# Patient Record
Sex: FEMALE | Race: BLACK OR AFRICAN AMERICAN | Employment: PART TIME | URBAN - METROPOLITAN AREA
[De-identification: names, ages, dates, MRNs, and addresses within clinical notes are randomized per-mention and may not be internally consistent; named-entity substitution may affect disease eponyms.]

---

## 2018-06-20 ENCOUNTER — HOSPITAL ENCOUNTER (EMERGENCY)
Age: 43
Discharge: HOME | End: 2018-06-20
Attending: EMERGENCY MEDICINE
Payer: SUBSIDIZED

## 2018-06-20 ENCOUNTER — HOSPITAL ENCOUNTER (EMERGENCY)
Age: 43
Discharge: HOME | End: 2018-06-21
Attending: EMERGENCY MEDICINE
Payer: SUBSIDIZED

## 2018-06-20 VITALS
TEMPERATURE: 99 F | HEART RATE: 91 BPM | HEIGHT: 63 IN | OXYGEN SATURATION: 99 % | WEIGHT: 246.6 LBS | DIASTOLIC BLOOD PRESSURE: 75 MMHG | SYSTOLIC BLOOD PRESSURE: 126 MMHG | BODY MASS INDEX: 43.7 KG/M2 | RESPIRATION RATE: 18 BRPM

## 2018-06-20 DIAGNOSIS — F41.1 ANXIETY STATE: Primary | ICD-10-CM

## 2018-06-20 PROCEDURE — 99285 EMERGENCY DEPT VISIT HI MDM: CPT

## 2018-06-20 PROCEDURE — 90791 PSYCH DIAGNOSTIC EVALUATION: CPT

## 2018-06-20 PROCEDURE — 93005 ELECTROCARDIOGRAM TRACING: CPT | Performed by: EMERGENCY MEDICINE

## 2018-06-20 PROCEDURE — 93005 ELECTROCARDIOGRAM TRACING: CPT

## 2018-06-20 PROCEDURE — 74011250637 HC RX REV CODE- 250/637: Performed by: EMERGENCY MEDICINE

## 2018-06-20 PROCEDURE — 99283 EMERGENCY DEPT VISIT LOW MDM: CPT

## 2018-06-20 RX ORDER — HYDROXYZINE 50 MG/1
50 TABLET, FILM COATED ORAL
Status: COMPLETED | OUTPATIENT
Start: 2018-06-20 | End: 2018-06-20

## 2018-06-20 RX ORDER — CLONIDINE HYDROCHLORIDE 0.1 MG/1
0.2 TABLET ORAL
Status: COMPLETED | OUTPATIENT
Start: 2018-06-20 | End: 2018-06-20

## 2018-06-20 RX ORDER — HYDROXYZINE 25 MG/1
25 TABLET, FILM COATED ORAL
Qty: 12 TAB | Refills: 0 | Status: SHIPPED | OUTPATIENT
Start: 2018-06-20 | End: 2018-06-22 | Stop reason: SDUPTHER

## 2018-06-20 RX ORDER — LORAZEPAM 1 MG/1
1 TABLET ORAL
Status: COMPLETED | OUTPATIENT
Start: 2018-06-20 | End: 2018-06-20

## 2018-06-20 RX ADMIN — LORAZEPAM 1 MG: 1 TABLET ORAL at 21:55

## 2018-06-20 RX ADMIN — CLONIDINE HYDROCHLORIDE 0.2 MG: 0.1 TABLET ORAL at 23:20

## 2018-06-20 RX ADMIN — HYDROXYZINE HYDROCHLORIDE 50 MG: 50 TABLET, FILM COATED ORAL at 20:59

## 2018-06-20 NOTE — ED PROVIDER NOTES
HPI       41y F with hx of HTN, DM, anxiety here with concern for anxiety. States she recently lost a job and has been stressed out. 4 days ago she started pulling her hair out. Hx of similar a few years ago. At that time was placed on anxiety meds but then taken off by her doctor. Hasn't really had recurrence of problems until recently. No SI or HI. No paranoia. No AH/VH. No complaints of pain. No trouble breathing. Nothing makes sx's better or worse. Past Medical History:   Diagnosis Date    Anxiety     Depression     Diabetes ([facility] [address]     Hypertension     Panic attack     Suicidal thoughts        History reviewed. No pertinent surgical history. History reviewed. No pertinent family history. Social History     Social History    Marital status: SINGLE     Spouse name: N/A    Number of children: N/A    Years of education: N/A     Occupational History    Not on file. Social History Main Topics    Smoking status: Never Smoker    Smokeless tobacco: Not on file    Alcohol use No    Drug use: No    Sexual activity: Not on file     Other Topics Concern    Not on file     Social History Narrative         ALLERGIES: Lisinopril    Review of Systems  Review of Systems   Constitutional: (-) weight loss. HEENT: (-) stiff neck   Eyes: (-) discharge. Respiratory: (-) for cough. Cardiovascular: (-) syncope. Gastrointestinal: (-) blood in stool. Genitourinary: (-) hematuria. Musculoskeletal: (-) myalgias. Neurological: (-) seizure. Skin: (-) petechiae  Lymph/Immunologic: (-) enlarged lymph nodes  All other systems reviewed and are negative. Vitals:    06/20/18 1208   BP: 126/75   Pulse: 91   Resp: 18   Temp: 99 °F (37.2 °C)   SpO2: 99%   Weight: 111.9 kg (246 lb 9.6 oz)   Height: 5' 3\" (1.6 m)            Physical Exam Nursing note and vitals reviewed. Constitutional: oriented to person, place, and time. appears well-developed and well-nourished. No distress.   Head: Normocephalic and atraumatic. Sclera anicteric  Nose: No rhinorrhea  Mouth/Throat: Oropharynx is clear and moist. Pharynx normal  Eyes: Conjunctivae are normal. Pupils are equal, round, and reactive to light. Right eye exhibits no discharge. Left eye exhibits no discharge. Neck: Painless normal range of motion. Neck supple. No LAD. Cardiovascular: Normal rate, regular rhythm, normal heart sounds and intact distal pulses. Exam reveals no gallop and no friction rub. No murmur heard. Pulmonary/Chest:  No respiratory distress. No wheezes. No rales. No rhonchi. No increased work of breathing. No accessory muscle use. Good air exchange throughout. Abdominal: soft, non-tender, no rebound or guarding. No hepatosplenomegaly. Normal bowel sounds throughout. Back: no tenderness to palpation, no deformities, no CVA tenderness  Extremities/Musculoskeletal: Normal range of motion. no tenderness. No edema. Distal extremities are neurovasc intact. Lymphadenopathy:   No adenopathy. Neurological:  Alert and oriented to person, place, and time. Coordination normal. CN 2-12 intact. Motor and sensory function intact. Skin: Skin is warm and dry. No rash noted. No pallor. MDM 41y F here with anxiety. Hx of similar controlled with meds but then taken off by her doctor. Doesn't recall what she had been on in the past. No SI/HI. Will have [facility] evaluate.       ED Course       Procedures

## 2018-06-20 NOTE — ED TRIAGE NOTES
Pt states she needs a refill on her psychiatric medications. Can't be seen by her MD til July. States she does not know the name of the medications. States she was taken off her medications by her PCP back in 2016 but feels like she needs to be on the medications. Complains of anxiety and that she has been pulling her hair.

## 2018-06-20 NOTE — DISCHARGE INSTRUCTIONS
Anxiety Disorder: Care Instructions  Your Care Instructions    Anxiety is a normal reaction to stress. Difficult situations can cause you to have symptoms such as sweaty palms and a nervous feeling. In an anxiety disorder, the symptoms are far more severe. Constant worry, muscle tension, trouble sleeping, nausea and diarrhea, and other symptoms can make normal daily activities difficult or impossible. These symptoms may occur for no reason, and they can affect your work, school, or social life. Medicines, counseling, and self-care can all help. Follow-up care is a key part of your treatment and safety. Be sure to make and go to all appointments, and call your doctor if you are having problems. It's also a good idea to know your test results and keep a list of the medicines you take. How can you care for yourself at home? · Take medicines exactly as directed. Call your doctor if you think you are having a problem with your medicine. · Go to your counseling sessions and follow-up appointments. · Recognize and accept your anxiety. Then, when you are in a situation that makes you anxious, say to yourself, \"This is not an emergency. I feel uncomfortable, but I am not in danger. I can keep going even if I feel anxious. \"  · Be kind to your body:  ¨ Relieve tension with exercise or a massage. ¨ Get enough rest.  ¨ Avoid alcohol, caffeine, nicotine, and illegal drugs. They can increase your anxiety level and cause sleep problems. ¨ Learn and do relaxation techniques. See below for more about these techniques. · Engage your mind. Get out and do something you enjoy. Go to a funny movie, or take a walk or hike. Plan your day. Having too much or too little to do can make you anxious. · Keep a record of your symptoms. Discuss your fears with a good friend or family member, or join a support group for people with similar problems. Talking to others sometimes relieves stress.   · Get involved in social groups, or volunteer to help others. Being alone sometimes makes things seem worse than they are. · Get at least 30 minutes of exercise on most days of the week to relieve stress. Walking is a good choice. You also may want to do other activities, such as running, swimming, cycling, or playing tennis or team sports. Relaxation techniques  Do relaxation exercises 10 to 20 minutes a day. You can play soothing, relaxing music while you do them, if you wish. · Tell others in your house that you are going to do your relaxation exercises. Ask them not to disturb you. · Find a comfortable place, away from all distractions and noise. · Lie down on your back, or sit with your back straight. · Focus on your breathing. Make it slow and steady. · Breathe in through your nose. Breathe out through either your nose or mouth. · Breathe deeply, filling up the area between your navel and your rib cage. Breathe so that your belly goes up and down. · Do not hold your breath. · Breathe like this for 5 to 10 minutes. Notice the feeling of calmness throughout your whole body. As you continue to breathe slowly and deeply, relax by doing the following for another 5 to 10 minutes:  · Tighten and relax each muscle group in your body. You can begin at your toes and work your way up to your head. · Imagine your muscle groups relaxing and becoming heavy. · Empty your mind of all thoughts. · Let yourself relax more and more deeply. · Become aware of the state of calmness that surrounds you. · When your relaxation time is over, you can bring yourself back to alertness by moving your fingers and toes and then your hands and feet and then stretching and moving your entire body. Sometimes people fall asleep during relaxation, but they usually wake up shortly afterward. · Always give yourself time to return to full alertness before you drive a car or do anything that might cause an accident if you are not fully alert.  Never play a relaxation tape while you drive a car. When should you call for help? Call 911 anytime you think you may need emergency care. For example, call if:  ? · You feel you cannot stop from hurting yourself or someone else. ? Keep the numbers for these national suicide hotlines: 0-092-705-TALK (8-857-248-675.180.4469) and 8-469-XVQOUXN (9-743.488.9201). If you or someone you know talks about suicide or feeling hopeless, get help right away. ? Watch closely for changes in your health, and be sure to contact your doctor if:  ? · You have anxiety or fear that affects your life. ? · You have symptoms of anxiety that are new or different from those you had before. Where can you learn more? Go to http://scott-clara.info/. Enter P754 in the search box to learn more about \"Anxiety Disorder: Care Instructions. \"  Current as of: May 12, 2017  Content Version: 11.4  © 0099-0138 Healthwise, Incorporated. Care instructions adapted under license by [employer] (which disclaims liability or warranty for this information). If you have questions about a medical condition or this instruction, always ask your healthcare professional. [address] any warranty or liability for your use of this information.

## 2018-06-20 NOTE — ED NOTES
12:10 PM  I have evaluated the patient as the Provider in Triage. I have reviewed Her vital signs and the triage nurse assessment. I have talked with the patient and any available family and advised that I am the provider in triage and have ordered the appropriate study to initiate their work up based on the clinical presentation during my assessment. I have advised that the patient will be accommodated in the Main ED as soon as possible. I have also requested to contact the triage nurse or myself immediately if the patient experiences any changes in their condition during this brief waiting period. The patient reports anxiety and hair-pulling for the past 4 days. She reports being admitted 2 years ago for similar symptoms but has not been on any medication for the past 2 years and is concerned she needs to be back on medication. No SI/HI.   AQUILINO Hernandez

## 2018-06-20 NOTE — BSM[facility] NOTE
Comprehensive Assessment Form Part 1      Section I - Disposition    Axis I - Anxiety Unspecified, Trichotillomania   Axis II - Deferred  Axis III -   Past Medical History:   Diagnosis Date    Anxiety     Depression     Diabetes ([address]     Hypertension     Panic attack     Suicidal thoughts        Axis IV - Loss of job  [place] [address] 46      The Medical Doctor to Psychiatrist conference was not completed. The Medical Doctor is in agreement with Psychiatrist disposition because of (reason) Patient does not require admission. The plan is discharge with resources to follow up. The on-call Psychiatrist consulted was Dr. Brenna Sarkar. The admitting Psychiatrist will be Dr. Tanner Sotomayor. The admitting Diagnosis is NA. The Payor source is self pay. Section II - Integrated Summary  Summary:  Patient came in accompanied by her mother due to increased anxiety and hair pulling since Saturday. Patient has history of same. Patient also has history of depression and was admitted here in 2016. Patient was discharged on Prozac, Clonipin, Risperdal, Hydroxyzine prn and Ambien prn. Per patient she has not taken medications since 2016 because she had been doing well. Patient reported she called her previous psychiatrist and can't be seen until September. Patient is alert and oriented. Mood is anxious and she reported poor sleep the last couple nights. Patient denied any current hallucinations, SI or HI. The patienthas demonstrated mental capacity to provide informed consent. The information is given by the patient and past medical records. The Chief Complaint is anxiety and hair pulling. The Precipitant Factors are loss of job. Previous Hospitalizations: Yes  Current Psychiatrist and/or  is NA. Lethality Assessment:    The potential for suicide noted by the following: Patient denied any SI . The potential for homicide is not noted. The patient has not been a perpetrator of sexual or physical abuse.   There are not pending charges. The patient is not felt to be at risk for self harm or harm to others. The attending nurse was advised that security has not been notified. Section III - Psychosocial  The patient's overall mood and attitude is anxious. Feelings of helplessness and hopelessness are not observed. Generalized anxiety is not observed. Panic is not observed. Phobias are not observed. Obsessive compulsive tendencies are not observed. Section IV - Mental Status Exam  The patient's appearance shows no evidence of impairment. The patient's behavior shows no evidence of impairment. The patient is oriented to time, place, person and situation. The patient's speech shows no evidence of impairment. The patient's mood is anxious. The range of affect shows no evidence of impairment. The patient's thought content demonstrates no evidence of impairment. The thought process shows no evidence of impairment. The patient's perception shows no evidence of impairment. The patient's memory shows no evidence of impairment. The patient's appetite shows no evidence of impairment. The patient's sleep has evidence of insomnia. The patient shows no insight. The patient's judgement is psychologically impaired. Section V - Substance Abuse  The patient is not using substances. Section VI - Living Arrangements  The patient is single. The patient lives alone. The patient has no children. The patient does plan to return home upon discharge. The patient does not have legal issues pending. The patient's source of income comes from [employer]. Buddhist and cultural practices have not been voiced at this time. The patient's greatest support comes from family and this person will not be involved with the treatment.     The patient has not been in an event described as horrible or outside the realm of ordinary life experience either currently or in the past.  The patient has not been a victim of sexual/physical abuse. Section VII - Other Areas of Clinical Concern  The highest grade achieved is college with the overall quality of school experience being described as NA. The patient is currently unemployed and speaks [place] as a primary language. The patient has no communication impairments affecting communication. The patient's preference for learning can be described as: can read and write adequately. The patient's hearing is normal.  The patient's vision is impaired and  wears glasses or contacts.       Jose Mejias, LPC

## 2018-06-21 VITALS
WEIGHT: 250.6 LBS | OXYGEN SATURATION: 94 % | SYSTOLIC BLOOD PRESSURE: 133 MMHG | RESPIRATION RATE: 16 BRPM | BODY MASS INDEX: 44.39 KG/M2 | DIASTOLIC BLOOD PRESSURE: 85 MMHG | TEMPERATURE: 98.2 F | HEART RATE: 80 BPM

## 2018-06-21 LAB
ATRIAL RATE: 99 BPM
CALCULATED P AXIS, ECG09: 46 DEGREES
CALCULATED R AXIS, ECG10: -9 DEGREES
CALCULATED T AXIS, ECG11: 29 DEGREES
DIAGNOSIS, 93000: NORMAL
P-R INTERVAL, ECG05: 156 MS
Q-T INTERVAL, ECG07: 350 MS
QRS DURATION, ECG06: 72 MS
QTC CALCULATION (BEZET), ECG08: 449 MS
VENTRICULAR RATE, ECG03: 99 BPM

## 2018-06-21 NOTE — DISCHARGE INSTRUCTIONS
Anxiety Disorder: Care Instructions  Your Care Instructions    Anxiety is a normal reaction to stress. Difficult situations can cause you to have symptoms such as sweaty palms and a nervous feeling. In an anxiety disorder, the symptoms are far more severe. Constant worry, muscle tension, trouble sleeping, nausea and diarrhea, and other symptoms can make normal daily activities difficult or impossible. These symptoms may occur for no reason, and they can affect your work, school, or social life. Medicines, counseling, and self-care can all help. Follow-up care is a key part of your treatment and safety. Be sure to make and go to all appointments, and call your doctor if you are having problems. It's also a good idea to know your test results and keep a list of the medicines you take. How can you care for yourself at home? · Take medicines exactly as directed. Call your doctor if you think you are having a problem with your medicine. · Go to your counseling sessions and follow-up appointments. · Recognize and accept your anxiety. Then, when you are in a situation that makes you anxious, say to yourself, \"This is not an emergency. I feel uncomfortable, but I am not in danger. I can keep going even if I feel anxious. \"  · Be kind to your body:  ¨ Relieve tension with exercise or a massage. ¨ Get enough rest.  ¨ Avoid alcohol, caffeine, nicotine, and illegal drugs. They can increase your anxiety level and cause sleep problems. ¨ Learn and do relaxation techniques. See below for more about these techniques. · Engage your mind. Get out and do something you enjoy. Go to a funny movie, or take a walk or hike. Plan your day. Having too much or too little to do can make you anxious. · Keep a record of your symptoms. Discuss your fears with a good friend or family member, or join a support group for people with similar problems. Talking to others sometimes relieves stress.   · Get involved in social groups, or volunteer to help others. Being alone sometimes makes things seem worse than they are. · Get at least 30 minutes of exercise on most days of the week to relieve stress. Walking is a good choice. You also may want to do other activities, such as running, swimming, cycling, or playing tennis or team sports. Relaxation techniques  Do relaxation exercises 10 to 20 minutes a day. You can play soothing, relaxing music while you do them, if you wish. · Tell others in your house that you are going to do your relaxation exercises. Ask them not to disturb you. · Find a comfortable place, away from all distractions and noise. · Lie down on your back, or sit with your back straight. · Focus on your breathing. Make it slow and steady. · Breathe in through your nose. Breathe out through either your nose or mouth. · Breathe deeply, filling up the area between your navel and your rib cage. Breathe so that your belly goes up and down. · Do not hold your breath. · Breathe like this for 5 to 10 minutes. Notice the feeling of calmness throughout your whole body. As you continue to breathe slowly and deeply, relax by doing the following for another 5 to 10 minutes:  · Tighten and relax each muscle group in your body. You can begin at your toes and work your way up to your head. · Imagine your muscle groups relaxing and becoming heavy. · Empty your mind of all thoughts. · Let yourself relax more and more deeply. · Become aware of the state of calmness that surrounds you. · When your relaxation time is over, you can bring yourself back to alertness by moving your fingers and toes and then your hands and feet and then stretching and moving your entire body. Sometimes people fall asleep during relaxation, but they usually wake up shortly afterward. · Always give yourself time to return to full alertness before you drive a car or do anything that might cause an accident if you are not fully alert.  Never play a relaxation tape while you drive a car. When should you call for help? Call 911 anytime you think you may need emergency care. For example, call if:  ? · You feel you cannot stop from hurting yourself or someone else. ? Keep the numbers for these national suicide hotlines: 4-634-469-TALK (6-566.126.4127) and 0-493-AWZPZES (3-209.189.2315). If you or someone you know talks about suicide or feeling hopeless, get help right away. ? Watch closely for changes in your health, and be sure to contact your doctor if:  ? · You have anxiety or fear that affects your life. ? · You have symptoms of anxiety that are new or different from those you had before. Where can you learn more? Go to http://scott-clara.info/. Enter P754 in the search box to learn more about \"Anxiety Disorder: Care Instructions. \"  Current as of: May 12, 2017  Content Version: 11.4  © 3769-6631 Healthwise, Incorporated. Care instructions adapted under license by [employer] (which disclaims liability or warranty for this information). If you have questions about a medical condition or this instruction, always ask your healthcare professional. [address] any warranty or liability for your use of this information.

## 2018-06-21 NOTE — ED TRIAGE NOTES
TRIAGE NOTE: Pt arrives for pulling her hair and panic attacks that have been worsening. Mother states she's been pulling her hair out in large clumps and having violent panic attacks. Seen earlier for same, took prescribed hydroxyzine without relief.
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
## 2018-06-21 NOTE — BSMART NOTE
Comprehensive Assessment Form Part 1        Section I - Disposition     Axis I - Anxiety Unspecified, Trichotillomania   Axis II - Deferred  Axis III -        Past Medical History:   Diagnosis Date    Anxiety      Depression      Diabetes ([facility] [address]      Hypertension      Panic attack      Suicidal thoughts           Axis IV - Unemployed (6/15/18)  Axis V - 50        The Medical Doctor to Psychiatrist conference was not completed. The Medical Doctor is in agreement with Psychiatrist disposition because of (reason) Patient is seeking a voluntary admission, patient does not warrant admission criteria. The plan continue medications as given connect with psychiatrist. Patient given information for [employer]. The on-call Psychiatrist consulted was Dr. Yahaira Castro. The admitting Psychiatrist will be  .  The admitting Diagnosis is   The Payor source is self pay. Section II - Integrated Summary  Summary:   Patient is 38year old  female reporting to ED for the second time to day with compliant  Pt arrives for pulling her hair and panic attacks that have been worsening. Mother states she's been pulling her hair out in large clumps and having violent panic attacks. Seen earlier for same, took prescribed hydroxyzine without relief. As reported patient was given prescription for Hydroxyzine and after taking did not have relief so she returned. At bedside, patient denied suicidal, homicidal thoughts and hallucinations. Patient stated she was scared because she could not stop what is going on. Patient acknowledged that she was hurting herself by as reported hitting herself in head and pulling at her hair. Patient reported that she lost her job on Friday 6/15/18 and symptoms started Saturday as she was away from home to visit parents in [place]. As reported by her mother things have been worse since Sunday evening and Monday.  Patient as reported has had similar problem in past. Patient was hospitalized last in 2016. Patient does not have psychiatrist at this time. Patient reported difficulty sleeping. Patient reported she cannot calm her mind. Patient lives alone but has her mother here from out of town. Patient stated she cannot get a hold it. The patienthas demonstrated mental capacity to provide informed consent. The information is given by the patient and past medical records. The Chief Complaint is anxiety and hair pulling. The Precipitant Factors are lost her job. Previous Hospitalizations: Yes  Current Psychiatrist and/or  is NA.     Lethality Assessment:     The potential for suicide noted by the following: Patient denied any SI . The potential for homicide is not noted. The patient has not been a perpetrator of sexual or physical abuse. There are not pending charges. The patient is not felt to be at risk for self harm or harm to others. The attending nurse was advised that security has not been notified.     Section III - Psychosocial  The patient's overall mood and attitude is anxious. Feelings of helplessness and hopelessness are not observed. Generalized anxiety is not observed. Panic is not observed. Phobias are not observed. Obsessive compulsive tendencies are not observed.       Section IV - Mental Status Exam  The patient's appearance shows no evidence of impairment. The patient's behavior shows no evidence of impairment. The patient is oriented to time, place, person and situation. The patient's speech shows no evidence of impairment. The patient's mood is anxious. The range of affect shows no evidence of impairment. The patient's thought content demonstrates no evidence of impairment. The thought process shows no evidence of impairment. The patient's perception shows no evidence of impairment. The patient's memory shows no evidence of impairment. The patient's appetite shows no evidence of impairment. The patient's sleep has evidence of insomnia.  The patient shows no insight. The patient's judgement is psychologically impaired.                         Section V - Substance Abuse  The patient is not using substances.       Section VI - Living Arrangements  The patient is single. The patient lives alone. The patient has no children. The patient does plan to return home upon discharge. The patient does not have legal issues pending. The patient's source of income comes from [employer]. Rastafari and cultural practices have not been voiced at this time.     The patient's greatest support comes from family and this person will not be involved with the treatment. The patient has not been in an event described as horrible or outside the realm of ordinary life experience either currently or in the past.  The patient has not been a victim of sexual/physical abuse.     Section VII - Other Areas of Clinical Concern  The highest grade achieved is college with the overall quality of school experience being described as NA. The patient is currently unemployed and speaks [place] as a primary language. The patient has no communication impairments affecting communication. The patient's preference for learning can be described as: can read and write adequately. The patient's hearing is normal.  The patient's vision is impaired and  wears glasses or contacts.

## 2018-06-21 NOTE — ED NOTES
Patient has been medically cleared for discharge at this time. She has been given all her discharge papers. Told to follow up with psychiatrist in AM. Patient assisted out of the department and taken home by her mother.

## 2018-06-22 ENCOUNTER — HOSPITAL ENCOUNTER (INPATIENT)
Age: 43
LOS: 5 days | Discharge: HOME | DRG: 885 | End: 2018-06-27
Attending: EMERGENCY MEDICINE | Admitting: PSYCHIATRY & NEUROLOGY
Payer: SUBSIDIZED

## 2018-06-22 DIAGNOSIS — F51.04 PSYCHOPHYSIOLOGICAL INSOMNIA: ICD-10-CM

## 2018-06-22 DIAGNOSIS — F29 PSYCHOSIS, UNSPECIFIED PSYCHOSIS TYPE (HCC): Primary | ICD-10-CM

## 2018-06-22 DIAGNOSIS — F41.0 SEVERE ANXIETY WITH PANIC: ICD-10-CM

## 2018-06-22 LAB
ALBUMIN SERPL-MCNC: 3.5 G/DL (ref 3.5–5)
ALBUMIN/GLOB SERPL: 0.8 {RATIO} (ref 1.1–2.2)
ALP SERPL-CCNC: 82 U/L (ref 45–117)
ALT SERPL-CCNC: 41 U/L (ref 12–78)
AMPHET UR QL SCN: NEGATIVE
ANION GAP SERPL CALC-SCNC: 11 MMOL/L (ref 5–15)
APAP SERPL-MCNC: <2 UG/ML (ref 10–30)
APPEARANCE UR: CLEAR
AST SERPL-CCNC: 28 U/L (ref 15–37)
BACTERIA URNS QL MICRO: NEGATIVE /HPF
BARBITURATES UR QL SCN: NEGATIVE
BASOPHILS # BLD: 0 K/UL (ref 0–0.1)
BASOPHILS NFR BLD: 1 % (ref 0–1)
BENZODIAZ UR QL: NEGATIVE
BILIRUB SERPL-MCNC: 0.4 MG/DL (ref 0.2–1)
BILIRUB UR QL: NEGATIVE
BUN SERPL-MCNC: 9 MG/DL (ref 6–20)
BUN/CREAT SERPL: 10 (ref 12–20)
CALCIUM SERPL-MCNC: 9.1 MG/DL (ref 8.5–10.1)
CANNABINOIDS UR QL SCN: NEGATIVE
CHLORIDE SERPL-SCNC: 104 MMOL/L (ref 97–108)
CO2 SERPL-SCNC: 23 MMOL/L (ref 21–32)
COCAINE UR QL SCN: NEGATIVE
COLOR UR: NORMAL
CREAT SERPL-MCNC: 0.94 MG/DL (ref 0.55–1.02)
DIFFERENTIAL METHOD BLD: ABNORMAL
DRUG SCRN COMMENT,DRGCM: NORMAL
EOSINOPHIL # BLD: 0.1 K/UL (ref 0–0.4)
EOSINOPHIL NFR BLD: 1 % (ref 0–7)
EPITH CASTS URNS QL MICRO: NORMAL /LPF
ERYTHROCYTE [DISTWIDTH] IN BLOOD BY AUTOMATED COUNT: 14.6 % (ref 11.5–14.5)
ETHANOL SERPL-MCNC: <10 MG/DL
GLOBULIN SER CALC-MCNC: 4.6 G/DL (ref 2–4)
GLUCOSE SERPL-MCNC: 131 MG/DL (ref 65–100)
GLUCOSE UR STRIP.AUTO-MCNC: NEGATIVE MG/DL
HCG SERPL QL: NEGATIVE
HCT VFR BLD AUTO: 37.2 % (ref 35–47)
HGB BLD-MCNC: 11.9 G/DL (ref 11.5–16)
HGB UR QL STRIP: NEGATIVE
HYALINE CASTS URNS QL MICRO: NORMAL /LPF (ref 0–5)
IMM GRANULOCYTES # BLD: 0 K/UL (ref 0–0.04)
IMM GRANULOCYTES NFR BLD AUTO: 0 % (ref 0–0.5)
KETONES UR QL STRIP.AUTO: NEGATIVE MG/DL
LEUKOCYTE ESTERASE UR QL STRIP.AUTO: NEGATIVE
LYMPHOCYTES # BLD: 1.5 K/UL (ref 0.8–3.5)
LYMPHOCYTES NFR BLD: 18 % (ref 12–49)
MCH RBC QN AUTO: 28.5 PG (ref 26–34)
MCHC RBC AUTO-ENTMCNC: 32 G/DL (ref 30–36.5)
MCV RBC AUTO: 89.2 FL (ref 80–99)
METHADONE UR QL: NEGATIVE
MONOCYTES # BLD: 0.5 K/UL (ref 0–1)
MONOCYTES NFR BLD: 6 % (ref 5–13)
NEUTS SEG # BLD: 6 K/UL (ref 1.8–8)
NEUTS SEG NFR BLD: 75 % (ref 32–75)
NITRITE UR QL STRIP.AUTO: NEGATIVE
NRBC # BLD: 0 K/UL (ref 0–0.01)
NRBC BLD-RTO: 0 PER 100 WBC
OPIATES UR QL: NEGATIVE
PCP UR QL: NEGATIVE
PH UR STRIP: 7.5 [PH] (ref 5–8)
PLATELET # BLD AUTO: 331 K/UL (ref 150–400)
PMV BLD AUTO: 9.9 FL (ref 8.9–12.9)
POTASSIUM SERPL-SCNC: 3 MMOL/L (ref 3.5–5.1)
PROT SERPL-MCNC: 8.1 G/DL (ref 6.4–8.2)
PROT UR STRIP-MCNC: NEGATIVE MG/DL
RBC # BLD AUTO: 4.17 M/UL (ref 3.8–5.2)
RBC #/AREA URNS HPF: NORMAL /HPF (ref 0–5)
SALICYLATES SERPL-MCNC: <1.7 MG/DL (ref 2.8–20)
SODIUM SERPL-SCNC: 138 MMOL/L (ref 136–145)
SP GR UR REFRACTOMETRY: 1.01 (ref 1–1.03)
UR CULT HOLD, URHOLD: NORMAL
UROBILINOGEN UR QL STRIP.AUTO: 0.2 EU/DL (ref 0.2–1)
WBC # BLD AUTO: 8.1 K/UL (ref 3.6–11)
WBC URNS QL MICRO: NORMAL /HPF (ref 0–4)

## 2018-06-22 PROCEDURE — 80307 DRUG TEST PRSMV CHEM ANLYZR: CPT | Performed by: EMERGENCY MEDICINE

## 2018-06-22 PROCEDURE — 90791 PSYCH DIAGNOSTIC EVALUATION: CPT

## 2018-06-22 PROCEDURE — 65220000001 HC RM PRIVATE PSYCH

## 2018-06-22 PROCEDURE — 36415 COLL VENOUS BLD VENIPUNCTURE: CPT | Performed by: EMERGENCY MEDICINE

## 2018-06-22 PROCEDURE — 84703 CHORIONIC GONADOTROPIN ASSAY: CPT | Performed by: EMERGENCY MEDICINE

## 2018-06-22 PROCEDURE — 74011000250 HC RX REV CODE- 250: Performed by: PSYCHIATRY & NEUROLOGY

## 2018-06-22 PROCEDURE — 85025 COMPLETE CBC W/AUTO DIFF WBC: CPT | Performed by: EMERGENCY MEDICINE

## 2018-06-22 PROCEDURE — 74011250637 HC RX REV CODE- 250/637: Performed by: HOSPITALIST

## 2018-06-22 PROCEDURE — 99284 EMERGENCY DEPT VISIT MOD MDM: CPT

## 2018-06-22 PROCEDURE — 74011250637 HC RX REV CODE- 250/637: Performed by: EMERGENCY MEDICINE

## 2018-06-22 PROCEDURE — 80053 COMPREHEN METABOLIC PANEL: CPT | Performed by: EMERGENCY MEDICINE

## 2018-06-22 PROCEDURE — 74011250636 HC RX REV CODE- 250/636: Performed by: PSYCHIATRY & NEUROLOGY

## 2018-06-22 PROCEDURE — 81001 URINALYSIS AUTO W/SCOPE: CPT | Performed by: EMERGENCY MEDICINE

## 2018-06-22 RX ORDER — IBUPROFEN 200 MG
1 TABLET ORAL
Status: DISCONTINUED | OUTPATIENT
Start: 2018-06-22 | End: 2018-06-27 | Stop reason: HOSPADM

## 2018-06-22 RX ORDER — HYDROXYZINE PAMOATE 25 MG/1
25-50 CAPSULE ORAL
COMMUNITY
End: 2018-06-27

## 2018-06-22 RX ORDER — ACETAMINOPHEN 325 MG/1
650 TABLET ORAL
Status: DISCONTINUED | OUTPATIENT
Start: 2018-06-22 | End: 2018-06-27 | Stop reason: HOSPADM

## 2018-06-22 RX ORDER — IBUPROFEN 400 MG/1
400 TABLET ORAL
Status: DISCONTINUED | OUTPATIENT
Start: 2018-06-22 | End: 2018-06-27 | Stop reason: HOSPADM

## 2018-06-22 RX ORDER — FLUOXETINE HYDROCHLORIDE 20 MG/1
40 CAPSULE ORAL DAILY
COMMUNITY
End: 2018-06-27

## 2018-06-22 RX ORDER — METFORMIN HYDROCHLORIDE 500 MG/1
500 TABLET ORAL 2 TIMES DAILY WITH MEALS
Status: DISCONTINUED | OUTPATIENT
Start: 2018-06-23 | End: 2018-06-27 | Stop reason: HOSPADM

## 2018-06-22 RX ORDER — QUETIAPINE FUMARATE 25 MG/1
25 TABLET, FILM COATED ORAL
COMMUNITY
End: 2018-06-27

## 2018-06-22 RX ORDER — LOSARTAN POTASSIUM 50 MG/1
50 TABLET ORAL DAILY
Status: DISCONTINUED | OUTPATIENT
Start: 2018-06-23 | End: 2018-06-27 | Stop reason: HOSPADM

## 2018-06-22 RX ORDER — LORAZEPAM 2 MG/ML
2 INJECTION INTRAMUSCULAR
Status: DISCONTINUED | OUTPATIENT
Start: 2018-06-22 | End: 2018-06-27 | Stop reason: HOSPADM

## 2018-06-22 RX ORDER — POTASSIUM CHLORIDE 750 MG/1
40 TABLET, FILM COATED, EXTENDED RELEASE ORAL 2 TIMES DAILY
Status: COMPLETED | OUTPATIENT
Start: 2018-06-22 | End: 2018-06-23

## 2018-06-22 RX ORDER — BENZTROPINE MESYLATE 1 MG/1
2 TABLET ORAL
Status: DISCONTINUED | OUTPATIENT
Start: 2018-06-22 | End: 2018-06-27 | Stop reason: HOSPADM

## 2018-06-22 RX ORDER — OLANZAPINE 5 MG/1
5 TABLET ORAL
Status: DISCONTINUED | OUTPATIENT
Start: 2018-06-22 | End: 2018-06-26

## 2018-06-22 RX ORDER — ZOLPIDEM TARTRATE 10 MG/1
10 TABLET ORAL
Status: DISCONTINUED | OUTPATIENT
Start: 2018-06-22 | End: 2018-06-22 | Stop reason: DRUGHIGH

## 2018-06-22 RX ORDER — LORAZEPAM 1 MG/1
1 TABLET ORAL
Status: DISCONTINUED | OUTPATIENT
Start: 2018-06-22 | End: 2018-06-27 | Stop reason: HOSPADM

## 2018-06-22 RX ORDER — LORAZEPAM 1 MG/1
1 TABLET ORAL
Status: COMPLETED | OUTPATIENT
Start: 2018-06-22 | End: 2018-06-22

## 2018-06-22 RX ORDER — LORAZEPAM 2 MG/ML
1 INJECTION INTRAMUSCULAR
Status: DISCONTINUED | OUTPATIENT
Start: 2018-06-22 | End: 2018-06-22

## 2018-06-22 RX ORDER — BENZTROPINE MESYLATE 1 MG/ML
2 INJECTION INTRAMUSCULAR; INTRAVENOUS
Status: DISCONTINUED | OUTPATIENT
Start: 2018-06-22 | End: 2018-06-27 | Stop reason: HOSPADM

## 2018-06-22 RX ORDER — ADHESIVE BANDAGE
30 BANDAGE TOPICAL DAILY PRN
Status: DISCONTINUED | OUTPATIENT
Start: 2018-06-22 | End: 2018-06-27 | Stop reason: HOSPADM

## 2018-06-22 RX ORDER — TRIAMTERENE/HYDROCHLOROTHIAZID 37.5-25 MG
1 TABLET ORAL DAILY
Status: DISCONTINUED | OUTPATIENT
Start: 2018-06-23 | End: 2018-06-27 | Stop reason: HOSPADM

## 2018-06-22 RX ORDER — ZOLPIDEM TARTRATE 5 MG/1
5 TABLET ORAL
Status: DISCONTINUED | OUTPATIENT
Start: 2018-06-22 | End: 2018-06-26

## 2018-06-22 RX ADMIN — LORAZEPAM 2 MG: 2 INJECTION INTRAMUSCULAR; INTRAVENOUS at 16:54

## 2018-06-22 RX ADMIN — WATER 20 MG: 1 INJECTION INTRAMUSCULAR; INTRAVENOUS; SUBCUTANEOUS at 16:55

## 2018-06-22 RX ADMIN — LORAZEPAM 1 MG: 1 TABLET ORAL at 13:28

## 2018-06-22 RX ADMIN — POTASSIUM CHLORIDE 40 MEQ: 750 TABLET, EXTENDED RELEASE ORAL at 22:52

## 2018-06-22 NOTE — ED TRIAGE NOTES
Seen twice Tuesday for medication refill and anxiety. Started on Seroquel 25mg daily yesterday by psychiatrist. Today, c/o being suicidal without a plan and then said \"No, I'm not going to kill myself\".

## 2018-06-22 NOTE — BSMART NOTE
Comprehensive Assessment Form Part 1        Section I - Disposition     Axis I - Anxiety Unspecified, Trichotillomania   Axis II - Deferred  Axis III -        Past Medical History:   Diagnosis Date    Anxiety      Depression      Diabetes ([address]      Hypertension      Panic attack      Suicidal thoughts           Axis IV - Loss of job  Axis V - 50        The Medical Doctor to Psychiatrist conference was not completed. The Medical Doctor is in agreement with Psychiatrist disposition because of (reason) patient warrants inpatient care for stabilization. The plan is admit. The on-call Psychiatrist consulted was Dr. [place] Blair  The admitting Psychiatrist will be Dr. Mel Lorenzo  The admitting Diagnosis is Anxiety D/O NOS  The Payor source is self pay. Section II - Integrated Summary  Summary: Patient returns to ER for 3rd time in 2 days. Says that she saw a Psychiatrist yesterday, Dr. Kat Rivera, at [facility]. She was started on Seroquel 25mg for sleep. Says that this didn't work. Last night she began thinking about ending her life saying that she can't live like this anymore. This morning she's had similar thoughts. She denies any plans or history of attempts. Patient noted to have a somewhat bizarre presentation. She is seen jerking, pulling her hair, pulling her mouth, and intermittently staring off without speaking. She denies any AH/VH, however patient appears disorganized and seems to be thought blocking. Per note on 6/21- Patient came in accompanied by her mother due to increased anxiety and hair pulling since Saturday. Patient has history of same. Patient also has history of depression and was admitted here in 2016. Patient was discharged on Prozac, Clonipin, Risperdal, Hydroxyzine prn and Ambien prn. Per patient she has not taken medications since 2016 because she had been doing well. Patient reported she called her previous psychiatrist and can't be seen until September.   Patient is alert and oriented. Mood is anxious and she reported poor sleep the last couple nights. Patient denied any current hallucinations, SI or HI. The patienthas demonstrated mental capacity to provide informed consent. The information is given by the patient and past medical records. The Chief Complaint is anxiety and hair pulling. The Precipitant Factors are loss of job. Previous Hospitalizations: Yes  Current Psychiatrist and/or  is David Narvaez, Psych NP at [facility].     Lethality Assessment:     The potential for suicide noted by the following: ideation . The potential for homicide is not noted. The patient has not been a perpetrator of sexual or physical abuse. There are not pending charges. The patient is not felt to be at risk for self harm or harm to others. The attending nurse was advised that security has not been notified.     Section III - Psychosocial  The patient's overall mood and attitude is anxious. Feelings of helplessness and hopelessness are not observed. Generalized anxiety is not observed. Panic is not observed. Phobias are not observed. Obsessive compulsive tendencies are not observed.       Section IV - Mental Status Exam  The patient's appearance shows no evidence of impairment. The patient's behavior shows restlessness. The patient is oriented to time, place, person and situation. The patient's speech shows no evidence of impairment. The patient's mood is anxious. The range of affect shows no evidence of impairment. The patient's thought content demonstrates no evidence of impairment. The thought process shows no evidence of impairment. The patient's perception shows no evidence of impairment. The patient's memory shows no evidence of impairment. The patient's appetite shows no evidence of impairment. The patient's sleep has evidence of insomnia. The patient shows little insight.   The patient's judgement is psychologically impaired.              Section V - Substance Abuse  The patient is not using substances.       Section VI - Living Arrangements  The patient is single. The patient lives alone. The patient has no children. The patient does plan to return home upon discharge. The patient does not have legal issues pending. The patient's source of income comes from [employer]. Baptism and cultural practices have not been voiced at this time.     The patient's greatest support comes from family and this person will not be involved with the treatment. The patient has not been in an event described as horrible or outside the realm of ordinary life experience either currently or in the past.  The patient has not been a victim of sexual/physical abuse.     Section VII - Other Areas of Clinical Concern  The highest grade achieved is college with the overall quality of school experience being described as good. The patient is currently unemployed and speaks [place] as a primary language. The patient has no communication impairments affecting communication. The patient's preference for learning can be described as: can read and write adequately.  The patient's hearing is normal.  The patient's vision is impaired and  wears glasses or contacts.        Flako Zamarripa [facility]

## 2018-06-22 NOTE — ROUTINE PROCESS
TRANSFER - OUT REPORT:    Verbal report given to Giovani Velasquez RN (name) on Desiree Sic  being transferred to [place] [id number] (unit) for routine progression of care       Report consisted of patients Situation, Background, Assessment and   Recommendations(SBAR). Information from the following report(s) SBAR, ED Summary, [facility] and Recent Results was reviewed with the receiving nurse. Lines:       Opportunity for questions and clarification was provided.       Patient transported with:   Registered Nurse & HPD

## 2018-06-22 NOTE — BH NOTES
1920-Sonya Berman paged related to anti-hypertensive management, oral diabetic management, poc glucose bid order consideration, as well as to evatuate what appears to sebacious cyst mid spine cervical region  2120-Norma Nursing Supervisor informed No return page from Dr. Baldev Villar  2300-Hospitalist up to see patient , advised patient PCP could do I and D on outpatient basis, no signs of infection, Potassium supplement ordered, as well as hypertensives, glucophage, and POC glocuse ac TID

## 2018-06-22 NOTE — BH NOTES
PRN Medication Documentation    Specific patient behavior that led to need for PRN medication: patient seen in day room pulling hair and eating her plastic fork during dinner; patient given redirection then 5 minutes later returned to nursing station to say she had pulled out an entire chunk of her hair and needed medication to calm herself down  Staff interventions attempted prior to PRN being given: redirection, reorientation, mindfulness  PRN medication given: 20 mg IM geodon and 2 mg ativan IM left deltoid  Patient response/effectiveness of PRN medication: will continue to assess

## 2018-06-22 NOTE — ED NOTES
Patient resting comfortably in stretcher. VSS. Respirations equal and unlabored on RA. Patient remains voluntary for admission. Patient in no acute distress upon transfer to the floor.

## 2018-06-22 NOTE — ED NOTES
9:39 AM  I have just evaluated the patient. I have reviewed Her vital signs and determined there is currently no worsening in their condition or physical exam. I have talked with the patient and the family and advised them that I am the provider in triage and have ordered lab work, x rays and other diagnostic tests. I have advised them that we will try and get them to the back as soon as possible. I have also advised them that should they have a worsening condition or any problems before they are sent back to the main ED, to contact me or the triage nurse.       Tanya Rai NP

## 2018-06-22 NOTE — ED PROVIDER NOTES
HPI Comments: 38 y.o. female with past medical history significant for HTN, diabetes, depression, anxiety, and panic attacks who presents from home via private vehicle with chief complaint of mental health problem. Pt reports intense anxiety causing \"violent\" episodes of pulling her hair out and flailing movements. Pt reports her anxiety in the past has been managed by medication. Pt continually states \"I need to be admitted\", \"I need to stop pulling my hair out\", and wants to be here to \"not deal with the outside\". Pt reports SI at this time with no plan, reports a previous suicide attempt of OD on her medications, was admitted for a few days. Pt reports seeing a new psychiatrist yesterday, per her discharge instructions from previous ED visit, and pt states this went \"pretty good\", was started on 25mg Seroquel. Pt reports taking this last night. Pt states \"I am just tired, I want to sleep\". Pt denies any hallucinations or HI. There are no other acute medical concerns at this time. Per pt's mother at bedside, pt has not had any exacerbations of her psychiatric history in the last 2 years and has been doing very well on her own. Mother states that the pt started a job she really enjoyed, stayed at it for 3 weeks before being fired unexpectedly. She believes this is what has triggered the pt's recent decline. She reports the pt traveled to [place] to visit her parents for Father's Day. Mother notes seeing one episode of hair-pulling 6 days ago, but the pt did not start with significant symptoms until 3 days ago. She reports she was admitted for 4 days in 2016 after an OD on her medications. Mother states the pt was very agitated this morning. Old Chart Review: Pt was here twice 2 days ago. Her first encounter was for anxiety after recently losing a job, was pulling her hair out. Pt denies SI/HI at that time. Pt was discharged with hydroxyzine and told to F/U with psych.  Pt then returned later that day for the same symptoms, noting some sleep disturbances, still denying any SI/HI. Pt was having flailing movements at that time, striking the ED provider during exam. Pt was seen again by [facility] and discharged with outpatient treatment. Social hx: Nonsmoker; No EtOH use  PCP: Mark Garza MD    Note written by Dylan Latham, as dictated by Mili Rojas MD 10:10 AM    The history is provided by the patient, medical records and a parent. No  was used. Past Medical History:   Diagnosis Date    Anxiety     Depression     Diabetes [address] [address]     Hypertension     Panic attack     Suicidal thoughts        History reviewed. No pertinent surgical history. History reviewed. No pertinent family history. Social History     Social History    Marital status: SINGLE     Spouse name: N/A    Number of children: N/A    Years of education: N/A     Occupational History    Not on file. Social History Main Topics    Smoking status: Never Smoker    Smokeless tobacco: Not on file    Alcohol use No    Drug use: No    Sexual activity: Not on file     Other Topics Concern    Not on file     Social History Narrative         ALLERGIES: Lisinopril    Review of Systems   Constitutional: Negative for activity change, chills and fever. HENT: Negative for nosebleeds, sore throat, trouble swallowing and voice change. Eyes: Negative for visual disturbance. Respiratory: Negative for shortness of breath. Cardiovascular: Negative for chest pain and palpitations. Gastrointestinal: Negative for abdominal pain, constipation, diarrhea and nausea. Genitourinary: Negative for difficulty urinating, dysuria, hematuria and urgency. Musculoskeletal: Negative for back pain, neck pain and neck stiffness. Skin: Negative for color change. Allergic/Immunologic: Negative for immunocompromised state.    Neurological: Negative for dizziness, seizures, syncope, weakness, light-headedness, numbness and headaches. Psychiatric/Behavioral: Positive for agitation, behavioral problems and suicidal ideas. Negative for confusion, hallucinations and self-injury. The patient is nervous/anxious. All other systems reviewed and are negative. Vitals:    06/22/18 0940   BP: 146/88   Pulse: (!) 101   Resp: 16   Temp: 98.9 °F (37.2 °C)   SpO2: 97%   Weight: 111.1 kg (245 lb)   Height: 5' 3\" (1.6 m)            Physical Exam   Constitutional: She appears well-developed and well-nourished. No distress. HENT:   Head: Atraumatic. Eyes: EOM are normal.   Neck: No tracheal deviation present. Cardiovascular:   Warm and well perfused   Pulmonary/Chest: Effort normal. No respiratory distress. Musculoskeletal: Normal range of motion. Neurological: She is alert. Coordination normal.   Skin: Skin is warm and dry. She is not diaphoretic. Nursing note and vitals reviewed. Note written by Dylan Wyatt, as dictated by Anant Marie MD 10:10 AM    [facility]      ED Course     This is a 57-year-old female with past medical history, review of systems, physical exam as above, presenting with ongoing anxiety, self-mutilation, and a history of several visits his week for anxiety, in the context of previous mental health problems, and the recent loss for job. Today the patient also endorses suicidal ideation, plan by pills, denying homicidal ideation, auditory or visual hallucinations. She states she started taking Seroquel yesterday, as prescribed by her psychiatrist. Physical exam is unremarkable. In to consult psychiatry for recommendations, obtain preadmission lab work. We will make a disposition based the patient's diagnostics and response to therapy. Procedures      CONSULT NOTE:  12:42 PM Anant Marie MD spoke with Tobias Reynolds, Consult for [facility] [place]. Discussed available diagnostic tests and clinical findings.   Tobias Reynolds recommends admission and is requesting Ativan to calm the pt.

## 2018-06-22 NOTE — ED NOTES
Assumed care of patient. Patient unable to void at this time. Lunch tray ordered per patient request. Denies any other needs or concerns at this time. Mother remains at bedside.

## 2018-06-22 NOTE — ED NOTES
Patient ambulatory to restroom. Steady gait noted. Urine specimen obtained. Updated patient on plan of care. Verbalizes understanding. Voluntary for admission at this time.

## 2018-06-22 NOTE — PROGRESS NOTES
Admission Medication Reconciliation:    Information obtained from:  patient and pt's Mom interview, pt's home rx bottles    Comments/Recommendations: Updated PTA meds/reviewed patient's allergies. 1)  Pt's medications reviewed, pt brought her rx bottles from home, updated pt's        pharmacy records    2)  Medication changes (since last review): Added  - Prozac    Adjusted  - Hydroxyzine indication updated (anxiety, not itching)  - Quetiapine dose updated (pt started this new medication last night)    Removed  - none     Allergies:  Lisinopril    Significant PMH/Disease States:   Past Medical History:   Diagnosis Date    Anxiety     Depression     Diabetes ([facility] [address]     Hypertension     Panic attack     Suicidal thoughts        Chief Complaint for this Admission:    Chief Complaint   Patient presents with   [address]       Prior to Admission Medications:   Prior to Admission Medications   Prescriptions Last Dose Informant Patient Reported? Taking? FLUoxetine (PROZAC) 20 mg capsule 6/22/2018 at am  Yes Yes   Sig: Take 20 mg by mouth daily. QUEtiapine (SEROQUEL) 25 mg tablet 6/21/2018 at hs  Yes Yes   Sig: Take 25 mg by mouth nightly. hydrOXYzine pamoate (VISTARIL) 25 mg capsule   Yes Yes   Sig: Take 25 mg by mouth three (3) times daily as needed for Anxiety. losartan (COZAAR) 50 mg tablet 6/22/2018 at am  Yes Yes   Sig: Take 50 mg by mouth daily. Indications: HYPERTENSION   metformin (GLUCOPHAGE) 500 mg tablet 6/22/2018 at am  Yes Yes   Sig: Take 500 mg by mouth two (2) times daily (with meals). Indications: TYPE 2 DIABETES MELLITUS   triamterene-hydrochlorothiazide (MAXZIDE) 37.5-25 mg per tablet 6/22/2018 at am  Yes Yes   Sig: Take 1 Tab by mouth daily. Indications: HYPERTENSION      Facility-Administered Medications: None     Thank you for allowing me to participate in the care of this patient.  If there are any further questions, please contact the pharmacy at  or the medication reconciliation pharmacist at . Araceli Lau, Pharm. D., BCPS

## 2018-06-22 NOTE — IP AVS SNAPSHOT
[address] [facility] [address] [place] [address] 
[phone] Patient: Celia Carballo MRN: [id number] :1975 About your hospitalization You were admitted on:  2018 You last received care in the:  [address] [place] [address] You were discharged on:  2018 Why you were hospitalized Your primary diagnosis was:  Severe Anxiety With Panic Your diagnoses also included:  Depressive Disorder Follow-up Information Follow up With Details Comments Contact Info The Daily [address] On 2018 You have a 9:00am appointment with Moody Grover, [address]. [address] [place] [id number] 
[phone] Zhane Bailon MD   12[address] [facility] 7 [address] 
[phone] Discharge Orders None A check dhara indicates which time of day the medication should be taken. My Medications START taking these medications Instructions Each Dose to Equal  
 Morning Noon Evening Bedtime  
 benztropine 0.5 mg tablet Commonly known as:  COGENTIN Take 1 Tab by mouth two (2) times daily as needed. Indications: drug-induced extrapyramidal reaction, jittery, restlessness feelings 0.5 mg  
    
   
   
   
  
 clonazePAM 0.5 mg tablet Commonly known as:  Almita Panda Take 1 Tab by mouth three (3) times daily. Max Daily Amount: 1.5 mg. Indications: anxiety 0.5 mg  
    
   
   
   
  
  
 risperiDONE 0.5 mg tablet Commonly known as:  RisperDAL Take 1 Tab by mouth nightly. Indications: severe trichotillomania, panic  
 0.5 mg  
    
   
   
   
  
  
 zolpidem 10 mg tablet Commonly known as:  AMBIEN Take 1 Tab by mouth nightly. Max Daily Amount: 10 mg. Indications: SLEEP-ONSET INSOMNIA 10 mg CHANGE how you take these medications Instructions Each Dose to Equal  
 Morning Noon Evening Bedtime FLUoxetine 40 mg capsule Commonly known as:  PROzac Start taking on:  2018 What changed:  medication strength Take 1 Cap by mouth daily. Indications: Generalized Anxiety Disorder, major depressive disorder 40 mg CONTINUE taking these medications Instructions Each Dose to Equal  
 Morning Noon Evening Bedtime  
 losartan 50 mg tablet Commonly known as:  COZAAR Take 50 mg by mouth daily. Indications: HYPERTENSION 50 mg  
    
  
   
   
   
  
 metFORMIN 500 mg tablet Commonly known as:  GLUCOPHAGE Take 500 mg by mouth two (2) times daily (with meals). Indications: TYPE 2 DIABETES MELLITUS  
 500 mg  
    
   
   
  
   
  
 triamterene-hydroCHLOROthiazide 37.5-25 mg per tablet Commonly known as:  Aaron Feeling Take 1 Tab by mouth daily. Indications: HYPERTENSION  
 1 Tab STOP taking these medications   
 hydrOXYzine HCl 25 mg tablet Commonly known as:  ATARAX  
   
  
 hydrOXYzine pamoate 25 mg capsule Commonly known as:  VISTARIL  
   
  
 SEROquel 25 mg tablet Generic drug:  QUEtiapine Where to Get Your Medications Information on where to get these meds will be given to you by the nurse or doctor. ! Ask your nurse or doctor about these medications  
  benztropine 0.5 mg tablet  
 clonazePAM 0.5 mg tablet FLUoxetine 40 mg capsule  
 risperiDONE 0.5 mg tablet  
 zolpidem 10 mg tablet Discharge Instructions DISCHARGE SUMMARY 
 
NAME:Catherine Wagoner : 1975 MRN: [id number] The patient Bianca Chain exhibits the ability to control behavior in a less restrictive environment. Patient's level of functioning is improving. No assaultive/destructive behavior has been observed for the past 24 hours. No suicidal/homicidal threat or behavior has been observed for the past 24 hours.   There is no evidence of serious medication side effects. Patient has not been in physical or protective restraints for at least the past 24 hours. If weapons involved, how are they secured? No weapons involved. Is patient aware of and in agreement with discharge plan? Yes Arrangements for medication:  Prescriptions given to patient. Referral for substance abuse treatment? Patient is not using substances/Not applicable. Referral for smoking cessation needed? Patient is not a smoker/Not applicable. Copy of discharge instructions to provider?:  [facility] Arrangements for transportation home:  Parents to . Keep all follow up appointments as scheduled, continue to take prescribed medications per physician instructions. Mental health crisis number:  796 or your local mental health crisis line number at [phone]. DISCHARGE SUMMARY from Nurse PATIENT INSTRUCTIONS: 
 
What to do at Home: 
Recommended activity: Activity as tolerated. If you experience any of the following symptoms:  Overwhelming anxiety or depression, thoughts of hurting yourself or others, please follow up with  911 or your local mental health crisis line number at [phone]. *  Please give a list of your current medications to your Primary Care Provider. *  Please update this list whenever your medications are discontinued, doses are 
    changed, or new medications (including over-the-counter products) are added. *  Please carry medication information at all times in case of emergency situations. These are general instructions for a healthy lifestyle: No smoking/ No tobacco products/ Avoid exposure to second hand smoke Surgeon General's Warning:  Quitting smoking now greatly reduces serious risk to your health. Obesity, smoking, and sedentary lifestyle greatly increases your risk for illness A healthy diet, regular physical exercise & weight monitoring are important for maintaining a healthy lifestyle You may be retaining fluid if you have a history of heart failure or if you experience any of the following symptoms:  Weight gain of 3 pounds or more overnight or 5 pounds in a week, increased swelling in our hands or feet or shortness of breath while lying flat in bed. Please call your doctor as soon as you notice any of these symptoms; do not wait until your next office visit. Recognize signs and symptoms of STROKE: 
 
F-face looks uneven A-arms unable to move or move unevenly S-speech slurred or non-existent T-time-call 911 as soon as signs and symptoms begin-DO NOT go Back to bed or wait to see if you get better-TIME IS BRAIN. Warning Signs of HEART ATTACK Call 911 if you have these symptoms: 
? Chest discomfort. Most heart attacks involve discomfort in the center of the chest that lasts more than a few minutes, or that goes away and comes back. It can feel like uncomfortable pressure, squeezing, fullness, or pain. ? Discomfort in other areas of the upper body. Symptoms can include pain or discomfort in one or both arms, the back, neck, jaw, or stomach. ? Shortness of breath with or without chest discomfort. ? Other signs may include breaking out in a cold sweat, nausea, or lightheadedness. Don't wait more than five minutes to call [address]! Fast action can save your life. Calling 911 is almost always the fastest way to get lifesaving treatment. Emergency Medical Services staff can begin treatment when they arrive  up to an hour sooner than if someone gets to the hospital by car. The discharge information has been reviewed with the patient. The patient verbalized understanding. Discharge medications reviewed with the patient and appropriate educational materials and side effects teaching were provided. ___________________________________________________________________________________________________________________________________ Introducing [facility]! [employer] introduces Secure-24hart patient portal. Now you can access parts of your medical record, email your doctor's office, and request medication refills online. 1. In your internet browser, go to [email or web address] 2. Click on the First Time User? Click Here link in the Sign In box. You will see the New Member Sign Up page. 3. Enter your Real [employer] Access Code exactly as it appears below. You will not need to use this code after youve completed the sign-up process. If you do not sign up before the expiration date, you must request a new code. · [employer] Access Code: [email or web address] Expires: 9/18/2018 12:04 PM 
 
4. Enter the last four digits of your Social Security Number (xxxx) and Date of Birth (mm/dd/yyyy) as indicated and click Submit. You will be taken to the next sign-up page. 5. Create a Real Time Wine ID. This will be your Real Time [employer] login ID and cannot be changed, so think of one that is secure and easy to remember. 6. Create a [employer] password. You can change your password at any time. 7. Enter your Password Reset Question and Answer. This can be used at a later time if you forget your password. 8. Enter your e-mail address. You will receive e-mail notification when new information is available in [address] 9. Click Sign Up. You can now view and download portions of your medical record. 10. Click the Download Summary menu link to download a portable copy of your medical information. If you have questions, please visit the Frequently Asked Questions section of the Real Time Wine website. Remember, [employer] is NOT to be used for urgent needs. For medical emergencies, dial 911. Now available from your iPhone and Android! Introducing Cristopher Perdomo As a [employer] patient, I wanted to make you aware of our electronic visit tool called Cristopher Perdomo. [employer] 24/7 allows you to connect within minutes with a medical provider 24 hours a day, seven days a week via a mobile device or tablet or logging into a secure website from your computer. You can access Virobay from anywhere in the [place]. A virtual visit might be right for you when you have a simple condition and feel like you just dont want to get out of bed, or cant get away from work for an appointment, when your regular [facility] provider is not available (evenings, weekends or holidays), or when youre out of town and need minor care. Electronic visits cost only $49 and if the GastelumYouTern 24/Startup Freak provider determines a prescription is needed to treat your condition, one can be electronically transmitted to a nearby pharmacy*. Please take a moment to enroll today if you have not already done so. The enrollment process is free and takes just a few minutes. To enroll, please download the [email or web address] gaudencio to your tablet or phone, or visit [email or web address] to enroll on your computer. And, as an [address] [place] [address] patient with a Everypost account, the results of your visits will be scanned into your electronic medical record and your primary care provider will be able to view the scanned results. We urge you to continue to see your regular [facility] provider for your ongoing medical care. And while your primary care provider may not be the one available when you seek a Cristopher Lewisfin virtual visit, the peace of mind you get from getting a real diagnosis real time can be priceless. For more information on Cristopher iCeuticaluz maria, view our Frequently Asked Questions (FAQs) at [email or web address]. Sincerely, 
 
Richard Hall MD 
Chief Medical Officer Anna8 Sophie Li *:  certain medications cannot be prescribed via Cristopher iCeuticaniBest Learning English Unresulted tests-please follow up with your PCP on these results Procedure/Test Authorizing Provider  Luke Mullins MD  
 CBC WITH AUTOMATED DIFF 1 Olegario Ortiz MD  
 DRUG SCREEN, URINE Antonio Gomes MD  
 ETHYL ALCOHOL Antonio Gomes MD  
 GLUCOSE, FASTING Sandra Hughes MD  
 HCG QL SERUM Antonio Gomes MD  
 LIPID PANEL Sandra Hughes MD  
 METABOLIC PANEL, COMPREHENSIVE MD Marylou Coates MD  
 SAMPLES BEING HELD 1 Olegario Ortiz MD  
 TSH 3RD GENERATION MD Brittany Gonzalez MD  
 URINE CULTURE HOLD SAMPLE 1 Olegario Ortiz MD  
  
Providers Seen During Your Hospitalization Provider Specialty Primary office phone 1 Olegario Ortiz MD Emergency Medicine [phone] Sandra Hughes MD Psychiatry [phone] Your Primary Care Physician (PCP) Primary Care Physician Office Phone Office Fax Bayron Leonard [phone] ** None ** You are allergic to the following Allergen Reactions Lisinopril Rash Rash on lip Recent Documentation Height Weight Breastfeeding? BMI OB Status Smoking Status 1.6 m 110.8 kg No 43.29 kg/m2 Having regular periods Never Smoker Emergency Contacts Name Discharge Info Relation Home Work Mobile [address] DISCHARGE CAREGIVER [3] Other Relative [6] [phone] Patient Belongings The following personal items are in your possession at time of discharge: 
  Dental Appliances: None  Visual Aid: Glasses      Home Medications: None   Jewelry: None  Clothing: Shirt, Undergarments (bra, 2 shirts)    Other Valuables: None  Personal Items Sent to Safe: none Please provide this summary of care documentation to your next provider. Signatures-by signing, you are acknowledging that this After Visit Summary has been reviewed with you and you have received a copy. Patient Signature:  ____________________________________________________________ Date:  ____________________________________________________________  
  
Connye Brod Provider Signature:  ____________________________________________________________ Date:  ____________________________________________________________

## 2018-06-22 NOTE — IP AVS SNAPSHOT
[address] 
[phone] Patient: Bianca Avila MRN: [id number] :1975 A check dhara indicates which time of day the medication should be taken. My Medications START taking these medications Instructions Each Dose to Equal  
 Morning Noon Evening Bedtime  
 benztropine 0.5 mg tablet Commonly known as:  COGENTIN Take 1 Tab by mouth two (2) times daily as needed. Indications: drug-induced extrapyramidal reaction, jittery, restlessness feelings 0.5 mg  
    
   
   
   
  
 clonazePAM 0.5 mg tablet Commonly known as:  Beverely Hack Take 1 Tab by mouth three (3) times daily. Max Daily Amount: 1.5 mg. Indications: anxiety 0.5 mg  
    
   
   
   
  
  
 risperiDONE 0.5 mg tablet Commonly known as:  RisperDAL Take 1 Tab by mouth nightly. Indications: severe trichotillomania, panic  
 0.5 mg  
    
   
   
   
  
  
 zolpidem 10 mg tablet Commonly known as:  AMBIEN Take 1 Tab by mouth nightly. Max Daily Amount: 10 mg. Indications: SLEEP-ONSET INSOMNIA 10 mg CHANGE how you take these medications Instructions Each Dose to Equal  
 Morning Noon Evening Bedtime FLUoxetine 40 mg capsule Commonly known as:  PROzac Start taking on:  2018 What changed:  medication strength Take 1 Cap by mouth daily. Indications: Generalized Anxiety Disorder, major depressive disorder 40 mg CONTINUE taking these medications Instructions Each Dose to Equal  
 Morning Noon Evening Bedtime  
 losartan 50 mg tablet Commonly known as:  COZAAR Take 50 mg by mouth daily. Indications: HYPERTENSION 50 mg  
    
  
   
   
   
  
 metFORMIN 500 mg tablet Commonly known as:  GLUCOPHAGE Take 500 mg by mouth two (2) times daily (with meals).  Indications: TYPE 2 DIABETES MELLITUS  
 500 mg  
    
   
 triamterene-hydroCHLOROthiazide 37.5-25 mg per tablet Commonly known as:  Lucía Manzo Take 1 Tab by mouth daily. Indications: HYPERTENSION  
 1 Tab STOP taking these medications   
 hydrOXYzine HCl 25 mg tablet Commonly known as:  ATARAX  
   
  
 hydrOXYzine pamoate 25 mg capsule Commonly known as:  VISTARIL  
   
  
 SEROquel 25 mg tablet Generic drug:  QUEtiapine Where to Get Your Medications Information on where to get these meds will be given to you by the nurse or doctor. ! Ask your nurse or doctor about these medications  
  benztropine 0.5 mg tablet  
 clonazePAM 0.5 mg tablet FLUoxetine 40 mg capsule  
 risperiDONE 0.5 mg tablet  
 zolpidem 10 mg tablet

## 2018-06-22 NOTE — BH NOTES
Primary Nurse Juan Finley RN and William Adam RN performed a dual skin assessment on this patient No impairment noted  Daniel score is 22    Right great toe reddened from \"stubbing it\"  Cyst right mid cervical spine region    Pressure Injury Documentation  (COMPLETE ONE LABEL PER PRESSURE INJURY)  For further information, please review corresponding Wound Care flowsheet.       Sofi Rush has: no pressure ulcer    {

## 2018-06-22 NOTE — BH NOTES
TRANSFER - IN REPORT:    Verbal report received from Blu Cummings on Reynaldo Nicholas  being received from Emergency Department(unit) for routine progression of care      Report consisted of patients Situation, Background, Assessment and   Recommendations(SBAR). Information from the following report(s) SBAR, Kardex and MAR was reviewed with the receiving nurse. Opportunity for questions and clarification was provided. Assessment completed upon patients arrival to unit and care assumed.

## 2018-06-23 LAB
CHOLEST SERPL-MCNC: 145 MG/DL
GLUCOSE BLD STRIP.AUTO-MCNC: 102 MG/DL (ref 65–100)
GLUCOSE BLD STRIP.AUTO-MCNC: 148 MG/DL (ref 65–100)
GLUCOSE BLD STRIP.AUTO-MCNC: 91 MG/DL (ref 65–100)
GLUCOSE P FAST SERPL-MCNC: 121 MG/DL (ref 65–100)
HDLC SERPL-MCNC: 63 MG/DL
HDLC SERPL: 2.3 {RATIO} (ref 0–5)
LDLC SERPL CALC-MCNC: 68.8 MG/DL (ref 0–100)
LIPID PROFILE,FLP: NORMAL
SERVICE CMNT-IMP: ABNORMAL
SERVICE CMNT-IMP: ABNORMAL
SERVICE CMNT-IMP: NORMAL
TRIGL SERPL-MCNC: 66 MG/DL (ref ?–150)
TSH SERPL DL<=0.05 MIU/L-ACNC: 1.89 UIU/ML (ref 0.36–3.74)
VLDLC SERPL CALC-MCNC: 13.2 MG/DL

## 2018-06-23 PROCEDURE — 74011250637 HC RX REV CODE- 250/637: Performed by: PSYCHIATRY & NEUROLOGY

## 2018-06-23 PROCEDURE — 74011250637 HC RX REV CODE- 250/637: Performed by: HOSPITALIST

## 2018-06-23 PROCEDURE — 82962 GLUCOSE BLOOD TEST: CPT | Performed by: PSYCHIATRY & NEUROLOGY

## 2018-06-23 PROCEDURE — 36415 COLL VENOUS BLD VENIPUNCTURE: CPT | Performed by: PSYCHIATRY & NEUROLOGY

## 2018-06-23 PROCEDURE — 80061 LIPID PANEL: CPT | Performed by: PSYCHIATRY & NEUROLOGY

## 2018-06-23 PROCEDURE — 84443 ASSAY THYROID STIM HORMONE: CPT | Performed by: PSYCHIATRY & NEUROLOGY

## 2018-06-23 PROCEDURE — 65220000001 HC RM PRIVATE PSYCH

## 2018-06-23 PROCEDURE — 82947 ASSAY GLUCOSE BLOOD QUANT: CPT | Performed by: PSYCHIATRY & NEUROLOGY

## 2018-06-23 PROCEDURE — 82962 GLUCOSE BLOOD TEST: CPT

## 2018-06-23 RX ORDER — RISPERIDONE 0.5 MG/1
0.5 TABLET, FILM COATED ORAL
Status: DISCONTINUED | OUTPATIENT
Start: 2018-06-23 | End: 2018-06-27 | Stop reason: HOSPADM

## 2018-06-23 RX ORDER — CLONAZEPAM 0.5 MG/1
0.25 TABLET ORAL 3 TIMES DAILY
Status: DISCONTINUED | OUTPATIENT
Start: 2018-06-23 | End: 2018-06-23

## 2018-06-23 RX ORDER — FLUOXETINE HYDROCHLORIDE 20 MG/1
40 CAPSULE ORAL DAILY
Status: DISCONTINUED | OUTPATIENT
Start: 2018-06-23 | End: 2018-06-27 | Stop reason: HOSPADM

## 2018-06-23 RX ORDER — CLONAZEPAM 0.5 MG/1
0.25 TABLET ORAL 3 TIMES DAILY
Status: DISCONTINUED | OUTPATIENT
Start: 2018-06-23 | End: 2018-06-25

## 2018-06-23 RX ADMIN — METFORMIN HYDROCHLORIDE 500 MG: 500 TABLET, FILM COATED ORAL at 08:31

## 2018-06-23 RX ADMIN — ZOLPIDEM TARTRATE 5 MG: 5 TABLET ORAL at 21:01

## 2018-06-23 RX ADMIN — LOSARTAN POTASSIUM 50 MG: 50 TABLET ORAL at 08:31

## 2018-06-23 RX ADMIN — CLONAZEPAM 0.25 MG: 0.5 TABLET ORAL at 17:04

## 2018-06-23 RX ADMIN — METFORMIN HYDROCHLORIDE 500 MG: 500 TABLET, FILM COATED ORAL at 17:04

## 2018-06-23 RX ADMIN — FLUOXETINE 40 MG: 20 CAPSULE ORAL at 12:15

## 2018-06-23 RX ADMIN — LORAZEPAM 1 MG: 1 TABLET ORAL at 23:24

## 2018-06-23 RX ADMIN — CLONAZEPAM 0.25 MG: 0.5 TABLET ORAL at 21:01

## 2018-06-23 RX ADMIN — POTASSIUM CHLORIDE 40 MEQ: 750 TABLET, EXTENDED RELEASE ORAL at 17:03

## 2018-06-23 RX ADMIN — POTASSIUM CHLORIDE 40 MEQ: 750 TABLET, EXTENDED RELEASE ORAL at 08:30

## 2018-06-23 RX ADMIN — TRIAMTERENE AND HYDROCHLOROTHIAZIDE 1 TABLET: 37.5; 25 TABLET ORAL at 08:31

## 2018-06-23 RX ADMIN — CLONAZEPAM 0.25 MG: 0.5 TABLET ORAL at 12:15

## 2018-06-23 RX ADMIN — RISPERIDONE 0.5 MG: 0.5 TABLET, FILM COATED ORAL at 21:01

## 2018-06-23 NOTE — CONSULTS
HISTORY AND PHYSICAL      PCP: Harvel Ormond, MD  History source: the patient, EMR  History limitation: the patient has been medically sedated      CC: bump on back of neck      HPI: 38 y.o lady w/ DM, HTN, who is admitted to the behavioral health service. We are consulted for her diabetes, HTN, and a bump on the back of her neck. The patient was recently sedated and is therefore a limited historian. When awakened, she offers no complaints. States the bump is not painful and has been present there for at least a year. No fever. No other complaints. PMH/PSH:  Past Medical History:   Diagnosis Date    Anxiety     Depression     Diabetes ([address]     Hypertension     Panic attack     Suicidal thoughts     Vision decreased      History reviewed. No pertinent surgical history. Home meds:   Prior to Admission medications    Medication Sig Start Date End Date Taking? Authorizing Provider   QUEtiapine (SEROQUEL) 25 mg tablet Take 25 mg by mouth nightly. Yes Phys MD Cindy   hydrOXYzine pamoate (VISTARIL) 25 mg capsule Take 25-50 mg by mouth three (3) times daily as needed for Anxiety. Yes Historical Provider   FLUoxetine (PROZAC) 20 mg capsule Take 40 mg by mouth daily. Yes Historical Provider   triamterene-hydrochlorothiazide (MAXZIDE) 37.5-25 mg per tablet Take 1 Tab by mouth daily. Indications: HYPERTENSION   Yes Historical Provider   losartan (COZAAR) 50 mg tablet Take 50 mg by mouth daily. Indications: HYPERTENSION   Yes Historical Provider   metformin (GLUCOPHAGE) 500 mg tablet Take 500 mg by mouth two (2) times daily (with meals). Indications: TYPE 2 DIABETES MELLITUS   Yes Phys Other, MD       Allergies: Allergies   Allergen Reactions    Lisinopril Rash     Rash on lip       FH:  History reviewed. No pertinent family history.     SH:  Social History   Substance Use Topics    Smoking status: Never Smoker    Smokeless tobacco: Never Used      Comment: n/a never smoked tobacco    Alcohol use No       ROS: Review of systems not obtained due to patient factors. PHYSICAL EXAM:  Visit Vitals    /66    Pulse 93    Temp 98.3 °F (36.8 °C)    Resp 18    Ht 5' 3\" (1.6 m)    Wt 111.1 kg (245 lb)    SpO2 99%    Breastfeeding No    BMI 43.4 kg/m2       Gen: NAD, non-toxic  HEENT: anicteric sclerae  Neck: supple, non-tender ~2cm sebaceous cyst on dorsum of neck  Heart: RRR, no MRG, no peripheral edema  Lungs: CTA b/l, non-labored respirations  Neuro/psych: drowsy      Labs/Imaging:  Recent Results (from the past 24 hour(s))   CBC WITH AUTOMATED DIFF    Collection Time: 06/22/18 11:14 AM   Result Value Ref Range    WBC 8.1 3.6 - 11.0 K/uL    RBC 4.17 3.80 - 5.20 M/uL    HGB 11.9 11.5 - 16.0 g/dL    HCT 37.2 35.0 - 47.0 %    MCV 89.2 80.0 - 99.0 FL    MCH 28.5 26.0 - 34.0 PG    MCHC 32.0 30.0 - 36.5 g/dL    RDW 14.6 (H) 11.5 - 14.5 %    PLATELET 098 358 - 560 K/uL    MPV 9.9 8.9 - 12.9 FL    NRBC 0.0 0  WBC    ABSOLUTE NRBC 0.00 0.00 - 0.01 K/uL    NEUTROPHILS 75 32 - 75 %    LYMPHOCYTES 18 12 - 49 %    MONOCYTES 6 5 - 13 %    EOSINOPHILS 1 0 - 7 %    BASOPHILS 1 0 - 1 %    IMMATURE GRANULOCYTES 0 0.0 - 0.5 %    ABS. NEUTROPHILS 6.0 1.8 - 8.0 K/UL    ABS. LYMPHOCYTES 1.5 0.8 - 3.5 K/UL    ABS. MONOCYTES 0.5 0.0 - 1.0 K/UL    ABS. EOSINOPHILS 0.1 0.0 - 0.4 K/UL    ABS. BASOPHILS 0.0 0.0 - 0.1 K/UL    ABS. IMM.  GRANS. 0.0 0.00 - 0.04 K/UL    DF AUTOMATED     METABOLIC PANEL, COMPREHENSIVE    Collection Time: 06/22/18 11:14 AM   Result Value Ref Range    Sodium 138 136 - 145 mmol/L    Potassium 3.0 (L) 3.5 - 5.1 mmol/L    Chloride 104 97 - 108 mmol/L    CO2 23 21 - 32 mmol/L    Anion gap 11 5 - 15 mmol/L    Glucose 131 (H) 65 - 100 mg/dL    BUN 9 6 - 20 MG/DL    Creatinine 0.94 0.55 - 1.02 MG/DL    BUN/Creatinine ratio 10 (L) 12 - 20      GFR est AA >60 >60 ml/min/1.73m2    GFR est non-AA >60 >60 ml/min/1.73m2    Calcium 9.1 8.5 - 10.1 MG/DL    Bilirubin, total 0.4 0.2 - 1.0 MG/DL    ALT (SGPT) 41 12 - 78 U/L    AST (SGOT) 28 15 - 37 U/L    Alk. phosphatase 82 45 - 117 U/L    Protein, total 8.1 6.4 - 8.2 g/dL    Albumin 3.5 3.5 - 5.0 g/dL    Globulin 4.6 (H) 2.0 - 4.0 g/dL    A-G Ratio 0.8 (L) 1.1 - 2.2     ETHYL ALCOHOL    Collection Time: 06/22/18 11:14 AM   Result Value Ref Range    ALCOHOL(ETHYL),SERUM <17 <00 MG/DL   SALICYLATE    Collection Time: 06/22/18 11:14 AM   Result Value Ref Range    Salicylate level <0.3 (L) 2.8 - 20.0 MG/DL   ACETAMINOPHEN    Collection Time: 06/22/18 11:14 AM   Result Value Ref Range    Acetaminophen level <2 (L) 10 - 30 ug/mL   HCG QL SERUM    Collection Time: 06/22/18 11:14 AM   Result Value Ref Range    HCG, Ql. NEGATIVE  NEG     URINALYSIS W/MICROSCOPIC    Collection Time: 06/22/18  1:29 PM   Result Value Ref Range    Color YELLOW/STRAW      Appearance CLEAR CLEAR      Specific gravity 1.013 1.003 - 1.030      pH (UA) 7.5 5.0 - 8.0      Protein NEGATIVE  NEG mg/dL    Glucose NEGATIVE  NEG mg/dL    Ketone NEGATIVE  NEG mg/dL    Bilirubin NEGATIVE  NEG      Blood NEGATIVE  NEG      Urobilinogen 0.2 0.2 - 1.0 EU/dL    Nitrites NEGATIVE  NEG      Leukocyte Esterase NEGATIVE  NEG      WBC 0-4 0 - 4 /hpf    RBC 0-5 0 - 5 /hpf    Epithelial cells FEW FEW /lpf    Bacteria NEGATIVE  NEG /hpf    Hyaline cast 0-2 0 - 5 /lpf   URINE CULTURE HOLD SAMPLE    Collection Time: 06/22/18  1:29 PM   Result Value Ref Range    Urine culture hold        URINE ON HOLD IN MICROBIOLOGY DEPT FOR 3 DAYS. IF UNPRESERVED URINE IS SUBMITTED, IT CANNOT BE USED FOR ADDITIONAL TESTING AFTER 24 HRS, RECOLLECTION WILL BE REQUIRED.    DRUG SCREEN, URINE    Collection Time: 06/22/18  1:29 PM   Result Value Ref Range    AMPHETAMINES NEGATIVE  NEG      BARBITURATES NEGATIVE  NEG      BENZODIAZEPINES NEGATIVE  NEG      COCAINE NEGATIVE  NEG      METHADONE NEGATIVE  NEG      OPIATES NEGATIVE  NEG      PCP(PHENCYCLIDINE) NEGATIVE  NEG      THC (TH-CANNABINOL) NEGATIVE  NEG      Drug screen comment (NOTE) Recent Labs      06/22/18   1114   WBC  8.1   HGB  11.9   HCT  37.2   PLT  331     Recent Labs      06/22/18   1114   NA  138   K  3.0*   CL  104   CO2  23   BUN  9   CREA  0.94   GLU  131*   CA  9.1     Recent Labs      06/22/18   1114   SGOT  28   ALT  41   AP  82   TBILI  0.4   TP  8.1   ALB  3.5   GLOB  4.6*       No results for input(s): CPK, CKNDX, TROIQ in the last 72 hours. No lab exists for component: CPKMB    No results for input(s): INR, PTP, APTT in the last 72 hours. No lab exists for component: INREXT     No results for input(s): PH, PCO2, PO2 in the last 72 hours. Assessment & Plan:     Sebaceous cyst: no intervention in the hospital. Can be incised and drained by PCP or surgeon electively as an office procedure. Type 2 DM:  -resume metformin  -POC checks    HTN:  -resume home meds    Hypokalemia:  -replete    Obesity    Will sign off and be available as needed.     Signed By: Katie Thacker MD     June 22, 2018

## 2018-06-23 NOTE — PROGRESS NOTES
Problem: Anxiety-Behavioral Health (Adult/Pediatric)  Goal: *STG/LTG: Trigger identification  Outcome: Progressing Towards Goal  Pt acknowledges that increased stress leads to hair pulling  Pt agreeable to informing staff when feeling increasingly stressed so that proper care can be provided

## 2018-06-23 NOTE — INTERDISCIPLINARY ROUNDS
Behavioral Health Interdisciplinary Rounds     Patient Name: Desiree Hyatt  Age: 38 y.o. Room/Bed:  724/  Primary Diagnosis: <principal problem not specified>   Admission Status: Voluntary     Readmission within 30 days: no  Power of  in place: no  Patient requires a blocked bed: yes          Reason for blocked bed: behavior    VTE Prophylaxis: Not indicated    Mobility needs/Fall risk: no    Nutritional Plan: no  Consults:          Labs/Testing due today?: yes    Sleep hours: 10.5       Participation in Care/Groups:  no  Medication Compliant?: Yes  PRNS (last 24 hours):  Antipsychotic (IM) and Antianxiety    Restraints (last 24 hours):  no     CIWA (range last 24 hours):     COWS (range last 24 hours):      Alcohol screening (AUDIT) completed -   AUDIT Score: 0     If applicable, date SBIRT discussed in treatment team AND documented:     Tobacco - patient is a smoker: Have You Used Tobacco in the Past 30 Days: No  Illegal Drugs use: Have You Used Any Illegal Substances Over the Past 12 Months: No    24 hour chart check complete: yes     Patient goal(s) for today: Meet with Tx team to devise course of tx and to provide information that might assist with assessment  Treatment team focus/goals:  Complete Psych- Social Assessment  Progress note: Alert, engaging and providing significant information regarding issues that lead to hospitalization    LOS:  1  Expected LOS:     Financial concerns/prescription coverage:   Date of last family contact:       Family requesting physician contact today:    Discharge plan: Return home  Guns in the home: Return home     Outpatient provider(s):  [facility]     Participating treatment team members: Desiree Hyatt, Dr Ranulfo Hendrix and Mary Puente RN

## 2018-06-23 NOTE — BH NOTES
Voluntary admit  Single, lives alone, reportedly recent lost job  Chief complaint of increasing anxiety leading to hair pulling  No A/V hallucinations  No alcohol or substance abuse or use  Valuables-only clothing  PTA meds verified  Provided dinner  Signed MAGGIE to mother for full disclosure to discuss health related matters  Mother called and informed patient received medication and so was currently sleeping, therefore visitation may not be beneficial

## 2018-06-23 NOTE — PROGRESS NOTES
Problem: Falls - Risk of  Goal: *Absence of Falls  Document Princess Fall Risk and appropriate interventions in the flowsheet. Outcome: Progressing Towards Goal  Fall Risk Interventions:        non slip socks  Bed in low position    Medication Interventions: Teach patient to arise slowly                  Problem: Depressed Mood (Adult/Pediatric)  Goal met by 6/30/2018    Goal: *STG: Participates in treatment plan  Outcome: Progressing Towards Goal  Pt. Met with Dr. Iveth Cabrales in treatment team states she has been  Under some stress not having a job but the impulse of pulling her hair out just comes  Out of the blue    Goal: *STG: Attends activities and groups  Outcome: Progressing Towards Goal  Attending select groups  Goal: *STG: Complies with medication therapy  Outcome: Progressing Towards Goal  Medication compliant  Reviewed in treatment team  \"i've been on prozac for a long time \"  Discussed continual anxiety and some medications to reduce it. Goal: Interventions  Outcome: Progressing Towards Goal  Will continue to monitor  On 15 min.  Checks for safety  Assess depression and anxiety  Medication compliance  Effectiveness encourage groups    [facility]  Master Treatment Plan Bobbye Sic        Date Treatment Plan Initiated:  6/23/2018      Treatment Plan Modalities:    Type of Modality Amount  (x minutes) Frequency (x/week) Duration (x days) Name of Responsible Staff   Community & wrap-up meetings to encourage peer interactions    15    7    1     MEGAN Marmolejo   Group psychotherapy to assist in building coping skills and internal controls    60    7    1    Lewis Mcclendon LCSW   Therapeutic activity groups to build coping skills    60    7    1    Lewis Mcclendon LCSW   Psychoeducation in group setting to address:   Medication education    15    7    1    Jeninfer Mckeon RN   Coping skills    20    7    1    Madisyn Hannah RN   Relaxation techniques           Symptom management         Discharge planning    15    7    1    Israel Ward,    Spirituality     60    7    1    huang ARMANDO    60    7    1    Volunteer from [facility]/AA/NA    60    7    1    Volunteer from [address] [place] [address] medication management    15    7    1    Dr. Gunjan Smith meeting/discharge planning

## 2018-06-23 NOTE — BH NOTES
PSYCHOSOCIAL ASSESSMENT  :Patient identifying info:  Jovan Verde is a 38 y.o., female admitted 6/22/2018  9:43 AM     Presenting problem and precipitating factors: Pt was admitted to the hospital with symptoms related to her depression. Pt stated she has been experiencing lack of sleep, anxiety, suicide ideation ( no plan), difficulty coping with illness; however pt has been off medications since 2016. Pt reported doing well w/o medications until recent return of depression. Pt had three ER visits prior to this admission complaining of same issues. Mental status assessment: Alert, well organized thoughts and no s/s of psychosis but agreed     Current psychiatric providers and contact info:  [address] [facility]   @ [facility] ( psych)     Previous psychiatric services/providers and response to treatment:  [facility] [place] 2016 and     Family history of mental illness : None    Substance abuse history:  Pt.denied using and drugs and her UDS was negative for tested drugs. Social History   Substance Use Topics    Smoking status: Never Smoker    Smokeless tobacco: Never Used      Comment: n/a never smoked tobacco    Alcohol use No       Family constellation:Parents - [employer] Trish @ [phone]    Is significant other involved? Not in a relationship    Describe support system: Pt.'s parents  provides her greatest source of support and they are  involved in her treatment and discharge planning    Describe living arrangements and home environment: Single and lives alone. . Pt does plan to return home    Health issues: Review H&P  Hospital Problems  Date Reviewed: 3/25/2016          Codes Class Noted POA    Depressive disorder ICD-10-CM: F32.9  ICD-9-CM: 104  3/24/2016 Unknown              Trauma history:  Denied any forms of abuse.     Legal issues: None     History of  service:  N/A    Financial status: Pt receives supplemental income and part-time employment    Evangelical/cultural factors: Not voiced     Education/work history: HS and college grad but currently not in the work force    Have you been licensed as a sheri care professional (current or ):  No    Leisure and recreation preferences: Reading     Describe coping skills: Ineffective and poor judgement    Israel Ward  2018

## 2018-06-23 NOTE — PROGRESS NOTES
Problem: Falls - Risk of  Goal: *Absence of Falls  Document Princess Fall Risk and appropriate interventions in the flowsheet. Outcome: Progressing Towards Goal  Fall Risk Interventions:            Medication Interventions: Teach patient to arise slowly       Resting in bed with eyes closed, no complaints, no distress noted. Safety measures in place, will continue to monitor.

## 2018-06-23 NOTE — PROGRESS NOTES
Problem: Anxiety-Behavioral Health (Adult/Pediatric)  Goal: *STG: Participates in treatment plan  Outcome: Progressing Towards Goal  Pt is coloring and watching TV and writing in her journal, amongst peers. Pt states that she will notify staff if her anxiety/agitation level increases. Pt complies with medications. Dianne Jay

## 2018-06-24 LAB
GLUCOSE BLD STRIP.AUTO-MCNC: 107 MG/DL (ref 65–100)
GLUCOSE BLD STRIP.AUTO-MCNC: 126 MG/DL (ref 65–100)
GLUCOSE BLD STRIP.AUTO-MCNC: 138 MG/DL (ref 65–100)
SERVICE CMNT-IMP: ABNORMAL

## 2018-06-24 PROCEDURE — 65220000001 HC RM PRIVATE PSYCH

## 2018-06-24 PROCEDURE — 74011250637 HC RX REV CODE- 250/637: Performed by: PSYCHIATRY & NEUROLOGY

## 2018-06-24 PROCEDURE — 82962 GLUCOSE BLOOD TEST: CPT

## 2018-06-24 PROCEDURE — 74011250637 HC RX REV CODE- 250/637: Performed by: HOSPITALIST

## 2018-06-24 RX ADMIN — CLONAZEPAM 0.25 MG: 0.5 TABLET ORAL at 17:29

## 2018-06-24 RX ADMIN — FLUOXETINE 40 MG: 20 CAPSULE ORAL at 09:06

## 2018-06-24 RX ADMIN — RISPERIDONE 0.5 MG: 0.5 TABLET, FILM COATED ORAL at 21:20

## 2018-06-24 RX ADMIN — CLONAZEPAM 0.25 MG: 0.5 TABLET ORAL at 21:20

## 2018-06-24 RX ADMIN — CLONAZEPAM 0.25 MG: 0.5 TABLET ORAL at 09:07

## 2018-06-24 RX ADMIN — METFORMIN HYDROCHLORIDE 500 MG: 500 TABLET, FILM COATED ORAL at 09:06

## 2018-06-24 RX ADMIN — LORAZEPAM 1 MG: 1 TABLET ORAL at 11:08

## 2018-06-24 RX ADMIN — METFORMIN HYDROCHLORIDE 500 MG: 500 TABLET, FILM COATED ORAL at 17:29

## 2018-06-24 RX ADMIN — TRIAMTERENE AND HYDROCHLOROTHIAZIDE 1 TABLET: 37.5; 25 TABLET ORAL at 09:06

## 2018-06-24 RX ADMIN — ZOLPIDEM TARTRATE 5 MG: 5 TABLET ORAL at 22:00

## 2018-06-24 RX ADMIN — LOSARTAN POTASSIUM 50 MG: 50 TABLET ORAL at 09:06

## 2018-06-24 RX ADMIN — OLANZAPINE 5 MG: 5 TABLET, FILM COATED ORAL at 11:08

## 2018-06-24 NOTE — PROGRESS NOTES
Problem: Falls - Risk of  Goal: *Absence of Falls  Document Princess Fall Risk and appropriate interventions in the flowsheet. Outcome: Progressing Towards Goal  Fall Risk Interventions:        non slip socks  Bed in low position    Medication Interventions: Teach patient to arise slowly                  Problem: Depressed Mood (Adult/Pediatric)  Goal: *STG: Participates in treatment plan  Outcome: Progressing Towards Goal  Pt. Met in treatment team with Dr. Hoang Kelsey she has been feeling more depressed   Today     Discussed her impulsive hair pulling episodes  Goal: *STG: Attends activities and groups  Outcome: Progressing Towards Goal  Selectively attends groups  Goal: *STG: Complies with medication therapy  Outcome: Progressing Towards Goal  Medication compliant  Reviewed in treatment team   Goal: Interventions  Outcome: Progressing Towards Goal  Will continue to monitor  On 15 min.  Checks for safety  Assess depression  Lability   Medication compliance  Effectiveness  Encourage groups

## 2018-06-24 NOTE — INTERDISCIPLINARY ROUNDS
Behavioral Health Interdisciplinary Rounds     Patient Name: Mili Paredes  Age: 38 y.o. Room/Bed:  724/  Primary Diagnosis: <principal problem not specified>   Admission Status: Voluntary     Readmission within 30 days: no  Power of  in place: no  Patient requires a blocked bed: yes          Reason for blocked bed: behavior    VTE Prophylaxis: No    Mobility needs/Fall risk: no    Nutritional Plan: no  Consults:          Labs/Testing due today?: no    Sleep hours:  4.15      Participation in Care/Groups:  yes  Medication Compliant?: Yes  PRNS (last 24 hours):  Antipsychotic (PO)    Restraints (last 24 hours):  no     CIWA (range last 24 hours):     COWS (range last 24 hours):      Alcohol screening (AUDIT) completed -   AUDIT Score: 0     If applicable, date SBIRT discussed in treatment team AND documented:     Tobacco - patient is a smoker: Have You Used Tobacco in the Past 30 Days: No  Illegal Drugs use: Have You Used Any Illegal Substances Over the Past 12 Months: No    24 hour chart check complete: yes     Patient goal(s) for today:   Treatment team focus/goals:   Progress note     LOS:  2  Expected LOS:     Financial concerns/prescription coverage:  no  Date of last family contact:       Family requesting physician contact today:  no  Discharge plan:   Guns in the home:         Outpatient provider(s):     Participating treatment team members: Mili Paredes, * (assigned SW),

## 2018-06-24 NOTE — BH NOTES
PSYCHIATRIC PROGRESS NOTE            IDENTIFICATION:    Patient Name  Jennie Chino   Date of Birth 1975   [facility] [id number]   Medical Record Number  [id number]      Age  38 y.o. PCP Shaheed Becerril MD   Admit date:  6/22/2018    Room Number  724/01  @ [facility]   Date of Service  6/24/2018            HISTORY         REASON FOR HOSPITALIZATION:  CC: \"I keep pulling my hair out\". Pt admitted on a voluntary basis for severe anxiety and hair pulling    HISTORY OF PRESENT ILLNESS:    The patient, Jennie Chino, is a 38 y.o. BLACK OR  female with a past psychiatric history significant for SANTY, MDD, hair pulling who presents at this time with complaints of (and/or evidence of) the following emotional symptoms: severe life stressors, anxiety and impulsive hair pulling. .  Additional symptomatology include poor sleep. The above symptoms have been present for one week These symptoms are of moderate to high severity. These symptoms are constant in nature. She was brought to the ED by her parents who saw the patient one week ago and noted the increased anxiety and hair pulling. They [facility] evaluated [facility] and encouraged to bring her to the ED if the sx worsened. The patient says that she does not have any positive feelings resulting from pulling her hair, no sense of relief or calm. She notes severe anxiety,panic and impulsive ego dystonic urge to pull her hair out. The patient has pulled out 80% of her hair. This is the second episode of this nature in her lifetime. She notes significant stress related to lack of employment, financial stressors/ dependent on parents, limited social supports.    6/24/18 she still feels anxious and depressed and has been feeling down and not feeling angry or agitated and no si or HI and no paranoid feeling      ALLERGIES:   Allergies   Allergen Reactions    Lisinopril Rash     Rash on lip      MEDICATIONS PRIOR TO ADMISSION: Prescriptions Prior to Admission   Medication Sig    QUEtiapine (SEROQUEL) 25 mg tablet Take 25 mg by mouth nightly.  hydrOXYzine pamoate (VISTARIL) 25 mg capsule Take 25-50 mg by mouth three (3) times daily as needed for Anxiety.  FLUoxetine (PROZAC) 20 mg capsule Take 40 mg by mouth daily.  triamterene-hydrochlorothiazide (MAXZIDE) 37.5-25 mg per tablet Take 1 Tab by mouth daily. Indications: HYPERTENSION    losartan (COZAAR) 50 mg tablet Take 50 mg by mouth daily. Indications: HYPERTENSION    metformin (GLUCOPHAGE) 500 mg tablet Take 500 mg by mouth two (2) times daily (with meals). Indications: TYPE 2 DIABETES MELLITUS      PAST MEDICAL HISTORY:   Past Medical History:   Diagnosis Date    Anxiety     Depression     Diabetes ([facility] [address]     Hypertension     Panic attack     Suicidal thoughts     Vision decreased    History reviewed. No pertinent surgical history. SOCIAL HISTORY: LIves alone. College educated. Past work at [employer] for 17 years, laid off in December 2017, received severance  Social History     Social History    Marital status: SINGLE     Spouse name: N/A    Number of children: N/A    Years of education: N/A     Occupational History    Not on file. Social History Main Topics    Smoking status: Never Smoker    Smokeless tobacco: Never Used      Comment: n/a never smoked tobacco    Alcohol use No    Drug use: No    Sexual activity: Yes     Partners: Male     Birth control/ protection: None     Other Topics Concern    Not on file     Social History Narrative      FAMILY HISTORY: History reviewed. No pertinent family history. History reviewed. No pertinent family history. REVIEW OF SYSTEMS:   Negative except anxiety, panic, worry, hair pulling  Pertinent items are noted in the History of Present Illness. All other Systems reviewed and are considered negative.            MENTAL STATUS EXAM & VITALS     MENTAL STATUS EXAM (MSE):    MSE FINDINGS ARE WITHIN NORMAL LIMITS (WNL) UNLESS OTHERWISE STATED BELOW. ( ALL OF THE BELOW CATEGORIES OF THE MSE HAVE BEEN REVIEWED (reviewed 6/24/2018) AND UPDATED AS DEEMED APPROPRIATE )  General Presentation age appropriate and casually dressed, cooperative   Orientation oriented to time, place and person   Vital Signs  See below (reviewed 6/24/2018); Vital Signs (BP, Pulse, & Temp) are within normal limits if not listed below.    Gait and Station Stable/steady, no ataxia   Musculoskeletal System No extrapyramidal symptoms (EPS); no abnormal muscular movements or Tardive Dyskinesia (TD); muscle strength and tone are within normal limits   Language No aphasia or dysarthria   Speech:  normal pitch and normal volume   Thought Processes logical; normal rate of thoughts; good abstract reasoning/computation   Thought Associations goal directed   Thought Content not internally preoccupied   Suicidal Ideations none   Homicidal Ideations none   Mood:  anxious    Affect:  anxious   Memory recent  fair   Memory remote:  fair   Concentration/Attention:  wnl   Fund of Knowledge wnl   Insight:  good   Reliability good   Judgment:  good          VITALS:     Patient Vitals for the past 24 hrs:   Temp Pulse Resp BP SpO2   06/24/18 1118 98.2 °F (36.8 °C) (!) 108 18 130/84 -   06/24/18 0800 98.4 °F (36.9 °C) 96 16 124/83 96 %   06/23/18 2324 98.1 °F (36.7 °C) (!) 110 16 117/71 98 %   06/23/18 2019 98.6 °F (37 °C) (!) 108 12 128/78 -   06/23/18 1616 98.6 °F (37 °C) (!) 101 16 121/83 99 %     Wt Readings from Last 3 Encounters:   06/24/18 110.8 kg (244 lb 6 oz)   06/20/18 113.7 kg (250 lb 9.6 oz)   06/20/18 111.9 kg (246 lb 9.6 oz)     Temp Readings from Last 3 Encounters:   06/24/18 98.2 °F (36.8 °C)   06/20/18 98.2 °F (36.8 °C)   06/20/18 99 °F (37.2 °C)     BP Readings from Last 3 Encounters:   06/24/18 130/84   06/21/18 133/85   06/20/18 126/75     Pulse Readings from Last 3 Encounters:   06/24/18 (!) 108   06/21/18 80   06/20/18 91            DATA     LABORATORY DATA:  Labs Reviewed   CBC WITH AUTOMATED DIFF - Abnormal; Notable for the following:        Result Value    RDW 14.6 (*)     All other components within normal limits   METABOLIC PANEL, COMPREHENSIVE - Abnormal; Notable for the following:     Potassium 3.0 (*)     Glucose 131 (*)     BUN/Creatinine ratio 10 (*)     Globulin 4.6 (*)     A-G Ratio 0.8 (*)     All other components within normal limits   SALICYLATE - Abnormal; Notable for the following:     Salicylate level <2.2 (*)     All other components within normal limits   ACETAMINOPHEN - Abnormal; Notable for the following:     Acetaminophen level <2 (*)     All other components within normal limits   GLUCOSE, FASTING - Abnormal; Notable for the following:     Glucose 121 (*)     All other components within normal limits   GLUCOSE, POC - Abnormal; Notable for the following:     Glucose (POC) 148 (*)     All other components within normal limits   GLUCOSE, POC - Abnormal; Notable for the following:     Glucose (POC) 102 (*)     All other components within normal limits   GLUCOSE, POC - Abnormal; Notable for the following:     Glucose (POC) 138 (*)     All other components within normal limits   GLUCOSE, POC - Abnormal; Notable for the following:     Glucose (POC) 126 (*)     All other components within normal limits   URINE CULTURE HOLD SAMPLE   URINALYSIS W/MICROSCOPIC   ETHYL ALCOHOL   DRUG SCREEN, URINE   HCG QL SERUM   TSH 3RD GENERATION   LIPID PANEL   SAMPLES BEING HELD   GLUCOSE, POC     Admission on 06/22/2018   Component Date Value Ref Range Status    WBC 06/22/2018 8.1  3.6 - 11.0 K/uL Final    RBC 06/22/2018 4.17  3.80 - 5.20 M/uL Final    HGB 06/22/2018 11.9  11.5 - 16.0 g/dL Final    HCT 06/22/2018 37.2  35.0 - 47.0 % Final    MCV 06/22/2018 89.2  80.0 - 99.0 FL Final    MCH 06/22/2018 28.5  26.0 - 34.0 PG Final    MCHC 06/22/2018 32.0  30.0 - 36.5 g/dL Final    RDW 06/22/2018 14.6* 11.5 - 14.5 % Final    PLATELET 45/05/4640 356  150 - 400 K/uL Final    MPV 06/22/2018 9.9  8.9 - 12.9 FL Final    NRBC 06/22/2018 0.0  0  WBC Final    ABSOLUTE NRBC 06/22/2018 0.00  0.00 - 0.01 K/uL Final    NEUTROPHILS 06/22/2018 75  32 - 75 % Final    LYMPHOCYTES 06/22/2018 18  12 - 49 % Final    MONOCYTES 06/22/2018 6  5 - 13 % Final    EOSINOPHILS 06/22/2018 1  0 - 7 % Final    BASOPHILS 06/22/2018 1  0 - 1 % Final    IMMATURE GRANULOCYTES 06/22/2018 0  0.0 - 0.5 % Final    ABS. NEUTROPHILS 06/22/2018 6.0  1.8 - 8.0 K/UL Final    ABS. LYMPHOCYTES 06/22/2018 1.5  0.8 - 3.5 K/UL Final    ABS. MONOCYTES 06/22/2018 0.5  0.0 - 1.0 K/UL Final    ABS. EOSINOPHILS 06/22/2018 0.1  0.0 - 0.4 K/UL Final    ABS. BASOPHILS 06/22/2018 0.0  0.0 - 0.1 K/UL Final    ABS. IMM. GRANS. 06/22/2018 0.0  0.00 - 0.04 K/UL Final    DF 06/22/2018 AUTOMATED    Final    Sodium 06/22/2018 138  136 - 145 mmol/L Final    Potassium 06/22/2018 3.0* 3.5 - 5.1 mmol/L Final    Chloride 06/22/2018 104  97 - 108 mmol/L Final    CO2 06/22/2018 23  21 - 32 mmol/L Final    Anion gap 06/22/2018 11  5 - 15 mmol/L Final    Glucose 06/22/2018 131* 65 - 100 mg/dL Final    BUN 06/22/2018 9  6 - 20 MG/DL Final    Creatinine 06/22/2018 0.94  0.55 - 1.02 MG/DL Final    BUN/Creatinine ratio 06/22/2018 10* 12 - 20   Final    GFR est AA 06/22/2018 >60  >60 ml/min/1.73m2 Final    GFR est non-AA 06/22/2018 >60  >60 ml/min/1.73m2 Final    Calcium 06/22/2018 9.1  8.5 - 10.1 MG/DL Final    Bilirubin, total 06/22/2018 0.4  0.2 - 1.0 MG/DL Final    ALT (SGPT) 06/22/2018 41  12 - 78 U/L Final    AST (SGOT) 06/22/2018 28  15 - 37 U/L Final    Alk.  phosphatase 06/22/2018 82  45 - 117 U/L Final    Protein, total 06/22/2018 8.1  6.4 - 8.2 g/dL Final    Albumin 06/22/2018 3.5  3.5 - 5.0 g/dL Final    Globulin 06/22/2018 4.6* 2.0 - 4.0 g/dL Final    A-G Ratio 06/22/2018 0.8* 1.1 - 2.2   Final    Color 06/22/2018 YELLOW/STRAW    Final    Appearance 06/22/2018 CLEAR  CLEAR   Final    Specific gravity 06/22/2018 1.013  1.003 - 1.030   Final    pH (UA) 06/22/2018 7.5  5.0 - 8.0   Final    Protein 06/22/2018 NEGATIVE   NEG mg/dL Final    Glucose 06/22/2018 NEGATIVE   NEG mg/dL Final    Ketone 06/22/2018 NEGATIVE   NEG mg/dL Final    Bilirubin 06/22/2018 NEGATIVE   NEG   Final    Blood 06/22/2018 NEGATIVE   NEG   Final    Urobilinogen 06/22/2018 0.2  0.2 - 1.0 EU/dL Final    Nitrites 06/22/2018 NEGATIVE   NEG   Final    Leukocyte Esterase 06/22/2018 NEGATIVE   NEG   Final    WBC 06/22/2018 0-4  0 - 4 /hpf Final    RBC 06/22/2018 0-5  0 - 5 /hpf Final    Epithelial cells 06/22/2018 FEW  FEW /lpf Final    Bacteria 06/22/2018 NEGATIVE   NEG /hpf Final    Hyaline cast 06/22/2018 0-2  0 - 5 /lpf Final    Urine culture hold 06/22/2018 URINE ON HOLD IN MICROBIOLOGY DEPT FOR 3 DAYS. IF UNPRESERVED URINE IS SUBMITTED, IT CANNOT BE USED FOR ADDITIONAL TESTING AFTER 24 HRS, RECOLLECTION WILL BE REQUIRED.     Final    ALCOHOL(ETHYL),SERUM 06/22/2018 <10  <10 MG/DL Final    Salicylate level 41/77/0329 <1.7* 2.8 - 20.0 MG/DL Final    Acetaminophen level 06/22/2018 <2* 10 - 30 ug/mL Final    AMPHETAMINES 06/22/2018 NEGATIVE   NEG   Final    BARBITURATES 06/22/2018 NEGATIVE   NEG   Final    BENZODIAZEPINES 06/22/2018 NEGATIVE   NEG   Final    COCAINE 06/22/2018 NEGATIVE   NEG   Final    METHADONE 06/22/2018 NEGATIVE   NEG   Final    OPIATES 06/22/2018 NEGATIVE   NEG   Final    PCP(PHENCYCLIDINE) 06/22/2018 NEGATIVE   NEG   Final    THC (TH-CANNABINOL) 06/22/2018 NEGATIVE   NEG   Final    Drug screen comment 06/22/2018 (NOTE)   Final    HCG, Ql. 06/22/2018 NEGATIVE   NEG   Final    Glucose 06/23/2018 121* 65 - 100 MG/DL Final    TSH 06/23/2018 1.89  0.36 - 3.74 uIU/mL Final    LIPID PROFILE 06/23/2018        Final    Cholesterol, total 06/23/2018 145  <200 MG/DL Final    Triglyceride 06/23/2018 66  <150 MG/DL Final    HDL Cholesterol 06/23/2018 63  MG/DL Final    LDL, calculated 06/23/2018 68.8  0 - 100 MG/DL Final    VLDL, calculated 06/23/2018 13.2  MG/DL Final    CHOL/HDL Ratio 06/23/2018 2.3  0 - 5.0   Final    Glucose (POC) 06/23/2018 148* 65 - 100 mg/dL Final    Performed by 06/23/2018 Giselle Clark   Final    Glucose (POC) 06/23/2018 91  65 - 100 mg/dL Final    Performed by 06/23/2018 Giselle Clark   Final    Glucose (POC) 06/23/2018 102* 65 - 100 mg/dL Final    Performed by 06/23/2018 Dale Hightower   Final    Glucose (POC) 06/24/2018 138* 65 - 100 mg/dL Final    Performed by 06/24/2018 Junaid Menezes   Final    Glucose (POC) 06/24/2018 126* 65 - 100 mg/dL Final    Performed by 06/24/2018 Junaid Menezes   Final        RADIOLOGY REPORTS:  No results found for this or any previous visit. No results found.            MEDICATIONS       ALL MEDICATIONS  Current Facility-Administered Medications   Medication Dose Route Frequency    risperiDONE (RisperDAL) tablet 0.5 mg  0.5 mg Oral QHS    FLUoxetine (PROzac) capsule 40 mg  40 mg Oral DAILY    clonazePAM (KlonoPIN) tablet 0.25 mg  0.25 mg Oral TID    ziprasidone (GEODON) 20 mg in sterile water (preservative free) 1 mL injection  20 mg IntraMUSCular BID PRN    OLANZapine (ZyPREXA) tablet 5 mg  5 mg Oral Q6H PRN    benztropine (COGENTIN) tablet 2 mg  2 mg Oral BID PRN    benztropine (COGENTIN) injection 2 mg  2 mg IntraMUSCular BID PRN    LORazepam (ATIVAN) injection 2 mg  2 mg IntraMUSCular Q4H PRN    LORazepam (ATIVAN) tablet 1 mg  1 mg Oral Q4H PRN    acetaminophen (TYLENOL) tablet 650 mg  650 mg Oral Q4H PRN    ibuprofen (MOTRIN) tablet 400 mg  400 mg Oral Q8H PRN    magnesium hydroxide (MILK OF MAGNESIA) 400 mg/5 mL oral suspension 30 mL  30 mL Oral DAILY PRN    nicotine (NICODERM CQ) 21 mg/24 hr patch 1 Patch  1 Patch TransDERmal DAILY PRN    zolpidem (AMBIEN) tablet 5 mg  5 mg Oral QHS PRN    losartan (COZAAR) tablet 50 mg  50 mg Oral DAILY    triamterene-hydroCHLOROthiazide (MAXZIDE) 37.5-25 mg per tablet 1 Tab 1 Tab Oral DAILY    metFORMIN (GLUCOPHAGE) tablet 500 mg  500 mg Oral BID WITH MEALS      SCHEDULED MEDICATIONS  Current Facility-Administered Medications   Medication Dose Route Frequency    risperiDONE (RisperDAL) tablet 0.5 mg  0.5 mg Oral QHS    FLUoxetine (PROzac) capsule 40 mg  40 mg Oral DAILY    clonazePAM (KlonoPIN) tablet 0.25 mg  0.25 mg Oral TID    losartan (COZAAR) tablet 50 mg  50 mg Oral DAILY    triamterene-hydroCHLOROthiazide (MAXZIDE) 37.5-25 mg per tablet 1 Tab  1 Tab Oral DAILY    metFORMIN (GLUCOPHAGE) tablet 500 mg  500 mg Oral BID WITH MEALS                ASSESSMENT & PLAN        The patient, Reynaldo Nicholas, is a 38 y.o.  female who presents at this time for treatment of the following diagnoses:  Patient Active Hospital Problem List:   Anxiety Disorder nos (3/24/2016)    Assessment: Recurrent, severe    Plan: Agree with inpatient hospitalization for further stabilization, safety monitoring and medication management  Medications- continue Prozac which she reports has stabilized her depression, Add klonopin and Risperdal, which helped with her hair pulling in the past.   Provided supportive psychotherapy    Diabetes  Assessment: mild  Plan: resume metformin  Diabetic diet  POC glucose    HTN  Assessment: moderate to severe  Plan: resume home meds- losartan and maxzide  6/24/18 continue same medications             A coordinated, multidisplinary treatment team (includes the nurse, unit pharmcist,  and writer) round was conducted for this initial evaluation with the patient present. The following regarding medications was addressed during rounds with patient:   the risks and benefits of the proposed medication. The patient was given the opportunity to ask questions. Informed consent given to the use of the above medications.      I will continue to adjust psychiatric and non-psychiatric medications (see above \"medication\" section and orders section for details) as deemed appropriate & based upon diagnoses and response to treatment. I have reviewed admission (and previous/old) labs and medical tests in the EHR and or transferring hospital documents. I will continue to order blood tests/labs and diagnostic tests as deemed appropriate and review results as they become available (see orders for details). I have reviewed old psychiatric and medical records available in the EHR. I Will order additional psychiatric records from other institutions to further elucidate the nature of patient's psychopathology and review once available. I will gather additional collateral information from friends, family and o/p treatment team to further elucidate the nature of patient's psychopathology and baselline level of psychiatric functioning.       ESTIMATED LENGTH OF STAY:    3-5 days       STRENGTHS:  Access to housing/residential stability and Interpersonal/supportive relationships (family, friends, peers)                                        SIGNED:    Carmenza Avila MD  6/24/2018

## 2018-06-24 NOTE — BH NOTES
PRN Medication Documentation    Specific patient behavior that led to need for PRN medication: patient asking for sleep aid  Staff interventions attempted prior to PRN being given: redirection  PRN medication given: 5 mg ambien PO  Patient response/effectiveness of PRN medication: will continue to assess

## 2018-06-24 NOTE — BH NOTES
Pt tearful during morning.      PRN Medication Documentation    Specific patient behavior that led to need for PRN medication: pt attempting to pull her hair out, banging chair in dining room, impulsive  Staff interventions attempted prior to PRN being given: education   PRN medication given: po 1 mg Zyprexa, po 5 mg Zyprexa   Patient response/effectiveness of PRN medication: pt visible on unit

## 2018-06-24 NOTE — BH NOTES
PRN Medication Documentation 1031    Specific patient behavior that led to need for PRN medication: patient c/o anxiety, patient pulls at her hair   Staff interventions attempted prior to PRN being given: quiet environment, dimmed lights in her room  PRN medication given: Ativan 1mg PO  Patient response/effectiveness of PRN medication: appears effective as patient asleep at midnight

## 2018-06-24 NOTE — PROGRESS NOTES
Problem: Depressed Mood (Adult/Pediatric)  Goal: Interventions  Pt shared that she had a nice visit with her parents and a friend of hers that she has known since school age. She is pleasant upon approach, calm,cooperative, friendly. Good appetite. Med compliant. Continue to encourage pt to verbalize feelings and continue Q15 checks.

## 2018-06-24 NOTE — BH NOTES
GROUP THERAPY PROGRESS NOTE    Reynaldo Nicholas is participating in Goals Group.      Group time: 20 minutes    Personal goal for participation: Stop beating myself up     Goal orientation: personal    Group therapy participation: active    Therapeutic interventions reviewed and discussed: Goals/Change    Impression of participation: active

## 2018-06-24 NOTE — PROGRESS NOTES
Problem: Depressed Mood (Adult/Pediatric)  Goal: *STG: Attends activities and groups  Outcome: Progressing Towards Goal  Patient is participating in groups, activities and the preparation of her care plan. She appears to be improved as she is responding positively to redirection. She has remained safe on the Acute unit since arrival and has contracted for safety.

## 2018-06-25 LAB
GLUCOSE BLD STRIP.AUTO-MCNC: 142 MG/DL (ref 65–100)
GLUCOSE BLD STRIP.AUTO-MCNC: 159 MG/DL (ref 65–100)
GLUCOSE BLD STRIP.AUTO-MCNC: 98 MG/DL (ref 65–100)
SERVICE CMNT-IMP: ABNORMAL
SERVICE CMNT-IMP: ABNORMAL
SERVICE CMNT-IMP: NORMAL

## 2018-06-25 PROCEDURE — 65220000001 HC RM PRIVATE PSYCH

## 2018-06-25 PROCEDURE — 74011250637 HC RX REV CODE- 250/637: Performed by: HOSPITALIST

## 2018-06-25 PROCEDURE — 74011250637 HC RX REV CODE- 250/637: Performed by: PSYCHIATRY & NEUROLOGY

## 2018-06-25 PROCEDURE — 82962 GLUCOSE BLOOD TEST: CPT

## 2018-06-25 RX ORDER — CLONAZEPAM 0.5 MG/1
0.5 TABLET ORAL 3 TIMES DAILY
Status: DISCONTINUED | OUTPATIENT
Start: 2018-06-25 | End: 2018-06-27 | Stop reason: HOSPADM

## 2018-06-25 RX ADMIN — LORAZEPAM 1 MG: 1 TABLET ORAL at 23:43

## 2018-06-25 RX ADMIN — RISPERIDONE 0.5 MG: 0.5 TABLET, FILM COATED ORAL at 21:00

## 2018-06-25 RX ADMIN — OLANZAPINE 5 MG: 5 TABLET, FILM COATED ORAL at 23:43

## 2018-06-25 RX ADMIN — TRIAMTERENE AND HYDROCHLOROTHIAZIDE 1 TABLET: 37.5; 25 TABLET ORAL at 08:33

## 2018-06-25 RX ADMIN — LOSARTAN POTASSIUM 50 MG: 50 TABLET ORAL at 08:33

## 2018-06-25 RX ADMIN — CLONAZEPAM 0.25 MG: 0.5 TABLET ORAL at 08:34

## 2018-06-25 RX ADMIN — METFORMIN HYDROCHLORIDE 500 MG: 500 TABLET, FILM COATED ORAL at 08:33

## 2018-06-25 RX ADMIN — CLONAZEPAM 0.5 MG: 0.5 TABLET ORAL at 16:52

## 2018-06-25 RX ADMIN — METFORMIN HYDROCHLORIDE 500 MG: 500 TABLET, FILM COATED ORAL at 16:52

## 2018-06-25 RX ADMIN — FLUOXETINE 40 MG: 20 CAPSULE ORAL at 08:34

## 2018-06-25 RX ADMIN — ZOLPIDEM TARTRATE 5 MG: 5 TABLET ORAL at 21:00

## 2018-06-25 RX ADMIN — CLONAZEPAM 0.5 MG: 0.5 TABLET ORAL at 21:00

## 2018-06-25 NOTE — PROGRESS NOTES
Problem: Anxiety-Behavioral Health (Adult/Pediatric)  Goal: *STG: Remains safe in hospital  Outcome: Progressing Towards Goal  Pt denies any suicidal or homicidal thoughts. Contracts for safety. Remains on q 15 min safety checks. Goal: *STG: Demonstrates effective anxiety reduction strategies  Outcome: Not Progressing Towards Goal  Patient was sitting at dining room table and was banging on table. Staed \"I just wanted to.'  Goal: *STG/LTG: Complies with medication therapy  Outcome: Progressing Towards Goal  Has been medication compliant. Problem: Depressed Mood (Adult/Pediatric)  Goal: *STG: Demonstrates reduction in symptoms and increase in insight into coping skills/future focused  Outcome: Not Progressing Towards Goal  Self reports depression. Level of irritability is present. Minimal self disclosure. Mila Barriga

## 2018-06-25 NOTE — BH NOTES
PRN Medication Documentation    Specific patient behavior that led to need for PRN medication: sleeplessness  Staff interventions attempted prior to PRN being given: relaxation  PRN medication given: Ambien  Patient response/effectiveness of PRN medication: will continue to monitor
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
## 2018-06-25 NOTE — INTERDISCIPLINARY ROUNDS
Behavioral Health Interdisciplinary Rounds     Patient Name: Mili Paredes  Age: 38 y.o. Room/Bed:  724/  Primary Diagnosis: <principal problem not specified>   Admission Status: Voluntary     Readmission within 30 days: no  Power of  in place: no  Patient requires a blocked bed: yes          Reason for blocked bed: behavior    VTE Prophylaxis: No    Mobility needs/Fall risk: no    Nutritional Plan: no  Consults:          Labs/Testing due today?: no    Sleep hours:        Participation in Care/Groups:  yes  Medication Compliant?: Yes  PRNS (last 24 hours): Antipsychotic (PO), Antianxiety and Sleep Aid    Restraints (last 24 hours):  no     CIWA (range last 24 hours):     COWS (range last 24 hours):      Alcohol screening (AUDIT) completed -   AUDIT Score: 0     If applicable, date SBIRT discussed in treatment team AND documented:     Tobacco - patient is a smoker: Have You Used Tobacco in the Past 30 Days: No  Illegal Drugs use: Have You Used Any Illegal Substances Over the Past 12 Months: No    24 hour chart check complete: yes     Patient goal(s) for today:  To have fewer outbursts  Treatment team focus/goals: Continue to take medication as prescribed  Progress note Send patient to the General Unit when a bed is available    LOS:  3  Expected LOS: TBD    Financial concerns/prescription coverage:   Date of last family contact:  Spoke with patient's mother today, 6/25/18   Family requesting physician contact today: No  Discharge plan: Return to home  Guns in the home: No       Outpatient provider(s): [facility]    Participating treatment team members: Dr. Angelic Hector; Delbert Murillo; Lionel Rosado

## 2018-06-25 NOTE — BH NOTES
GROUP THERAPY PROGRESS NOTE    Lisa Dumont is participating in [place].      Group time: 15 minutes    Personal goal for participation: stay calm     Goal orientation: community    Group therapy participation: active    Therapeutic interventions reviewed and discussed: yes    Impression of participation: engaged

## 2018-06-25 NOTE — PROGRESS NOTES
Laboratory Monitoring for Antipsychotics: This patient is currently prescribed the following medication(s):   Current Facility-Administered Medications   Medication Dose Route Frequency    risperiDONE (RisperDAL) tablet 0.5 mg  0.5 mg Oral QHS    FLUoxetine (PROzac) capsule 40 mg  40 mg Oral DAILY    clonazePAM (KlonoPIN) tablet 0.25 mg  0.25 mg Oral TID    losartan (COZAAR) tablet 50 mg  50 mg Oral DAILY    triamterene-hydroCHLOROthiazide (MAXZIDE) 37.5-25 mg per tablet 1 Tab  1 Tab Oral DAILY    metFORMIN (GLUCOPHAGE) tablet 500 mg  500 mg Oral BID WITH MEALS     The following labs have been completed for monitoring of antipsychotics and/or mood stabilizers:    Height, Weight, BMI Estimation  Estimated body mass index is 43.29 kg/(m^2) as calculated from the following:    Height as of this encounter: 160 cm (63\"). Weight as of this encounter: 110.8 kg (244 lb 6 oz). Vital Signs/Blood Pressure  Visit Vitals    /84    Pulse (!) 110    Temp 96.8 °F (36 °C)    Resp 16    Ht 160 cm (63\")    Wt 110.8 kg (244 lb 6 oz)    SpO2 98%    Breastfeeding No    BMI 43.29 kg/m2     Renal Function, Hepatic Function and Chemistry  Estimated Creatinine Clearance: 91.4 mL/min (based on Cr of 0.94). Lab Results   Component Value Date/Time    Sodium 138 06/22/2018 11:14 AM    Potassium 3.0 (L) 06/22/2018 11:14 AM    Chloride 104 06/22/2018 11:14 AM    CO2 23 06/22/2018 11:14 AM    Anion gap 11 06/22/2018 11:14 AM    BUN 9 06/22/2018 11:14 AM    Creatinine 0.94 06/22/2018 11:14 AM    BUN/Creatinine ratio 10 (L) 06/22/2018 11:14 AM    Bilirubin, total 0.4 06/22/2018 11:14 AM    Protein, total 8.1 06/22/2018 11:14 AM    Albumin 3.5 06/22/2018 11:14 AM    Globulin 4.6 (H) 06/22/2018 11:14 AM    A-G Ratio 0.8 (L) 06/22/2018 11:14 AM    ALT (SGPT) 41 06/22/2018 11:14 AM    Alk.  phosphatase 82 06/22/2018 11:14 AM     Lab Results   Component Value Date/Time    Glucose 121 (H) 06/23/2018 04:29 AM    Glucose (POC) 159 (H) 06/25/2018 07:36 AM     Lab Results   Component Value Date/Time    Hemoglobin A1c 6.5 (H) 03/26/2016 06:09 AM     Hematology  Lab Results   Component Value Date/Time    WBC 8.1 06/22/2018 11:14 AM    RBC 4.17 06/22/2018 11:14 AM    HGB 11.9 06/22/2018 11:14 AM    HCT 37.2 06/22/2018 11:14 AM    MCV 89.2 06/22/2018 11:14 AM    MCH 28.5 06/22/2018 11:14 AM    MCHC 32.0 06/22/2018 11:14 AM    RDW 14.6 (H) 06/22/2018 11:14 AM    PLATELET 423 29/64/3169 11:14 AM     Lipids  Lab Results   Component Value Date/Time    Cholesterol, total 145 06/23/2018 04:29 AM    HDL Cholesterol 63 06/23/2018 04:29 AM    LDL, calculated 68.8 06/23/2018 04:29 AM    Triglyceride 66 06/23/2018 04:29 AM    CHOL/HDL Ratio 2.3 06/23/2018 04:29 AM     Thyroid Function  Lab Results   Component Value Date/Time    TSH 1.89 06/23/2018 04:29 AM     Pregnancy Status  Lab Results   Component Value Date/Time    HCG urine, QL NEGATIVE  03/24/2016 06:06 AM    Pregnancy test,urine (POC) NEGATIVE  05/21/2016 03:36 PM     Assessment/Plan:  Recommended baseline laboratory monitoring has been completed based on this patient's current medication regimen.      Yuan Marie, PharmD, BCPP, [facility], [facility]

## 2018-06-26 LAB
GLUCOSE BLD STRIP.AUTO-MCNC: 118 MG/DL (ref 65–100)
GLUCOSE BLD STRIP.AUTO-MCNC: 123 MG/DL (ref 65–100)
GLUCOSE BLD STRIP.AUTO-MCNC: 93 MG/DL (ref 65–100)
SERVICE CMNT-IMP: ABNORMAL
SERVICE CMNT-IMP: ABNORMAL
SERVICE CMNT-IMP: NORMAL

## 2018-06-26 PROCEDURE — 74011250637 HC RX REV CODE- 250/637: Performed by: PSYCHIATRY & NEUROLOGY

## 2018-06-26 PROCEDURE — 74011250637 HC RX REV CODE- 250/637: Performed by: HOSPITALIST

## 2018-06-26 PROCEDURE — 65220000003 HC RM SEMIPRIVATE PSYCH

## 2018-06-26 PROCEDURE — 82962 GLUCOSE BLOOD TEST: CPT

## 2018-06-26 RX ORDER — ZOLPIDEM TARTRATE 10 MG/1
10 TABLET ORAL
Status: DISCONTINUED | OUTPATIENT
Start: 2018-06-26 | End: 2018-06-27 | Stop reason: HOSPADM

## 2018-06-26 RX ADMIN — LOSARTAN POTASSIUM 50 MG: 50 TABLET ORAL at 08:13

## 2018-06-26 RX ADMIN — METFORMIN HYDROCHLORIDE 500 MG: 500 TABLET, FILM COATED ORAL at 08:13

## 2018-06-26 RX ADMIN — CLONAZEPAM 0.5 MG: 0.5 TABLET ORAL at 17:11

## 2018-06-26 RX ADMIN — METFORMIN HYDROCHLORIDE 500 MG: 500 TABLET, FILM COATED ORAL at 17:11

## 2018-06-26 RX ADMIN — FLUOXETINE 40 MG: 20 CAPSULE ORAL at 08:13

## 2018-06-26 RX ADMIN — TRIAMTERENE AND HYDROCHLOROTHIAZIDE 1 TABLET: 37.5; 25 TABLET ORAL at 08:14

## 2018-06-26 RX ADMIN — CLONAZEPAM 0.5 MG: 0.5 TABLET ORAL at 08:13

## 2018-06-26 RX ADMIN — CLONAZEPAM 0.5 MG: 0.5 TABLET ORAL at 21:16

## 2018-06-26 RX ADMIN — ZOLPIDEM TARTRATE 10 MG: 10 TABLET ORAL at 22:07

## 2018-06-26 RX ADMIN — RISPERIDONE 0.5 MG: 0.5 TABLET, FILM COATED ORAL at 21:17

## 2018-06-26 NOTE — INTERDISCIPLINARY ROUNDS
Behavioral Health Interdisciplinary Rounds     Patient Name: Constantin Alicia  Age: 38 y.o. Room/Bed:  724/  Primary Diagnosis: Severe anxiety with panic   Admission Status: Voluntary     Readmission within 30 days: no  Power of  in place: no  Patient requires a blocked bed: yes          Reason for blocked bed: Behavior    VTE Prophylaxis: No    Mobility needs/Fall risk: no    Nutritional Plan: no  Consults:          Labs/Testing due today?: no    Sleep hours: 5.5       Participation in Care/Groups:  yes  Medication Compliant?: Yes  PRNS (last 24 hours):  Antipsychotic (PO), Antianxiety and Sleep Aid    Restraints (last 24 hours):  no     CIWA (range last 24 hours):     COWS (range last 24 hours):      Alcohol screening (AUDIT) completed -   AUDIT Score: 0     If applicable, date SBIRT discussed in treatment team AND documented:     Tobacco - patient is a smoker: Have You Used Tobacco in the Past 30 Days: No  Illegal Drugs use: Have You Used Any Illegal Substances Over the Past 12 Months: No    24 hour chart check complete: yes     Patient goal(s) for today: Adjust to General Unit; engage in groups with peers  Treatment team focus/goals: Transfer to General Unit; schedule follow-up  Progress note: Pt reports decreased frequency in \"episodes,\" states she is feeling better and family is still in town to assist with Pt maintaining stability      LOS:  4  Expected LOS: ~5-6    Financial concerns/prescription coverage: Uninsured  Date of last family contact:       Family requesting physician contact today: No  Discharge plan: Return home when stable for discharge  Guns in the home: No       Outpatient provider(s): ANNEL Lay; Kyra Schlatter, [facility] ([facility])    Participating treatment team members: Constantin Ravin, Libra Dickerson, MSW; Dr. Edgardo Marc MD; Ivone Corona, CHRISTIANE; Linda Torres, PharmD; Pharmacy student

## 2018-06-26 NOTE — PROGRESS NOTES
Problem: Depressed Mood (Adult/Pediatric)  Goal: *STG: Participates in treatment plan  Outcome: Progressing Towards Goal  Out on unit transfer to general unit. Demonstrates appropriate behaviors, ability to follow staff direction and unit routine. Mood and affect smiling bright and reports anxiety decreased to a tolerable level. Broken sleep continue reports not feeling tired and describes anxiety present when awoken. Daily goal is to participates in groups, work on healthy coping skills and share her anxiety levels with staff.  Staff focus is on coping skills education

## 2018-06-26 NOTE — BH NOTES
GROUP THERAPY PROGRESS NOTE    Tobi Ambrose is participating in Reflections. Group time: 10 minutes    Personal goal for participation: unit orientation and daily progress    Goal orientation: personal    Group therapy participation: active    Therapeutic interventions reviewed and discussed: yes    Impression of participation: Seems in good spirits. Comments and questions appropriate to group discussion.

## 2018-06-26 NOTE — BH NOTES
PRN Medication Documentation    Specific patient behavior that led to need for PRN medication: patient complaining of increasing anxiety and racing thoughts with inability to sleep  Staff interventions attempted prior to PRN being given: redirection  PRN medication given: 1 mg ativan PO, 5 mg xyprexa PO  Patient response/effectiveness of PRN medication: will continue to monitor

## 2018-06-26 NOTE — BH NOTES
TRANSFER - IN REPORT:    Verbal report received from Bravo Serrano RN on Sofi Rush  being received from Acute(unit) for routine progression of care      Report consisted of patients Situation, Background, Assessment and   Recommendations(SBAR). Information from the following report(s) SBAR was reviewed with the receiving nurse. Opportunity for questions and clarification was provided. Assessment completed upon patients arrival to unit and care assumed.

## 2018-06-26 NOTE — PROGRESS NOTES
Problem: Falls - Risk of  Goal: *Absence of Falls  Document Princess Fall Risk and appropriate interventions in the flowsheet. Outcome: Progressing Towards Goal  Fall Risk Interventions: non slip socks  Bed in ow position            Medication Interventions: Teach patient to arise slowly         History of Falls Interventions: Room close to nurse's station        Problem: Depressed Mood (Adult/Pediatric)  Goal: *STG: Participates in treatment plan  Outcome: Progressing Towards Goal  Pt. Met with Dr. Iveth Cabrales in treatment team  Discussed her anxiety  Poor impulse control when she is stressed  Goal: *STG: Attends activities and groups  Outcome: Progressing Towards Goal  Attending and participating in unit activities   Goal: *STG: Complies with medication therapy  Outcome: Progressing Towards Goal  Medication compliant reviewed in treatment team  Goal: Interventions  Outcome: Progressing Towards Goal  Will continue to monitor  On 15 min.  Checks for safety  Assess depression  Medication compliance  Effectiveness  Encourage groups

## 2018-06-26 NOTE — BH NOTES
GROUP THERAPY PROGRESS NOTE    Joe Rey participated in the General Unit's Process Group, with a focus on setting a direction or goal, identifying feelings and planning for the day. Group time: 75 minutes. Personal goal for participation: To increase the capacity to improve ones mood and structure. Goal orientation: The patient will be able to identify a direction or goal for themselves, identify their feelings, develop a plan for structuring their day, and continue working on a discharge planning. Group therapy participation: With prompting, this patient partially participated in the group. Therapeutic interventions reviewed and discussed: The group members were asked to introduce themselves to each other and to see if they could identify an emotion they are having and/or let the group know what they want to focus on for the day as they continue to make discharge plans. They were also asked to read a list of twenty-five suggestions for living and to pick one for themselves today. Impression of participation: The patient said she was \"feeling better today\" and glad that she had been moved over from the Acute Unit. She added that she was focusing on \"being positive today. ..and working on her discharge planning. \" She was alert, generally oriented, and expressed no current SI/HI. She also displayed no overt psychotic symptoms. Her thinking appeared clearer and her facial expression was brighter than earlier in the week. Her affect was anxious and her mood was mildly euphoric. She left the group about long-term through the session and did not return to the group.

## 2018-06-26 NOTE — BH NOTES
GROUP THERAPY PROGRESS NOTE    The patient Galina Calix is participating in [employer]. Group time: 30 minutes    Personal goal for participation: to orient the patient to the unit.     Goal orientation: successful adoption of unit rules    Group therapy participation: active    Therapeutic interventions reviewed and discussed: Yes    Impression of participation:     Filemon Espino  6/26/2018 9:56 AM

## 2018-06-26 NOTE — PROGRESS NOTES
Problem: Falls - Risk of  Goal: *Absence of Falls  Document Princess Fall Risk and appropriate interventions in the flowsheet. Outcome: Progressing Towards Goal  Fall Risk Interventions:            Medication Interventions: Assess postural VS orthostatic hypotension         History of Falls Interventions: Room close to nurse's station    Resting in bed with eyes closed, no complaints, no distress noted. Safety measures in place, will continue to monitor.

## 2018-06-26 NOTE — BH NOTES
TRANSFER - OUT REPORT:    Verbal report given to zeus CHANDLER RN  on [facility]  being transferred to  [facility] general unit  for routine progression of care       Report consisted of patients Situation, Background, Assessment and   Recommendations(SBAR). Information from the following report(s) SBAR was reviewed with the receiving nurse. Opportunity for questions and clarification was provided.       Patient transported with:   TIDAL PETROLEUM

## 2018-06-26 NOTE — PROGRESS NOTES
Problem: Depressed Mood (Adult/Pediatric)  Goal: *STG: Remains safe in hospital  Outcome: Progressing Towards Goal  Pt out in milieu with peers laughing and talking with select peers. No acute distress noted at this time. Appetite is good and complies with scheduled medications. Staff will continue to monitor q 15 min checks.

## 2018-06-27 VITALS
HEIGHT: 63 IN | SYSTOLIC BLOOD PRESSURE: 115 MMHG | DIASTOLIC BLOOD PRESSURE: 82 MMHG | HEART RATE: 98 BPM | TEMPERATURE: 98.2 F | OXYGEN SATURATION: 99 % | BODY MASS INDEX: 43.3 KG/M2 | WEIGHT: 244.38 LBS | RESPIRATION RATE: 16 BRPM

## 2018-06-27 LAB
GLUCOSE BLD STRIP.AUTO-MCNC: 144 MG/DL (ref 65–100)
GLUCOSE BLD STRIP.AUTO-MCNC: 85 MG/DL (ref 65–100)
SERVICE CMNT-IMP: ABNORMAL
SERVICE CMNT-IMP: NORMAL

## 2018-06-27 PROCEDURE — 74011250637 HC RX REV CODE- 250/637: Performed by: PSYCHIATRY & NEUROLOGY

## 2018-06-27 PROCEDURE — 74011250637 HC RX REV CODE- 250/637: Performed by: HOSPITALIST

## 2018-06-27 PROCEDURE — 82962 GLUCOSE BLOOD TEST: CPT

## 2018-06-27 RX ORDER — RISPERIDONE 0.5 MG/1
0.5 TABLET, FILM COATED ORAL
Qty: 15 TAB | Refills: 1 | Status: SHIPPED | OUTPATIENT
Start: 2018-06-27 | End: 2019-05-31

## 2018-06-27 RX ORDER — FLUOXETINE HYDROCHLORIDE 40 MG/1
40 CAPSULE ORAL DAILY
Qty: 15 CAP | Refills: 1 | Status: SHIPPED | OUTPATIENT
Start: 2018-06-28 | End: 2019-05-31

## 2018-06-27 RX ORDER — BENZTROPINE MESYLATE 1 MG/1
0.5 TABLET ORAL 2 TIMES DAILY
Status: DISCONTINUED | OUTPATIENT
Start: 2018-06-27 | End: 2018-06-27 | Stop reason: HOSPADM

## 2018-06-27 RX ORDER — ZOLPIDEM TARTRATE 10 MG/1
10 TABLET ORAL
Qty: 15 TAB | Refills: 1 | Status: SHIPPED | OUTPATIENT
Start: 2018-06-27 | End: 2019-05-31

## 2018-06-27 RX ORDER — BENZTROPINE MESYLATE 0.5 MG/1
0.5 TABLET ORAL
Qty: 15 TAB | Refills: 1 | Status: SHIPPED | OUTPATIENT
Start: 2018-06-27 | End: 2019-05-31

## 2018-06-27 RX ORDER — CLONAZEPAM 0.5 MG/1
0.5 TABLET ORAL 3 TIMES DAILY
Qty: 45 TAB | Refills: 1 | Status: SHIPPED | OUTPATIENT
Start: 2018-06-27 | End: 2019-05-31

## 2018-06-27 RX ADMIN — BENZTROPINE MESYLATE 0.5 MG: 1 TABLET ORAL at 13:25

## 2018-06-27 RX ADMIN — FLUOXETINE 40 MG: 20 CAPSULE ORAL at 09:26

## 2018-06-27 RX ADMIN — CLONAZEPAM 0.5 MG: 0.5 TABLET ORAL at 09:26

## 2018-06-27 RX ADMIN — TRIAMTERENE AND HYDROCHLOROTHIAZIDE 1 TABLET: 37.5; 25 TABLET ORAL at 09:26

## 2018-06-27 RX ADMIN — LOSARTAN POTASSIUM 50 MG: 50 TABLET ORAL at 09:26

## 2018-06-27 RX ADMIN — METFORMIN HYDROCHLORIDE 500 MG: 500 TABLET, FILM COATED ORAL at 09:26

## 2018-06-27 NOTE — INTERDISCIPLINARY ROUNDS
Behavioral Health Interdisciplinary Rounds     Patient Name: Don Thompson  Age: 38 y.o.   Room/Bed:  746/  Primary Diagnosis: Severe anxiety with panic   Admission Status: Voluntary     Readmission within 30 days: no  Power of  in place: no  Patient requires a blocked bed: yes          Reason for blocked bed:     VTE Prophylaxis: No    Mobility needs/Fall risk: no    Nutritional Plan: no  Consults:          Labs/Testing due today?: no  Sleep hours:  6.0      Participation in Care/Groups:  yes  Medication Compliant?: Yes  PRNS (last 24 hours): None    Restraints (last 24 hours):  no     CIWA (range last 24 hours):     COWS (range last 24 hours):      Alcohol screening (AUDIT) completed -   AUDIT Score: 0     If applicable, date SBIRT discussed in treatment team AND documented:     Tobacco - patient is a smoker: Have You Used Tobacco in the Past 30 Days: No  Illegal Drugs use: Have You Used Any Illegal Substances Over the Past 12 Months: No    24 hour chart check complete: yes     Patient goal(s) for today: Discharge  Treatment team focus/goals: Schedule follow-up  Progress note: Pt reports being better able to put things into perspective; future-oriented goals     LOS:  5  Expected LOS: 5    Financial concerns/prescription coverage:  Uninsured  Date of last family contact:       Family requesting physician contact today:  no  Discharge plan: Return home when stable for discharge  Guns in the home: No        Outpatient provider(s): ANNEL Andrew; Alan Pritchett [facility] [place] [facility] ([facility])    Participating treatment team members: Aranza Elder MSW; Dr. Sandra Barboza MD; Marilyn Salinas RN

## 2018-06-27 NOTE — BH NOTES
GROUP THERAPY PROGRESS NOTE    Joe Rey participated in a morning Process Group on the General Unit with a focus identifying feelings, planning for the day, and learning more about Healing the [address] and Breaking the [employer]. .  Group time: 65 minutes. Personal goal for participation: To increase the capacity to improve ones mood, set personal goals, and understand more about basic activities to help regulate emotions, particularly for trauma survivors. Goal orientation: The patients will be able to identify their feelings and develop a plan for   structuring their day. They were also presented with a two-sided sheet with one page focusing on the concept of the wounded inner child and the second sheet providing a paradigm for breaking the drama of many dysfunctional patterns. Six steps were suggested to help heal a wounded inner child: 1) trust; 2) validation; 3) shock and anger; 4) sadness; 5) remorse; and 6) loneliness. Breaking the drama triangle requires recognizing the three roles that keep the drama in play: 1) victim/martyr  feeling broken, blameless, victimized, hopeless, helpless, ashamed, etc.; 2) rescuer  feeling like an enabler or offers to save, reinforces victim mentality by giving s permission to fail and expectations to fail; 3) tyrant/oppressor  feeling right, others are wrong, criticizes, keeps others subservient, mobilized by anger, rigidity, and an authoritarian manner. The goal will be to get on with ones own life, to give without expectations, hold oneself accountable and hold others accountable. Group therapy participation: With prompting, this patient partially participated in the group. Therapeutic interventions reviewed and discussed: The group members were asked to   identify an emotion they are having and/or let the group know what they want to focus on for the   day as they continue to make discharge plans.  The group members reviewed their feelings and goals for the day. They also reviewed the two paged handout described above. The group   members were also provided summary sheets on the two topics for their review outside of group. Impression of participation: The patient was called out of group to meet with her treatment plan and to get dressed for her anticipated discharge later today. She said was less anxious about going home and that she is facing her discharge with a feeling of positivity. She indicated that she expects to be able to bring this level of positive energy into all the areas of her life when she leaves the hospital. She recommended her peers take on a positive attitude and trust in their jasvir in God. She expressed no current SI/HI and displayed no overt psychotic symptoms in this group. Her affect was mildly euphoric and her mood seemed slightly over optimistic; she did not want to leave any room for doubt or vicissitude.

## 2018-06-27 NOTE — PROGRESS NOTES
Problem: Depressed Mood (Adult/Pediatric)  Goal: *STG: Participates in treatment plan  Outcome: Progressing Towards Goal  Pt attended Benedicto ARMANDO and focus groups. Pt compliant with meal and meds. Pt had family visitors this evening. Pt has been on the milieu interacting with others.

## 2018-06-27 NOTE — PROGRESS NOTES
Problem: Depressed Mood (Adult/Pediatric)  Goal: *STG: Participates in treatment plan  Outcome: Progressing Towards Goal  Patient participates in treatment plan. Labile mood, affect congruent, cooperative, appropriate with peers. Patient's work history discussed, thoughts organized, fair insight, treatment and medication discussed. Patient is attending groups, out on the unit, med and meal compliant, remains on Q15 min safety checks.

## 2018-06-27 NOTE — BH NOTES
Behavioral Health Transition Record to Provider    Patient Name: Sophie Vazquez  YOB: 1975  Medical Record Number: [id number]  Date of Admission: 6/22/2018  Date of Discharge: 6/27/2018    Attending Provider: General Marely MD  Discharging Provider: General Marely MD  To contact this individual call [phone] and ask the  to page. If unavailable, ask to be transferred to [facility] Provider on call. [facility] Provider will be available on call 24/7 and during holidays. Primary Care Provider: Mark Garza MD    Allergies   Allergen Reactions    Lisinopril Rash     Rash on lip       Reason for Admission: Pt admitted on a voluntary basis for severe anxiety and hair pulling     Admission Diagnosis: Depressive disorder    * No surgery found *    Results for orders placed or performed during the hospital encounter of 06/22/18   URINE CULTURE HOLD SAMPLE   Result Value Ref Range    Urine culture hold        URINE ON HOLD IN MICROBIOLOGY DEPT FOR 3 DAYS. IF UNPRESERVED URINE IS SUBMITTED, IT CANNOT BE USED FOR ADDITIONAL TESTING AFTER 24 HRS, RECOLLECTION WILL BE REQUIRED. CBC WITH AUTOMATED DIFF   Result Value Ref Range    WBC 8.1 3.6 - 11.0 K/uL    RBC 4.17 3.80 - 5.20 M/uL    HGB 11.9 11.5 - 16.0 g/dL    HCT 37.2 35.0 - 47.0 %    MCV 89.2 80.0 - 99.0 FL    MCH 28.5 26.0 - 34.0 PG    MCHC 32.0 30.0 - 36.5 g/dL    RDW 14.6 (H) 11.5 - 14.5 %    PLATELET 304 307 - 761 K/uL    MPV 9.9 8.9 - 12.9 FL    NRBC 0.0 0  WBC    ABSOLUTE NRBC 0.00 0.00 - 0.01 K/uL    NEUTROPHILS 75 32 - 75 %    LYMPHOCYTES 18 12 - 49 %    MONOCYTES 6 5 - 13 %    EOSINOPHILS 1 0 - 7 %    BASOPHILS 1 0 - 1 %    IMMATURE GRANULOCYTES 0 0.0 - 0.5 %    ABS. NEUTROPHILS 6.0 1.8 - 8.0 K/UL    ABS. LYMPHOCYTES 1.5 0.8 - 3.5 K/UL    ABS. MONOCYTES 0.5 0.0 - 1.0 K/UL    ABS. EOSINOPHILS 0.1 0.0 - 0.4 K/UL    ABS. BASOPHILS 0.0 0.0 - 0.1 K/UL    ABS. IMM.  GRANS. 0.0 0.00 - 0.04 K/UL    DF AUTOMATED METABOLIC PANEL, COMPREHENSIVE   Result Value Ref Range    Sodium 138 136 - 145 mmol/L    Potassium 3.0 (L) 3.5 - 5.1 mmol/L    Chloride 104 97 - 108 mmol/L    CO2 23 21 - 32 mmol/L    Anion gap 11 5 - 15 mmol/L    Glucose 131 (H) 65 - 100 mg/dL    BUN 9 6 - 20 MG/DL    Creatinine 0.94 0.55 - 1.02 MG/DL    BUN/Creatinine ratio 10 (L) 12 - 20      GFR est AA >60 >60 ml/min/1.73m2    GFR est non-AA >60 >60 ml/min/1.73m2    Calcium 9.1 8.5 - 10.1 MG/DL    Bilirubin, total 0.4 0.2 - 1.0 MG/DL    ALT (SGPT) 41 12 - 78 U/L    AST (SGOT) 28 15 - 37 U/L    Alk.  phosphatase 82 45 - 117 U/L    Protein, total 8.1 6.4 - 8.2 g/dL    Albumin 3.5 3.5 - 5.0 g/dL    Globulin 4.6 (H) 2.0 - 4.0 g/dL    A-G Ratio 0.8 (L) 1.1 - 2.2     URINALYSIS W/MICROSCOPIC   Result Value Ref Range    Color YELLOW/STRAW      Appearance CLEAR CLEAR      Specific gravity 1.013 1.003 - 1.030      pH (UA) 7.5 5.0 - 8.0      Protein NEGATIVE  NEG mg/dL    Glucose NEGATIVE  NEG mg/dL    Ketone NEGATIVE  NEG mg/dL    Bilirubin NEGATIVE  NEG      Blood NEGATIVE  NEG      Urobilinogen 0.2 0.2 - 1.0 EU/dL    Nitrites NEGATIVE  NEG      Leukocyte Esterase NEGATIVE  NEG      WBC 0-4 0 - 4 /hpf    RBC 0-5 0 - 5 /hpf    Epithelial cells FEW FEW /lpf    Bacteria NEGATIVE  NEG /hpf    Hyaline cast 0-2 0 - 5 /lpf   ETHYL ALCOHOL   Result Value Ref Range    ALCOHOL(ETHYL),SERUM <37 <82 MG/DL   SALICYLATE   Result Value Ref Range    Salicylate level <8.5 (L) 2.8 - 20.0 MG/DL   ACETAMINOPHEN   Result Value Ref Range    Acetaminophen level <2 (L) 10 - 30 ug/mL   DRUG SCREEN, URINE   Result Value Ref Range    AMPHETAMINES NEGATIVE  NEG      BARBITURATES NEGATIVE  NEG      BENZODIAZEPINES NEGATIVE  NEG      COCAINE NEGATIVE  NEG      METHADONE NEGATIVE  NEG      OPIATES NEGATIVE  NEG      PCP(PHENCYCLIDINE) NEGATIVE  NEG      THC (TH-CANNABINOL) NEGATIVE  NEG      Drug screen comment (NOTE)    HCG QL SERUM   Result Value Ref Range    HCG, Ql. NEGATIVE  NEG     GLUCOSE, FASTING   Result Value Ref Range    Glucose 121 (H) 65 - 100 MG/DL   TSH 3RD GENERATION   Result Value Ref Range    TSH 1.89 0.36 - 3.74 uIU/mL   LIPID PANEL   Result Value Ref Range    LIPID PROFILE          Cholesterol, total 145 <200 MG/DL    Triglyceride 66 <150 MG/DL    HDL Cholesterol 63 MG/DL    LDL, calculated 68.8 0 - 100 MG/DL    VLDL, calculated 13.2 MG/DL    CHOL/HDL Ratio 2.3 0 - 5.0     GLUCOSE, POC   Result Value Ref Range    Glucose (POC) 148 (H) 65 - 100 mg/dL    Performed by Sangeetha Paredes    GLUCOSE, POC   Result Value Ref Range    Glucose (POC) 91 65 - 100 mg/dL    Performed by Sangeetha Paredes    GLUCOSE, POC   Result Value Ref Range    Glucose (POC) 102 (H) 65 - 100 mg/dL    Performed by Dina Olivares    GLUCOSE, POC   Result Value Ref Range    Glucose (POC) 138 (H) 65 - 100 mg/dL    Performed by Muna Bailey    GLUCOSE, POC   Result Value Ref Range    Glucose (POC) 126 (H) 65 - 100 mg/dL    Performed by Muna Bailey    GLUCOSE, POC   Result Value Ref Range    Glucose (POC) 107 (H) 65 - 100 mg/dL    Performed by Sangeetha Paredes    GLUCOSE, POC   Result Value Ref Range    Glucose (POC) 159 (H) 65 - 100 mg/dL    Performed by Dina Olivares    GLUCOSE, POC   Result Value Ref Range    Glucose (POC) 142 (H) 65 - 100 mg/dL    Performed by Dina Olivares    GLUCOSE, POC   Result Value Ref Range    Glucose (POC) 98 65 - 100 mg/dL    Performed by Lyric Hope    GLUCOSE, POC   Result Value Ref Range    Glucose (POC) 123 (H) 65 - 100 mg/dL    Performed by Sangeetha Paredes    GLUCOSE, POC   Result Value Ref Range    Glucose (POC) 118 (H) 65 - 100 mg/dL    Performed by Jason Rondon    GLUCOSE, POC   Result Value Ref Range    Glucose (POC) 93 65 - 100 mg/dL    Performed by Judy Trujillo    GLUCOSE, POC   Result Value Ref Range    Glucose (POC) 144 (H) 65 - 100 mg/dL    Performed by Thefarhana Mom    GLUCOSE, POC   Result Value Ref Range    Glucose (POC) 85 65 - 100 mg/dL    Performed by Theadora Mom Immunizations administered during this encounter: There is no immunization history on file for this patient. Screening for Metabolic Disorders for Patients on Antipsychotic Medications  (Data obtained from the EMR)    Estimated Body Mass Index  Estimated body mass index is 43.29 kg/(m^2) as calculated from the following:    Height as of this encounter: 5' 3\" (1.6 m). Weight as of this encounter: 110.8 kg (244 lb 6 oz). Vital Signs/Blood Pressure  Visit Vitals    /82    Pulse 98    Temp 98.2 °F (36.8 °C)    Resp 16    Ht 5' 3\" (1.6 m)    Wt 110.8 kg (244 lb 6 oz)    SpO2 99%    Breastfeeding No    BMI 43.29 kg/m2       Blood Glucose/Hemoglobin A1c  Lab Results   Component Value Date/Time    Glucose 121 (H) 2018 04:29 AM    Glucose (POC) 85 2018 11:33 AM       Lab Results   Component Value Date/Time    Hemoglobin A1c 6.5 (H) 2016 06:09 AM        Lipid Panel  Lab Results   Component Value Date/Time    Cholesterol, total 145 2018 04:29 AM    HDL Cholesterol 63 2018 04:29 AM    LDL, calculated 68.8 2018 04:29 AM    Triglyceride 66 2018 04:29 AM    CHOL/HDL Ratio 2.3 2018 04:29 AM        Discharge Diagnosis:  Severe anxiety with panic (ICD-10-CM: F41.0); Depressive disorder (ICD-10-CM: F32.9)    Discharge Plan: Patient discharged home into the care of family. DISCHARGE SUMMARY    NAME:Catherine Oscar  : 1975  MRN: [id number]    The patient Kishor Jones exhibits the ability to control behavior in a less restrictive environment. Patient's level of functioning is improving. No assaultive/destructive behavior has been observed for the past 24 hours. No suicidal/homicidal threat or behavior has been observed for the past 24 hours. There is no evidence of serious medication side effects. Patient has not been in physical or protective restraints for at least the past 24 hours. If weapons involved, how are they secured?   No weapons involved. Is patient aware of and in agreement with discharge plan? Yes    Arrangements for medication:  Prescriptions given to patient. Referral for substance abuse treatment? Patient is not using substances/Not applicable. Referral for smoking cessation needed? Patient is not a smoker/Not applicable. Copy of discharge instructions to provider?:  [facility]    Arrangements for transportation home:  Parents to . Keep all follow up appointments as scheduled, continue to take prescribed medications per physician instructions. Mental health crisis number:  457 or your local mental health crisis line number at [phone]. Discharge Medication List and Instructions:   Discharge Medication List as of 6/27/2018  1:17 PM      START taking these medications    Details   benztropine (COGENTIN) 0.5 mg tablet Take 1 Tab by mouth two (2) times daily as needed. Indications: drug-induced extrapyramidal reaction, jittery, restlessness feelings, Print, Disp-15 Tab, R-1      clonazePAM (KLONOPIN) 0.5 mg tablet Take 1 Tab by mouth three (3) times daily. Max Daily Amount: 1.5 mg. Indications: anxiety, Print, Disp-45 Tab, R-1      risperiDONE (RISPERDAL) 0.5 mg tablet Take 1 Tab by mouth nightly. Indications: severe trichotillomania, panic, Print, Disp-15 Tab, R-1      zolpidem (AMBIEN) 10 mg tablet Take 1 Tab by mouth nightly. Max Daily Amount: 10 mg. Indications: SLEEP-ONSET INSOMNIA, Print, Disp-15 Tab, R-1         CONTINUE these medications which have CHANGED    Details   FLUoxetine (PROZAC) 40 mg capsule Take 1 Cap by mouth daily. Indications: Generalized Anxiety Disorder, major depressive disorder, Print, Disp-15 Cap, R-1         CONTINUE these medications which have NOT CHANGED    Details   triamterene-hydrochlorothiazide (MAXZIDE) 37.5-25 mg per tablet Take 1 Tab by mouth daily. Indications: HYPERTENSION, Historical Med      losartan (COZAAR) 50 mg tablet Take 50 mg by mouth daily.  Indications: HYPERTENSION, Historical Med      metformin (GLUCOPHAGE) 500 mg tablet Take 500 mg by mouth two (2) times daily (with meals). Indications: TYPE 2 DIABETES MELLITUS, Historical Med         STOP taking these medications       QUEtiapine (SEROQUEL) 25 mg tablet Comments:   Reason for Stopping:         hydrOXYzine pamoate (VISTARIL) 25 mg capsule Comments:   Reason for Stopping:         hydrOXYzine HCl (ATARAX) 25 mg tablet Comments:   Reason for Stopping:               Unresulted Labs     None        To obtain results of studies pending at discharge, please contact [phone]    Follow-up Information     Follow up With Details Comments Contact Info    [facility] [address] On 7/18/2018 You have a 9:00am appointment with Emily Duran, [address] [place] [id number]  [address] [place] [address] [phone]            Advanced Directive:   Does the patient have an appointed surrogate decision maker? No  Does the patient have a Medical Advance Directive? No  Does the patient have a Psychiatric Advance Directive? No  If the patient does not have a surrogate or Medical Advance Directive AND Psychiatric Advance Directive, the patient was offered information on these advance directives Yes and Patient declined to complete    Patient Instructions: Please continue all medications until otherwise directed by physician. Tobacco Cessation Discharge Plan:   Is the patient a smoker and needs referral for smoking cessation? No  Patient referred to the following for smoking cessation with an appointment? Refused     Patient was offered medication to assist with smoking cessation at discharge? Refused  Was education for smoking cessation added to the discharge instructions? Yes    Alcohol/Substance Abuse Discharge Plan:   Does the patient have a history of substance/alcohol abuse and requires a referral for treatment?  No  Patient referred to the following for substance/alcohol abuse treatment with an appointment? Refused  Patient was offered medication to assist with alcohol cessation at discharge? Refused  Was education for substance/alcohol abuse added to discharge instructions? Yes    Patient discharged to Home; discussed with patient/caregiver and provided to the patient/caregiver either in hard copy or electronically.

## 2018-06-27 NOTE — PROGRESS NOTES
Pharmacist Discharge Medication Reconciliation    Discharging Provider: Dr. Ariel Hoffmann PMH:   Past Medical History:   Diagnosis Date    Anxiety     Depression     Diabetes ([address]     Hypertension     Panic attack     Suicidal thoughts     Vision decreased      Chief Complaint for this Admission:   Chief Complaint   Patient presents with    Mental Health Problem     Allergies: Lisinopril    Discharge Medications:   Current Discharge Medication List        START taking these medications    Details   benztropine (COGENTIN) 0.5 mg tablet Take 1 Tab by mouth two (2) times daily as needed. Indications: drug-induced extrapyramidal reaction, jittery, restlessness feelings  Qty: 15 Tab, Refills: 1      clonazePAM (KLONOPIN) 0.5 mg tablet Take 1 Tab by mouth three (3) times daily. Max Daily Amount: 1.5 mg. Indications: anxiety  Qty: 45 Tab, Refills: 1    Associated Diagnoses: Severe anxiety with panic      risperiDONE (RISPERDAL) 0.5 mg tablet Take 1 Tab by mouth nightly. Indications: severe trichotillomania, panic  Qty: 15 Tab, Refills: 1      zolpidem (AMBIEN) 10 mg tablet Take 1 Tab by mouth nightly. Max Daily Amount: 10 mg. Indications: SLEEP-ONSET INSOMNIA  Qty: 15 Tab, Refills: 1    Associated Diagnoses: Psychophysiological insomnia           CONTINUE these medications which have CHANGED    Details   FLUoxetine (PROZAC) 40 mg capsule Take 1 Cap by mouth daily. Indications: Generalized Anxiety Disorder, major depressive disorder  Qty: 15 Cap, Refills: 1           CONTINUE these medications which have NOT CHANGED    Details   triamterene-hydrochlorothiazide (MAXZIDE) 37.5-25 mg per tablet Take 1 Tab by mouth daily. Indications: HYPERTENSION      losartan (COZAAR) 50 mg tablet Take 50 mg by mouth daily. Indications: HYPERTENSION      metformin (GLUCOPHAGE) 500 mg tablet Take 500 mg by mouth two (2) times daily (with meals).  Indications: TYPE 2 DIABETES MELLITUS           STOP taking these medications QUEtiapine (SEROQUEL) 25 mg tablet Comments:   Reason for Stopping:         hydrOXYzine pamoate (VISTARIL) 25 mg capsule Comments:   Reason for Stopping:         hydrOXYzine HCl (ATARAX) 25 mg tablet Comments:   Reason for Stopping:               The patient's chart, MAR and AVS were reviewed by ZAINAB SappD.

## 2018-06-27 NOTE — BH NOTES
Patient reviewed and signed discharge instructions. Patient was discharged with instructions, all personal belongings and escorted off unit to where her parents were waiting.

## 2018-06-27 NOTE — DISCHARGE INSTRUCTIONS
DISCHARGE SUMMARY    NAME:Catherine Wright  : 1975  MRN: [id number]    The patient Celia Carballo exhibits the ability to control behavior in a less restrictive environment. Patient's level of functioning is improving. No assaultive/destructive behavior has been observed for the past 24 hours. No suicidal/homicidal threat or behavior has been observed for the past 24 hours. There is no evidence of serious medication side effects. Patient has not been in physical or protective restraints for at least the past 24 hours. If weapons involved, how are they secured? No weapons involved. Is patient aware of and in agreement with discharge plan? Yes    Arrangements for medication:  Prescriptions given to patient. Referral for substance abuse treatment? Patient is not using substances/Not applicable. Referral for smoking cessation needed? Patient is not a smoker/Not applicable. Copy of discharge instructions to provider?:  [facility]    Arrangements for transportation home:  Parents to . Keep all follow up appointments as scheduled, continue to take prescribed medications per physician instructions. Mental health crisis number:  [phone] or your local mental health crisis line number at [phone]. DISCHARGE SUMMARY from Nurse    PATIENT INSTRUCTIONS:    What to do at Home:  Recommended activity: Activity as tolerated. If you experience any of the following symptoms:  Overwhelming anxiety or depression, thoughts of hurting yourself or others, please follow up with  911 or your local mental health crisis line number at [phone]. *  Please give a list of your current medications to your Primary Care Provider. *  Please update this list whenever your medications are discontinued, doses are      changed, or new medications (including over-the-counter products) are added. *  Please carry medication information at all times in case of emergency situations.     These are general instructions for a healthy lifestyle:    No smoking/ No tobacco products/ Avoid exposure to second hand smoke  Surgeon General's Warning:  Quitting smoking now greatly reduces serious risk to your health. Obesity, smoking, and sedentary lifestyle greatly increases your risk for illness    A healthy diet, regular physical exercise & weight monitoring are important for maintaining a healthy lifestyle    You may be retaining fluid if you have a history of heart failure or if you experience any of the following symptoms:  Weight gain of 3 pounds or more overnight or 5 pounds in a week, increased swelling in our hands or feet or shortness of breath while lying flat in bed. Please call your doctor as soon as you notice any of these symptoms; do not wait until your next office visit. Recognize signs and symptoms of STROKE:    F-face looks uneven    A-arms unable to move or move unevenly    S-speech slurred or non-existent    T-time-call 911 as soon as signs and symptoms begin-DO NOT go       Back to bed or wait to see if you get better-TIME IS BRAIN. Warning Signs of HEART ATTACK     Call 911 if you have these symptoms:   Chest discomfort. Most heart attacks involve discomfort in the center of the chest that lasts more than a few minutes, or that goes away and comes back. It can feel like uncomfortable pressure, squeezing, fullness, or pain.  Discomfort in other areas of the upper body. Symptoms can include pain or discomfort in one or both arms, the back, neck, jaw, or stomach.  Shortness of breath with or without chest discomfort.  Other signs may include breaking out in a cold sweat, nausea, or lightheadedness. Don't wait more than five minutes to call 911 - MINUTES MATTER! Fast action can save your life. Calling 911 is almost always the fastest way to get lifesaving treatment.  Emergency Medical Services staff can begin treatment when they arrive -- up to an hour sooner than if someone gets to the hospital by car. The discharge information has been reviewed with the patient. The patient verbalized understanding. Discharge medications reviewed with the patient and appropriate educational materials and side effects teaching were provided.   ___________________________________________________________________________________________________________________________________

## 2018-07-01 NOTE — DISCHARGE SUMMARY
PSYCHIATRIC DISCHARGE SUMMARY         IDENTIFICATION:    Patient Name  Nasima Graham   Date of Birth 1975   [facility] [id number]   Medical Record Number  [id number]      Age  38 y.o. 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  ICD-9-CM: 312.39         Depression with anxiety ICD-10-CM: F41.8  ICD-9-CM: 300.4         Hypertension ICD-10-CM: I10  ICD-9-CM: 401.9         Type II diabetes mellitus ([facility] [address] ICD-10-CM: E11.9  ICD-9-CM: 250.00         * (Principal)Severe anxiety with panic ICD-10-CM: F41.0  ICD-9-CM: 300.01         Depressive disorder ICD-10-CM: F32.9  ICD-9-CM: [address] Problems    *Severe anxiety with panic      Depressive disorder        DISCHARGE DIAGNOSIS:   Axis I:  SEE ABOVE  Axis II: SEE ABOVE  Axis III: SEE ABOVE  Axis IV:  Financial and career stressors; lack of structure  Axis V:  45on admission, 55 on discharge 65(baseline)       CC & HISTORY OF PRESENT ILLNESS:  The patient, Nasima Graham, is a 38 y.o. BLACK OR  female with a past psychiatric history significant for SANTY, MDD, hair pulling who presents at this time with complaints of (and/or evidence of) the following emotional symptoms: severe life stressors, anxiety and impulsive hair pulling. .  Additional symptomatology include poor sleep. The above symptoms have been present for one week These symptoms are of moderate to high severity. These symptoms are constant in nature. She was brought to the ED by her parents who saw the patient one week ago and noted the increased anxiety and hair pulling.  They had her evaluated by [facility] and encouraged to bring her to the ED if the sx worsened. The patient says that she does not have any positive feelings resulting from pulling her hair, no sense of relief or calm. She notes severe anxiety,panic and impulsive ego dystonic urge to pull her hair out. The patient has pulled out 80% of her hair. This is the second episode of this nature in her lifetime.      She notes significant stress related to lack of employment, financial stressors/ dependent on parents, limited social supports     SOCIAL HISTORY:    Social History     Social History    Marital status: SINGLE     Spouse name: N/A    Number of children: N/A    Years of education: N/A     Occupational History    Not on file. Social History Main Topics    Smoking status: Never Smoker    Smokeless tobacco: Never Used      Comment: n/a never smoked tobacco    Alcohol use No    Drug use: No    Sexual activity: Yes     Partners: Male     Birth control/ protection: None     Other Topics Concern    Not on file     Social History Narrative      FAMILY HISTORY:   History reviewed. No pertinent family history. HOSPITALIZATION COURSE:    Juanjo Choi was admitted to the inpatient psychiatric unit [facility] for acute psychiatric stabilization in regards to symptomatology as described in the HPI above. The differential diagnosis at time of admission included: MDD vs. SANTY vs. Trichotillomania vs. Panic disorder. .  While on the unit Juanjo Choi was involved in individual, group, occupational and milieu therapy. Psychiatric medications were adjusted during this hospitalization including resuming prozac, adding risperdal and klonopin which worked in the past.   Juanjo Choi demonstrated a  progressive improvement in overall condition. Much of patient's depression appeared to be related to situational stressors,  and psychological factors. Please see individual progress notes for more specific details regarding patient's hospitalization course.    At time of discharge, Jennie Chino is without significant problems of psychosis or anxiety or depression  Patient free of suicidal and homicidal ideations (appears to be at very low risk of suicide or homicide) and reports many positive predictive factors in terms of not attempting suicide or homicide. Overall presentation at time of discharge is most consistent with the diagnosis of Panic disorder, MDD and trichotillomania. Patient with request for discharge today. There are no grounds to seek a TDO. Patient has maximized benefit to be derived from acute inpatient psychiatric treatment. All members of the treatment team concur with each other in regards to plans for discharge today per patient's request.  Patient and family are aware and in agreement with discharge and discharge plan.     Per my last note:          LABS AND IMAGING:    Labs Reviewed   CBC WITH AUTOMATED DIFF - Abnormal; Notable for the following:        Result Value    RDW 14.6 (*)     All other components within normal limits   METABOLIC PANEL, COMPREHENSIVE - Abnormal; Notable for the following:     Potassium 3.0 (*)     Glucose 131 (*)     BUN/Creatinine ratio 10 (*)     Globulin 4.6 (*)     A-G Ratio 0.8 (*)     All other components within normal limits   SALICYLATE - Abnormal; Notable for the following:     Salicylate level <4.7 (*)     All other components within normal limits   ACETAMINOPHEN - Abnormal; Notable for the following:     Acetaminophen level <2 (*)     All other components within normal limits   GLUCOSE, FASTING - Abnormal; Notable for the following:     Glucose 121 (*)     All other components within normal limits   GLUCOSE, POC - Abnormal; Notable for the following:     Glucose (POC) 148 (*)     All other components within normal limits   GLUCOSE, POC - Abnormal; Notable for the following:     Glucose (POC) 102 (*)     All other components within normal limits   GLUCOSE, POC - Abnormal; Notable for the following:     Glucose (POC) 138 (*)     All other components within normal limits   GLUCOSE, POC - Abnormal; Notable for the following:     Glucose (POC) 126 (*)     All other components within normal limits   GLUCOSE, POC - Abnormal; Notable for the following:     Glucose (POC) 107 (*)     All other components within normal limits   GLUCOSE, POC - Abnormal; Notable for the following:     Glucose (POC) 159 (*)     All other components within normal limits   GLUCOSE, POC - Abnormal; Notable for the following:     Glucose (POC) 142 (*)     All other components within normal limits   GLUCOSE, POC - Abnormal; Notable for the following:     Glucose (POC) 123 (*)     All other components within normal limits   GLUCOSE, POC - Abnormal; Notable for the following:     Glucose (POC) 118 (*)     All other components within normal limits   GLUCOSE, POC - Abnormal; Notable for the following:     Glucose (POC) 144 (*)     All other components within normal limits   URINE CULTURE HOLD SAMPLE   URINALYSIS W/MICROSCOPIC   ETHYL ALCOHOL   DRUG SCREEN, URINE   HCG QL SERUM   TSH 3RD GENERATION   LIPID PANEL   SAMPLES BEING HELD   GLUCOSE, POC   GLUCOSE, POC   GLUCOSE, POC   GLUCOSE, POC     No results found for: DS35, PHEN, PHENO, PHENT, DILF, DS39, PHENY, PTN, VALF2, VALAC, VALP, VALPR, DS6, CRBAM, CRBAMP, CARB2, XCRBAM  Admission on 06/22/2018, Discharged on 06/27/2018   Component Date Value Ref Range Status    WBC 06/22/2018 8.1  3.6 - 11.0 K/uL Final    RBC 06/22/2018 4.17  3.80 - 5.20 M/uL Final    HGB 06/22/2018 11.9  11.5 - 16.0 g/dL Final    HCT 06/22/2018 37.2  35.0 - 47.0 % Final    MCV 06/22/2018 89.2  80.0 - 99.0 FL Final    MCH 06/22/2018 28.5  26.0 - 34.0 PG Final    MCHC 06/22/2018 32.0  30.0 - 36.5 g/dL Final    RDW 06/22/2018 14.6* 11.5 - 14.5 % Final    PLATELET 87/23/6941 370  150 - 400 K/uL Final    MPV 06/22/2018 9.9  8.9 - 12.9 FL Final    NRBC 06/22/2018 0.0  0  WBC Final    ABSOLUTE NRBC 06/22/2018 0.00  0.00 - 0.01 K/uL Final    NEUTROPHILS 06/22/2018 75  32 - 75 % Final    LYMPHOCYTES 06/22/2018 18  12 - 49 % Final    MONOCYTES 06/22/2018 6  5 - 13 % Final    EOSINOPHILS 06/22/2018 1  0 - 7 % Final    BASOPHILS 06/22/2018 1  0 - 1 % Final    IMMATURE GRANULOCYTES 06/22/2018 0  0.0 - 0.5 % Final    ABS. NEUTROPHILS 06/22/2018 6.0  1.8 - 8.0 K/UL Final    ABS. LYMPHOCYTES 06/22/2018 1.5  0.8 - 3.5 K/UL Final    ABS. MONOCYTES 06/22/2018 0.5  0.0 - 1.0 K/UL Final    ABS. EOSINOPHILS 06/22/2018 0.1  0.0 - 0.4 K/UL Final    ABS. BASOPHILS 06/22/2018 0.0  0.0 - 0.1 K/UL Final    ABS. IMM. GRANS. 06/22/2018 0.0  0.00 - 0.04 K/UL Final    DF 06/22/2018 AUTOMATED    Final    Sodium 06/22/2018 138  136 - 145 mmol/L Final    Potassium 06/22/2018 3.0* 3.5 - 5.1 mmol/L Final    Chloride 06/22/2018 104  97 - 108 mmol/L Final    CO2 06/22/2018 23  21 - 32 mmol/L Final    Anion gap 06/22/2018 11  5 - 15 mmol/L Final    Glucose 06/22/2018 131* 65 - 100 mg/dL Final    BUN 06/22/2018 9  6 - 20 MG/DL Final    Creatinine 06/22/2018 0.94  0.55 - 1.02 MG/DL Final    BUN/Creatinine ratio 06/22/2018 10* 12 - 20   Final    GFR est AA 06/22/2018 >60  >60 ml/min/1.73m2 Final    GFR est non-AA 06/22/2018 >60  >60 ml/min/1.73m2 Final    Calcium 06/22/2018 9.1  8.5 - 10.1 MG/DL Final    Bilirubin, total 06/22/2018 0.4  0.2 - 1.0 MG/DL Final    ALT (SGPT) 06/22/2018 41  12 - 78 U/L Final    AST (SGOT) 06/22/2018 28  15 - 37 U/L Final    Alk.  phosphatase 06/22/2018 82  45 - 117 U/L Final    Protein, total 06/22/2018 8.1  6.4 - 8.2 g/dL Final    Albumin 06/22/2018 3.5  3.5 - 5.0 g/dL Final    Globulin 06/22/2018 4.6* 2.0 - 4.0 g/dL Final    A-G Ratio 06/22/2018 0.8* 1.1 - 2.2   Final    Color 06/22/2018 YELLOW/STRAW    Final    Appearance 06/22/2018 CLEAR  CLEAR   Final    Specific gravity 06/22/2018 1.013  1.003 - 1.030   Final    pH (UA) 06/22/2018 7.5  5.0 - 8.0   Final    Protein 06/22/2018 NEGATIVE   NEG mg/dL Final    Glucose 06/22/2018 NEGATIVE   NEG mg/dL Final    Ketone 06/22/2018 NEGATIVE   NEG mg/dL Final    Bilirubin 06/22/2018 NEGATIVE   NEG   Final    Blood 06/22/2018 NEGATIVE   NEG   Final    Urobilinogen 06/22/2018 0.2  0.2 - 1.0 EU/dL Final    Nitrites 06/22/2018 NEGATIVE   NEG   Final    Leukocyte Esterase 06/22/2018 NEGATIVE   NEG   Final    WBC 06/22/2018 0-4  0 - 4 /hpf Final    RBC 06/22/2018 0-5  0 - 5 /hpf Final    Epithelial cells 06/22/2018 FEW  FEW /lpf Final    Bacteria 06/22/2018 NEGATIVE   NEG /hpf Final    Hyaline cast 06/22/2018 0-2  0 - 5 /lpf Final    Urine culture hold 06/22/2018 URINE ON HOLD IN MICROBIOLOGY DEPT FOR 3 DAYS. IF UNPRESERVED URINE IS SUBMITTED, IT CANNOT BE USED FOR ADDITIONAL TESTING AFTER 24 HRS, RECOLLECTION WILL BE REQUIRED.     Final    ALCOHOL(ETHYL),SERUM 06/22/2018 <10  <10 MG/DL Final    Salicylate level 62/83/8289 <1.7* 2.8 - 20.0 MG/DL Final    Acetaminophen level 06/22/2018 <2* 10 - 30 ug/mL Final    AMPHETAMINES 06/22/2018 NEGATIVE   NEG   Final    BARBITURATES 06/22/2018 NEGATIVE   NEG   Final    BENZODIAZEPINES 06/22/2018 NEGATIVE   NEG   Final    COCAINE 06/22/2018 NEGATIVE   NEG   Final    METHADONE 06/22/2018 NEGATIVE   NEG   Final    OPIATES 06/22/2018 NEGATIVE   NEG   Final    PCP(PHENCYCLIDINE) 06/22/2018 NEGATIVE   NEG   Final    THC (TH-CANNABINOL) 06/22/2018 NEGATIVE   NEG   Final    Drug screen comment 06/22/2018 (NOTE)   Final    HCG, Ql. 06/22/2018 NEGATIVE   NEG   Final    Glucose 06/23/2018 121* 65 - 100 MG/DL Final    TSH 06/23/2018 1.89  0.36 - 3.74 uIU/mL Final    LIPID PROFILE 06/23/2018        Final    Cholesterol, total 06/23/2018 145  <200 MG/DL Final    Triglyceride 06/23/2018 66  <150 MG/DL Final    HDL Cholesterol 06/23/2018 63  MG/DL Final    LDL, calculated 06/23/2018 68.8  0 - 100 MG/DL Final    VLDL, calculated 06/23/2018 13.2  MG/DL Final    CHOL/HDL Ratio 06/23/2018 2.3  0 - 5.0   Final    Glucose (POC) 06/23/2018 148* 65 - 100 mg/dL Final    Performed by 06/23/2018 Ulefrain Stahlk   Final    Glucose (POC) 06/23/2018 91  65 - 100 mg/dL Final    Performed by 06/23/2018 Tacho Stahlk   Final    Glucose (POC) 06/23/2018 102* 65 - 100 mg/dL Final    Performed by 06/23/2018 Vilmarioa Elders   Final    Glucose (POC) 06/24/2018 138* 65 - 100 mg/dL Final    Performed by 06/24/2018 [place]   Final    Glucose (POC) 06/24/2018 126* 65 - 100 mg/dL Final    Performed by 06/24/2018 [place]   Final    Glucose (POC) 06/24/2018 107* 65 - 100 mg/dL Final    Performed by 06/24/2018 Tacho Stahlk   Final    Glucose (POC) 06/25/2018 159* 65 - 100 mg/dL Final    Performed by 06/25/2018 Ronnie Flemings   Final    Glucose (POC) 06/25/2018 142* 65 - 100 mg/dL Final    Performed by 06/25/2018 Trinitya Elders   Final    Glucose (POC) 06/25/2018 98  65 - 100 mg/dL Final    Performed by 06/25/2018 Aline Ballesteros   Final    Glucose (POC) 06/26/2018 123* 65 - 100 mg/dL Final    Performed by 06/26/2018 Tacho Stahlk   Final    Glucose (POC) 06/26/2018 118* 65 - 100 mg/dL Final    Performed by 06/26/2018 Marie Roach   Final    Glucose (POC) 06/26/2018 93  65 - 100 mg/dL Final    Performed by 06/26/2018 [place]   Final    Glucose (POC) 06/27/2018 144* 65 - 100 mg/dL Final    Performed by 06/27/2018 Jerica Arias   Final    Glucose (POC) 06/27/2018 85  65 - 100 mg/dL Final    Performed by 06/27/2018 Jerica Arias   Final   Admission on 06/20/2018, Discharged on 06/21/2018   Component Date Value Ref Range Status    Ventricular Rate 06/20/2018 99  BPM Final    Atrial Rate 06/20/2018 99  BPM Final    P-R Interval 06/20/2018 156  ms Final    QRS Duration 06/20/2018 72  ms Final    Q-T Interval 06/20/2018 350  ms Final    QTC Calculation (Bezet) 06/20/2018 449  ms Final    Calculated P Axis 06/20/2018 46  degrees Final    Calculated R Axis 06/20/2018 -9  degrees Final    Calculated T Axis 06/20/2018 29 degrees Final    Diagnosis 06/20/2018    Final                    Value:Normal sinus rhythm  No previous ECGs available  Confirmed by Rufino Estrada MD. ([id number]) on 6/21/2018 11:48:11 AM       No results found. DISPOSITION:    Home. Patient to f/u with , psychiatric, and psychotherapy appointments. Patient is to f/u with internist as directed. FOLLOW-UP CARE:    Activity as tolerated  Regular Diet  Wound Care: none needed. Follow-up Information     Follow up With Details Comments Contact Info    [facility] On 7/18/2018 You have a 9:00am appointment with Heladio Tuttle, [address] [place] [address], [address] [address] [phone]                   PROGNOSIS:   Good ---- based on nature of patient's pathology/ies and treatment compliance issues. Prognosis is greatly dependent upon patient's ability to follow up with psychiatric/psychotherapy appointments as well as to comply with psychiatric medications as prescribed. DISCHARGE MEDICATIONS:   Informed consent given for the use of following psychotropic medications:  Discharge Medication List as of 6/27/2018  1:17 PM      START taking these medications    Details   benztropine (COGENTIN) 0.5 mg tablet Take 1 Tab by mouth two (2) times daily as needed. Indications: drug-induced extrapyramidal reaction, jittery, restlessness feelings, Print, Disp-15 Tab, R-1      clonazePAM (KLONOPIN) 0.5 mg tablet Take 1 Tab by mouth three (3) times daily. Max Daily Amount: 1.5 mg. Indications: anxiety, Print, Disp-45 Tab, R-1      risperiDONE (RISPERDAL) 0.5 mg tablet Take 1 Tab by mouth nightly. Indications: severe trichotillomania, panic, Print, Disp-15 Tab, R-1      zolpidem (AMBIEN) 10 mg tablet Take 1 Tab by mouth nightly. Max Daily Amount: 10 mg.  Indications: SLEEP-ONSET INSOMNIA, Print, Disp-15 Tab, R-1         CONTINUE these medications which have CHANGED    Details   FLUoxetine (PROZAC) 40 mg capsule Take 1 Cap by mouth daily. Indications: Generalized Anxiety Disorder, major depressive disorder, Print, Disp-15 Cap, R-1         CONTINUE these medications which have NOT CHANGED    Details   triamterene-hydrochlorothiazide (MAXZIDE) 37.5-25 mg per tablet Take 1 Tab by mouth daily. Indications: HYPERTENSION, Historical Med      losartan (COZAAR) 50 mg tablet Take 50 mg by mouth daily. Indications: HYPERTENSION, Historical Med      metformin (GLUCOPHAGE) 500 mg tablet Take 500 mg by mouth two (2) times daily (with meals). Indications: TYPE 2 DIABETES MELLITUS, Historical Med         STOP taking these medications       QUEtiapine (SEROQUEL) 25 mg tablet Comments:   Reason for Stopping:         hydrOXYzine pamoate (VISTARIL) 25 mg capsule Comments:   Reason for Stopping:         hydrOXYzine HCl (ATARAX) 25 mg tablet Comments:   Reason for Stopping:                      A coordinated, multidisplinary treatment team round was conducted with Jenn Marie---this is done daily here at [facility]. This team consists of the nurse, psychiatric unit pharmcist,  and writer. I have spent greater than 35 minutes on discharge work.     Signed:  Flaquita Yarbrough MD  6/27/18

## 2018-07-01 NOTE — H&P
PSYCHIATRIC DISCHARGE SUMMARY         IDENTIFICATION:    Patient Name  Jovan Verde   Date of Birth 1975   [facility] [id number]   Medical Record Number  [id number]      Age  38 y.o. 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  ICD-9-CM: 312.39         Depression with anxiety ICD-10-CM: F41.8  ICD-9-CM: 300.4         Hypertension ICD-10-CM: I10  ICD-9-CM: 401.9         Type II diabetes mellitus ([facility] [address] ICD-10-CM: E11.9  ICD-9-CM: 250.00         * (Principal)Severe anxiety with panic ICD-10-CM: F41.0  ICD-9-CM: 300.01         Depressive disorder ICD-10-CM: F32.9  ICD-9-CM: [address] Problems    *Severe anxiety with panic      Depressive disorder        DISCHARGE DIAGNOSIS:   Axis I:  SEE ABOVE  Axis II: SEE ABOVE  Axis III: SEE ABOVE  Axis IV:  Financial and career stressors; lack of structure  Axis V:  45on admission, 55 on discharge 65(baseline)       CC & HISTORY OF PRESENT ILLNESS:  The patient, Jovan Verde, is a 38 y.o. BLACK OR  female with a past psychiatric history significant for SANTY, MDD, hair pulling who presents at this time with complaints of (and/or evidence of) the following emotional symptoms: severe life stressors, anxiety and impulsive hair pulling. .  Additional symptomatology include poor sleep. The above symptoms have been present for one week These symptoms are of moderate to high severity. These symptoms are constant in nature. She was brought to the ED by her parents who saw the patient one week ago and noted the increased anxiety and hair pulling.  They [facility] and encouraged to bring her to the ED if the sx worsened. The patient says that she does not have any positive feelings resulting from pulling her hair, no sense of relief or calm. She notes severe anxiety,panic and impulsive ego dystonic urge to pull her hair out. The patient has pulled out 80% of her hair. This is the second episode of this nature in her lifetime.      She notes significant stress related to lack of employment, financial stressors/ dependent on parents, limited social supports     SOCIAL HISTORY:    Social History     Social History    Marital status: SINGLE     Spouse name: N/A    Number of children: N/A    Years of education: N/A     Occupational History    Not on file. Social History Main Topics    Smoking status: Never Smoker    Smokeless tobacco: Never Used      Comment: n/a never smoked tobacco    Alcohol use No    Drug use: No    Sexual activity: Yes     Partners: Male     Birth control/ protection: None     Other Topics Concern    Not on file     Social History Narrative      FAMILY HISTORY:   History reviewed. No pertinent family history. HOSPITALIZATION COURSE:    Joe Rey was admitted to the inpatient psychiatric unit [facility] for acute psychiatric stabilization in regards to symptomatology as described in the HPI above. The differential diagnosis at time of admission included: MDD vs. SANTY vs. Trichotillomania vs. Panic disorder. .  While on the unit Joe Rey was involved in individual, group, occupational and milieu therapy. Psychiatric medications were adjusted during this hospitalization including resuming prozac, adding risperdal and klonopin which worked in the past.   Joe Rey demonstrated a  progressive improvement in overall condition. Much of patient's depression appeared to be related to situational stressors,  and psychological factors. Please see individual progress notes for more specific details regarding patient's hospitalization course.    At time of discharge, Galina Calix is without significant problems of psychosis or anxiety or depression  Patient free of suicidal and homicidal ideations (appears to be at very low risk of suicide or homicide) and reports many positive predictive factors in terms of not attempting suicide or homicide. Overall presentation at time of discharge is most consistent with the diagnosis of Panic disorder, MDD and trichotillomania. Patient with request for discharge today. There are no grounds to seek a TDO. Patient has maximized benefit to be derived from acute inpatient psychiatric treatment. All members of the treatment team concur with each other in regards to plans for discharge today per patient's request.  Patient and family are aware and in agreement with discharge and discharge plan.     Per my last note:          LABS AND IMAGING:    Labs Reviewed   CBC WITH AUTOMATED DIFF - Abnormal; Notable for the following:        Result Value    RDW 14.6 (*)     All other components within normal limits   METABOLIC PANEL, COMPREHENSIVE - Abnormal; Notable for the following:     Potassium 3.0 (*)     Glucose 131 (*)     BUN/Creatinine ratio 10 (*)     Globulin 4.6 (*)     A-G Ratio 0.8 (*)     All other components within normal limits   SALICYLATE - Abnormal; Notable for the following:     Salicylate level <2.1 (*)     All other components within normal limits   ACETAMINOPHEN - Abnormal; Notable for the following:     Acetaminophen level <2 (*)     All other components within normal limits   GLUCOSE, FASTING - Abnormal; Notable for the following:     Glucose 121 (*)     All other components within normal limits   GLUCOSE, POC - Abnormal; Notable for the following:     Glucose (POC) 148 (*)     All other components within normal limits   GLUCOSE, POC - Abnormal; Notable for the following:     Glucose (POC) 102 (*)     All other components within normal limits   GLUCOSE, POC - Abnormal; Notable for the following:     Glucose (POC) 138 (*)     All other components within normal limits   GLUCOSE, POC - Abnormal; Notable for the following:     Glucose (POC) 126 (*)     All other components within normal limits   GLUCOSE, POC - Abnormal; Notable for the following:     Glucose (POC) 107 (*)     All other components within normal limits   GLUCOSE, POC - Abnormal; Notable for the following:     Glucose (POC) 159 (*)     All other components within normal limits   GLUCOSE, POC - Abnormal; Notable for the following:     Glucose (POC) 142 (*)     All other components within normal limits   GLUCOSE, POC - Abnormal; Notable for the following:     Glucose (POC) 123 (*)     All other components within normal limits   GLUCOSE, POC - Abnormal; Notable for the following:     Glucose (POC) 118 (*)     All other components within normal limits   GLUCOSE, POC - Abnormal; Notable for the following:     Glucose (POC) 144 (*)     All other components within normal limits   URINE CULTURE HOLD SAMPLE   URINALYSIS W/MICROSCOPIC   ETHYL ALCOHOL   DRUG SCREEN, URINE   HCG QL SERUM   TSH 3RD GENERATION   LIPID PANEL   SAMPLES BEING HELD   GLUCOSE, POC   GLUCOSE, POC   GLUCOSE, POC   GLUCOSE, POC     No results found for: DS35, PHEN, PHENO, PHENT, DILF, DS39, PHENY, PTN, VALF2, VALAC, VALP, VALPR, DS6, CRBAM, CRBAMP, CARB2, XCRBAM  Admission on 06/22/2018, Discharged on 06/27/2018   Component Date Value Ref Range Status    WBC 06/22/2018 8.1  3.6 - 11.0 K/uL Final    RBC 06/22/2018 4.17  3.80 - 5.20 M/uL Final    HGB 06/22/2018 11.9  11.5 - 16.0 g/dL Final    HCT 06/22/2018 37.2  35.0 - 47.0 % Final    MCV 06/22/2018 89.2  80.0 - 99.0 FL Final    MCH 06/22/2018 28.5  26.0 - 34.0 PG Final    MCHC 06/22/2018 32.0  30.0 - 36.5 g/dL Final    RDW 06/22/2018 14.6* 11.5 - 14.5 % Final    PLATELET 36/39/6099 264  150 - 400 K/uL Final    MPV 06/22/2018 9.9  8.9 - 12.9 FL Final    NRBC 06/22/2018 0.0  0  WBC Final    ABSOLUTE NRBC 06/22/2018 0.00  0.00 - 0.01 K/uL Final    NEUTROPHILS 06/22/2018 75  32 - 75 % Final    LYMPHOCYTES 06/22/2018 18  12 - 49 % Final    MONOCYTES 06/22/2018 6  5 - 13 % Final    EOSINOPHILS 06/22/2018 1  0 - 7 % Final    BASOPHILS 06/22/2018 1  0 - 1 % Final    IMMATURE GRANULOCYTES 06/22/2018 0  0.0 - 0.5 % Final    ABS. NEUTROPHILS 06/22/2018 6.0  1.8 - 8.0 K/UL Final    ABS. LYMPHOCYTES 06/22/2018 1.5  0.8 - 3.5 K/UL Final    ABS. MONOCYTES 06/22/2018 0.5  0.0 - 1.0 K/UL Final    ABS. EOSINOPHILS 06/22/2018 0.1  0.0 - 0.4 K/UL Final    ABS. BASOPHILS 06/22/2018 0.0  0.0 - 0.1 K/UL Final    ABS. IMM. GRANS. 06/22/2018 0.0  0.00 - 0.04 K/UL Final    DF 06/22/2018 AUTOMATED    Final    Sodium 06/22/2018 138  136 - 145 mmol/L Final    Potassium 06/22/2018 3.0* 3.5 - 5.1 mmol/L Final    Chloride 06/22/2018 104  97 - 108 mmol/L Final    CO2 06/22/2018 23  21 - 32 mmol/L Final    Anion gap 06/22/2018 11  5 - 15 mmol/L Final    Glucose 06/22/2018 131* 65 - 100 mg/dL Final    BUN 06/22/2018 9  6 - 20 MG/DL Final    Creatinine 06/22/2018 0.94  0.55 - 1.02 MG/DL Final    BUN/Creatinine ratio 06/22/2018 10* 12 - 20   Final    GFR est AA 06/22/2018 >60  >60 ml/min/1.73m2 Final    GFR est non-AA 06/22/2018 >60  >60 ml/min/1.73m2 Final    Calcium 06/22/2018 9.1  8.5 - 10.1 MG/DL Final    Bilirubin, total 06/22/2018 0.4  0.2 - 1.0 MG/DL Final    ALT (SGPT) 06/22/2018 41  12 - 78 U/L Final    AST (SGOT) 06/22/2018 28  15 - 37 U/L Final    Alk.  phosphatase 06/22/2018 82  45 - 117 U/L Final    Protein, total 06/22/2018 8.1  6.4 - 8.2 g/dL Final    Albumin 06/22/2018 3.5  3.5 - 5.0 g/dL Final    Globulin 06/22/2018 4.6* 2.0 - 4.0 g/dL Final    A-G Ratio 06/22/2018 0.8* 1.1 - 2.2   Final    Color 06/22/2018 YELLOW/STRAW    Final    Appearance 06/22/2018 CLEAR  CLEAR   Final    Specific gravity 06/22/2018 1.013  1.003 - 1.030   Final    pH (UA) 06/22/2018 7.5  5.0 - 8.0   Final    Protein 06/22/2018 NEGATIVE   NEG mg/dL Final    Glucose 06/22/2018 NEGATIVE   NEG mg/dL Final    Ketone 06/22/2018 NEGATIVE   NEG mg/dL Final    Bilirubin 06/22/2018 NEGATIVE   NEG   Final    Blood 06/22/2018 NEGATIVE   NEG   Final    Urobilinogen 06/22/2018 0.2  0.2 - 1.0 EU/dL Final    Nitrites 06/22/2018 NEGATIVE   NEG   Final    Leukocyte Esterase 06/22/2018 NEGATIVE   NEG   Final    WBC 06/22/2018 0-4  0 - 4 /hpf Final    RBC 06/22/2018 0-5  0 - 5 /hpf Final    Epithelial cells 06/22/2018 FEW  FEW /lpf Final    Bacteria 06/22/2018 NEGATIVE   NEG /hpf Final    Hyaline cast 06/22/2018 0-2  0 - 5 /lpf Final    Urine culture hold 06/22/2018 URINE ON HOLD IN MICROBIOLOGY DEPT FOR 3 DAYS. IF UNPRESERVED URINE IS SUBMITTED, IT CANNOT BE USED FOR ADDITIONAL TESTING AFTER 24 HRS, RECOLLECTION WILL BE REQUIRED.     Final    ALCOHOL(ETHYL),SERUM 06/22/2018 <10  <10 MG/DL Final    Salicylate level 29/14/4630 <1.7* 2.8 - 20.0 MG/DL Final    Acetaminophen level 06/22/2018 <2* 10 - 30 ug/mL Final    AMPHETAMINES 06/22/2018 NEGATIVE   NEG   Final    BARBITURATES 06/22/2018 NEGATIVE   NEG   Final    BENZODIAZEPINES 06/22/2018 NEGATIVE   NEG   Final    COCAINE 06/22/2018 NEGATIVE   NEG   Final    METHADONE 06/22/2018 NEGATIVE   NEG   Final    OPIATES 06/22/2018 NEGATIVE   NEG   Final    PCP(PHENCYCLIDINE) 06/22/2018 NEGATIVE   NEG   Final    THC (TH-CANNABINOL) 06/22/2018 NEGATIVE   NEG   Final    Drug screen comment 06/22/2018 (NOTE)   Final    HCG, Ql. 06/22/2018 NEGATIVE   NEG   Final    Glucose 06/23/2018 121* 65 - 100 MG/DL Final    TSH 06/23/2018 1.89  0.36 - 3.74 uIU/mL Final    LIPID PROFILE 06/23/2018        Final    Cholesterol, total 06/23/2018 145  <200 MG/DL Final    Triglyceride 06/23/2018 66  <150 MG/DL Final    HDL Cholesterol 06/23/2018 63  MG/DL Final    LDL, calculated 06/23/2018 68.8  0 - 100 MG/DL Final    VLDL, calculated 06/23/2018 13.2  MG/DL Final    CHOL/HDL Ratio 06/23/2018 2.3  0 - 5.0   Final    Glucose (POC) 06/23/2018 148* 65 - 100 mg/dL Final    Performed by 06/23/2018 Sanjuana Wai   Final    Glucose (POC) 06/23/2018 91  65 - 100 mg/dL Final    Performed by 06/23/2018 Sanjuana Wai   Final    Glucose (POC) 06/23/2018 102* 65 - 100 mg/dL Final    Performed by 06/23/2018 Abdias Claudio   Final    Glucose (POC) 06/24/2018 138* 65 - 100 mg/dL Final    Performed by 06/24/2018 Ronda Nikhil   Final    Glucose (POC) 06/24/2018 126* 65 - 100 mg/dL Final    Performed by 06/24/2018 Ronda Alfordmitz   Final    Glucose (POC) 06/24/2018 107* 65 - 100 mg/dL Final    Performed by 06/24/2018 Sanjuana Wai   Final    Glucose (POC) 06/25/2018 159* 65 - 100 mg/dL Final    Performed by 06/25/2018 Abdias Pérezy   Final    Glucose (POC) 06/25/2018 142* 65 - 100 mg/dL Final    Performed by 06/25/2018 Abdias Claudio   Final    Glucose (POC) 06/25/2018 98  65 - 100 mg/dL Final    Performed by 06/25/2018 Tip Alert   Final    Glucose (POC) 06/26/2018 123* 65 - 100 mg/dL Final    Performed by 06/26/2018 Sanjuana Wai   Final    Glucose (POC) 06/26/2018 118* 65 - 100 mg/dL Final    Performed by 06/26/2018 Albert Ruiz   Final    Glucose (POC) 06/26/2018 93  65 - 100 mg/dL Final    Performed by 06/26/2018 Koby Finn   Final    Glucose (POC) 06/27/2018 144* 65 - 100 mg/dL Final    Performed by 06/27/2018 Libia Sneed   Final    Glucose (POC) 06/27/2018 85  65 - 100 mg/dL Final    Performed by 06/27/2018 Libia Sneed   Final   Admission on 06/20/2018, Discharged on 06/21/2018   Component Date Value Ref Range Status    Ventricular Rate 06/20/2018 99  BPM Final    Atrial Rate 06/20/2018 99  BPM Final    P-R Interval 06/20/2018 156  ms Final    QRS Duration 06/20/2018 72  ms Final    Q-T Interval 06/20/2018 350  ms Final    QTC Calculation (Bezet) 06/20/2018 449  ms Final    Calculated P Axis 06/20/2018 46  degrees Final    Calculated R Axis 06/20/2018 -9  degrees Final    Calculated T Axis 06/20/2018 29 degrees Final    Diagnosis 06/20/2018    Final                    Value:Normal sinus rhythm  No previous ECGs available  Confirmed by Odalis Barrera MD. ([id number]) on 6/21/2018 11:48:11 AM       No results found. DISPOSITION:    Home. Patient to f/u with , psychiatric, and psychotherapy appointments. Patient is to f/u with internist as directed. FOLLOW-UP CARE:    Activity as tolerated  Regular Diet  Wound Care: none needed. Follow-up Information     Follow up With Details Comments Contact Info    [facility] [address] On 7/18/2018 You have a 9:00am appointment with Malik Arredondo, [address] [facility] [address] [place] [address], [address] [address] [phone]                   PROGNOSIS:   Good ---- based on nature of patient's pathology/ies and treatment compliance issues. Prognosis is greatly dependent upon patient's ability to follow up with psychiatric/psychotherapy appointments as well as to comply with psychiatric medications as prescribed. DISCHARGE MEDICATIONS:   Informed consent given for the use of following psychotropic medications:  Discharge Medication List as of 6/27/2018  1:17 PM      START taking these medications    Details   benztropine (COGENTIN) 0.5 mg tablet Take 1 Tab by mouth two (2) times daily as needed. Indications: drug-induced extrapyramidal reaction, jittery, restlessness feelings, Print, Disp-15 Tab, R-1      clonazePAM (KLONOPIN) 0.5 mg tablet Take 1 Tab by mouth three (3) times daily. Max Daily Amount: 1.5 mg. Indications: anxiety, Print, Disp-45 Tab, R-1      risperiDONE (RISPERDAL) 0.5 mg tablet Take 1 Tab by mouth nightly. Indications: severe trichotillomania, panic, Print, Disp-15 Tab, R-1      zolpidem (AMBIEN) 10 mg tablet Take 1 Tab by mouth nightly. Max Daily Amount: 10 mg.  Indications: SLEEP-ONSET INSOMNIA, Print, Disp-15 Tab, R-1         CONTINUE these medications which have CHANGED    Details   FLUoxetine (PROZAC) 40 mg capsule Take 1 Cap by mouth daily. Indications: Generalized Anxiety Disorder, major depressive disorder, Print, Disp-15 Cap, R-1         CONTINUE these medications which have NOT CHANGED    Details   triamterene-hydrochlorothiazide (MAXZIDE) 37.5-25 mg per tablet Take 1 Tab by mouth daily. Indications: HYPERTENSION, Historical Med      losartan (COZAAR) 50 mg tablet Take 50 mg by mouth daily. Indications: HYPERTENSION, Historical Med      metformin (GLUCOPHAGE) 500 mg tablet Take 500 mg by mouth two (2) times daily (with meals). Indications: TYPE 2 DIABETES MELLITUS, Historical Med         STOP taking these medications       QUEtiapine (SEROQUEL) 25 mg tablet Comments:   Reason for Stopping:         hydrOXYzine pamoate (VISTARIL) 25 mg capsule Comments:   Reason for Stopping:         hydrOXYzine HCl (ATARAX) 25 mg tablet Comments:   Reason for Stopping:                      A coordinated, multidisplinary treatment team round was conducted with Ester Marie---this is done daily here at [facility]. This team consists of the nurse, psychiatric unit pharmcist,  and writer. I have spent greater than 35 minutes on discharge work.     Signed:  Ebony Morrissey MD  6/27/18

## 2018-07-10 ENCOUNTER — TELEPHONE (OUTPATIENT)
Dept: BEHAVIORAL/MENTAL HEALTH CLINIC | Age: 43
End: 2018-07-10

## 2018-07-10 NOTE — TELEPHONE ENCOUNTER
Pt calls. You discharged her 2 weeks ago. She was summoned with jury duty and asks if you can write her a note to excuse her from jury duty. Please call her when letter is ready.

## 2018-07-10 NOTE — TELEPHONE ENCOUNTER
Patient should follow up with her outpatient psychiatrist office for this letter. I believe she was referred to [facility].

## 2018-10-17 ENCOUNTER — OFFICE VISIT (OUTPATIENT)
Dept: NEUROLOGY | Age: 43
End: 2018-10-17

## 2018-10-17 VITALS
OXYGEN SATURATION: 97 % | WEIGHT: 239 LBS | HEART RATE: 83 BPM | BODY MASS INDEX: 42.34 KG/M2 | SYSTOLIC BLOOD PRESSURE: 118 MMHG | DIASTOLIC BLOOD PRESSURE: 80 MMHG

## 2018-10-17 DIAGNOSIS — F63.3 HAIR PULLING: ICD-10-CM

## 2018-10-17 DIAGNOSIS — M54.81 OCCIPITAL NEURALGIA OF LEFT SIDE: Primary | ICD-10-CM

## 2018-10-17 RX ORDER — ARIPIPRAZOLE 10 MG/1
10 TABLET ORAL DAILY
Refills: 1 | COMMUNITY
Start: 2018-10-16 | End: 2019-05-31

## 2018-10-17 RX ORDER — LORAZEPAM 1 MG/1
1 TABLET ORAL DAILY
Refills: 1 | COMMUNITY
Start: 2018-10-16 | End: 2019-05-31

## 2018-10-17 NOTE — PATIENT INSTRUCTIONS
[facility]   Statement to Patients  April 1, 2014      In an effort to ensure the large volume of patient prescription refills is processed in the most efficient and expeditious manner, we are asking our patients to assist us by calling your Pharmacy for all prescription refills, this will include also your  Mail Order Pharmacy. The pharmacy will contact our office electronically to continue the refill process. Please do not wait until the last minute to call your pharmacy. We need at least 48 hours (2days) to fill prescriptions. We also encourage you to call your pharmacy before going to  your prescription to make sure it is ready. With regard to controlled substance prescription refill requests (narcotic refills) that need to be picked up at our office, we ask your cooperation by providing us with at least 72 hours (3days) notice that you will need a refill. We will not refill narcotic prescription refill requests after 4:00pm on any weekday, Monday through Thursday, or after 2:00pm on Fridays, or on the weekends. We encourage everyone to explore another way of getting your prescription refill request processed using [employer], our patient web portal through our electronic medical record system. [employer] is an efficient and effective way to communicate your medication request directly to the office and  downloadable as an gaudencio on your smart phone . [employer] also features a review functionality that allows you to view your medication list as well as leave messages for your physician. Are you ready to get connected? If so please review the attatched instructions or speak to any of our staff to get you set up right away! Thank you so much for your cooperation. Should you have any questions please contact our Practice Administrator.     The Physicians and Staff,  [facility] [address] [facility] Statement to Patients  April 1, 2014      In an effort to ensure the large volume of patient prescription refills is processed in the most efficient and expeditious manner, we are asking our patients to assist us by calling your Pharmacy for all prescription refills, this will include also your  Mail Order Pharmacy. The pharmacy will contact our office electronically to continue the refill process. Please do not wait until the last minute to call your pharmacy. We need at least 48 hours (2days) to fill prescriptions. We also encourage you to call your pharmacy before going to  your prescription to make sure it is ready. With regard to controlled substance prescription refill requests (narcotic refills) that need to be picked up at our office, we ask your cooperation by providing us with at least 72 hours (3days) notice that you will need a refill. We will not refill narcotic prescription refill requests after 4:00pm on any weekday, Monday through Thursday, or after 2:00pm on Fridays, or on the weekends. We encourage everyone to explore another way of getting your prescription refill request processed using [employer], our patient web portal through our electronic medical record system. [employer] is an efficient and effective way to communicate your medication request directly to the office and  downloadable as an gaudencio on your smart phone . [employer] also features a review functionality that allows you to view your medication list as well as leave messages for your physician. Are you ready to get connected? If so please review the attatched instructions or speak to any of our staff to get you set up right away! Thank you so much for your cooperation. Should you have any questions please contact our Practice Administrator.     The Physicians and Staff,  [facility]

## 2018-10-17 NOTE — PROGRESS NOTES
NEUROLOGY NEW PATIENT OFFICE CONSULTATION      10/17/2018    RE: French Apple         1975      REFERRED BY:  Wilmar Dumas MD        CHIEF COMPLAINT:  This is French Apple is a 37 y.o. female right handed unemployed who had concerns including Anxiety. HPI:     On June 22 until 27, patient was admitted at [facility] for anxiety and hair pulling (-) suicidal/ homicidal thoughts. Patient's Prozac was adjusted to 40 mg and Risperdisone and Ativan were given. Since then, patient has been having L occipital sharp severe pain. Patient saw psychiatrist 5 days ago who said everything is okay. Patient feels better now and has stopped pulling hair for the past 2 weeks with no more L occipital pain for the past 2 days.     ROS   (-) fever  (-) rash  All other systems reviewed and are negative    Past Medical Hx  Past Medical History:   Diagnosis Date    Anxiety     Depression     Diabetes ([facility] [address]     Hypertension     Panic attack     Suicidal thoughts     Vision decreased        Social Hx  Social History     Socioeconomic History    Marital status: SINGLE     Spouse name: Not on file    Number of children: Not on file    Years of education: Not on file    Highest education level: Not on file   Social Needs    Financial resource strain: Not on file    Food insecurity - worry: Not on file    Food insecurity - inability: Not on file   [employer] needs - medical: Not on file   [employer] needs - non-medical: Not on file   Occupational History    Not on file   Tobacco Use    Smoking status: Never Smoker    Smokeless tobacco: Never Used    Tobacco comment: n/a never smoked tobacco   Substance and Sexual Activity    Alcohol use: No    Drug use: No    Sexual activity: Yes     Partners: Male     Birth control/protection: None   Other Topics Concern    Not on file   Social History Narrative    Not on file       Family Hx  No family history on file.    ALLERGIES  Allergies   Allergen Reactions    Lisinopril Rash     Swelling of the lips       CURRENT MEDS  Current Outpatient Medications   Medication Sig Dispense Refill    LORazepam (ATIVAN) 1 mg tablet Take 1 mg by mouth daily. 1    ARIPiprazole (ABILIFY) 10 mg tablet Take 10 mg by mouth daily. 1    FLUoxetine (PROZAC) 40 mg capsule Take 1 Cap by mouth daily. Indications: Generalized Anxiety Disorder, major depressive disorder 15 Cap 1    zolpidem (AMBIEN) 10 mg tablet Take 1 Tab by mouth nightly. Max Daily Amount: 10 mg. Indications: SLEEP-ONSET INSOMNIA 15 Tab 1    triamterene-hydrochlorothiazide (MAXZIDE) 37.5-25 mg per tablet Take 1 Tab by mouth daily. Indications: HYPERTENSION      losartan (COZAAR) 50 mg tablet Take 50 mg by mouth daily. Indications: HYPERTENSION      metformin (GLUCOPHAGE) 500 mg tablet Take 500 mg by mouth two (2) times daily (with meals). Indications: TYPE 2 DIABETES MELLITUS      benztropine (COGENTIN) 0.5 mg tablet Take 1 Tab by mouth two (2) times daily as needed. Indications: drug-induced extrapyramidal reaction, jittery, restlessness feelings 15 Tab 1    clonazePAM (KLONOPIN) 0.5 mg tablet Take 1 Tab by mouth three (3) times daily. Max Daily Amount: 1.5 mg. Indications: anxiety 45 Tab 1    risperiDONE (RISPERDAL) 0.5 mg tablet Take 1 Tab by mouth nightly. Indications: severe trichotillomania, panic 15 Tab 1           PREVIOUS WORKUP: (reviewed)  IMAGING:    CT Results (recent):  No results found for this or any previous visit. MRI Results (recent):  No results found for this or any previous visit. IR Results (recent):  No results found for this or any previous visit. VAS/US Results (recent):  No results found for this or any previous visit.         LABS (reviewed)  Results for orders placed or performed during the hospital encounter of 06/22/18   URINE CULTURE HOLD SAMPLE   Result Value Ref Range    Urine culture hold        URINE ON HOLD IN MICROBIOLOGY DEPT FOR 3 DAYS. IF UNPRESERVED URINE IS SUBMITTED, IT CANNOT BE USED FOR ADDITIONAL TESTING AFTER 24 HRS, RECOLLECTION WILL BE REQUIRED. CBC WITH AUTOMATED DIFF   Result Value Ref Range    WBC 8.1 3.6 - 11.0 K/uL    RBC 4.17 3.80 - 5.20 M/uL    HGB 11.9 11.5 - 16.0 g/dL    HCT 37.2 35.0 - 47.0 %    MCV 89.2 80.0 - 99.0 FL    MCH 28.5 26.0 - 34.0 PG    MCHC 32.0 30.0 - 36.5 g/dL    RDW 14.6 (H) 11.5 - 14.5 %    PLATELET 126 241 - 958 K/uL    MPV 9.9 8.9 - 12.9 FL    NRBC 0.0 0  WBC    ABSOLUTE NRBC 0.00 0.00 - 0.01 K/uL    NEUTROPHILS 75 32 - 75 %    LYMPHOCYTES 18 12 - 49 %    MONOCYTES 6 5 - 13 %    EOSINOPHILS 1 0 - 7 %    BASOPHILS 1 0 - 1 %    IMMATURE GRANULOCYTES 0 0.0 - 0.5 %    ABS. NEUTROPHILS 6.0 1.8 - 8.0 K/UL    ABS. LYMPHOCYTES 1.5 0.8 - 3.5 K/UL    ABS. MONOCYTES 0.5 0.0 - 1.0 K/UL    ABS. EOSINOPHILS 0.1 0.0 - 0.4 K/UL    ABS. BASOPHILS 0.0 0.0 - 0.1 K/UL    ABS. IMM. GRANS. 0.0 0.00 - 0.04 K/UL    DF AUTOMATED     METABOLIC PANEL, COMPREHENSIVE   Result Value Ref Range    Sodium 138 136 - 145 mmol/L    Potassium 3.0 (L) 3.5 - 5.1 mmol/L    Chloride 104 97 - 108 mmol/L    CO2 23 21 - 32 mmol/L    Anion gap 11 5 - 15 mmol/L    Glucose 131 (H) 65 - 100 mg/dL    BUN 9 6 - 20 MG/DL    Creatinine 0.94 0.55 - 1.02 MG/DL    BUN/Creatinine ratio 10 (L) 12 - 20      GFR est AA >60 >60 ml/min/1.73m2    GFR est non-AA >60 >60 ml/min/1.73m2    Calcium 9.1 8.5 - 10.1 MG/DL    Bilirubin, total 0.4 0.2 - 1.0 MG/DL    ALT (SGPT) 41 12 - 78 U/L    AST (SGOT) 28 15 - 37 U/L    Alk.  phosphatase 82 45 - 117 U/L    Protein, total 8.1 6.4 - 8.2 g/dL    Albumin 3.5 3.5 - 5.0 g/dL    Globulin 4.6 (H) 2.0 - 4.0 g/dL    A-G Ratio 0.8 (L) 1.1 - 2.2     URINALYSIS W/MICROSCOPIC   Result Value Ref Range    Color YELLOW/STRAW      Appearance CLEAR CLEAR      Specific gravity 1.013 1.003 - 1.030      pH (UA) 7.5 5.0 - 8.0      Protein NEGATIVE  NEG mg/dL    Glucose NEGATIVE  NEG mg/dL    Ketone NEGATIVE  NEG mg/dL Bilirubin NEGATIVE  NEG      Blood NEGATIVE  NEG      Urobilinogen 0.2 0.2 - 1.0 EU/dL    Nitrites NEGATIVE  NEG      Leukocyte Esterase NEGATIVE  NEG      WBC 0-4 0 - 4 /hpf    RBC 0-5 0 - 5 /hpf    Epithelial cells FEW FEW /lpf    Bacteria NEGATIVE  NEG /hpf    Hyaline cast 0-2 0 - 5 /lpf   ETHYL ALCOHOL   Result Value Ref Range    ALCOHOL(ETHYL),SERUM <36 <13 MG/DL   SALICYLATE   Result Value Ref Range    Salicylate level <8.0 (L) 2.8 - 20.0 MG/DL   ACETAMINOPHEN   Result Value Ref Range    Acetaminophen level <2 (L) 10 - 30 ug/mL   DRUG SCREEN, URINE   Result Value Ref Range    AMPHETAMINES NEGATIVE  NEG      BARBITURATES NEGATIVE  NEG      BENZODIAZEPINES NEGATIVE  NEG      COCAINE NEGATIVE  NEG      METHADONE NEGATIVE  NEG      OPIATES NEGATIVE  NEG      PCP(PHENCYCLIDINE) NEGATIVE  NEG      THC (TH-CANNABINOL) NEGATIVE  NEG      Drug screen comment (NOTE)    HCG QL SERUM   Result Value Ref Range    HCG, Ql. NEGATIVE  NEG     GLUCOSE, FASTING   Result Value Ref Range    Glucose 121 (H) 65 - 100 MG/DL   TSH 3RD GENERATION   Result Value Ref Range    TSH 1.89 0.36 - 3.74 uIU/mL   LIPID PANEL   Result Value Ref Range    LIPID PROFILE          Cholesterol, total 145 <200 MG/DL    Triglyceride 66 <150 MG/DL    HDL Cholesterol 63 MG/DL    LDL, calculated 68.8 0 - 100 MG/DL    VLDL, calculated 13.2 MG/DL    CHOL/HDL Ratio 2.3 0 - 5.0     GLUCOSE, POC   Result Value Ref Range    Glucose (POC) 148 (H) 65 - 100 mg/dL    Performed by Dialogfeed Sanchez    GLUCOSE, POC   Result Value Ref Range    Glucose (POC) 91 65 - 100 mg/dL    Performed by Dialogfeed Sanchez    GLUCOSE, POC   Result Value Ref Range    Glucose (POC) 102 (H) 65 - 100 mg/dL    Performed by Magdalena Malin    GLUCOSE, POC   Result Value Ref Range    Glucose (POC) 138 (H) 65 - 100 mg/dL    Performed by Gloriadaly Wayne    GLUCOSE, POC   Result Value Ref Range    Glucose (POC) 126 (H) 65 - 100 mg/dL    Performed by [facility]    GLUCOSE, POC   Result Value Ref Range Glucose (POC) 107 (H) 65 - 100 mg/dL    Performed by [address], POC   Result Value Ref Range    Glucose (POC) 159 (H) 65 - 100 mg/dL    Performed by Lexy Beam    GLUCOSE, POC   Result Value Ref Range    Glucose (POC) 142 (H) 65 - 100 mg/dL    Performed by Lexy Beam    GLUCOSE, POC   Result Value Ref Range    Glucose (POC) 98 65 - 100 mg/dL    Performed by Gerri Guo    GLUCOSE, POC   Result Value Ref Range    Glucose (POC) 123 (H) 65 - 100 mg/dL    Performed by Rodo Oneal    GLUCOSE, POC   Result Value Ref Range    Glucose (POC) 118 (H) 65 - 100 mg/dL    Performed by Warren Hannah    GLUCOSE, POC   Result Value Ref Range    Glucose (POC) 93 65 - 100 mg/dL    Performed by Angela Nicholas    GLUCOSE, POC   Result Value Ref Range    Glucose (POC) 144 (H) 65 - 100 mg/dL    Performed by [employer]    GLUCOSE, POC   Result Value Ref Range    Glucose (POC) 85 65 - 100 mg/dL    Performed by [employer]        Physical Exam:     Visit Vitals  /80   Pulse 83   Wt 108.4 kg (239 lb)   SpO2 97%   BMI 42.34 kg/m²     General:  Alert, cooperative, no distress. Head:  Normocephalic, without obvious abnormality, atraumatic. Eyes:  Conjunctivae/corneas clear. Lungs:  Heart:   Non labored breathing  Regular rate and rhythm, no carotid bruits   Abdomen:   Soft, non-distended   Extremities: Extremities normal, atraumatic, no cyanosis or edema. Pulses: 2+ and symmetric all extremities. Skin: Skin color, texture, turgor normal. No rashes or lesions.   Neurologic Exam     Gen: Attention normal             Language: naming, repetition, fluency normal             Memory: intact recent and remote memory  Cranial Nerves:  I: smell Not tested   II: visual fields Full to confrontation   II: pupils Equal, round, reactive to light   II: optic disc No papilledema   III,VII: ptosis none   III,IV,VI: extraocular muscles  Full ROM   V: mastication normal   V: facial light touch sensation  normal   VII: facial muscle function   symmetric   VIII: hearing symmetric   IX: soft palate elevation  normal   XI: trapezius strength  5/5   XI: sternocleidomastoid strength 5/5   XI: neck flexion strength  5/5   XII: tongue  midline     Motor: normal bulk and tone, no tremor              Strength: 5/5 all four extremities  Sensory: intact to LT, PP, vibration, and JPS  Reflexes: 2+ throughout; Down going toes  Coordination: Good FTN and HTS  Gait: normal gait including tandem            Impression:     Charles Mayer is a 37 y.o. female who  has a past medical history of Anxiety, Depression, Diabetes ([address] Hypertension, Panic attack, Suicidal thoughts, and Vision decreased. who   On June 22 until 27, patient was admitted at [facility] for anxiety and hair pulling after losing her job. (-) suicidal/ homicidal thoughts. Patient's Prozac was adjusted to 40 mg and Risperdisone and Ativan were given. Since then, patient has severe L occipital sharp pain consistent with L occipital neuralgia probably due to hair pulling (more likely a reaction to severe stress and anxiety disorder)    RECOMMENDATIONS  1. Reviewed [facility] medical records and lab results in EPIC  2. Since patient is doing better, advise to observe symptoms for now and avoid hair pulling  3. Can take Diclofenac prn and warm packs if L occipital pain reoccurs  4. Follow up with her psychiatrist regarding further management of anxiety disorder  5. Depending on above, will consider L occipital nerve block    Follow-up Disposition:  Return if symptoms worsen or fail to improve.       Thank you for the consultation      Reyna Morrow MD  Diplomate, American Board of Psychiatry and Neurology  Diplomate, Neuromuscular Medicine  Diplomate, American Board of Electrodiagnostic Medicine        CC: Cassie Saint, MD  Fax: None

## 2018-10-24 ENCOUNTER — HOSPITAL ENCOUNTER (OUTPATIENT)
Dept: MAMMOGRAPHY | Age: 43
Discharge: HOME | End: 2018-10-24
Attending: INTERNAL MEDICINE
Payer: SUBSIDIZED

## 2018-10-24 DIAGNOSIS — Z12.39 SCREENING BREAST EXAMINATION: ICD-10-CM

## 2018-10-24 PROCEDURE — 77067 SCR MAMMO BI INCL CAD: CPT

## 2019-01-22 ENCOUNTER — TELEPHONE (OUTPATIENT)
Dept: NEUROLOGY | Age: 44
End: 2019-01-22

## 2019-01-24 ENCOUNTER — TELEPHONE (OUTPATIENT)
Dept: NEUROLOGY | Age: 44
End: 2019-01-24

## 2019-01-24 NOTE — TELEPHONE ENCOUNTER
Pt is returning ASHISH call in ref to giving him her address.  Please note the correct address is what we have on file  [address] [address] [place] # [phone]

## 2019-01-24 NOTE — TELEPHONE ENCOUNTER
Called, left vm for pt to return call to office.    Left message that office note has mailed to her address on file

## 2019-01-24 NOTE — TELEPHONE ENCOUNTER
----- Message from Hugo Narvaez sent at 1/24/2019 11:31 AM EST -----  Regarding: Dr Mike Wilson (p) [phone],[facility] was returning the nurses call. She things it is regarding her test results.

## 2019-04-10 ENCOUNTER — OFFICE VISIT (OUTPATIENT)
Dept: SURGERY | Age: 44
End: 2019-04-10

## 2019-04-10 VITALS
OXYGEN SATURATION: 97 % | BODY MASS INDEX: 40.57 KG/M2 | WEIGHT: 229 LBS | TEMPERATURE: 99.3 F | RESPIRATION RATE: 18 BRPM | HEIGHT: 63 IN | SYSTOLIC BLOOD PRESSURE: 108 MMHG | DIASTOLIC BLOOD PRESSURE: 76 MMHG | HEART RATE: 108 BPM

## 2019-04-10 DIAGNOSIS — L72.3 INFECTED SEBACEOUS CYST: Primary | ICD-10-CM

## 2019-04-10 DIAGNOSIS — L08.9 INFECTED SEBACEOUS CYST: Primary | ICD-10-CM

## 2019-04-10 PROBLEM — E66.01 OBESITY, MORBID (HCC): Status: ACTIVE | Noted: 2019-04-10

## 2019-04-10 RX ORDER — DOXYCYCLINE 100 MG/1
CAPSULE ORAL
Refills: 0 | COMMUNITY
Start: 2019-04-04 | End: 2019-05-31

## 2019-04-10 NOTE — PROGRESS NOTES
1. Have you been to the ER, urgent care clinic since your last visit? Hospitalized since your last visit? No    2. Have you seen or consulted any other health care providers outside of the [address] [place] [address] since your last visit? Include any pap smears or colon screening.  No

## 2019-04-10 NOTE — PROGRESS NOTES
Subjective:      Camila Shah  is a 37 y.o.  female who was referred by Dr. Gwen Damon, who presents for evaluation of cyst of her back. Pt states she had a cyst on her back for several years and it recently became infected. She is currently on antibiotics and has taking them for the past week and has one week remaining. This is currently draining. Past Medical History:   Diagnosis Date    Anxiety     Depression     Diabetes ([address]     Hypertension     Infected sebaceous cyst, upper back 4/10/2019    Panic attack     Suicidal thoughts     Vision decreased        History reviewed. No pertinent surgical history. Social History     Tobacco Use    Smoking status: Never Smoker    Smokeless tobacco: Never Used    Tobacco comment: n/a never smoked tobacco   Substance Use Topics    Alcohol use: No       History reviewed. No pertinent family history. Current Outpatient Medications on File Prior to Visit   Medication Sig Dispense Refill    doxycycline (MONODOX) 100 mg capsule   0    LORazepam (ATIVAN) 1 mg tablet Take 1 mg by mouth daily. 1    ARIPiprazole (ABILIFY) 10 mg tablet Take 10 mg by mouth daily. 1    zolpidem (AMBIEN) 10 mg tablet Take 1 Tab by mouth nightly. Max Daily Amount: 10 mg. Indications: SLEEP-ONSET INSOMNIA 15 Tab 1    triamterene-hydrochlorothiazide (MAXZIDE) 37.5-25 mg per tablet Take 1 Tab by mouth daily. Indications: HYPERTENSION      losartan (COZAAR) 50 mg tablet Take 50 mg by mouth daily. Indications: HYPERTENSION      FLUoxetine (PROZAC) 40 mg capsule Take 1 Cap by mouth daily. Indications: Generalized Anxiety Disorder, major depressive disorder 15 Cap 1    benztropine (COGENTIN) 0.5 mg tablet Take 1 Tab by mouth two (2) times daily as needed. Indications: drug-induced extrapyramidal reaction, jittery, restlessness feelings 15 Tab 1    clonazePAM (KLONOPIN) 0.5 mg tablet Take 1 Tab by mouth three (3) times daily.  Max Daily Amount: 1.5 mg. Indications: anxiety 45 Tab 1    risperiDONE (RISPERDAL) 0.5 mg tablet Take 1 Tab by mouth nightly. Indications: severe trichotillomania, panic 15 Tab 1    metformin (GLUCOPHAGE) 500 mg tablet Take 500 mg by mouth two (2) times daily (with meals). Indications: TYPE 2 DIABETES MELLITUS       No current facility-administered medications on file prior to visit. Allergies   Allergen Reactions    Lisinopril Rash     Swelling of the lips         Review of Systems:    Pertinent items are noted in the History of Present Illness. Objective:     Visit Vitals  /76 (BP 1 Location: Right arm, BP Patient Position: Sitting)   Pulse (!) 108   Temp 99.3 °F (37.4 °C) (Oral)   Resp 18   Ht 5' 3\" (1.6 m)   Wt 229 lb (103.9 kg)   SpO2 97%   BMI 40.57 kg/m²        Physical Exam:  BACK: 4 cm area of infected cyst in the upper midline back which is draining nicely. Labs: No results found for this or any previous visit (from the past 24 hour(s)). Assessment and Plan:       ICD-10-CM ICD-9-CM    1. Infected sebaceous cyst, upper back L72.3 706.2     L08.9         Will consider a cystectomy with local anesthesia once the infection has cleared. She should finish the remainder of her antibiotics and may use a heating pad to encourage further draining. Patient will call after area has healed to schedule a time for removal. All questions were answered and she agrees with this plan. This document was scribed by Jaguar Cowart as dictated by Dr. Romeo De La Cruz.      Signed By: Jericho Young MD     04/10/19

## 2019-04-24 ENCOUNTER — OFFICE VISIT (OUTPATIENT)
Dept: SURGERY | Age: 44
End: 2019-04-24

## 2019-04-24 VITALS
RESPIRATION RATE: 18 BRPM | OXYGEN SATURATION: 97 % | BODY MASS INDEX: 40.75 KG/M2 | SYSTOLIC BLOOD PRESSURE: 122 MMHG | HEIGHT: 63 IN | TEMPERATURE: 97.8 F | DIASTOLIC BLOOD PRESSURE: 80 MMHG | WEIGHT: 230 LBS | HEART RATE: 86 BPM

## 2019-04-24 DIAGNOSIS — L72.3 INFECTED SEBACEOUS CYST: Primary | ICD-10-CM

## 2019-04-24 DIAGNOSIS — L08.9 INFECTED SEBACEOUS CYST: Primary | ICD-10-CM

## 2019-04-24 NOTE — PROGRESS NOTES
Subjective:      Geoff Patient  is a 37 y.o.  female who presents for 2 week f/u for infected sebaceous cyst on the upper midline back. Pt states this looks much better but is still currently draining. Past Medical History:   Diagnosis Date    Anxiety     Depression     Diabetes ([address]     Hypertension     Infected sebaceous cyst, upper back 4/10/2019    Panic attack     Suicidal thoughts     Vision decreased        History reviewed. No pertinent surgical history. Social History     Tobacco Use    Smoking status: Never Smoker    Smokeless tobacco: Never Used    Tobacco comment: n/a never smoked tobacco   Substance Use Topics    Alcohol use: No       History reviewed. No pertinent family history. Current Outpatient Medications on File Prior to Visit   Medication Sig Dispense Refill    doxycycline (MONODOX) 100 mg capsule   0    LORazepam (ATIVAN) 1 mg tablet Take 1 mg by mouth daily. 1    ARIPiprazole (ABILIFY) 10 mg tablet Take 10 mg by mouth daily. 1    benztropine (COGENTIN) 0.5 mg tablet Take 1 Tab by mouth two (2) times daily as needed. Indications: drug-induced extrapyramidal reaction, jittery, restlessness feelings 15 Tab 1    clonazePAM (KLONOPIN) 0.5 mg tablet Take 1 Tab by mouth three (3) times daily. Max Daily Amount: 1.5 mg. Indications: anxiety 45 Tab 1    zolpidem (AMBIEN) 10 mg tablet Take 1 Tab by mouth nightly. Max Daily Amount: 10 mg. Indications: SLEEP-ONSET INSOMNIA 15 Tab 1    triamterene-hydrochlorothiazide (MAXZIDE) 37.5-25 mg per tablet Take 1 Tab by mouth daily. Indications: HYPERTENSION      losartan (COZAAR) 50 mg tablet Take 50 mg by mouth daily. Indications: HYPERTENSION      metformin (GLUCOPHAGE) 500 mg tablet Take 500 mg by mouth two (2) times daily (with meals). Indications: TYPE 2 DIABETES MELLITUS      FLUoxetine (PROZAC) 40 mg capsule Take 1 Cap by mouth daily.  Indications: Generalized Anxiety Disorder, major depressive disorder 15 Cap 1    risperiDONE (RISPERDAL) 0.5 mg tablet Take 1 Tab by mouth nightly. Indications: severe trichotillomania, panic 15 Tab 1     No current facility-administered medications on file prior to visit. Allergies   Allergen Reactions    Lisinopril Rash     Swelling of the lips         Review of Systems:    Pertinent items are noted in the History of Present Illness. Objective:     Visit Vitals  /80 (BP 1 Location: Right arm, BP Patient Position: Sitting)   Pulse 86   Temp 97.8 °F (36.6 °C) (Oral)   Resp 18   Ht 5' 3\" (1.6 m)   Wt 230 lb (104.3 kg)   SpO2 97%   BMI 40.74 kg/m²        Physical Exam:  SKIN: She has a 2.5 cm remaining cyst with remaining fluid that is draining slowly. Labs: No results found for this or any previous visit (from the past 24 hour(s)). Assessment and Plan:       Pt will call me after this has completely healed and is no longer draining to schedule cystectomy. All questions were answered and she agrees with this plan. This document was scribed by Mesha Mixon as dictated by Dr. Sae Rod.      Signed By: Livingston Runner, MD     05/01/19

## 2019-04-24 NOTE — PROGRESS NOTES
1. Have you been to the ER, urgent care clinic since your last visit? Hospitalized since your last visit? No    2. Have you seen or consulted any other health care providers outside of the [address] [place] [address] since your last visit? Include any pap smears or colon screening.  No

## 2019-05-29 ENCOUNTER — OFFICE VISIT (OUTPATIENT)
Dept: SURGERY | Age: 44
End: 2019-05-29

## 2019-05-29 VITALS
SYSTOLIC BLOOD PRESSURE: 130 MMHG | HEART RATE: 96 BPM | WEIGHT: 248 LBS | TEMPERATURE: 98.6 F | BODY MASS INDEX: 43.94 KG/M2 | DIASTOLIC BLOOD PRESSURE: 84 MMHG | OXYGEN SATURATION: 99 % | RESPIRATION RATE: 18 BRPM | HEIGHT: 63 IN

## 2019-05-29 DIAGNOSIS — L72.3 INFECTED SEBACEOUS CYST: Primary | ICD-10-CM

## 2019-05-29 DIAGNOSIS — L08.9 INFECTED SEBACEOUS CYST: Primary | ICD-10-CM

## 2019-05-29 NOTE — PROGRESS NOTES
1. Have you been to the ER, urgent care clinic since your last visit? Hospitalized since your last visit? No    2. Have you seen or consulted any other health care providers outside of the [address] [place] [address] since your last visit? Include any pap smears or colon screening.  No

## 2019-05-29 NOTE — PROGRESS NOTES
Subjective:      Quita Owen  is a 37 y.o.  female who returns for re-evaluation of previously infected sebaceous cyst on her upper midline back. Pt reports this is no longer draining. Past Medical History:   Diagnosis Date    Anxiety     Depression     [facility] [address]     Hypertension     Infected sebaceous cyst, upper back 4/10/2019    Panic attack     Suicidal thoughts     Vision decreased        History reviewed. No pertinent surgical history. Social History     Tobacco Use    Smoking status: Never Smoker    Smokeless tobacco: Never Used    Tobacco comment: n/a never smoked tobacco   Substance Use Topics    Alcohol use: No       History reviewed. No pertinent family history. Current Outpatient Medications on File Prior to Visit   Medication Sig Dispense Refill    triamterene-hydrochlorothiazide (MAXZIDE) 37.5-25 mg per tablet Take 1 Tab by mouth daily. Indications: HYPERTENSION      losartan (COZAAR) 50 mg tablet Take 50 mg by mouth daily. Indications: HYPERTENSION      metformin (GLUCOPHAGE) 500 mg tablet Take 500 mg by mouth two (2) times daily (with meals). Indications: TYPE 2 DIABETES MELLITUS      doxycycline (MONODOX) 100 mg capsule   0    LORazepam (ATIVAN) 1 mg tablet Take 1 mg by mouth daily. 1    ARIPiprazole (ABILIFY) 10 mg tablet Take 10 mg by mouth daily. 1    FLUoxetine (PROZAC) 40 mg capsule Take 1 Cap by mouth daily. Indications: Generalized Anxiety Disorder, major depressive disorder 15 Cap 1    benztropine (COGENTIN) 0.5 mg tablet Take 1 Tab by mouth two (2) times daily as needed. Indications: drug-induced extrapyramidal reaction, jittery, restlessness feelings 15 Tab 1    clonazePAM (KLONOPIN) 0.5 mg tablet Take 1 Tab by mouth three (3) times daily. Max Daily Amount: 1.5 mg. Indications: anxiety 45 Tab 1    risperiDONE (RISPERDAL) 0.5 mg tablet Take 1 Tab by mouth nightly.  Indications: severe trichotillomania, panic 15 Tab 1    zolpidem (AMBIEN) 10 mg tablet Take 1 Tab by mouth nightly. Max Daily Amount: 10 mg. Indications: SLEEP-ONSET INSOMNIA 15 Tab 1     No current facility-administered medications on file prior to visit. Allergies   Allergen Reactions    Lisinopril Rash     Swelling of the lips         Review of Systems:    Pertinent items are noted in the History of Present Illness. Objective:     Visit Vitals  /84 (BP 1 Location: Right arm, BP Patient Position: Sitting)   Pulse 96   Temp 98.6 °F (37 °C) (Oral)   Resp 18   Ht 5' 3\" (1.6 m)   Wt 248 lb (112.5 kg)   SpO2 99%   BMI 43.93 kg/m²        Physical Exam:  LUNG: clear to auscultation bilaterally  HEART: regular rate and rhythm, S1, S2 normal, no murmur, click, rub or gallop  SKIN: 3.5 x 4 cm cyst in the upper midline back. Labs: No results found for this or any previous visit (from the past 24 hour(s)). Assessment and Plan:       ICD-10-CM ICD-9-CM    1. Infected sebaceous cyst, upper back L72.3 706.2     L08.9         Will plan for cystectomy with local anesthesia and MAC. All questions were answered. Pt agrees with this plan and will schedule this accordingly. This document was scribed by Winter Carty as dictated by Dr. Queenie Bailon.      Signed By: Nick Verdin MD     05/29/19

## 2019-05-29 NOTE — H&P (VIEW-ONLY)
Subjective:      Morales Olivarez  is a 37 y.o.  female who returns for re-evaluation of previously infected sebaceous cyst on her upper midline back. Pt reports this is no longer draining. Past Medical History:   Diagnosis Date    Anxiety     Depression     Diabetes ([facility] [address]     Hypertension     Infected sebaceous cyst, upper back 4/10/2019    Panic attack     Suicidal thoughts     Vision decreased        History reviewed. No pertinent surgical history. Social History     Tobacco Use    Smoking status: Never Smoker    Smokeless tobacco: Never Used    Tobacco comment: n/a never smoked tobacco   Substance Use Topics    Alcohol use: No       History reviewed. No pertinent family history. Current Outpatient Medications on File Prior to Visit   Medication Sig Dispense Refill    triamterene-hydrochlorothiazide (MAXZIDE) 37.5-25 mg per tablet Take 1 Tab by mouth daily. Indications: HYPERTENSION      losartan (COZAAR) 50 mg tablet Take 50 mg by mouth daily. Indications: HYPERTENSION      metformin (GLUCOPHAGE) 500 mg tablet Take 500 mg by mouth two (2) times daily (with meals). Indications: TYPE 2 DIABETES MELLITUS      doxycycline (MONODOX) 100 mg capsule   0    LORazepam (ATIVAN) 1 mg tablet Take 1 mg by mouth daily. 1    ARIPiprazole (ABILIFY) 10 mg tablet Take 10 mg by mouth daily. 1    FLUoxetine (PROZAC) 40 mg capsule Take 1 Cap by mouth daily. Indications: Generalized Anxiety Disorder, major depressive disorder 15 Cap 1    benztropine (COGENTIN) 0.5 mg tablet Take 1 Tab by mouth two (2) times daily as needed. Indications: drug-induced extrapyramidal reaction, jittery, restlessness feelings 15 Tab 1    clonazePAM (KLONOPIN) 0.5 mg tablet Take 1 Tab by mouth three (3) times daily. Max Daily Amount: 1.5 mg. Indications: anxiety 45 Tab 1    risperiDONE (RISPERDAL) 0.5 mg tablet Take 1 Tab by mouth nightly.  Indications: severe trichotillomania, panic 15 Tab 1    zolpidem (AMBIEN) 10 mg tablet Take 1 Tab by mouth nightly. Max Daily Amount: 10 mg. Indications: SLEEP-ONSET INSOMNIA 15 Tab 1     No current facility-administered medications on file prior to visit. Allergies   Allergen Reactions    Lisinopril Rash     Swelling of the lips         Review of Systems:    Pertinent items are noted in the History of Present Illness. Objective:     Visit Vitals  /84 (BP 1 Location: Right arm, BP Patient Position: Sitting)   Pulse 96   Temp 98.6 °F (37 °C) (Oral)   Resp 18   Ht 5' 3\" (1.6 m)   Wt 248 lb (112.5 kg)   SpO2 99%   BMI 43.93 kg/m²        Physical Exam:  LUNG: clear to auscultation bilaterally  HEART: regular rate and rhythm, S1, S2 normal, no murmur, click, rub or gallop  SKIN: 3.5 x 4 cm cyst in the upper midline back. Labs: No results found for this or any previous visit (from the past 24 hour(s)). Assessment and Plan:       ICD-10-CM ICD-9-CM    1. Infected sebaceous cyst, upper back L72.3 706.2     L08.9         Will plan for cystectomy with local anesthesia and MAC. All questions were answered. Pt agrees with this plan and will schedule this accordingly. This document was scribed by Rodo Rodríguez as dictated by Dr. Tatyana Ellsworth.      Signed By: Cristi Allen MD     05/29/19

## 2019-05-29 NOTE — Clinical Note
Excision of large sebaceous cyst of midline upper back; 1/2 hour; outpatient; MAC or General anesthesia (choice)

## 2019-05-31 ENCOUNTER — HOSPITAL ENCOUNTER (OUTPATIENT)
Dept: PREADMISSION TESTING | Age: 44
Discharge: HOME | End: 2019-05-31
Payer: MEDICAID

## 2019-05-31 VITALS
BODY MASS INDEX: 43.23 KG/M2 | HEIGHT: 63 IN | SYSTOLIC BLOOD PRESSURE: 121 MMHG | HEART RATE: 98 BPM | TEMPERATURE: 98.1 F | DIASTOLIC BLOOD PRESSURE: 76 MMHG | OXYGEN SATURATION: 97 % | WEIGHT: 244 LBS

## 2019-05-31 LAB
ANION GAP SERPL CALC-SCNC: 6 MMOL/L (ref 5–15)
BASOPHILS # BLD: 0 K/UL (ref 0–0.1)
BASOPHILS NFR BLD: 1 % (ref 0–1)
BUN SERPL-MCNC: 8 MG/DL (ref 6–20)
BUN/CREAT SERPL: 10 (ref 12–20)
CALCIUM SERPL-MCNC: 9 MG/DL (ref 8.5–10.1)
CHLORIDE SERPL-SCNC: 104 MMOL/L (ref 97–108)
CO2 SERPL-SCNC: 28 MMOL/L (ref 21–32)
CREAT SERPL-MCNC: 0.82 MG/DL (ref 0.55–1.02)
DIFFERENTIAL METHOD BLD: ABNORMAL
EOSINOPHIL # BLD: 0.2 K/UL (ref 0–0.4)
EOSINOPHIL NFR BLD: 5 % (ref 0–7)
ERYTHROCYTE [DISTWIDTH] IN BLOOD BY AUTOMATED COUNT: 16.9 % (ref 11.5–14.5)
EST. AVERAGE GLUCOSE BLD GHB EST-MCNC: 131 MG/DL
GLUCOSE SERPL-MCNC: 90 MG/DL (ref 65–100)
HBA1C MFR BLD: 6.2 % (ref 4.2–6.3)
HCT VFR BLD AUTO: 38 % (ref 35–47)
HGB BLD-MCNC: 12 G/DL (ref 11.5–16)
IMM GRANULOCYTES # BLD AUTO: 0 K/UL (ref 0–0.04)
IMM GRANULOCYTES NFR BLD AUTO: 0 % (ref 0–0.5)
LYMPHOCYTES # BLD: 1.8 K/UL (ref 0.8–3.5)
LYMPHOCYTES NFR BLD: 40 % (ref 12–49)
MCH RBC QN AUTO: 28 PG (ref 26–34)
MCHC RBC AUTO-ENTMCNC: 31.6 G/DL (ref 30–36.5)
MCV RBC AUTO: 88.8 FL (ref 80–99)
MONOCYTES # BLD: 0.2 K/UL (ref 0–1)
MONOCYTES NFR BLD: 5 % (ref 5–13)
NEUTS SEG # BLD: 2.2 K/UL (ref 1.8–8)
NEUTS SEG NFR BLD: 49 % (ref 32–75)
NRBC # BLD: 0 K/UL (ref 0–0.01)
NRBC BLD-RTO: 0 PER 100 WBC
PLATELET # BLD AUTO: 268 K/UL (ref 150–400)
PMV BLD AUTO: 8.9 FL (ref 8.9–12.9)
POTASSIUM SERPL-SCNC: 4.3 MMOL/L (ref 3.5–5.1)
RBC # BLD AUTO: 4.28 M/UL (ref 3.8–5.2)
SODIUM SERPL-SCNC: 138 MMOL/L (ref 136–145)
WBC # BLD AUTO: 4.5 K/UL (ref 3.6–11)

## 2019-05-31 PROCEDURE — 83036 HEMOGLOBIN GLYCOSYLATED A1C: CPT

## 2019-05-31 PROCEDURE — 80048 BASIC METABOLIC PNL TOTAL CA: CPT

## 2019-05-31 PROCEDURE — 85025 COMPLETE CBC W/AUTO DIFF WBC: CPT

## 2019-05-31 RX ORDER — METFORMIN HYDROCHLORIDE 1000 MG/1
1000 TABLET ORAL DAILY
Status: ON HOLD | COMMUNITY
End: 2022-08-05 | Stop reason: SDUPTHER

## 2019-05-31 RX ORDER — LORAZEPAM 0.5 MG/1
TABLET ORAL
Status: ON HOLD | COMMUNITY
End: 2022-05-12

## 2019-05-31 RX ORDER — CLOMIPRAMINE HYDROCHLORIDE 50 MG/1
50 CAPSULE ORAL
COMMUNITY
End: 2022-05-16

## 2019-05-31 NOTE — PERIOP NOTES
PREOP. VERBAL & WRITTEN INSTRUCTIONS GIVEN TO PT. PATIENT GIVEN SURGICAL SITE INFECTION FAQs HANDOUT. PT INSTRUCTED TO BUY CHG SOLUTION, WITH WRITTEN & VERBAL INSTRUCTED. PT GIVEN OPPORTUNITY TO EXPRESS CONCERNS & ASK QUESTIONS.

## 2019-06-03 ENCOUNTER — ANESTHESIA EVENT (OUTPATIENT)
Dept: SURGERY | Age: 44
End: 2019-06-03
Payer: MEDICAID

## 2019-06-03 ENCOUNTER — ANESTHESIA (OUTPATIENT)
Dept: SURGERY | Age: 44
End: 2019-06-03
Payer: MEDICAID

## 2019-06-03 ENCOUNTER — APPOINTMENT (OUTPATIENT)
Dept: GENERAL RADIOLOGY | Age: 44
End: 2019-06-03
Attending: SURGERY
Payer: MEDICAID

## 2019-06-03 ENCOUNTER — HOSPITAL ENCOUNTER (OUTPATIENT)
Age: 44
Setting detail: OUTPATIENT SURGERY
Discharge: HOME | End: 2019-06-03
Attending: SURGERY | Admitting: SURGERY
Payer: MEDICAID

## 2019-06-03 VITALS
WEIGHT: 244 LBS | SYSTOLIC BLOOD PRESSURE: 124 MMHG | RESPIRATION RATE: 22 BRPM | HEIGHT: 63 IN | BODY MASS INDEX: 43.23 KG/M2 | DIASTOLIC BLOOD PRESSURE: 86 MMHG | HEART RATE: 85 BPM | TEMPERATURE: 98.6 F | OXYGEN SATURATION: 96 %

## 2019-06-03 DIAGNOSIS — G89.18 POST-OP PAIN: Primary | ICD-10-CM

## 2019-06-03 LAB
GLUCOSE BLD STRIP.AUTO-MCNC: 127 MG/DL (ref 65–100)
HCG UR QL: NEGATIVE
SERVICE CMNT-IMP: ABNORMAL

## 2019-06-03 PROCEDURE — 77030002996 HC SUT SLK J&J -A: Performed by: SURGERY

## 2019-06-03 PROCEDURE — 77030020782 HC GWN BAIR PAWS FLX 3M -B

## 2019-06-03 PROCEDURE — 76210000006 HC OR PH I REC 0.5 TO 1 HR: Performed by: SURGERY

## 2019-06-03 PROCEDURE — 76210000020 HC REC RM PH II FIRST 0.5 HR: Performed by: SURGERY

## 2019-06-03 PROCEDURE — 76060000034 HC ANESTHESIA 1.5 TO 2 HR: Performed by: SURGERY

## 2019-06-03 PROCEDURE — 88305 TISSUE EXAM BY PATHOLOGIST: CPT

## 2019-06-03 PROCEDURE — 71045 X-RAY EXAM CHEST 1 VIEW: CPT

## 2019-06-03 PROCEDURE — 88304 TISSUE EXAM BY PATHOLOGIST: CPT

## 2019-06-03 PROCEDURE — 74011250636 HC RX REV CODE- 250/636: Performed by: ANESTHESIOLOGY

## 2019-06-03 PROCEDURE — 81025 URINE PREGNANCY TEST: CPT

## 2019-06-03 PROCEDURE — 74011250636 HC RX REV CODE- 250/636: Performed by: SURGERY

## 2019-06-03 PROCEDURE — 77030002933 HC SUT MCRYL J&J -A: Performed by: SURGERY

## 2019-06-03 PROCEDURE — 74011250636 HC RX REV CODE- 250/636

## 2019-06-03 PROCEDURE — 88331 PATH CONSLTJ SURG 1 BLK 1SPC: CPT

## 2019-06-03 PROCEDURE — 77030011640 HC PAD GRND REM COVD -A: Performed by: SURGERY

## 2019-06-03 PROCEDURE — 77030008684 HC TU ET CUF COVD -B: Performed by: ANESTHESIOLOGY

## 2019-06-03 PROCEDURE — 77030010507 HC ADH SKN DERMBND J&J -B: Performed by: SURGERY

## 2019-06-03 PROCEDURE — 77030013079 HC BLNKT BAIR HGGR 3M -A: Performed by: ANESTHESIOLOGY

## 2019-06-03 PROCEDURE — 77030031139 HC SUT VCRL2 J&J -A: Performed by: SURGERY

## 2019-06-03 PROCEDURE — 77030032490 HC SLV COMPR SCD KNE COVD -B: Performed by: SURGERY

## 2019-06-03 PROCEDURE — 74011000250 HC RX REV CODE- 250

## 2019-06-03 PROCEDURE — 74011250637 HC RX REV CODE- 250/637: Performed by: ANESTHESIOLOGY

## 2019-06-03 PROCEDURE — 77030018836 HC SOL IRR NACL ICUM -A: Performed by: SURGERY

## 2019-06-03 PROCEDURE — 77030026438 HC STYL ET INTUB CARD -A: Performed by: ANESTHESIOLOGY

## 2019-06-03 PROCEDURE — 82962 GLUCOSE BLOOD TEST: CPT

## 2019-06-03 PROCEDURE — 76010000149 HC OR TIME 1 TO 1.5 HR: Performed by: SURGERY

## 2019-06-03 RX ORDER — EPHEDRINE SULFATE/0.9% NACL/PF 50 MG/5 ML
5 SYRINGE (ML) INTRAVENOUS AS NEEDED
Status: DISCONTINUED | OUTPATIENT
Start: 2019-06-03 | End: 2019-06-03 | Stop reason: HOSPADM

## 2019-06-03 RX ORDER — NEOSTIGMINE METHYLSULFATE 1 MG/ML
INJECTION INTRAVENOUS AS NEEDED
Status: DISCONTINUED | OUTPATIENT
Start: 2019-06-03 | End: 2019-06-03 | Stop reason: HOSPADM

## 2019-06-03 RX ORDER — CEFAZOLIN SODIUM/WATER 2 G/20 ML
2 SYRINGE (ML) INTRAVENOUS ONCE
Status: COMPLETED | OUTPATIENT
Start: 2019-06-03 | End: 2019-06-03

## 2019-06-03 RX ORDER — ROCURONIUM BROMIDE 10 MG/ML
INJECTION, SOLUTION INTRAVENOUS AS NEEDED
Status: DISCONTINUED | OUTPATIENT
Start: 2019-06-03 | End: 2019-06-03 | Stop reason: HOSPADM

## 2019-06-03 RX ORDER — OXYCODONE HYDROCHLORIDE 5 MG/1
5 TABLET ORAL AS NEEDED
Status: DISCONTINUED | OUTPATIENT
Start: 2019-06-03 | End: 2019-06-03 | Stop reason: HOSPADM

## 2019-06-03 RX ORDER — PROPOFOL 10 MG/ML
INJECTION, EMULSION INTRAVENOUS AS NEEDED
Status: DISCONTINUED | OUTPATIENT
Start: 2019-06-03 | End: 2019-06-03 | Stop reason: HOSPADM

## 2019-06-03 RX ORDER — FENTANYL CITRATE 50 UG/ML
50 INJECTION, SOLUTION INTRAMUSCULAR; INTRAVENOUS AS NEEDED
Status: DISCONTINUED | OUTPATIENT
Start: 2019-06-03 | End: 2019-06-03 | Stop reason: HOSPADM

## 2019-06-03 RX ORDER — SODIUM CHLORIDE, SODIUM LACTATE, POTASSIUM CHLORIDE, CALCIUM CHLORIDE 600; 310; 30; 20 MG/100ML; MG/100ML; MG/100ML; MG/100ML
1000 INJECTION, SOLUTION INTRAVENOUS CONTINUOUS
Status: DISCONTINUED | OUTPATIENT
Start: 2019-06-03 | End: 2019-06-03 | Stop reason: HOSPADM

## 2019-06-03 RX ORDER — ONDANSETRON 2 MG/ML
INJECTION INTRAMUSCULAR; INTRAVENOUS AS NEEDED
Status: DISCONTINUED | OUTPATIENT
Start: 2019-06-03 | End: 2019-06-03 | Stop reason: HOSPADM

## 2019-06-03 RX ORDER — SUCCINYLCHOLINE CHLORIDE 20 MG/ML
INJECTION INTRAMUSCULAR; INTRAVENOUS AS NEEDED
Status: DISCONTINUED | OUTPATIENT
Start: 2019-06-03 | End: 2019-06-03 | Stop reason: HOSPADM

## 2019-06-03 RX ORDER — LIDOCAINE HYDROCHLORIDE 20 MG/ML
INJECTION, SOLUTION EPIDURAL; INFILTRATION; INTRACAUDAL; PERINEURAL AS NEEDED
Status: DISCONTINUED | OUTPATIENT
Start: 2019-06-03 | End: 2019-06-03 | Stop reason: HOSPADM

## 2019-06-03 RX ORDER — MIDAZOLAM HYDROCHLORIDE 1 MG/ML
1 INJECTION, SOLUTION INTRAMUSCULAR; INTRAVENOUS AS NEEDED
Status: DISCONTINUED | OUTPATIENT
Start: 2019-06-03 | End: 2019-06-03 | Stop reason: HOSPADM

## 2019-06-03 RX ORDER — SODIUM CHLORIDE 9 MG/ML
25 INJECTION, SOLUTION INTRAVENOUS CONTINUOUS
Status: DISCONTINUED | OUTPATIENT
Start: 2019-06-03 | End: 2019-06-03 | Stop reason: HOSPADM

## 2019-06-03 RX ORDER — ROPIVACAINE HYDROCHLORIDE 5 MG/ML
150 INJECTION, SOLUTION EPIDURAL; INFILTRATION; PERINEURAL AS NEEDED
Status: DISCONTINUED | OUTPATIENT
Start: 2019-06-03 | End: 2019-06-03 | Stop reason: HOSPADM

## 2019-06-03 RX ORDER — BUPIVACAINE HYDROCHLORIDE AND EPINEPHRINE 5; 5 MG/ML; UG/ML
30 INJECTION, SOLUTION EPIDURAL; INTRACAUDAL; PERINEURAL ONCE
Status: DISCONTINUED | OUTPATIENT
Start: 2019-06-03 | End: 2019-06-03 | Stop reason: HOSPADM

## 2019-06-03 RX ORDER — FENTANYL CITRATE 50 UG/ML
25 INJECTION, SOLUTION INTRAMUSCULAR; INTRAVENOUS
Status: DISCONTINUED | OUTPATIENT
Start: 2019-06-03 | End: 2019-06-03 | Stop reason: HOSPADM

## 2019-06-03 RX ORDER — FENTANYL CITRATE 50 UG/ML
INJECTION, SOLUTION INTRAMUSCULAR; INTRAVENOUS AS NEEDED
Status: DISCONTINUED | OUTPATIENT
Start: 2019-06-03 | End: 2019-06-03 | Stop reason: HOSPADM

## 2019-06-03 RX ORDER — MORPHINE SULFATE 10 MG/ML
2 INJECTION, SOLUTION INTRAMUSCULAR; INTRAVENOUS
Status: DISCONTINUED | OUTPATIENT
Start: 2019-06-03 | End: 2019-06-03 | Stop reason: HOSPADM

## 2019-06-03 RX ORDER — SODIUM CHLORIDE, SODIUM LACTATE, POTASSIUM CHLORIDE, CALCIUM CHLORIDE 600; 310; 30; 20 MG/100ML; MG/100ML; MG/100ML; MG/100ML
100 INJECTION, SOLUTION INTRAVENOUS CONTINUOUS
Status: DISCONTINUED | OUTPATIENT
Start: 2019-06-03 | End: 2019-06-03 | Stop reason: HOSPADM

## 2019-06-03 RX ORDER — MIDAZOLAM HYDROCHLORIDE 1 MG/ML
0.5 INJECTION, SOLUTION INTRAMUSCULAR; INTRAVENOUS
Status: DISCONTINUED | OUTPATIENT
Start: 2019-06-03 | End: 2019-06-03 | Stop reason: HOSPADM

## 2019-06-03 RX ORDER — GLYCOPYRROLATE 0.2 MG/ML
INJECTION INTRAMUSCULAR; INTRAVENOUS AS NEEDED
Status: DISCONTINUED | OUTPATIENT
Start: 2019-06-03 | End: 2019-06-03 | Stop reason: HOSPADM

## 2019-06-03 RX ORDER — DIPHENHYDRAMINE HYDROCHLORIDE 50 MG/ML
12.5 INJECTION, SOLUTION INTRAMUSCULAR; INTRAVENOUS AS NEEDED
Status: DISCONTINUED | OUTPATIENT
Start: 2019-06-03 | End: 2019-06-03 | Stop reason: HOSPADM

## 2019-06-03 RX ORDER — DEXAMETHASONE SODIUM PHOSPHATE 4 MG/ML
INJECTION, SOLUTION INTRA-ARTICULAR; INTRALESIONAL; INTRAMUSCULAR; INTRAVENOUS; SOFT TISSUE AS NEEDED
Status: DISCONTINUED | OUTPATIENT
Start: 2019-06-03 | End: 2019-06-03 | Stop reason: HOSPADM

## 2019-06-03 RX ORDER — MIDAZOLAM HYDROCHLORIDE 1 MG/ML
INJECTION, SOLUTION INTRAMUSCULAR; INTRAVENOUS AS NEEDED
Status: DISCONTINUED | OUTPATIENT
Start: 2019-06-03 | End: 2019-06-03 | Stop reason: HOSPADM

## 2019-06-03 RX ORDER — ACETAMINOPHEN 325 MG/1
650 TABLET ORAL ONCE
Status: COMPLETED | OUTPATIENT
Start: 2019-06-03 | End: 2019-06-03

## 2019-06-03 RX ORDER — HYDROCODONE BITARTRATE AND ACETAMINOPHEN 5; 325 MG/1; MG/1
1 TABLET ORAL
Qty: 30 TAB | Refills: 0 | Status: SHIPPED | OUTPATIENT
Start: 2019-06-03 | End: 2019-06-10

## 2019-06-03 RX ORDER — LIDOCAINE HYDROCHLORIDE 10 MG/ML
0.1 INJECTION, SOLUTION EPIDURAL; INFILTRATION; INTRACAUDAL; PERINEURAL AS NEEDED
Status: DISCONTINUED | OUTPATIENT
Start: 2019-06-03 | End: 2019-06-03 | Stop reason: HOSPADM

## 2019-06-03 RX ORDER — PHENYLEPHRINE HCL IN 0.9% NACL 0.4MG/10ML
SYRINGE (ML) INTRAVENOUS AS NEEDED
Status: DISCONTINUED | OUTPATIENT
Start: 2019-06-03 | End: 2019-06-03 | Stop reason: HOSPADM

## 2019-06-03 RX ORDER — ONDANSETRON 2 MG/ML
4 INJECTION INTRAMUSCULAR; INTRAVENOUS AS NEEDED
Status: DISCONTINUED | OUTPATIENT
Start: 2019-06-03 | End: 2019-06-03 | Stop reason: HOSPADM

## 2019-06-03 RX ORDER — HYDROMORPHONE HYDROCHLORIDE 1 MG/ML
0.2 INJECTION, SOLUTION INTRAMUSCULAR; INTRAVENOUS; SUBCUTANEOUS
Status: ACTIVE | OUTPATIENT
Start: 2019-06-03 | End: 2019-06-03

## 2019-06-03 RX ADMIN — MIDAZOLAM HYDROCHLORIDE 2 MG: 1 INJECTION, SOLUTION INTRAMUSCULAR; INTRAVENOUS at 11:18

## 2019-06-03 RX ADMIN — MIDAZOLAM HYDROCHLORIDE 2 MG: 1 INJECTION, SOLUTION INTRAMUSCULAR; INTRAVENOUS at 11:08

## 2019-06-03 RX ADMIN — Medication 80 MCG: at 11:38

## 2019-06-03 RX ADMIN — SUCCINYLCHOLINE CHLORIDE 160 MG: 20 INJECTION INTRAMUSCULAR; INTRAVENOUS at 11:19

## 2019-06-03 RX ADMIN — Medication 2 G: at 11:30

## 2019-06-03 RX ADMIN — ROCURONIUM BROMIDE 10 MG: 10 INJECTION, SOLUTION INTRAVENOUS at 11:46

## 2019-06-03 RX ADMIN — ONDANSETRON 4 MG: 2 INJECTION INTRAMUSCULAR; INTRAVENOUS at 11:57

## 2019-06-03 RX ADMIN — SODIUM CHLORIDE, SODIUM LACTATE, POTASSIUM CHLORIDE, AND CALCIUM CHLORIDE 1000 ML: 600; 310; 30; 20 INJECTION, SOLUTION INTRAVENOUS at 10:10

## 2019-06-03 RX ADMIN — NEOSTIGMINE METHYLSULFATE 4 MG: 1 INJECTION INTRAVENOUS at 11:57

## 2019-06-03 RX ADMIN — DEXAMETHASONE SODIUM PHOSPHATE 4 MG: 4 INJECTION, SOLUTION INTRA-ARTICULAR; INTRALESIONAL; INTRAMUSCULAR; INTRAVENOUS; SOFT TISSUE at 11:31

## 2019-06-03 RX ADMIN — PROPOFOL 200 MG: 10 INJECTION, EMULSION INTRAVENOUS at 11:19

## 2019-06-03 RX ADMIN — ACETAMINOPHEN 650 MG: 325 TABLET ORAL at 10:06

## 2019-06-03 RX ADMIN — MIDAZOLAM HYDROCHLORIDE 1 MG: 1 INJECTION, SOLUTION INTRAMUSCULAR; INTRAVENOUS at 11:09

## 2019-06-03 RX ADMIN — FENTANYL CITRATE 100 MCG: 50 INJECTION, SOLUTION INTRAMUSCULAR; INTRAVENOUS at 11:19

## 2019-06-03 RX ADMIN — ROCURONIUM BROMIDE 20 MG: 10 INJECTION, SOLUTION INTRAVENOUS at 11:25

## 2019-06-03 RX ADMIN — GLYCOPYRROLATE 0.6 MG: 0.2 INJECTION INTRAMUSCULAR; INTRAVENOUS at 11:57

## 2019-06-03 RX ADMIN — LIDOCAINE HYDROCHLORIDE 60 MG: 20 INJECTION, SOLUTION EPIDURAL; INFILTRATION; INTRACAUDAL; PERINEURAL at 11:19

## 2019-06-03 RX ADMIN — ROCURONIUM BROMIDE 10 MG: 10 INJECTION, SOLUTION INTRAVENOUS at 11:19

## 2019-06-03 NOTE — ANESTHESIA PREPROCEDURE EVALUATION
Relevant Problems   No relevant active problems       Anesthetic History   No history of anesthetic complications            Review of Systems / Medical History  Patient summary reviewed, nursing notes reviewed and pertinent labs reviewed    Pulmonary  Within defined limits                 Neuro/Psych         Psychiatric history     Cardiovascular    Hypertension                   GI/Hepatic/Renal  Within defined limits              Endo/Other    Diabetes: type 2    Morbid obesity     Other Findings            Physical Exam    Airway  Mallampati: II  TM Distance: > 6 cm  Neck ROM: normal range of motion   Mouth opening: Normal     Cardiovascular  Regular rate and rhythm,  S1 and S2 normal,  no murmur, click, rub, or gallop             Dental  No notable dental hx       Pulmonary  Breath sounds clear to auscultation               Abdominal  GI exam deferred       Other Findings            Anesthetic Plan    ASA: 3  Anesthesia type: general          Induction: Intravenous  Anesthetic plan and risks discussed with: Patient

## 2019-06-03 NOTE — ROUTINE PROCESS
Patient: Loy Dancer MRN: [id number]  SSN: [id number]   YOB: 1975  Age: 37 y.o. Sex: female     Patient is status post Procedure(s):  EXCISION OF LARGE SEBACEOUS CYST OF MID LINE UPPER BACK. Surgeon(s) and Role:     * Ajay Hager MD - Primary    Local/Dose/Irrigation:  No local  Saline irrigation to sterile field. Peripheral IV 06/03/19 Left;Posterior Hand (Active)   Site Assessment Clean, dry, & intact 6/3/2019 10:07 AM   Phlebitis Assessment 0 6/3/2019 10:07 AM   Infiltration Assessment 0 6/3/2019 10:07 AM   Dressing Status Clean, dry, & intact; New 6/3/2019 10:07 AM   Dressing Type Tape;Transparent 6/3/2019 10:07 AM   Hub Color/Line Status Pink; Infusing 6/3/2019 10:07 AM            Airway - Endotracheal Tube 06/03/19 Oral (Active)                   Dressing/Packing:  Wound Back Upper;Mid-Dressing Type: Packing;4 x 4;Special tape (comment) (06/03/19 1206)    Splint/Cast:  ]    Other:  none

## 2019-06-03 NOTE — INTERVAL H&P NOTE
H&P Update:  Lee Rosales was seen and examined. History and physical has been reviewed. The patient has been examined.  There have been no significant clinical changes since the completion of the originally dated History and Physical.

## 2019-06-03 NOTE — BRIEF OP NOTE
BRIEF OPERATIVE NOTE    Date of Procedure: 6/3/2019   Preoperative Diagnosis: LARGE SEBACEOUS CYST ON BACK  Postoperative Diagnosis: LARGE SEBACEOUS CYST ON BACK    Procedure(s):  EXCISION OF LARGE SEBACEOUS CYST OF MID LINE UPPER BACK  Surgeon(s) and Role:     Osbaldo Brewer MD - Primary         Surgical Assistant: Ismael Snyder    Surgical Staff:  Circ-1: Jamshid Slater RN  Scrub Tech-1: Lincoln Cone  Surg Asst-1: Richelle Arreguin  Surg Asst-2: Sable Richmond  Float Staff: Angélica Sosa RN  Event Time In Time Out   Incision Start 1139    Incision Close 1203      Anesthesia: General   Estimated Blood Loss: minimal  Specimens:   ID Type Source Tests Collected by Time Destination   1 : mass on back Frozen Section Back  Joy Naylor MD 6/3/2019 1148 Pathology   2 : sebaceous cyst Fresh Back  Joy Naylor MD 6/3/2019 1152 Pathology      Findings: pockets of pus in severe fibrotic scarred areas   Complications: none  Implants: * No implants in log *   [id number]

## 2019-06-03 NOTE — ANESTHESIA POSTPROCEDURE EVALUATION
Post-Anesthesia Evaluation and Assessment    Patient: Suri Monterroso MRN: [id number]  SSN: [id number]    YOB: 1975  Age: 37 y.o. Sex: female      I have evaluated the patient and they are stable and ready for discharge from the PACU. Cardiovascular Function/Vital Signs  Visit Vitals  /72   Pulse 92   Temp 35.7 °C (96.3 °F)   Resp 15   Ht 5' 3\" (1.6 m)   Wt 110.7 kg (244 lb)   SpO2 100%   BMI 43.22 kg/m²       Patient is status post General anesthesia for Procedure(s):  EXCISION OF LARGE SEBACEOUS CYST OF MID LINE UPPER BACK. Nausea/Vomiting: None    Postoperative hydration reviewed and adequate. Pain:  Pain Scale 1: Adult Nonverbal Pain Scale (06/03/19 1250)  Pain Intensity 1: 0 (06/03/19 1250)   Managed    Neurological Status:   Neuro (WDL): Exceptions to WDL (06/03/19 1250)  Neuro  Neurologic State: Drowsy; Eyes open to stimulus (06/03/19 1250)  Orientation Level: Unable to verbalize (06/03/19 1250)   At baseline    Mental Status, Level of Consciousness: Alert and  oriented to person, place, and time    Pulmonary Status:   O2 Device: Nasal cannula (06/03/19 1250)   Adequate oxygenation and airway patent    Complications related to anesthesia: None    Post-anesthesia assessment completed. No concerns    Signed By: Misbah Liu MD     Lalita 3, 2019              Procedure(s):  EXCISION OF LARGE SEBACEOUS CYST OF MID LINE UPPER BACK. general    <BSHSIANPOST>    Vitals Value Taken Time   /65 6/3/2019  1:00 PM   Temp 35.7 °C (96.3 °F) 6/3/2019 12:50 PM   Pulse 91 6/3/2019  1:11 PM   Resp 10 6/3/2019  1:11 PM   SpO2 99 % 6/3/2019  1:11 PM   Vitals shown include unvalidated device data.

## 2019-06-03 NOTE — DISCHARGE INSTRUCTIONS
Patient Discharge Instructions    Geoff Patient / [id number] : 1975    Admitted 6/3/2019 Discharged: 6/3/2019     Take Home Medications     · It is important that you take the medication exactly as they are prescribed. · Keep your medication in the bottles provided by the pharmacist and keep a list of the medication names, dosages, and times to be taken in your wallet. · Do not take other medications without consulting your doctor. What to do at Home    Recommended diet: Regular Diet; start with light, bland foods and advance if no nausea or vomiting    Recommended activity: Activity as tolerated    Do not remove the dressing or take a shower. Dr. Maico Church will remove the dressing at your visit on Wednesday. Follow-up Appointments   Procedures    FOLLOW UP VISIT Appointment in: Other (Specify) This Wednesday at 1:20 PM  Room [address] in the [address] [place] [address]     This Wednesday at 1:20 PM  Room 406 in the [address] [place] [address]     Standing Status:   Standing     Number of Occurrences:   1     Order Specific Question:   Appointment in     Answer: Other (Specify)           Information obtained by :  I understand that if any problems occur once I am at home I am to contact my physician. I understand and acknowledge receipt of the instructions indicated above.                                                                                                                                            Physician's or R.N.'s Signature                                                                  Date/Time                                                                                                                                              Patient or Representative Signature                                                          Date/Time    ______________________________________________________________________    Anesthesia Discharge Instructions    After general anesthesia or intervenous sedation, for 24 hours or while taking prescription Narcotics:  · Limit your activities  · Do not drive or operate hazardous machinery  · If you have not urinated within 8 hours after discharge, please contact your surgeon on call. · Do not make important personal or business decisions  · Do not drink alcoholic beverages    Report the following to your surgeon:  · Excessive pain, swelling, redness or odor of or around the surgical area  · Temperature over 100.5 degrees  · Nausea and vomiting lasting longer than 4 hours or if unable to take medication  · Any signs of decreased circulation or nerve impairment to extremity:  Change in color, persistent numbness, tingling, coldness or increased pain.   · Any questions

## 2019-06-04 NOTE — OP NOTES
[address]   OPERATIVE REPORT    Name:  Cecily Ryder  MR#:  [id number]  :  1975  ACCOUNT #:  [de-identified]  DATE OF SERVICE:  2019    PREOPERATIVE DIAGNOSIS:  Infected sebaceous cyst of the back. POSTOPERATIVE DIAGNOSIS:  Severely scarred, previously ruptured sebaceous cyst with multiple areas of fibrosis and tunneling of pus pockets. PROCEDURE PERFORMED:  Excision of large sebaceous cyst in the midline upper back and with packing. SURGEON:  Stu Rubio MD    ASSISTANT:  Cb Nazario SA    ANESTHESIA:  General.    COMPLICATIONS:  None. SPECIMENS REMOVED:  Mass from the back, and portions of it really appeared more like a cyst.    IMPLANTS:  None. ESTIMATED BLOOD LOSS:  Minimal.    CONDITION:  Good to the PACU. FINDINGS:  Pockets of pus and severe fibrotic scarred areas. PROCEDURE:  The patient was placed under suitable general anesthesia and then turned prone on the operating table with appropriate padding. The area of concern, which was at about 2, 4, 5 level, an ellipse of skin was made measuring about 3 x 1 cm in size, and once down into the lesion, it was clear it was mostly fibrotic with these little tunnels of pus extending in all directions. I debrided it as best as I could using the electrocautery. I was left with a defect that was 4 cm in length, 3 cm deep and about 2.5 to 3 cm wide. I cut it back to as normal tissue as possible. I elected to pack it with 2-inch [place] soaked in Betadine. Dressing was applied. At the termination of the procedure, all counts were correct. The patient tolerated this well and was brought to the PACU in satisfactory condition. Luisa Ye MD      GP/OMAR_DONNA_TAVON/BS_EDIT  D:  2019 12:24  T:  2019 15:38  JOB #:  [id number]  CC: Jalen Salines.  JEREMI Iglesias MD

## 2019-06-05 ENCOUNTER — OFFICE VISIT (OUTPATIENT)
Dept: SURGERY | Age: 44
End: 2019-06-05

## 2019-06-05 VITALS
RESPIRATION RATE: 18 BRPM | OXYGEN SATURATION: 99 % | DIASTOLIC BLOOD PRESSURE: 82 MMHG | BODY MASS INDEX: 43.94 KG/M2 | TEMPERATURE: 97.9 F | SYSTOLIC BLOOD PRESSURE: 124 MMHG | HEART RATE: 91 BPM | HEIGHT: 63 IN | WEIGHT: 248 LBS

## 2019-06-05 DIAGNOSIS — Z09 POSTOPERATIVE EXAMINATION: Primary | ICD-10-CM

## 2019-06-05 NOTE — LETTER
6/5/19 Patient: Jennifer Shell YOB: 1975 Date of Visit: 6/5/2019 Abby Nunez NP 
[facility] [address] [facility] [address] [address] VIA Facsimile: [phone] Dear Abby Nunez NP, Thank you for referring Ms. Leoncio Walker to [facility] for evaluation. My notes for this consultation are attached. If you have questions, please do not hesitate to call me. I look forward to following your patient along with you. Sincerely, Jericho Young MD

## 2019-06-05 NOTE — PROGRESS NOTES
1. Have you been to the ER, urgent care clinic since your last visit? Hospitalized since your last visit? No    2. Have you seen or consulted any other health care providers outside of the [address] [place] [address] since your last visit? Include any pap smears or colon screening.  No

## 2019-06-05 NOTE — PROGRESS NOTES
Subjective:      Katrin Smyth  is a 37 y.o.  female who is s/p excision of large sebaceous cyst on the upper midline back on 06/03/19, who presents for wound packing change. Past Medical History:   Diagnosis Date    Anxiety     Depression     ANXIETY & DEPRESSION    Diabetes ([facility] [address]     TYPE II    Hypertension     Infected sebaceous cyst, upper back 4/10/2019    Panic attack     Suicidal thoughts     Vision decreased        Past Surgical History:   Procedure Laterality Date    HX OTHER SURGICAL  06/03/2019     Excision of large sebaceous cyst in the midline upper back and with packing- [facility]-DR. Luis Enrique Powers       Social History     Tobacco Use    Smoking status: Never Smoker    Smokeless tobacco: Never Used    Tobacco comment: n/a never smoked tobacco   Substance Use Topics    Alcohol use: No       Family History   Problem Relation Age of Onset    Diabetes Mother     Hypertension Mother     Cancer Father         LUNG CA. SMOKER       Current Outpatient Medications on File Prior to Visit   Medication Sig Dispense Refill    LORazepam (ATIVAN) 0.5 mg tablet Take  by mouth daily (with breakfast).  metFORMIN (GLUCOPHAGE) 1,000 mg tablet Take 1,000 mg by mouth Daily (before breakfast).  clomiPRAMINE (ANAFRANIL) 50 mg capsule Take 50 mg by mouth daily (with breakfast).  triamterene-hydrochlorothiazide (MAXZIDE) 37.5-25 mg per tablet Take 1 Tab by mouth daily. Indications: HYPERTENSION      losartan (COZAAR) 50 mg tablet Take 50 mg by mouth daily. Indications: HYPERTENSION      HYDROcodone-acetaminophen (NORCO) 5-325 mg per tablet Take 1 Tab by mouth every six (6) hours as needed for Pain for up to 7 days. Max Daily Amount: 4 Tabs. 30 Tab 0     No current facility-administered medications on file prior to visit.         Allergies   Allergen Reactions    Lisinopril Rash     Swelling of the lips         Review of Systems:    Pertinent items are noted in the History of Present Illness. Objective:     Visit Vitals  /82 (BP 1 Location: Left arm, BP Patient Position: Sitting)   Pulse 91   Temp 97.9 °F (36.6 °C) (Oral)   Resp 18   Ht 5' 3\" (1.6 m)   Wt 248 lb (112.5 kg)   SpO2 99%   BMI 43.93 kg/m²        Physical Exam:  SKIN:(Upper midline back) Removed packing and placed a 2 x 2 vertically into the wound and covered with with a 4 x 4 and tape. Labs: No results found for this or any previous visit (from the past 24 hour(s)). Assessment and Plan:       ICD-10-CM ICD-9-CM    1. Postoperative examination Z09 V67.00        Pt's mother will perform the dressing changes once a day. Pt will f/u with me again in 1 week. All questions were answered and she agrees with this plan. This document was scribed by Oanh Owen as dictated by Dr. Romana Pimple.      Signed By: Mj Stephenson MD     06/05/19

## 2019-06-17 ENCOUNTER — OFFICE VISIT (OUTPATIENT)
Dept: SURGERY | Age: 44
End: 2019-06-17

## 2019-06-17 VITALS
DIASTOLIC BLOOD PRESSURE: 84 MMHG | OXYGEN SATURATION: 98 % | RESPIRATION RATE: 18 BRPM | HEART RATE: 99 BPM | WEIGHT: 245 LBS | HEIGHT: 63 IN | BODY MASS INDEX: 43.41 KG/M2 | SYSTOLIC BLOOD PRESSURE: 120 MMHG | TEMPERATURE: 98.2 F

## 2019-06-17 DIAGNOSIS — Z09 POSTOPERATIVE EXAMINATION: Primary | ICD-10-CM

## 2019-06-17 DIAGNOSIS — L72.3 INFECTED SEBACEOUS CYST: ICD-10-CM

## 2019-06-17 DIAGNOSIS — L08.9 INFECTED SEBACEOUS CYST: ICD-10-CM

## 2019-06-17 DIAGNOSIS — Z51.89 ENCOUNTER FOR WOUND CARE: ICD-10-CM

## 2019-06-17 NOTE — PROGRESS NOTES
1. Have you been to the ER, urgent care clinic since your last visit? Hospitalized since your last visit? No    2. Have you seen or consulted any other health care providers outside of the [address] [place] [address] since your last visit? Include any pap smears or colon screening.  no

## 2019-06-17 NOTE — PROGRESS NOTES
Subjective:    Nolvia Sweeney is a 37 y.o. female presents for postop care following excision of large, tunneling cyst on back by Dr. Immanuel Palacios. She is receiving wound care by friend/relative who reports no problems with wound care. The patient is not having any pain. Wound is being packed with packing strips. Advance directive not on file      Objective:     Visit Vitals  /84   Pulse 99   Temp 98.2 °F (36.8 °C) (Oral)   Resp 18   Ht 5' 3\" (1.6 m)   Wt 245 lb (111.1 kg)   SpO2 98%   BMI 43.40 kg/m²         Wound:  Location: back  clean, open, good granulation tissue, +slough on top of good granulation tissue; some rubbed off  periwound skin intact, no erythema or induration        Assessment:     Wound check. Doing well postoperatively. Plan:     1. Wound care discussed. Healing. Daily wound care: Moistened packing strip with saline and gentle pack wound bed. Cover with 4x4s and secure with tape. May take a shower and wash area with soap and water, pat wound dry. 2. Pt is to increase activities as tolerated. No swimming or baths. 3. Follow-up next week to check wound bed    Ms. Pepper Snider has a reminder for a \"due or due soon\" health maintenance. I have asked that she contact her primary care provider for follow-up on this health maintenance. Patient verbalized understanding and agreement.

## 2019-06-17 NOTE — PATIENT INSTRUCTIONS
Epidermoid Cyst: Care Instructions  Your Care Instructions  An epidermoid (say \"zr-zlp-XWT-haroldo\") cyst is a lump just under the skin. These cysts can form when a hair follicle becomes blocked. They are common in acne and may occur on the face, neck, back, and genitals. However, they can form anywhere on the body. These cysts are not cancer and do not lead to cancer. They tend not to hurt, but they can sometimes become swollen and painful. They also may break open (rupture) and cause scarring. These cysts sometimes do not cause problems and may not need treatment. If you have a cyst that is swollen and hurts, your doctor may inject it with a medicine to help it heal. But it is more likely that a painful cyst will need to be removed. Your doctor will give you a shot of numbing medicine and cut into the cyst to drain it or remove it. This makes the symptoms go away. Follow-up care is a key part of your treatment and safety. Be sure to make and go to all appointments, and call your doctor if you are having problems. It's also a good idea to know your test results and keep a list of the medicines you take. How can you care for yourself at home? · Do not squeeze the cyst or poke it with a needle to open it. This can cause swelling, redness, and infection. · Always have a doctor look at any new lumps you get to make sure that they are not serious. When should you call for help? Watch closely for changes in your health, and be sure to contact your doctor if:    · You have a fever, redness, or swelling after you get a shot of medicine in the cyst.     · You see or feel a new lump on your skin. Where can you learn more? Go to http://scott-clara.info/. Enter F616 in the search box to learn more about \"Epidermoid Cyst: Care Instructions. \"  Current as of: April 17, 2018  Content Version: 11.9  © 1217-9246 [employer].  Care instructions adapted under license by [employer] (which disclaims liability or warranty for this information). If you have questions about a medical condition or this instruction, always ask your healthcare professional. [address] any warranty or liability for your use of this information.

## 2019-06-17 NOTE — LETTER
6/17/19 Patient: Kristen Hercules YOB: 1975 Date of Visit: 6/17/2019 William Rivera NP 
[facility] [address] [address] VIA Facsimile: [phone] Dear William Rivera NP, Thank you for referring Ms. Zafar Gardner to [facility] for evaluation. My notes for this consultation are attached. If you have questions, please do not hesitate to call me. I look forward to following your patient along with you. Sincerely, Abbie Wing NP

## 2019-06-24 ENCOUNTER — OFFICE VISIT (OUTPATIENT)
Dept: SURGERY | Age: 44
End: 2019-06-24

## 2019-06-24 VITALS
DIASTOLIC BLOOD PRESSURE: 90 MMHG | SYSTOLIC BLOOD PRESSURE: 130 MMHG | HEIGHT: 63 IN | RESPIRATION RATE: 18 BRPM | WEIGHT: 245 LBS | HEART RATE: 97 BPM | BODY MASS INDEX: 43.41 KG/M2 | TEMPERATURE: 98.2 F | OXYGEN SATURATION: 97 %

## 2019-06-24 DIAGNOSIS — Z09 POSTOPERATIVE EXAMINATION: Primary | ICD-10-CM

## 2019-06-24 DIAGNOSIS — Z51.89 ENCOUNTER FOR WOUND CARE: ICD-10-CM

## 2019-06-24 NOTE — PROGRESS NOTES
Subjective:    Geoff Patient is a 37 y.o. female presents for wound check. She is receiving wound care by friend who reports no problems with wound care. The patient is not having any pain. Daily packing with gauze moistened with saline. Advance directive not on file    Objective:     Visit Vitals  /90   Pulse 97   Temp 98.2 °F (36.8 °C) (Oral)   Resp 18   Ht 5' 3\" (1.6 m)   Wt 245 lb (111.1 kg)   SpO2 97%   BMI 43.40 kg/m²         Wound:  Location: back  clean, dry, open, good granulation tissue, healing. Filling in nicely  periwound skin intact, no erythema or induration        Assessment:     Wound check. Doing well postoperatively. Plan:     1. Wound care discussed. Continue with daily wet-dry dressing changes. Moisten 1\" plain packing and fold into wound bed, cover with 4x4 and secure with tape. May shower, but no swimming or baths until it heals up. 2. Pt is to increase activities as tolerated. .  3. Follow-up in 2 weeks for wound  check    Ms. Yahaira Norwood has a reminder for a \"due or due soon\" health maintenance. I have asked that she contact her primary care provider for follow-up on this health maintenance. Patient verbalized understanding and agreement.

## 2019-06-24 NOTE — LETTER
6/24/19 Patient: Cassi Lal YOB: 1975 Date of Visit: 6/24/2019 Hang Fernandez NP 
[facility] [address] [address] VIA Facsimile: [phone] Dear Hang Fernandez NP, Thank you for referring Ms. Alexx Espinoza to [facility] for evaluation. My notes for this consultation are attached. If you have questions, please do not hesitate to call me. I look forward to following your patient along with you. Sincerely, Elroy Rossi NP

## 2019-06-24 NOTE — PATIENT INSTRUCTIONS
Wound Check: Care Instructions  Your Care Instructions  People have wounds that need care for many reasons. You may have a cut that needs care after surgery. You may have a cut or puncture wound from an accident. Or you may have a wound because of a condition like diabetes. Whatever the cause of your wound, there are things you can do to care for it at home. Your doctor may also want you to come back for a wound check. The wound check lets the doctor know how your wound is healing and if you need more treatment. Follow-up care is a key part of your treatment and safety. Be sure to make and go to all appointments, and call your doctor if you are having problems. It's also a good idea to know your test results and keep a list of the medicines you take. How can you care for yourself at home? · If your doctor told you how to care for your wound, follow your doctor's instructions. If you did not get instructions, follow this general advice:  ? You may cover the wound with a thin layer of petroleum jelly, such as Vaseline, and a nonstick bandage. ? Apply more petroleum jelly and replace the bandage as needed. · Keep the wound dry for the first 24 to 48 hours. After this, you can shower if your doctor okays it. Pat the wound dry. · Be safe with medicines. Read and follow all instructions on the label. ? If the doctor gave you a prescription medicine for pain, take it as prescribed. ? If you are not taking a prescription pain medicine, ask your doctor if you can take an over-the-counter medicine. · If your doctor prescribed antibiotics, take them as directed. Do not stop taking them just because you feel better. You need to take the full course of antibiotics. · If you have stitches, do not remove them on your own. Your doctor will tell you when to come back to have them removed. · If you have Steri-Strips, leave them on until they fall off.   · If possible, prop up the injured area on a pillow anytime you sit or lie down during the next 3 days. Try to keep it above the level of your heart. This will help reduce swelling. When should you call for help? Call your doctor now or seek immediate medical care if:    · You have new pain, or the pain gets worse.     · The skin near the wound is cold or pale or changes color.     · You have tingling, weakness, or numbness near the wound.     · The wound starts to bleed, and blood soaks through the bandage. Oozing small amounts of blood is normal.     · You have symptoms of infection, such as:  ? Increased pain, swelling, warmth, or redness. ? Red streaks leading from the wound. ? Pus draining from the wound. ? A fever.    Watch closely for changes in your health, and be sure to contact your doctor if:    · You do not get better as expected. Where can you learn more? Go to http://scott-clara.info/. Enter P342 in the search box to learn more about \"Wound Check: Care Instructions. \"  Current as of: September 23, 2018  Content Version: 11.9  © 4743-2015 Healthwise, Incorporated. Care instructions adapted under license by [employer] (which disclaims liability or warranty for this information). If you have questions about a medical condition or this instruction, always ask your healthcare professional. [address] any warranty or liability for your use of this information.

## 2019-06-24 NOTE — PROGRESS NOTES
1. Have you been to the ER, urgent care clinic since your last visit? Hospitalized since your last visit? no     2. Have you seen or consulted any other health care providers outside of the [address] [place] [address] since your last visit? Include any pap smears or colon screening. no

## 2019-07-17 ENCOUNTER — OFFICE VISIT (OUTPATIENT)
Dept: SURGERY | Age: 44
End: 2019-07-17

## 2019-07-17 VITALS
TEMPERATURE: 97.9 F | WEIGHT: 265 LBS | DIASTOLIC BLOOD PRESSURE: 100 MMHG | BODY MASS INDEX: 46.95 KG/M2 | RESPIRATION RATE: 18 BRPM | HEART RATE: 110 BPM | HEIGHT: 63 IN | SYSTOLIC BLOOD PRESSURE: 130 MMHG | OXYGEN SATURATION: 97 %

## 2019-07-17 DIAGNOSIS — Z09 POSTOPERATIVE EXAMINATION: Primary | ICD-10-CM

## 2019-07-17 DIAGNOSIS — Z51.89 ENCOUNTER FOR WOUND CARE: ICD-10-CM

## 2019-07-17 NOTE — PROGRESS NOTES
Subjective:    Anthony Wiseman is a 37 y.o. female presents for wound care. She is receiving wound care by self who reports no problems with wound care. The patient is not having any pain. Pt reports that it is too shallow to pack. Advance directive not on file      Objective:     Visit Vitals  BP (!) 130/100   Pulse (!) 110   Temp 97.9 °F (36.6 °C) (Oral)   Resp 18   Ht 5' 3\" (1.6 m)   Wt 265 lb (120.2 kg)   SpO2 97%   BMI 46.94 kg/m²     BP recheck 118/92    Wound:  Location: back  clean, dry, scabbed over. a spot of drainage appeared with palpation  periwound skin intact, no erythema or induration        Assessment:     Wound check    Plan:     1. Wound care discussed. No need for further wound care. Keep dry and cover only if any more drainage  2. Pt is to increase activities as tolerated. .  3. Follow-up prn    Ms. Francheska Peña has a reminder for a \"due or due soon\" health maintenance. I have asked that she contact her primary care provider for follow-up on this health maintenance. Patient verbalized understanding and agreement.

## 2019-07-17 NOTE — PROGRESS NOTES
1. Have you been to the ER, urgent care clinic since your last visit? Hospitalized since your last visit?no    2. Have you seen or consulted any other health care providers outside of the [address] [place] [address] since your last visit? Include any pap smears or colon screening.  no

## 2019-07-17 NOTE — LETTER
7/17/19 Patient: Christina Crooks YOB: 1975 Date of Visit: 7/17/2019 Ye Hutchison NP 
[facility] [address] [address] [address] VIA Facsimile: [phone] Dear Ye Hutchison NP, Thank you for referring Ms. Wen Bass to [address] [facility] for evaluation. My notes for this consultation are attached. If you have questions, please do not hesitate to call me. I look forward to following your patient along with you. Sincerely, Holly Goldberg NP

## 2019-11-20 ENCOUNTER — HOSPITAL ENCOUNTER (OUTPATIENT)
Dept: MAMMOGRAPHY | Age: 44
Discharge: HOME | End: 2019-11-20
Payer: MEDICAID

## 2019-11-20 DIAGNOSIS — Z12.31 VISIT FOR SCREENING MAMMOGRAM: ICD-10-CM

## 2019-11-20 PROCEDURE — 77067 SCR MAMMO BI INCL CAD: CPT

## 2020-10-08 ENCOUNTER — TRANSCRIBE ORDER (OUTPATIENT)
Dept: SCHEDULING | Age: 45
End: 2020-10-08

## 2020-10-08 DIAGNOSIS — Z12.31 SCREENING MAMMOGRAM FOR HIGH-RISK PATIENT: Primary | ICD-10-CM

## 2020-11-16 ENCOUNTER — HOSPITAL ENCOUNTER (OUTPATIENT)
Dept: PREADMISSION TESTING | Age: 45
Discharge: HOME | End: 2020-11-16
Payer: MEDICAID

## 2020-11-16 ENCOUNTER — TRANSCRIBE ORDER (OUTPATIENT)
Dept: REGISTRATION | Age: 45
End: 2020-11-16

## 2020-11-16 DIAGNOSIS — Z01.812 PRE-PROCEDURE LAB EXAM: ICD-10-CM

## 2020-11-16 DIAGNOSIS — Z01.812 PRE-PROCEDURE LAB EXAM: Primary | ICD-10-CM

## 2020-11-16 PROCEDURE — 87635 SARS-COV-2 COVID-19 AMP PRB: CPT

## 2020-11-18 LAB — SARS-COV-2, COV2NT: NOT DETECTED

## 2020-11-20 ENCOUNTER — ANESTHESIA (OUTPATIENT)
Dept: ENDOSCOPY | Age: 45
End: 2020-11-20
Payer: MEDICAID

## 2020-11-20 ENCOUNTER — ANESTHESIA EVENT (OUTPATIENT)
Dept: ENDOSCOPY | Age: 45
End: 2020-11-20
Payer: MEDICAID

## 2020-11-20 ENCOUNTER — HOSPITAL ENCOUNTER (OUTPATIENT)
Age: 45
Setting detail: OUTPATIENT SURGERY
Discharge: HOME | End: 2020-11-20
Attending: INTERNAL MEDICINE | Admitting: INTERNAL MEDICINE
Payer: MEDICAID

## 2020-11-20 VITALS
WEIGHT: 246 LBS | BODY MASS INDEX: 43.59 KG/M2 | SYSTOLIC BLOOD PRESSURE: 135 MMHG | HEART RATE: 103 BPM | DIASTOLIC BLOOD PRESSURE: 83 MMHG | OXYGEN SATURATION: 98 % | RESPIRATION RATE: 18 BRPM | HEIGHT: 63 IN | TEMPERATURE: 98.9 F

## 2020-11-20 PROCEDURE — 2709999900 HC NON-CHARGEABLE SUPPLY: Performed by: INTERNAL MEDICINE

## 2020-11-20 PROCEDURE — 88305 TISSUE EXAM BY PATHOLOGIST: CPT

## 2020-11-20 PROCEDURE — 76040000007: Performed by: INTERNAL MEDICINE

## 2020-11-20 PROCEDURE — 74011250636 HC RX REV CODE- 250/636: Performed by: NURSE ANESTHETIST, CERTIFIED REGISTERED

## 2020-11-20 PROCEDURE — 76060000032 HC ANESTHESIA 0.5 TO 1 HR: Performed by: INTERNAL MEDICINE

## 2020-11-20 PROCEDURE — 77030013992 HC SNR POLYP ENDOSC BSC -B: Performed by: INTERNAL MEDICINE

## 2020-11-20 PROCEDURE — 74011000250 HC RX REV CODE- 250: Performed by: NURSE ANESTHETIST, CERTIFIED REGISTERED

## 2020-11-20 PROCEDURE — 77030021593 HC FCPS BIOP ENDOSC BSC -A: Performed by: INTERNAL MEDICINE

## 2020-11-20 RX ORDER — NALOXONE HYDROCHLORIDE 0.4 MG/ML
0.4 INJECTION, SOLUTION INTRAMUSCULAR; INTRAVENOUS; SUBCUTANEOUS
Status: DISCONTINUED | OUTPATIENT
Start: 2020-11-20 | End: 2020-11-20 | Stop reason: HOSPADM

## 2020-11-20 RX ORDER — ATROPINE SULFATE 0.1 MG/ML
0.5 INJECTION INTRAVENOUS
Status: DISCONTINUED | OUTPATIENT
Start: 2020-11-20 | End: 2020-11-20 | Stop reason: HOSPADM

## 2020-11-20 RX ORDER — MIDAZOLAM HYDROCHLORIDE 1 MG/ML
.25-1 INJECTION, SOLUTION INTRAMUSCULAR; INTRAVENOUS
Status: DISCONTINUED | OUTPATIENT
Start: 2020-11-20 | End: 2020-11-20 | Stop reason: HOSPADM

## 2020-11-20 RX ORDER — FENTANYL CITRATE 50 UG/ML
100 INJECTION, SOLUTION INTRAMUSCULAR; INTRAVENOUS
Status: DISCONTINUED | OUTPATIENT
Start: 2020-11-20 | End: 2020-11-20 | Stop reason: HOSPADM

## 2020-11-20 RX ORDER — EPINEPHRINE 0.1 MG/ML
1 INJECTION INTRACARDIAC; INTRAVENOUS
Status: DISCONTINUED | OUTPATIENT
Start: 2020-11-20 | End: 2020-11-20 | Stop reason: HOSPADM

## 2020-11-20 RX ORDER — SODIUM CHLORIDE 9 MG/ML
INJECTION, SOLUTION INTRAVENOUS
Status: DISCONTINUED | OUTPATIENT
Start: 2020-11-20 | End: 2020-11-20 | Stop reason: HOSPADM

## 2020-11-20 RX ORDER — LIDOCAINE HYDROCHLORIDE 20 MG/ML
INJECTION, SOLUTION EPIDURAL; INFILTRATION; INTRACAUDAL; PERINEURAL AS NEEDED
Status: DISCONTINUED | OUTPATIENT
Start: 2020-11-20 | End: 2020-11-20 | Stop reason: HOSPADM

## 2020-11-20 RX ORDER — PROPOFOL 10 MG/ML
INJECTION, EMULSION INTRAVENOUS AS NEEDED
Status: DISCONTINUED | OUTPATIENT
Start: 2020-11-20 | End: 2020-11-20 | Stop reason: HOSPADM

## 2020-11-20 RX ORDER — SODIUM CHLORIDE 0.9 % (FLUSH) 0.9 %
5-40 SYRINGE (ML) INJECTION AS NEEDED
Status: DISCONTINUED | OUTPATIENT
Start: 2020-11-20 | End: 2020-11-20 | Stop reason: HOSPADM

## 2020-11-20 RX ORDER — SODIUM CHLORIDE 0.9 % (FLUSH) 0.9 %
5-40 SYRINGE (ML) INJECTION EVERY 8 HOURS
Status: DISCONTINUED | OUTPATIENT
Start: 2020-11-20 | End: 2020-11-20 | Stop reason: HOSPADM

## 2020-11-20 RX ORDER — DEXTROMETHORPHAN/PSEUDOEPHED 2.5-7.5/.8
1.2 DROPS ORAL
Status: DISCONTINUED | OUTPATIENT
Start: 2020-11-20 | End: 2020-11-20 | Stop reason: HOSPADM

## 2020-11-20 RX ORDER — SODIUM CHLORIDE 9 MG/ML
50 INJECTION, SOLUTION INTRAVENOUS CONTINUOUS
Status: DISCONTINUED | OUTPATIENT
Start: 2020-11-20 | End: 2020-11-20 | Stop reason: HOSPADM

## 2020-11-20 RX ORDER — FLUMAZENIL 0.1 MG/ML
0.2 INJECTION INTRAVENOUS
Status: DISCONTINUED | OUTPATIENT
Start: 2020-11-20 | End: 2020-11-20 | Stop reason: HOSPADM

## 2020-11-20 RX ADMIN — PROPOFOL 40 MG: 10 INJECTION, EMULSION INTRAVENOUS at 14:43

## 2020-11-20 RX ADMIN — PROPOFOL 50 MG: 10 INJECTION, EMULSION INTRAVENOUS at 14:47

## 2020-11-20 RX ADMIN — PROPOFOL 50 MG: 10 INJECTION, EMULSION INTRAVENOUS at 14:52

## 2020-11-20 RX ADMIN — PROPOFOL 30 MG: 10 INJECTION, EMULSION INTRAVENOUS at 14:35

## 2020-11-20 RX ADMIN — PROPOFOL 80 MG: 10 INJECTION, EMULSION INTRAVENOUS at 14:31

## 2020-11-20 RX ADMIN — LIDOCAINE HYDROCHLORIDE 50 MG: 20 INJECTION, SOLUTION EPIDURAL; INFILTRATION; INTRACAUDAL; PERINEURAL at 14:31

## 2020-11-20 RX ADMIN — SODIUM CHLORIDE: 900 INJECTION, SOLUTION INTRAVENOUS at 14:12

## 2020-11-20 RX ADMIN — PROPOFOL 30 MG: 10 INJECTION, EMULSION INTRAVENOUS at 14:33

## 2020-11-20 RX ADMIN — PROPOFOL 50 MG: 10 INJECTION, EMULSION INTRAVENOUS at 14:39

## 2020-11-20 NOTE — PERIOP NOTES

## 2020-11-20 NOTE — ANESTHESIA PREPROCEDURE EVALUATION
Relevant Problems   No relevant active problems       Anesthetic History   No history of anesthetic complications            Review of Systems / Medical History  Patient summary reviewed, nursing notes reviewed and pertinent labs reviewed    Pulmonary  Within defined limits                 Neuro/Psych         Psychiatric history     Cardiovascular    Hypertension                   GI/Hepatic/Renal  Within defined limits              Endo/Other    Diabetes: type 2    Morbid obesity     Other Findings              Physical Exam    Airway  Mallampati: II  TM Distance: > 6 cm  Neck ROM: normal range of motion   Mouth opening: Normal     Cardiovascular  Regular rate and rhythm,  S1 and S2 normal,  no murmur, click, rub, or gallop             Dental  No notable dental hx       Pulmonary  Breath sounds clear to auscultation               Abdominal  GI exam deferred       Other Findings            Anesthetic Plan    ASA: 3  Anesthesia type: MAC          Induction: Intravenous  Anesthetic plan and risks discussed with: Patient

## 2020-11-20 NOTE — H&P
[address] [facility] [address] [facility] [address]  [address] [facility] [address]  [address] [facility], [address] [place] [address]                                History and Physical     NAME: Mora Reddy   :  1975   MRN:  [id number]     HPI:  The patient was seen and examined. Past Surgical History:   Procedure Laterality Date    HX OTHER SURGICAL  2019     Excision of large sebaceous cyst in the midline upper back and with packing- [facility]-DR. Brittney Fernandes     Past Medical History:   Diagnosis Date    Anxiety     Depression     ANXIETY & DEPRESSION    Diabetes ([facility] [address]     TYPE II    Hypertension     Infected sebaceous cyst, upper back 4/10/2019    Panic attack     Suicidal thoughts     Vision decreased      Social History     Tobacco Use    Smoking status: Never Smoker    Smokeless tobacco: Never Used    Tobacco comment: n/a never smoked tobacco   Substance Use Topics    Alcohol use: No    Drug use: No     Allergies   Allergen Reactions    Lisinopril Rash     Swelling of the lips     Family History   Problem Relation Age of Onset    Diabetes Mother     Hypertension Mother     Cancer Father         LUNG CA. SMOKER     Current Facility-Administered Medications   Medication Dose Route Frequency    0.9% sodium chloride infusion  50 mL/hr IntraVENous CONTINUOUS    sodium chloride (NS) flush 5-40 mL  5-40 mL IntraVENous Q8H    sodium chloride (NS) flush 5-40 mL  5-40 mL IntraVENous PRN    midazolam (VERSED) injection 0.25-10 mg  0.25-10 mg IntraVENous Multiple    fentaNYL citrate (PF) injection 100 mcg  100 mcg IntraVENous MULTIPLE DOSE GIVEN    naloxone (NARCAN) injection 0.4 mg  0.4 mg IntraVENous Multiple    flumazeniL (ROMAZICON) 0.1 mg/mL injection 0.2 mg  0.2 mg IntraVENous Multiple    simethicone (MYLICON) 99KW/4.1UT oral drops 80 mg  1.2 mL Oral Multiple    atropine injection 0.5 mg  0.5 mg IntraVENous ONCE PRN    EPINEPHrine (ADRENALIN) 0.1 mg/mL syringe 1 mg  1 mg Endoscopically ONCE PRN Facility-Administered Medications Ordered in Other Encounters   Medication Dose Route Frequency    0.9% sodium chloride infusion   IntraVENous CONTINUOUS         PHYSICAL EXAM:  General: WD, WN. Alert, cooperative, no acute distress    HEENT: NC, Atraumatic. PERRLA, EOMI. Anicteric sclerae. Lungs:  CTA Bilaterally. No Wheezing/Rhonchi/Rales. Heart:  Regular  rhythm,  No murmur, No Rubs, No Gallops  Abdomen: Soft, Non distended, Non tender. +Bowel sounds, no HSM  Extremities: No c/c/e  Neurologic:  CN 2-12 gi, Alert and oriented X 3. No acute neurological distress   Psych:   Good insight. Not anxious nor agitated. The heart, lungs and mental status were satisfactory for the administration of MAC sedation and for the procedure. Mallampati score: 2     The patient was counseled at length about the risks of sid Covid-19 in the hemanth-operative and post-operative states including the recovery window of their procedure. The patient was made aware that sid Covid-19 after a surgical procedure may worsen their prognosis for recovering from the virus and lend to a higher morbidity and or mortality risk. The patient was given the options of postponing their procedure. All of the risks, benefits, and alternatives were discussed. The patient does wish to proceed with the procedure.       Assessment:   · Screening colonoscopy    Plan:   · Endoscopic procedure :colonoscopy  · MAC sedation

## 2020-11-20 NOTE — ROUTINE PROCESS
Sharla Keller 1975 
[id number] Situation: 
Verbal report received from: RN Yanelis Bowie Procedure: Procedure(s): 
. COLONOSCOPY  :- 
ENDOSCOPIC POLYPECTOMY Background: 
 
Preoperative diagnosis: SCREENING Postoperative diagnosis: colon polyps :  Dr. Annel Rice Assistant(s): Endoscopy Technician-1: Martín Osuna Endoscopy RN-1: Nicola Tompkins RN Specimens:  
ID Type Source Tests Collected by Time Destination 1 : ascending colon polyp Preservative Colon, Ascending  Barbara Hopper MD 11/20/2020 1439 Pathology 2 : sigmoid colon polyp Preservative Sigmoid  Barbara Hopper MD 11/20/2020 1451 Pathology H. Pylori  no Assessment: 
Intra-procedure medications Anesthesia gave intra-procedure sedation and medications, see anesthesia flow sheet yes Intravenous fluids: 350 NS @ Theone Yaw Vital signs stable yes Abdominal assessment: round and soft yes Recommendation: 
Discharge patient per MD order yes. Return to floor no Family or Friend : mother Permission to share finding with family or friend yes

## 2020-11-20 NOTE — DISCHARGE INSTRUCTIONS
[address] [facility].  [address] [facility] [address], [address]  [facility] Theresa  [id number]  1975    It was my pleasure seeing you for your procedure. You will also receive a summary report with the findings from this procedure and any further recommendations. If you had polyps removed or biopsies taken during your procedure, you will receive a separate letter from me within the next 2 weeks. If you don't receive this letter or if you have any questions, please call my office [phone]. Please take note of the post procedure instructions listed below. Dr. Dhruv Thibodeaux    These instructions give you information on caring for yourself after your procedure. Call your doctor if you have any problems or questions after your procedure. HOME CARE  · Walk if you have belly cramping or gas. Walking will help get rid of the air and reduce the bloated feeling in your belly (abdomen). · Your IV site (where you received drugs) may be tender to touch. Place warm towels on the site; keep your arm up on two pillows if you have any swelling or soreness in the area. · You may shower. ACTIVITY:  · Take frequent rest periods and move at a slower pace for the next 24 hours. .  · You may resume your regular activity tomorrow if you are feeling back to normal.  · Do not drive or ride a bicycle for at least 24 hours (because of the medicine (anesthesia) used during the test). · Do not sign any important legal documents or use or operate any machinery for 24 hours  · Do not take sleeping medicines/nerve drugs for 24 hours unless the doctor tells you. · You can return to work/school tomorrow unless otherwise instructed. NUTRITION:  · Drink plenty of fluids to keep your pee (urine) clear or pale yellow  · Begin with a light meal and progress to your normal diet.  Heavy or fried foods are harder to digest and may make you feel sick to your stomach (nauseated). · Once you are feeling back to normal, you may resume your normal diet as instructed by your doctor. · Avoid alcoholic beverages for 24 hours or as instructed. IF YOU HAD BIOPSIES TAKEN OR POLYPS REMOVED DURING THE PROCEDURE:  · For the next 7 days, avoid all non-steroidal antiinflammatory medications such as Ibuprofen, Motrin, Advil, Alleve, Cecilia-seltzer, Goody's powder, BC powder. · If you do not have an heart condition that requires you to take a daily aspirin, you should avoid taking aspirin for 7 days. · Eat a soft diet for 24 hours. · Monitor your stools for any blood or dark black, tar-like, stools as this may be a sign of bleeding and if you see any blood, notify your doctor immediately. GET HELP RIGHT AWAY AND SEEK IMMEDIATE MEDICAL CARE IF:  · You have more than a spotting of blood in your stool. · You pass clumps of tissue (blood clots) or fill the toilet with blood. · Your belly is painfully swollen or puffy (abdominal distention). · You throw up (vomit). · You have a fever. · You have redness, pain or swelling at the IV site that last greater than two days. · You have abdominal pain or discomfort that is severe or gets worse throughout the day. Post-procedure diagnosis:  colon polyps    Post-procedure recommendations:  Findings:   Rectum: normal  Sigmoid: One 10 mm sessile polyp, removed with hot snare  Descending Colon: normal  Transverse Colon: normal  Ascending Colon: One 7 mm sessile polyp, removed with hot snare  Cecum: normal    Recommendations:   - Await pathology. You should receive a letter within 2 weeks. - Resume normal medications.  - Recommend repeat colonoscopy in 3 years with trilyte prep. Learning About Coronavirus [phone])  Coronavirus [phone]): Overview  What is coronavirus (COVID-19)? The coronavirus disease (COVID-19) is caused by a virus.  It is an illness that was first found in [place], [place], in December 2019. It has since spread worldwide. The virus can cause fever, cough, and trouble breathing. In severe cases, it can cause pneumonia and make it hard to breathe without help. It can cause death. Coronaviruses are a large group of viruses. They cause the common cold. They also cause more serious illnesses like Middle East respiratory syndrome (MERS) and severe acute respiratory syndrome (SARS). COVID-19 is caused by a novel coronavirus. That means it's a new type that has not been seen in people before. This virus spreads person-to-person through droplets from coughing and sneezing. It can also spread when you are close to someone who is infected. And it can spread when you touch something that has the virus on it, such as a doorknob or a tabletop. What can you do to protect yourself from coronavirus (COVID-19)? The best way to protect yourself from getting sick is to:  · Avoid areas where there is an outbreak. · Avoid contact with people who may be infected. · Wash your hands often with soap or alcohol-based hand sanitizers. · Avoid crowds and try to stay at least 6 feet away from other people. · Wash your hands often, especially after you cough or sneeze. Use soap and water, and scrub for at least 20 seconds. If soap and water aren't available, use an alcohol-based hand . · Avoid touching your mouth, nose, and eyes. What can you do to avoid spreading the virus to others? To help avoid spreading the virus to others:  · Cover your mouth with a tissue when you cough or sneeze. Then throw the tissue in the trash. · Use a disinfectant to clean things that you touch often. · Stay home if you are sick or have been exposed to the virus. Don't go to school, work, or public areas. And don't use public transportation. · If you are sick:  ? Leave your home only if you need to get medical care. But call the doctor's office first so they know you're coming.  And wear a face mask, if you have one.  ? If you have a face mask, wear it whenever you're around other people. It can help stop the spread of the virus when you cough or sneeze. ? Clean and disinfect your home every day. Use household  and disinfectant wipes or sprays. Take special care to clean things that you grab with your hands. These include doorknobs, remote controls, phones, and handles on your refrigerator and microwave. And don't forget countertops, tabletops, bathrooms, and computer keyboards. When to call for help  Call 911 anytime you think you may need emergency care. For example, call if:  · You have severe trouble breathing. (You can't talk at all.)  · You have constant chest pain or pressure. · You are severely dizzy or lightheaded. · You are confused or can't think clearly. · Your face and lips have a blue color. · You pass out (lose consciousness) or are very hard to wake up. Call your doctor now if you develop symptoms such as:  · Shortness of breath. · Fever. · Cough. If you need to get care, call ahead to the doctor's office for instructions before you go. Make sure you wear a face mask, if you have one, to prevent exposing other people to the virus. Where can you get the latest information? The following health organizations are tracking and studying this virus. Their websites contain the most up-to-date information. Christina Borjas also learn what to do if you think you may have been exposed to the virus. · U.S. Centers for Disease Control and Prevention (CDC): The CDC provides updated news about the disease and travel advice. The website also tells you how to prevent the spread of infection. www.cdc.gov  · World Health Organization [facility]): WHO offers information about the virus outbreaks. WHO also has travel advice. www.who.int  Current as of: April 1, 2020               Content Version: 12.4  © 9920-4866 Healthwise, Incorporated.    Care instructions adapted under license by your healthcare professional. If you have questions about a medical condition or this instruction, always ask your healthcare professional. [place] [address] any warranty or liability for your use of this information.

## 2020-11-20 NOTE — PROCEDURES
[address] [facility].  [address] [place] [address] [address], [address]  [phone]                              Colonoscopy Procedure Note      Indications:  Screening colonoscopy, first procedure. Family history colon cancer     :  Neida David MD    Staff: Endoscopy Technician-1: Joceline Samaniego  Endoscopy RN-1: Ramila Bonilla RN    Referring Provider: Carolyn Killian NP    Sedation:  MAC anesthesia    Procedure Details:  After informed consent was obtained with all risks and benefits of procedure explained and preoperative exam completed, the patient was taken to the endoscopy suite and placed in the left lateral decubitus position. Upon sequential sedation as per above, a digital rectal exam was performed per below. The Olympus videocolonoscope was inserted in the rectum and carefully advanced to the cecum, which was identified by the ileocecal valve and appendiceal orifice. The quality of preparation was fair. [place] Bowel Prep Score :2/2/2. The colonoscope was slowly withdrawn with careful evaluation between folds. Retroflexion in the rectum was performed. Findings:   Rectum: normal aside from small external hemorrhoids  Sigmoid: One 10 mm sessile polyp, removed with hot snare  Descending Colon: normal  Transverse Colon: normal  Ascending Colon: One 7 mm sessile polyp, removed with hot snare  Cecum: normal    Interventions:  polypectomy     Specimen Removed:    ID Type Source Tests Collected by Time Destination   1 : ascending colon polyp Preservative Colon, Ascending  Becka Bhandari MD 11/20/2020 1439 Pathology   2 : sigmoid colon polyp Preservative Sigmoid  Becka Bhandari MD 11/20/2020 1451 Pathology       Complications: None. EBL:  Minimal     Impression:    See Postoperative diagnosis above    Recommendations:   - Await pathology. You should receive a letter within 2 weeks.    - Resume normal medications.  - Recommend repeat colonoscopy in 3 years with trilyte prep. Discharge Disposition:  Home in the company of a  when able to ambulate.     Latanya Alfaro MD  11/20/2020  3:00 PM

## 2020-11-23 NOTE — ANESTHESIA POSTPROCEDURE EVALUATION
Procedure(s):  . COLONOSCOPY  :-  ENDOSCOPIC POLYPECTOMY. MAC    Anesthesia Post Evaluation        Patient location during evaluation: PACU  Patient participation: complete - patient participated  Level of consciousness: awake and alert  Pain management: adequate  Airway patency: patent  Anesthetic complications: no  Cardiovascular status: acceptable  Respiratory status: acceptable  Hydration status: acceptable  Comments: I have seen and evaluated the patient and is ready for discharge.  Luciano Hargrove MD    Post anesthesia nausea and vomiting:  none      INITIAL Post-op Vital signs:   Vitals Value Taken Time   /83 11/20/2020  3:20 PM   Temp     Pulse 103 11/20/2020  3:20 PM   Resp 18 11/20/2020  3:20 PM   SpO2 98 % 11/20/2020  3:20 PM

## 2020-12-11 ENCOUNTER — HOSPITAL ENCOUNTER (OUTPATIENT)
Dept: MAMMOGRAPHY | Age: 45
Discharge: HOME | End: 2020-12-11
Payer: MEDICAID

## 2020-12-11 DIAGNOSIS — Z12.31 SCREENING MAMMOGRAM FOR HIGH-RISK PATIENT: ICD-10-CM

## 2020-12-11 PROCEDURE — 77067 SCR MAMMO BI INCL CAD: CPT

## 2021-11-17 ENCOUNTER — TRANSCRIBE ORDER (OUTPATIENT)
Dept: SCHEDULING | Age: 46
End: 2021-11-17

## 2021-11-17 DIAGNOSIS — Z12.31 SCREENING MAMMOGRAM FOR HIGH-RISK PATIENT: Primary | ICD-10-CM

## 2021-12-13 ENCOUNTER — HOSPITAL ENCOUNTER (OUTPATIENT)
Dept: MAMMOGRAPHY | Age: 46
Discharge: HOME | End: 2021-12-13
Payer: MEDICAID

## 2021-12-13 DIAGNOSIS — Z12.31 SCREENING MAMMOGRAM FOR HIGH-RISK PATIENT: ICD-10-CM

## 2021-12-13 PROCEDURE — 77067 SCR MAMMO BI INCL CAD: CPT

## 2022-03-19 PROBLEM — L72.3 INFECTED SEBACEOUS CYST: Status: ACTIVE | Noted: 2019-04-10

## 2022-03-19 PROBLEM — L08.9 INFECTED SEBACEOUS CYST: Status: ACTIVE | Noted: 2019-04-10

## 2022-03-19 PROBLEM — E66.01 OBESITY, MORBID (HCC): Status: ACTIVE | Noted: 2019-04-10

## 2022-05-04 ENCOUNTER — APPOINTMENT (OUTPATIENT)
Dept: CT IMAGING | Age: 47
End: 2022-05-04
Attending: FAMILY MEDICINE
Payer: MEDICAID

## 2022-05-04 ENCOUNTER — APPOINTMENT (OUTPATIENT)
Dept: GENERAL RADIOLOGY | Age: 47
End: 2022-05-04
Attending: FAMILY MEDICINE
Payer: MEDICAID

## 2022-05-04 ENCOUNTER — HOSPITAL ENCOUNTER (EMERGENCY)
Age: 47
Discharge: HOME | End: 2022-05-04
Attending: EMERGENCY MEDICINE
Payer: MEDICAID

## 2022-05-04 VITALS
BODY MASS INDEX: 38.28 KG/M2 | HEART RATE: 95 BPM | OXYGEN SATURATION: 99 % | WEIGHT: 216.05 LBS | RESPIRATION RATE: 18 BRPM | SYSTOLIC BLOOD PRESSURE: 133 MMHG | TEMPERATURE: 97 F | HEIGHT: 63 IN | DIASTOLIC BLOOD PRESSURE: 82 MMHG

## 2022-05-04 DIAGNOSIS — E87.6 HYPOKALEMIA: Primary | ICD-10-CM

## 2022-05-04 DIAGNOSIS — M50.30 DEGENERATIVE DISC DISEASE, CERVICAL: ICD-10-CM

## 2022-05-04 LAB
ALBUMIN SERPL-MCNC: 3.7 G/DL (ref 3.5–5)
ALBUMIN/GLOB SERPL: 0.8 {RATIO} (ref 1.1–2.2)
ALP SERPL-CCNC: 87 U/L (ref 45–117)
ALT SERPL-CCNC: 24 U/L (ref 12–78)
ANION GAP SERPL CALC-SCNC: 7 MMOL/L (ref 5–15)
AST SERPL-CCNC: 22 U/L (ref 15–37)
ATRIAL RATE: 104 BPM
BASOPHILS # BLD: 0.1 K/UL (ref 0–0.1)
BASOPHILS NFR BLD: 1 % (ref 0–1)
BILIRUB SERPL-MCNC: 0.5 MG/DL (ref 0.2–1)
BUN SERPL-MCNC: 11 MG/DL (ref 6–20)
BUN/CREAT SERPL: 14 (ref 12–20)
CALCIUM SERPL-MCNC: 9.2 MG/DL (ref 8.5–10.1)
CALCULATED P AXIS, ECG09: 44 DEGREES
CALCULATED R AXIS, ECG10: -30 DEGREES
CALCULATED T AXIS, ECG11: 45 DEGREES
CHLORIDE SERPL-SCNC: 106 MMOL/L (ref 97–108)
CO2 SERPL-SCNC: 27 MMOL/L (ref 21–32)
CREAT SERPL-MCNC: 0.79 MG/DL (ref 0.55–1.02)
DIAGNOSIS, 93000: NORMAL
DIFFERENTIAL METHOD BLD: NORMAL
EOSINOPHIL # BLD: 0.2 K/UL (ref 0–0.4)
EOSINOPHIL NFR BLD: 3 % (ref 0–7)
ERYTHROCYTE [DISTWIDTH] IN BLOOD BY AUTOMATED COUNT: 14.1 % (ref 11.5–14.5)
GLOBULIN SER CALC-MCNC: 4.5 G/DL (ref 2–4)
GLUCOSE SERPL-MCNC: 126 MG/DL (ref 65–100)
HCT VFR BLD AUTO: 37.9 % (ref 35–47)
HGB BLD-MCNC: 12.3 G/DL (ref 11.5–16)
IMM GRANULOCYTES # BLD AUTO: 0 K/UL (ref 0–0.04)
IMM GRANULOCYTES NFR BLD AUTO: 0 % (ref 0–0.5)
LIPASE SERPL-CCNC: 118 U/L (ref 73–393)
LYMPHOCYTES # BLD: 2.6 K/UL (ref 0.8–3.5)
LYMPHOCYTES NFR BLD: 42 % (ref 12–49)
MCH RBC QN AUTO: 27.3 PG (ref 26–34)
MCHC RBC AUTO-ENTMCNC: 32.5 G/DL (ref 30–36.5)
MCV RBC AUTO: 84.2 FL (ref 80–99)
MONOCYTES # BLD: 0.3 K/UL (ref 0–1)
MONOCYTES NFR BLD: 5 % (ref 5–13)
NEUTS SEG # BLD: 3.1 K/UL (ref 1.8–8)
NEUTS SEG NFR BLD: 49 % (ref 32–75)
NRBC # BLD: 0 K/UL (ref 0–0.01)
NRBC BLD-RTO: 0 PER 100 WBC
P-R INTERVAL, ECG05: 120 MS
PLATELET # BLD AUTO: 348 K/UL (ref 150–400)
PMV BLD AUTO: 9.2 FL (ref 8.9–12.9)
POTASSIUM SERPL-SCNC: 2.9 MMOL/L (ref 3.5–5.1)
PROT SERPL-MCNC: 8.2 G/DL (ref 6.4–8.2)
Q-T INTERVAL, ECG07: 356 MS
QRS DURATION, ECG06: 68 MS
QTC CALCULATION (BEZET), ECG08: 468 MS
RBC # BLD AUTO: 4.5 M/UL (ref 3.8–5.2)
SODIUM SERPL-SCNC: 140 MMOL/L (ref 136–145)
TROPONIN-HIGH SENSITIVITY: 4 NG/L (ref 0–51)
VENTRICULAR RATE, ECG03: 104 BPM
WBC # BLD AUTO: 6.2 K/UL (ref 3.6–11)

## 2022-05-04 PROCEDURE — 84484 ASSAY OF TROPONIN QUANT: CPT

## 2022-05-04 PROCEDURE — 96374 THER/PROPH/DIAG INJ IV PUSH: CPT

## 2022-05-04 PROCEDURE — 72125 CT NECK SPINE W/O DYE: CPT

## 2022-05-04 PROCEDURE — 74011250636 HC RX REV CODE- 250/636: Performed by: FAMILY MEDICINE

## 2022-05-04 PROCEDURE — 83690 ASSAY OF LIPASE: CPT

## 2022-05-04 PROCEDURE — 36415 COLL VENOUS BLD VENIPUNCTURE: CPT

## 2022-05-04 PROCEDURE — 99285 EMERGENCY DEPT VISIT HI MDM: CPT

## 2022-05-04 PROCEDURE — 85025 COMPLETE CBC W/AUTO DIFF WBC: CPT

## 2022-05-04 PROCEDURE — 71046 X-RAY EXAM CHEST 2 VIEWS: CPT

## 2022-05-04 PROCEDURE — 80053 COMPREHEN METABOLIC PANEL: CPT

## 2022-05-04 PROCEDURE — 74011250637 HC RX REV CODE- 250/637: Performed by: FAMILY MEDICINE

## 2022-05-04 PROCEDURE — 93005 ELECTROCARDIOGRAM TRACING: CPT

## 2022-05-04 RX ORDER — CYCLOBENZAPRINE HCL 10 MG
10 TABLET ORAL
Qty: 9 TABLET | Refills: 0 | Status: SHIPPED | OUTPATIENT
Start: 2022-05-04 | End: 2022-05-08

## 2022-05-04 RX ORDER — CYCLOBENZAPRINE HCL 10 MG
10 TABLET ORAL
Status: COMPLETED | OUTPATIENT
Start: 2022-05-04 | End: 2022-05-04

## 2022-05-04 RX ORDER — POTASSIUM CHLORIDE 750 MG/1
40 TABLET, FILM COATED, EXTENDED RELEASE ORAL
Status: COMPLETED | OUTPATIENT
Start: 2022-05-04 | End: 2022-05-04

## 2022-05-04 RX ORDER — KETOROLAC TROMETHAMINE 30 MG/ML
15 INJECTION, SOLUTION INTRAMUSCULAR; INTRAVENOUS
Status: COMPLETED | OUTPATIENT
Start: 2022-05-04 | End: 2022-05-04

## 2022-05-04 RX ADMIN — KETOROLAC TROMETHAMINE 15 MG: 30 INJECTION, SOLUTION INTRAMUSCULAR at 12:41

## 2022-05-04 RX ADMIN — CYCLOBENZAPRINE 10 MG: 10 TABLET, FILM COATED ORAL at 12:41

## 2022-05-04 RX ADMIN — POTASSIUM CHLORIDE 40 MEQ: 750 TABLET, EXTENDED RELEASE ORAL at 13:40

## 2022-05-04 NOTE — DISCHARGE INSTRUCTIONS
Thank you for allowing us to provide you with medical care today. We realize that you have many choices for your emergency care needs. We thank you for choosing [facility]. Please choose us in the future for any continued health care needs. The exam and treatment you received in the emergency department were for an emergent problem and are not intended as complete care. It is important that you follow-up with a doctor. If your symptoms worsen or you do not improve should return to the emergency department. We are available 24 hours a day. Please make an appointment with your health care provider for follow-up of your emergency department visit. Take this sheet with you when you go to your follow-up visit.
88

## 2022-05-04 NOTE — Clinical Note
[address]  [address] [facility] [address]  [phone]    Work/School Note    Date: 5/4/2022    To Whom It May concern:      Samm Magaña was seen and treated today in the emergency room by the following provider(s):  Attending Provider: Carolyn Mark MD  Nurse Practitioner: Sanchez Cramer NP. Samm Magaña is excused from work/school on 05/04/22. She is clear to return to work/school on 05/05/22.         Sincerely,          Loree Kim NP

## 2022-05-04 NOTE — ED PROVIDER NOTES
Patient is a 40-year-old female with past medical history of anxiety, depression, diabetes, hypertension who presents for evaluation of left-sided jaw pain and left arm pain. She reports that she noticed her jaw pain towards the end of March and felt that her tooth was hurting her. [facility] and they were unable to find a cause for her symptoms. Over the past 2 weeks, her jaw pain has resolved. She however reports that she has noted some left-sided arm pain that began a few weeks ago. She denies any weakness of the extremity. She additionally felt that yesterday she was diaphoretic and was unsure if she had a fever. [facility] went to [facility] and got a rapid [facility] test, which was negative. She does endorse that she does heavy lifting at work frequently and she is unsure if this is causing her pain. She denies any chest pain or shortness of breath. She tried acetaminophen last night for her symptoms with no relief. Past Medical History:   Diagnosis Date    Anxiety     Depression     ANXIETY & DEPRESSION    Diabetes ([address]     TYPE II    Hypertension     Infected sebaceous cyst, upper back 4/10/2019    Panic attack     Suicidal thoughts     Vision decreased        Past Surgical History:   Procedure Laterality Date    COLONOSCOPY N/A 11/20/2020    . COLONOSCOPY  :- [facility] by [facility] at [facility] ENDOSCOPY    HX OTHER SURGICAL  06/03/2019     Excision of large sebaceous cyst in the midline upper back and with packing- [facility]-DR. Tej Quiroz         Family History:   Problem Relation Age of Onset    Diabetes Mother     Hypertension Mother     Cancer Father         LUNG [employer]. [employer]       Social History     Socioeconomic History    Marital status: SINGLE     Spouse name: Not on file    Number of children: Not on file    Years of education: Not on file    Highest education level: Not on file   Occupational History    Not on file   Tobacco Use    Smoking status: Never Smoker    Smokeless tobacco: Never Used    Tobacco comment: n/a never smoked tobacco   Substance and Sexual Activity    Alcohol use: No    Drug use: No    Sexual activity: Yes     Partners: Male     Birth control/protection: None   Other Topics Concern    Not on file   Social History Narrative    Not on file     Social Determinants of Health     Financial Resource Strain:     Difficulty of Paying Living Expenses: Not on file   Food Insecurity:     Worried About Running Out of Food in the Last Year: Not on file    James[place] of Food in the Last Year: Not on file   Transportation Needs:     Lack of Transportation (Medical): Not on file    Lack of Transportation (Non-Medical): Not on file   Physical Activity:     Days of Exercise per Week: Not on file    Minutes of Exercise per Session: Not on file   Stress:     Feeling of Stress : Not on file   Social Connections:     Frequency of Communication with Friends and Family: Not on file    Frequency of Social Gatherings with Friends and Family: Not on file    Attends Islam Services: Not on file    Active Member of [address] [place] [address] or Organizations: Not on file    Attends Club or Organization Meetings: Not on file    Marital Status: Not on file   Intimate Partner Violence:     Fear of Current or Ex-Partner: Not on file    Emotionally Abused: Not on file    Physically Abused: Not on file    Sexually Abused: Not on file   Housing Stability:     Unable to Pay for Housing in the Last Year: Not on file    Number of [place] in the Last Year: Not on file    Unstable Housing in the Last Year: Not on file         ALLERGIES: Lisinopril    Review of Systems   Constitutional: Negative for unexpected weight change. HENT: Negative for congestion. Eyes: Negative for visual disturbance. Respiratory: Negative for cough, chest tightness and shortness of breath. Cardiovascular: Negative for chest pain and leg swelling.    Gastrointestinal: Negative for abdominal pain, nausea and vomiting. Endocrine: Negative for polyuria. Genitourinary: Negative for dysuria and flank pain. Musculoskeletal: Positive for neck pain. Negative for back pain and neck stiffness. Skin: Negative for color change. Allergic/Immunologic: Negative for immunocompromised state. Neurological: Negative for dizziness and headaches. Hematological: Negative for adenopathy. Psychiatric/Behavioral: Negative for agitation. Vitals:    05/04/22 1119   BP: 133/82   Pulse: 95   Resp: 18   Temp: 97 °F (36.1 °C)   SpO2: 99%   Weight: 98 kg (216 lb 0.8 oz)   Height: 5' 3\" (1.6 m)            Physical Exam  Vitals and nursing note reviewed. Constitutional:       Appearance: Normal appearance. She is normal weight. HENT:      Head: Atraumatic. Jaw: There is normal jaw occlusion. No tenderness or pain on movement. Mouth/Throat:      Mouth: Mucous membranes are moist.      Dentition: No gingival swelling or dental abscesses. Pharynx: Oropharynx is clear. Uvula midline. Tonsils: No tonsillar exudate or tonsillar abscesses. Eyes:      Conjunctiva/sclera: Conjunctivae normal.      Pupils: Pupils are equal, round, and reactive to light. Neck:      Comments: C-spine tenderness to palpation, no step-offs noted, no skin abnormality noted  Cardiovascular:      Rate and Rhythm: Normal rate and regular rhythm. Pulses: Normal pulses. Heart sounds: Normal heart sounds. Pulmonary:      Effort: Pulmonary effort is normal. No respiratory distress. Breath sounds: Normal breath sounds. Abdominal:      General: Abdomen is flat. Bowel sounds are normal.      Tenderness: There is no abdominal tenderness. Musculoskeletal:         General: Normal range of motion. Cervical back: Normal range of motion and neck supple. Comments: 5 out of 5  strength to bilateral upper extremities. No obvious swelling or deformity noted to left arm.   No skin abnormality, erythema, heat present. Full range of motion without reproduction of pain. Skin:     General: Skin is warm and dry. Capillary Refill: Capillary refill takes less than 2 seconds. Neurological:      General: No focal deficit present. Mental Status: She is alert and oriented to person, place, and time. Mental status is at baseline. Psychiatric:         Mood and Affect: Mood normal.         Behavior: Behavior normal.          MDM  Number of Diagnoses or Management Options  Degenerative disc disease, cervical  Hypokalemia  Diagnosis management comments: Patient presents for evaluation of jaw pain, which has now resolved, left arm pain. On physical exam, she does have some C-spine tenderness. Suspect that this pain in her arm could be radicular in nature. Will obtain CT of cervical spine for further evaluation. We will additionally obtain cardiac work-up as patient was having jaw pain and is now having left arm pain as this may be a atypical presentation of ACS. Patient is PERC negative based on HR during physical exam.     ED EKG interpretation:1:22 PM  Rhythm: normal sinus rhythm; and regular. Rate (approx.): 101; Axis: normal; P wave: normal; ST/T wave: no concerning ST elevations or depressions; Other findings: unremarkable. EKG has also been evaluated by attending ED physician. Anne-Marie Ponce NP    3500 -significant for hypokalemia, did with potassium. Troponin 4. No need for repeat at this point in time. Remainder of lab work unremarkable. Chest x-ray unremarkable. CT of cervical spine shows severe multilevel chronic degenerative changes. I suspect that this is likely causing her arm pain. As patient is a diabetic, I will not be treating her with oral steroids. We will trial course of oral Flexeril for her discomfort. We will provide her with orthopedic follow-up for further evaluation possibly with MRI.   Additionally advised patient to follow-up with primary care provider for reevaluation of her hypokalemia, to ensure that this is resolving. Discussed my clinical impression(s), any labs and/or radiology results with the patient. I answered any questions and addressed any concerns. Discussed the importance of following up with their primary care physician and/or specialist(s). Discussed signs or symptoms that would warrant return back to the ER for further evaluation. The patient is agreeable with discharge.        Amount and/or Complexity of Data Reviewed  Clinical lab tests: ordered and reviewed  Tests in the radiology section of CPT®: ordered and reviewed    Patient Progress  Patient progress: stable         Procedures

## 2022-05-04 NOTE — ED TRIAGE NOTES
Pt has had intermittent pain down left jaw down into left arm/hand x 2 weeks. +upper back pain.  Denies pain in chest.

## 2022-05-06 ENCOUNTER — HOSPITAL ENCOUNTER (EMERGENCY)
Age: 47
Discharge: HOME | End: 2022-05-06
Attending: EMERGENCY MEDICINE
Payer: MEDICAID

## 2022-05-06 VITALS
SYSTOLIC BLOOD PRESSURE: 139 MMHG | DIASTOLIC BLOOD PRESSURE: 83 MMHG | TEMPERATURE: 97.6 F | RESPIRATION RATE: 16 BRPM | OXYGEN SATURATION: 96 % | HEART RATE: 93 BPM

## 2022-05-06 DIAGNOSIS — F63.3 TRICHOTILLOMANIA: ICD-10-CM

## 2022-05-06 DIAGNOSIS — F41.1 ANXIETY STATE: Primary | ICD-10-CM

## 2022-05-06 PROCEDURE — 90791 PSYCH DIAGNOSTIC EVALUATION: CPT

## 2022-05-06 PROCEDURE — 99283 EMERGENCY DEPT VISIT LOW MDM: CPT

## 2022-05-06 PROCEDURE — 74011250637 HC RX REV CODE- 250/637: Performed by: FAMILY MEDICINE

## 2022-05-06 RX ORDER — HYDROXYZINE 50 MG/1
25 TABLET, FILM COATED ORAL
Qty: 20 TABLET | Refills: 0 | Status: ON HOLD | OUTPATIENT
Start: 2022-05-06 | End: 2022-05-12

## 2022-05-06 RX ORDER — ALPRAZOLAM 0.5 MG/1
0.25 TABLET ORAL
Status: COMPLETED | OUTPATIENT
Start: 2022-05-06 | End: 2022-05-06

## 2022-05-06 RX ADMIN — ALPRAZOLAM 0.25 MG: 0.5 TABLET ORAL at 21:09

## 2022-05-07 NOTE — ED PROVIDER NOTES
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 She reports that today, she has felt extremely anxious. She noted to be having panic attacks where she felt that she was hyperventilating. She notes that she started pulling her hair out today. She has previously done this many years ago, but has not had any episodes recently except for today. She denies any episodes of self-harm. She denies any suicidal ideation or homicidal ideation. She denies any auditory or visual hallucinations. She reports that she does see a therapist.  She has not had any changes to medications, but cannot remember which she is currently taking for anxiety and depression. Past Medical History:   Diagnosis Date    Anxiety     Depression     ANXIETY & DEPRESSION    Diabetes ([facility] [address]     TYPE II    Hypertension     Infected sebaceous cyst, upper back 4/10/2019    Panic attack     Suicidal thoughts     Vision decreased        Past Surgical History:   Procedure Laterality Date    COLONOSCOPY N/A 11/20/2020    . COLONOSCOPY  :- [facility] by [facility] at [facility] ENDOSCOPY    HX OTHER SURGICAL  06/03/2019     Excision of large sebaceous cyst in the midline upper back and with packing- [facility]-DR. Tej Quiroz         Family History:   Problem Relation Age of Onset    Diabetes Mother     Hypertension Mother     Cancer Father         LUNG CA. SMOKER       Social History     Socioeconomic History    Marital status: SINGLE     Spouse name: Not on file    Number of children: Not on file    Years of education: Not on file    Highest education level: Not on file   Occupational History    Not on file   Tobacco Use    Smoking status: Never Smoker    Smokeless tobacco: Never Used    Tobacco comment: n/a never smoked tobacco   Substance and Sexual Activity    Alcohol use: No    Drug use: No    Sexual activity: Yes     Partners: Male     Birth control/protection: None   Other Topics Concern    Not on file   Social History Narrative    Not on file     Social Determinants of Health     Financial Resource Strain:     Difficulty of Paying Living Expenses: Not on file   Food Insecurity:     Worried About Running Out of Food in the Last Year: Not on file    Jameson of Food in the Last Year: Not on file   Transportation Needs:     Lack of Transportation (Medical): Not on file    Lack of Transportation (Non-Medical): Not on file   Physical Activity:     Days of Exercise per Week: Not on file    Minutes of Exercise per Session: Not on file   Stress:     Feeling of Stress : Not on file   Social Connections:     Frequency of Communication with Friends and Family: Not on file    Frequency of Social Gatherings with Friends and Family: Not on file    Attends Alevism Services: Not on file    Active Member of [address] [place] [address] [employer] or Organizations: Not on file    Attends Club or Organization Meetings: Not on file    Marital Status: Not on file   Intimate Partner Violence:     Fear of Current or Ex-Partner: Not on file    Emotionally Abused: Not on file    Physically Abused: Not on file    Sexually Abused: Not on file   Housing Stability:     Unable to Pay for Housing in the Last Year: Not on file    Number of [place] in the Last Year: Not on file    Unstable Housing in the Last Year: Not on file         ALLERGIES: Lisinopril    Review of Systems   Constitutional: Negative for unexpected weight change. HENT: Negative for congestion. Eyes: Negative for visual disturbance. Respiratory: Negative for cough, chest tightness and shortness of breath. Cardiovascular: Negative for chest pain. Gastrointestinal: Negative for abdominal pain. Endocrine: Negative for polyuria. Genitourinary: Negative for dysuria and flank pain. Musculoskeletal: Negative for back pain. Skin: Negative for color change. Allergic/Immunologic: Negative for immunocompromised state. Neurological: Negative for dizziness and headaches. Hematological: Negative for adenopathy. Psychiatric/Behavioral: The patient is nervous/anxious. Vitals:    05/06/22 2027   BP: 139/83   Pulse: 93   Resp: 16   Temp: 97.6 °F (36.4 °C)   SpO2: 96%            Physical Exam  Vitals and nursing note reviewed. Constitutional:       Appearance: Normal appearance. She is normal weight. HENT:      Head: Atraumatic. Eyes:      Conjunctiva/sclera: Conjunctivae normal.      Pupils: Pupils are equal, round, and reactive to light. Cardiovascular:      Rate and Rhythm: Normal rate. Pulmonary:      Effort: Pulmonary effort is normal. No respiratory distress. Abdominal:      General: Abdomen is flat. Musculoskeletal:         General: Normal range of motion. Cervical back: Neck supple. Skin:     General: Skin is warm and dry. Capillary Refill: Capillary refill takes less than 2 seconds. Neurological:      General: No focal deficit present. Mental Status: She is alert and oriented to person, place, and time. Mental status is at baseline. Psychiatric:         Mood and Affect: Mood is anxious. Affect is tearful. Thought Content: Thought content is not paranoid or delusional. Thought content does not include homicidal or suicidal ideation. Thought content does not include homicidal or suicidal plan. MDM  Number of Diagnoses or Management Options  Anxiety state  Trichotillomania  Diagnosis management comments: Patient presents with anxiety, trichotillomania. She is not suicidal or homicidal.  We will treat patient symptomatically with low-dose Xanax here in the emergency department. We will additionally consult bsmart for evaluation. 2138 - Spoke to [facility], patient has therapist and nurse practitioner she can follow up with at the [facility].   Per medication review, I do not see that patient is taking anything for anxiety/depression. Will provide patient with hydroxyzine PRN anxiety/panic attacks. Will defer long term medications to her nurse practitioner. Patient in agreement with plan of care. Discussed my clinical impression(s), any labs and/or radiology results with the patient. I answered any questions and addressed any concerns. Discussed the importance of following up with their primary care physician and/or specialist(s). Discussed signs or symptoms that would warrant return back to the ER for further evaluation. The patient is agreeable with discharge.          Procedures

## 2022-05-07 NOTE — DISCHARGE INSTRUCTIONS
Thank you for allowing us to provide you with medical care today. We realize that you have many choices for your emergency care needs. We thank you for choosing [address] [place] [address]. Please choose us in the future for any continued health care needs. The exam and treatment you received in the emergency department were for an emergent problem and are not intended as complete care. It is important that you follow-up with a doctor. If your symptoms worsen or you do not improve should return to the emergency department. We are available 24 hours a day. Please make an appointment with your health care provider for follow-up of your emergency department visit. Take this sheet with you when you go to your follow-up visit.

## 2022-05-07 NOTE — BSMART NOTE
BSMART evaluation complete; Rec referrals for outpatient. NP Mare Handler agrees with disposition. BSMART assessment completed, and suicide risk level noted to be low risk. Primary Nurse Yue Delarosa and Charge Nurse Konstantin Santamaria notified. Concerns observed by patient presents as anxious, with a nervous tic where she screams and pulls her hair and bonnet off of her head. Patient reports she is stressed out from work; mother is with patient at bedside. Security/Off- has not been notified.

## 2022-05-07 NOTE — BSMART NOTE
Comprehensive Assessment Form Part 1      Section I - Disposition    Axis I - Generalized Anxiety Disorder with panic attacks    Past Medical History:   Diagnosis Date    Anxiety     Depression     ANXIETY & DEPRESSION    Diabetes ([facility] [address]     TYPE II    Hypertension     Infected sebaceous cyst, upper back 4/10/2019    Panic attack     Suicidal thoughts     Vision decreased        The Medical Doctor to Psychiatrist conference was not completed. The Medical Provider, JEREMI Styles, is in agreement with BSMART. The plan is to discharge with referrals. The on-call Psychiatrist consulted was Dr. Wilmer Irene. The admitting Psychiatrist will be Dr. Wilmer Irene. The admitting Diagnosis is n/a. The Payor source is [facility] MEDICAID/[place] [facility]. 1. This writer reviewed the [address] in nursing flowsheet and the risk level assigned is no risk. 2. Based on this assessment, the risk of suicide is at no risk. The plan is discharge with referrals to outpatient treatment. Section II - Integrated Summary  Summary:  Patient is a 48year old female presenting to the ED as a [address] arriving by car with mother, Wilmer Diaz due to increasing anxiety symptoms with panic attacks. Patient was evaluated face to face by [facility]; Patient denies HI/SI and A/V hallucinations. Patient  Is reporting feeling extreme stressed due to work and states she had a panic attack earlier today. Patient is seeking medication. Patient reports that she is currently taking medication for anxiety but does not remember the name of the medication stating, \"it begins with the letter C.\"  Patient reports she had a previous episode presenting similarly two years ago. At that time, she said medication and therapy sessions \"helped the symptoms go away. \"  Patient is currently in the care of a psychiatrist with [facility], Malaika Dang, but is not currently engaged in treatment with an outpatient counselor.   Patient states she skipped a few doses this week of her anxiety medication but she usually takes it as prescribed. Patient reports a hx dx of depression and anxiety. Patient reports a hx of inpatient treatment in the past for depression and anxiety a few years ago. Patient denies SA and alcohol use. Patient denies hx of suicide attempts. Patient reports that she is sleeping well but recently has not slept well this week for two nights due to pain in her shoulder. Patient denies appetite disturbances. Patient denies current pending legal charges. Patient's mother is part of her support system and has patient's information of previous treatment providers and was able to provide some additional hx. At this time, patient does not meet criteria for inpatient hospitalization. Patient has an outpatient provider for psychiatry with the [facility]. Her next appointment in- person is May 19th which she plans on attending. According to the CSSRS, patient is at no risk of suicide. Patient agrees with the recommendation to start outpatient therapy sessions again and was given additional referrals, but states she would like to start therapy again with her previous therapist that is with the [facility]; Ronny Min, [address] [facility]. Patient was also provided info to [facility] and [facility]. for Same Day Access services if needed. Patient state she was also seeking medication to assist with her symptoms of tics and hair pulling. Consulted with ED provider, JEREMI Gunn, who agrees with disposition to discharge with referrals and will follow up with patient concerning medication management. The patienthas demonstrated mental capacity to provide informed consent. The information is given by the patient and caregiver / friend. The Chief Complaint is anxiety and pulling her hair. The Precipitant Factors are stress from work.   Previous Hospitalizations: Yes with [facility] in the past  The patient has not previously been in restraints. Current Psychiatrist and/or  is the [facility]- Dr Bruna Lazar. Lethality Assessment:    The potential for suicide noted by the following: n/a . The potential for homicide is not noted. The patient has not been a perpetrator of sexual or physical abuse. There are not pending charges. The patient is not felt to be at risk for self harm or harm to others. The attending nurse was advised patient is not at risk of suicide and needs no precautions. Section III - Psychosocial  The patient's overall mood and attitude is anxious. Feelings of helplessness and hopelessness are not observed. Generalized anxiety is observed by patient having outbursts with rapid random movements with hair pulling; short rapid tic- like movements. Panic is not observed. Phobias are not observed. Obsessive compulsive tendencies are not observed. Section IV - Mental Status Exam  The patient's appearance shows no evidence of impairment. The patient's behavior is restless. The patient is oriented to time, place, person and situation. The patient's speech is soft. The patient's mood is anxious. The range of affect is constricted. The patient's thought content demonstrates no evidence of impairment. The thought process shows no evidence of impairment. The patient's perception shows no evidence of impairment. The patient's memory shows no evidence of impairment. The patient's appetite shows no evidence of impairment. The patient's sleep shows no evidence of impairment. The patient's insight shows no evidence of impairment. The patient's judgement shows no evidence of impairment. Section V - Substance Abuse  The patient is not using substances. The patient is not using. The patient has experienced the following withdrawal symptoms: N/A. Section VI - Living Arrangements  The patient is single. The patient lives alone. The patient has no children.   The patient does plan to return home upon discharge. The patient does not have legal issues pending. The patient's source of income comes from employment. Church and cultural practices have not been voiced at this time. The patient's greatest support comes from mother and this person will be involved with the treatment. The patient has not been in an event described as horrible or outside the realm of ordinary life experience either currently or in the past.  The patient has not been a victim of sexual/physical abuse. Section VII - Other Areas of Clinical Concern  The highest grade achieved is high school with the overall quality of school experience being described as normal.  The patient is currently employed and speaks [place] as a primary language. The patient has no communication impairments affecting communication. The patient's preference for learning can be described as: can read and write adequately.   The patient's hearing is normal.  The patient's vision is normal.      Jorge L Ibrahim MA, Resident in counseling

## 2022-05-08 ENCOUNTER — HOSPITAL ENCOUNTER (EMERGENCY)
Age: 47
Discharge: HOME | End: 2022-05-08
Attending: EMERGENCY MEDICINE
Payer: MEDICAID

## 2022-05-08 VITALS
SYSTOLIC BLOOD PRESSURE: 118 MMHG | HEART RATE: 86 BPM | DIASTOLIC BLOOD PRESSURE: 85 MMHG | HEIGHT: 63 IN | WEIGHT: 216 LBS | TEMPERATURE: 97.4 F | RESPIRATION RATE: 17 BRPM | OXYGEN SATURATION: 98 % | BODY MASS INDEX: 38.27 KG/M2

## 2022-05-08 DIAGNOSIS — F41.0 PANIC ATTACK: ICD-10-CM

## 2022-05-08 DIAGNOSIS — F41.1 ANXIETY STATE: Primary | ICD-10-CM

## 2022-05-08 PROCEDURE — 90791 PSYCH DIAGNOSTIC EVALUATION: CPT

## 2022-05-08 PROCEDURE — 74011250637 HC RX REV CODE- 250/637: Performed by: EMERGENCY MEDICINE

## 2022-05-08 PROCEDURE — 99283 EMERGENCY DEPT VISIT LOW MDM: CPT

## 2022-05-08 RX ORDER — ALPRAZOLAM 0.5 MG/1
0.5 TABLET ORAL
Status: COMPLETED | OUTPATIENT
Start: 2022-05-08 | End: 2022-05-08

## 2022-05-08 RX ADMIN — ALPRAZOLAM 0.5 MG: 0.5 TABLET ORAL at 19:09

## 2022-05-08 NOTE — ED PROVIDER NOTES
39-year-old female history of anxiety, depression, type 2 diabetes, hypertension, panic attacks presents to the emergency department chief complaint of increased anxiety with panic attacks past 3 days. She was seen 2 days ago for similar complaint and given hydroxyzine prescription. She tells me that she has had worsening panic attacks, cannot stop pulling out her hair. She tells me she been having increased stress due to situations at home. No medical complaints. She is supposed to see someone from the [place] [id number]. tomorrow to continue to manage her psychiatric conditions. The history is provided by the patient, medical records and a parent. Panic Attack   This is a recurrent problem. The problem has been rapidly worsening. The problem occurs constantly. The patient is experiencing no pain. Pertinent negatives include no abdominal pain, no cough, no fever, no headaches, no nausea, no shortness of breath, no vomiting and no weakness. Past Medical History:   Diagnosis Date    Anxiety     Depression     ANXIETY & DEPRESSION    Diabetes ([facility] [address]     TYPE II    Hypertension     Infected sebaceous cyst, upper back 4/10/2019    Panic attack     Suicidal thoughts     Vision decreased        Past Surgical History:   Procedure Laterality Date    COLONOSCOPY N/A 11/20/2020    . COLONOSCOPY  :- performed by Shaunna Sandifer, [facility] at [facility] ENDOSCOPY    HX OTHER SURGICAL  06/03/2019     Excision of large sebaceous cyst in the midline upper back and with packing- [facility]-DR. Araseli Davies         Family History:   Problem Relation Age of Onset    Diabetes Mother     Hypertension Mother     Cancer Father         LUNG CA. SMOKER       Social History     Socioeconomic History    Marital status: SINGLE     Spouse name: Not on file    Number of children: Not on file    Years of education: Not on file    Highest education level: Not on file   Occupational History    Not on file   Tobacco Use    Smoking status: Never Smoker    Smokeless tobacco: Never Used    Tobacco comment: n/a never smoked tobacco   Substance and Sexual Activity    Alcohol use: No    Drug use: No    Sexual activity: Yes     Partners: Male     Birth control/protection: None   Other Topics Concern    Not on file   Social History Narrative    Not on file     Social Determinants of Health     Financial Resource Strain:     Difficulty of Paying Living Expenses: Not on file   Food Insecurity:     Worried About Running Out of Food in the Last Year: Not on file    Jameson of Food in the Last Year: Not on file   Transportation Needs:     Lack of Transportation (Medical): Not on file    Lack of Transportation (Non-Medical): Not on file   Physical Activity:     Days of Exercise per Week: Not on file    Minutes of Exercise per Session: Not on file   Stress:     Feeling of Stress : Not on file   Social Connections:     Frequency of Communication with Friends and Family: Not on file    Frequency of Social Gatherings with Friends and Family: Not on file    Attends Yazidism Services: Not on file    Active Member of [address] [place] [address] or Organizations: Not on file    Attends Club or Organization Meetings: Not on file    Marital Status: Not on file   Intimate Partner Violence:     Fear of Current or Ex-Partner: Not on file    Emotionally Abused: Not on file    Physically Abused: Not on file    Sexually Abused: Not on file   Housing Stability:     Unable to Pay for Housing in the Last Year: Not on file    Number of [place] in the Last Year: Not on file    Unstable Housing in the Last Year: Not on file         ALLERGIES: Lisinopril    Review of Systems   Constitutional: Negative for fatigue and fever. HENT: Negative for sneezing and sore throat. Respiratory: Negative for cough and shortness of breath. Cardiovascular: Negative for chest pain and leg swelling. Gastrointestinal: Negative for abdominal pain, diarrhea, nausea and vomiting. Genitourinary: Negative for difficulty urinating and dysuria. Musculoskeletal: Negative for arthralgias and myalgias. Skin: Negative for color change and rash. Neurological: Negative for weakness and headaches. Psychiatric/Behavioral: Negative for agitation and behavioral problems. The patient is nervous/anxious and is hyperactive. Vitals:    05/08/22 1717   BP: 118/85   Pulse: (!) 104   Resp: 17   Temp: 97.4 °F (36.3 °C)   SpO2: 97%   Weight: 98 kg (216 lb)   Height: 5' 3\" (1.6 m)            Physical Exam  Vitals and nursing note reviewed. Constitutional:       General: She is not in acute distress. Appearance: Normal appearance. She is well-developed. She is not ill-appearing, toxic-appearing or diaphoretic. HENT:      Head: Normocephalic and atraumatic. Nose: Nose normal.      Mouth/Throat:      Mouth: Mucous membranes are moist.      Pharynx: Oropharynx is clear. Eyes:      Extraocular Movements: Extraocular movements intact. Conjunctiva/sclera: Conjunctivae normal.      Pupils: Pupils are equal, round, and reactive to light. Cardiovascular:      Rate and Rhythm: Normal rate and regular rhythm. Pulses: Normal pulses. Heart sounds: Normal heart sounds. Pulmonary:      Effort: Pulmonary effort is normal. No respiratory distress. Breath sounds: Normal breath sounds. No wheezing. Chest:      Chest wall: No tenderness. Abdominal:      General: Abdomen is flat. There is no distension. Palpations: Abdomen is soft. Tenderness: There is no abdominal tenderness. There is no guarding or rebound. Musculoskeletal:         General: No swelling, tenderness, deformity or signs of injury. Normal range of motion. Cervical back: Normal range of motion and neck supple. No rigidity. No muscular tenderness. Right lower leg: No edema. Left lower leg: No edema. Skin:     General: Skin is warm and dry.       Capillary Refill: Capillary refill takes less than 2 seconds. Neurological:      General: No focal deficit present. Mental Status: She is alert and oriented to person, place, and time. Psychiatric:         Mood and Affect: Mood is anxious. Behavior: Behavior normal.          MDM  Number of Diagnoses or Management Options  Diagnosis management comments: 78-year-old female presents as above with increased anxiety. She is pending follow-up tomorrow with her behavioral health team.  Evaluated by [facility] while in the emergency department and felt safe to be discharged home.          Procedures

## 2022-05-08 NOTE — BSMART NOTE
Comprehensive Assessment Form Part 1        Section I - Disposition     Axis I - Generalized Anxiety Disorder with panic attacks          Past Medical History:   Diagnosis Date    Anxiety      Depression       ANXIETY & DEPRESSION    Diabetes ([facility] [address]       TYPE II    Hypertension      Infected sebaceous cyst, upper back 4/10/2019    Panic attack      Suicidal thoughts      Vision decreased           The Medical Doctor to Psychiatrist conference was not completed. The Medical Provider, JEREMI Roblero, is in agreement with [facility] because the patient doesn't warrant inpatient care  The plan is to discharge home and follow up with [facility] in the morning. Mom to take her for walk in clinic in the morning. Also contacted [facility] and [facility] to make referrals. The on-call Psychiatrist consulted was   The admitting Psychiatrist will be   The admitting Diagnosis is NA  The Payor source is [facility] MEDICAID/[facility].          Section II - Integrated Summary    Summary:  Patient returns to ER with her mother. Shared that she has continued to feel highly anxious with \"aggressive\" hair pulling. Feels that the hydroxyzine offered little benefit to her \"I took the whole tablet and still nothing\". Shared that she did get some sleep Friday night after leaving ER but has been unable to relax since then. Feels she can't stop her mind from spinning over details. She further shared that she continues to pull out clumps of her hair but feels she can't stop herself from doing this. Patient's mother states that she hasn't seen her like this in a few years. She's been doing well up until Friday when she had an \"incident\" at work. Although she didn't go into details, she did have a panic attack that was witnessed by her coworkers which made her anxiety much worse. Isn't set to return until Tuesday, but suggested to patient she wait until seen by her providers.  Mom further notes she hears her yelling out every so often upstairs, which patient says happens when she panics. Reviewed patient's prior admission hx and noted several previous admission for same that result in her being placed on a combination of SSRI/Benzo/Risperdal. She is currently on Anafranil. Talked about various options with patient including admission to get episode under control. However, suggested that [facility]/mobile crisis supports might help her to talk more about issues and build skills while working with her Psychiatrist is a better option at this time. Denies any SI/HI. Denies any substance use. From 5/6/22: Patient is a 48year old female presenting to the ED as a [address] arriving by car with mother, Devendra Velasquez due to increasing anxiety symptoms with panic attacks. Patient was evaluated face to face by [place]; Patient denies HI/SI and A/V hallucinations. Patient  Is reporting feeling extreme stressed due to work and states she had a panic attack earlier today. Patient is seeking medication. Patient reports that she is currently taking medication for anxiety but does not remember the name of the medication stating, \"it begins with the letter C.\"  Patient reports she had a previous episode presenting similarly two years ago. At that time, she said medication and therapy sessions \"helped the symptoms go away. \"  Patient is currently in the care of a psychiatrist with [facility], but is not currently engaged in treatment with an outpatient counselor. Patient states she skipped a few doses this week of her anxiety medication but she usually takes it as prescribed. Patient reports a hx dx of depression and anxiety. Patient reports a hx of inpatient treatment in the past for depression and anxiety a few years ago. Patient denies SA and alcohol use. Patient denies hx of suicide attempts.   Patient reports that she is sleeping well but recently has not slept well this week for two nights due to pain in her shoulder. Patient denies appetite disturbances. Patient denies current pending legal charges. Patient's mother is part of her support system and has patient's information of previous treatment providers and was able to provide some additional hx.     At this time, patient does not meet criteria for inpatient hospitalization. Patient has an outpatient provider for psychiatry with [facility]. Her next appointment in- person is May 19th which she plans on attending. According to the CSSRS, patient is at no risk of suicide. Patient agrees with the recommendation to start outpatient therapy sessions again and was given additional referrals, but states she would like to start therapy again with her previous therapist that is with the [facility]; [facility]. Patient was also provided info to [facility] and [facility] for Same Day Access services if needed. Patient state she was also seeking medication to assist with her symptoms of tics and hair pulling. The patienthas demonstrated mental capacity to provide informed consent. The information is given by the patient and caregiver / friend. The Chief Complaint is anxiety and pulling her hair. The Precipitant Factors are stress from work. Previous Hospitalizations: Yes with [facility] in the past  The patient has not previously been in restraints. Current Psychiatrist and/or  is [facility]- Dr Fahad Adames.     Lethality Assessment:     The potential for suicide noted by the following: Not noted  The potential for homicide is not noted. The patient has not been a perpetrator of sexual or physical abuse. There are not pending charges. The patient is not felt to be at risk for self harm or harm to others. The attending nurse was advised patient is not at risk of suicide and needs no precautions.     Section III - Psychosocial  The patient's overall mood and attitude is anxious.   Feelings of helplessness and hopelessness are not observed. Generalized anxiety is observed by patient having outbursts with rapid random movements with hair pulling; short rapid tic- like movements. Panic is not observed. Phobias are not observed. Obsessive compulsive tendencies are not observed.       Section IV - Mental Status Exam  The patient's appearance shows no evidence of impairment. The patient's behavior is restless. The patient is oriented to time, place, person and situation. The patient's speech is WNL. The patient's mood is anxious. The range of affect is constricted. The patient's thought content demonstrates no evidence of impairment. The thought process shows no evidence of impairment. The patient's perception shows no evidence of impairment. The patient's memory shows no evidence of impairment. The patient's appetite shows no evidence of impairment. The patient's sleep shows no evidence of impairment. The patient's insight shows no evidence of impairment. The patient's judgement shows no evidence of impairment.           Section V - Substance Abuse  The patient is not using substances. The patient is not using. The patient has experienced the following withdrawal symptoms: N/A.        Section VI - Living Arrangements  The patient is single. The patient lives with her mother. The patient has no children. The patient does plan to return home upon discharge. The patient does not have legal issues pending. The patient's source of income comes from employment. Denominational and cultural practices have not been voiced at this time. The patient's greatest support comes from mother and this person will be involved with the treatment.   The patient has not been in an event described as horrible or outside the realm of ordinary life experience either currently or in the past. The patient has not been a victim of sexual/physical abuse.     Section VII - Other Areas of Clinical Concern  The highest grade achieved is some college with the overall quality of school experience being described as good. The patient is currently employed and speaks [place] as a primary language. The patient has no communication impairments affecting communication. The patient's preference for learning can be described as: can read and write adequately.   The patient's hearing is normal.  The patient's vision is impaired and she wears glasses.     Domo Casillas, [facility]

## 2022-05-08 NOTE — ED TRIAGE NOTES
Pt ambulatory to ED accompanied mother with c/o increased panic attacks over the last 3 days. \"Today has been worse than before. I've been pulling my hair, jerking and making noises that I can't control\". Pt denies SI/HI.

## 2022-05-09 NOTE — DISCHARGE INSTRUCTIONS
Thank you for allowing us to provide you with medical care today. We realize that you have many choices for your emergency care needs. We thank you for choosing [facility]. Please choose us in the future for any continued health care needs. We hope we addressed all of your medical concerns. We strive to provide excellent quality care in the Emergency Department. Anything less than excellent does not meet our expectations. The exam and treatment you received in the Emergency Department were for an emergent problem and are not intended as complete care. It is important that you follow up with a doctor, nurse practitioner, or physician's assistant for ongoing care. If your symptoms worsen or you do not improve as expected and you are unable to reach your usual health care provider, you should return to the Emergency Department. We are available 24 hours a day. Take this sheet with you when you go to your follow-up visit. If you have any problem arranging the follow-up visit, contact the Emergency Department immediately. Make an appointment your family doctor for follow up of this visit. Return to the ER if you are unable to be seen in a timely manner.

## 2022-05-11 ENCOUNTER — HOSPITAL ENCOUNTER (INPATIENT)
Age: 47
LOS: 5 days | Discharge: HOME | DRG: 751 | End: 2022-05-16
Attending: EMERGENCY MEDICINE | Admitting: PSYCHIATRY & NEUROLOGY
Payer: MEDICAID

## 2022-05-11 DIAGNOSIS — Z00.8 MEDICAL CLEARANCE FOR PSYCHIATRIC ADMISSION: ICD-10-CM

## 2022-05-11 DIAGNOSIS — F33.1 MODERATE EPISODE OF RECURRENT MAJOR DEPRESSIVE DISORDER (HCC): ICD-10-CM

## 2022-05-11 DIAGNOSIS — E87.6 HYPOKALEMIA: ICD-10-CM

## 2022-05-11 DIAGNOSIS — F43.0 PANIC ATTACK AS REACTION TO STRESS: Primary | ICD-10-CM

## 2022-05-11 DIAGNOSIS — F41.0 PANIC ATTACK AS REACTION TO STRESS: Primary | ICD-10-CM

## 2022-05-11 DIAGNOSIS — F63.3 TRICHOTILLOMANIA: ICD-10-CM

## 2022-05-11 DIAGNOSIS — F41.0 PANIC DISORDER WITHOUT AGORAPHOBIA WITH SEVERE PANIC ATTACKS: ICD-10-CM

## 2022-05-11 LAB
ALBUMIN SERPL-MCNC: 3.8 G/DL (ref 3.5–5)
ALBUMIN/GLOB SERPL: 0.9 {RATIO} (ref 1.1–2.2)
ALP SERPL-CCNC: 86 U/L (ref 45–117)
ALT SERPL-CCNC: 28 U/L (ref 12–78)
AMPHET UR QL SCN: NEGATIVE
ANION GAP SERPL CALC-SCNC: 11 MMOL/L (ref 5–15)
ANION GAP SERPL CALC-SCNC: 9 MMOL/L (ref 5–15)
APPEARANCE UR: CLEAR
AST SERPL-CCNC: 23 U/L (ref 15–37)
BARBITURATES UR QL SCN: NEGATIVE
BASOPHILS # BLD: 0 K/UL (ref 0–0.1)
BASOPHILS NFR BLD: 0 % (ref 0–1)
BENZODIAZ UR QL: NEGATIVE
BILIRUB SERPL-MCNC: 0.4 MG/DL (ref 0.2–1)
BILIRUB UR QL: NEGATIVE
BUN SERPL-MCNC: 13 MG/DL (ref 6–20)
BUN SERPL-MCNC: 17 MG/DL (ref 6–20)
BUN/CREAT SERPL: 16 (ref 12–20)
BUN/CREAT SERPL: 17 (ref 12–20)
CALCIUM SERPL-MCNC: 9.2 MG/DL (ref 8.5–10.1)
CALCIUM SERPL-MCNC: 9.4 MG/DL (ref 8.5–10.1)
CANNABINOIDS UR QL SCN: NEGATIVE
CHLORIDE SERPL-SCNC: 100 MMOL/L (ref 97–108)
CHLORIDE SERPL-SCNC: 100 MMOL/L (ref 97–108)
CO2 SERPL-SCNC: 29 MMOL/L (ref 21–32)
CO2 SERPL-SCNC: 29 MMOL/L (ref 21–32)
COCAINE UR QL SCN: NEGATIVE
COLOR UR: ABNORMAL
CREAT SERPL-MCNC: 0.79 MG/DL (ref 0.55–1.02)
CREAT SERPL-MCNC: 1.02 MG/DL (ref 0.55–1.02)
DIFFERENTIAL METHOD BLD: ABNORMAL
DRUG SCRN COMMENT,DRGCM: NORMAL
EOSINOPHIL # BLD: 0 K/UL (ref 0–0.4)
EOSINOPHIL NFR BLD: 0 % (ref 0–7)
ERYTHROCYTE [DISTWIDTH] IN BLOOD BY AUTOMATED COUNT: 14.5 % (ref 11.5–14.5)
ETHANOL SERPL-MCNC: <10 MG/DL
FLUAV RNA SPEC QL NAA+PROBE: NOT DETECTED
FLUBV RNA SPEC QL NAA+PROBE: NOT DETECTED
GLOBULIN SER CALC-MCNC: 4.4 G/DL (ref 2–4)
GLUCOSE SERPL-MCNC: 65 MG/DL (ref 65–100)
GLUCOSE SERPL-MCNC: 96 MG/DL (ref 65–100)
GLUCOSE UR STRIP.AUTO-MCNC: >1000 MG/DL
HCG UR QL: NEGATIVE
HCT VFR BLD AUTO: 38.2 % (ref 35–47)
HGB BLD-MCNC: 12 G/DL (ref 11.5–16)
HGB UR QL STRIP: NEGATIVE
IMM GRANULOCYTES # BLD AUTO: 0.1 K/UL (ref 0–0.04)
IMM GRANULOCYTES NFR BLD AUTO: 1 % (ref 0–0.5)
KETONES UR QL STRIP.AUTO: ABNORMAL MG/DL
LEUKOCYTE ESTERASE UR QL STRIP.AUTO: NEGATIVE
LYMPHOCYTES # BLD: 2.2 K/UL (ref 0.8–3.5)
LYMPHOCYTES NFR BLD: 23 % (ref 12–49)
MCH RBC QN AUTO: 26.1 PG (ref 26–34)
MCHC RBC AUTO-ENTMCNC: 31.4 G/DL (ref 30–36.5)
MCV RBC AUTO: 83.2 FL (ref 80–99)
METHADONE UR QL: NEGATIVE
MONOCYTES # BLD: 0.5 K/UL (ref 0–1)
MONOCYTES NFR BLD: 5 % (ref 5–13)
NEUTS SEG # BLD: 7 K/UL (ref 1.8–8)
NEUTS SEG NFR BLD: 71 % (ref 32–75)
NITRITE UR QL STRIP.AUTO: NEGATIVE
NRBC # BLD: 0 K/UL (ref 0–0.01)
NRBC BLD-RTO: 0 PER 100 WBC
OPIATES UR QL: NEGATIVE
PCP UR QL: NEGATIVE
PH UR STRIP: 6.5 [PH] (ref 5–8)
PLATELET # BLD AUTO: 423 K/UL (ref 150–400)
PMV BLD AUTO: 9.4 FL (ref 8.9–12.9)
POTASSIUM SERPL-SCNC: 2.5 MMOL/L (ref 3.5–5.1)
POTASSIUM SERPL-SCNC: 2.8 MMOL/L (ref 3.5–5.1)
PROT SERPL-MCNC: 8.2 G/DL (ref 6.4–8.2)
PROT UR STRIP-MCNC: NEGATIVE MG/DL
RBC # BLD AUTO: 4.59 M/UL (ref 3.8–5.2)
SARS-COV-2, COV2: NOT DETECTED
SODIUM SERPL-SCNC: 138 MMOL/L (ref 136–145)
SODIUM SERPL-SCNC: 140 MMOL/L (ref 136–145)
SP GR UR REFRACTOMETRY: 1.03 (ref 1–1.03)
UROBILINOGEN UR QL STRIP.AUTO: 0.2 EU/DL (ref 0.2–1)
WBC # BLD AUTO: 9.8 K/UL (ref 3.6–11)

## 2022-05-11 PROCEDURE — 36415 COLL VENOUS BLD VENIPUNCTURE: CPT

## 2022-05-11 PROCEDURE — 81025 URINE PREGNANCY TEST: CPT

## 2022-05-11 PROCEDURE — 85025 COMPLETE CBC W/AUTO DIFF WBC: CPT

## 2022-05-11 PROCEDURE — 80053 COMPREHEN METABOLIC PANEL: CPT

## 2022-05-11 PROCEDURE — 74011250637 HC RX REV CODE- 250/637: Performed by: EMERGENCY MEDICINE

## 2022-05-11 PROCEDURE — 65220000003 HC RM SEMIPRIVATE PSYCH

## 2022-05-11 PROCEDURE — 80307 DRUG TEST PRSMV CHEM ANLYZR: CPT

## 2022-05-11 PROCEDURE — 81003 URINALYSIS AUTO W/O SCOPE: CPT

## 2022-05-11 PROCEDURE — 99285 EMERGENCY DEPT VISIT HI MDM: CPT

## 2022-05-11 PROCEDURE — 82077 ASSAY SPEC XCP UR&BREATH IA: CPT

## 2022-05-11 PROCEDURE — 87636 SARSCOV2 & INF A&B AMP PRB: CPT

## 2022-05-11 RX ORDER — LORAZEPAM 1 MG/1
1 TABLET ORAL
Status: COMPLETED | OUTPATIENT
Start: 2022-05-11 | End: 2022-05-11

## 2022-05-11 RX ADMIN — POTASSIUM BICARBONATE 50 MEQ: 977.5 TABLET, EFFERVESCENT ORAL at 12:41

## 2022-05-11 RX ADMIN — POTASSIUM BICARBONATE 50 MEQ: 977.5 TABLET, EFFERVESCENT ORAL at 17:28

## 2022-05-11 RX ADMIN — LORAZEPAM 1 MG: 1 TABLET ORAL at 08:51

## 2022-05-11 NOTE — BSMART NOTE
Axis I  Anxiety disorders: acute stress disorder   Depressive disorders recurrent  Axis II   Axis II diagnosis deferred   [place] III   General medical conditions  Axis IV  Problems related to the social environment and other psychosocial and environmental problems      Axis V  70-61 Some mild symptoms (e.g., depressed mood and mild insomnia) OR some difficulty in social, occupational, or school functioning (e.g., occasional truancy, or theft within the household), but generally functioning pretty well, has some meaningful relationships. Comprehensive Assessment Form Part 1    Section I - Disposition      The Medical Doctor to Psychiatrist conference was not completed. The Medical Doctor is in agreement with clinician's disposition because of recent increased anxiety/ panic attacks. The plan is patient is to be a voluntary admission to [facility], general unit. The on-call Psychiatrist consulted was Dr. Henna Olmedo. The admitting Psychiatrist will be Dr. Henna Olmedo. The admitting Diagnosis is Panic Anxiety Disorder. The Payor source is Medicaid- [employer]. The name of the representative was NA. This was not approved. Based on the completed assessment, the risk for suicide is low to moderate. Section II - Integrated Summary  Summary:  Butch Santos is at bedside. 48 yro female presented at [facility] ED today. Patient is accompanied by her mother. The patient reports increased panic attacks and periods of hair pulling to relieve stress. The patient reports issues with her job and relationship. She recalls similar issues when she lost a job two years prior. Patient is seen by [facility] for psychiatric services. The patient reports treatment and medication compliance. The patient is deemed competent to provide informed consent. The information is given by the patient, parent and past medical records. The Chief Complaint is panic attacks. The Precipitant Factors are poor coping skills.   Previous Hospitalizations: 2021  The patient has not previously been in restraints. Current Psychiatrist and/or  is [facility]. Lethality Assessment:    The potential for suicide is noted by the following: not noted. The potential for homicide is not noted. The patient has not been a perpetrator of sexual or physical abuse. There are not pending charges. The patient is not felt to be at risk for self harm or harm to others. The attending nurse was advised that security has not been notified. Section III - Psychosocial  The patient's overall mood and attitude is cooperative. Feelings of helplessness and hopelessness are not observed. Generalized anxiety is observed by patient's report. Panic is observed by pulling her hair out. Phobias are not observed. Obsessive compulsive tendencies are not observed. Section IV - Mental Status Exam  The patient's appearance shows no evidence of impairment. The patient's behavior is guarded. The patient is oriented to time, place, person and situation. The patient's speech shows no evidence of impairment. The patient's mood  is euthymic. The range of affect shows no evidence of impairment. The patient's thought content  demonstrates no evidence of impairment. The thought process is circumstantial.  The patient's perception shows no evidence of impairment. The patient's memory shows no evidence of impairment. The patient's appetite shows no evidence of impairment. The patient's sleep shows no evidence of impairment. The patient shows little insight. The patient's judgement is psychologically impaired. Section V - Substance Abuse  The patient is not using substances. Section VI - Living Arrangements  The patient is single. The patient lives with a parent. The patient has no children. The patient does plan to return home upon discharge. The patient does not have legal issues pending. The patient's source of income comes from employment.   Zoroastrianism and cultural practices have been noted and include: Christainity. The patient's greatest support comes from family and this person will be involved with the treatment. The patient has not been in an event described as horrible or outside the realm of ordinary life experience either currently or in the past.  The patient has not been a victim of sexual/physical abuse. Section VII - Other Areas of Clinical Concern  The highest grade achieved is some college with the overall quality of school experience being described as good. The patient is currently  employed and speaks [place] as a primary language. The patient has no communication impairments affecting communication. The patient's preference for learning can be described as: can read and write adequately. The patient's hearing is normal.  The patient's vision is normal .  Patient is seeking voluntary admission.     Kory Vazquez

## 2022-05-11 NOTE — ED NOTES
Attempted to call report to [facility], there was no answer. Will call back in 10 minutes.

## 2022-05-11 NOTE — ED TRIAGE NOTES
Pt states she has anxiety and panic attacks. \"I keep pulling on my hair and jerking. Yelling and screaming since Friday\"    Pt on clomipramine and had taken 2 pills this morning. Seen at [facility] on Friday and prescribed hydroxyzine. Pt given option of admission or f/u pcp but has been unable to see pcp.

## 2022-05-11 NOTE — ED NOTES
RN spoke to Kayleigh Alves regarding admission. Per Ria Dr. Tyler Oswald would like potassium to be repeated since last result was 2.5. He would like the goal to be 3.1. Dr. Shira Mark notified.

## 2022-05-11 NOTE — ED PROVIDER NOTES
EMERGENCY DEPARTMENT HISTORY AND PHYSICAL EXAM      Date: 5/11/2022  Patient Name: Gaby Rushing    History of Presenting Illness     Chief Complaint   Patient presents with    Anxiety     History Provided By: Patient and Patient's Mother    HPI: Gaby Rushing, 48 y.o. female with past medical history significant for anxiety, depression, diabetes, and hypertension who presents via private vehicle accompanied by her mother to the ED with cc of anxiety, increased stress, and self-destructive behavior. Per patient's mom, she started having symptoms either Thursday or Friday after an incident at work. 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 It is unclear if this is what triggered the patient's anxiety, but since then she has had increased panic attacks, frequent outbursts, and has been pulling out her hair. Patient is compliant with her medications. Apparently she has only been taking 1 tablet of her Anafranil a day and recently found out she is supposed to be on 2 tablets daily. She just made that change this morning. She has a virtual appointment with her psychiatrist this afternoon and an appointment with a therapist tomorrow morning. She denies any homicidal or suicidal ideations. She also denies any auditory or visual hallucinations. PMHx: anxiety, depression, diabetes, and hypertension  Social Hx: Denies alcohol, tobacco, or illegal drug use    PCP: Mikaela Whiteside NP    There are no other complaints, changes, or physical findings at this time. No current facility-administered medications on file prior to encounter. Current Outpatient Medications on File Prior to Encounter   Medication Sig Dispense Refill    hydrOXYzine HCL (ATARAX) 50 mg tablet Take 0.5 Tablets by mouth every six (6) hours as needed for Anxiety for up to 10 days.  20 Tablet 0    metFORMIN (GLUCOPHAGE) 1,000 mg tablet Take 1,000 mg by mouth Daily (before breakfast).  clomiPRAMINE (ANAFRANIL) 50 mg capsule Take 50 mg by mouth daily (with breakfast).  triamterene-hydrochlorothiazide (MAXZIDE) 37.5-25 mg per tablet Take 1 Tab by mouth daily. Indications: HYPERTENSION      losartan (COZAAR) 50 mg tablet Take 50 mg by mouth daily. Indications: HYPERTENSION      LORazepam (ATIVAN) 0.5 mg tablet Take  by mouth daily (with breakfast). (Patient not taking: Reported on 5/11/2022)       Past History     Past Medical History:  Past Medical History:   Diagnosis Date    Anxiety     Depression     ANXIETY & DEPRESSION    Diabetes ([facility] [address]     TYPE II    Hypertension     Infected sebaceous cyst, upper back 4/10/2019    Panic attack     Suicidal thoughts     Vision decreased      Past Surgical History:  Past Surgical History:   Procedure Laterality Date    COLONOSCOPY N/A 11/20/2020    . COLONOSCOPY  :- performed by Jay El [facility] at [facility] ENDOSCOPY    HX OTHER SURGICAL  06/03/2019     Excision of large sebaceous cyst in the midline upper back and with packing- [facility]-DR. Nunu Kelsey     Family History:  Family History   Problem Relation Age of Onset    Diabetes Mother     Hypertension Mother     Cancer Father         LUNG CA. SMOKER     Social History:  Social History     Tobacco Use    Smoking status: Never Smoker    Smokeless tobacco: Never Used    Tobacco comment: n/a never smoked tobacco   Substance Use Topics    Alcohol use: No    Drug use: No     Allergies: Allergies   Allergen Reactions    Lisinopril Rash     Swelling of the lips     Review of Systems   Review of Systems   Constitutional: Negative for chills and fever. HENT: Negative for congestion, rhinorrhea, sneezing and sore throat. Eyes: Negative for redness and visual disturbance. Respiratory: Negative for shortness of breath. Cardiovascular: Negative for leg swelling. Gastrointestinal: Negative for abdominal pain, nausea and vomiting.    Genitourinary: Negative for difficulty urinating and frequency. Musculoskeletal: Negative for back pain, myalgias and neck stiffness. Skin: Negative for rash. Neurological: Negative for dizziness, syncope, weakness and headaches. Hematological: Negative for adenopathy. Psychiatric/Behavioral: Positive for behavioral problems. The patient is nervous/anxious. All other systems reviewed and are negative. Physical Exam   Physical Exam  Vitals and nursing note reviewed. Constitutional:       Appearance: Normal appearance. She is well-developed. HENT:      Head: Normocephalic and atraumatic. Eyes:      Conjunctiva/sclera: Conjunctivae normal.   Cardiovascular:      Rate and Rhythm: Normal rate and regular rhythm. Pulses: Normal pulses. Heart sounds: Normal heart sounds, S1 normal and S2 normal.   Pulmonary:      Effort: Pulmonary effort is normal. No respiratory distress. Breath sounds: Normal breath sounds. No wheezing. Abdominal:      General: Bowel sounds are normal. There is no distension. Palpations: Abdomen is soft. Tenderness: There is no abdominal tenderness. There is no rebound. Musculoskeletal:         General: Normal range of motion. Cervical back: Full passive range of motion without pain, normal range of motion and neck supple. Skin:     General: Skin is warm and dry. Findings: No rash. Neurological:      Mental Status: She is alert and oriented to person, place, and time. Psychiatric:         Mood and Affect: Mood is depressed. Speech: Speech normal.         Behavior: Behavior normal.         Thought Content: Thought content normal.         Judgment: Judgment is impulsive.        Diagnostic Study Results   Labs -     Recent Results (from the past 12 hour(s))   CBC WITH AUTOMATED DIFF    Collection Time: 05/11/22 11:00 AM   Result Value Ref Range    WBC 9.8 3.6 - 11.0 K/uL    RBC 4.59 3.80 - 5.20 M/uL    HGB 12.0 11.5 - 16.0 g/dL    HCT 38.2 35.0 - 47.0 %    MCV 83.2 80.0 - 99.0 FL    MCH 26.1 26.0 - 34.0 PG    MCHC 31.4 30.0 - 36.5 g/dL    RDW 14.5 11.5 - 14.5 %    PLATELET 473 (H) 306 - 400 K/uL    MPV 9.4 8.9 - 12.9 FL    NRBC 0.0 0  WBC    ABSOLUTE NRBC 0.00 0.00 - 0.01 K/uL    NEUTROPHILS 71 32 - 75 %    LYMPHOCYTES 23 12 - 49 %    MONOCYTES 5 5 - 13 %    EOSINOPHILS 0 0 - 7 %    BASOPHILS 0 0 - 1 %    IMMATURE GRANULOCYTES 1 (H) 0.0 - 0.5 %    ABS. NEUTROPHILS 7.0 1.8 - 8.0 K/UL    ABS. LYMPHOCYTES 2.2 0.8 - 3.5 K/UL    ABS. MONOCYTES 0.5 0.0 - 1.0 K/UL    ABS. EOSINOPHILS 0.0 0.0 - 0.4 K/UL    ABS. BASOPHILS 0.0 0.0 - 0.1 K/UL    ABS. IMM. GRANS. 0.1 (H) 0.00 - 0.04 K/UL    DF AUTOMATED     METABOLIC PANEL, COMPREHENSIVE    Collection Time: 05/11/22 11:00 AM   Result Value Ref Range    Sodium 140 136 - 145 mmol/L    Potassium 2.5 (LL) 3.5 - 5.1 mmol/L    Chloride 100 97 - 108 mmol/L    CO2 29 21 - 32 mmol/L    Anion gap 11 5 - 15 mmol/L    Glucose 65 65 - 100 mg/dL    BUN 13 6 - 20 MG/DL    Creatinine 0.79 0.55 - 1.02 MG/DL    BUN/Creatinine ratio 16 12 - 20      GFR est AA >60 >60 ml/min/1.73m2    GFR est non-AA >60 >60 ml/min/1.73m2    Calcium 9.4 8.5 - 10.1 MG/DL    Bilirubin, total 0.4 0.2 - 1.0 MG/DL    ALT (SGPT) 28 12 - 78 U/L    AST (SGOT) 23 15 - 37 U/L    Alk.  phosphatase 86 45 - 117 U/L    Protein, total 8.2 6.4 - 8.2 g/dL    Albumin 3.8 3.5 - 5.0 g/dL    Globulin 4.4 (H) 2.0 - 4.0 g/dL    A-G Ratio 0.9 (L) 1.1 - 2.2     DRUG SCREEN, URINE    Collection Time: 05/11/22 11:00 AM   Result Value Ref Range    AMPHETAMINES Negative NEG      BARBITURATES Negative NEG      BENZODIAZEPINES Negative NEG      COCAINE Negative NEG      METHADONE Negative NEG      OPIATES Negative NEG      PCP(PHENCYCLIDINE) Negative NEG      THC (TH-CANNABINOL) Negative NEG      Drug screen comment (NOTE)    ETHYL ALCOHOL    Collection Time: 05/11/22 11:00 AM   Result Value Ref Range    ALCOHOL(ETHYL),SERUM <10 <10 MG/DL   COVID-19 WITH INFLUENZA A/B    Collection Time: 05/11/22 11:00 AM   Result Value Ref Range    SARS-CoV-2 by PCR Not detected NOTD      Influenza A by PCR Not detected      Influenza B by PCR Not detected     URINALYSIS W/ RFLX MICROSCOPIC    Collection Time: 05/11/22 11:00 AM   Result Value Ref Range    Color YELLOW/STRAW      Appearance CLEAR CLEAR      Specific gravity 1.030 1.003 - 1.030      pH (UA) 6.5 5.0 - 8.0      Protein Negative NEG mg/dL    Glucose >1,000 (A) NEG mg/dL    Ketone TRACE (A) NEG mg/dL    Bilirubin Negative NEG      Blood Negative NEG      Urobilinogen 0.2 0.2 - 1.0 EU/dL    Nitrites Negative NEG      Leukocyte Esterase Negative NEG     HCG URINE, QL. - POC    Collection Time: 05/11/22 11:57 AM   Result Value Ref Range    Pregnancy test,urine (POC) Negative NEG     METABOLIC PANEL, BASIC    Collection Time: 05/11/22  4:21 PM   Result Value Ref Range    Sodium 138 136 - 145 mmol/L    Potassium 2.8 (L) 3.5 - 5.1 mmol/L    Chloride 100 97 - 108 mmol/L    CO2 29 21 - 32 mmol/L    Anion gap 9 5 - 15 mmol/L    Glucose 96 65 - 100 mg/dL    BUN 17 6 - 20 MG/DL    Creatinine 1.02 0.55 - 1.02 MG/DL    BUN/Creatinine ratio 17 12 - 20      GFR est AA >60 >60 ml/min/1.73m2    GFR est non-AA 58 (L) >60 ml/min/1.73m2    Calcium 9.2 8.5 - 10.1 MG/DL       Radiologic Studies -   No orders to display     No results found. Medical Decision Making   I am the first provider for this patient. I reviewed the vital signs, available nursing notes, past medical history, past surgical history, family history and social history. Vital Signs-Reviewed the patient's vital signs.   Patient Vitals for the past 24 hrs:   Temp Pulse Resp BP SpO2   05/11/22 1635 98.6 °F (37 °C) (!) 103 16 102/63 98 %   05/11/22 1000 -- -- -- 110/66 --   05/11/22 0901 -- -- -- 134/83 --   05/11/22 0801 98.5 °F (36.9 °C) 68 18 (!) 143/78 98 %   05/11/22 0800 -- -- -- (!) 143/78 97 %   05/11/22 0759 -- -- -- -- 97 %   05/11/22 0758 -- -- -- -- 97 %   05/11/22 0757 -- -- -- Kelsea Fillers 147/96 --     Pulse Oximetry Analysis - 98% on RA (normal)    Records Reviewed: Nursing Notes and Old Medical Records    Provider Notes (Medical Decision Making):   49-year-old female presents with her mother for increased anxiety and panic attacks likely secondary to increased stressors at work. This is her third ED visit for the same presentation in the past 6 days. Will discuss with BSMART and check basic labs for medical clearance. ED Course:   Initial assessment performed. The patients presenting problems have been discussed, and they are in agreement with the care plan formulated and outlined with them. I have encouraged them to ask questions as they arise throughout their visit. Patient had an episode while I was in the room where she started grabbing at her hair and pulling at it. She also started screaming for approximately 10 seconds and then calmed down. Will discuss with BSMART. 9:48 AM  Micheline Jasmine MD spoke with Tim Toney for [facility]. Discussed HPI and PE, available diagnostic tests and clinical findings. He is in agreement with care plans as outlined. He will see and evaluate the patient. Progress Note  10:52 AM  Adan Chiu has seen and evaluated the patient. He is recommending admission to inpatient behavioral health. Will order the medical screening labs. Progress Note  12:29 PM  I have re-evaluated pt and her potassium is 2.5. Will order potassium repletion and patient is otherwise medically cleared. Progress Note  5:25 PM  Patient's repeat potassium is 2.8. I have spoken with Dr. Caitlin Sinha who would like the potassium to be 3.0 or above. If we give her an additional dose of potassium, he can continue repleting it on the floor. Otherwise he will accept the patient to inpatient psychiatry. Progress Note:   Updated pt on all returned results and findings. Discussed the importance of proper follow up as referred below along with return precautions.  Pt in agreement with the care plan and expresses agreement with and understanding of all items discussed. Disposition:  ADMIT NOTE:  5:26 PM  The patient is being admitted to the hospital by Dr. Dex Jacobo to inpatient psychiatry. The results of their tests and reasons for their admission have been discussed with the patient and/or available family. They convey agreement and understanding for the need to be admitted and for their admission diagnosis. PLAN:  1. Admit    Diagnosis     Clinical Impression:   1. Panic attack as reaction to stress    2. Hypokalemia    3. Medical clearance for psychiatric admission            Please note that this dictation was completed with Dragon, computer voice recognition software. Quite often unanticipated grammatical, syntax, homophones, and other interpretive errors are inadvertently transcribed by the computer software. Please disregard these errors. Additionally, please excuse any errors that have escaped final proofreading.

## 2022-05-11 NOTE — ED NOTES
TRANSFER - OUT REPORT:    Verbal report given to Selena Brush(name) on Delia Huynh  being transferred to [facility]) for routine progression of care       Report consisted of patients Situation, Background, Assessment and   Recommendations(SBAR). Information from the following report(s) SBAR was reviewed with the receiving nurse. Lines:   Peripheral IV 05/11/22 Left Antecubital (Active)   Site Assessment Clean, dry, & intact 05/11/22 1634   Phlebitis Assessment 0 05/11/22 1634   Infiltration Assessment 0 05/11/22 1634   Dressing Status Clean, dry, & intact 05/11/22 1634   Dressing Type Tape;Transparent 05/11/22 1634   Hub Color/Line Status Flushed;Patent 05/11/22 1634   Action Taken Blood drawn 05/11/22 1634        Opportunity for questions and clarification was provided.       Patient transported with:  Nevin Caba

## 2022-05-11 NOTE — ED NOTES
See triage note. Pt is alert and oriented x 4, RR even and unlabored, skin is warm and dry. Assesment completed and pt updated on plan of care. Emergency Department Nursing Plan of Care       The Nursing Plan of Care is developed from the Nursing assessment and Emergency Department Attending provider initial evaluation. The plan of care may be reviewed in the ED Provider note.     The Plan of Care was developed with the following considerations:   Patient / Family readiness to learn indicated by:verbalized understanding  Persons(s) to be included in education: patient and family  Barriers to Learning/Limitations:No    [id number], RN    5/11/2022   8:05 AM

## 2022-05-11 NOTE — ED NOTES
Bedside shift change report given to [id number] [facility] (oncoming nurse) by Nahun Padilla RN (offgoing nurse). Report included the following information SBAR, ED Summary and MAR.

## 2022-05-12 PROBLEM — F32.A DEPRESSION: Status: ACTIVE | Noted: 2022-05-12

## 2022-05-12 PROBLEM — F33.1 MODERATE EPISODE OF RECURRENT MAJOR DEPRESSIVE DISORDER (HCC): Status: ACTIVE | Noted: 2022-05-12

## 2022-05-12 PROCEDURE — 65220000003 HC RM SEMIPRIVATE PSYCH

## 2022-05-12 PROCEDURE — 74011250637 HC RX REV CODE- 250/637: Performed by: PSYCHIATRY & NEUROLOGY

## 2022-05-12 PROCEDURE — 99223 1ST HOSP IP/OBS HIGH 75: CPT | Performed by: PSYCHIATRY & NEUROLOGY

## 2022-05-12 RX ORDER — ATORVASTATIN CALCIUM 40 MG/1
40 TABLET, FILM COATED ORAL DAILY
Status: ON HOLD | COMMUNITY
End: 2022-08-05 | Stop reason: SDUPTHER

## 2022-05-12 RX ORDER — ATORVASTATIN CALCIUM 40 MG/1
40 TABLET, FILM COATED ORAL DAILY
Status: DISCONTINUED | OUTPATIENT
Start: 2022-05-12 | End: 2022-05-16 | Stop reason: HOSPADM

## 2022-05-12 RX ORDER — ACETAMINOPHEN 325 MG/1
650 TABLET ORAL
Status: DISCONTINUED | OUTPATIENT
Start: 2022-05-12 | End: 2022-05-16 | Stop reason: HOSPADM

## 2022-05-12 RX ORDER — ATORVASTATIN CALCIUM 40 MG/1
40 TABLET, FILM COATED ORAL DAILY
Status: DISCONTINUED | OUTPATIENT
Start: 2022-05-13 | End: 2022-05-12

## 2022-05-12 RX ORDER — FLUOXETINE HYDROCHLORIDE 20 MG/1
20 CAPSULE ORAL DAILY
Status: DISCONTINUED | OUTPATIENT
Start: 2022-05-12 | End: 2022-05-16 | Stop reason: HOSPADM

## 2022-05-12 RX ORDER — BENZTROPINE MESYLATE 1 MG/1
1 TABLET ORAL
Status: DISCONTINUED | OUTPATIENT
Start: 2022-05-12 | End: 2022-05-16 | Stop reason: HOSPADM

## 2022-05-12 RX ORDER — LORAZEPAM 1 MG/1
1 TABLET ORAL
Status: DISCONTINUED | OUTPATIENT
Start: 2022-05-12 | End: 2022-05-16 | Stop reason: HOSPADM

## 2022-05-12 RX ORDER — ADHESIVE BANDAGE
30 BANDAGE TOPICAL DAILY PRN
Status: DISCONTINUED | OUTPATIENT
Start: 2022-05-12 | End: 2022-05-16 | Stop reason: HOSPADM

## 2022-05-12 RX ORDER — METFORMIN HYDROCHLORIDE 500 MG/1
1000 TABLET ORAL DAILY
Status: DISCONTINUED | OUTPATIENT
Start: 2022-05-12 | End: 2022-05-16 | Stop reason: HOSPADM

## 2022-05-12 RX ORDER — HYDROXYZINE 25 MG/1
50 TABLET, FILM COATED ORAL
Status: DISCONTINUED | OUTPATIENT
Start: 2022-05-12 | End: 2022-05-12

## 2022-05-12 RX ORDER — GLIPIZIDE 10 MG/1
10 TABLET, FILM COATED, EXTENDED RELEASE ORAL DAILY
Status: ON HOLD | COMMUNITY
End: 2022-08-05 | Stop reason: SDUPTHER

## 2022-05-12 RX ORDER — LORAZEPAM 2 MG/ML
1 INJECTION INTRAMUSCULAR
Status: DISCONTINUED | OUTPATIENT
Start: 2022-05-12 | End: 2022-05-16 | Stop reason: HOSPADM

## 2022-05-12 RX ORDER — LOSARTAN POTASSIUM 25 MG/1
50 TABLET ORAL DAILY
Status: DISCONTINUED | OUTPATIENT
Start: 2022-05-12 | End: 2022-05-16 | Stop reason: HOSPADM

## 2022-05-12 RX ORDER — LOSARTAN POTASSIUM 25 MG/1
50 TABLET ORAL DAILY
Status: DISCONTINUED | OUTPATIENT
Start: 2022-05-13 | End: 2022-05-12

## 2022-05-12 RX ORDER — TRAZODONE HYDROCHLORIDE 50 MG/1
50 TABLET ORAL
Status: DISCONTINUED | OUTPATIENT
Start: 2022-05-12 | End: 2022-05-16 | Stop reason: HOSPADM

## 2022-05-12 RX ORDER — HALOPERIDOL 5 MG/ML
5 INJECTION INTRAMUSCULAR
Status: DISCONTINUED | OUTPATIENT
Start: 2022-05-12 | End: 2022-05-16 | Stop reason: HOSPADM

## 2022-05-12 RX ORDER — OLANZAPINE 5 MG/1
5 TABLET ORAL
Status: DISCONTINUED | OUTPATIENT
Start: 2022-05-12 | End: 2022-05-16 | Stop reason: HOSPADM

## 2022-05-12 RX ORDER — DIPHENHYDRAMINE HYDROCHLORIDE 50 MG/ML
50 INJECTION, SOLUTION INTRAMUSCULAR; INTRAVENOUS
Status: DISCONTINUED | OUTPATIENT
Start: 2022-05-12 | End: 2022-05-16 | Stop reason: HOSPADM

## 2022-05-12 RX ADMIN — POTASSIUM BICARBONATE 20 MEQ: 782 TABLET, EFFERVESCENT ORAL at 11:50

## 2022-05-12 RX ADMIN — LOSARTAN POTASSIUM 50 MG: 25 TABLET, FILM COATED ORAL at 15:05

## 2022-05-12 RX ADMIN — POTASSIUM BICARBONATE 20 MEQ: 782 TABLET, EFFERVESCENT ORAL at 16:56

## 2022-05-12 RX ADMIN — METFORMIN HYDROCHLORIDE 1000 MG: 500 TABLET ORAL at 15:05

## 2022-05-12 RX ADMIN — FLUOXETINE 20 MG: 20 CAPSULE ORAL at 11:50

## 2022-05-12 RX ADMIN — ATORVASTATIN CALCIUM 40 MG: 40 TABLET, FILM COATED ORAL at 15:06

## 2022-05-12 NOTE — H&P
INITIAL PSYCHIATRIC EVALUATION            IDENTIFICATION:    Patient Name  Renae Ferreira   Date of Birth 1975   [facility] [id number]   Medical Record Number  [id number]      Age  48 y.o. PCP Xander Rasmussen NP   Admit date:  5/11/2022    Room Number  320/01  @ [facility]   Date of Service  5/12/2022            HISTORY         REASON FOR HOSPITALIZATION:  CC: \"trichotillomania\". Pt admitted under a voluntary basis for debilitating depression and anxiety proving to be an imminent danger to self due to self mutilation and an inability to care for self. HISTORY OF PRESENT ILLNESS:    The patient, Renae Ferreira, is a 48 y.o. 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 Additional symptomatology include pulling out most of her hair. The above symptoms have been present for 2+ weeks. These symptoms are of moderate to high severity. These symptoms are constant in nature. The patient's condition has been precipitated by psychosocial stressors. UDS: negative; BAL=0. The patient presented to the ED 3x in the past week due to anxiety attacks. She has been having increasingly debilitating panic attacks, and has been unable to function in the community. The patient denied active thoughts of self harm but was admitted as there was no better alternative to address her worsening symptoms and she was in severe distress. The patient has been on antidepressant medication for OCD and for depression with some success but a recent relationship stressor triggered the episode. Of note, patient with K+ 2.5 in the ED. The patient is a good historian. The patient corroborates the above narrative.  The patient contracts for safety on the unit and gives consent for the team to contact collateral. The patient is amenable to initiating treatment while on the unit. The patient states that she has been feeling anxious about her relationship and having more difficulty with her ADLs due to the panic attacks. Discussed SSRIs for panic and benzodiazepines for anxiety, patient agrees to both agents after a discussion of risk and benefit. ALLERGIES:   Allergies   Allergen Reactions    Lisinopril Rash     Swelling of the lips      MEDICATIONS PRIOR TO ADMISSION:   Medications Prior to Admission   Medication Sig    atorvastatin (LIPITOR) 40 mg tablet Take 40 mg by mouth daily. Indications: high cholesterol    empagliflozin (Jardiance) 25 mg tablet Take 25 mg by mouth daily. Indications: type 2 diabetes mellitus    glipiZIDE SR (GLUCOTROL XL) 10 mg CR tablet Take 10 mg by mouth daily. Indications: type 2 diabetes mellitus    clomiPRAMINE (ANAFRANIL) 50 mg capsule Take 50 mg by mouth daily (with breakfast). Indications: depression    triamterene-hydrochlorothiazide (MAXZIDE) 37.5-25 mg per tablet Take 1 Tab by mouth daily. Indications: HYPERTENSION    losartan (COZAAR) 50 mg tablet Take 50 mg by mouth daily. Indications: HYPERTENSION    metFORMIN (GLUCOPHAGE) 1,000 mg tablet Take 1,000 mg by mouth daily. Indications: type 2 diabetes mellitus      PAST MEDICAL HISTORY:   Past Medical History:   Diagnosis Date    Anxiety     Depression     ANXIETY & DEPRESSION    [facility]     TYPE II    Hypertension     Infected sebaceous cyst, upper back 4/10/2019    Panic attack     Suicidal thoughts     Vision decreased      Past Surgical History:   Procedure Laterality Date    COLONOSCOPY N/A 11/20/2020    . COLONOSCOPY  :- performed by Lelsie Urbano MD at [facility] ENDOSCOPY    HX OTHER SURGICAL  06/03/2019     Excision of large sebaceous cyst in the midline upper back and with packing- [facility]-DR. Leroy Mariscal      SOCIAL HISTORY:  The patient is currently employed; the patient is not a smoker; the patient's marital status is in a relationship; the patient does not have children; the patient reports the highest level of education achieved is a bachelor's degree. She lives alone but has support from her mother     FAMILY HISTORY: History reviewed, pertinent family history as below:   Family History   Problem Relation Age of Onset    Diabetes Mother     Hypertension Mother     Cancer Father         LUNG CA. SMOKER       REVIEW OF SYSTEMS:   Pertinent items are noted in the History of Present Illness. All other Systems reviewed and are considered negative. MENTAL STATUS EXAM & VITALS     MENTAL STATUS EXAM (MSE):    MSE FINDINGS ARE WITHIN NORMAL LIMITS (WNL) UNLESS OTHERWISE STATED BELOW. ( ALL OF THE BELOW CATEGORIES OF THE MSE HAVE BEEN REVIEWED (reviewed 5/12/2022) AND UPDATED AS DEEMED APPROPRIATE )  General Presentation age appropriate and overweight, cooperative   Orientation oriented to time, place and person   Vital Signs  See below (reviewed 5/12/2022); Vital Signs (BP, Pulse, & Temp) are within normal limits if not listed below.    Gait and Station Stable/steady, no ataxia   Musculoskeletal System No extrapyramidal symptoms (EPS); no abnormal muscular movements or Tardive Dyskinesia (TD); muscle strength and tone are within normal limits   Language No aphasia or dysarthria   Speech:  normal volume and non-pressured   Thought Processes logical; normal rate of thoughts; fair abstract reasoning/computation   Thought Associations goal directed   Thought Content preoccupations   Suicidal Ideations none   Homicidal Ideations none   Mood:  anxious  and depressed   Affect:  full range and mood-congruent   Memory recent  intact   Memory remote:  intact   Concentration/Attention:  intact   Fund of Knowledge average   Insight:  good   Reliability good   Judgment:  good          VITALS:     Patient Vitals for the past 24 hrs:   Temp Pulse Resp BP SpO2   05/12/22 0722 99 °F (37.2 °C) (!) 111 16 (!) 113/41 99 %   05/11/22 1930 98.1 °F (36.7 °C) 81 17 117/69 97 %   05/11/22 1635 98.6 °F (37 °C) (!) 103 16 102/63 98 %     Wt Readings from Last 3 Encounters:   05/11/22 98 kg (216 lb)   05/08/22 98 kg (216 lb)   05/04/22 98 kg (216 lb 0.8 oz)     Temp Readings from Last 3 Encounters:   05/12/22 99 °F (37.2 °C)   05/08/22 97.4 °F (36.3 °C)   05/06/22 97.6 °F (36.4 °C)     BP Readings from Last 3 Encounters:   05/12/22 (!) 113/41   05/08/22 118/85   05/06/22 139/83     Pulse Readings from Last 3 Encounters:   05/12/22 (!) 111   05/08/22 86   05/06/22 93            DATA     LABORATORY DATA:  Labs Reviewed   CBC WITH AUTOMATED DIFF - Abnormal; Notable for the following components:       Result Value    PLATELET 383 (*)     IMMATURE GRANULOCYTES 1 (*)     ABS. IMM.  GRANS. 0.1 (*)     All other components within normal limits   METABOLIC PANEL, COMPREHENSIVE - Abnormal; Notable for the following components:    Potassium 2.5 (*)     Globulin 4.4 (*)     A-G Ratio 0.9 (*)     All other components within normal limits   URINALYSIS W/ RFLX MICROSCOPIC - Abnormal; Notable for the following components:    Glucose >1,000 (*)     Ketone TRACE (*)     All other components within normal limits   METABOLIC PANEL, BASIC - Abnormal; Notable for the following components:    Potassium 2.8 (*)     GFR est non-AA 58 (*)     All other components within normal limits   COVID-19 WITH INFLUENZA A/B   DRUG SCREEN, URINE   ETHYL ALCOHOL   HCG URINE, QL. - POC     Admission on 05/11/2022   Component Date Value Ref Range Status    WBC 05/11/2022 9.8  3.6 - 11.0 K/uL Final    RBC 05/11/2022 4.59  3.80 - 5.20 M/uL Final    HGB 05/11/2022 12.0  11.5 - 16.0 g/dL Final    HCT 05/11/2022 38.2  35.0 - 47.0 % Final    MCV 05/11/2022 83.2  80.0 - 99.0 FL Final    MCH 05/11/2022 26.1  26.0 - 34.0 PG Final    MCHC 05/11/2022 31.4  30.0 - 36.5 g/dL Final    RDW 05/11/2022 14.5  11.5 - 14.5 % Final    PLATELET 95/97/6218 692* 150 - 400 K/uL Final    MPV 05/11/2022 9.4  8.9 - 12.9 FL Final    NRBC 05/11/2022 0.0  0  WBC Final    ABSOLUTE NRBC 05/11/2022 0.00  0.00 - 0.01 K/uL Final    NEUTROPHILS 05/11/2022 71  32 - 75 % Final    LYMPHOCYTES 05/11/2022 23  12 - 49 % Final    MONOCYTES 05/11/2022 5  5 - 13 % Final    EOSINOPHILS 05/11/2022 0  0 - 7 % Final    BASOPHILS 05/11/2022 0  0 - 1 % Final    IMMATURE GRANULOCYTES 05/11/2022 1* 0.0 - 0.5 % Final    ABS. NEUTROPHILS 05/11/2022 7.0  1.8 - 8.0 K/UL Final    ABS. LYMPHOCYTES 05/11/2022 2.2  0.8 - 3.5 K/UL Final    ABS. MONOCYTES 05/11/2022 0.5  0.0 - 1.0 K/UL Final    ABS. EOSINOPHILS 05/11/2022 0.0  0.0 - 0.4 K/UL Final    ABS. BASOPHILS 05/11/2022 0.0  0.0 - 0.1 K/UL Final    ABS. IMM. GRANS. 05/11/2022 0.1* 0.00 - 0.04 K/UL Final    DF 05/11/2022 AUTOMATED    Final    Sodium 05/11/2022 140  136 - 145 mmol/L Final    Potassium 05/11/2022 2.5* 3.5 - 5.1 mmol/L Final    Chloride 05/11/2022 100  97 - 108 mmol/L Final    CO2 05/11/2022 29  21 - 32 mmol/L Final    Anion gap 05/11/2022 11  5 - 15 mmol/L Final    Glucose 05/11/2022 65  65 - 100 mg/dL Final    BUN 05/11/2022 13  6 - 20 MG/DL Final    Creatinine 05/11/2022 0.79  0.55 - 1.02 MG/DL Final    BUN/Creatinine ratio 05/11/2022 16  12 - 20   Final    GFR est AA 05/11/2022 >60  >60 ml/min/1.73m2 Final    GFR est non-AA 05/11/2022 >60  >60 ml/min/1.73m2 Final    Calcium 05/11/2022 9.4  8.5 - 10.1 MG/DL Final    Bilirubin, total 05/11/2022 0.4  0.2 - 1.0 MG/DL Final    ALT (SGPT) 05/11/2022 28  12 - 78 U/L Final    AST (SGOT) 05/11/2022 23  15 - 37 U/L Final    Alk.  phosphatase 05/11/2022 86  45 - 117 U/L Final    Protein, total 05/11/2022 8.2  6.4 - 8.2 g/dL Final    Albumin 05/11/2022 3.8  3.5 - 5.0 g/dL Final    Globulin 05/11/2022 4.4* 2.0 - 4.0 g/dL Final    A-G Ratio 05/11/2022 0.9* 1.1 - 2.2   Final    AMPHETAMINES 05/11/2022 Negative  NEG   Final    BARBITURATES 05/11/2022 Negative  NEG   Final    BENZODIAZEPINES 05/11/2022 Negative  NEG   Final    COCAINE 05/11/2022 Negative  NEG   Final    METHADONE 05/11/2022 Negative  NEG   Final    OPIATES 05/11/2022 Negative  NEG   Final    PCP(PHENCYCLIDINE) 05/11/2022 Negative  NEG   Final    THC (TH-CANNABINOL) 05/11/2022 Negative  NEG   Final    Drug screen comment 05/11/2022 (NOTE)   Final    ALCOHOL(ETHYL),SERUM 05/11/2022 <10  <10 MG/DL Final    SARS-CoV-2 by PCR 05/11/2022 Not detected  NOTD   Final    Influenza A by PCR 05/11/2022 Not detected    Final    Influenza B by PCR 05/11/2022 Not detected    Final    Color 05/11/2022 YELLOW/STRAW    Final    Appearance 05/11/2022 CLEAR  CLEAR   Final    Specific gravity 05/11/2022 1.030  1.003 - 1.030   Final    pH (UA) 05/11/2022 6.5  5.0 - 8.0   Final    Protein 05/11/2022 Negative  NEG mg/dL Final    Glucose 05/11/2022 >1,000* NEG mg/dL Final    Ketone 05/11/2022 TRACE* NEG mg/dL Final    Bilirubin 05/11/2022 Negative  NEG   Final    Blood 05/11/2022 Negative  NEG   Final    Urobilinogen 05/11/2022 0.2  0.2 - 1.0 EU/dL Final    Nitrites 05/11/2022 Negative  NEG   Final    Leukocyte Esterase 05/11/2022 Negative  NEG   Final    Pregnancy test,urine (POC) 05/11/2022 Negative  NEG   Final    Sodium 05/11/2022 138  136 - 145 mmol/L Final    Potassium 05/11/2022 2.8* 3.5 - 5.1 mmol/L Final    Chloride 05/11/2022 100  97 - 108 mmol/L Final    CO2 05/11/2022 29  21 - 32 mmol/L Final    Anion gap 05/11/2022 9  5 - 15 mmol/L Final    Glucose 05/11/2022 96  65 - 100 mg/dL Final    BUN 05/11/2022 17  6 - 20 MG/DL Final    Creatinine 05/11/2022 1.02  0.55 - 1.02 MG/DL Final    BUN/Creatinine ratio 05/11/2022 17  12 - 20   Final    GFR est AA 05/11/2022 >60  >60 ml/min/1.73m2 Final    GFR est non-AA 05/11/2022 58* >60 ml/min/1.73m2 Final    Calcium 05/11/2022 9.2  8.5 - 10.1 MG/DL Final        RADIOLOGY REPORTS:  Results from [place] encounter on 05/04/22    XR CHEST PA LAT    Narrative  EXAM: XR CHEST PA LAT    INDICATION: Chest Pain    COMPARISON: 6/3/2019. FINDINGS: PA and lateral radiographs of the chest demonstrate clear lungs. The  cardiac and mediastinal contours and pulmonary vascularity are normal.  Degenerative changes are seen in the thoracic spine. Impression  No acute abnormality. XR CHEST PA LAT    Result Date: 5/4/2022  EXAM: XR CHEST PA LAT INDICATION: Chest Pain COMPARISON: 6/3/2019. FINDINGS: PA and lateral radiographs of the chest demonstrate clear lungs. The cardiac and mediastinal contours and pulmonary vascularity are normal. Degenerative changes are seen in the thoracic spine. No acute abnormality. CT SPINE CERV WO CONT    Result Date: 5/4/2022  EXAM: CT SPINE CERV WO CONT CLINICAL HISTORY: neck pain INDICATION: neck pain COMPARISON:  None TECHNIQUE:  Axial neck CT was performed. Noncontrast imaging obtained. Coronal and sagittal reconstructions were performed. CT dose reduction was achieved through use of a standardized protocol tailored for this examination and automatic exposure control for dose modulation. Osseous/bone algorithm was utilized. FINDINGS: The vertebral bodies are anatomically aligned. There is no evidence of fracture or subluxation. The prevertebral soft tissues are grossly within normal limits. The atlantodental interval is within normal limits. The craniocervical junction is intact. Large posterior and anterior osteophytes. Severe multilevel canal stenoses. Cord compression at C5-6 is likely. Multilevel severe foraminal stenoses as well. No apical pneumothorax. There is no acute fracture or dislocation identified. Severe multilevel chronic degenerative change with multiple anterior and posterior disc osteophyte. Severe canal and foraminal stenoses with multilevel cord compression chronic and degenerative in nature is likely. MRI of the cervical spine for further delineation on an nonemergent basis is recommended.                MEDICATIONS       ALL MEDICATIONS  Current Facility-Administered Medications   Medication Dose Route Frequency    OLANZapine (ZyPREXA) tablet 5 mg  5 mg Oral Q6H PRN    haloperidol lactate (HALDOL) injection 5 mg  5 mg IntraMUSCular Q6H PRN    benztropine (COGENTIN) tablet 1 mg  1 mg Oral BID PRN    diphenhydrAMINE (BENADRYL) injection 50 mg  50 mg IntraMUSCular BID PRN    LORazepam (ATIVAN) injection 1 mg  1 mg IntraMUSCular Q4H PRN    traZODone (DESYREL) tablet 50 mg  50 mg Oral QHS PRN    acetaminophen (TYLENOL) tablet 650 mg  650 mg Oral Q4H PRN    magnesium hydroxide (MILK OF MAGNESIA) 400 mg/5 mL oral suspension 30 mL  30 mL Oral DAILY PRN    potassium bicarb-citric acid (EFFER-K) tablet 20 mEq  20 mEq Oral BID    FLUoxetine (PROzac) capsule 20 mg  20 mg Oral DAILY    LORazepam (ATIVAN) tablet 1 mg  1 mg Oral Q6H PRN      SCHEDULED MEDICATIONS  Current Facility-Administered Medications   Medication Dose Route Frequency    potassium bicarb-citric acid (EFFER-K) tablet 20 mEq  20 mEq Oral BID    FLUoxetine (PROzac) capsule 20 mg  20 mg Oral DAILY                ASSESSMENT & PLAN        The patient, Elif Mercado, is a 48 y.o.  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 She would benefit from an SSRI which is the favored agent over a TCA for anxiety, as her hair pulling seems secondary to anxiety rather than a compulsive behavior.     Plan:  - START Prozac 20 mg QDAY for MDD and anxiety  - DISCONTINUE Anafrinil due to poor efficacy, not 1st line agent  - START Ativan 1 mg BID PRN anxiety  - DISCONTINUE Triamterene due to normal BP, K+ wasting  - IGM therapy as tolerated  - Expand database / obtain collateral  - Dispo planning (home when stable)           A coordinated, multidisplinary treatment team (includes the nurse, unit pharmacist,  and writer) round was conducted for this initial evaluation with the patient present. The following regarding medications was addressed during rounds with patient: the risks and benefits of the proposed medication. The patient was given the opportunity to ask questions. Informed consent given to the use of the above medications. I will continue to adjust psychiatric and non-psychiatric medications (see above \"medication\" section and orders section for details) as deemed appropriate & based upon diagnoses and response to treatment. I have reviewed admission (and previous/old) labs and medical tests in the EHR and or transferring hospital documents. I will continue to order blood tests/labs and diagnostic tests as deemed appropriate and review results as they become available (see orders for details). I have reviewed old psychiatric and medical records available in the EHR. I Will order additional psychiatric records from other institutions to further elucidate the nature of patient's psychopathology and review once available. I will gather additional collateral information from friends, family and o/p treatment team to further elucidate the nature of patient's psychopathology and baselline level of psychiatric functioning. I certify that this patient's inpatient psychiatric hospital services are required for treatment that could reasonably be expected to improve the patient's condition, or for diagnostic study, and that the patient continues to need, on a daily basis, active treatment furnished directly by or requiring the supervision of inpatient psychiatric facility personnel. In addition, the hospital records show that services furnished were intensive treatment services, admission or related services, or equivalent services.       ESTIMATED LENGTH OF STAY:  2-3 days       STRENGTHS:  Exercising self-direction/Resourceful, Access to housing/residential stability and Interpersonal/supportive relationships (family, friends, peers)                                        SIGNED:    Nam Lowyr MD  5/12/2022

## 2022-05-12 NOTE — PROGRESS NOTES
[facility] Medication Reconciliation     Information obtained from: Patient interview, , RxQuery  RxQuery data available1: Yes    Comments/recommendations:  Patient is a good historian, able to name what medications she is taking (\"takes seven medications total\")  Reports taking prozac in the past and responded well to it  Metformin is prescribed as 1000mg BID. However, patient reports taking just 1 tablet a day due to GI side effects (constipation). Provided education about an alternative (metformin ER) with less GI SE to potentially discuss with PCP about   Clomipramine 50mg was prescribed as 2 capsules once daily. However, patient had been taking this medication as 1 capsule once daily and only started taking 2 capsules in a day yesterday    Medication changes (since last review): Added  Atorvastatin 40mg once daily  Jardiance 25mg once daily  Glipizide ER 10mg once daily  Removed  Hydroxyzine  Lorazepam      The [place] Prescription Monitoring Program () was accessed to determine fill history of any controlled medications. No history in past year   1RxQuery pharmacy benefit data reflects medications filled and processed through the patient's insurance, however                this data does NOT capture whether the medication was picked up or is currently being taken by the patient. Patient allergies: Allergies as of 05/11/2022 - Fully Reviewed 05/11/2022   Allergen Reaction Noted    Lisinopril Rash 03/24/2016         Prior to Admission Medications   Prescriptions Last Dose Informant Patient Reported? Taking?   atorvastatin (LIPITOR) 40 mg tablet  Self Yes Yes   Sig: Take 40 mg by mouth daily. Indications: high cholesterol   clomiPRAMINE (ANAFRANIL) 50 mg capsule  Self Yes Yes   Sig: Take 50 mg by mouth daily (with breakfast). Indications: depression   empagliflozin (Jardiance) 25 mg tablet  Self Yes Yes   Sig: Take 25 mg by mouth daily.  Indications: type 2 diabetes mellitus glipiZIDE SR (GLUCOTROL XL) 10 mg CR tablet  Self Yes Yes   Sig: Take 10 mg by mouth daily. Indications: type 2 diabetes mellitus   losartan (COZAAR) 50 mg tablet  Self Yes Yes   Sig: Take 50 mg by mouth daily. Indications: HYPERTENSION   metFORMIN (GLUCOPHAGE) 1,000 mg tablet  Self Yes No   Sig: Take 1,000 mg by mouth daily. Indications: type 2 diabetes mellitus   triamterene-hydrochlorothiazide (MAXZIDE) 37.5-25 mg per tablet  Self Yes Yes   Sig: Take 1 Tab by mouth daily.  Indications: HYPERTENSION      Facility-Administered Medications: None          Thank you,  Edda Muro, PHARMD

## 2022-05-12 NOTE — PROGRESS NOTES
Auto-MST trigger per RN assessment. No acute nutrition issues identified. Pt meal compliant. Suggest CC diet with 4 CHO choices per meal given pt's hx DM. Current A1c 6.2. Other hx notable for HTN, elev BMI.   Ht: 5'3\"  Wt: 216 lb  BMI: 38.26 kg/(m^2) c/w obesity class II  Est energy needs: 1815 kcal, 68 g protein, 2200 mL fluids  Pt will consume > 75% of meals at follow up 7-10 days  LOS, MST

## 2022-05-12 NOTE — PROGRESS NOTES
Patient received out in the day room. Denies SI/HI/AVH, depression and anxiety. Calm, cooperative, pleasant, out on the unit, attending groups, interacting with her peers. Taking medications as prescribed. Will continue to monitor for safety.

## 2022-05-12 NOTE — PROGRESS NOTES
[facility]acy Medication Reconciliation     Information obtained from: Patient interview, , RxQuery  RxQuery data available1: Yes    Comments/recommendations:  Patient is a good historian, able to name what medications she is taking (\"takes seven medications total\")  Reports taking prozac in the past and responded well to it  Metformin is prescribed as 1000mg BID. However, patient reports taking just 1 tablet a day due to GI side effects (constipation). Provided education about an alternative (metformin ER) with less GI SE to potentially discuss with PCP about   Clomipramine 50mg was prescribed as 2 capsules once daily. However, patient had been taking this medication as 1 capsule once daily and only started taking 2 capsules in a day yesterday    Medication changes (since last review): Added  Atorvastatin 40mg once daily  Jardiance 25mg once daily  Glipizide ER 10mg once daily  Removed  Hydroxyzine  Lorazepam      The [place] Prescription Monitoring Program () was accessed to determine fill history of any controlled medications. No history in past year   1RxQuery pharmacy benefit data reflects medications filled and processed through the patient's insurance, however                this data does NOT capture whether the medication was picked up or is currently being taken by the patient. Patient allergies: Allergies as of 05/11/2022 - Fully Reviewed 05/11/2022   Allergen Reaction Noted    Lisinopril Rash 03/24/2016         Prior to Admission Medications   Prescriptions Last Dose Informant Patient Reported? Taking?   atorvastatin (LIPITOR) 40 mg tablet  Self Yes Yes   Sig: Take 40 mg by mouth daily. Indications: high cholesterol   clomiPRAMINE (ANAFRANIL) 50 mg capsule  Self Yes Yes   Sig: Take 50 mg by mouth daily (with breakfast). Indications: depression   empagliflozin (Jardiance) 25 mg tablet  Self Yes Yes   Sig: Take 25 mg by mouth daily.  Indications: type 2 diabetes mellitus glipiZIDE SR (GLUCOTROL XL) 10 mg CR tablet  Self Yes Yes   Sig: Take 10 mg by mouth daily. Indications: type 2 diabetes mellitus   losartan (COZAAR) 50 mg tablet  Self Yes Yes   Sig: Take 50 mg by mouth daily. Indications: HYPERTENSION   metFORMIN (GLUCOPHAGE) 1,000 mg tablet  Self Yes No   Sig: Take 1,000 mg by mouth daily. Indications: type 2 diabetes mellitus   triamterene-hydrochlorothiazide (MAXZIDE) 37.5-25 mg per tablet  Self Yes Yes   Sig: Take 1 Tab by mouth daily.  Indications: HYPERTENSION      Facility-Administered Medications: None          Thank you,  Ira Willis, PHARMD

## 2022-05-12 NOTE — BH NOTES
PSYCHOSOCIAL ASSESSMENT  :Patient identifying info:   Mable Vasquez is a 48 y.o., female admitted 5/11/2022  7:55 AM     Presenting problem and precipitating factors: Patient has been trying to voluntary admit herself to inpatient behavioral health due to increased anxiety, panic, and self-injury (hair pulling). Patient has been engaging in excessive hair pulling since last Friday and having sever panic attacks due to work and relationship stress. Patient has been trying to get seen by her outpatient psychiatrist for medication management, but he was not available. Patient reports that she has not been able to eat or sleep for the past three days. Patient denies SI/HIAH/VH. Patient reports increased anxiety and panic. Mental status assessment: Patient presented with a pleaseant mood, bright affect, and linear thought process. Patient reports that she ended up pulling out almost all of her hair since last Friday due to increased stress at work and with her partner. Patient reports that she tried to get seen by her outpatient doctor, but he was not able to accomodate a visit. She reports that she has a hx of hair pulling, last time she engaged in this behavior was in 2020 in which she was hospitalilzed at [facility] [place].  Patient reports that her medications are not helping and she currently dooes not have outpatient therapist.     Eloisa Arrington, motivated, employed, financially stable, family support    Collateral information: Dr. Paris Rod, mom     Current psychiatric /substance abuse providers and contact info: Dr. Venessa Aguilar [facility]    Previous psychiatric/substance abuse providers and response to treatment: none    Family history of mental illness or substance abuse: none    Substance abuse history:  UDS-negative   Social History     Tobacco Use    Smoking status: Never Smoker    Smokeless tobacco: Never Used    Tobacco comment: n/a never smoked tobacco   Substance Use Topics    Alcohol use: No       History of biomedical complications associated with substance abuse: none    Patient's current acceptance of treatment or motivation for change: motivated    Family constellation: patient is single, never  with no kids, patient is in relationship    Is significant other involved? 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    Education/work history: Bachelor's degree     Have you been licensed as a health care professional (current or ): n/a    Describe coping skills:limited and ineffective     Ivan Terrell  2022

## 2022-05-12 NOTE — PROGRESS NOTES
Patient received an evening snack and was observed interacting with peers in dayroom. Patient observed returning to her room at around 2200. Patient observed resting/sleeping in bed with no concerns.     Problem: Anxiety  Goal: *Alleviation of anxiety  Outcome: Progressing Towards Goal     Problem: Anxiety  Goal: *Alleviation of anxiety  Outcome: Progressing Towards Goal

## 2022-05-12 NOTE — PROGRESS NOTES
Behavioral Services  Medicare Certification Upon Admission    I certify that this patient's inpatient psychiatric hospital admission is medically necessary for:    [x] (1) Treatment which could reasonably be expected to improve this patient's condition,       [x] (2) Or for diagnostic study;     AND     [x](2) The inpatient psychiatric services are provided while the individual is under the care of a physician and are included in the individualized plan of care.     Estimated length of stay/service 5-7 days    Plan for post-hospital care home    Electronically signed by Susan Spears MD on 5/12/2022 at 8:35 AM

## 2022-05-12 NOTE — PROGRESS NOTES
Patient is an 48 tear old AA female admitted to unit with a primary dx of Anxiety D/O, Acute Stress D/O and Depressive D/O. Patient admitted from [facility] as an VOL admit. Patient alert and oriented X4. Patient admitted due to increased symptoms of anxiety, paranoia and depression. As stated in admission documentation, Patient began to exhibit self-destructive bxs which started this past Thurs/Fri. 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 Patient began to experience increased episodes of panic attacks, verbal outburst and trichotillomania. Patient reported that she has been compliant in taking all medications, however realized within the past day that she should be taking Anafranil BID instead once a day. Patient was scheduled to be seen by her psychiatrist today and therapist on 05/13/22. During assessment, Patient denied any symptoms of depression/anxiety. Patient denied hallucinations and did not endorse S/I or H/I. Medical co-morbidities include HTN and Type 2 diabetes. Prior to unit admission, Patient's potassium level was assessed as 2.8. Dr. Mago Velazquez made aware, with goal of 3.1. Staff will continue to monitor. Patient has allergies to Lisinopril. Patient's home medications listed as: Atarax, Metformin, Anafranil, Maxzide, Cozaar and Ativan. UDS negative. BAL 0. Patient cooperative with admission process. Patient oriented to unit. Admission packet and confidentiality code given. Q15 minute checks initiated for safety. On-call NP contacted for [address] standing orders.

## 2022-05-13 PROCEDURE — 99232 SBSQ HOSP IP/OBS MODERATE 35: CPT | Performed by: PSYCHIATRY & NEUROLOGY

## 2022-05-13 PROCEDURE — 65220000003 HC RM SEMIPRIVATE PSYCH

## 2022-05-13 PROCEDURE — 74011250637 HC RX REV CODE- 250/637: Performed by: NURSE PRACTITIONER

## 2022-05-13 PROCEDURE — 74011250637 HC RX REV CODE- 250/637: Performed by: PSYCHIATRY & NEUROLOGY

## 2022-05-13 RX ORDER — FLUOXETINE HYDROCHLORIDE 20 MG/1
20 CAPSULE ORAL DAILY
Qty: 30 CAPSULE | Refills: 1 | Status: SHIPPED | OUTPATIENT
Start: 2022-05-13 | End: 2022-08-05

## 2022-05-13 RX ORDER — TRAZODONE HYDROCHLORIDE 50 MG/1
50 TABLET ORAL
Qty: 30 TABLET | Refills: 1 | Status: SHIPPED | OUTPATIENT
Start: 2022-05-13 | End: 2022-08-05

## 2022-05-13 RX ORDER — LORAZEPAM 1 MG/1
1 TABLET ORAL
Qty: 30 TABLET | Refills: 0 | Status: ON HOLD | OUTPATIENT
Start: 2022-05-13 | End: 2022-07-27

## 2022-05-13 RX ADMIN — POTASSIUM BICARBONATE 20 MEQ: 782 TABLET, EFFERVESCENT ORAL at 08:22

## 2022-05-13 RX ADMIN — ATORVASTATIN CALCIUM 40 MG: 40 TABLET, FILM COATED ORAL at 08:22

## 2022-05-13 RX ADMIN — TRAZODONE HYDROCHLORIDE 50 MG: 50 TABLET ORAL at 23:05

## 2022-05-13 RX ADMIN — LOSARTAN POTASSIUM 50 MG: 25 TABLET, FILM COATED ORAL at 08:22

## 2022-05-13 RX ADMIN — METFORMIN HYDROCHLORIDE 1000 MG: 500 TABLET ORAL at 08:22

## 2022-05-13 RX ADMIN — FLUOXETINE 20 MG: 20 CAPSULE ORAL at 08:22

## 2022-05-13 RX ADMIN — POTASSIUM BICARBONATE 20 MEQ: 782 TABLET, EFFERVESCENT ORAL at 17:11

## 2022-05-13 RX ADMIN — OLANZAPINE 5 MG: 5 TABLET, FILM COATED ORAL at 10:30

## 2022-05-13 RX ADMIN — TRAZODONE HYDROCHLORIDE 50 MG: 50 TABLET ORAL at 00:13

## 2022-05-13 NOTE — GROUP NOTE
[facility]  GROUP DOCUMENTATION INDIVIDUAL                                                                          Group Therapy Note    Date: 5/13/2022    Group Start Time: 1400  Group End Time: 1500  Group Topic: Recreational/Music Therapy    [facility] [place] 3 ACUTE BEHAV [facility]    [place], [address] [facility] GROUP DOCUMENTATION GROUP    Group Therapy Note    Attendees: 4         Attendance: Attended    Patient's Goal:  To concentrate on selected task    Interventions/techniques: Supported-crafts,games,music    Follows Directions:  Followed directions    Interactions: Interacted appropriately    Mental Status: Calm    Behavior/appearance: Attentive and Cooperative    Goals Achieved: Able to engage in interactions and Able to listen to others-worked on selected task    Rhonda Tarango

## 2022-05-13 NOTE — PROGRESS NOTES
Problem: Anxiety  Goal: *Alleviation of anxiety  Outcome: Progressing Towards Goal   [id number]: Report received from outgoing RN. Assumed care of Patient. Patient is up and visible in day room. Patient is engaging with peers. Patient denies any anxiety, depression, A/V/H, pain . H/I or S/I.  2000 to 2230: hourly rounding maintained and Patient is sitting in day room. 2300 to 0200: Patient observed in room quiet in bed. Patient up and requested PRN for  Sleep. PRN Trazodone given by Nurse RONIT Vaughn RN.  [id number]:Patient resting in bed quietly if sleep during hourly rounds. Patient slept about   6 Hrs.

## 2022-05-13 NOTE — PROGRESS NOTES
Pharmacist Discharge Medication Reconciliation    Discharging Provider: Dr. José Hardy PMH:   Past Medical History:   Diagnosis Date    Anxiety     Depression     ANXIETY & DEPRESSION    Diabetes ([facility]     TYPE II    Hypertension     Infected sebaceous cyst, upper back 4/10/2019    Panic attack     Suicidal thoughts     Vision decreased      Chief Complaint for this Admission:   Chief Complaint   Patient presents with    Anxiety     Allergies: Lisinopril    Discharge Medications:   Current Discharge Medication List        START taking these medications    Details   FLUoxetine (PROzac) 20 mg capsule Take 1 Capsule by mouth daily. Indications: major depressive disorder, panic disorder  Qty: 30 Capsule, Refills: 1  Start date: 5/13/2022      LORazepam (ATIVAN) 1 mg tablet Take 1 Tablet by mouth two (2) times daily as needed for Anxiety. Max Daily Amount: 2 mg. Indications: anxious  Qty: 30 Tablet, Refills: 0  Start date: 5/13/2022    Associated Diagnoses: Panic disorder without agoraphobia with severe panic attacks      traZODone (DESYREL) 50 mg tablet Take 1 Tablet by mouth nightly as needed for Sleep (For insomnia). Indications: insomnia associated with depression  Qty: 30 Tablet, Refills: 1  Start date: 5/13/2022           CONTINUE these medications which have NOT CHANGED    Details   atorvastatin (LIPITOR) 40 mg tablet Take 40 mg by mouth daily. Indications: high cholesterol      empagliflozin (Jardiance) 25 mg tablet Take 25 mg by mouth daily. Indications: type 2 diabetes mellitus      glipiZIDE SR (GLUCOTROL XL) 10 mg CR tablet Take 10 mg by mouth daily. Indications: type 2 diabetes mellitus      losartan (COZAAR) 50 mg tablet Take 50 mg by mouth daily. Indications: HYPERTENSION      metFORMIN (GLUCOPHAGE) 1,000 mg tablet Take 1,000 mg by mouth daily.  Indications: type 2 diabetes mellitus           STOP taking these medications       clomiPRAMINE (ANAFRANIL) 50 mg capsule Comments:   Reason for Stopping:         triamterene-hydrochlorothiazide (MAXZIDE) 37.5-25 mg per tablet Comments:   Reason for Stopping:               The patient's chart, MAR and AVS were reviewed by Blessing Mccrary PHARMD.

## 2022-05-13 NOTE — BH NOTES
Behavioral Health Treatment Team Note     Patient goal(s) for today: continue taking medication as prescribed, engage in unit activities, participate in hygiene/ADLs, follow directions from staff, contact support team, prepare for discharge  Treatment team focus/goals: medication adjustments, dispo planning, update support system    Progress note: Pt was seen in treatment team this morning. Pt is alert and oriented. Pt denies SI/HI. Pt's mood is euthymic, affect is full range. Pt's thought process is logical. Pt's insight and judgment is fair, reliability is fair. Social work department will continue to coordinate discharge plans. LOS:  2  Expected LOS: 5/16? Financial concerns/prescription coverage: [facility] Medicaid   Date of last family contact:  Mother      Family requesting physician contact today: N/A    Discharge plan: Home  Guns in the home: None       Outpatient provider(s): [facility]    Participating treatment team members: Chuck Howell and Dr. Ivan Sierra

## 2022-05-13 NOTE — DISCHARGE SUMMARY
PSYCHIATRIC DISCHARGE SUMMARY         IDENTIFICATION:    Patient Name  Lydia Guillermo   Date of Birth 1975   [facility] [id number]   Medical Record Number  [id number]      Age  48 y.o. PCP Mikaela Rossi NP   Admit date:  5/11/2022    Discharge date: 5/16/2022   Room Number  320/01  @ [facility] (formerly [facility])[3]   Date of Service  5/16/2022            TYPE OF DISCHARGE: REGULAR               CONDITION AT DISCHARGE: improved and fair       PROVISIONAL & DISCHARGE DIAGNOSES:    Problem List  Date Reviewed: 7/17/2019          Codes Class    Depression ICD-10-CM: F32. A  ICD-9-CM: 311         * (Principal) Moderate episode of recurrent major depressive disorder (HCC) ICD-10-CM: F33.1  ICD-9-CM: 296.32         Obesity, morbid (HCC) ICD-10-CM: E66.01  ICD-9-CM: 278.01         Infected sebaceous cyst, upper back ICD-10-CM: L72.3, L08.9  ICD-9-CM: 706.2         Trichotillomania ICD-10-CM: F63.3  ICD-9-CM: 312.39         Depression with anxiety ICD-10-CM: F41.8  ICD-9-CM: 300.4         Hypertension ICD-10-CM: I10  ICD-9-CM: 401.9         Type II diabetes mellitus ([facility] [address] ICD-10-CM: E11.9  ICD-9-CM: 250.00         Panic disorder without agoraphobia with severe panic attacks ICD-10-CM: F41.0  ICD-9-CM: 300.01         Depressive disorder ICD-10-CM: F32. A  ICD-9-CM: [address] Problems    *Moderate episode of recurrent major depressive disorder (HCC)      Trichotillomania      Panic disorder without agoraphobia with severe panic attacks        DISCHARGE DIAGNOSIS:   Axis I:  SEE ABOVE  Axis II: SEE ABOVE  Axis III: SEE ABOVE  Axis IV:  lack of structure  Axis V:  40 on admission, 70 on discharge     CC & HISTORY OF PRESENT ILLNESS:  \"panic attacks / self mutilation\"    The patient, Lydia Guillermo, is a 48 y.o.   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 Additional symptomatology include pulling out most of her hair. The above symptoms have been present for 2+ weeks. These symptoms are of moderate to high severity. These symptoms are constant in nature. The patient's condition has been precipitated by psychosocial stressors. UDS: negative; BAL=0.      The patient presented to the ED 3x in the past week due to anxiety attacks. She has been having increasingly debilitating panic attacks, and has been unable to function in the community. The patient denied active thoughts of self harm but was admitted as there was no better alternative to address her worsening symptoms and she was in severe distress. The patient has been on antidepressant medication for OCD and for depression with some success but a recent relationship stressor triggered the episode. Of note, patient with K+ 2.5 in the ED.     The patient is a good historian. The patient corroborates the above narrative. The patient contracts for safety on the unit and gives consent for the team to contact collateral. The patient is amenable to initiating treatment while on the unit. The patient states that she has been feeling anxious about her relationship and having more difficulty with her ADLs due to the panic attacks. Discussed SSRIs for panic and benzodiazepines for anxiety, patient agrees to both agents after a discussion of risk and benefit.        SOCIAL HISTORY:    Social History     Socioeconomic History    Marital status: SINGLE     Spouse name: Not on file    Number of children: Not on file    Years of education: Not on file    Highest education level: Not on file   Occupational History    Not on file   Tobacco Use    Smoking status: Never Smoker    Smokeless tobacco: Never Used    Tobacco comment: n/a never smoked tobacco   Substance and Sexual Activity    Alcohol use: No    Drug use: No    Sexual activity: Yes     Partners: Male     Birth control/protection: None   Other Topics Concern    Not on file   Social History Narrative    Not on file     Social Determinants of Health     Financial Resource Strain:     Difficulty of Paying Living Expenses: Not on file   Food Insecurity:     Worried About Running Out of Food in the Last Year: Not on file    Jameson of Food in the Last Year: Not on file   Transportation Needs:     Lack of Transportation (Medical): Not on file    Lack of Transportation (Non-Medical): Not on file   Physical Activity:     Days of Exercise per Week: Not on file    Minutes of Exercise per Session: Not on file   Stress:     Feeling of Stress : Not on file   Social Connections:     Frequency of Communication with Friends and Family: Not on file    Frequency of Social Gatherings with Friends and Family: Not on file    Attends Latter-day Services: Not on file    Active Member of [address] [place] [address] [employer] or Organizations: Not on file    Attends Club or Organization Meetings: Not on file    Marital Status: Not on file   Intimate Partner Violence:     Fear of Current or Ex-Partner: Not on file    Emotionally Abused: Not on file    Physically Abused: Not on file    Sexually Abused: Not on file   Housing Stability:     Unable to Pay for Housing in the Last Year: Not on file    Number of [place] in the Last Year: Not on file    Unstable Housing in the Last Year: Not on file      FAMILY HISTORY:   Family History   Problem Relation Age of Onset    Diabetes Mother     Hypertension Mother     Cancer Father         LUNG CA. SMOKER             HOSPITALIZATION COURSE:    Tessa Stephens was admitted to the inpatient psychiatric unit [facility] for acute psychiatric stabilization in regards to symptomatology as described in the HPI above. The differential diagnosis at time of admission included: MDD vs adjustment disorder. While on the unit Tessa Stephens was involved in individual, group, occupational and milieu therapy.   Psychiatric medications were adjusted during this hospitalization including Prozac and Abilify. Kelsy Richards demonstrated a slow, but progressive improvement in overall condition. Much of patient's initial presentation appeared to be related to situational stressors and psychological factors. Please see individual progress notes for more specific details regarding patient's hospitalization course. Patient with request for discharge today. There are no grounds to seek a TDO. At time of discharge, Kelsy Richards is without significant problems of depression, psychosis, or valentín. Patient free of suicidal and homicidal ideations (appears to be at very low risk of suicide or homicide) and reports many positive predictive factors in terms of not attempting suicide or homicide. Overall presentation at time of discharge is most consistent with the diagnosis of major depressive disorder and panic disorder without agoraphobia. Patient has maximized benefit to be derived from acute inpatient psychiatric treatment. All members of the treatment team concur with each other in regards to plans for discharge today. Patient and family are aware and in agreement with discharge and discharge plan. LABS AND IMAGAING:    Labs Reviewed   CBC WITH AUTOMATED DIFF - Abnormal; Notable for the following components:       Result Value    PLATELET 806 (*)     IMMATURE GRANULOCYTES 1 (*)     ABS. IMM.  GRANS. 0.1 (*)     All other components within normal limits   METABOLIC PANEL, COMPREHENSIVE - Abnormal; Notable for the following components:    Potassium 2.5 (*)     Globulin 4.4 (*)     A-G Ratio 0.9 (*)     All other components within normal limits   URINALYSIS W/ RFLX MICROSCOPIC - Abnormal; Notable for the following components:    Glucose >1,000 (*)     Ketone TRACE (*)     All other components within normal limits   METABOLIC PANEL, BASIC - Abnormal; Notable for the following components:    Potassium 2.8 (*)     GFR est non-AA 58 (*)     All other components within normal limits   HEMOGLOBIN A1C WITH EAG - Abnormal; Notable for the following components:    Hemoglobin A1c 7.1 (*)     All other components within normal limits   METABOLIC PANEL, BASIC - Abnormal; Notable for the following components:    Glucose 151 (*)     All other components within normal limits   METABOLIC PANEL, BASIC - Abnormal; Notable for the following components:    Glucose 143 (*)     Calcium 8.4 (*)     All other components within normal limits   COVID-19 WITH INFLUENZA A/B   DRUG SCREEN, URINE   ETHYL ALCOHOL   HCG URINE, QL. - POC     No results found for: DS35, PHEN, PHENO, PHENT, DILF, DS39, PHENY, PTN, VALF2, VALAC, VALP, VALPR, DS6, CRBAM, CRBAMP, CARB2, XCRBAM  Admission on 05/11/2022, Discharged on 05/16/2022   Component Date Value Ref Range Status    WBC 05/11/2022 9.8  3.6 - 11.0 K/uL Final    RBC 05/11/2022 4.59  3.80 - 5.20 M/uL Final    HGB 05/11/2022 12.0  11.5 - 16.0 g/dL Final    HCT 05/11/2022 38.2  35.0 - 47.0 % Final    MCV 05/11/2022 83.2  80.0 - 99.0 FL Final    MCH 05/11/2022 26.1  26.0 - 34.0 PG Final    MCHC 05/11/2022 31.4  30.0 - 36.5 g/dL Final    RDW 05/11/2022 14.5  11.5 - 14.5 % Final    PLATELET 84/76/4520 381* 150 - 400 K/uL Final    MPV 05/11/2022 9.4  8.9 - 12.9 FL Final    NRBC 05/11/2022 0.0  0  WBC Final    ABSOLUTE NRBC 05/11/2022 0.00  0.00 - 0.01 K/uL Final    NEUTROPHILS 05/11/2022 71  32 - 75 % Final    LYMPHOCYTES 05/11/2022 23  12 - 49 % Final    MONOCYTES 05/11/2022 5  5 - 13 % Final    EOSINOPHILS 05/11/2022 0  0 - 7 % Final    BASOPHILS 05/11/2022 0  0 - 1 % Final    IMMATURE GRANULOCYTES 05/11/2022 1* 0.0 - 0.5 % Final    ABS. NEUTROPHILS 05/11/2022 7.0  1.8 - 8.0 K/UL Final    ABS. LYMPHOCYTES 05/11/2022 2.2  0.8 - 3.5 K/UL Final    ABS. MONOCYTES 05/11/2022 0.5  0.0 - 1.0 K/UL Final    ABS. EOSINOPHILS 05/11/2022 0.0  0.0 - 0.4 K/UL Final    ABS.  BASOPHILS 05/11/2022 0.0  0.0 - 0.1 K/UL Final    ABS. IMM. GRANS. 05/11/2022 0.1* 0.00 - 0.04 K/UL Final    DF 05/11/2022 AUTOMATED    Final    Sodium 05/11/2022 140  136 - 145 mmol/L Final    Potassium 05/11/2022 2.5* 3.5 - 5.1 mmol/L Final    Chloride 05/11/2022 100  97 - 108 mmol/L Final    CO2 05/11/2022 29  21 - 32 mmol/L Final    Anion gap 05/11/2022 11  5 - 15 mmol/L Final    Glucose 05/11/2022 65  65 - 100 mg/dL Final    BUN 05/11/2022 13  6 - 20 MG/DL Final    Creatinine 05/11/2022 0.79  0.55 - 1.02 MG/DL Final    BUN/Creatinine ratio 05/11/2022 16  12 - 20   Final    GFR est AA 05/11/2022 >60  >60 ml/min/1.73m2 Final    GFR est non-AA 05/11/2022 >60  >60 ml/min/1.73m2 Final    Calcium 05/11/2022 9.4  8.5 - 10.1 MG/DL Final    Bilirubin, total 05/11/2022 0.4  0.2 - 1.0 MG/DL Final    ALT (SGPT) 05/11/2022 28  12 - 78 U/L Final    AST (SGOT) 05/11/2022 23  15 - 37 U/L Final    Alk.  phosphatase 05/11/2022 86  45 - 117 U/L Final    Protein, total 05/11/2022 8.2  6.4 - 8.2 g/dL Final    Albumin 05/11/2022 3.8  3.5 - 5.0 g/dL Final    Globulin 05/11/2022 4.4* 2.0 - 4.0 g/dL Final    A-G Ratio 05/11/2022 0.9* 1.1 - 2.2   Final    AMPHETAMINES 05/11/2022 Negative  NEG   Final    BARBITURATES 05/11/2022 Negative  NEG   Final    BENZODIAZEPINES 05/11/2022 Negative  NEG   Final    COCAINE 05/11/2022 Negative  NEG   Final    METHADONE 05/11/2022 Negative  NEG   Final    OPIATES 05/11/2022 Negative  NEG   Final    PCP(PHENCYCLIDINE) 05/11/2022 Negative  NEG   Final    THC (TH-CANNABINOL) 05/11/2022 Negative  NEG   Final    Drug screen comment 05/11/2022 (NOTE)   Final    ALCOHOL(ETHYL),SERUM 05/11/2022 <10  <10 MG/DL Final    SARS-CoV-2 by PCR 05/11/2022 Not detected  NOTD   Final    Influenza A by PCR 05/11/2022 Not detected    Final    Influenza B by PCR 05/11/2022 Not detected    Final    Color 05/11/2022 YELLOW/STRAW    Final    Appearance 05/11/2022 CLEAR  CLEAR   Final    Specific gravity 05/11/2022 1.030 1.003 - 1.030   Final    pH (UA) 05/11/2022 6.5  5.0 - 8.0   Final    Protein 05/11/2022 Negative  NEG mg/dL Final    Glucose 05/11/2022 >1,000* NEG mg/dL Final    Ketone 05/11/2022 TRACE* NEG mg/dL Final    Bilirubin 05/11/2022 Negative  NEG   Final    Blood 05/11/2022 Negative  NEG   Final    Urobilinogen 05/11/2022 0.2  0.2 - 1.0 EU/dL Final    Nitrites 05/11/2022 Negative  NEG   Final    Leukocyte Esterase 05/11/2022 Negative  NEG   Final    Pregnancy test,urine (POC) 05/11/2022 Negative  NEG   Final    Sodium 05/11/2022 138  136 - 145 mmol/L Final    Potassium 05/11/2022 2.8* 3.5 - 5.1 mmol/L Final    Chloride 05/11/2022 100  97 - 108 mmol/L Final    CO2 05/11/2022 29  21 - 32 mmol/L Final    Anion gap 05/11/2022 9  5 - 15 mmol/L Final    Glucose 05/11/2022 96  65 - 100 mg/dL Final    BUN 05/11/2022 17  6 - 20 MG/DL Final    Creatinine 05/11/2022 1.02  0.55 - 1.02 MG/DL Final    BUN/Creatinine ratio 05/11/2022 17  12 - 20   Final    GFR est AA 05/11/2022 >60  >60 ml/min/1.73m2 Final    GFR est non-AA 05/11/2022 58* >60 ml/min/1.73m2 Final    Calcium 05/11/2022 9.2  8.5 - 10.1 MG/DL Final    Hemoglobin A1c 05/14/2022 7.1* 4.0 - 5.6 % Final    Est. average glucose 05/14/2022 157  mg/dL Final    Sodium 05/14/2022 142  136 - 145 mmol/L Final    Potassium 05/14/2022 3.8  3.5 - 5.1 mmol/L Final    Chloride 05/14/2022 103  97 - 108 mmol/L Final    CO2 05/14/2022 29  21 - 32 mmol/L Final    Anion gap 05/14/2022 10  5 - 15 mmol/L Final    Glucose 05/14/2022 151* 65 - 100 mg/dL Final    BUN 05/14/2022 12  6 - 20 MG/DL Final    Creatinine 05/14/2022 0.74  0.55 - 1.02 MG/DL Final    BUN/Creatinine ratio 05/14/2022 16  12 - 20   Final    GFR est AA 05/14/2022 >60  >60 ml/min/1.73m2 Final    GFR est non-AA 05/14/2022 >60  >60 ml/min/1.73m2 Final    Calcium 05/14/2022 8.7  8.5 - 10.1 MG/DL Final    Sodium 05/16/2022 140  136 - 145 mmol/L Final    Potassium 05/16/2022 4.0  3.5 - 5.1 mmol/L Final    Chloride 05/16/2022 104  97 - 108 mmol/L Final    CO2 05/16/2022 30  21 - 32 mmol/L Final    Anion gap 05/16/2022 6  5 - 15 mmol/L Final    Glucose 05/16/2022 143* 65 - 100 mg/dL Final    BUN 05/16/2022 11  6 - 20 MG/DL Final    Creatinine 05/16/2022 0.66  0.55 - 1.02 MG/DL Final    BUN/Creatinine ratio 05/16/2022 17  12 - 20   Final    GFR est AA 05/16/2022 >60  >60 ml/min/1.73m2 Final    GFR est non-AA 05/16/2022 >60  >60 ml/min/1.73m2 Final    Calcium 05/16/2022 8.4* 8.5 - 10.1 MG/DL Final   Admission on 05/04/2022, Discharged on 05/04/2022   Component Date Value Ref Range Status    Ventricular Rate 05/04/2022 104  BPM Final    Atrial Rate 05/04/2022 104  BPM Final    P-R Interval 05/04/2022 120  ms Final    QRS Duration 05/04/2022 68  ms Final    Q-T Interval 05/04/2022 356  ms Final    QTC Calculation (Bezet) 05/04/2022 468  ms Final    Calculated P Axis 05/04/2022 44  degrees Final    Calculated R Axis 05/04/2022 -30  degrees Final    Calculated T Axis 05/04/2022 45  degrees Final    Diagnosis 05/04/2022    Final                    Value:Sinus tachycardia  Left axis deviation  Voltage criteria for left ventricular hypertrophy    When compared with ECG of 04-MAY-2022 11:24,  No significant change  Confirmed by Ruby Johnston M.D., Christiano Whiteside ([id number]) on 5/4/2022 3:14:21 PM      WBC 05/04/2022 6.2  3.6 - 11.0 K/uL Final    RBC 05/04/2022 4.50  3.80 - 5.20 M/uL Final    HGB 05/04/2022 12.3  11.5 - 16.0 g/dL Final    HCT 05/04/2022 37.9  35.0 - 47.0 % Final    MCV 05/04/2022 84.2  80.0 - 99.0 FL Final    MCH 05/04/2022 27.3  26.0 - 34.0 PG Final    MCHC 05/04/2022 32.5  30.0 - 36.5 g/dL Final    RDW 05/04/2022 14.1  11.5 - 14.5 % Final    PLATELET 76/12/4091 430  150 - 400 K/uL Final    MPV 05/04/2022 9.2  8.9 - 12.9 FL Final    NRBC 05/04/2022 0.0  0  WBC Final    ABSOLUTE NRBC 05/04/2022 0.00  0.00 - 0.01 K/uL Final    NEUTROPHILS 05/04/2022 49  32 - 75 % Final    LYMPHOCYTES 05/04/2022 42  12 - 49 % Final    MONOCYTES 05/04/2022 5  5 - 13 % Final    EOSINOPHILS 05/04/2022 3  0 - 7 % Final    BASOPHILS 05/04/2022 1  0 - 1 % Final    IMMATURE GRANULOCYTES 05/04/2022 0  0.0 - 0.5 % Final    ABS. NEUTROPHILS 05/04/2022 3.1  1.8 - 8.0 K/UL Final    ABS. LYMPHOCYTES 05/04/2022 2.6  0.8 - 3.5 K/UL Final    ABS. MONOCYTES 05/04/2022 0.3  0.0 - 1.0 K/UL Final    ABS. EOSINOPHILS 05/04/2022 0.2  0.0 - 0.4 K/UL Final    ABS. BASOPHILS 05/04/2022 0.1  0.0 - 0.1 K/UL Final    ABS. IMM. GRANS. 05/04/2022 0.0  0.00 - 0.04 K/UL Final    DF 05/04/2022 AUTOMATED    Final    Sodium 05/04/2022 140  136 - 145 mmol/L Final    Potassium 05/04/2022 2.9* 3.5 - 5.1 mmol/L Final    Chloride 05/04/2022 106  97 - 108 mmol/L Final    CO2 05/04/2022 27  21 - 32 mmol/L Final    Anion gap 05/04/2022 7  5 - 15 mmol/L Final    Glucose 05/04/2022 126* 65 - 100 mg/dL Final    BUN 05/04/2022 11  6 - 20 MG/DL Final    Creatinine 05/04/2022 0.79  0.55 - 1.02 MG/DL Final    BUN/Creatinine ratio 05/04/2022 14  12 - 20   Final    GFR est AA 05/04/2022 >60  >60 ml/min/1.73m2 Final    GFR est non-AA 05/04/2022 >60  >60 ml/min/1.73m2 Final    Calcium 05/04/2022 9.2  8.5 - 10.1 MG/DL Final    Bilirubin, total 05/04/2022 0.5  0.2 - 1.0 MG/DL Final    ALT (SGPT) 05/04/2022 24  12 - 78 U/L Final    AST (SGOT) 05/04/2022 22  15 - 37 U/L Final    Alk. phosphatase 05/04/2022 87  45 - 117 U/L Final    Protein, total 05/04/2022 8.2  6.4 - 8.2 g/dL Final    Albumin 05/04/2022 3.7  3.5 - 5.0 g/dL Final    Globulin 05/04/2022 4.5* 2.0 - 4.0 g/dL Final    A-G Ratio 05/04/2022 0.8* 1.1 - 2.2   Final    Lipase 05/04/2022 118  73 - 393 U/L Final    Troponin-High Sensitivity 05/04/2022 4  0 - 51 ng/L Final     XR CHEST PA LAT    Result Date: 5/4/2022  EXAM: XR CHEST PA LAT INDICATION: Chest Pain COMPARISON: 6/3/2019. FINDINGS: PA and lateral radiographs of the chest demonstrate clear lungs.  The cardiac and mediastinal contours and pulmonary vascularity are normal. Degenerative changes are seen in the thoracic spine. No acute abnormality. CT SPINE CERV WO CONT    Result Date: 5/4/2022  EXAM: CT SPINE CERV WO CONT CLINICAL HISTORY: neck pain INDICATION: neck pain COMPARISON:  None TECHNIQUE:  Axial neck CT was performed. Noncontrast imaging obtained. Coronal and sagittal reconstructions were performed. CT dose reduction was achieved through use of a standardized protocol tailored for this examination and automatic exposure control for dose modulation. Osseous/bone algorithm was utilized. FINDINGS: The vertebral bodies are anatomically aligned. There is no evidence of fracture or subluxation. The prevertebral soft tissues are grossly within normal limits. The atlantodental interval is within normal limits. The craniocervical junction is intact. Large posterior and anterior osteophytes. Severe multilevel canal stenoses. Cord compression at C5-6 is likely. Multilevel severe foraminal stenoses as well. No apical pneumothorax. There is no acute fracture or dislocation identified. Severe multilevel chronic degenerative change with multiple anterior and posterior disc osteophyte. Severe canal and foraminal stenoses with multilevel cord compression chronic and degenerative in nature is likely. MRI of the cervical spine for further delineation on an nonemergent basis is recommended. DISPOSITION:    Home. Patient to f/u with psychiatric and psychotherapy appointments. FOLLOW-UP CARE:    Activity as tolerated  Regular diet  Wound Care: none needed. Follow-up Information     Follow up With Specialties Details Why Contact Info    Lab Monitoring with PCP  In 1 month On 5/11/22, your potassium level was 2.5 and 2.8 mmol/L. Normal range is 3.5-5 mmol/L. You were subsequently treated with several doses of potassium oral supplement during your stay.  Follow-up with PCP in 1 month Carlos Dumont, JEREMI Nurse Practitioner   [address]  [facility] [id number] [phone]      [employer]  Go on 5/19/2022 Medication management appointment with Holly Mcmahon on Thursday, May 19th at 1:00PM  [address] [place] [address],[id number]  Hours: Monday-Friday 8:30AM- 12:00PM & 1:00PM-4:30PM   PHONE: [phone]   FAX: [phone]                    PROGNOSIS:   Fair ---- based on nature of patient's pathology/ies and treatment compliance issues. Prognosis is greatly dependent upon patient's ability to remain sober and to follow up with scheduled appointments as well as to comply with psychiatric medications as prescribed. DISCHARGE MEDICATIONS:     Informed consent given for the use of following psychotropic medications:  Discharge Medication List as of 5/16/2022  2:15 PM      START taking these medications    Details   multivitamin capsule Take 1 Capsule by mouth daily. Indications: treatment to prevent vitamin deficiency, Normal, Disp-180 Capsule, R-1      FLUoxetine (PROzac) 20 mg capsule Take 1 Capsule by mouth daily. Indications: major depressive disorder, panic disorder, Normal, Disp-30 Capsule, R-1      LORazepam (ATIVAN) 1 mg tablet Take 1 Tablet by mouth two (2) times daily as needed for Anxiety. Max Daily Amount: 2 mg. Indications: anxious, Normal, Disp-30 Tablet, R-0      traZODone (DESYREL) 50 mg tablet Take 1 Tablet by mouth nightly as needed for Sleep (For insomnia). Indications: insomnia associated with depression, Normal, Disp-30 Tablet, R-1         CONTINUE these medications which have NOT CHANGED    Details   atorvastatin (LIPITOR) 40 mg tablet Take 40 mg by mouth daily. Indications: high cholesterol, Historical Med      empagliflozin (Jardiance) 25 mg tablet Take 25 mg by mouth daily. Indications: type 2 diabetes mellitus, Historical Med      glipiZIDE SR (GLUCOTROL XL) 10 mg CR tablet Take 10 mg by mouth daily.  Indications: type 2 diabetes mellitus, Historical Med      metFORMIN (GLUCOPHAGE) 1,000 mg tablet Take 1,000 mg by mouth daily. Indications: type 2 diabetes mellitus, Historical Med      losartan (COZAAR) 50 mg tablet Take 50 mg by mouth daily. Indications: HYPERTENSION, Historical Med         STOP taking these medications       clomiPRAMINE (ANAFRANIL) 50 mg capsule Comments:   Reason for Stopping:         triamterene-hydrochlorothiazide (MAXZIDE) 37.5-25 mg per tablet Comments:   Reason for Stopping:                      A coordinated, multidisplinary treatment team round was conducted with Jovan Marie---this is done daily here at [facility]. This team consists of the nurse, psychiatric unit pharmacist,  and Javy Guido. I have spent greater than 35 minutes on discharge work.     Signed:  Bhumika Cuellar MD  5/16/2022

## 2022-05-13 NOTE — BH NOTES
PSYCHIATRIC PROGRESS NOTE         Patient Name  Nicolas Botello   Date of Birth 1975   [facility] [id number]   Medical Record Number  [id number]      Age  48 y.o. PCP Rosa Madrid NP   Admit date:  5/11/2022    Room Number  320/01  @ [facility]   Date of Service  5/13/2022         E & M PROGRESS NOTE:         HISTORY       CC:  \"panic attacks / self mutilation\"  HISTORY OF PRESENT ILLNESS/INTERVAL HISTORY:  (reviewed/updated 5/13/2022). per initial evaluation: The patient, iNcolas Botello, is a 48 y.o. 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 Additional symptomatology include pulling out most of her hair. The above symptoms have been present for 2+ weeks. These symptoms are of moderate to high severity. These symptoms are constant in nature. The patient's condition has been precipitated by psychosocial stressors. UDS: negative; BAL=0.      The patient presented to the ED 3x in the past week due to anxiety attacks. She has been having increasingly debilitating panic attacks, and has been unable to function in the community. The patient denied active thoughts of self harm but was admitted as there was no better alternative to address her worsening symptoms and she was in severe distress. The patient has been on antidepressant medication for OCD and for depression with some success but a recent relationship stressor triggered the episode. Of note, patient with K+ 2.5 in the ED.     The patient is a good historian. The patient corroborates the above narrative. The patient contracts for safety on the unit and gives consent for the team to contact collateral. The patient is amenable to initiating treatment while on the unit.  The patient states that she has been feeling anxious about her relationship and having more difficulty with her ADLs due to the panic attacks. Discussed SSRIs for panic and benzodiazepines for anxiety, patient agrees to both agents after a discussion of risk and benefit.    05/13 - no acute overnight events. Patient slept 6 hours overnight, she is pleasant and cooperative. SHe is medication compliant and denies active thoughts of self harm. She was originally scheduled to discharge and take a two week medical leave of absence but she did not feel safe discharging from the hospital. Patient denies SI/HI/AVH/PI, she continues to endorse anxiety and is able to make her needs known. SIDE EFFECTS: (reviewed/updated 5/13/2022)  None reported or admitted to. ALLERGIES:(reviewed/updated 5/13/2022)  Allergies   Allergen Reactions    Lisinopril Rash     Swelling of the lips      MEDICATIONS PRIOR TO ADMISSION:(reviewed/updated 5/13/2022)  Medications Prior to Admission   Medication Sig    atorvastatin (LIPITOR) 40 mg tablet Take 40 mg by mouth daily. Indications: high cholesterol    empagliflozin (Jardiance) 25 mg tablet Take 25 mg by mouth daily. Indications: type 2 diabetes mellitus    glipiZIDE SR (GLUCOTROL XL) 10 mg CR tablet Take 10 mg by mouth daily. Indications: type 2 diabetes mellitus    clomiPRAMINE (ANAFRANIL) 50 mg capsule Take 50 mg by mouth daily (with breakfast). Indications: depression    triamterene-hydrochlorothiazide (MAXZIDE) 37.5-25 mg per tablet Take 1 Tab by mouth daily. Indications: HYPERTENSION    losartan (COZAAR) 50 mg tablet Take 50 mg by mouth daily. Indications: HYPERTENSION    metFORMIN (GLUCOPHAGE) 1,000 mg tablet Take 1,000 mg by mouth daily. Indications: type 2 diabetes mellitus      PAST MEDICAL HISTORY: Past medical history from the initial psychiatric evaluation has been reviewed (reviewed/updated 5/13/2022) with no additional updates (I asked patient and no additional past medical history provided).  Past Medical History:   Diagnosis Date    Anxiety     Depression     ANXIETY & DEPRESSION    Diabetes ([address]     TYPE II    Hypertension     Infected sebaceous cyst, upper back 4/10/2019    Panic attack     Suicidal thoughts     Vision decreased      Past Surgical History:   Procedure Laterality Date    COLONOSCOPY N/A 11/20/2020    . COLONOSCOPY  :- performed by Niles [facility] at [facility] ENDOSCOPY    HX OTHER SURGICAL  06/03/2019     Excision of large sebaceous cyst in the midline upper back and with packing- [facility]-DR. Ella Toscano      SOCIAL HISTORY: Social history from the initial psychiatric evaluation has been reviewed (reviewed/updated 5/13/2022) with no additional updates (I asked patient and no additional social history provided). Social History     Socioeconomic History    Marital status: SINGLE     Spouse name: Not on file    Number of children: Not on file    Years of education: Not on file    Highest education level: Not on file   Occupational History    Not on file   Tobacco Use    Smoking status: Never Smoker    Smokeless tobacco: Never Used    Tobacco comment: n/a never smoked tobacco   Substance and Sexual Activity    Alcohol use: No    Drug use: No    Sexual activity: Yes     Partners: Male     Birth control/protection: None   Other Topics Concern    Not on file   Social History Narrative    Not on file     Social Determinants of Health     Financial Resource Strain:     Difficulty of Paying Living Expenses: Not on file   Food Insecurity:     Worried About Running Out of Food in the Last Year: Not on file    [place] of Food in the Last Year: Not on file   Transportation Needs:     Lack of Transportation (Medical): Not on file    Lack of Transportation (Non-Medical):  Not on file   Physical Activity:     Days of Exercise per Week: Not on file    Minutes of Exercise per Session: Not on file   Stress:     Feeling of Stress : Not on file   Social Connections:     Frequency of Communication with Friends and Family: Not on file    Frequency of Social Gatherings with Friends and Family: Not on file    Attends Orthodoxy Services: Not on file    Active Member of Clubs or Organizations: Not on file    Attends Club or Organization Meetings: Not on file    Marital Status: Not on file   Intimate Partner Violence:     Fear of Current or Ex-Partner: Not on file    Emotionally Abused: Not on file    Physically Abused: Not on file    Sexually Abused: Not on file   Housing Stability:     Unable to Pay for Housing in the Last Year: Not on file    Number of [place] in the Last Year: Not on file    Unstable Housing in the Last Year: Not on file      FAMILY HISTORY: Family history from the initial psychiatric evaluation has been reviewed (reviewed/updated 5/13/2022) with no additional updates (I asked patient and no additional family history provided). Family History   Problem Relation Age of Onset    Diabetes Mother     Hypertension Mother     Cancer Father         LUNG CA. SMOKER       REVIEW OF SYSTEMS: (reviewed/updated 5/13/2022)  Appetite:good   Sleep: poor with DIMS (difficulty initiating & maintaining sleep)   All other Review of Systems: Negative except per HPI         [facility] (MSE):    MSE FINDINGS ARE WITHIN NORMAL LIMITS (WNL) UNLESS OTHERWISE STATED BELOW. ( ALL OF THE BELOW CATEGORIES OF THE MSE HAVE BEEN REVIEWED (reviewed 5/13/2022) AND UPDATED AS DEEMED APPROPRIATE )  General Presentation age appropriate, cooperative   Orientation oriented to time, place and person   Vital Signs  See below (reviewed 5/13/2022); Vital Signs (BP, Pulse, & Temp) are within normal limits if not listed below.    Gait and Station Stable/steady, no ataxia   Musculoskeletal System No extrapyramidal symptoms (EPS); no abnormal muscular movements or Tardive Dyskinesia (TD); muscle strength and tone are within normal limits   Language No aphasia or dysarthria   Speech:  normal volume and non-pressured Thought Processes logical; normal rate of thoughts; fair abstract reasoning/computation   Thought Associations goal directed   Thought Content free of delusions and free of hallucinations   Suicidal Ideations contracts for safety   Homicidal Ideations none   Mood:  anxious    Affect:  constricted and mood-congruent   Memory recent  intact   Memory remote:  intact   Concentration/Attention:  intact   Fund of Knowledge average   Insight:  fair   Reliability poor   Judgment:  fair          VITALS:     Patient Vitals for the past 24 hrs:   Temp Pulse Resp BP SpO2   05/13/22 0813 98 °F (36.7 °C) (!) 109 18 126/80 100 %   05/12/22 2017 98.2 °F (36.8 °C) 100 18 114/66 100 %   05/12/22 1505 -- 98 -- 115/79 --     Wt Readings from Last 3 Encounters:   05/11/22 98 kg (216 lb)   05/08/22 98 kg (216 lb)   05/04/22 98 kg (216 lb 0.8 oz)     Temp Readings from Last 3 Encounters:   05/13/22 98 °F (36.7 °C)   05/08/22 97.4 °F (36.3 °C)   05/06/22 97.6 °F (36.4 °C)     BP Readings from Last 3 Encounters:   05/13/22 126/80   05/08/22 118/85   05/06/22 139/83     Pulse Readings from Last 3 Encounters:   05/13/22 (!) 109   05/08/22 86   05/06/22 93            DATA     LABORATORY DATA:(reviewed/updated 5/13/2022)  No results found for this or any previous visit (from the past 24 hour(s)). No results found for: VALF2, VALAC, VALP, VALPR, DS6, CRBAM, CRBAMP, CARB2, XCRBAM  No results found for: LITHM   RADIOLOGY REPORTS:(reviewed/updated 5/13/2022)  XR CHEST PA LAT    Result Date: 5/4/2022  EXAM: XR CHEST PA LAT INDICATION: Chest Pain COMPARISON: 6/3/2019. FINDINGS: PA and lateral radiographs of the chest demonstrate clear lungs. The cardiac and mediastinal contours and pulmonary vascularity are normal. Degenerative changes are seen in the thoracic spine. No acute abnormality.      CT SPINE CERV WO CONT    Result Date: 5/4/2022  EXAM: CT SPINE CERV WO CONT CLINICAL HISTORY: neck pain INDICATION: neck pain COMPARISON:  None TECHNIQUE:  Axial neck CT was performed. Noncontrast imaging obtained. Coronal and sagittal reconstructions were performed. CT dose reduction was achieved through use of a standardized protocol tailored for this examination and automatic exposure control for dose modulation. Osseous/bone algorithm was utilized. FINDINGS: The vertebral bodies are anatomically aligned. There is no evidence of fracture or subluxation. The prevertebral soft tissues are grossly within normal limits. The atlantodental interval is within normal limits. The craniocervical junction is intact. Large posterior and anterior osteophytes. Severe multilevel canal stenoses. Cord compression at C5-6 is likely. Multilevel severe foraminal stenoses as well. No apical pneumothorax. There is no acute fracture or dislocation identified. Severe multilevel chronic degenerative change with multiple anterior and posterior disc osteophyte. Severe canal and foraminal stenoses with multilevel cord compression chronic and degenerative in nature is likely. MRI of the cervical spine for further delineation on an nonemergent basis is recommended.            MEDICATIONS     ALL MEDICATIONS:   Current Facility-Administered Medications   Medication Dose Route Frequency    OLANZapine (ZyPREXA) tablet 5 mg  5 mg Oral Q6H PRN    haloperidol lactate (HALDOL) injection 5 mg  5 mg IntraMUSCular Q6H PRN    benztropine (COGENTIN) tablet 1 mg  1 mg Oral BID PRN    diphenhydrAMINE (BENADRYL) injection 50 mg  50 mg IntraMUSCular BID PRN    LORazepam (ATIVAN) injection 1 mg  1 mg IntraMUSCular Q4H PRN    traZODone (DESYREL) tablet 50 mg  50 mg Oral QHS PRN    acetaminophen (TYLENOL) tablet 650 mg  650 mg Oral Q4H PRN    magnesium hydroxide (MILK OF MAGNESIA) 400 mg/5 mL oral suspension 30 mL  30 mL Oral DAILY PRN    potassium bicarb-citric acid (EFFER-K) tablet 20 mEq  20 mEq Oral BID    FLUoxetine (PROzac) capsule 20 mg  20 mg Oral DAILY    LORazepam (ATIVAN) tablet 1 mg  1 mg Oral Q6H PRN    metFORMIN (GLUCOPHAGE) tablet 1,000 mg  1,000 mg Oral DAILY    atorvastatin (LIPITOR) tablet 40 mg  40 mg Oral DAILY    losartan (COZAAR) tablet 50 mg  50 mg Oral DAILY      SCHEDULED MEDICATIONS:   Current Facility-Administered Medications   Medication Dose Route Frequency    potassium bicarb-citric acid (EFFER-K) tablet 20 mEq  20 mEq Oral BID    FLUoxetine (PROzac) capsule 20 mg  20 mg Oral DAILY    metFORMIN (GLUCOPHAGE) tablet 1,000 mg  1,000 mg Oral DAILY    atorvastatin (LIPITOR) tablet 40 mg  40 mg Oral DAILY    losartan (COZAAR) tablet 50 mg  50 mg Oral DAILY          ASSESSMENT & PLAN     DIAGNOSES REQUIRING ACTIVE TREATMENT AND MONITORING: (reviewed/updated 5/13/2022)  Patient Active Hospital Problem List:    Panic disorder without agoraphobia with severe panic attacks (3/25/2016)    Assessment: the patient has been experiencing worsening of her underlying depression and anxiety, now with severe panic attacks resulting in trichotillomania (she has pulled much of her hair out). She would benefit from an SSRI which is the favored agent over a TCA for anxiety, as her hair pulling seems secondary to anxiety rather than a compulsive behavior. Plan:  - CONTINUE Prozac 20 mg QDAY for MDD and anxiety  - CONTINE Ativan 1 mg BID PRN anxiety  - DISCONTINUE Triamterene due to normal BP, K+ wasting  - f/u BMP, A1C  - IGM therapy as tolerated  - Expand database / obtain collateral  - Dispo planning (home when stable)    In summary, Elif Mercado, is a 48 y.o.  female who presents with a severe exacerbation of the principal diagnosis of Moderate episode of recurrent major depressive disorder ([facility] [address]    Patient's condition is improving. Patient requires continued inpatient hospitalization for further stabilization, safety monitoring and medication management.   I will continue to coordinate the provision of individual, milieu, occupational, group, and substance abuse therapies to address target symptoms/diagnoses as deemed appropriate for the individual patient. A coordinated, multidisplinary treatment team round was conducted with the patient (this team consists of the nurse, psychiatric unit pharmacist,  and writer). Complete current electronic health record for patient has been reviewed today including consultant notes, ancillary staff notes, nurses and psychiatric tech notes. Suicide risk assessment completed and patient deemed to be of low risk for suicide at this time. The following regarding medications was addressed during rounds with patient:   the risks and benefits of the proposed medication. The patient was given the opportunity to ask questions. Informed consent given to the use of the above medications. Will continue to adjust psychiatric and non-psychiatric medications (see above \"medication\" section and orders section for details) as deemed appropriate & based upon diagnoses and response to treatment. I will continue to order blood tests/labs and diagnostic tests as deemed appropriate and review results as they become available (see orders for details and above listed lab/test results). I will order psychiatric records from previous [facility] hospitals to further elucidate the nature of patient's psychopathology and review once available. I will gather additional collateral information from friends, family and o/p treatment team to further elucidate the nature of patient's psychopathology and baselline level of psychiatric functioning.          I certify that this patient's inpatient psychiatric hospital services furnished since the previous certification were, and continue to be, required for treatment that could reasonably be expected to improve the patient's condition, or for diagnostic study, and that the patient continues to need, on a daily basis, active treatment furnished directly by or requiring the supervision of inpatient psychiatric facility personnel. In addition, the hospital records show that services furnished were intensive treatment services, admission or related services, or equivalent services.     EXPECTED DISCHARGE DATE/DAY: 5/16/22     DISPOSITION: Home       Signed By:   Marvin Cortez MD  5/13/2022

## 2022-05-13 NOTE — GROUP NOTE
[facility]  GROUP DOCUMENTATION INDIVIDUAL                                                                          Group Therapy Note    Date: 5/13/2022    Group Start Time: 1000  Group End Time: 1100  Group Topic: Topic Group    [facility] 3 ACUTE BEHAV [facility]    [place], [address] [facility] GROUP DOCUMENTATION GROUP    Group Therapy Note    Attendees: 7         Attendance: Attended    Patient's Goal:  To identify people and things most grateful for    Interventions/techniques: Supported-worksheet    Follows Directions:  Followed directions    Interactions: Interacted appropriately    Mental Status: Calm    Behavior/appearance: Attentive and Cooperative    Goals Achieved: Able to engage in interactions, Able to listen to others, Able to self-disclose and Discussed coping      Jason Riddle

## 2022-05-13 NOTE — DISCHARGE SUMMARY
PSYCHIATRIC DISCHARGE SUMMARY         IDENTIFICATION:    Patient Name  Lydia Guillermo   Date of Birth 1975   [facility] [id number]   Medical Record Number  [id number]      Age  48 y.o. PCP Mikaela Rossi NP   Admit date:  5/11/2022    Discharge date: 5/13/2022   Room Number  320/01  @ [facility]   Date of Service  5/13/2022            TYPE OF DISCHARGE: REGULAR               CONDITION AT DISCHARGE: improved and fair       PROVISIONAL & DISCHARGE DIAGNOSES:    Problem List  Date Reviewed: 7/17/2019          Codes Class    Depression ICD-10-CM: F32. A  ICD-9-CM: 311         * (Principal) Moderate episode of recurrent major depressive disorder (HCC) ICD-10-CM: F33.1  ICD-9-CM: 296.32         Obesity, morbid (HCC) ICD-10-CM: E66.01  ICD-9-CM: 278.01         Infected sebaceous cyst, upper back ICD-10-CM: L72.3, L08.9  ICD-9-CM: 706.2         Trichotillomania ICD-10-CM: F63.3  ICD-9-CM: 312.39         Depression with anxiety ICD-10-CM: F41.8  ICD-9-CM: 300.4         Hypertension ICD-10-CM: I10  ICD-9-CM: 401.9         Type II diabetes mellitus ([facility] [address] ICD-10-CM: E11.9  ICD-9-CM: 250.00         Panic disorder without agoraphobia with severe panic attacks ICD-10-CM: F41.0  ICD-9-CM: 300.01         Depressive disorder ICD-10-CM: F32. A  ICD-9-CM: [address] Problems    *Moderate episode of recurrent major depressive disorder (HCC)      Trichotillomania      Panic disorder without agoraphobia with severe panic attacks        DISCHARGE DIAGNOSIS:   Axis I:  SEE ABOVE  Axis II: SEE ABOVE  Axis III: SEE ABOVE  Axis IV:  lack of structure  Axis V:  40 on admission, 70 on discharge     CC & HISTORY OF PRESENT ILLNESS:  \"panic attacks / self mutilation\"    The patient, Lydia Guillermo, is a 48 y.o.   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 Additional symptomatology include pulling out most of her hair. The above symptoms have been present for 2+ weeks. These symptoms are of moderate to high severity. These symptoms are constant in nature. The patient's condition has been precipitated by psychosocial stressors. UDS: negative; BAL=0.      The patient presented to the ED 3x in the past week due to anxiety attacks. She has been having increasingly debilitating panic attacks, and has been unable to function in the community. The patient denied active thoughts of self harm but was admitted as there was no better alternative to address her worsening symptoms and she was in severe distress. The patient has been on antidepressant medication for OCD and for depression with some success but a recent relationship stressor triggered the episode. Of note, patient with K+ 2.5 in the ED.     The patient is a good historian. The patient corroborates the above narrative. The patient contracts for safety on the unit and gives consent for the team to contact collateral. The patient is amenable to initiating treatment while on the unit. The patient states that she has been feeling anxious about her relationship and having more difficulty with her ADLs due to the panic attacks. Discussed SSRIs for panic and benzodiazepines for anxiety, patient agrees to both agents after a discussion of risk and benefit.        SOCIAL HISTORY:    Social History     Socioeconomic History    Marital status: SINGLE     Spouse name: Not on file    Number of children: Not on file    Years of education: Not on file    Highest education level: Not on file   Occupational History    Not on file   Tobacco Use    Smoking status: Never Smoker    Smokeless tobacco: Never Used    Tobacco comment: n/a never smoked tobacco   Substance and Sexual Activity    Alcohol use: No    Drug use: No    Sexual activity: Yes     Partners: Male     Birth control/protection: None   Other Topics Concern    Not on file   Social History Narrative    Not on file     Social Determinants of Health     Financial Resource Strain:     Difficulty of Paying Living Expenses: Not on file   Food Insecurity:     Worried About Running Out of Food in the Last Year: Not on file    Jameson of Food in the Last Year: Not on file   Transportation Needs:     Lack of Transportation (Medical): Not on file    Lack of Transportation (Non-Medical): Not on file   Physical Activity:     Days of Exercise per Week: Not on file    Minutes of Exercise per Session: Not on file   Stress:     Feeling of Stress : Not on file   Social Connections:     Frequency of Communication with Friends and Family: Not on file    Frequency of Social Gatherings with Friends and Family: Not on file    Attends [employer] Services: Not on file    Active Member of [address] [place] [address] [employer] or Organizations: Not on file    Attends Club or Organization Meetings: Not on file    Marital Status: Not on file   Intimate Partner Violence:     Fear of Current or Ex-Partner: Not on file    Emotionally Abused: Not on file    Physically Abused: Not on file    Sexually Abused: Not on file   Housing Stability:     Unable to Pay for Housing in the Last Year: Not on file    Number of [place] in the Last Year: Not on file    Unstable Housing in the Last Year: Not on file      FAMILY HISTORY:   Family History   Problem Relation Age of Onset    Diabetes Mother     Hypertension Mother     Cancer Father         LUNG CA. SMOKER             HOSPITALIZATION COURSE:    Adina Stallings was admitted to the inpatient psychiatric unit [facility] for acute psychiatric stabilization in regards to symptomatology as described in the HPI above. The differential diagnosis at time of admission included: MDD vs adjustment disorder. While on the unit Adina Stallings was involved in individual, group, occupational and milieu therapy.   Psychiatric medications were adjusted during this hospitalization including Prozac and Abilify. Crystal Olea demonstrated a slow, but progressive improvement in overall condition. Much of patient's initial presentation appeared to be related to situational stressors and psychological factors. Please see individual progress notes for more specific details regarding patient's hospitalization course. Patient with request for discharge today. There are no grounds to seek a TDO. At time of discharge, Crystal Olea is without significant problems of depression, psychosis, or valentín. Patient free of suicidal and homicidal ideations (appears to be at very low risk of suicide or homicide) and reports many positive predictive factors in terms of not attempting suicide or homicide. Overall presentation at time of discharge is most consistent with the diagnosis of major depressive disorder and panic disorder without agoraphobia. Patient has maximized benefit to be derived from acute inpatient psychiatric treatment. All members of the treatment team concur with each other in regards to plans for discharge today. Patient and family are aware and in agreement with discharge and discharge plan. LABS AND IMAGAING:    Labs Reviewed   CBC WITH AUTOMATED DIFF - Abnormal; Notable for the following components:       Result Value    PLATELET 550 (*)     IMMATURE GRANULOCYTES 1 (*)     ABS. IMM.  GRANS. 0.1 (*)     All other components within normal limits   METABOLIC PANEL, COMPREHENSIVE - Abnormal; Notable for the following components:    Potassium 2.5 (*)     Globulin 4.4 (*)     A-G Ratio 0.9 (*)     All other components within normal limits   URINALYSIS W/ RFLX MICROSCOPIC - Abnormal; Notable for the following components:    Glucose >1,000 (*)     Ketone TRACE (*)     All other components within normal limits   METABOLIC PANEL, BASIC - Abnormal; Notable for the following components:    Potassium 2.8 (*)     GFR est non-AA 58 (*)     All other components within normal limits   COVID-19 WITH INFLUENZA A/B   DRUG SCREEN, URINE   ETHYL ALCOHOL   METABOLIC PANEL, BASIC   HEMOGLOBIN A1C WITH EAG   HCG URINE, QL. - POC     No results found for: DS35, PHEN, PHENO, PHENT, DILF, DS39, PHENY, PTN, VALF2, VALAC, VALP, VALPR, DS6, CRBAM, CRBAMP, CARB2, XCRBAM  Admission on 05/11/2022   Component Date Value Ref Range Status    WBC 05/11/2022 9.8  3.6 - 11.0 K/uL Final    RBC 05/11/2022 4.59  3.80 - 5.20 M/uL Final    HGB 05/11/2022 12.0  11.5 - 16.0 g/dL Final    HCT 05/11/2022 38.2  35.0 - 47.0 % Final    MCV 05/11/2022 83.2  80.0 - 99.0 FL Final    MCH 05/11/2022 26.1  26.0 - 34.0 PG Final    MCHC 05/11/2022 31.4  30.0 - 36.5 g/dL Final    RDW 05/11/2022 14.5  11.5 - 14.5 % Final    PLATELET 47/69/3872 869* 150 - 400 K/uL Final    MPV 05/11/2022 9.4  8.9 - 12.9 FL Final    NRBC 05/11/2022 0.0  0  WBC Final    ABSOLUTE NRBC 05/11/2022 0.00  0.00 - 0.01 K/uL Final    NEUTROPHILS 05/11/2022 71  32 - 75 % Final    LYMPHOCYTES 05/11/2022 23  12 - 49 % Final    MONOCYTES 05/11/2022 5  5 - 13 % Final    EOSINOPHILS 05/11/2022 0  0 - 7 % Final    BASOPHILS 05/11/2022 0  0 - 1 % Final    IMMATURE GRANULOCYTES 05/11/2022 1* 0.0 - 0.5 % Final    ABS. NEUTROPHILS 05/11/2022 7.0  1.8 - 8.0 K/UL Final    ABS. LYMPHOCYTES 05/11/2022 2.2  0.8 - 3.5 K/UL Final    ABS. MONOCYTES 05/11/2022 0.5  0.0 - 1.0 K/UL Final    ABS. EOSINOPHILS 05/11/2022 0.0  0.0 - 0.4 K/UL Final    ABS. BASOPHILS 05/11/2022 0.0  0.0 - 0.1 K/UL Final    ABS. IMM.  GRANS. 05/11/2022 0.1* 0.00 - 0.04 K/UL Final    DF 05/11/2022 AUTOMATED    Final    Sodium 05/11/2022 140  136 - 145 mmol/L Final    Potassium 05/11/2022 2.5* 3.5 - 5.1 mmol/L Final    Chloride 05/11/2022 100  97 - 108 mmol/L Final    CO2 05/11/2022 29  21 - 32 mmol/L Final    Anion gap 05/11/2022 11  5 - 15 mmol/L Final    Glucose 05/11/2022 65  65 - 100 mg/dL Final    BUN 05/11/2022 13  6 - 20 MG/DL Final    Creatinine 05/11/2022 0.79  0.55 - 1.02 MG/DL Final    BUN/Creatinine ratio 05/11/2022 16  12 - 20   Final    GFR est AA 05/11/2022 >60  >60 ml/min/1.73m2 Final    GFR est non-AA 05/11/2022 >60  >60 ml/min/1.73m2 Final    Calcium 05/11/2022 9.4  8.5 - 10.1 MG/DL Final    Bilirubin, total 05/11/2022 0.4  0.2 - 1.0 MG/DL Final    ALT (SGPT) 05/11/2022 28  12 - 78 U/L Final    AST (SGOT) 05/11/2022 23  15 - 37 U/L Final    Alk.  phosphatase 05/11/2022 86  45 - 117 U/L Final    Protein, total 05/11/2022 8.2  6.4 - 8.2 g/dL Final    Albumin 05/11/2022 3.8  3.5 - 5.0 g/dL Final    Globulin 05/11/2022 4.4* 2.0 - 4.0 g/dL Final    A-G Ratio 05/11/2022 0.9* 1.1 - 2.2   Final    AMPHETAMINES 05/11/2022 Negative  NEG   Final    BARBITURATES 05/11/2022 Negative  NEG   Final    BENZODIAZEPINES 05/11/2022 Negative  NEG   Final    COCAINE 05/11/2022 Negative  NEG   Final    METHADONE 05/11/2022 Negative  NEG   Final    OPIATES 05/11/2022 Negative  NEG   Final    PCP(PHENCYCLIDINE) 05/11/2022 Negative  NEG   Final    THC (TH-CANNABINOL) 05/11/2022 Negative  NEG   Final    Drug screen comment 05/11/2022 (NOTE)   Final    ALCOHOL(ETHYL),SERUM 05/11/2022 <10  <10 MG/DL Final    SARS-CoV-2 by PCR 05/11/2022 Not detected  NOTD   Final    Influenza A by PCR 05/11/2022 Not detected    Final    Influenza B by PCR 05/11/2022 Not detected    Final    Color 05/11/2022 YELLOW/STRAW    Final    Appearance 05/11/2022 CLEAR  CLEAR   Final    Specific gravity 05/11/2022 1.030  1.003 - 1.030   Final    pH (UA) 05/11/2022 6.5  5.0 - 8.0   Final    Protein 05/11/2022 Negative  NEG mg/dL Final    Glucose 05/11/2022 >1,000* NEG mg/dL Final    Ketone 05/11/2022 TRACE* NEG mg/dL Final    Bilirubin 05/11/2022 Negative  NEG   Final    Blood 05/11/2022 Negative  NEG   Final    Urobilinogen 05/11/2022 0.2  0.2 - 1.0 EU/dL Final    Nitrites 05/11/2022 Negative  NEG   Final    Leukocyte Esterase 05/11/2022 Negative  NEG   Final    Pregnancy test,urine (POC) 05/11/2022 Negative  NEG   Final    Sodium 05/11/2022 138  136 - 145 mmol/L Final    Potassium 05/11/2022 2.8* 3.5 - 5.1 mmol/L Final    Chloride 05/11/2022 100  97 - 108 mmol/L Final    CO2 05/11/2022 29  21 - 32 mmol/L Final    Anion gap 05/11/2022 9  5 - 15 mmol/L Final    Glucose 05/11/2022 96  65 - 100 mg/dL Final    BUN 05/11/2022 17  6 - 20 MG/DL Final    Creatinine 05/11/2022 1.02  0.55 - 1.02 MG/DL Final    BUN/Creatinine ratio 05/11/2022 17  12 - 20   Final    GFR est AA 05/11/2022 >60  >60 ml/min/1.73m2 Final    GFR est non-AA 05/11/2022 58* >60 ml/min/1.73m2 Final    Calcium 05/11/2022 9.2  8.5 - 10.1 MG/DL Final   Admission on 05/04/2022, Discharged on 05/04/2022   Component Date Value Ref Range Status    Ventricular Rate 05/04/2022 104  BPM Final    Atrial Rate 05/04/2022 104  BPM Final    P-R Interval 05/04/2022 120  ms Final    QRS Duration 05/04/2022 68  ms Final    Q-T Interval 05/04/2022 356  ms Final    QTC Calculation (Bezet) 05/04/2022 468  ms Final    Calculated P Axis 05/04/2022 44  degrees Final    Calculated R Axis 05/04/2022 -30  degrees Final    Calculated T Axis 05/04/2022 45  degrees Final    Diagnosis 05/04/2022    Final                    Value:Sinus tachycardia  Left axis deviation  Voltage criteria for left ventricular hypertrophy    When compared with ECG of 04-MAY-2022 11:24,  No significant change  Confirmed by Zeny Avila M.D., Eudelia Purchase ([id number]) on 5/4/2022 3:14:21 PM      WBC 05/04/2022 6.2  3.6 - 11.0 K/uL Final    RBC 05/04/2022 4.50  3.80 - 5.20 M/uL Final    HGB 05/04/2022 12.3  11.5 - 16.0 g/dL Final    HCT 05/04/2022 37.9  35.0 - 47.0 % Final    MCV 05/04/2022 84.2  80.0 - 99.0 FL Final    MCH 05/04/2022 27.3  26.0 - 34.0 PG Final    MCHC 05/04/2022 32.5  30.0 - 36.5 g/dL Final    RDW 05/04/2022 14.1  11.5 - 14.5 % Final    PLATELET 27/09/6156 002  150 - 400 K/uL Final    MPV 05/04/2022 9.2  8.9 - 12.9 FL Final    NRBC 05/04/2022 0.0  0  WBC Final    ABSOLUTE NRBC 05/04/2022 0.00  0.00 - 0.01 K/uL Final    NEUTROPHILS 05/04/2022 49  32 - 75 % Final    LYMPHOCYTES 05/04/2022 42  12 - 49 % Final    MONOCYTES 05/04/2022 5  5 - 13 % Final    EOSINOPHILS 05/04/2022 3  0 - 7 % Final    BASOPHILS 05/04/2022 1  0 - 1 % Final    IMMATURE GRANULOCYTES 05/04/2022 0  0.0 - 0.5 % Final    ABS. NEUTROPHILS 05/04/2022 3.1  1.8 - 8.0 K/UL Final    ABS. LYMPHOCYTES 05/04/2022 2.6  0.8 - 3.5 K/UL Final    ABS. MONOCYTES 05/04/2022 0.3  0.0 - 1.0 K/UL Final    ABS. EOSINOPHILS 05/04/2022 0.2  0.0 - 0.4 K/UL Final    ABS. BASOPHILS 05/04/2022 0.1  0.0 - 0.1 K/UL Final    ABS. IMM. GRANS. 05/04/2022 0.0  0.00 - 0.04 K/UL Final    DF 05/04/2022 AUTOMATED    Final    Sodium 05/04/2022 140  136 - 145 mmol/L Final    Potassium 05/04/2022 2.9* 3.5 - 5.1 mmol/L Final    Chloride 05/04/2022 106  97 - 108 mmol/L Final    CO2 05/04/2022 27  21 - 32 mmol/L Final    Anion gap 05/04/2022 7  5 - 15 mmol/L Final    Glucose 05/04/2022 126* 65 - 100 mg/dL Final    BUN 05/04/2022 11  6 - 20 MG/DL Final    Creatinine 05/04/2022 0.79  0.55 - 1.02 MG/DL Final    BUN/Creatinine ratio 05/04/2022 14  12 - 20   Final    GFR est AA 05/04/2022 >60  >60 ml/min/1.73m2 Final    GFR est non-AA 05/04/2022 >60  >60 ml/min/1.73m2 Final    Calcium 05/04/2022 9.2  8.5 - 10.1 MG/DL Final    Bilirubin, total 05/04/2022 0.5  0.2 - 1.0 MG/DL Final    ALT (SGPT) 05/04/2022 24  12 - 78 U/L Final    AST (SGOT) 05/04/2022 22  15 - 37 U/L Final    Alk.  phosphatase 05/04/2022 87  45 - 117 U/L Final    Protein, total 05/04/2022 8.2  6.4 - 8.2 g/dL Final    Albumin 05/04/2022 3.7  3.5 - 5.0 g/dL Final    Globulin 05/04/2022 4.5* 2.0 - 4.0 g/dL Final    A-G Ratio 05/04/2022 0.8* 1.1 - 2.2   Final    Lipase 05/04/2022 118  73 - 393 U/L Final    Troponin-High Sensitivity 05/04/2022 4  0 - 51 ng/L Final     XR CHEST PA LAT    Result Date: 5/4/2022  EXAM: XR CHEST PA LAT INDICATION: Chest Pain COMPARISON: 6/3/2019. FINDINGS: PA and lateral radiographs of the chest demonstrate clear lungs. The cardiac and mediastinal contours and pulmonary vascularity are normal. Degenerative changes are seen in the thoracic spine. No acute abnormality. CT SPINE CERV WO CONT    Result Date: 5/4/2022  EXAM: CT SPINE CERV WO CONT CLINICAL HISTORY: neck pain INDICATION: neck pain COMPARISON:  None TECHNIQUE:  Axial neck CT was performed. Noncontrast imaging obtained. Coronal and sagittal reconstructions were performed. CT dose reduction was achieved through use of a standardized protocol tailored for this examination and automatic exposure control for dose modulation. Osseous/bone algorithm was utilized. FINDINGS: The vertebral bodies are anatomically aligned. There is no evidence of fracture or subluxation. The prevertebral soft tissues are grossly within normal limits. The atlantodental interval is within normal limits. The craniocervical junction is intact. Large posterior and anterior osteophytes. Severe multilevel canal stenoses. Cord compression at C5-6 is likely. Multilevel severe foraminal stenoses as well. No apical pneumothorax. There is no acute fracture or dislocation identified. Severe multilevel chronic degenerative change with multiple anterior and posterior disc osteophyte. Severe canal and foraminal stenoses with multilevel cord compression chronic and degenerative in nature is likely. MRI of the cervical spine for further delineation on an nonemergent basis is recommended. DISPOSITION:    Home. Patient to f/u with psychiatric and psychotherapy appointments. FOLLOW-UP CARE:    Activity as tolerated  Regular diet  Wound Care: none needed.   Follow-up Information     Follow up With Specialties Details Why Contact Info    Lab Monitoring with PCP  In 1 month On 5/11/22, your potassium level was 2.5 and 2.8 mmol/L. Normal range is 3.5-5 mmol/L. You were subsequently treated with several doses of potassium oral supplement during your stay. Follow-up with PCP in 1 month     Latanya Dang NP Nurse Practitioner   [place] [id number] [phone]      [facility]    [address] [place] [address],[address] [facility] [address]  Hours: Monday-Friday 8:30AM- 12:00PM & 1:00PM-4:30PM   PHONE: [phone]   FAX: [phone]                    PROGNOSIS:   Zan Mtz ---- based on nature of patient's pathology/ies and treatment compliance issues. Prognosis is greatly dependent upon patient's ability to remain sober and to follow up with scheduled appointments as well as to comply with psychiatric medications as prescribed. DISCHARGE MEDICATIONS:     Informed consent given for the use of following psychotropic medications:  Current Discharge Medication List      START taking these medications    Details   FLUoxetine (PROzac) 20 mg capsule Take 1 Capsule by mouth daily. Indications: major depressive disorder, panic disorder  Qty: 30 Capsule, Refills: 1  Start date: 5/13/2022      LORazepam (ATIVAN) 1 mg tablet Take 1 Tablet by mouth two (2) times daily as needed for Anxiety. Max Daily Amount: 2 mg. Indications: anxious  Qty: 30 Tablet, Refills: 0  Start date: 5/13/2022    Associated Diagnoses: Panic disorder without agoraphobia with severe panic attacks      traZODone (DESYREL) 50 mg tablet Take 1 Tablet by mouth nightly as needed for Sleep (For insomnia). Indications: insomnia associated with depression  Qty: 30 Tablet, Refills: 1  Start date: 5/13/2022         CONTINUE these medications which have NOT CHANGED    Details   atorvastatin (LIPITOR) 40 mg tablet Take 40 mg by mouth daily. Indications: high cholesterol      empagliflozin (Jardiance) 25 mg tablet Take 25 mg by mouth daily.  Indications: type 2 diabetes mellitus      glipiZIDE SR (GLUCOTROL XL) 10 mg CR tablet Take 10 mg by mouth daily. Indications: type 2 diabetes mellitus      losartan (COZAAR) 50 mg tablet Take 50 mg by mouth daily. Indications: HYPERTENSION      metFORMIN (GLUCOPHAGE) 1,000 mg tablet Take 1,000 mg by mouth daily. Indications: type 2 diabetes mellitus         STOP taking these medications       clomiPRAMINE (ANAFRANIL) 50 mg capsule Comments:   Reason for Stopping:         triamterene-hydrochlorothiazide (MAXZIDE) 37.5-25 mg per tablet Comments:   Reason for Stopping:                      A coordinated, multidisplinary treatment team round was conducted with Catherine Marie---this is done daily here at [facility]. This team consists of the nurse, psychiatric unit pharmacist,  and Heather . I have spent greater than 35 minutes on discharge work.     Signed:  Karen Baldwin MD  5/13/2022

## 2022-05-13 NOTE — PROGRESS NOTES
Problem: Anxiety  Goal: *Alleviation of anxiety  5/13/2022 1057 by Rea Woods RN  Outcome: Progressing Towards Goal  5/13/2022 0945 by Rea Woods RN  Outcome: Progressing Towards Goal     Problem: Anxiety  Goal: *Alleviation of anxiety  Outcome: Progressing Towards Goal     Problem: Discharge Planning  Goal: *Knowledge of medication management  5/13/2022 1057 by Rea Woods RN  Outcome: Progressing Towards Goal  5/13/2022 0945 by Rea Woods RN  Outcome: Progressing Towards Goal     Problem: Diabetes Self-Management  Goal: *Using medications safely  Description: State effect of diabetes medications on diabetes; name diabetes medication taking, action and side effects.   Outcome: Progressing Towards Goal     Problem: Patient Education: Go to Patient Education Activity  Goal: Patient/Family Education  5/13/2022 1057 by Rea Woods RN  Outcome: Progressing Towards Goal  5/13/2022 0946 by Rea Woods RN  Outcome: Progressing Towards Goal

## 2022-05-13 NOTE — PROGRESS NOTES
Bedside and Verbal shift change report given to Afshin Cobos RN (oncoming nurse) by Jennifer Vásquez RN (offgoing nurse). Report included the following information SBAR, Kardex, MAR, Accordion, and Recent Results. Assumed care of the patient, she is up in the room taking care of ADL's. She slept well last night ( reported 6 hours from previous report). She ate breakfast in the dayroom. She is cooperative and pleasant. She denies SI, HI, anxiety and depression. She voiced no pain. She is concerned about labs- K+ and Hgb A1C.  Staff reported concerns to MD.  1000- no distress noted  1030- Zyprexa given for anxiety   1100- no distress noted  1300- no distress noted  1500- no distress noted  1700- no distress noted   1900- no distress noted

## 2022-05-14 LAB
ANION GAP SERPL CALC-SCNC: 10 MMOL/L (ref 5–15)
BUN SERPL-MCNC: 12 MG/DL (ref 6–20)
BUN/CREAT SERPL: 16 (ref 12–20)
CALCIUM SERPL-MCNC: 8.7 MG/DL (ref 8.5–10.1)
CHLORIDE SERPL-SCNC: 103 MMOL/L (ref 97–108)
CO2 SERPL-SCNC: 29 MMOL/L (ref 21–32)
CREAT SERPL-MCNC: 0.74 MG/DL (ref 0.55–1.02)
EST. AVERAGE GLUCOSE BLD GHB EST-MCNC: 157 MG/DL
GLUCOSE SERPL-MCNC: 151 MG/DL (ref 65–100)
HBA1C MFR BLD: 7.1 % (ref 4–5.6)
POTASSIUM SERPL-SCNC: 3.8 MMOL/L (ref 3.5–5.1)
SODIUM SERPL-SCNC: 142 MMOL/L (ref 136–145)

## 2022-05-14 PROCEDURE — 65220000003 HC RM SEMIPRIVATE PSYCH

## 2022-05-14 PROCEDURE — 83036 HEMOGLOBIN GLYCOSYLATED A1C: CPT

## 2022-05-14 PROCEDURE — 36415 COLL VENOUS BLD VENIPUNCTURE: CPT

## 2022-05-14 PROCEDURE — 74011250637 HC RX REV CODE- 250/637: Performed by: PSYCHIATRY & NEUROLOGY

## 2022-05-14 PROCEDURE — 80048 BASIC METABOLIC PNL TOTAL CA: CPT

## 2022-05-14 RX ADMIN — METFORMIN HYDROCHLORIDE 1000 MG: 500 TABLET ORAL at 08:31

## 2022-05-14 RX ADMIN — POTASSIUM BICARBONATE 20 MEQ: 782 TABLET, EFFERVESCENT ORAL at 17:35

## 2022-05-14 RX ADMIN — POTASSIUM BICARBONATE 20 MEQ: 782 TABLET, EFFERVESCENT ORAL at 08:30

## 2022-05-14 RX ADMIN — LOSARTAN POTASSIUM 50 MG: 25 TABLET, FILM COATED ORAL at 08:30

## 2022-05-14 RX ADMIN — ATORVASTATIN CALCIUM 40 MG: 40 TABLET, FILM COATED ORAL at 08:30

## 2022-05-14 RX ADMIN — FLUOXETINE 20 MG: 20 CAPSULE ORAL at 08:31

## 2022-05-14 RX ADMIN — LORAZEPAM 1 MG: 1 TABLET ORAL at 23:51

## 2022-05-14 NOTE — PROGRESS NOTES
0700- Shift change report given to Damion Meño Zheng (oncoming nurse) by Lenor Simmonds RN(offgoing nurse). Report included the following information SBAR, Kardex, MAR, and Recent Results. [id number]  Received pt oriented x 4, pleasant and cooperative. On assessment , pt denied anxiety, depression, AVH, and SI. Meals and meds compliant. Vital signs stable. No compliant made. Will continue to monitor. 100-1200 Pt in the day room eating lunch. No issues.

## 2022-05-14 NOTE — BH NOTES
PSYCHIATRIC PROGRESS NOTE         Patient Name  Liz Wynn   Date of Birth 1975   [facility] [id number]   Medical Record Number  [id number]      Age  48 y.o. PCP Brea Bruno NP   Admit date:  5/11/2022    Room Number  320/01  @ [facility]   Date of Service  5/14/2022         E & M PROGRESS NOTE:         HISTORY       CC:  \"panic attacks / self mutilation\"  HISTORY OF PRESENT ILLNESS/INTERVAL HISTORY:  (reviewed/updated 5/14/2022). per initial evaluation: The patient, Liz Wynn, is a 48 y.o. 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 Additional symptomatology include pulling out most of her hair. The above symptoms have been present for 2+ weeks. These symptoms are of moderate to high severity. These symptoms are constant in nature. The patient's condition has been precipitated by psychosocial stressors. UDS: negative; BAL=0.      The patient presented to the ED 3x in the past week due to anxiety attacks. She has been having increasingly debilitating panic attacks, and has been unable to function in the community. The patient denied active thoughts of self harm but was admitted as there was no better alternative to address her worsening symptoms and she was in severe distress. The patient has been on antidepressant medication for OCD and for depression with some success but a recent relationship stressor triggered the episode. Of note, patient with K+ 2.5 in the ED.     The patient is a good historian. The patient corroborates the above narrative. The patient contracts for safety on the unit and gives consent for the team to contact collateral. The patient is amenable to initiating treatment while on the unit.  The patient states that she has been feeling anxious about her relationship and having more difficulty with her ADLs due to the panic attacks. Discussed SSRIs for panic and benzodiazepines for anxiety, patient agrees to both agents after a discussion of risk and benefit.    05/13 - no acute overnight events. Patient slept 6 hours overnight, she is pleasant and cooperative. SHe is medication compliant and denies active thoughts of self harm. She was originally scheduled to discharge and take a two week medical leave of absence but she did not feel safe discharging from the hospital. Patient denies SI/HI/AVH/PI, she continues to endorse anxiety and is able to make her needs known. 05/14 - No events overnight to report. Pt slept 7.5 hours. Pt given PRN Zyprexa and Trazodone. Pt states anxiety is improved today. She states she is feeling better about discharge today than she was yesterday. She states no depressive symptoms today. She has some nausea to report from medication but nothing significant. She is sleeping and eating well. SIDE EFFECTS: (reviewed/updated 5/14/2022)  None reported or admitted to. ALLERGIES:(reviewed/updated 5/14/2022)  Allergies   Allergen Reactions    Lisinopril Rash     Swelling of the lips      MEDICATIONS PRIOR TO ADMISSION:(reviewed/updated 5/14/2022)  Medications Prior to Admission   Medication Sig    atorvastatin (LIPITOR) 40 mg tablet Take 40 mg by mouth daily. Indications: high cholesterol    empagliflozin (Jardiance) 25 mg tablet Take 25 mg by mouth daily. Indications: type 2 diabetes mellitus    glipiZIDE SR (GLUCOTROL XL) 10 mg CR tablet Take 10 mg by mouth daily. Indications: type 2 diabetes mellitus    clomiPRAMINE (ANAFRANIL) 50 mg capsule Take 50 mg by mouth daily (with breakfast). Indications: depression    triamterene-hydrochlorothiazide (MAXZIDE) 37.5-25 mg per tablet Take 1 Tab by mouth daily. Indications: HYPERTENSION    losartan (COZAAR) 50 mg tablet Take 50 mg by mouth daily.  Indications: HYPERTENSION    metFORMIN (GLUCOPHAGE) 1,000 mg tablet Take 1,000 mg by mouth daily. Indications: type 2 diabetes mellitus      PAST MEDICAL HISTORY: Past medical history from the initial psychiatric evaluation has been reviewed (reviewed/updated 5/14/2022) with no additional updates (I asked patient and no additional past medical history provided). Past Medical History:   Diagnosis Date    Anxiety     Depression     ANXIETY & DEPRESSION    Diabetes ([facility] [address]     TYPE II    Hypertension     Infected sebaceous cyst, upper back 4/10/2019    Panic attack     Suicidal thoughts     Vision decreased      Past Surgical History:   Procedure Laterality Date    COLONOSCOPY N/A 11/20/2020    . COLONOSCOPY  :- performed by Chandra Fiore MD at [facility] ENDOSCOPY    HX OTHER SURGICAL  06/03/2019     Excision of large sebaceous cyst in the midline upper back and with packing- [facility]-DR. Tej Quiroz      SOCIAL HISTORY: Social history from the initial psychiatric evaluation has been reviewed (reviewed/updated 5/14/2022) with no additional updates (I asked patient and no additional social history provided).    Social History     Socioeconomic History    Marital status: SINGLE     Spouse name: Not on file    Number of children: Not on file    Years of education: Not on file    Highest education level: Not on file   Occupational History    Not on file   Tobacco Use    Smoking status: Never Smoker    Smokeless tobacco: Never Used    Tobacco comment: n/a never smoked tobacco   Substance and Sexual Activity    Alcohol use: No    Drug use: No    Sexual activity: Yes     Partners: Male     Birth control/protection: None   Other Topics Concern    Not on file   Social History Narrative    Not on file     Social Determinants of Health     Financial Resource Strain:     Difficulty of Paying Living Expenses: Not on file   Food Insecurity:     Worried About Running Out of Food in the Last Year: Not on file    Jameson of Food in the Last Year: Not on BACILIO Savage Needs:     Lack of Transportation (Medical): Not on file    Lack of Transportation (Non-Medical): Not on file   Physical Activity:     Days of Exercise per Week: Not on file    Minutes of Exercise per Session: Not on file   Stress:     Feeling of Stress : Not on file   Social Connections:     Frequency of Communication with Friends and Family: Not on file    Frequency of Social Gatherings with Friends and Family: Not on file    Attends Zoroastrianism Services: Not on file    Active Member of [address] [place] [address] [employer] or Organizations: Not on file    Attends Club or Organization Meetings: Not on file    Marital Status: Not on file   Intimate Partner Violence:     Fear of Current or Ex-Partner: Not on file    Emotionally Abused: Not on file    Physically Abused: Not on file    Sexually Abused: Not on file   Housing Stability:     Unable to Pay for Housing in the Last Year: Not on file    Number of [place] in the Last Year: Not on file    Unstable Housing in the Last Year: Not on file      FAMILY HISTORY: Family history from the initial psychiatric evaluation has been reviewed (reviewed/updated 5/14/2022) with no additional updates (I asked patient and no additional family history provided). Family History   Problem Relation Age of Onset    Diabetes Mother     Hypertension Mother     Cancer Father         LUNG CA. SMOKER       REVIEW OF SYSTEMS: (reviewed/updated 5/14/2022)  Appetite:good   Sleep: poor with DIMS (difficulty initiating & maintaining sleep)   All other Review of Systems: Negative except per HPI         [facility] (MSE):    MSE FINDINGS ARE WITHIN NORMAL LIMITS (WNL) UNLESS OTHERWISE STATED BELOW. ( ALL OF THE BELOW CATEGORIES OF THE MSE HAVE BEEN REVIEWED (reviewed 5/14/2022) AND UPDATED AS DEEMED APPROPRIATE )  General Presentation age appropriate, cooperative   Orientation oriented to time, place and person   Vital Signs  See below (reviewed 5/14/2022);  Vital Signs (BP, Pulse, & Temp) are within normal limits if not listed below.    Gait and Station Stable/steady, no ataxia   Musculoskeletal System No extrapyramidal symptoms (EPS); no abnormal muscular movements or Tardive Dyskinesia (TD); muscle strength and tone are within normal limits   Language No aphasia or dysarthria   Speech:  normal volume and non-pressured   Thought Processes logical; normal rate of thoughts; fair abstract reasoning/computation   Thought Associations goal directed   Thought Content free of delusions and free of hallucinations   Suicidal Ideations contracts for safety   Homicidal Ideations none   Mood:  euthymic   Affect:  constricted and mood-congruent   Memory recent  intact   Memory remote:  intact   Concentration/Attention:  intact   Fund of Knowledge average   Insight:  fair   Reliability poor   Judgment:  fair          VITALS:     Patient Vitals for the past 24 hrs:   Temp Pulse Resp BP SpO2   05/14/22 0814 98.3 °F (36.8 °C) 99 18 106/66 100 %   05/13/22 2020 97.9 °F (36.6 °C) 98 20 111/71 100 %     Wt Readings from Last 3 Encounters:   05/11/22 98 kg (216 lb)   05/08/22 98 kg (216 lb)   05/04/22 98 kg (216 lb 0.8 oz)     Temp Readings from Last 3 Encounters:   05/14/22 98.3 °F (36.8 °C)   05/08/22 97.4 °F (36.3 °C)   05/06/22 97.6 °F (36.4 °C)     BP Readings from Last 3 Encounters:   05/14/22 106/66   05/08/22 118/85   05/06/22 139/83     Pulse Readings from Last 3 Encounters:   05/14/22 99   05/08/22 86   05/06/22 93            DATA     LABORATORY DATA:(reviewed/updated 5/14/2022)  Recent Results (from the past 24 hour(s))   HEMOGLOBIN A1C WITH EAG    Collection Time: 05/14/22  5:30 AM   Result Value Ref Range    Hemoglobin A1c 7.1 (H) 4.0 - 5.6 %    Est. average glucose 346 mg/dL   METABOLIC PANEL, BASIC    Collection Time: 05/14/22  5:30 AM   Result Value Ref Range    Sodium 142 136 - 145 mmol/L    Potassium 3.8 3.5 - 5.1 mmol/L    Chloride 103 97 - 108 mmol/L    CO2 29 21 - 32 mmol/L Anion gap 10 5 - 15 mmol/L    Glucose 151 (H) 65 - 100 mg/dL    BUN 12 6 - 20 MG/DL    Creatinine 0.74 0.55 - 1.02 MG/DL    BUN/Creatinine ratio 16 12 - 20      GFR est AA >60 >60 ml/min/1.73m2    GFR est non-AA >60 >60 ml/min/1.73m2    Calcium 8.7 8.5 - 10.1 MG/DL     No results found for: VALF2, VALAC, VALP, VALPR, DS6, CRBAM, CRBAMP, CARB2, XCRBAM  No results found for: LITHM   RADIOLOGY REPORTS:(reviewed/updated 5/14/2022)  XR CHEST PA LAT    Result Date: 5/4/2022  EXAM: XR CHEST PA LAT INDICATION: Chest Pain COMPARISON: 6/3/2019. FINDINGS: PA and lateral radiographs of the chest demonstrate clear lungs. The cardiac and mediastinal contours and pulmonary vascularity are normal. Degenerative changes are seen in the thoracic spine. No acute abnormality. CT SPINE CERV WO CONT    Result Date: 5/4/2022  EXAM: CT SPINE CERV WO CONT CLINICAL HISTORY: neck pain INDICATION: neck pain COMPARISON:  None TECHNIQUE:  Axial neck CT was performed. Noncontrast imaging obtained. Coronal and sagittal reconstructions were performed. CT dose reduction was achieved through use of a standardized protocol tailored for this examination and automatic exposure control for dose modulation. Osseous/bone algorithm was utilized. FINDINGS: The vertebral bodies are anatomically aligned. There is no evidence of fracture or subluxation. The prevertebral soft tissues are grossly within normal limits. The atlantodental interval is within normal limits. The craniocervical junction is intact. Large posterior and anterior osteophytes. Severe multilevel canal stenoses. Cord compression at C5-6 is likely. Multilevel severe foraminal stenoses as well. No apical pneumothorax. There is no acute fracture or dislocation identified. Severe multilevel chronic degenerative change with multiple anterior and posterior disc osteophyte. Severe canal and foraminal stenoses with multilevel cord compression chronic and degenerative in nature is likely. MRI of the cervical spine for further delineation on an nonemergent basis is recommended.            MEDICATIONS     ALL MEDICATIONS:   Current Facility-Administered Medications   Medication Dose Route Frequency    OLANZapine (ZyPREXA) tablet 5 mg  5 mg Oral Q6H PRN    haloperidol lactate (HALDOL) injection 5 mg  5 mg IntraMUSCular Q6H PRN    benztropine (COGENTIN) tablet 1 mg  1 mg Oral BID PRN    diphenhydrAMINE (BENADRYL) injection 50 mg  50 mg IntraMUSCular BID PRN    LORazepam (ATIVAN) injection 1 mg  1 mg IntraMUSCular Q4H PRN    traZODone (DESYREL) tablet 50 mg  50 mg Oral QHS PRN    acetaminophen (TYLENOL) tablet 650 mg  650 mg Oral Q4H PRN    magnesium hydroxide (MILK OF MAGNESIA) 400 mg/5 mL oral suspension 30 mL  30 mL Oral DAILY PRN    potassium bicarb-citric acid (EFFER-K) tablet 20 mEq  20 mEq Oral BID    FLUoxetine (PROzac) capsule 20 mg  20 mg Oral DAILY    LORazepam (ATIVAN) tablet 1 mg  1 mg Oral Q6H PRN    metFORMIN (GLUCOPHAGE) tablet 1,000 mg  1,000 mg Oral DAILY    atorvastatin (LIPITOR) tablet 40 mg  40 mg Oral DAILY    losartan (COZAAR) tablet 50 mg  50 mg Oral DAILY      SCHEDULED MEDICATIONS:   Current Facility-Administered Medications   Medication Dose Route Frequency    potassium bicarb-citric acid (EFFER-K) tablet 20 mEq  20 mEq Oral BID    FLUoxetine (PROzac) capsule 20 mg  20 mg Oral DAILY    metFORMIN (GLUCOPHAGE) tablet 1,000 mg  1,000 mg Oral DAILY    atorvastatin (LIPITOR) tablet 40 mg  40 mg Oral DAILY    losartan (COZAAR) tablet 50 mg  50 mg Oral DAILY          ASSESSMENT & PLAN     DIAGNOSES REQUIRING ACTIVE TREATMENT AND MONITORING: (reviewed/updated 5/14/2022)  Patient Active Hospital Problem List:    Panic disorder without agoraphobia with severe panic attacks (3/25/2016)    Assessment: the patient has been experiencing worsening of her underlying depression and anxiety, now with severe panic attacks resulting in trichotillomania (she has pulled much of her hair out). She would benefit from an SSRI which is the favored agent over a TCA for anxiety, as her hair pulling seems secondary to anxiety rather than a compulsive behavior. Plan:  - CONTINUE Prozac 20 mg QDAY for MDD and anxiety  - CONTINE Ativan 1 mg BID PRN anxiety  - f/u BMP, A1C  - IGM therapy as tolerated  - Expand database / obtain collateral  - Dispo planning (home when stable)    In summary, Chayo Rivers, is a 48 y.o.  female who presents with a severe exacerbation of the principal diagnosis of Moderate episode of recurrent major depressive disorder ([facility] [address]    Patient's condition is improving. Patient requires continued inpatient hospitalization for further stabilization, safety monitoring and medication management. I will continue to coordinate the provision of individual, milieu, occupational, group, and substance abuse therapies to address target symptoms/diagnoses as deemed appropriate for the individual patient. A coordinated, multidisplinary treatment team round was conducted with the patient (this team consists of the nurse, psychiatric unit pharmacist,  and writer). Complete current electronic health record for patient has been reviewed today including consultant notes, ancillary staff notes, nurses and psychiatric tech notes. Suicide risk assessment completed and patient deemed to be of low risk for suicide at this time. The following regarding medications was addressed during rounds with patient:   the risks and benefits of the proposed medication. The patient was given the opportunity to ask questions. Informed consent given to the use of the above medications. Will continue to adjust psychiatric and non-psychiatric medications (see above \"medication\" section and orders section for details) as deemed appropriate & based upon diagnoses and response to treatment.      I will continue to order blood tests/labs and diagnostic tests as deemed appropriate and review results as they become available (see orders for details and above listed lab/test results). I will order psychiatric records from previous [facility] hospitals to further elucidate the nature of patient's psychopathology and review once available. I will gather additional collateral information from friends, family and o/p treatment team to further elucidate the nature of patient's psychopathology and baselline level of psychiatric functioning. I certify that this patient's inpatient psychiatric hospital services furnished since the previous certification were, and continue to be, required for treatment that could reasonably be expected to improve the patient's condition, or for diagnostic study, and that the patient continues to need, on a daily basis, active treatment furnished directly by or requiring the supervision of inpatient psychiatric facility personnel. In addition, the hospital records show that services furnished were intensive treatment services, admission or related services, or equivalent services.     EXPECTED DISCHARGE DATE/DAY: 5/16/22     DISPOSITION: Home       Signed By:   Quinn Kehr, NP  5/14/2022

## 2022-05-14 NOTE — PROGRESS NOTES
Rosi has had some sadness about being unable to go home today. She coped by going to the day room and engaging with peers and staff. She zara and wrote a bit as well and her spirits improved by the end of the evening. She requested PRN Trazodone and appears to be sleeping well.

## 2022-05-14 NOTE — PROGRESS NOTES
0700- Shift change report given to [address] Zheng (oncoming nurse) by Krystina Curry RN(offgoing nurse). Report included the following information SBAR, Kardex, MAR, and Recent Results. [id number]  Received pt oriented x 4, pleasant and cooperative. On assessment , pt denied anxiety, depression, AVH, and SI. Meals and meds compliant. Vital signs stable. No compliant made. Will continue to monitor. 100-1200 Pt in the day room eating lunch. No issues. [id number] No complaint made.      Problem: Patient Education: Go to Patient Education Activity  Goal: Patient/Family Education  Outcome: Progressing Towards Goal     Problem: Anxiety  Goal: *Alleviation of anxiety  5/14/2022 1528 by Amy Ladd RN  Outcome: Not Progressing Towards Goal  5/14/2022 1204 by Amy Ladd RN  Outcome: Progressing Towards Goal  Goal: *Alleviation of anxiety (Palliative Care)  5/14/2022 1528 by Amy Ladd RN  Outcome: Not Progressing Towards Goal  5/14/2022 1204 by Amy Ladd RN  Outcome: Progressing Towards Goal     Problem: Patient Education: Go to Patient Education Activity  Goal: Patient/Family Education  Outcome: Not Progressing Towards Goal     Problem: Anxiety  Goal: *Alleviation of anxiety  Outcome: Not Progressing Towards Goal  Goal: *Alleviation of anxiety (Palliative Care)  5/14/2022 1528 by Amy Ladd RN  Outcome: Not Progressing Towards Goal  5/14/2022 1204 by Amy Ladd RN  Outcome: Progressing Towards Goal

## 2022-05-14 NOTE — PROGRESS NOTES
Problem: Anxiety  Goal: *Alleviation of anxiety  Outcome: Progressing Towards Goal     Problem: Patient Education: Go to Patient Education Activity  Goal: Patient/Family Education  Outcome: Progressing Towards Goal

## 2022-05-15 PROCEDURE — 65220000003 HC RM SEMIPRIVATE PSYCH

## 2022-05-15 PROCEDURE — 74011250637 HC RX REV CODE- 250/637: Performed by: NURSE PRACTITIONER

## 2022-05-15 PROCEDURE — 74011250637 HC RX REV CODE- 250/637: Performed by: PSYCHIATRY & NEUROLOGY

## 2022-05-15 RX ADMIN — FLUOXETINE 20 MG: 20 CAPSULE ORAL at 09:09

## 2022-05-15 RX ADMIN — METFORMIN HYDROCHLORIDE 1000 MG: 500 TABLET ORAL at 09:09

## 2022-05-15 RX ADMIN — LOSARTAN POTASSIUM 50 MG: 25 TABLET, FILM COATED ORAL at 09:08

## 2022-05-15 RX ADMIN — MAGNESIUM HYDROXIDE 30 ML: 2400 SUSPENSION ORAL at 10:39

## 2022-05-15 RX ADMIN — LORAZEPAM 1 MG: 1 TABLET ORAL at 22:11

## 2022-05-15 RX ADMIN — POTASSIUM BICARBONATE 20 MEQ: 782 TABLET, EFFERVESCENT ORAL at 17:32

## 2022-05-15 RX ADMIN — POTASSIUM BICARBONATE 20 MEQ: 782 TABLET, EFFERVESCENT ORAL at 09:09

## 2022-05-15 RX ADMIN — ATORVASTATIN CALCIUM 40 MG: 40 TABLET, FILM COATED ORAL at 09:09

## 2022-05-15 NOTE — BH NOTES
PSYCHIATRIC PROGRESS NOTE         Patient Name  Crystal Olea   Date of Birth 1975   CSN [id number]   Medical Record Number  [id number]      Age  48 y.o. PCP Fallon Rizo NP   Admit date:  5/11/2022    Room Number  320/01  @ [facility]   Date of Service  5/15/2022         E & M PROGRESS NOTE:         HISTORY       CC:  \"panic attacks / self mutilation\"  HISTORY OF PRESENT ILLNESS/INTERVAL HISTORY:  (reviewed/updated 5/15/2022). per initial evaluation: The patient, Crystal Olea, is a 48 y.o. 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 Additional symptomatology include pulling out most of her hair. The above symptoms have been present for 2+ weeks. These symptoms are of moderate to high severity. These symptoms are constant in nature. The patient's condition has been precipitated by psychosocial stressors. UDS: negative; BAL=0.      The patient presented to the ED 3x in the past week due to anxiety attacks. She has been having increasingly debilitating panic attacks, and has been unable to function in the community. The patient denied active thoughts of self harm but was admitted as there was no better alternative to address her worsening symptoms and she was in severe distress. The patient has been on antidepressant medication for OCD and for depression with some success but a recent relationship stressor triggered the episode. Of note, patient with K+ 2.5 in the ED.     The patient is a good historian. The patient corroborates the above narrative. The patient contracts for safety on the unit and gives consent for the team to contact collateral. The patient is amenable to initiating treatment while on the unit.  The patient states that she has been feeling anxious about her relationship and having more difficulty with her ADLs due to the panic attacks. Discussed SSRIs for panic and benzodiazepines for anxiety, patient agrees to both agents after a discussion of risk and benefit.    05/13 - no acute overnight events. Patient slept 6 hours overnight, she is pleasant and cooperative. SHe is medication compliant and denies active thoughts of self harm. She was originally scheduled to discharge and take a two week medical leave of absence but she did not feel safe discharging from the hospital. Patient denies SI/HI/AVH/PI, she continues to endorse anxiety and is able to make her needs known. 05/14 - No events overnight to report. Pt slept 7.5 hours. Pt given PRN Zyprexa and Trazodone. Pt states anxiety is improved today. She states she is feeling better about discharge today than she was yesterday. She states no depressive symptoms today. She has some nausea to report from medication but nothing significant. She is sleeping and eating well.      05/15 - No events overnight reported. Pt states she slept alright last night and did take PRN to help with sleep last night. She states she is eating well. She endorses continued decrease in overall anxiety levels. She states some nausea from metformin still. She denies SI/HI/AVH currently. SIDE EFFECTS: (reviewed/updated 5/15/2022)  None reported or admitted to. ALLERGIES:(reviewed/updated 5/15/2022)  Allergies   Allergen Reactions    Lisinopril Rash     Swelling of the lips      MEDICATIONS PRIOR TO ADMISSION:(reviewed/updated 5/15/2022)  Medications Prior to Admission   Medication Sig    atorvastatin (LIPITOR) 40 mg tablet Take 40 mg by mouth daily. Indications: high cholesterol    empagliflozin (Jardiance) 25 mg tablet Take 25 mg by mouth daily. Indications: type 2 diabetes mellitus    glipiZIDE SR (GLUCOTROL XL) 10 mg CR tablet Take 10 mg by mouth daily.  Indications: type 2 diabetes mellitus    clomiPRAMINE (ANAFRANIL) 50 mg capsule Take 50 mg by mouth daily (with breakfast). Indications: depression    triamterene-hydrochlorothiazide (MAXZIDE) 37.5-25 mg per tablet Take 1 Tab by mouth daily. Indications: HYPERTENSION    losartan (COZAAR) 50 mg tablet Take 50 mg by mouth daily. Indications: HYPERTENSION    metFORMIN (GLUCOPHAGE) 1,000 mg tablet Take 1,000 mg by mouth daily. Indications: type 2 diabetes mellitus      PAST MEDICAL HISTORY: Past medical history from the initial psychiatric evaluation has been reviewed (reviewed/updated 5/15/2022) with no additional updates (I asked patient and no additional past medical history provided). Past Medical History:   Diagnosis Date    Anxiety     Depression     ANXIETY & DEPRESSION    Diabetes ([facility] [address]     TYPE II    Hypertension     Infected sebaceous cyst, upper back 4/10/2019    Panic attack     Suicidal thoughts     Vision decreased      Past Surgical History:   Procedure Laterality Date    COLONOSCOPY N/A 11/20/2020    . COLONOSCOPY  :- performed by Marcela [facility] at [facility] ENDOSCOPY    HX OTHER SURGICAL  06/03/2019     Excision of large sebaceous cyst in the midline upper back and with packing- [facility]-DR. Jose Sidhu      SOCIAL HISTORY: Social history from the initial psychiatric evaluation has been reviewed (reviewed/updated 5/15/2022) with no additional updates (I asked patient and no additional social history provided).    Social History     Socioeconomic History    Marital status: SINGLE     Spouse name: Not on file    Number of children: Not on file    Years of education: Not on file    Highest education level: Not on file   Occupational History    Not on file   Tobacco Use    Smoking status: Never Smoker    Smokeless tobacco: Never Used    Tobacco comment: n/a never smoked tobacco   Substance and Sexual Activity    Alcohol use: No    Drug use: No    Sexual activity: Yes     Partners: Male     Birth control/protection: None   Other Topics Concern    Not on file   Social History Narrative    Not on file     Social Determinants of Health     Financial Resource Strain:     Difficulty of Paying Living Expenses: Not on file   Food Insecurity:     Worried About Running Out of Food in the Last Year: Not on file    Jameson of Food in the Last Year: Not on file   Transportation Needs:     Lack of Transportation (Medical): Not on file    Lack of Transportation (Non-Medical): Not on file   Physical Activity:     Days of Exercise per Week: Not on file    Minutes of Exercise per Session: Not on file   Stress:     Feeling of Stress : Not on file   Social Connections:     Frequency of Communication with Friends and Family: Not on file    Frequency of Social Gatherings with Friends and Family: Not on file    Attends Faith Services: Not on file    Active Member of [address] [place] [address] [employer] or Organizations: Not on file    Attends Club or Organization Meetings: Not on file    Marital Status: Not on file   Intimate Partner Violence:     Fear of Current or Ex-Partner: Not on file    Emotionally Abused: Not on file    Physically Abused: Not on file    Sexually Abused: Not on file   Housing Stability:     Unable to Pay for Housing in the Last Year: Not on file    Number of [place] in the Last Year: Not on file    Unstable Housing in the Last Year: Not on file      FAMILY HISTORY: Family history from the initial psychiatric evaluation has been reviewed (reviewed/updated 5/15/2022) with no additional updates (I asked patient and no additional family history provided).    Family History   Problem Relation Age of Onset    Diabetes Mother     Hypertension Mother     Cancer Father         LUNG CA. SMOKER       REVIEW OF SYSTEMS: (reviewed/updated 5/15/2022)  Appetite:good   Sleep: poor with DIMS (difficulty initiating & maintaining sleep)   All other Review of Systems: Negative except per HPI         [facility] (MSE):    MSE FINDINGS ARE WITHIN NORMAL LIMITS (WNL) UNLESS OTHERWISE STATED BELOW. ( ALL OF THE BELOW CATEGORIES OF THE MSE HAVE BEEN REVIEWED (reviewed 5/15/2022) AND UPDATED AS DEEMED APPROPRIATE )  General Presentation age appropriate, cooperative   Orientation oriented to time, place and person   Vital Signs  See below (reviewed 5/15/2022); Vital Signs (BP, Pulse, & Temp) are within normal limits if not listed below. Gait and Station Stable/steady, no ataxia   Musculoskeletal System No extrapyramidal symptoms (EPS); no abnormal muscular movements or Tardive Dyskinesia (TD); muscle strength and tone are within normal limits   Language No aphasia or dysarthria   Speech:  normal volume and non-pressured   Thought Processes logical; normal rate of thoughts; fair abstract reasoning/computation   Thought Associations goal directed   Thought Content free of delusions and free of hallucinations   Suicidal Ideations contracts for safety   Homicidal Ideations none   Mood:  euthymic   Affect:  constricted and mood-congruent   Memory recent  intact   Memory remote:  intact   Concentration/Attention:  intact   Fund of Knowledge average   Insight:  fair   Reliability poor   Judgment:  fair          VITALS:     Patient Vitals for the past 24 hrs:   Temp Pulse Resp BP SpO2   05/15/22 0904 98.7 °F (37.1 °C) (!) 110 18 139/84 98 %   05/14/22 2016 98.7 °F (37.1 °C) 88 18 120/77 100 %     Wt Readings from Last 3 Encounters:   05/11/22 98 kg (216 lb)   05/08/22 98 kg (216 lb)   05/04/22 98 kg (216 lb 0.8 oz)     Temp Readings from Last 3 Encounters:   05/15/22 98.7 °F (37.1 °C)   05/08/22 97.4 °F (36.3 °C)   05/06/22 97.6 °F (36.4 °C)     BP Readings from Last 3 Encounters:   05/15/22 139/84   05/08/22 118/85   05/06/22 139/83     Pulse Readings from Last 3 Encounters:   05/15/22 (!) 110   05/08/22 86   05/06/22 93            DATA     LABORATORY DATA:(reviewed/updated 5/15/2022)  No results found for this or any previous visit (from the past 24 hour(s)).   No results found for: VALF2, VALAC, VALP, VALPR, DS6, CRBAM, CRBAMP, CARB2, XCRBAM  No results found for: LITHM   RADIOLOGY REPORTS:(reviewed/updated 5/15/2022)  XR CHEST PA LAT    Result Date: 5/4/2022  EXAM: XR CHEST PA LAT INDICATION: Chest Pain COMPARISON: 6/3/2019. FINDINGS: PA and lateral radiographs of the chest demonstrate clear lungs. The cardiac and mediastinal contours and pulmonary vascularity are normal. Degenerative changes are seen in the thoracic spine. No acute abnormality. CT SPINE CERV WO CONT    Result Date: 5/4/2022  EXAM: CT SPINE CERV WO CONT CLINICAL HISTORY: neck pain INDICATION: neck pain COMPARISON:  None TECHNIQUE:  Axial neck CT was performed. Noncontrast imaging obtained. Coronal and sagittal reconstructions were performed. CT dose reduction was achieved through use of a standardized protocol tailored for this examination and automatic exposure control for dose modulation. Osseous/bone algorithm was utilized. FINDINGS: The vertebral bodies are anatomically aligned. There is no evidence of fracture or subluxation. The prevertebral soft tissues are grossly within normal limits. The atlantodental interval is within normal limits. The craniocervical junction is intact. Large posterior and anterior osteophytes. Severe multilevel canal stenoses. Cord compression at C5-6 is likely. Multilevel severe foraminal stenoses as well. No apical pneumothorax. There is no acute fracture or dislocation identified. Severe multilevel chronic degenerative change with multiple anterior and posterior disc osteophyte. Severe canal and foraminal stenoses with multilevel cord compression chronic and degenerative in nature is likely. MRI of the cervical spine for further delineation on an nonemergent basis is recommended.            MEDICATIONS     ALL MEDICATIONS:   Current Facility-Administered Medications   Medication Dose Route Frequency    OLANZapine (ZyPREXA) tablet 5 mg  5 mg Oral Q6H PRN    haloperidol lactate (HALDOL) injection 5 mg  5 mg IntraMUSCular Q6H PRN    benztropine (COGENTIN) tablet 1 mg  1 mg Oral BID PRN    diphenhydrAMINE (BENADRYL) injection 50 mg  50 mg IntraMUSCular BID PRN    LORazepam (ATIVAN) injection 1 mg  1 mg IntraMUSCular Q4H PRN    traZODone (DESYREL) tablet 50 mg  50 mg Oral QHS PRN    acetaminophen (TYLENOL) tablet 650 mg  650 mg Oral Q4H PRN    magnesium hydroxide (MILK OF MAGNESIA) 400 mg/5 mL oral suspension 30 mL  30 mL Oral DAILY PRN    potassium bicarb-citric acid (EFFER-K) tablet 20 mEq  20 mEq Oral BID    FLUoxetine (PROzac) capsule 20 mg  20 mg Oral DAILY    LORazepam (ATIVAN) tablet 1 mg  1 mg Oral Q6H PRN    metFORMIN (GLUCOPHAGE) tablet 1,000 mg  1,000 mg Oral DAILY    atorvastatin (LIPITOR) tablet 40 mg  40 mg Oral DAILY    losartan (COZAAR) tablet 50 mg  50 mg Oral DAILY      SCHEDULED MEDICATIONS:   Current Facility-Administered Medications   Medication Dose Route Frequency    potassium bicarb-citric acid (EFFER-K) tablet 20 mEq  20 mEq Oral BID    FLUoxetine (PROzac) capsule 20 mg  20 mg Oral DAILY    metFORMIN (GLUCOPHAGE) tablet 1,000 mg  1,000 mg Oral DAILY    atorvastatin (LIPITOR) tablet 40 mg  40 mg Oral DAILY    losartan (COZAAR) tablet 50 mg  50 mg Oral DAILY          ASSESSMENT & PLAN     DIAGNOSES REQUIRING ACTIVE TREATMENT AND MONITORING: (reviewed/updated 5/15/2022)  Patient Active Hospital Problem List:    Panic disorder without agoraphobia with severe panic attacks (3/25/2016)    Assessment: the patient has been experiencing worsening of her underlying depression and anxiety, now with severe panic attacks resulting in trichotillomania (she has pulled much of her hair out). She would benefit from an SSRI which is the favored agent over a TCA for anxiety, as her hair pulling seems secondary to anxiety rather than a compulsive behavior.     Plan:  - CONTINUE Prozac 20 mg QDAY for MDD and anxiety  - CONTINE Ativan 1 mg BID PRN anxiety  - f/u BMP, A1C  - IGM therapy as tolerated  - Expand database / obtain collateral  - Dispo planning (home when stable)    In summary, Susan Beauchamp, is a 48 y.o.  female who presents with a severe exacerbation of the principal diagnosis of Moderate episode of recurrent major depressive disorder ([facility] [address]    Patient's condition is improving. Patient requires continued inpatient hospitalization for further stabilization, safety monitoring and medication management. I will continue to coordinate the provision of individual, milieu, occupational, group, and substance abuse therapies to address target symptoms/diagnoses as deemed appropriate for the individual patient. A coordinated, multidisplinary treatment team round was conducted with the patient (this team consists of the nurse, psychiatric unit pharmacist,  and writer). Complete current electronic health record for patient has been reviewed today including consultant notes, ancillary staff notes, nurses and psychiatric tech notes. Suicide risk assessment completed and patient deemed to be of low risk for suicide at this time. The following regarding medications was addressed during rounds with patient:   the risks and benefits of the proposed medication. The patient was given the opportunity to ask questions. Informed consent given to the use of the above medications. Will continue to adjust psychiatric and non-psychiatric medications (see above \"medication\" section and orders section for details) as deemed appropriate & based upon diagnoses and response to treatment. I will continue to order blood tests/labs and diagnostic tests as deemed appropriate and review results as they become available (see orders for details and above listed lab/test results). I will order psychiatric records from previous [facility] hospitals to further elucidate the nature of patient's psychopathology and review once available.     I will gather additional collateral information from friends, family and o/p treatment team to further elucidate the nature of patient's psychopathology and baselline level of psychiatric functioning. I certify that this patient's inpatient psychiatric hospital services furnished since the previous certification were, and continue to be, required for treatment that could reasonably be expected to improve the patient's condition, or for diagnostic study, and that the patient continues to need, on a daily basis, active treatment furnished directly by or requiring the supervision of inpatient psychiatric facility personnel. In addition, the hospital records show that services furnished were intensive treatment services, admission or related services, or equivalent services.     EXPECTED DISCHARGE DATE/DAY: 5/16/22     DISPOSITION: Home       Signed By:   Deepali Forte NP  5/15/2022

## 2022-05-15 NOTE — PROGRESS NOTES
Problem: Anxiety  Goal: *Alleviation of anxiety  Outcome: Progressing Towards Goal     Problem: Discharge Planning  Goal: *Knowledge of medication management  Outcome: Progressing Towards Goal     0700: Shift change report given to Destiny CHAVEZ (oncoming nurse) by Margarette Ontiveros (offgoing nurse). Report included the following information SBAR, Kardex, MAR and Recent Results. 1601-3626: Assumed care of patient. Met patient in dayroom. Calm and cooperative during assessment. Patient denied anxiety, depression, SI, HI and AVH. No complaints of pain. Meal compliant. 0127-7243: Medication compliant. Patient in dayroom talking to peers. 8992-8601: Patient in dayroom coloring. PRN milk of magnesia given at 1039.    1138-2491: Patient in dayroom talking to peers and watching TV. Meal compliant. 6576-3315: Patient awake in bed sitting quietly. 1044-0836: Patient awake in bed resting quietly. 1177-7200: Patient in dayroom watching TV.    4962-7796: Patient in bathroom toileting. 6846-9707: Patient tearful. Patient anxious about d/c tomorrow. States that she does not think she is ready. Writer to sit with patient and provide therapeutic communication. 1317-0706: Patient med and meal compliant. 4530-7200: Patient in dayroom watching TV.    2808-3976: Patient up in room quiet.

## 2022-05-15 NOTE — BH NOTES
Assumed care of the patient. Patient was visible on the milieu and was seen interacting well with the staffs and the peers. Over all mood was elated. She was cooperative with the assessments. Patient denied S.I/H. I/A/V/H, depression and anxiety. PRN Ativan administered for anxiety at 2351 upon request. Will continue to monitor and provide support as needed. Patient appeared to be sleeping for about 5 hours. Hourly rounds completed.

## 2022-05-16 VITALS
WEIGHT: 216 LBS | TEMPERATURE: 99.2 F | BODY MASS INDEX: 38.27 KG/M2 | SYSTOLIC BLOOD PRESSURE: 118 MMHG | HEART RATE: 109 BPM | RESPIRATION RATE: 18 BRPM | DIASTOLIC BLOOD PRESSURE: 77 MMHG | OXYGEN SATURATION: 96 % | HEIGHT: 63 IN

## 2022-05-16 LAB
ANION GAP SERPL CALC-SCNC: 6 MMOL/L (ref 5–15)
BUN SERPL-MCNC: 11 MG/DL (ref 6–20)
BUN/CREAT SERPL: 17 (ref 12–20)
CALCIUM SERPL-MCNC: 8.4 MG/DL (ref 8.5–10.1)
CHLORIDE SERPL-SCNC: 104 MMOL/L (ref 97–108)
CO2 SERPL-SCNC: 30 MMOL/L (ref 21–32)
CREAT SERPL-MCNC: 0.66 MG/DL (ref 0.55–1.02)
GLUCOSE SERPL-MCNC: 143 MG/DL (ref 65–100)
POTASSIUM SERPL-SCNC: 4 MMOL/L (ref 3.5–5.1)
SODIUM SERPL-SCNC: 140 MMOL/L (ref 136–145)

## 2022-05-16 PROCEDURE — 80048 BASIC METABOLIC PNL TOTAL CA: CPT

## 2022-05-16 PROCEDURE — 36415 COLL VENOUS BLD VENIPUNCTURE: CPT

## 2022-05-16 PROCEDURE — 99239 HOSP IP/OBS DSCHRG MGMT >30: CPT | Performed by: PSYCHIATRY & NEUROLOGY

## 2022-05-16 PROCEDURE — 74011250637 HC RX REV CODE- 250/637: Performed by: PSYCHIATRY & NEUROLOGY

## 2022-05-16 RX ORDER — MULTIVITAMIN
1 CAPSULE ORAL DAILY
Qty: 180 CAPSULE | Refills: 1 | Status: SHIPPED | OUTPATIENT
Start: 2022-05-16 | End: 2022-08-05

## 2022-05-16 RX ADMIN — ATORVASTATIN CALCIUM 40 MG: 40 TABLET, FILM COATED ORAL at 08:50

## 2022-05-16 RX ADMIN — METFORMIN HYDROCHLORIDE 1000 MG: 500 TABLET ORAL at 08:50

## 2022-05-16 RX ADMIN — POTASSIUM BICARBONATE 20 MEQ: 782 TABLET, EFFERVESCENT ORAL at 08:50

## 2022-05-16 RX ADMIN — FLUOXETINE 20 MG: 20 CAPSULE ORAL at 08:50

## 2022-05-16 RX ADMIN — LOSARTAN POTASSIUM 50 MG: 25 TABLET, FILM COATED ORAL at 08:50

## 2022-05-16 NOTE — BH NOTES
Patient alert and verbal. Discharged home to continue recommended plan of care. Discharge instructions reviewed with patient. Patient verbalized understanding. Patients belongings returned. Patient transported home via parents.

## 2022-05-16 NOTE — PROGRESS NOTES
Problem: Falls - Risk of  Goal: *Absence of Falls  Description: Document [place] Fall Risk and appropriate interventions in the flowsheet.   Outcome: Progressing Towards Goal  Note: Fall Risk Interventions:            Medication Interventions: Teach patient to arise slowly

## 2022-05-16 NOTE — DISCHARGE INSTRUCTIONS
If I feel I am at risk of hurting myself or others, I will call the crisis office and speak with a crisis worker who will assist me during my crisis. [address]  [phone]  [address] [address] [phone]  701  [facility]-  [address] [place]  [phone]      Patient Education        Recovering From Depression: Care Instructions  Your Care Instructions     Taking good care of yourself is important as you recover from depression. In time, your symptoms will fade as your treatment takes hold. Do not give up. Instead, focus your energy on getting better. Your mood will improve. It just takes some time. Focus on things that can help you feel better, such as being with friends and family, eating well, and getting enough rest. But take things slowly. Do not do too much too soon. You will begin to feel better gradually. Follow-up care is a key part of your treatment and safety. Be sure to make and go to all appointments, and call your doctor if you are having problems. It's also a good idea to know your test results and keep a list of the medicines you take. How can you care for yourself at home? Be realistic  · If you have a large task to do, break it up into smaller steps you can handle, and just do what you can. · You may want to put off important decisions until your depression has lifted. If you have plans that will have a major impact on your life, such as marriage, divorce, or a job change, try to wait a bit. Talk it over with friends and loved ones who can help you look at the overall picture first.  · Reaching out to people for help is important. Do not isolate yourself. Let your family and friends help you. Find someone you can trust and confide in, and talk to that person. · Be patient, and be kind to yourself.  Remember that depression is not your fault and is not something you can overcome with willpower alone. Treatment is important for depression, just like for any other illness. Feeling better takes time, and your mood will improve little by little. Stay active  · Stay busy and get outside. Take a walk, or try some other light exercise. · Talk with your doctor about an exercise program. Exercise can help with mild depression. · Go to a movie or concert. Take part in a [employer] activity or other social gathering. Go to a ball game. · Ask a friend to have dinner with you. Take care of yourself  · Eat a balanced diet with plenty of fresh fruits and vegetables, whole grains, and lean protein. If you have lost your appetite, eat small snacks rather than large meals. · Avoid using illegal drugs or marijuana and drinking alcohol. Do not take medicines that have not been prescribed for you. They may interfere with medicines you may be taking for depression, or they may make your depression worse. · Take your medicines exactly as they are prescribed. You may start to feel better within 1 to 3 weeks of taking antidepressant medicine. But it can take as many as 6 to 8 weeks to see more improvement. If you have questions or concerns about your medicines, or if you do not notice any improvement by 3 weeks, talk to your doctor. · Continue to take your medicine after your symptoms improve. Taking your medicine for at least 6 months after you feel better can help keep you from getting depressed again. If this isn't the first time you have been depressed, your doctor may recommend you to take medicine even longer. · If you have any side effects from your medicine, tell your doctor. Many side effects are mild and will go away on their own after you have been taking the medicine for a few weeks. Some may last longer. Talk to your doctor if side effects are bothering you too much. You might be able to try a different medicine. · Continue counseling.  It may help prevent depression from returning, especially if you've had multiple episodes of depression. Talk with your counselor if you are having a hard time attending your sessions or you think the sessions aren't working. Don't just stop going. · Get enough sleep. Talk to your doctor if you are having problems sleeping. · Avoid sleeping pills unless they are prescribed by the doctor treating your depression. Sleeping pills may make you groggy during the day, and they may interact with other medicine you are taking. · If you have any other illnesses, such as diabetes, heart disease, or high blood pressure, make sure to continue with your treatment. Tell your doctor about all of the medicines you take, including those with or without a prescription. · If you or someone you know talks about suicide, self-harm, or feeling hopeless, get help right away. Call the [facility] at 1-800-273-talk (5-966.835.4145) or text HOME to 332361 to access the Crisis Text Line. Consider saving these numbers in your phone. When should you call for help? Call 305 anytime you think you may need emergency care. For example, call if:    · You feel like hurting yourself or someone else.     · Someone you know has depression and is about to attempt or is attempting suicide. Call your doctor now or seek immediate medical care if:    · You hear voices.     · Someone you know has depression and:  ? Starts to give away his or her possessions. ? Uses illegal drugs or drinks alcohol heavily. ? Talks or writes about death, including writing suicide notes or talking about guns, knives, or pills. ? Starts to spend a lot of time alone. ? Acts very aggressively or suddenly appears calm. Watch closely for changes in your health, and be sure to contact your doctor if:    · You do not get better as expected. Where can you learn more?   Go to http://www.gray.com/  Enter N529 in the search box to learn more about \"Recovering From Depression: Care Instructions. \"  Current as of: June 16, 2021               Content Version: 13.2  © 8569-0343 [employer]. Care instructions adapted under license by [employer] (which disclaims liability or warranty for this information). If you have questions about a medical condition or this instruction, always ask your healthcare professional. [place] [address] any warranty or liability for your use of this information.

## 2022-05-16 NOTE — PROGRESS NOTES
Problem: Anxiety  Goal: *Alleviation of anxiety  Outcome: Progressing Towards Goal     Problem: Falls - Risk of  Goal: *Absence of Falls  Description: Document Cj Amin Fall Risk and appropriate interventions in the flowsheet. Outcome: Progressing Towards Goal  Note: Fall Risk Interventions:    Medication Interventions: Teach patient to arise slowly       4271-9239: Shift change report given to Destiny CHAVEZ (oncoming nurse) by Aron Payan (offgoing nurse). Report included the following information SBAR, Kardex, MAR and Recent Results. 7966-4081: Assumed care of patient. Met patient in room. Calm and cooperative during assessment. Patient smiling. Patient states that she is excited to be d/c today. Patient denied anxiety, depression, SI, HI, and AVH. No complaints of pain. Medication and meal compliant. 7606-1646: Patient in hallway walking. 5890-7089: Patient in dayroom for groups. Interacting appropriately with staff and peers. 3959-9472: Patient in hallway walking. 2379-9805: Patient to dayroom for lunch    1435-0190: Patient in bed sleeping. 5973-9982: Discharge instructions reviewed with patient.

## 2022-05-16 NOTE — GROUP NOTE
SCOTT  GROUP DOCUMENTATION INDIVIDUAL                                                                          Group Therapy Note    Date: 5/16/2022    Group Start Time: 1000  Group End Time: 1100  Group Topic: Topic Group    [facility] [place] [address] ACUTE BEHAV [facility]    [place], [address] [facility] GROUP DOCUMENTATION GROUP    Group Therapy Note    Attendees: 8         Attendance: Attended    Patient's Goal:  To participate in relaxation activity    Interventions/techniques: Supported-game    Follows Directions:  Followed directions    Interactions: Interacted appropriately    Mental Status: Calm    Behavior/appearance: Attentive, Cooperative and Needed prompting    Goals Achieved: Able to engage in interactions, Able to listen to others and Discussed coping-active participant in game    Power Challenge [place] Lites No

## 2022-05-16 NOTE — DISCHARGE SUMMARY
PSYCHIATRIC DISCHARGE SUMMARY         IDENTIFICATION:    Patient Name  Mary Pizano   Date of Birth 1975   [facility] [id number]   Medical Record Number  [id number]      Age  48 y.o. PCP Meri Snyder NP   Admit date:  5/11/2022    Discharge date: 5/16/2022   Room Number  320/01  @ [facility]   Date of Service  5/16/2022            TYPE OF DISCHARGE: REGULAR               CONDITION AT DISCHARGE: improved and fair       PROVISIONAL & DISCHARGE DIAGNOSES:    Problem List  Date Reviewed: 7/17/2019          Codes Class    Depression ICD-10-CM: F32. A  ICD-9-CM: 311         * (Principal) Moderate episode of recurrent major depressive disorder (HCC) ICD-10-CM: F33.1  ICD-9-CM: 296.32         Obesity, morbid (HCC) ICD-10-CM: E66.01  ICD-9-CM: 278.01         Infected sebaceous cyst, upper back ICD-10-CM: L72.3, L08.9  ICD-9-CM: 706.2         Trichotillomania ICD-10-CM: F63.3  ICD-9-CM: 312.39         Depression with anxiety ICD-10-CM: F41.8  ICD-9-CM: 300.4         Hypertension ICD-10-CM: I10  ICD-9-CM: 401.9         Type II diabetes mellitus ([facility] [address] ICD-10-CM: E11.9  ICD-9-CM: 250.00         Panic disorder without agoraphobia with severe panic attacks ICD-10-CM: F41.0  ICD-9-CM: 300.01         Depressive disorder ICD-10-CM: F32. A  ICD-9-CM: [address] Problems    *Moderate episode of recurrent major depressive disorder (HCC)      Trichotillomania      Panic disorder without agoraphobia with severe panic attacks        DISCHARGE DIAGNOSIS:   Axis I:  SEE ABOVE  Axis II: SEE ABOVE  Axis III: SEE ABOVE  Axis IV:  lack of structure  Axis V:  40 on admission, 70 on discharge     CC & HISTORY OF PRESENT ILLNESS:  \"panic attacks / self mutilation\"    The patient, Mary Pizano, is a 48 y.o.   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 Additional symptomatology include pulling out most of her hair. The above symptoms have been present for 2+ weeks. These symptoms are of moderate to high severity. These symptoms are constant in nature. The patient's condition has been precipitated by psychosocial stressors. UDS: negative; BAL=0.      The patient presented to the ED 3x in the past week due to anxiety attacks. She has been having increasingly debilitating panic attacks, and has been unable to function in the community. The patient denied active thoughts of self harm but was admitted as there was no better alternative to address her worsening symptoms and she was in severe distress. The patient has been on antidepressant medication for OCD and for depression with some success but a recent relationship stressor triggered the episode. Of note, patient with K+ 2.5 in the ED.     The patient is a good historian. The patient corroborates the above narrative. The patient contracts for safety on the unit and gives consent for the team to contact collateral. The patient is amenable to initiating treatment while on the unit. The patient states that she has been feeling anxious about her relationship and having more difficulty with her ADLs due to the panic attacks. Discussed SSRIs for panic and benzodiazepines for anxiety, patient agrees to both agents after a discussion of risk and benefit.        SOCIAL HISTORY:    Social History     Socioeconomic History    Marital status: SINGLE     Spouse name: Not on file    Number of children: Not on file    Years of education: Not on file    Highest education level: Not on file   Occupational History    Not on file   Tobacco Use    Smoking status: Never Smoker    Smokeless tobacco: Never Used    Tobacco comment: n/a never smoked tobacco   Substance and Sexual Activity    Alcohol use: No    Drug use: No    Sexual activity: Yes     Partners: Male     Birth control/protection: None   Other Topics Concern    Not on file   Social History Narrative    Not on file     Social Determinants of Health     Financial Resource Strain:     Difficulty of Paying Living Expenses: Not on file   Food Insecurity:     Worried About Running Out of Food in the Last Year: Not on file    Jameson of Food in the Last Year: Not on file   Transportation Needs:     Lack of Transportation (Medical): Not on file    Lack of Transportation (Non-Medical): Not on file   Physical Activity:     Days of Exercise per Week: Not on file    Minutes of Exercise per Session: Not on file   Stress:     Feeling of Stress : Not on file   Social Connections:     Frequency of Communication with Friends and Family: Not on file    Frequency of Social Gatherings with Friends and Family: Not on file    Attends [employer] Services: Not on file    Active Member of [address] [place] [address] [employer] or Organizations: Not on file    Attends Club or Organization Meetings: Not on file    Marital Status: Not on file   Intimate Partner Violence:     Fear of Current or Ex-Partner: Not on file    Emotionally Abused: Not on file    Physically Abused: Not on file    Sexually Abused: Not on file   Housing Stability:     Unable to Pay for Housing in the Last Year: Not on file    Number of [place] in the Last Year: Not on file    Unstable Housing in the Last Year: Not on file      FAMILY HISTORY:   Family History   Problem Relation Age of Onset    Diabetes Mother     Hypertension Mother     Cancer Father         LUNG CA. SMOKER             HOSPITALIZATION COURSE:    Crystal Olea was admitted to the inpatient psychiatric unit [facility] for acute psychiatric stabilization in regards to symptomatology as described in the HPI above. The differential diagnosis at time of admission included: MDD vs adjustment disorder. While on the unit Crystal Olea was involved in individual, group, occupational and milieu therapy.   Psychiatric medications were adjusted during this hospitalization including Prozac and Abilify. Elif Mercado demonstrated a slow, but progressive improvement in overall condition. Much of patient's initial presentation appeared to be related to situational stressors and psychological factors. Please see individual progress notes for more specific details regarding patient's hospitalization course. Patient with request for discharge today. There are no grounds to seek a TDO. At time of discharge, Elif Mercado is without significant problems of depression, psychosis, or valentín. Patient free of suicidal and homicidal ideations (appears to be at very low risk of suicide or homicide) and reports many positive predictive factors in terms of not attempting suicide or homicide. Overall presentation at time of discharge is most consistent with the diagnosis of major depressive disorder and panic disorder without agoraphobia. Patient has maximized benefit to be derived from acute inpatient psychiatric treatment. All members of the treatment team concur with each other in regards to plans for discharge today. Patient and family are aware and in agreement with discharge and discharge plan. LABS AND IMAGAING:    Labs Reviewed   CBC WITH AUTOMATED DIFF - Abnormal; Notable for the following components:       Result Value    PLATELET 355 (*)     IMMATURE GRANULOCYTES 1 (*)     ABS. IMM.  GRANS. 0.1 (*)     All other components within normal limits   METABOLIC PANEL, COMPREHENSIVE - Abnormal; Notable for the following components:    Potassium 2.5 (*)     Globulin 4.4 (*)     A-G Ratio 0.9 (*)     All other components within normal limits   URINALYSIS W/ RFLX MICROSCOPIC - Abnormal; Notable for the following components:    Glucose >1,000 (*)     Ketone TRACE (*)     All other components within normal limits   METABOLIC PANEL, BASIC - Abnormal; Notable for the following components:    Potassium 2.8 (*)     GFR est non-AA 58 (*)     All other components within normal limits   HEMOGLOBIN A1C WITH EAG - Abnormal; Notable for the following components:    Hemoglobin A1c 7.1 (*)     All other components within normal limits   METABOLIC PANEL, BASIC - Abnormal; Notable for the following components:    Glucose 151 (*)     All other components within normal limits   METABOLIC PANEL, BASIC - Abnormal; Notable for the following components:    Glucose 143 (*)     Calcium 8.4 (*)     All other components within normal limits   COVID-19 WITH INFLUENZA A/B   DRUG SCREEN, URINE   ETHYL ALCOHOL   HCG URINE, QL. - POC     No results found for: DS35, PHEN, PHENO, PHENT, DILF, DS39, PHENY, PTN, VALF2, VALAC, VALP, VALPR, DS6, CRBAM, CRBAMP, CARB2, XCRBAM  Admission on 05/11/2022, Discharged on 05/16/2022   Component Date Value Ref Range Status    WBC 05/11/2022 9.8  3.6 - 11.0 K/uL Final    RBC 05/11/2022 4.59  3.80 - 5.20 M/uL Final    HGB 05/11/2022 12.0  11.5 - 16.0 g/dL Final    HCT 05/11/2022 38.2  35.0 - 47.0 % Final    MCV 05/11/2022 83.2  80.0 - 99.0 FL Final    MCH 05/11/2022 26.1  26.0 - 34.0 PG Final    MCHC 05/11/2022 31.4  30.0 - 36.5 g/dL Final    RDW 05/11/2022 14.5  11.5 - 14.5 % Final    PLATELET 88/05/7975 441* 150 - 400 K/uL Final    MPV 05/11/2022 9.4  8.9 - 12.9 FL Final    NRBC 05/11/2022 0.0  0  WBC Final    ABSOLUTE NRBC 05/11/2022 0.00  0.00 - 0.01 K/uL Final    NEUTROPHILS 05/11/2022 71  32 - 75 % Final    LYMPHOCYTES 05/11/2022 23  12 - 49 % Final    MONOCYTES 05/11/2022 5  5 - 13 % Final    EOSINOPHILS 05/11/2022 0  0 - 7 % Final    BASOPHILS 05/11/2022 0  0 - 1 % Final    IMMATURE GRANULOCYTES 05/11/2022 1* 0.0 - 0.5 % Final    ABS. NEUTROPHILS 05/11/2022 7.0  1.8 - 8.0 K/UL Final    ABS. LYMPHOCYTES 05/11/2022 2.2  0.8 - 3.5 K/UL Final    ABS. MONOCYTES 05/11/2022 0.5  0.0 - 1.0 K/UL Final    ABS. EOSINOPHILS 05/11/2022 0.0  0.0 - 0.4 K/UL Final    ABS.  BASOPHILS 05/11/2022 0.0  0.0 - 0.1 K/UL Final    ABS. IMM. GRANS. 05/11/2022 0.1* 0.00 - 0.04 K/UL Final    DF 05/11/2022 AUTOMATED    Final    Sodium 05/11/2022 140  136 - 145 mmol/L Final    Potassium 05/11/2022 2.5* 3.5 - 5.1 mmol/L Final    Chloride 05/11/2022 100  97 - 108 mmol/L Final    CO2 05/11/2022 29  21 - 32 mmol/L Final    Anion gap 05/11/2022 11  5 - 15 mmol/L Final    Glucose 05/11/2022 65  65 - 100 mg/dL Final    BUN 05/11/2022 13  6 - 20 MG/DL Final    Creatinine 05/11/2022 0.79  0.55 - 1.02 MG/DL Final    BUN/Creatinine ratio 05/11/2022 16  12 - 20   Final    GFR est AA 05/11/2022 >60  >60 ml/min/1.73m2 Final    GFR est non-AA 05/11/2022 >60  >60 ml/min/1.73m2 Final    Calcium 05/11/2022 9.4  8.5 - 10.1 MG/DL Final    Bilirubin, total 05/11/2022 0.4  0.2 - 1.0 MG/DL Final    ALT (SGPT) 05/11/2022 28  12 - 78 U/L Final    AST (SGOT) 05/11/2022 23  15 - 37 U/L Final    Alk.  phosphatase 05/11/2022 86  45 - 117 U/L Final    Protein, total 05/11/2022 8.2  6.4 - 8.2 g/dL Final    Albumin 05/11/2022 3.8  3.5 - 5.0 g/dL Final    Globulin 05/11/2022 4.4* 2.0 - 4.0 g/dL Final    A-G Ratio 05/11/2022 0.9* 1.1 - 2.2   Final    AMPHETAMINES 05/11/2022 Negative  NEG   Final    BARBITURATES 05/11/2022 Negative  NEG   Final    BENZODIAZEPINES 05/11/2022 Negative  NEG   Final    COCAINE 05/11/2022 Negative  NEG   Final    METHADONE 05/11/2022 Negative  NEG   Final    OPIATES 05/11/2022 Negative  NEG   Final    PCP(PHENCYCLIDINE) 05/11/2022 Negative  NEG   Final    THC (TH-CANNABINOL) 05/11/2022 Negative  NEG   Final    Drug screen comment 05/11/2022 (NOTE)   Final    ALCOHOL(ETHYL),SERUM 05/11/2022 <10  <10 MG/DL Final    SARS-CoV-2 by PCR 05/11/2022 Not detected  NOTD   Final    Influenza A by PCR 05/11/2022 Not detected    Final    Influenza B by PCR 05/11/2022 Not detected    Final    Color 05/11/2022 YELLOW/STRAW    Final    Appearance 05/11/2022 CLEAR  CLEAR   Final    Specific gravity 05/11/2022 1.030 1.003 - 1.030   Final    pH (UA) 05/11/2022 6.5  5.0 - 8.0   Final    Protein 05/11/2022 Negative  NEG mg/dL Final    Glucose 05/11/2022 >1,000* NEG mg/dL Final    Ketone 05/11/2022 TRACE* NEG mg/dL Final    Bilirubin 05/11/2022 Negative  NEG   Final    Blood 05/11/2022 Negative  NEG   Final    Urobilinogen 05/11/2022 0.2  0.2 - 1.0 EU/dL Final    Nitrites 05/11/2022 Negative  NEG   Final    Leukocyte Esterase 05/11/2022 Negative  NEG   Final    Pregnancy test,urine (POC) 05/11/2022 Negative  NEG   Final    Sodium 05/11/2022 138  136 - 145 mmol/L Final    Potassium 05/11/2022 2.8* 3.5 - 5.1 mmol/L Final    Chloride 05/11/2022 100  97 - 108 mmol/L Final    CO2 05/11/2022 29  21 - 32 mmol/L Final    Anion gap 05/11/2022 9  5 - 15 mmol/L Final    Glucose 05/11/2022 96  65 - 100 mg/dL Final    BUN 05/11/2022 17  6 - 20 MG/DL Final    Creatinine 05/11/2022 1.02  0.55 - 1.02 MG/DL Final    BUN/Creatinine ratio 05/11/2022 17  12 - 20   Final    GFR est AA 05/11/2022 >60  >60 ml/min/1.73m2 Final    GFR est non-AA 05/11/2022 58* >60 ml/min/1.73m2 Final    Calcium 05/11/2022 9.2  8.5 - 10.1 MG/DL Final    Hemoglobin A1c 05/14/2022 7.1* 4.0 - 5.6 % Final    Est. average glucose 05/14/2022 157  mg/dL Final    Sodium 05/14/2022 142  136 - 145 mmol/L Final    Potassium 05/14/2022 3.8  3.5 - 5.1 mmol/L Final    Chloride 05/14/2022 103  97 - 108 mmol/L Final    CO2 05/14/2022 29  21 - 32 mmol/L Final    Anion gap 05/14/2022 10  5 - 15 mmol/L Final    Glucose 05/14/2022 151* 65 - 100 mg/dL Final    BUN 05/14/2022 12  6 - 20 MG/DL Final    Creatinine 05/14/2022 0.74  0.55 - 1.02 MG/DL Final    BUN/Creatinine ratio 05/14/2022 16  12 - 20   Final    GFR est AA 05/14/2022 >60  >60 ml/min/1.73m2 Final    GFR est non-AA 05/14/2022 >60  >60 ml/min/1.73m2 Final    Calcium 05/14/2022 8.7  8.5 - 10.1 MG/DL Final    Sodium 05/16/2022 140  136 - 145 mmol/L Final    Potassium 05/16/2022 4.0  3.5 - 5.1 mmol/L Final    Chloride 05/16/2022 104  97 - 108 mmol/L Final    CO2 05/16/2022 30  21 - 32 mmol/L Final    Anion gap 05/16/2022 6  5 - 15 mmol/L Final    Glucose 05/16/2022 143* 65 - 100 mg/dL Final    BUN 05/16/2022 11  6 - 20 MG/DL Final    Creatinine 05/16/2022 0.66  0.55 - 1.02 MG/DL Final    BUN/Creatinine ratio 05/16/2022 17  12 - 20   Final    GFR est AA 05/16/2022 >60  >60 ml/min/1.73m2 Final    GFR est non-AA 05/16/2022 >60  >60 ml/min/1.73m2 Final    Calcium 05/16/2022 8.4* 8.5 - 10.1 MG/DL Final   Admission on 05/04/2022, Discharged on 05/04/2022   Component Date Value Ref Range Status    Ventricular Rate 05/04/2022 104  BPM Final    Atrial Rate 05/04/2022 104  BPM Final    P-R Interval 05/04/2022 120  ms Final    QRS Duration 05/04/2022 68  ms Final    Q-T Interval 05/04/2022 356  ms Final    QTC Calculation (Bezet) 05/04/2022 468  ms Final    Calculated P Axis 05/04/2022 44  degrees Final    Calculated R Axis 05/04/2022 -30  degrees Final    Calculated T Axis 05/04/2022 45  degrees Final    Diagnosis 05/04/2022    Final                    Value:Sinus tachycardia  Left axis deviation  Voltage criteria for left ventricular hypertrophy    When compared with ECG of 04-MAY-2022 11:24,  No significant change  Confirmed by Wil Pandya M.D., Johan Mandel ([id number]) on 5/4/2022 3:14:21 PM      WBC 05/04/2022 6.2  3.6 - 11.0 K/uL Final    RBC 05/04/2022 4.50  3.80 - 5.20 M/uL Final    HGB 05/04/2022 12.3  11.5 - 16.0 g/dL Final    HCT 05/04/2022 37.9  35.0 - 47.0 % Final    MCV 05/04/2022 84.2  80.0 - 99.0 FL Final    MCH 05/04/2022 27.3  26.0 - 34.0 PG Final    MCHC 05/04/2022 32.5  30.0 - 36.5 g/dL Final    RDW 05/04/2022 14.1  11.5 - 14.5 % Final    PLATELET 15/65/2973 909  150 - 400 K/uL Final    MPV 05/04/2022 9.2  8.9 - 12.9 FL Final    NRBC 05/04/2022 0.0  0  WBC Final    ABSOLUTE NRBC 05/04/2022 0.00  0.00 - 0.01 K/uL Final    NEUTROPHILS 05/04/2022 49  32 - 75 % Final    LYMPHOCYTES 05/04/2022 42  12 - 49 % Final    MONOCYTES 05/04/2022 5  5 - 13 % Final    EOSINOPHILS 05/04/2022 3  0 - 7 % Final    BASOPHILS 05/04/2022 1  0 - 1 % Final    IMMATURE GRANULOCYTES 05/04/2022 0  0.0 - 0.5 % Final    ABS. NEUTROPHILS 05/04/2022 3.1  1.8 - 8.0 K/UL Final    ABS. LYMPHOCYTES 05/04/2022 2.6  0.8 - 3.5 K/UL Final    ABS. MONOCYTES 05/04/2022 0.3  0.0 - 1.0 K/UL Final    ABS. EOSINOPHILS 05/04/2022 0.2  0.0 - 0.4 K/UL Final    ABS. BASOPHILS 05/04/2022 0.1  0.0 - 0.1 K/UL Final    ABS. IMM. GRANS. 05/04/2022 0.0  0.00 - 0.04 K/UL Final    DF 05/04/2022 AUTOMATED    Final    Sodium 05/04/2022 140  136 - 145 mmol/L Final    Potassium 05/04/2022 2.9* 3.5 - 5.1 mmol/L Final    Chloride 05/04/2022 106  97 - 108 mmol/L Final    CO2 05/04/2022 27  21 - 32 mmol/L Final    Anion gap 05/04/2022 7  5 - 15 mmol/L Final    Glucose 05/04/2022 126* 65 - 100 mg/dL Final    BUN 05/04/2022 11  6 - 20 MG/DL Final    Creatinine 05/04/2022 0.79  0.55 - 1.02 MG/DL Final    BUN/Creatinine ratio 05/04/2022 14  12 - 20   Final    GFR est AA 05/04/2022 >60  >60 ml/min/1.73m2 Final    GFR est non-AA 05/04/2022 >60  >60 ml/min/1.73m2 Final    Calcium 05/04/2022 9.2  8.5 - 10.1 MG/DL Final    Bilirubin, total 05/04/2022 0.5  0.2 - 1.0 MG/DL Final    ALT (SGPT) 05/04/2022 24  12 - 78 U/L Final    AST (SGOT) 05/04/2022 22  15 - 37 U/L Final    Alk. phosphatase 05/04/2022 87  45 - 117 U/L Final    Protein, total 05/04/2022 8.2  6.4 - 8.2 g/dL Final    Albumin 05/04/2022 3.7  3.5 - 5.0 g/dL Final    Globulin 05/04/2022 4.5* 2.0 - 4.0 g/dL Final    A-G Ratio 05/04/2022 0.8* 1.1 - 2.2   Final    Lipase 05/04/2022 118  73 - 393 U/L Final    Troponin-High Sensitivity 05/04/2022 4  0 - 51 ng/L Final     XR CHEST PA LAT    Result Date: 5/4/2022  EXAM: XR CHEST PA LAT INDICATION: Chest Pain COMPARISON: 6/3/2019. FINDINGS: PA and lateral radiographs of the chest demonstrate clear lungs.  The cardiac and mediastinal contours and pulmonary vascularity are normal. Degenerative changes are seen in the thoracic spine. No acute abnormality. CT SPINE CERV WO CONT    Result Date: 5/4/2022  EXAM: CT SPINE CERV WO CONT CLINICAL HISTORY: neck pain INDICATION: neck pain COMPARISON:  None TECHNIQUE:  Axial neck CT was performed. Noncontrast imaging obtained. Coronal and sagittal reconstructions were performed. CT dose reduction was achieved through use of a standardized protocol tailored for this examination and automatic exposure control for dose modulation. Osseous/bone algorithm was utilized. FINDINGS: The vertebral bodies are anatomically aligned. There is no evidence of fracture or subluxation. The prevertebral soft tissues are grossly within normal limits. The atlantodental interval is within normal limits. The craniocervical junction is intact. Large posterior and anterior osteophytes. Severe multilevel canal stenoses. Cord compression at C5-6 is likely. Multilevel severe foraminal stenoses as well. No apical pneumothorax. There is no acute fracture or dislocation identified. Severe multilevel chronic degenerative change with multiple anterior and posterior disc osteophyte. Severe canal and foraminal stenoses with multilevel cord compression chronic and degenerative in nature is likely. MRI of the cervical spine for further delineation on an nonemergent basis is recommended. DISPOSITION:    Home. Patient to f/u with psychiatric and psychotherapy appointments. FOLLOW-UP CARE:    Activity as tolerated  Regular diet  Wound Care: none needed. Follow-up Information     Follow up With Specialties Details Why Contact Info    Lab Monitoring with PCP  In 1 month On 5/11/22, your potassium level was 2.5 and 2.8 mmol/L. Normal range is 3.5-5 mmol/L. You were subsequently treated with several doses of potassium oral supplement during your stay.  Follow-up with PCP in 1 month Ton Hernandez, JEREMI Nurse Practitioner   [address] [facility] [id number] [phone]      [employer] [facility]  Go on 5/19/2022 Medication management appointment with Garrison Galan on Thursday, May 19th at 1:00PM  [address] [place] [address],[id number]  Hours: Monday-Friday 8:30AM- 12:00PM & 1:00PM-4:30PM   PHONE: [phone]   FAX: [phone]                    PROGNOSIS:   Fair ---- based on nature of patient's pathology/ies and treatment compliance issues. Prognosis is greatly dependent upon patient's ability to remain sober and to follow up with scheduled appointments as well as to comply with psychiatric medications as prescribed. DISCHARGE MEDICATIONS:     Informed consent given for the use of following psychotropic medications:  Discharge Medication List as of 5/16/2022  2:15 PM      START taking these medications    Details   multivitamin capsule Take 1 Capsule by mouth daily. Indications: treatment to prevent vitamin deficiency, Normal, Disp-180 Capsule, R-1      FLUoxetine (PROzac) 20 mg capsule Take 1 Capsule by mouth daily. Indications: major depressive disorder, panic disorder, Normal, Disp-30 Capsule, R-1      LORazepam (ATIVAN) 1 mg tablet Take 1 Tablet by mouth two (2) times daily as needed for Anxiety. Max Daily Amount: 2 mg. Indications: anxious, Normal, Disp-30 Tablet, R-0      traZODone (DESYREL) 50 mg tablet Take 1 Tablet by mouth nightly as needed for Sleep (For insomnia). Indications: insomnia associated with depression, Normal, Disp-30 Tablet, R-1         CONTINUE these medications which have NOT CHANGED    Details   atorvastatin (LIPITOR) 40 mg tablet Take 40 mg by mouth daily. Indications: high cholesterol, Historical Med      empagliflozin (Jardiance) 25 mg tablet Take 25 mg by mouth daily. Indications: type 2 diabetes mellitus, Historical Med      glipiZIDE SR (GLUCOTROL XL) 10 mg CR tablet Take 10 mg by mouth daily.  Indications: type 2 diabetes mellitus, Historical Med      metFORMIN (GLUCOPHAGE) 1,000 mg tablet Take 1,000 mg by mouth daily. Indications: type 2 diabetes mellitus, Historical Med      losartan (COZAAR) 50 mg tablet Take 50 mg by mouth daily. Indications: HYPERTENSION, Historical Med         STOP taking these medications       clomiPRAMINE (ANAFRANIL) 50 mg capsule Comments:   Reason for Stopping:         triamterene-hydrochlorothiazide (MAXZIDE) 37.5-25 mg per tablet Comments:   Reason for Stopping:                      A coordinated, multidisplinary treatment team round was conducted with Almon Lennox Roberts---this is done daily here at [facility]. This team consists of the nurse, psychiatric unit pharmacist,  and Joshua Jurado. I have spent greater than 35 minutes on discharge work.     Signed:  Nam Lowry MD  5/16/2022

## 2022-05-17 NOTE — BH NOTES
Behavioral Health Transition Record to Provider    Patient Name: Delia Huynh  YOB: 1975  Medical Record Number: [id number]  Date of Admission: 5/11/2022  Date of Discharge: 5/17/2022    Attending Provider: Gissel Johnson MD  Discharging Provider: Gissel Johnson MD  To contact this individual call [phone] and ask the  to page. If unavailable, ask to be transferred to [address] [place] [address] Provider on call. [facility] Provider will be available on call 24/7 and during holidays. Primary Care Provider: Hussein Hanson NP    Allergies   Allergen Reactions    Lisinopril Rash     Swelling of the lips       Reason for Admission: SI    Admission Diagnosis: Depressive disorder [F32. A]  Depression [F32. A]    * No surgery found *    Results for orders placed or performed during the hospital encounter of 05/11/22   COVID-19 WITH INFLUENZA A/B   Result Value Ref Range    SARS-CoV-2 by PCR Not detected NOTD      Influenza A by PCR Not detected      Influenza B by PCR Not detected     CBC WITH AUTOMATED DIFF   Result Value Ref Range    WBC 9.8 3.6 - 11.0 K/uL    RBC 4.59 3.80 - 5.20 M/uL    HGB 12.0 11.5 - 16.0 g/dL    HCT 38.2 35.0 - 47.0 %    MCV 83.2 80.0 - 99.0 FL    MCH 26.1 26.0 - 34.0 PG    MCHC 31.4 30.0 - 36.5 g/dL    RDW 14.5 11.5 - 14.5 %    PLATELET 210 (H) 558 - 400 K/uL    MPV 9.4 8.9 - 12.9 FL    NRBC 0.0 0  WBC    ABSOLUTE NRBC 0.00 0.00 - 0.01 K/uL    NEUTROPHILS 71 32 - 75 %    LYMPHOCYTES 23 12 - 49 %    MONOCYTES 5 5 - 13 %    EOSINOPHILS 0 0 - 7 %    BASOPHILS 0 0 - 1 %    IMMATURE GRANULOCYTES 1 (H) 0.0 - 0.5 %    ABS. NEUTROPHILS 7.0 1.8 - 8.0 K/UL    ABS. LYMPHOCYTES 2.2 0.8 - 3.5 K/UL    ABS. MONOCYTES 0.5 0.0 - 1.0 K/UL    ABS. EOSINOPHILS 0.0 0.0 - 0.4 K/UL    ABS. BASOPHILS 0.0 0.0 - 0.1 K/UL    ABS. IMM.  GRANS. 0.1 (H) 0.00 - 0.04 K/UL    DF AUTOMATED     METABOLIC PANEL, COMPREHENSIVE   Result Value Ref Range    Sodium 140 136 - 145 mmol/L    Potassium 2.5 (LL) 3.5 - 5.1 mmol/L    Chloride 100 97 - 108 mmol/L    CO2 29 21 - 32 mmol/L    Anion gap 11 5 - 15 mmol/L    Glucose 65 65 - 100 mg/dL    BUN 13 6 - 20 MG/DL    Creatinine 0.79 0.55 - 1.02 MG/DL    BUN/Creatinine ratio 16 12 - 20      GFR est AA >60 >60 ml/min/1.73m2    GFR est non-AA >60 >60 ml/min/1.73m2    Calcium 9.4 8.5 - 10.1 MG/DL    Bilirubin, total 0.4 0.2 - 1.0 MG/DL    ALT (SGPT) 28 12 - 78 U/L    AST (SGOT) 23 15 - 37 U/L    Alk.  phosphatase 86 45 - 117 U/L    Protein, total 8.2 6.4 - 8.2 g/dL    Albumin 3.8 3.5 - 5.0 g/dL    Globulin 4.4 (H) 2.0 - 4.0 g/dL    A-G Ratio 0.9 (L) 1.1 - 2.2     DRUG SCREEN, URINE   Result Value Ref Range    AMPHETAMINES Negative NEG      BARBITURATES Negative NEG      BENZODIAZEPINES Negative NEG      COCAINE Negative NEG      METHADONE Negative NEG      OPIATES Negative NEG      PCP(PHENCYCLIDINE) Negative NEG      THC (TH-CANNABINOL) Negative NEG      Drug screen comment (NOTE)    ETHYL ALCOHOL   Result Value Ref Range    ALCOHOL(ETHYL),SERUM <10 <10 MG/DL   URINALYSIS W/ RFLX MICROSCOPIC   Result Value Ref Range    Color YELLOW/STRAW      Appearance CLEAR CLEAR      Specific gravity 1.030 1.003 - 1.030      pH (UA) 6.5 5.0 - 8.0      Protein Negative NEG mg/dL    Glucose >1,000 (A) NEG mg/dL    Ketone TRACE (A) NEG mg/dL    Bilirubin Negative NEG      Blood Negative NEG      Urobilinogen 0.2 0.2 - 1.0 EU/dL    Nitrites Negative NEG      Leukocyte Esterase Negative NEG     METABOLIC PANEL, BASIC   Result Value Ref Range    Sodium 138 136 - 145 mmol/L    Potassium 2.8 (L) 3.5 - 5.1 mmol/L    Chloride 100 97 - 108 mmol/L    CO2 29 21 - 32 mmol/L    Anion gap 9 5 - 15 mmol/L    Glucose 96 65 - 100 mg/dL    BUN 17 6 - 20 MG/DL    Creatinine 1.02 0.55 - 1.02 MG/DL    BUN/Creatinine ratio 17 12 - 20      GFR est AA >60 >60 ml/min/1.73m2    GFR est non-AA 58 (L) >60 ml/min/1.73m2    Calcium 9.2 8.5 - 10.1 MG/DL   HEMOGLOBIN A1C WITH EAG   Result Value Ref Range    Hemoglobin A1c 7.1 (H) 4.0 - 5.6 %    Est. average glucose 702 mg/dL   METABOLIC PANEL, BASIC   Result Value Ref Range    Sodium 142 136 - 145 mmol/L    Potassium 3.8 3.5 - 5.1 mmol/L    Chloride 103 97 - 108 mmol/L    CO2 29 21 - 32 mmol/L    Anion gap 10 5 - 15 mmol/L    Glucose 151 (H) 65 - 100 mg/dL    BUN 12 6 - 20 MG/DL    Creatinine 0.74 0.55 - 1.02 MG/DL    BUN/Creatinine ratio 16 12 - 20      GFR est AA >60 >60 ml/min/1.73m2    GFR est non-AA >60 >60 ml/min/1.73m2    Calcium 8.7 8.5 - 70.4 MG/DL   METABOLIC PANEL, BASIC   Result Value Ref Range    Sodium 140 136 - 145 mmol/L    Potassium 4.0 3.5 - 5.1 mmol/L    Chloride 104 97 - 108 mmol/L    CO2 30 21 - 32 mmol/L    Anion gap 6 5 - 15 mmol/L    Glucose 143 (H) 65 - 100 mg/dL    BUN 11 6 - 20 MG/DL    Creatinine 0.66 0.55 - 1.02 MG/DL    BUN/Creatinine ratio 17 12 - 20      GFR est AA >60 >60 ml/min/1.73m2    GFR est non-AA >60 >60 ml/min/1.73m2    Calcium 8.4 (L) 8.5 - 10.1 MG/DL   HCG URINE, QL. - POC   Result Value Ref Range    Pregnancy test,urine (POC) Negative NEG         Immunizations administered during this encounter: There is no immunization history on file for this patient. Screening for Metabolic Disorders for Patients on Antipsychotic Medications  (Data obtained from the EMR)    Estimated Body Mass Index  Estimated body mass index is 38.26 kg/m² as calculated from the following:    Height as of this encounter: 5' 3\" (1.6 m). Weight as of this encounter: 98 kg (216 lb).      Vital Signs/Blood Pressure  Visit Vitals  /77 (BP 1 Location: Right arm)   Pulse (!) 109   Temp 99.2 °F (37.3 °C)   Resp 18   Ht 5' 3\" (1.6 m)   Wt 98 kg (216 lb)   SpO2 96%   Breastfeeding No   BMI 38.26 kg/m²       Blood Glucose/Hemoglobin A1c  Lab Results   Component Value Date/Time    Glucose 143 (H) 05/16/2022 05:58 AM    Glucose 121 (H) 06/23/2018 04:29 AM    Glucose (POC) 127 (H) 06/03/2019 01:33 PM       Lab Results   Component Value Date/Time    Hemoglobin A1c 7.1 (H) 05/14/2022 05:30 AM        Lipid Panel  Lab Results   Component Value Date/Time    Cholesterol, total 145 06/23/2018 04:29 AM    HDL Cholesterol 63 06/23/2018 04:29 AM    LDL, calculated 68.8 06/23/2018 04:29 AM    Triglyceride 66 06/23/2018 04:29 AM    CHOL/HDL Ratio 2.3 06/23/2018 04:29 AM        Discharge Diagnosis: Please refer to physician's discharge summary. Discharge Plan:   The patient Tessa Stephens exhibits the ability to control behavior in a less restrictive environment. Patient's level of functioning is improving. No assaultive/destructive behavior has been observed for the past 24 hours. No suicidal/homicidal threat or behavior has been observed for the past 24 hours. There is no evidence of serious medication side effects. Patient has not been in physical or protective restraints for at least the past 24 hours. Discharge Medication List and Instructions:   Discharge Medication List as of 5/16/2022  2:15 PM      START taking these medications    Details   multivitamin capsule Take 1 Capsule by mouth daily. Indications: treatment to prevent vitamin deficiency, Normal, Disp-180 Capsule, R-1      FLUoxetine (PROzac) 20 mg capsule Take 1 Capsule by mouth daily. Indications: major depressive disorder, panic disorder, Normal, Disp-30 Capsule, R-1      LORazepam (ATIVAN) 1 mg tablet Take 1 Tablet by mouth two (2) times daily as needed for Anxiety. Max Daily Amount: 2 mg. Indications: anxious, Normal, Disp-30 Tablet, R-0      traZODone (DESYREL) 50 mg tablet Take 1 Tablet by mouth nightly as needed for Sleep (For insomnia). Indications: insomnia associated with depression, Normal, Disp-30 Tablet, R-1         CONTINUE these medications which have NOT CHANGED    Details   atorvastatin (LIPITOR) 40 mg tablet Take 40 mg by mouth daily. Indications: high cholesterol, Historical Med      empagliflozin (Jardiance) 25 mg tablet Take 25 mg by mouth daily. Indications: type 2 diabetes mellitus, Historical Med      glipiZIDE SR (GLUCOTROL XL) 10 mg CR tablet Take 10 mg by mouth daily. Indications: type 2 diabetes mellitus, Historical Med      metFORMIN (GLUCOPHAGE) 1,000 mg tablet Take 1,000 mg by mouth daily. Indications: type 2 diabetes mellitus, Historical Med      losartan (COZAAR) 50 mg tablet Take 50 mg by mouth daily. Indications: HYPERTENSION, Historical Med         STOP taking these medications       clomiPRAMINE (ANAFRANIL) 50 mg capsule Comments:   Reason for Stopping:         triamterene-hydrochlorothiazide (MAXZIDE) 37.5-25 mg per tablet Comments:   Reason for Stopping:               Unresulted Labs (24h ago, onward)            None        To obtain results of studies pending at discharge, please contact N/A    Follow-up Information     Follow up With Specialties Details Why Contact Info    Lab Monitoring with PCP  In 1 month On 5/11/22, your potassium level was 2.5 and 2.8 mmol/L. Normal range is 3.5-5 mmol/L. You were subsequently treated with several doses of potassium oral supplement during your stay. Follow-up with PCP in 1 month     Mikaela Rossi NP Nurse Practitioner   [address] [place] [id number] [phone]      [facility]  Go on 5/19/2022 Medication management appointment with Rachel Meraz on Thursday, May 19th at 1:00PM  [address] [place] [address],[place] [address]  Hours: Monday-Friday 8:30AM- 12:00PM & 1:00PM-4:30PM   PHONE: [phone]   FAX: [phone]           Advanced Directive:   Does the patient have an appointed surrogate decision maker? Unknown   Does the patient have a Medical Advance Directive? Unknown   Does the patient have a Psychiatric Advance Directive? Unknown   If the patient does not have a surrogate or Medical Advance Directive AND Psychiatric Advance Directive, the patient was offered information on these advance directives.   Unknown     Patient Instructions: Please continue all medications until otherwise directed by physician. Tobacco Cessation Discharge Plan:   Is the patient a smoker and needs referral for smoking cessation? No  Patient referred to the following for smoking cessation with an appointment? No   Patient was offered medication to assist with smoking cessation at discharge? No  Was education for smoking cessation added to the discharge instructions? No     Alcohol/Substance Abuse Discharge Plan:   Does the patient have a history of substance/alcohol abuse and requires a referral for treatment? Yes  Patient referred to the following for substance/alcohol abuse treatment with an appointment? Yes  Patient was offered medication to assist with alcohol cessation at discharge? No  Was education for substance/alcohol abuse added to discharge instructions? Yes     Patient discharged to Home; provided to the patient/caregiver either in hard copy or electronically. Continuing care paperwork was faxed to community mental health providers.

## 2022-07-26 ENCOUNTER — HOSPITAL ENCOUNTER (INPATIENT)
Age: 47
LOS: 10 days | Discharge: HOME | DRG: 760 | End: 2022-08-05
Attending: EMERGENCY MEDICINE | Admitting: PSYCHIATRY & NEUROLOGY
Payer: MEDICAID

## 2022-07-26 DIAGNOSIS — E87.6 HYPOKALEMIA: ICD-10-CM

## 2022-07-26 DIAGNOSIS — F41.9 ANXIETY: Primary | ICD-10-CM

## 2022-07-26 DIAGNOSIS — R45.851 SUICIDAL IDEATIONS: ICD-10-CM

## 2022-07-26 PROBLEM — F32.A DEPRESSED: Status: ACTIVE | Noted: 2022-07-26

## 2022-07-26 LAB
ALBUMIN SERPL-MCNC: 3.5 G/DL (ref 3.5–5)
ALBUMIN/GLOB SERPL: 0.7 {RATIO} (ref 1.1–2.2)
ALP SERPL-CCNC: 77 U/L (ref 45–117)
ALT SERPL-CCNC: 25 U/L (ref 12–78)
AMPHET UR QL SCN: NEGATIVE
ANION GAP SERPL CALC-SCNC: 7 MMOL/L (ref 5–15)
APAP SERPL-MCNC: <2 UG/ML (ref 10–30)
APPEARANCE UR: ABNORMAL
AST SERPL-CCNC: 11 U/L (ref 15–37)
BACTERIA URNS QL MICRO: ABNORMAL /HPF
BARBITURATES UR QL SCN: NEGATIVE
BASOPHILS # BLD: 0 K/UL (ref 0–0.1)
BASOPHILS NFR BLD: 1 % (ref 0–1)
BENZODIAZ UR QL: NEGATIVE
BILIRUB SERPL-MCNC: 0.3 MG/DL (ref 0.2–1)
BILIRUB UR QL: NEGATIVE
BUN SERPL-MCNC: 16 MG/DL (ref 6–20)
BUN/CREAT SERPL: 21 (ref 12–20)
CALCIUM SERPL-MCNC: 9.5 MG/DL (ref 8.5–10.1)
CANNABINOIDS UR QL SCN: NEGATIVE
CHLORIDE SERPL-SCNC: 103 MMOL/L (ref 97–108)
CO2 SERPL-SCNC: 29 MMOL/L (ref 21–32)
COCAINE UR QL SCN: NEGATIVE
COLOR UR: ABNORMAL
CREAT SERPL-MCNC: 0.75 MG/DL (ref 0.55–1.02)
DIFFERENTIAL METHOD BLD: ABNORMAL
DRUG SCRN COMMENT,DRGCM: NORMAL
EOSINOPHIL # BLD: 0.2 K/UL (ref 0–0.4)
EOSINOPHIL NFR BLD: 2 % (ref 0–7)
EPITH CASTS URNS QL MICRO: ABNORMAL /LPF
ERYTHROCYTE [DISTWIDTH] IN BLOOD BY AUTOMATED COUNT: 15.2 % (ref 11.5–14.5)
ETHANOL SERPL-MCNC: <10 MG/DL
FLUAV RNA SPEC QL NAA+PROBE: NOT DETECTED
FLUBV RNA SPEC QL NAA+PROBE: NOT DETECTED
GLOBULIN SER CALC-MCNC: 4.7 G/DL (ref 2–4)
GLUCOSE BLD STRIP.AUTO-MCNC: 107 MG/DL (ref 65–117)
GLUCOSE SERPL-MCNC: 78 MG/DL (ref 65–100)
GLUCOSE UR STRIP.AUTO-MCNC: >1000 MG/DL
HCG UR QL: NEGATIVE
HCT VFR BLD AUTO: 39.2 % (ref 35–47)
HGB BLD-MCNC: 12.3 G/DL (ref 11.5–16)
HGB UR QL STRIP: NEGATIVE
HYALINE CASTS URNS QL MICRO: ABNORMAL /LPF (ref 0–5)
IMM GRANULOCYTES # BLD AUTO: 0.1 K/UL (ref 0–0.04)
IMM GRANULOCYTES NFR BLD AUTO: 1 % (ref 0–0.5)
KETONES UR QL STRIP.AUTO: NEGATIVE MG/DL
LEUKOCYTE ESTERASE UR QL STRIP.AUTO: NEGATIVE
LYMPHOCYTES # BLD: 2.4 K/UL (ref 0.8–3.5)
LYMPHOCYTES NFR BLD: 31 % (ref 12–49)
MCH RBC QN AUTO: 25.6 PG (ref 26–34)
MCHC RBC AUTO-ENTMCNC: 31.4 G/DL (ref 30–36.5)
MCV RBC AUTO: 81.7 FL (ref 80–99)
METHADONE UR QL: NEGATIVE
MONOCYTES # BLD: 0.5 K/UL (ref 0–1)
MONOCYTES NFR BLD: 7 % (ref 5–13)
NEUTS SEG # BLD: 4.6 K/UL (ref 1.8–8)
NEUTS SEG NFR BLD: 58 % (ref 32–75)
NITRITE UR QL STRIP.AUTO: NEGATIVE
NRBC # BLD: 0 K/UL (ref 0–0.01)
NRBC BLD-RTO: 0 PER 100 WBC
OPIATES UR QL: NEGATIVE
PCP UR QL: NEGATIVE
PH UR STRIP: 8 [PH] (ref 5–8)
PLATELET # BLD AUTO: 324 K/UL (ref 150–400)
PMV BLD AUTO: 9.5 FL (ref 8.9–12.9)
POTASSIUM SERPL-SCNC: 3.2 MMOL/L (ref 3.5–5.1)
PROT SERPL-MCNC: 8.2 G/DL (ref 6.4–8.2)
PROT UR STRIP-MCNC: NEGATIVE MG/DL
RBC # BLD AUTO: 4.8 M/UL (ref 3.8–5.2)
RBC #/AREA URNS HPF: ABNORMAL /HPF (ref 0–5)
SALICYLATES SERPL-MCNC: <1.7 MG/DL (ref 2.8–20)
SARS-COV-2, COV2: NOT DETECTED
SERVICE CMNT-IMP: NORMAL
SODIUM SERPL-SCNC: 139 MMOL/L (ref 136–145)
SP GR UR REFRACTOMETRY: 1.02 (ref 1–1.03)
TSH SERPL DL<=0.05 MIU/L-ACNC: 1.61 UIU/ML (ref 0.36–3.74)
UR CULT HOLD, URHOLD: NORMAL
UROBILINOGEN UR QL STRIP.AUTO: 0.2 EU/DL (ref 0.2–1)
WBC # BLD AUTO: 7.8 K/UL (ref 3.6–11)
WBC URNS QL MICRO: ABNORMAL /HPF (ref 0–4)

## 2022-07-26 PROCEDURE — 87636 SARSCOV2 & INF A&B AMP PRB: CPT

## 2022-07-26 PROCEDURE — 80143 DRUG ASSAY ACETAMINOPHEN: CPT

## 2022-07-26 PROCEDURE — 82077 ASSAY SPEC XCP UR&BREATH IA: CPT

## 2022-07-26 PROCEDURE — 74011250637 HC RX REV CODE- 250/637: Performed by: EMERGENCY MEDICINE

## 2022-07-26 PROCEDURE — 80179 DRUG ASSAY SALICYLATE: CPT

## 2022-07-26 PROCEDURE — 82962 GLUCOSE BLOOD TEST: CPT

## 2022-07-26 PROCEDURE — 99285 EMERGENCY DEPT VISIT HI MDM: CPT

## 2022-07-26 PROCEDURE — 80307 DRUG TEST PRSMV CHEM ANLYZR: CPT

## 2022-07-26 PROCEDURE — 81025 URINE PREGNANCY TEST: CPT

## 2022-07-26 PROCEDURE — 85025 COMPLETE CBC W/AUTO DIFF WBC: CPT

## 2022-07-26 PROCEDURE — 36415 COLL VENOUS BLD VENIPUNCTURE: CPT

## 2022-07-26 PROCEDURE — 80053 COMPREHEN METABOLIC PANEL: CPT

## 2022-07-26 PROCEDURE — 65220000003 HC RM SEMIPRIVATE PSYCH

## 2022-07-26 PROCEDURE — 84443 ASSAY THYROID STIM HORMONE: CPT

## 2022-07-26 PROCEDURE — 81001 URINALYSIS AUTO W/SCOPE: CPT

## 2022-07-26 PROCEDURE — 74011250637 HC RX REV CODE- 250/637

## 2022-07-26 RX ORDER — BENZTROPINE MESYLATE 1 MG/1
1 TABLET ORAL
Status: DISCONTINUED | OUTPATIENT
Start: 2022-07-26 | End: 2022-08-05 | Stop reason: HOSPADM

## 2022-07-26 RX ORDER — HYDROXYZINE 50 MG/1
50 TABLET, FILM COATED ORAL
Status: DISCONTINUED | OUTPATIENT
Start: 2022-07-26 | End: 2022-07-28

## 2022-07-26 RX ORDER — ACETAMINOPHEN 325 MG/1
650 TABLET ORAL
Status: DISCONTINUED | OUTPATIENT
Start: 2022-07-26 | End: 2022-08-05 | Stop reason: HOSPADM

## 2022-07-26 RX ORDER — DIPHENHYDRAMINE HYDROCHLORIDE 50 MG/ML
50 INJECTION, SOLUTION INTRAMUSCULAR; INTRAVENOUS
Status: DISCONTINUED | OUTPATIENT
Start: 2022-07-26 | End: 2022-08-05 | Stop reason: HOSPADM

## 2022-07-26 RX ORDER — HALOPERIDOL 5 MG/ML
5 INJECTION INTRAMUSCULAR
Status: DISCONTINUED | OUTPATIENT
Start: 2022-07-26 | End: 2022-08-05 | Stop reason: HOSPADM

## 2022-07-26 RX ORDER — OLANZAPINE 5 MG/1
5 TABLET ORAL
Status: DISCONTINUED | OUTPATIENT
Start: 2022-07-26 | End: 2022-08-05 | Stop reason: HOSPADM

## 2022-07-26 RX ORDER — POTASSIUM CHLORIDE 750 MG/1
40 TABLET, FILM COATED, EXTENDED RELEASE ORAL
Status: COMPLETED | OUTPATIENT
Start: 2022-07-26 | End: 2022-07-26

## 2022-07-26 RX ORDER — ADHESIVE BANDAGE
30 BANDAGE TOPICAL DAILY PRN
Status: DISCONTINUED | OUTPATIENT
Start: 2022-07-26 | End: 2022-08-05 | Stop reason: HOSPADM

## 2022-07-26 RX ORDER — MIDAZOLAM HYDROCHLORIDE 1 MG/ML
2 INJECTION, SOLUTION INTRAMUSCULAR; INTRAVENOUS
Status: DISCONTINUED | OUTPATIENT
Start: 2022-07-26 | End: 2022-08-05 | Stop reason: HOSPADM

## 2022-07-26 RX ORDER — TRAZODONE HYDROCHLORIDE 50 MG/1
50 TABLET ORAL
Status: DISCONTINUED | OUTPATIENT
Start: 2022-07-26 | End: 2022-07-28

## 2022-07-26 RX ADMIN — TRAZODONE HYDROCHLORIDE 50 MG: 50 TABLET ORAL at 23:15

## 2022-07-26 RX ADMIN — POTASSIUM CHLORIDE 40 MEQ: 750 TABLET, FILM COATED, EXTENDED RELEASE ORAL at 18:25

## 2022-07-26 NOTE — BSMART NOTE
Comprehensive Assessment Form Part 1      Section I - Disposition      Trichotillomania  MDD  SANTY with panic attacks    Past Medical History:   Diagnosis Date    Anxiety     Depression     ANXIETY & DEPRESSION    Diabetes ([address]     TYPE II    Hypertension     Infected sebaceous cyst, upper back 4/10/2019    Panic attack     Suicidal thoughts     Vision decreased         The Medical Doctor to Psychiatrist conference was not completed. The Medical Doctor is in agreement with this counselors assessment. The plan is admission to [facility]. The on-call Psychiatrist consulted was Dr. Rebekah Pérez. The admitting Psychiatrist will be Dr. Rebekah Pérez. The admitting Diagnosis is TBD. The Payor source is [place] [employer]      This writer reviewed the [facility] Suicide Severity Rating Scale in nursing flowsheet and the risk level assigned is LOW risk. Based on this counselors assessment, the risk of suicide is MODERATE risk and the plan is admission to [facility]. Selena Stapleton Section II - Integrated Summary  Summary:  Per triage, Patient arrives for issues with anxiety. Reports \"it goes up and down up and down but its not constant\" since May. Denies HI. Reports SI without plan. Has been hospitalized in the past.    Pt presents to the ED accompanied by her [address], endorsing passive SI, frequent panic attacks, and depression. She denies HI/AVH. She rates her depression 8/10, anxiety 8/10, hopelessness 10/10. She was recently discharged from [facility] ED on 5/4/22 and readmitted to [facility] from 5/8/2022-5/16/2022, for similar presentation. She reports her panic attacks began in May and have become increasingly worse the last 3 weeks. She describes these attacks as frequent, brief, difficult to control and states she screams, shakes/jerks, and pulls her hair out. She states this happened to her a few years ago but she was able to work through it.    Pt reports it is hard for her to wake up in the morning for several reason; no motivation, fear of panic attacks, constantly in fear of hurting herself. She shares she gets angry with herself because she cannot control the attacks, and states, \" I don't even recognize myself when I look in the mirror. \"  When asked again about her passive SI, she confirms she is not planning to harm herself, but states \"I can't deal with this anymore and I'm scared to go home and leave this hospital.  During this assessment, pt was observed having 4 brief and sudden episodes of jerking, hitting herself in head and pulling her hair out. The patienthas demonstrated mental capacity to provide informed consent. The information is given by the patient and relative(s). The Chief Complaint is Passive SI, panic attacks  The Precipitant Factors are personal and financial stress. Previous Hospitalizations:   5/4/2022 discharged from [facility] ED (same presentation with no SI)  5/8/2022- 5/17/2022: [facility] (same presentation with no SI)  The patient has not previously been in restraints. Current Psychiatrist and/or  is Winter Hanna. Lethality Assessment:    The potential for suicide noted by the following: previous history of attempts which occured 2 years ago in the form of OD on pills, and ideation, and means . The potential for homicide is not noted. The patient has not been a perpetrator of sexual or physical abuse. There are not pending charges. The patient is felt to be at risk for self harm or harm to others. The attending nurse was advised the patient needs supervision. Section III - Psychosocial  The patient's overall mood and attitude is depressed and hopeless, fearful. Feelings of helplessness and hopelessness are observed by pt report. Generalized anxiety is observed by frequent outbursts of tic like behavior with hair pulling, hitting head with hands and screaming.   Panic is observed by frequent outbursts of tic like behavior with hair pulling, hitting head with hands and screaming during assessment. Phobias are not observed. Obsessive compulsive tendencies are observed by hair pulling. Section IV - Mental Status Exam  The patient's appearance shows no evidence of impairment. The patient's behavior shows poor impulse control and is restless. The patient is oriented to time, place, person and situation. The patient's speech shows no evidence of impairment. The patient's mood is depressed and is anxious. The range of affect blunted. The patient's thought content demonstrates no evidence of impairment. The thought process shows no evidence of impairment. The patient's perception shows no evidence of impairment. The patient's memory shows no evidence of impairment. The patient's appetite shows no evidence of impairment. The patient's sleep has evidence of insomnia. The patient's insight shows no evidence of impairment. The patient's judgement shows no evidence of impairment. Section V - Substance Abuse    The patient is not using substances. Section VI - Living Arrangements  The patient is single. The patient lives alone. The patient has no children. The patient does plan to return home upon discharge. The patient does not have legal issues pending. The patient's source of income comes from employment. Buddhism and cultural practices have been noted and include: NA. The patient's greatest support comes from [employer] ([phone]) and this person will be involved with the treatment. The patient has not been in an event described as horrible or outside the realm of ordinary life experience either currently or in the past.  The patient has not been a victim of sexual/physical abuse. Section VII - Other Areas of Clinical Concern  The highest grade achieved is some college with the overall quality of school experience being described as NA. The patient is currently employed and speaks [place] as a primary language.   The patient has no communication impairments affecting communication. The patient's preference for learning can be described as: can read and write adequately.   The patient's hearing is normal.  The patient's vision is normal.      ZAN Galarza

## 2022-07-26 NOTE — BSMART NOTE
BSMART assessment completed, and suicide risk level noted to be MODERATE. Primary Nurse Robin Kelly and Charge Nurse Margarita Pearson and Physician Dena Jesus notified. Concerns not observed. Security/Off- has not been notified.

## 2022-07-26 NOTE — ED PROVIDER NOTES
Marisa Martinez is a 47 yo F with h/o anxiety who has been having increased anxiety as well as suicidal ideations and decreased sleep patient has been pulling out her hair due to the anxiety and is feeling very depressed. Past Medical History:   Diagnosis Date    Anxiety     Depression     ANXIETY & DEPRESSION    Diabetes ([facility] [address]     TYPE II    Hypertension     Infected sebaceous cyst, upper back 4/10/2019    Panic attack     Suicidal thoughts     Vision decreased        Past Surgical History:   Procedure Laterality Date    COLONOSCOPY N/A 11/20/2020    . COLONOSCOPY  :- performed by Naz Paredes MD at [facility] ENDOSCOPY    HX OTHER SURGICAL  06/03/2019     Excision of large sebaceous cyst in the midline upper back and with packing- [facility]-DR. Damari Martinez         Family History:   Problem Relation Age of Onset    Diabetes Mother     Hypertension Mother     Cancer Father         LUNG CA. SMOKER       Social History     Socioeconomic History    Marital status: SINGLE     Spouse name: Not on file    Number of children: Not on file    Years of education: Not on file    Highest education level: Not on file   Occupational History    Not on file   Tobacco Use    Smoking status: Never    Smokeless tobacco: Never    Tobacco comments:     n/a never smoked tobacco   Substance and Sexual Activity    Alcohol use: No    Drug use: No    Sexual activity: Yes     Partners: Male     Birth control/protection: None   Other Topics Concern    Not on file   Social History Narrative    Not on file     Social Determinants of Health     Financial Resource Strain: Not on file   Food Insecurity: Not on file   Transportation Needs: Not on file   Physical Activity: Not on file   Stress: Not on file   Social Connections: Not on file   Intimate Partner Violence: Not on file   Housing Stability: Not on file         ALLERGIES: Lisinopril    Review of Systems   Constitutional:  Negative for fever. HENT:  Negative for sore throat.     Eyes: Negative for visual disturbance. Respiratory:  Negative for cough. Cardiovascular:  Negative for chest pain. Gastrointestinal:  Negative for abdominal pain. Genitourinary:  Negative for dysuria. Musculoskeletal:  Negative for back pain. Skin:  Negative for rash. Neurological:  Negative for headaches. Psychiatric/Behavioral:  Positive for sleep disturbance and suicidal ideas. The patient is nervous/anxious. Vitals:    07/26/22 1155   BP: 121/65   Pulse: 98   Resp: 16   Temp: 98 °F (36.7 °C)   SpO2: 99%   Weight: 97.5 kg (215 lb)   Height: 5' 3\" (1.6 m)            Physical Exam  Vitals and nursing note reviewed. Constitutional:       General: She is not in acute distress. Appearance: She is well-developed. HENT:      Head: Normocephalic and atraumatic. Mouth/Throat:      Mouth: Mucous membranes are moist.   Eyes:      Extraocular Movements: Extraocular movements intact. Conjunctiva/sclera: Conjunctivae normal.   Neck:      Trachea: Phonation normal.   Cardiovascular:      Rate and Rhythm: Normal rate. Pulmonary:      Effort: Pulmonary effort is normal. No respiratory distress. Abdominal:      General: There is no distension. Musculoskeletal:         General: No tenderness. Normal range of motion. Cervical back: Normal range of motion. Skin:     General: Skin is warm and dry. Neurological:      General: No focal deficit present. Mental Status: She is alert. She is not disoriented. Motor: No abnormal muscle tone. Psychiatric:         Thought Content: Thought content includes suicidal ideation. Thought content does not include suicidal plan. [facility]       1:03 PM  Patient has been evaluated by [facility] and they recommend psychiatric admission. Clearance labs ordered and are pending. 5:41 PM  Labs reviewed and patient has mild hypokalemia,  K 3.2.  40meq KCL PO ordered. Patient is medically cleared for psychiatric admission. Procedures

## 2022-07-26 NOTE — ED TRIAGE NOTES
Patient arrives for issues with anxiety. Reports \"it goes up and down up and down but its not constant\" since May. Denies HI. Reports SI without plan.  Has been hospitalized in the past.

## 2022-07-26 NOTE — BSMART NOTE
Patient has been accepted to [facility]/B by Demetris Hyde NP on behalf of Dr. Ling Snatana. RN notified to call report to [phone].      JENNIFER Choi, Supervisee in Social Work

## 2022-07-27 LAB
GLUCOSE BLD STRIP.AUTO-MCNC: 122 MG/DL (ref 65–117)
GLUCOSE BLD STRIP.AUTO-MCNC: 92 MG/DL (ref 65–117)
SERVICE CMNT-IMP: ABNORMAL
SERVICE CMNT-IMP: NORMAL

## 2022-07-27 PROCEDURE — 74011250637 HC RX REV CODE- 250/637: Performed by: NURSE PRACTITIONER

## 2022-07-27 PROCEDURE — 82962 GLUCOSE BLOOD TEST: CPT

## 2022-07-27 PROCEDURE — 74011250637 HC RX REV CODE- 250/637

## 2022-07-27 PROCEDURE — 65220000003 HC RM SEMIPRIVATE PSYCH

## 2022-07-27 RX ORDER — FLUVOXAMINE MALEATE 50 MG/1
50 TABLET ORAL
Status: DISCONTINUED | OUTPATIENT
Start: 2022-07-27 | End: 2022-07-29

## 2022-07-27 RX ORDER — ESCITALOPRAM OXALATE 10 MG/1
10 TABLET ORAL DAILY
COMMUNITY
End: 2022-08-05

## 2022-07-27 RX ORDER — LORAZEPAM 0.5 MG/1
0.5 TABLET ORAL
COMMUNITY
End: 2022-08-05

## 2022-07-27 RX ORDER — TRIAMTERENE/HYDROCHLOROTHIAZID 37.5-25 MG
1 TABLET ORAL DAILY
Status: DISCONTINUED | OUTPATIENT
Start: 2022-07-27 | End: 2022-08-05 | Stop reason: HOSPADM

## 2022-07-27 RX ORDER — LOSARTAN POTASSIUM 50 MG/1
50 TABLET ORAL DAILY
Status: DISCONTINUED | OUTPATIENT
Start: 2022-07-27 | End: 2022-08-05 | Stop reason: HOSPADM

## 2022-07-27 RX ORDER — TRIAMTERENE/HYDROCHLOROTHIAZID 37.5-25 MG
1 TABLET ORAL DAILY
Status: ON HOLD | COMMUNITY
End: 2022-08-05 | Stop reason: SDUPTHER

## 2022-07-27 RX ORDER — ATORVASTATIN CALCIUM 40 MG/1
40 TABLET, FILM COATED ORAL DAILY
Status: DISCONTINUED | OUTPATIENT
Start: 2022-07-27 | End: 2022-08-05 | Stop reason: HOSPADM

## 2022-07-27 RX ORDER — GLIPIZIDE 5 MG/1
10 TABLET ORAL DAILY
Status: DISCONTINUED | OUTPATIENT
Start: 2022-07-27 | End: 2022-08-05 | Stop reason: HOSPADM

## 2022-07-27 RX ORDER — METFORMIN HYDROCHLORIDE 500 MG/1
1000 TABLET ORAL DAILY
Status: DISCONTINUED | OUTPATIENT
Start: 2022-07-27 | End: 2022-08-05 | Stop reason: HOSPADM

## 2022-07-27 RX ORDER — CLOMIPRAMINE HYDROCHLORIDE 25 MG/1
50 CAPSULE ORAL
COMMUNITY
End: 2022-08-05

## 2022-07-27 RX ADMIN — HYDROXYZINE HYDROCHLORIDE 50 MG: 50 TABLET, FILM COATED ORAL at 09:11

## 2022-07-27 RX ADMIN — TRAZODONE HYDROCHLORIDE 50 MG: 50 TABLET ORAL at 21:21

## 2022-07-27 RX ADMIN — GLIPIZIDE 10 MG: 5 TABLET ORAL at 18:18

## 2022-07-27 RX ADMIN — METFORMIN HYDROCHLORIDE 1000 MG: 500 TABLET ORAL at 17:14

## 2022-07-27 RX ADMIN — OLANZAPINE 5 MG: 5 TABLET, FILM COATED ORAL at 02:02

## 2022-07-27 RX ADMIN — OLANZAPINE 5 MG: 5 TABLET, FILM COATED ORAL at 09:11

## 2022-07-27 RX ADMIN — HYDROXYZINE HYDROCHLORIDE 50 MG: 50 TABLET, FILM COATED ORAL at 17:26

## 2022-07-27 RX ADMIN — FLUVOXAMINE MALEATE 50 MG: 50 TABLET, COATED ORAL at 21:21

## 2022-07-27 RX ADMIN — HYDROXYZINE HYDROCHLORIDE 50 MG: 50 TABLET, FILM COATED ORAL at 02:02

## 2022-07-27 RX ADMIN — LOSARTAN POTASSIUM 50 MG: 50 TABLET, FILM COATED ORAL at 17:15

## 2022-07-27 RX ADMIN — MAGNESIUM HYDROXIDE 30 ML: 400 SUSPENSION ORAL at 08:49

## 2022-07-27 RX ADMIN — ATORVASTATIN CALCIUM 40 MG: 40 TABLET, FILM COATED ORAL at 17:15

## 2022-07-27 RX ADMIN — TRIAMTERENE AND HYDROCHLOROTHIAZIDE 1 TABLET: 37.5; 25 TABLET ORAL at 17:15

## 2022-07-27 NOTE — ED NOTES
TRANSFER - OUT REPORT:    Verbal report given to Steph Ramsey RN(name) on Danis Lowry  being transferred to 725(unit) for routine progression of care       Report consisted of patients Situation, Background, Assessment and   Recommendations(SBAR). Information from the following report(s) SBAR, ED Summary, Intake/Output, MAR, and Recent Results was reviewed with the receiving nurse. Lines:       Opportunity for questions and clarification was provided.       Patient transported with:   Tech and officer

## 2022-07-27 NOTE — BH NOTES
TRANSFER - IN REPORT:    Verbal report received from Thad Glover RN(name) on Wade Cutler  being received from [facility] ED(unit) for routine progression of care      Report consisted of patients Situation, Background, Assessment and   Recommendations(SBAR). Information from the following report(s) SBAR was reviewed with the receiving nurse. Opportunity for questions and clarification was provided. Assessment completed upon patients arrival to unit and care assumed.

## 2022-07-27 NOTE — BH NOTES
PSYCHOSOCIAL ASSESSMENT  :Patient identifying info:   Miguel Galo is a 48 y.o., female admitted 7/26/2022 11:57 AM     Presenting problem and precipitating factors: Pt reported to ED with Auntendorsing passive SI, panic attacks, Anxiety and worsening depression. Pt was previously admitted to [facility] for similar symptoms in May. Pt reports symptoms worsening over the past 3 weeks. Pt was observed hitting herself and pulling her hair out due to panic attacks. Mental status assessment: Pt is alert and oriented x4. Pts eye contact and speech are both normal. Pt was calm, cooperative. At time of assessment. Pt currenlty denies SI but endorses anxiety. Strengths/Recreation/Coping Skills:    Collateral information: Junie Daryl [phone]    Current psychiatric /substance abuse providers and contact info: Hussein Hanna     Previous psychiatric/substance abuse providers and response to treatment: Pt has previous psych hospitalizations, recently, same presentation w/o SI. It is unclear if pt has had prior counseling. Family history of mental illness or substance abuse: not reported    Substance abuse history:  pt denies substance use   Social History     Tobacco Use    Smoking status: Never    Smokeless tobacco: Never    Tobacco comments:     n/a never smoked tobacco   Substance Use Topics    Alcohol use: No       History of biomedical complications associated with substance abuse: none    Patient's current acceptance of treatment or motivation for change: voluntary     Family constellation: Single, no children     Is significant other involved? No     Describe support system: Hussein Mcleod, JEREMI     Describe living arrangements and home environment: Pt lives alone     GUARDIAN/POA: NO    Guardian Name:     Guardian Contact:     Health issues:   Hospital Problems  Date Reviewed: 7/17/2019            Codes Class Noted POA    Depressed ICD-10-CM: F32. A  ICD-9-CM: 905 2022 Unknown           Trauma history: not reported     Legal issues: no     History of  service: no     Financial status: Unknown    Scientologist/cultural factors: Not reported     Education/work history: HS/ Pt is employed     Have you been licensed as a health care professional (current or ): no     Describe coping skills: Ineffectual     David Kelly  2022

## 2022-07-27 NOTE — INTERDISCIPLINARY ROUNDS
Behavioral Health Interdisciplinary Rounds     Patient Name: Lino Rowley  Age: 48 y.o. Room/Bed:  725/02  Primary Diagnosis: <principal problem not specified>   Admission Status: Voluntary     Readmission within 30 days: no  Power of  in place: no  Patient requires a blocked bed: yes          Reason for blocked bed: disruptive patient, yelling, screaming, pulling her hair, hitting her head with hand    VTE Prophylaxis: Not indicated    Mobility needs/Fall risk: no  Flu Vaccine : no   Nutritional Plan: no  Consults:          Labs/Testing due today?: no    Sleep hours:        Participation in Care/Groups:    Medication Compliant?:  None ordered, new patient  PRNS (last 24 hours): Antipsychotic (PO), Antianxiety, and Sleep Aid    Restraints (last 24 hours):  no     CIWA (range last 24 hours):     COWS (range last 24 hours):      Alcohol screening (AUDIT) completed -         If applicable, date SBIRT discussed in treatment team AND documented:   AUDIT Screen Score:        Document Brief Intervention (corresponds directly with the 5 A's, Ask, Advise, Assess, Assist, and Arrange): At- Risk Patients (Score 7-15 for women; 8-15 for men)  Discuss concern patient is drinking at unhealthy levels known to increase risk of alcohol-related health problems. Is Patient ready to commit to change? If No:  Encourage reflection  Discuss short term and long term health risks of consuming alcohol  Barriers to change  Reaffirm willingness to help / Educational materials provided  If Yes:  Set goal  Plan  Educational materials provided    Harmful use or Dependence (Score 16 or greater)  Discuss short term and long term health risks of consuming alcohol  Recommendations  Negotiate drinking goal  Recommend addiction specialist/center  Arrange follow-up appointments.     Tobacco - patient is a smoker: Have You Used Tobacco in the Past 30 Days: No  Illegal Drugs use: Have You Used Any Illegal Substances Over the Past 12 Months: No    24 hour chart check complete: yes   ____________________________________________________________________________________________________________    Patient goal(s) for today: Communicate needs to staff   Treatment team focus/goals: Assess pt, manage medications, discharge planning   Progress note Pt participated in tx team. Pts mood is anxious and congruent with appearance    LOS:  1    Expected LOS: 8/1/22    Financial concerns/prescription coverage:    Family contact:  Edan Brasher, [phone]     Family requesting physician contact today:    Discharge plan: TBD   Access to weapons :  no        Outpatient provider(s): [facility]   Patient's preferred phone number for follow up call :   Patient's preferred e-mail address :  Participating treatment team members: JENNIFER Cota, Arnaud Myles, CHRISTIANE, Brigitte Chaves, NP

## 2022-07-27 NOTE — PROGRESS NOTES
0710  Problem: Falls - Risk of  Goal: *Absence of Falls  Description: Document Cynthia Smyth Fall Risk and appropriate interventions in the flowsheet.   Outcome: Progressing Towards Goal  Note: Fall Risk Interventions:  Medication Interventions: Teach patient to arise slowly  Problem: Patient Education: Go to Patient Education Activity  Goal: Patient/Family Education  Outcome: Progressing Towards Goal  Problem: Depressed Mood (Adult/Pediatric)  Goal: Interventions  Outcome: Progressing Towards Goal  Problem: Patient Education: Go to Patient Education Activity  Goal: Patient/Family Education  Outcome: Progressing Towards Goal

## 2022-07-27 NOTE — BH NOTES
Admission Note      Admitting Diagnosis: Trichollomania/MDD      Admitting Status: Voluntary      Patient Received From: [facility] ED      Patient Condition Upon Arrival: Cooperative      BAL & UDS: 10< and UDS      Reason For Admission: Patient arrived from [facility] ED w/ depression, passive SI, and panic attacks. Upon arrival pt cooperative w/ admission process. Denies SI/HI/AH/VH, pt contracts to safety while on unit. Consent signed and placed in chart. Skin assessment completed by  and Dk Gray, old healed scar noted on upper back, no other impairments. Daniel Score 23. Patient offered and accepted food and beverage, verbalizes no complaints. Staff will continue to monitor safety q15 and provide support.

## 2022-07-27 NOTE — BH NOTES
PRN Medication Documentation    Specific patient behavior that led to need for PRN medication: pt requested for c/o constipation   Staff interventions attempted prior to PRN being given: education  PRN medication given: MOM 30ml given @ [phone]  Patient response/effectiveness of PRN medication: will monitor     PRN Medication Documentation    Specific patient behavior that led to need for PRN medication: anxiety, racing thoughts   Staff interventions attempted prior to PRN being given: education, listening, deep breathing   PRN medication given: Zyprexa 5mg/Atarax 50mg PO given @ 0911  Patient response/effectiveness of PRN medication: will monitor and provide support as needed.

## 2022-07-27 NOTE — PROGRESS NOTES
SGLT2 Verification Procedure    SGLT2 Inhibitor ordered: Empagliflozin    Indication: Diabetes      The following SGLT2 inhibitor has been discontinued per [facility] approved policy:  empagliflozin     Please reorder upon discharge if appropriate.

## 2022-07-27 NOTE — PROGRESS NOTES
Problem: Falls - Risk of  Goal: *Absence of Falls  Description: Document Poncho Garibay Fall Risk and appropriate interventions in the flowsheet. Outcome: Progressing Towards Goal  Note: Fall Risk Interventions:            Medication Interventions: Teach patient to arise slowly    Patient received resting quietly in bed. No signs of distress. Even and unlabored breathing. Staff will continue to monitor safety Q15 and provide support.

## 2022-07-27 NOTE — PROGRESS NOTES
Pt out in milieu with peers during meal time. States to be not feeling well. Vitals and BS obtained WNL. Writer encourages pt to drink fluids and eat dinner. Pt complies with scheduled meds but later expresses having urge to pull/scratch hair. Writer offers med to help with thoughts but pt declines. Within minutes pt observed at table yelling and pulling at hair while at dining room table. Writer administers prn atarax. Problem: Falls - Risk of  Goal: *Absence of Falls  Description: Document Miko Campos Fall Risk and appropriate interventions in the flowsheet.   Outcome: Progressing Towards Goal  Note: Fall Risk Interventions:       Medication Interventions: Teach patient to arise slowly         Problem: Patient Education: Go to Patient Education Activity  Goal: Patient/Family Education  Outcome: Progressing Towards Goal

## 2022-07-27 NOTE — BH NOTES
PRN Medication Documentation    Specific patient behavior that led to need for PRN medication: Patient is restless, pulling her hair and screaming while lying in  bed  Staff interventions attempted prior to PRN being given: Offered PRN medications  PRN medication given:  Atarax 50 mg and Zyprexa 5 mg  Patient response/effectiveness of PRN medication: Will continue to monitor

## 2022-07-27 NOTE — BH NOTES
Diagnosis and Plan reviewed with patient.   GROUP THERAPY PROGRESS NOTE    Patient is participating in psychotherapy group. Group time: 60 minutes    Personal goal for participation: to develop an understanding of cognitive distortions and how to alter our thinking. Goal orientation: Personal    Group therapy participation:  Active    Therapeutic interventions reviewed and discussed:  Group members were guided through learning about cognitive distortions. Members gained an understanding of how these types of distortions effect perception, relationships, and overall mental health. Members were guided through learning about methods that can be used for changing negative thought patterns. Members were able to identify distortions and engage in discussion about past experiences. Handout provided. Impression of participation:  Pt was present and engaged ingroup discussion. Pt added insight to group topic.  Pt interacted with peers and JENNIFER García, HP-A

## 2022-07-28 LAB
GLUCOSE BLD STRIP.AUTO-MCNC: 70 MG/DL (ref 65–117)
SERVICE CMNT-IMP: NORMAL

## 2022-07-28 PROCEDURE — 65220000003 HC RM SEMIPRIVATE PSYCH

## 2022-07-28 PROCEDURE — 82962 GLUCOSE BLOOD TEST: CPT

## 2022-07-28 PROCEDURE — 74011250637 HC RX REV CODE- 250/637: Performed by: NURSE PRACTITIONER

## 2022-07-28 PROCEDURE — 74011250637 HC RX REV CODE- 250/637

## 2022-07-28 RX ORDER — TRAZODONE HYDROCHLORIDE 100 MG/1
100 TABLET ORAL
Status: DISCONTINUED | OUTPATIENT
Start: 2022-07-28 | End: 2022-07-31

## 2022-07-28 RX ORDER — OLANZAPINE 5 MG/1
5 TABLET ORAL EVERY 12 HOURS
Status: DISCONTINUED | OUTPATIENT
Start: 2022-07-28 | End: 2022-08-05 | Stop reason: HOSPADM

## 2022-07-28 RX ORDER — HYDROXYZINE 50 MG/1
50 TABLET, FILM COATED ORAL
Status: DISCONTINUED | OUTPATIENT
Start: 2022-07-28 | End: 2022-07-29

## 2022-07-28 RX ADMIN — OLANZAPINE 5 MG: 5 TABLET, FILM COATED ORAL at 13:19

## 2022-07-28 RX ADMIN — METFORMIN HYDROCHLORIDE 1000 MG: 500 TABLET ORAL at 08:36

## 2022-07-28 RX ADMIN — OLANZAPINE 5 MG: 5 TABLET, FILM COATED ORAL at 19:31

## 2022-07-28 RX ADMIN — TRAZODONE HYDROCHLORIDE 100 MG: 100 TABLET ORAL at 21:35

## 2022-07-28 RX ADMIN — HYDROXYZINE HYDROCHLORIDE 50 MG: 50 TABLET, FILM COATED ORAL at 08:47

## 2022-07-28 RX ADMIN — FLUVOXAMINE MALEATE 50 MG: 50 TABLET, COATED ORAL at 21:35

## 2022-07-28 RX ADMIN — LOSARTAN POTASSIUM 50 MG: 50 TABLET, FILM COATED ORAL at 08:35

## 2022-07-28 RX ADMIN — ATORVASTATIN CALCIUM 40 MG: 40 TABLET, FILM COATED ORAL at 08:35

## 2022-07-28 NOTE — PROGRESS NOTES
Problem: Falls - Risk of  Goal: *Absence of Falls  Description: Document Leafy Edmonds Fall Risk and appropriate interventions in the flowsheet. Outcome: Progressing Towards Goal  Note: Fall Risk Interventions:GAIT IS OBSERVED AS STEADY patient IS WEARING SLIP RESISTANT FOOTWEAR            Medication Interventions: Teach patient to arise slowly                   Problem: Depressed Mood (Adult/Pediatric)  Goal: Interventions  Outcome: Progressing Towards Goal   ONLY ONE INSTANCE OF HAIR PULLING. HAS BEEN LESS IRRITABLE WITH NO INSTANCES OF YELLING AND SCREAMING. ORDER TO TRANSFER BACK TO GENERAL UNIT.

## 2022-07-28 NOTE — BH NOTES
PRN Medication Documentation    Specific patient behavior that led to need for PRN medication: Anxiety, agitation, restlessness. Staff interventions attempted prior to PRN being given: therapeutic communication and relaxation. PRN medication given: PRN Zyprexa 5 mg. Patient response/effectiveness of PRN medication: Will continue to monitor.

## 2022-07-28 NOTE — PROGRESS NOTES
Problem: Falls - Risk of  Goal: *Absence of Falls  Description: Document Juan C Stafford Fall Risk and appropriate interventions in the flowsheet. Outcome: Progressing Towards Goal  Note: Fall Risk Interventions:       Medication Interventions: Teach patient to arise slowly    Problem: Patient Education: Go to Patient Education Activity  Goal: Patient/Family Education  Outcome: Progressing Towards Goal     Problem: Depressed Mood (Adult/Pediatric)  Goal: Interventions  Outcome: Progressing Towards Goal   Patient is resting quietly in bed with eyes closed. Appears to be asleep. No respiratiry distress noted.

## 2022-07-28 NOTE — BH NOTES
PSYCHIATRIC PROGRESS NOTE       Patient: Fadia Gray MRN: [id number]  SSN: [id number]    YOB: 1975  Age: 48 y.o. Sex: female      Admit Date: 7/26/2022    LOS: 2 days       Chief Complaint:  I did not sleep well last night. Interval History:  Fadia Gray slept poorly last night. She however appears a little better today. Less outburst. Says she only pulled her hair once which is refreshing. She is calm and pleasant. She feels she is making progress. Denies si hi or avh. She agreed to take zyprexa as augmentation with luvox for lability, outburst, compulsive behavior, anxiety. At the present time the patient Fadia Gray remains compliant with taking medications.  Denies any adverse events from taking them and feels they have been beneficial.         Past Medical History:  Past Medical History:   Diagnosis Date    Anxiety     Depression     ANXIETY & DEPRESSION    [facility]     TYPE II    Hypertension     Infected sebaceous cyst, upper back 4/10/2019    Panic attack     Suicidal thoughts     Vision decreased          ALLERGIES:(reviewed/updated 7/28/2022)  Allergies   Allergen Reactions    Lisinopril Rash     Swelling of the lips       Laboratory report:  Lab Results   Component Value Date/Time    WBC 7.8 07/26/2022 02:48 PM    HGB 12.3 07/26/2022 02:48 PM    HCT 39.2 07/26/2022 02:48 PM    PLATELET 133 85/87/9652 02:48 PM    MCV 81.7 07/26/2022 02:48 PM      Lab Results   Component Value Date/Time    Sodium 139 07/26/2022 02:48 PM    Potassium 3.2 (L) 07/26/2022 02:48 PM    Chloride 103 07/26/2022 02:48 PM    CO2 29 07/26/2022 02:48 PM    Anion gap 7 07/26/2022 02:48 PM    Glucose 78 07/26/2022 02:48 PM    Glucose 121 (H) 06/23/2018 04:29 AM    BUN 16 07/26/2022 02:48 PM    Creatinine 0.75 07/26/2022 02:48 PM    BUN/Creatinine ratio 21 (H) 07/26/2022 02:48 PM    GFR est AA >60 07/26/2022 02:48 PM    GFR est non-AA >60 07/26/2022 02:48 PM    Calcium 9.5 07/26/2022 02:48 PM Bilirubin, total 0.3 07/26/2022 02:48 PM    Alk. phosphatase 77 07/26/2022 02:48 PM    Protein, total 8.2 07/26/2022 02:48 PM    Albumin 3.5 07/26/2022 02:48 PM    Globulin 4.7 (H) 07/26/2022 02:48 PM    A-G Ratio 0.7 (L) 07/26/2022 02:48 PM    ALT (SGPT) 25 07/26/2022 02:48 PM      Vitals:    07/27/22 0816 07/27/22 1613 07/27/22 1936 07/28/22 0836   BP: 124/84 130/87 113/76 108/71   Pulse: 100 94 96 (!) 102   Resp: 16 16 20 18   Temp: 98.5 °F (36.9 °C) 98.7 °F (37.1 °C) 98.1 °F (36.7 °C) 98.1 °F (36.7 °C)   SpO2: 99% 97%  98%   Weight:       Height:           No results found for: VALF2, VALAC, VALP, VALPR, DS6, CRBAM, CRBAMP, CARB2, XCRBAM  No results found for: LITHM    Vital Signs  Patient Vitals for the past 24 hrs:   Temp Pulse Resp BP SpO2   07/28/22 0836 98.1 °F (36.7 °C) (!) 102 18 108/71 98 %   07/27/22 1936 98.1 °F (36.7 °C) 96 20 113/76 --   07/27/22 1613 98.7 °F (37.1 °C) 94 16 130/87 97 %     Wt Readings from Last 3 Encounters:   07/26/22 91.3 kg (201 lb 4.8 oz)   05/11/22 98 kg (216 lb)   05/08/22 98 kg (216 lb)     Temp Readings from Last 3 Encounters:   07/28/22 98.1 °F (36.7 °C)   05/16/22 99.2 °F (37.3 °C)   05/08/22 97.4 °F (36.3 °C)     BP Readings from Last 3 Encounters:   07/28/22 108/71   05/16/22 118/77   05/08/22 118/85     Pulse Readings from Last 3 Encounters:   07/28/22 (!) 102   05/16/22 (!) 109   05/08/22 86       Radiology (reviewed/updated 7/28/2022)  XR CHEST PA LAT    Result Date: 5/4/2022  EXAM: XR CHEST PA LAT INDICATION: Chest Pain COMPARISON: 6/3/2019. FINDINGS: PA and lateral radiographs of the chest demonstrate clear lungs. The cardiac and mediastinal contours and pulmonary vascularity are normal. Degenerative changes are seen in the thoracic spine. No acute abnormality. CT SPINE CERV WO CONT    Result Date: 5/4/2022  EXAM: CT SPINE CERV WO CONT CLINICAL HISTORY: neck pain INDICATION: neck pain COMPARISON:  None TECHNIQUE:  Axial neck CT was performed.  Noncontrast imaging obtained. Coronal and sagittal reconstructions were performed. CT dose reduction was achieved through use of a standardized protocol tailored for this examination and automatic exposure control for dose modulation. Osseous/bone algorithm was utilized. FINDINGS: The vertebral bodies are anatomically aligned. There is no evidence of fracture or subluxation. The prevertebral soft tissues are grossly within normal limits. The atlantodental interval is within normal limits. The craniocervical junction is intact. Large posterior and anterior osteophytes. Severe multilevel canal stenoses. Cord compression at C5-6 is likely. Multilevel severe foraminal stenoses as well. No apical pneumothorax. There is no acute fracture or dislocation identified. Severe multilevel chronic degenerative change with multiple anterior and posterior disc osteophyte. Severe canal and foraminal stenoses with multilevel cord compression chronic and degenerative in nature is likely. MRI of the cervical spine for further delineation on an nonemergent basis is recommended.         Current Facility-Administered Medications   Medication Dose Route Frequency Provider Last Rate Last Admin    fluvoxaMINE (LUVOX) tablet 50 mg  50 mg Oral QHS Bria Koe A, NP   50 mg at 07/27/22 2121    glipiZIDE (GLUCOTROL) tablet 10 mg  10 mg Oral DAILY Bria Koe A, NP   10 mg at 07/27/22 1818    losartan (COZAAR) tablet 50 mg  50 mg Oral DAILY Brenda Koanie A, NP   50 mg at 07/28/22 0835    metFORMIN (GLUCOPHAGE) tablet 1,000 mg  1,000 mg Oral DAILY Brenda Koanie A, NP   1,000 mg at 07/28/22 0836    triamterene-hydroCHLOROthiazide (MAXZIDE) 37.5-25 mg per tablet 1 Tablet  1 Tablet Oral DAILY Brenda Koanie A, NP   1 Tablet at 07/27/22 1715    atorvastatin (LIPITOR) tablet 40 mg  40 mg Oral DAILY Bria Koe A, NP   40 mg at 07/28/22 0835    OLANZapine (ZyPREXA) tablet 5 mg  5 mg Oral Q6H PRN Vanessa Rao, NP   5 mg at 07/27/22 0911    haloperidol lactate (HALDOL) injection 5 mg  5 mg IntraMUSCular Q6H PRN Gerlene , NP        benztropine (COGENTIN) tablet 1 mg  1 mg Oral BID PRN Gerlene , NP        diphenhydrAMINE (BENADRYL) injection 50 mg  50 mg IntraMUSCular BID PRN Gerlene , NP        hydrOXYzine HCL (ATARAX) tablet 50 mg  50 mg Oral TID PRN Gerlene , NP   50 mg at 07/28/22 0847    traZODone (DESYREL) tablet 50 mg  50 mg Oral QHS PRN Gerlene , NP   50 mg at 07/27/22 2121    acetaminophen (TYLENOL) tablet 650 mg  650 mg Oral Q4H PRN Gerlene , NP        magnesium hydroxide (MILK OF MAGNESIA) 400 mg/5 mL oral suspension 30 mL  30 mL Oral DAILY PRN Gerlene , NP   30 mL at 07/27/22 0849    midazolam (VERSED) injection 2 mg  2 mg IntraMUSCular Q6H PRN Gerlene , NP           Side Effects: (reviewed/updated 7/28/2022)  None reported or admitted to. Review of Systems: (reviewed/updated 7/28/2022)  Appetite: good  Sleep: poor   All other Review of Systems: negative    Mental Status Exam:  Eye contact: Good eye contact  Psychomotor activity: wnl  Speech is spontaneous  Thought process: Logical and goal directed   Mood is \"ok\"  Affect: full  Perception: No avh  Suicidal ideation: No si  Homicidal ideation: No hi  Insight/judgment: Partial   Cognition is grossly intact      Physical Exam:  Musculoskeletal system: steady gait  Tremor not present  Cog wheeling not present      Assessment and Plan:  [facility]. Unspecified mood disorder. Trichotillomania. Increase trazodone to 100 mg. Start zyprexa 5 mg bid. Continue rest of  medications as prescribed. We will closely monitor for safety. We will encourage reality orientation. Disposition planning to continue.        I certify that this patients inpatient psychiatric hospital services furnished since the previous certification were, and continue to be, required for treatment that could reasonably be expected to improve the patient's condition, or for diagnostic study, and that the patient continues to need, on a daily basis, active treatment furnished directly by or requiring the supervision of inpatient psychiatric facility personnel. In addition, the hospital records show that services furnished were intensive treatment services, admission or related services, or equivalent services.       Signed:  Juancarlos Chacon NP  7/28/2022

## 2022-07-28 NOTE — INTERDISCIPLINARY ROUNDS
Pt displays less erratic outbursts or hair pulling behaviors. Pts medication was adjusted. Pt denies SI, HI, AVH. Pt reports not sleeping too well. Pts mood and affect are vastly improved from yesterday. SW updated pts mother and Aunt.    Expected discharge: TBD     JENNIFER Aguilar, Rickie Cardenas RN, Karen Fuentes, NP

## 2022-07-29 LAB
CHOLEST SERPL-MCNC: 100 MG/DL
EST. AVERAGE GLUCOSE BLD GHB EST-MCNC: 137 MG/DL
GLUCOSE BLD STRIP.AUTO-MCNC: 137 MG/DL (ref 65–117)
GLUCOSE BLD STRIP.AUTO-MCNC: 64 MG/DL (ref 65–117)
GLUCOSE BLD STRIP.AUTO-MCNC: 67 MG/DL (ref 65–117)
GLUCOSE BLD STRIP.AUTO-MCNC: 78 MG/DL (ref 65–117)
GLUCOSE P FAST SERPL-MCNC: 146 MG/DL (ref 65–100)
HBA1C MFR BLD: 6.4 % (ref 4–5.6)
HDLC SERPL-MCNC: 54 MG/DL
HDLC SERPL: 1.9 {RATIO} (ref 0–5)
LDLC SERPL CALC-MCNC: 34.2 MG/DL (ref 0–100)
SERVICE CMNT-IMP: ABNORMAL
SERVICE CMNT-IMP: ABNORMAL
SERVICE CMNT-IMP: NORMAL
SERVICE CMNT-IMP: NORMAL
TRIGL SERPL-MCNC: 59 MG/DL (ref ?–150)
VLDLC SERPL CALC-MCNC: 11.8 MG/DL

## 2022-07-29 PROCEDURE — 80061 LIPID PANEL: CPT

## 2022-07-29 PROCEDURE — 65220000003 HC RM SEMIPRIVATE PSYCH

## 2022-07-29 PROCEDURE — 74011250637 HC RX REV CODE- 250/637: Performed by: NURSE PRACTITIONER

## 2022-07-29 PROCEDURE — 82962 GLUCOSE BLOOD TEST: CPT

## 2022-07-29 PROCEDURE — 83036 HEMOGLOBIN GLYCOSYLATED A1C: CPT

## 2022-07-29 PROCEDURE — 36415 COLL VENOUS BLD VENIPUNCTURE: CPT

## 2022-07-29 PROCEDURE — 82947 ASSAY GLUCOSE BLOOD QUANT: CPT

## 2022-07-29 RX ORDER — HYDROXYZINE 25 MG/1
25 TABLET, FILM COATED ORAL
Status: DISCONTINUED | OUTPATIENT
Start: 2022-07-29 | End: 2022-08-04

## 2022-07-29 RX ORDER — FLUVOXAMINE MALEATE 50 MG/1
100 TABLET ORAL
Status: DISCONTINUED | OUTPATIENT
Start: 2022-07-29 | End: 2022-07-29

## 2022-07-29 RX ORDER — FLUVOXAMINE MALEATE 50 MG/1
50 TABLET ORAL
Status: DISCONTINUED | OUTPATIENT
Start: 2022-07-29 | End: 2022-08-03

## 2022-07-29 RX ADMIN — LOSARTAN POTASSIUM 50 MG: 50 TABLET, FILM COATED ORAL at 08:56

## 2022-07-29 RX ADMIN — GLIPIZIDE 10 MG: 5 TABLET ORAL at 08:56

## 2022-07-29 RX ADMIN — OLANZAPINE 5 MG: 5 TABLET, FILM COATED ORAL at 08:55

## 2022-07-29 RX ADMIN — OLANZAPINE 5 MG: 5 TABLET, FILM COATED ORAL at 21:01

## 2022-07-29 RX ADMIN — HYDROXYZINE HYDROCHLORIDE 50 MG: 50 TABLET, FILM COATED ORAL at 08:55

## 2022-07-29 RX ADMIN — METFORMIN HYDROCHLORIDE 1000 MG: 500 TABLET ORAL at 08:56

## 2022-07-29 RX ADMIN — TRAZODONE HYDROCHLORIDE 100 MG: 100 TABLET ORAL at 21:56

## 2022-07-29 RX ADMIN — ATORVASTATIN CALCIUM 40 MG: 40 TABLET, FILM COATED ORAL at 08:56

## 2022-07-29 RX ADMIN — TRIAMTERENE AND HYDROCHLOROTHIAZIDE 1 TABLET: 37.5; 25 TABLET ORAL at 08:55

## 2022-07-29 RX ADMIN — HYDROXYZINE HYDROCHLORIDE 25 MG: 25 TABLET ORAL at 21:56

## 2022-07-29 RX ADMIN — FLUVOXAMINE MALEATE 50 MG: 50 TABLET, COATED ORAL at 21:01

## 2022-07-29 NOTE — BH NOTES
GROUP THERAPY PROGRESS NOTE    Patient is participating in psychotherapy group. Group time: 60 minutes    Personal goal for participation: to develop an understanding of cognitive distortions and how to alter our thinking. Goal orientation: Personal    Group therapy participation:  Active    Therapeutic interventions reviewed and discussed:  Group members were guided through learning about cognitive distortions. Members gained an understanding of how these types of distortions effect perception, relationships, and overall mental health. Members were guided through learning about methods that can be used for changing negative thought patterns. Members were able to identify distortions and engage in discussion about past experiences. Handout provided. Impression of participation:  Pt was present and engaged ingroup discussion. Pt added insight to group topic. Pt interacted with peers and SW.

## 2022-07-29 NOTE — PROGRESS NOTES
Problem: Falls - Risk of  Goal: *Absence of Falls  Description: Document Tobias Fall Risk and appropriate interventions in the flowsheet. Outcome: Progressing Towards Goal  Note: Fall Risk Interventions:     Medication Interventions: Teach patient to arise slowly    Problem: Depressed Mood (Adult/Pediatric)  Goal: Interventions  Outcome: Progressing Towards Goal     Problem: Discharge Planning  Goal: *Discharge to safe environment  Outcome: Progressing Towards Goal     Patient is sleeping in her room, not in any obvious distress, respirations even and unlabored. Q15 minutes checks and Nurses hourly rounding ongoing for safety. Will continue to monitor patient. Patient had a calm shift, no complaints made, no distress observed. At 0600, she slept a total of 8 hours.

## 2022-07-29 NOTE — PROGRESS NOTES
Problem: Falls - Risk of  Goal: *Absence of Falls  Description: Document Saida Champion Fall Risk and appropriate interventions in the flowsheet. Outcome: Progressing Towards Goal  Note: Fall Risk Interventions:     Medication Interventions: Teach patient to arise slowly     11:42: Visible on the unit. Anxious mood, denies SI. During treatment team pt reports she pulled her hair twice today. \"I feel down and depressed. \" Compliant with meals and medications. Education and encouragement provided. 1655: Visible on the unit. Interacting appropriately with select peers. Anxious mood, denies SI. Compliant with meals and medications.

## 2022-07-29 NOTE — H&P
[address] [facility] HISTORY AND PHYSICAL    Name:  Farrah Quezada  MR#:  [id number]  :  1975  ACCOUNT #:  [de-identified]  ADMIT DATE:  2022      INITIAL PSYCHIATRIC EVALUATION    CHIEF COMPLAINT:  \"I have been having panic attacks. \"    HISTORY OF PRESENTING ILLNESS:  The patient is a 78-year-old female who is admitted at [facility] inpatient psychiatric unit voluntarily. 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 She is currently seeing Melodie Fleischer, PMHNP through the [facility]. She was on Anafranil for quite some time, but she was changed over to Lexapro about a week ago. She presented to the emergency room with passive suicidal ideation due to increasing anxiety, panic attack, and compulsive behavior of hair pulling. She states that when she gets very anxious, she pulls her hair \"violently. \"  As a result because of the worsening anxiety, she has not slept in 2 weeks. She had been feeling depressed because of this. Feeling hopeless and helpless. She has a history of overdosing about 10 years ago. She states her goal is to get over the trichotillomania. She reports that the compulsive hair pulling has gotten a lot worse and also shares that she has some obsessive thoughts. She states that even with simple things such as getting dressed or decision making that she has to think over and over. Otherwise, her anxiety will worsen even more. While she was in the emergency room, she was observed jerking, hitting herself in the head, and pulling her hair out. Also, when she arrived here in the unit, had to be transferred to the acute unit because she was screaming while pulling her hair. Lengthy discussion with her about medications. She agreed to trial Luvox to help with depression, anxiety, compulsive behavior, and some obsessive thoughts. She denies suicidal ideation, homicidal ideation, auditory or visual hallucination.     PAST MEDICAL HISTORY:  See H and P. Past Medical History:   Diagnosis Date    Anxiety     Depression     ANXIETY & DEPRESSION    Diabetes ([facility] [address]     TYPE II    Hypertension     Infected sebaceous cyst, upper back 4/10/2019    Panic attack     Suicidal thoughts     Vision decreased        Labs: (reviewed/updated 7/29/2022)  Patient Vitals for the past 8 hrs:   BP Temp Pulse Resp SpO2   07/29/22 1123 126/79 98 °F (36.7 °C) 98 16 100 %   07/29/22 0858 133/89 98 °F (36.7 °C) 97 16 100 %     Labs Reviewed   CBC WITH AUTOMATED DIFF - Abnormal; Notable for the following components:       Result Value    MCH 25.6 (*)     RDW 15.2 (*)     IMMATURE GRANULOCYTES 1 (*)     ABS. IMM.  GRANS. 0.1 (*)     All other components within normal limits   METABOLIC PANEL, COMPREHENSIVE - Abnormal; Notable for the following components:    Potassium 3.2 (*)     BUN/Creatinine ratio 21 (*)     AST (SGOT) 11 (*)     Globulin 4.7 (*)     A-G Ratio 0.7 (*)     All other components within normal limits   ACETAMINOPHEN - Abnormal; Notable for the following components:    Acetaminophen level <2 (*)     All other components within normal limits   SALICYLATE - Abnormal; Notable for the following components:    Salicylate level <8.1 (*)     All other components within normal limits   URINALYSIS W/MICROSCOPIC - Abnormal; Notable for the following components:    Appearance CLOUDY (*)     Glucose >1,000 (*)     Epithelial cells MODERATE (*)     Bacteria 1+ (*)     All other components within normal limits   HEMOGLOBIN A1C WITH EAG - Abnormal; Notable for the following components:    Hemoglobin A1c 6.4 (*)     All other components within normal limits   GLUCOSE, FASTING - Abnormal; Notable for the following components:    Glucose 146 (*)     All other components within normal limits   GLUCOSE, POC - Abnormal; Notable for the following components:    Glucose (POC) 122 (*)     All other components within normal limits   GLUCOSE, POC - Abnormal; Notable for the following components:    Glucose (POC) 137 (*)     All other components within normal limits   URINE CULTURE HOLD SAMPLE   COVID-19 WITH INFLUENZA A/B   TSH 3RD GENERATION   ETHYL ALCOHOL   DRUG SCREEN, URINE   LIPID PANEL   HCG URINE, QL. - POC   GLUCOSE, POC   GLUCOSE, POC   GLUCOSE, POC     Lab Results   Component Value Date/Time    Sodium 139 07/26/2022 02:48 PM    Potassium 3.2 (L) 07/26/2022 02:48 PM    Chloride 103 07/26/2022 02:48 PM    CO2 29 07/26/2022 02:48 PM    Anion gap 7 07/26/2022 02:48 PM    Glucose 146 (H) 07/29/2022 05:44 AM    BUN 16 07/26/2022 02:48 PM    Creatinine 0.75 07/26/2022 02:48 PM    BUN/Creatinine ratio 21 (H) 07/26/2022 02:48 PM    GFR est AA >60 07/26/2022 02:48 PM    GFR est non-AA >60 07/26/2022 02:48 PM    Calcium 9.5 07/26/2022 02:48 PM    Bilirubin, total 0.3 07/26/2022 02:48 PM    Alk.  phosphatase 77 07/26/2022 02:48 PM    Protein, total 8.2 07/26/2022 02:48 PM    Albumin 3.5 07/26/2022 02:48 PM    Globulin 4.7 (H) 07/26/2022 02:48 PM    A-G Ratio 0.7 (L) 07/26/2022 02:48 PM    ALT (SGPT) 25 07/26/2022 02:48 PM     Admission on 07/26/2022   Component Date Value Ref Range Status    WBC 07/26/2022 7.8  3.6 - 11.0 K/uL Final    RBC 07/26/2022 4.80  3.80 - 5.20 M/uL Final    HGB 07/26/2022 12.3  11.5 - 16.0 g/dL Final    HCT 07/26/2022 39.2  35.0 - 47.0 % Final    MCV 07/26/2022 81.7  80.0 - 99.0 FL Final    MCH 07/26/2022 25.6 (A) 26.0 - 34.0 PG Final    MCHC 07/26/2022 31.4  30.0 - 36.5 g/dL Final    RDW 07/26/2022 15.2 (A) 11.5 - 14.5 % Final    PLATELET 21/75/2126 010  150 - 400 K/uL Final    MPV 07/26/2022 9.5  8.9 - 12.9 FL Final    NRBC 07/26/2022 0.0  0  WBC Final    ABSOLUTE NRBC 07/26/2022 0.00  0.00 - 0.01 K/uL Final    NEUTROPHILS 07/26/2022 58  32 - 75 % Final    LYMPHOCYTES 07/26/2022 31  12 - 49 % Final    MONOCYTES 07/26/2022 7  5 - 13 % Final    EOSINOPHILS 07/26/2022 2  0 - 7 % Final    BASOPHILS 07/26/2022 1  0 - 1 % Final    IMMATURE GRANULOCYTES 07/26/2022 1 (A) 0.0 - 0.5 % Final    ABS. NEUTROPHILS 07/26/2022 4.6  1.8 - 8.0 K/UL Final    ABS. LYMPHOCYTES 07/26/2022 2.4  0.8 - 3.5 K/UL Final    ABS. MONOCYTES 07/26/2022 0.5  0.0 - 1.0 K/UL Final    ABS. EOSINOPHILS 07/26/2022 0.2  0.0 - 0.4 K/UL Final    ABS. BASOPHILS 07/26/2022 0.0  0.0 - 0.1 K/UL Final    ABS. IMM. GRANS. 07/26/2022 0.1 (A) 0.00 - 0.04 K/UL Final    DF 07/26/2022 AUTOMATED    Final    Sodium 07/26/2022 139  136 - 145 mmol/L Final    Potassium 07/26/2022 3.2 (A) 3.5 - 5.1 mmol/L Final    Chloride 07/26/2022 103  97 - 108 mmol/L Final    CO2 07/26/2022 29  21 - 32 mmol/L Final    Anion gap 07/26/2022 7  5 - 15 mmol/L Final    Glucose 07/26/2022 78  65 - 100 mg/dL Final    BUN 07/26/2022 16  6 - 20 MG/DL Final    Creatinine 07/26/2022 0.75  0.55 - 1.02 MG/DL Final    BUN/Creatinine ratio 07/26/2022 21 (A) 12 - 20   Final    GFR est AA 07/26/2022 >60  >60 ml/min/1.73m2 Final    GFR est non-AA 07/26/2022 >60  >60 ml/min/1.73m2 Final    Calcium 07/26/2022 9.5  8.5 - 10.1 MG/DL Final    Bilirubin, total 07/26/2022 0.3  0.2 - 1.0 MG/DL Final    ALT (SGPT) 07/26/2022 25  12 - 78 U/L Final    AST (SGOT) 07/26/2022 11 (A) 15 - 37 U/L Final    Alk.  phosphatase 07/26/2022 77  45 - 117 U/L Final    Protein, total 07/26/2022 8.2  6.4 - 8.2 g/dL Final    Albumin 07/26/2022 3.5  3.5 - 5.0 g/dL Final    Globulin 07/26/2022 4.7 (A) 2.0 - 4.0 g/dL Final    A-G Ratio 07/26/2022 0.7 (A) 1.1 - 2.2   Final    Acetaminophen level 07/26/2022 <2 (A) 10 - 30 ug/mL Final    Salicylate level 50/41/5778 <1.7 (A) 2.8 - 20.0 MG/DL Final    TSH 07/26/2022 1.61  0.36 - 3.74 uIU/mL Final    ALCOHOL(ETHYL),SERUM 07/26/2022 <10  <10 MG/DL Final    Color 07/26/2022 YELLOW/STRAW    Final    Appearance 07/26/2022 CLOUDY (A) CLEAR   Final    Specific gravity 07/26/2022 1.020  1.003 - 1.030   Final    pH (UA) 07/26/2022 8.0  5.0 - 8.0   Final    Protein 07/26/2022 Negative  NEG mg/dL Final    Glucose 07/26/2022 >1,000 (A) NEG mg/dL Final    Ketone 07/26/2022 Negative  NEG mg/dL Final    Bilirubin 07/26/2022 Negative  NEG   Final    Blood 07/26/2022 Negative  NEG   Final    Urobilinogen 07/26/2022 0.2  0.2 - 1.0 EU/dL Final    Nitrites 07/26/2022 Negative  NEG   Final    Leukocyte Esterase 07/26/2022 Negative  NEG   Final    WBC 07/26/2022 0-4  0 - 4 /hpf Final    RBC 07/26/2022 0-5  0 - 5 /hpf Final    Epithelial cells 07/26/2022 MODERATE (A) FEW /lpf Final    Bacteria 07/26/2022 1+ (A) NEG /hpf Final    Hyaline cast 07/26/2022 0-2  0 - 5 /lpf Final    Urine culture hold 07/26/2022 Urine on hold in Microbiology dept for 2 days. If unpreserved urine is submitted, it cannot be used for addtional testing after 24 hours, recollection will be required.     Final    AMPHETAMINES 07/26/2022 Negative  NEG   Final    BARBITURATES 07/26/2022 Negative  NEG   Final    BENZODIAZEPINES 07/26/2022 Negative  NEG   Final    COCAINE 07/26/2022 Negative  NEG   Final    METHADONE 07/26/2022 Negative  NEG   Final    OPIATES 07/26/2022 Negative  NEG   Final    PCP(PHENCYCLIDINE) 07/26/2022 Negative  NEG   Final    THC (TH-CANNABINOL) 07/26/2022 Negative  NEG   Final    Drug screen comment 07/26/2022 (NOTE)   Final    SARS-CoV-2 by PCR 07/26/2022 Not detected  NOTD   Final    Influenza A by PCR 07/26/2022 Not detected  NOTD   Final    Influenza B by PCR 07/26/2022 Not detected  NOTD   Final    Pregnancy test,urine (POC) 07/26/2022 Negative  NEG   Final    Glucose (POC) 07/26/2022 107  65 - 117 mg/dL Final    Performed by 07/26/2022 Elise May   Final    Glucose (POC) 07/27/2022 92  65 - 117 mg/dL Final    Performed by 07/27/2022 Arminda Sol   Final    Glucose (POC) 07/27/2022 122 (A) 65 - 117 mg/dL Final    Performed by 07/27/2022 JOSEPIHNE Jackson   Final    Glucose (POC) 07/28/2022 70  65 - 117 mg/dL Final    Performed by 07/28/2022 Bhavani Lopez   Final    Hemoglobin A1c 07/29/2022 6.4 (A) 4.0 - 5.6 % Final    Est. average glucose 07/29/2022 137  mg/dL Final    Glucose 07/29/2022 146 (A) 65 - 100 MG/DL Final    Cholesterol, total 07/29/2022 100  <200 MG/DL Final    Triglyceride 07/29/2022 59  <150 MG/DL Final    HDL Cholesterol 07/29/2022 54  MG/DL Final    LDL, calculated 07/29/2022 34.2  0 - 100 MG/DL Final    VLDL, calculated 07/29/2022 11.8  MG/DL Final    CHOL/HDL Ratio 07/29/2022 1.9  0.0 - 5.0   Final    Glucose (POC) 07/29/2022 137 (A) 65 - 117 mg/dL Final    Performed by 07/29/2022 Aliza Jones   Final     Vitals:    07/28/22 2212 07/29/22 0016 07/29/22 0858 07/29/22 1123   BP: 113/75  133/89 126/79   Pulse: (!) 105  97 98   Resp: 16 16 16 16   Temp: 98.4 °F (36.9 °C)  98 °F (36.7 °C) 98 °F (36.7 °C)   SpO2: 99%  100% 100%   Weight:       Height:         Recent Results (from the past 24 hour(s))   GLUCOSE, POC    Collection Time: 07/28/22  4:32 PM   Result Value Ref Range    Glucose (POC) 70 65 - 117 mg/dL    Performed by Dick Roque    HEMOGLOBIN A1C WITH EAG    Collection Time: 07/29/22  5:44 AM   Result Value Ref Range    Hemoglobin A1c 6.4 (H) 4.0 - 5.6 %    Est. average glucose 137 mg/dL   GLUCOSE, FASTING    Collection Time: 07/29/22  5:44 AM   Result Value Ref Range    Glucose 146 (H) 65 - 100 MG/DL   LIPID PANEL    Collection Time: 07/29/22  5:44 AM   Result Value Ref Range    Cholesterol, total 100 <200 MG/DL    Triglyceride 59 <150 MG/DL    HDL Cholesterol 54 MG/DL    LDL, calculated 34.2 0 - 100 MG/DL    VLDL, calculated 11.8 MG/DL    CHOL/HDL Ratio 1.9 0.0 - 5.0     GLUCOSE, POC    Collection Time: 07/29/22  7:38 AM   Result Value Ref Range    Glucose (POC) 137 (H) 65 - 117 mg/dL    Performed by [address]:  Results from Hospital Encounter encounter on 05/04/22    XR CHEST PA LAT    Narrative  EXAM: XR CHEST PA LAT    INDICATION: Chest Pain    COMPARISON: 6/3/2019. FINDINGS: PA and lateral radiographs of the chest demonstrate clear lungs.  The  cardiac and mediastinal contours and pulmonary vascularity are normal.  Degenerative changes are seen in the thoracic spine. Impression  No acute abnormality. XR CHEST PA LAT    Result Date: 5/4/2022  EXAM: XR CHEST PA LAT INDICATION: Chest Pain COMPARISON: 6/3/2019. FINDINGS: PA and lateral radiographs of the chest demonstrate clear lungs. The cardiac and mediastinal contours and pulmonary vascularity are normal. Degenerative changes are seen in the thoracic spine. No acute abnormality. CT SPINE CERV WO CONT    Result Date: 5/4/2022  EXAM: CT SPINE CERV WO CONT CLINICAL HISTORY: neck pain INDICATION: neck pain COMPARISON:  None TECHNIQUE:  Axial neck CT was performed. Noncontrast imaging obtained. Coronal and sagittal reconstructions were performed. CT dose reduction was achieved through use of a standardized protocol tailored for this examination and automatic exposure control for dose modulation. Osseous/bone algorithm was utilized. FINDINGS: The vertebral bodies are anatomically aligned. There is no evidence of fracture or subluxation. The prevertebral soft tissues are grossly within normal limits. The atlantodental interval is within normal limits. The craniocervical junction is intact. Large posterior and anterior osteophytes. Severe multilevel canal stenoses. Cord compression at C5-6 is likely. Multilevel severe foraminal stenoses as well. No apical pneumothorax. There is no acute fracture or dislocation identified. Severe multilevel chronic degenerative change with multiple anterior and posterior disc osteophyte. Severe canal and foraminal stenoses with multilevel cord compression chronic and degenerative in nature is likely. MRI of the cervical spine for further delineation on an nonemergent basis is recommended. PAST PSYCHIATRIC HISTORY:  [facility] as described above. She reports that she has been admitted a few times in the past, most recently was at [address] [place] [address] in May.     PSYCHOSOCIAL HISTORY:  She is single and no children. Her highest level of education is some college. She stated she is employed, but is not working at the moment. She lives with her parents. MENTAL STATUS EXAM:  She is alert and oriented in all spheres. She is dressed appropriately. She reports her mood is okay. Affect is constricted. Speech normal rate and rhythm. Thought process logical and goal directed. She denies suicidal ideation, homicidal ideation, auditory or visual hallucination. Memory is intact. Intelligence is average. Insight is partial.  Judgment is poor. DIAGNOSES:  Generalized anxiety disorder. Unspecified mood disorder. Trichotillomania. TREATMENT PLANNING:  I will continue her inpatient stay. She will be provided with support and encouraged to attend groups. Her safety will be monitored. Her medications will be modified and assessed. Case Management will work on discharge planning. ASSETS AND STRENGTHS:  She is willing to seek help. She is willing to take medication. ESTIMATED LENGTH OF STAY:  5 to 7 days.       ALANA MORENO NP      [id number]  D:  07/29/2022 9:11  T:  07/29/2022 11:15  JOB #:  [id number]

## 2022-07-29 NOTE — INTERDISCIPLINARY ROUNDS
Pt displays less erratic outbursts or hair pulling behaviors. Pts medication was adjusted. Pt denies SI, HI, AVH. Pt reports not sleeping too well. Pts mood is anxious. Pt does report agitation. Pt transferred to general unit.   SW will set up follow up counseling and schedule follow up with Marylene Setting, NP at [facility]     Expected discharge: JENNIFER James, Cely Victoria RN, Vicki Boyd NP

## 2022-07-29 NOTE — BH NOTES
PSYCHIATRIC PROGRESS NOTE       Patient: Wade Cutler MRN: [id number]  SSN: [id number]    YOB: 1975  Age: 50 y.o. Sex: female      Admit Date: 7/26/2022    LOS: 3 days       Chief Complaint:  Not good today, I am depressed. Interval History:  Wade Cutler is a little change. Says she is depressed, more anxious. Trichotillomania persists. She feels \"drugged and overmedicated\" this morning. She took atarax this morning which mostly was the culprit of drowsiness. She said she took zyprexa as scheduled plus prn but no sedation. She also says she's nauseous, could be from luvox. She says she feels agitated, irritable. She denies si hi or avh. Past Medical History:  Past Medical History:   Diagnosis Date    Anxiety     Depression     ANXIETY & DEPRESSION    Diabetes ([facility] [address]     TYPE II    Hypertension     Infected sebaceous cyst, upper back 4/10/2019    Panic attack     Suicidal thoughts     Vision decreased          ALLERGIES:(reviewed/updated 7/29/2022)  Allergies   Allergen Reactions    Lisinopril Rash     Swelling of the lips       Laboratory report:  Lab Results   Component Value Date/Time    WBC 7.8 07/26/2022 02:48 PM    HGB 12.3 07/26/2022 02:48 PM    HCT 39.2 07/26/2022 02:48 PM    PLATELET 315 64/41/8632 02:48 PM    MCV 81.7 07/26/2022 02:48 PM      Lab Results   Component Value Date/Time    Sodium 139 07/26/2022 02:48 PM    Potassium 3.2 (L) 07/26/2022 02:48 PM    Chloride 103 07/26/2022 02:48 PM    CO2 29 07/26/2022 02:48 PM    Anion gap 7 07/26/2022 02:48 PM    Glucose 146 (H) 07/29/2022 05:44 AM    BUN 16 07/26/2022 02:48 PM    Creatinine 0.75 07/26/2022 02:48 PM    BUN/Creatinine ratio 21 (H) 07/26/2022 02:48 PM    GFR est AA >60 07/26/2022 02:48 PM    GFR est non-AA >60 07/26/2022 02:48 PM    Calcium 9.5 07/26/2022 02:48 PM    Bilirubin, total 0.3 07/26/2022 02:48 PM    Alk.  phosphatase 77 07/26/2022 02:48 PM    Protein, total 8.2 07/26/2022 02:48 PM    Albumin 3.5 07/26/2022 02:48 PM    Globulin 4.7 (H) 07/26/2022 02:48 PM    A-G Ratio 0.7 (L) 07/26/2022 02:48 PM    ALT (SGPT) 25 07/26/2022 02:48 PM      Vitals:    07/28/22 2212 07/29/22 0016 07/29/22 0858 07/29/22 1123   BP: 113/75  133/89 126/79   Pulse: (!) 105  97 98   Resp: 16 16 16 16   Temp: 98.4 °F (36.9 °C)  98 °F (36.7 °C) 98 °F (36.7 °C)   SpO2: 99%  100% 100%   Weight:       Height:           No results found for: VALF2, VALAC, VALP, VALPR, DS6, CRBAM, CRBAMP, CARB2, XCRBAM  No results found for: LITHM    Vital Signs  Patient Vitals for the past 24 hrs:   Temp Pulse Resp BP SpO2   07/29/22 1123 98 °F (36.7 °C) 98 16 126/79 100 %   07/29/22 0858 98 °F (36.7 °C) 97 16 133/89 100 %   07/29/22 0016 -- -- 16 -- --   07/28/22 2212 98.4 °F (36.9 °C) (!) 105 16 113/75 99 %   07/28/22 1857 98.1 °F (36.7 °C) 99 16 113/70 98 %       Wt Readings from Last 3 Encounters:   07/26/22 91.3 kg (201 lb 4.8 oz)   05/11/22 98 kg (216 lb)   05/08/22 98 kg (216 lb)     Temp Readings from Last 3 Encounters:   07/29/22 98 °F (36.7 °C)   05/16/22 99.2 °F (37.3 °C)   05/08/22 97.4 °F (36.3 °C)     BP Readings from Last 3 Encounters:   07/29/22 126/79   05/16/22 118/77   05/08/22 118/85     Pulse Readings from Last 3 Encounters:   07/29/22 98   05/16/22 (!) 109   05/08/22 86       Radiology (reviewed/updated 7/29/2022)  XR CHEST PA LAT    Result Date: 5/4/2022  EXAM: XR CHEST PA LAT INDICATION: Chest Pain COMPARISON: 6/3/2019. FINDINGS: PA and lateral radiographs of the chest demonstrate clear lungs. The cardiac and mediastinal contours and pulmonary vascularity are normal. Degenerative changes are seen in the thoracic spine. No acute abnormality. CT SPINE CERV WO CONT    Result Date: 5/4/2022  EXAM: CT SPINE CERV WO CONT CLINICAL HISTORY: neck pain INDICATION: neck pain COMPARISON:  None TECHNIQUE:  Axial neck CT was performed. Noncontrast imaging obtained. Coronal and sagittal reconstructions were performed.  CT dose reduction was achieved through use of a standardized protocol tailored for this examination and automatic exposure control for dose modulation. Osseous/bone algorithm was utilized. FINDINGS: The vertebral bodies are anatomically aligned. There is no evidence of fracture or subluxation. The prevertebral soft tissues are grossly within normal limits. The atlantodental interval is within normal limits. The craniocervical junction is intact. Large posterior and anterior osteophytes. Severe multilevel canal stenoses. Cord compression at C5-6 is likely. Multilevel severe foraminal stenoses as well. No apical pneumothorax. There is no acute fracture or dislocation identified. Severe multilevel chronic degenerative change with multiple anterior and posterior disc osteophyte. Severe canal and foraminal stenoses with multilevel cord compression chronic and degenerative in nature is likely. MRI of the cervical spine for further delineation on an nonemergent basis is recommended.         Current Facility-Administered Medications   Medication Dose Route Frequency Provider Last Rate Last Admin    hydrOXYzine HCL (ATARAX) tablet 50 mg  50 mg Oral Q4H PRN Bredna Koanie A, NP   50 mg at 07/29/22 0855    traZODone (DESYREL) tablet 100 mg  100 mg Oral QHS PRN Maikel Lizbeth A, NP   100 mg at 07/28/22 2135    OLANZapine (ZyPREXA) tablet 5 mg  5 mg Oral Q12H Brenda Koanie A, NP   5 mg at 07/29/22 0855    fluvoxaMINE (LUVOX) tablet 50 mg  50 mg Oral QHS Brenda Koanie A, NP   50 mg at 07/28/22 2135    glipiZIDE (GLUCOTROL) tablet 10 mg  10 mg Oral DAILY Brenda Koanie A, NP   10 mg at 07/29/22 0856    losartan (COZAAR) tablet 50 mg  50 mg Oral DAILY Brenda Koanie A, NP   50 mg at 07/29/22 0856    metFORMIN (GLUCOPHAGE) tablet 1,000 mg  1,000 mg Oral DAILY Brenda Koanie A, NP   1,000 mg at 07/29/22 0856    triamterene-hydroCHLOROthiazide (MAXZIDE) 37.5-25 mg per tablet 1 Tablet  1 Tablet Oral DAILY Georgi Mejia A, NP   1 Tablet at 07/29/22 0855    atorvastatin (LIPITOR) tablet 40 mg  40 mg Oral DAILY Lizbeth Ko A, NP   40 mg at 07/29/22 0856    OLANZapine (ZyPREXA) tablet 5 mg  5 mg Oral Q6H PRN Barrett Esters, NP   5 mg at 07/28/22 1931    haloperidol lactate (HALDOL) injection 5 mg  5 mg IntraMUSCular Q6H PRN Barrett Esters, NP        benztropine (COGENTIN) tablet 1 mg  1 mg Oral BID PRN Barrett Esters, NP        diphenhydrAMINE (BENADRYL) injection 50 mg  50 mg IntraMUSCular BID PRN Barrett Esters, NP        acetaminophen (TYLENOL) tablet 650 mg  650 mg Oral Q4H PRN Barrett Esters, NP        magnesium hydroxide (MILK OF MAGNESIA) 400 mg/5 mL oral suspension 30 mL  30 mL Oral DAILY PRN Barrett Esters, NP   30 mL at 07/27/22 0849    midazolam (VERSED) injection 2 mg  2 mg IntraMUSCular Q6H PRN Barrett Esters, NP           Side Effects: (reviewed/updated 7/29/2022)  None reported or admitted to. Review of Systems: (reviewed/updated 7/29/2022)  Appetite: good  Sleep: poor   All other Review of Systems: negative    Mental Status Exam:  Eye contact: Good eye contact  Psychomotor activity: wnl  Speech is spontaneous  Thought process: Logical and goal directed   Mood is \"depressed\"  Affect: blunt  Perception: No avh  Suicidal ideation: No si  Homicidal ideation: No hi  Insight/judgment: Partial   Cognition is grossly intact      Physical Exam:  Musculoskeletal system: steady gait  Tremor not present  Cog wheeling not present      Assessment and Plan:  Carmelo Lee meets criteria for a diagnosis of SANTY. Unspecified mood disorder. Trichotillomania. Decrease atarax to 25 mg prn. Continue rest of  medications as prescribed. We will closely monitor for safety. We will encourage reality orientation. Disposition planning to continue.        I certify that this patients inpatient psychiatric hospital services furnished since the previous certification were, and continue to be, required for treatment that could reasonably be expected to improve the patient's condition, or for diagnostic study, and that the patient continues to need, on a daily basis, active treatment furnished directly by or requiring the supervision of inpatient psychiatric facility personnel. In addition, the hospital records show that services furnished were intensive treatment services, admission or related services, or equivalent services.       Signed:  Diamond Kim NP  7/29/2022

## 2022-07-29 NOTE — BH NOTES
HYPOGLYCEMIC EPISODE DOCUMENTATION    Patient with hypoglycemic episode(s) at 1631(time) on 7/29/2022(date). BG value(s) pre-treatment 59    Was patient symptomatic?  [] yes, [x] no  Patient was treated with the following rescue medications/treatments: [] D50                [] Glucose tablets                [] Glucagon                [x] 4oz juice                [] 6oz reg soda                [] 8oz low fat milk  BG value post-treatment: pt complies with juice at 1705 reassessment value 67 juice consumed: reassessment value 78  Once BG treated and value greater than 70 mg/dl, pt was provided with the following:  [] snack  [x] Meal  Provider notified  Follow up orders: give juice, educate pt, monitor for signs/symptoms, give scheduled meals, continue BS BID

## 2022-07-30 LAB
GLUCOSE BLD STRIP.AUTO-MCNC: 111 MG/DL (ref 65–117)
GLUCOSE BLD STRIP.AUTO-MCNC: 126 MG/DL (ref 65–117)
SERVICE CMNT-IMP: ABNORMAL
SERVICE CMNT-IMP: NORMAL

## 2022-07-30 PROCEDURE — 82962 GLUCOSE BLOOD TEST: CPT

## 2022-07-30 PROCEDURE — 65220000003 HC RM SEMIPRIVATE PSYCH

## 2022-07-30 PROCEDURE — 74011250637 HC RX REV CODE- 250/637: Performed by: NURSE PRACTITIONER

## 2022-07-30 RX ADMIN — OLANZAPINE 5 MG: 5 TABLET, FILM COATED ORAL at 20:50

## 2022-07-30 RX ADMIN — GLIPIZIDE 10 MG: 5 TABLET ORAL at 09:15

## 2022-07-30 RX ADMIN — HYDROXYZINE HYDROCHLORIDE 25 MG: 25 TABLET ORAL at 20:50

## 2022-07-30 RX ADMIN — OLANZAPINE 5 MG: 5 TABLET, FILM COATED ORAL at 09:25

## 2022-07-30 RX ADMIN — FLUVOXAMINE MALEATE 50 MG: 50 TABLET, COATED ORAL at 20:50

## 2022-07-30 RX ADMIN — TRAZODONE HYDROCHLORIDE 100 MG: 100 TABLET ORAL at 20:50

## 2022-07-30 RX ADMIN — LOSARTAN POTASSIUM 50 MG: 50 TABLET, FILM COATED ORAL at 09:25

## 2022-07-30 RX ADMIN — ATORVASTATIN CALCIUM 40 MG: 40 TABLET, FILM COATED ORAL at 09:15

## 2022-07-30 RX ADMIN — TRIAMTERENE AND HYDROCHLOROTHIAZIDE 1 TABLET: 37.5; 25 TABLET ORAL at 09:25

## 2022-07-30 RX ADMIN — METFORMIN HYDROCHLORIDE 1000 MG: 500 TABLET ORAL at 09:15

## 2022-07-30 NOTE — PROGRESS NOTES
Problem: Falls - Risk of  Goal: *Absence of Falls  Description: Document Cristofer Orozco Fall Risk and appropriate interventions in the flowsheet. 7/30/2022 0039 by Toya Haji RN  Outcome: Progressing Towards Goal  Note: Fall Risk Interventions:            Medication Interventions: Teach patient to arise slowly                7/30/2022 0037 by Toya Haji RN  Outcome: Progressing Towards Goal  Note: Fall Risk Interventions:            Medication Interventions: Teach patient to arise slowly                   Problem: Anxiety  Goal: *Alleviation of anxiety  Outcome: Progressing Towards Goal  Pt resting with eyes closed. Appears to be sleeping. No distress noted. Every 15 minute monitoring for Pt safety. Will continue to monitor.

## 2022-07-30 NOTE — PROGRESS NOTES
Problem: Depressed Mood (Adult/Pediatric)  Goal: *STG: Remains safe in hospital  Outcome: Progressing Towards Goal    Visible on the unit. Calm and cooperative at this time. Pt denies SI. Compliant with meals and medications.

## 2022-07-30 NOTE — BH NOTES
PSYCHIATRIC PROGRESS NOTE       Patient: Ziggy Ferrari MRN: [id number]  SSN: [id number]    YOB: 1975  Age: 50 y.o. Sex: female      Admit Date: 7/26/2022    LOS: 4 days     Chief Complaint: \"I'm doing a little better. \"     Interval History:  Ziggy Ferrari has been more visible on the unit and has been interacting with other patients appropriately. She has been journaling. She reports she slept well last night and \"woke up smiling. \" She denies SI/plan/intent and HI/plan/intent. Denies AVH. She is tolerating her medication changes well. She states she had been having some GI upset yesterday but it has improved. She feels her anxiety is better and she is engaging in less hair pulling. Past Medical History:  Past Medical History:   Diagnosis Date    Anxiety     Depression     ANXIETY & DEPRESSION    Diabetes ([facility] [address]     TYPE II    Hypertension     Infected sebaceous cyst, upper back 4/10/2019    Panic attack     Suicidal thoughts     Vision decreased        ALLERGIES:(reviewed/updated 7/30/2022)  Allergies   Allergen Reactions    Lisinopril Rash     Swelling of the lips     Laboratory report:  Lab Results   Component Value Date/Time    WBC 7.8 07/26/2022 02:48 PM    HGB 12.3 07/26/2022 02:48 PM    HCT 39.2 07/26/2022 02:48 PM    PLATELET 935 63/22/3269 02:48 PM    MCV 81.7 07/26/2022 02:48 PM      Lab Results   Component Value Date/Time    Sodium 139 07/26/2022 02:48 PM    Potassium 3.2 (L) 07/26/2022 02:48 PM    Chloride 103 07/26/2022 02:48 PM    CO2 29 07/26/2022 02:48 PM    Anion gap 7 07/26/2022 02:48 PM    Glucose 146 (H) 07/29/2022 05:44 AM    BUN 16 07/26/2022 02:48 PM    Creatinine 0.75 07/26/2022 02:48 PM    BUN/Creatinine ratio 21 (H) 07/26/2022 02:48 PM    GFR est AA >60 07/26/2022 02:48 PM    GFR est non-AA >60 07/26/2022 02:48 PM    Calcium 9.5 07/26/2022 02:48 PM    Bilirubin, total 0.3 07/26/2022 02:48 PM    Alk.  phosphatase 77 07/26/2022 02:48 PM    Protein, total 8.2 07/26/2022 02:48 PM    Albumin 3.5 07/26/2022 02:48 PM    Globulin 4.7 (H) 07/26/2022 02:48 PM    A-G Ratio 0.7 (L) 07/26/2022 02:48 PM    ALT (SGPT) 25 07/26/2022 02:48 PM      Vitals:    07/29/22 1732 07/29/22 2016 07/30/22 0839 07/30/22 0923   BP: 114/76 131/78 99/61 (!) 142/75   Pulse: (!) 108 100 99 (!) 112   Resp: 16 16 16    Temp: 98 °F (36.7 °C) 98.8 °F (37.1 °C) 97.9 °F (36.6 °C)    SpO2:  99% 97%    Weight:       Height:           No results found for: VALF2, VALAC, VALP, VALPR, DS6, CRBAM, CRBAMP, CARB2, XCRBAM  No results found for: LITHM    Vital Signs  Patient Vitals for the past 24 hrs:   Temp Pulse Resp BP SpO2   07/30/22 0923 -- (!) 112 -- (!) 142/75 --   07/30/22 0839 97.9 °F (36.6 °C) 99 16 99/61 97 %   07/29/22 2016 98.8 °F (37.1 °C) 100 16 131/78 99 %   07/29/22 1732 98 °F (36.7 °C) (!) 108 16 114/76 --       Wt Readings from Last 3 Encounters:   07/26/22 91.3 kg (201 lb 4.8 oz)   05/11/22 98 kg (216 lb)   05/08/22 98 kg (216 lb)     Temp Readings from Last 3 Encounters:   07/30/22 97.9 °F (36.6 °C)   05/16/22 99.2 °F (37.3 °C)   05/08/22 97.4 °F (36.3 °C)     BP Readings from Last 3 Encounters:   07/30/22 (!) 142/75   05/16/22 118/77   05/08/22 118/85     Pulse Readings from Last 3 Encounters:   07/30/22 (!) 112   05/16/22 (!) 109   05/08/22 86       Current Facility-Administered Medications   Medication Dose Route Frequency Provider Last Rate Last Admin    hydrOXYzine HCL (ATARAX) tablet 25 mg  25 mg Oral Q4H PRN Bria Koe A, NP   25 mg at 07/29/22 2156    fluvoxaMINE (LUVOX) tablet 50 mg  50 mg Oral QHS Bria Koe A, NP   50 mg at 07/29/22 2101    traZODone (DESYREL) tablet 100 mg  100 mg Oral QHS PRN Bria Koe A, NP   100 mg at 07/29/22 2156    OLANZapine (ZyPREXA) tablet 5 mg  5 mg Oral Q12H Bria Koe A, NP   5 mg at 07/30/22 0925    glipiZIDE (GLUCOTROL) tablet 10 mg  10 mg Oral DAILY Bria Koe A, NP   10 mg at 07/30/22 0915    losartan (COZAAR) tablet 50 mg  50 mg Oral DAILY Bria Kodaly BRASHER, NP   50 mg at 07/30/22 0925    metFORMIN (GLUCOPHAGE) tablet 1,000 mg  1,000 mg Oral DAILY Lizbeth Ko, NP   1,000 mg at 07/30/22 0915    triamterene-hydroCHLOROthiazide (MAXZIDE) 37.5-25 mg per tablet 1 Tablet  1 Tablet Oral DAILY Maikel Lizbeth A, NP   1 Tablet at 07/30/22 0925    atorvastatin (LIPITOR) tablet 40 mg  40 mg Oral DAILY Maikel Lizbeth A, NP   40 mg at 07/30/22 0915    OLANZapine (ZyPREXA) tablet 5 mg  5 mg Oral Q6H PRN Alma Rosa Tian, NP   5 mg at 07/28/22 1931    haloperidol lactate (HALDOL) injection 5 mg  5 mg IntraMUSCular Q6H PRN Alma Rosa [place], NP        benztropine (COGENTIN) tablet 1 mg  1 mg Oral BID PRN Alma Rosa [place], NP        diphenhydrAMINE (BENADRYL) injection 50 mg  50 mg IntraMUSCular BID PRN [facility], NP        acetaminophen (TYLENOL) tablet 650 mg  650 mg Oral Q4H PRN [facility], NP        magnesium hydroxide (MILK OF MAGNESIA) 400 mg/5 mL oral suspension 30 mL  30 mL Oral DAILY PRN Alma Rosa [place], NP   30 mL at 07/27/22 0849    midazolam (VERSED) injection 2 mg  2 mg IntraMUSCular Q6H PRN Alma Rosa [place], NP           Side Effects: (reviewed/updated 7/30/2022)  None reported or admitted to. Review of Systems: (reviewed/updated 7/30/2022)  Appetite: good  Sleep: poor   All other Review of Systems: negative    Mental Status Exam:  Eye contact: Good eye contact  Psychomotor activity: wnl  Speech is spontaneous  Thought process: Logical and goal directed   Mood is \"better\"  Affect: blunt  Perception: No avh  Suicidal ideation: No si  Homicidal ideation: No hi  Insight/judgment: Partial   Cognition is grossly intact    Physical Exam:  Musculoskeletal system: steady gait  Tremor not present  Cog wheeling not present    Assessment and Plan:  Lino Rowley meets criteria for a diagnosis of SANTY. Unspecified mood disorder. Trichotillomania. Continue rest of  medications as prescribed.   We will closely monitor for safety. We will encourage reality orientation. Disposition planning to continue. I certify that this patients inpatient psychiatric hospital services furnished since the previous certification were, and continue to be, required for treatment that could reasonably be expected to improve the patient's condition, or for diagnostic study, and that the patient continues to need, on a daily basis, active treatment furnished directly by or requiring the supervision of inpatient psychiatric facility personnel. In addition, the hospital records show that services furnished were intensive treatment services, admission or related services, or equivalent services.       Signed:  Chacorta Pink NP  7/30/2022

## 2022-07-30 NOTE — PROGRESS NOTES
PRN Medication Documentation    Specific patient behavior that led to need for PRN medication: Patient request for insomnia ad anxiety   Staff interventions attempted prior to PRN being given:  Active listening  PRN medication given: Trazodone and atarax  Patient response/effectiveness of PRN medication: Awaiting results

## 2022-07-30 NOTE — PROGRESS NOTES
Problem: Falls - Risk of  Goal: *Absence of Falls  Description: Document Annalisa Leach Fall Risk and appropriate interventions in the flowsheet. 7/30/2022 1328 by Todd Saez RN  Outcome: Progressing Towards Goal  Note: Fall Risk Interventions:            Medication Interventions: Teach patient to arise slowly                7/30/2022 1327 by Todd Saez RN  Outcome: Progressing Towards Goal  Note: Fall Risk Interventions:            Medication Interventions: Teach patient to arise slowly    Problem: Patient Education: Go to Patient Education Activity  Goal: Patient/Family Education  7/30/2022 1328 by Todd Saez RN  Outcome: Progressing Towards Goal  7/30/2022 1327 by Todd Saez RN  Outcome: Progressing Towards Goal     Problem: Depressed Mood (Adult/Pediatric)  Goal: *STG: Participates in treatment plan  Outcome: Progressing Towards Goal  Note: Pt participated in treatment team. Out on the unit and interacting with select peers. Stated she is \"feeling better\". No complaints of anxiety during the shift. No thoughts of self harm. Able to communicate needs and concerns with staff.    Goal: Interventions  Outcome: Progressing Towards Goal     Problem: Patient Education: Go to Patient Education Activity  Goal: Patient/Family Education  Outcome: Progressing Towards Goal

## 2022-07-31 LAB
GLUCOSE BLD STRIP.AUTO-MCNC: 107 MG/DL (ref 65–117)
GLUCOSE BLD STRIP.AUTO-MCNC: 142 MG/DL (ref 65–117)
GLUCOSE BLD STRIP.AUTO-MCNC: 64 MG/DL (ref 65–117)
GLUCOSE BLD STRIP.AUTO-MCNC: 67 MG/DL (ref 65–117)
SERVICE CMNT-IMP: ABNORMAL
SERVICE CMNT-IMP: ABNORMAL
SERVICE CMNT-IMP: NORMAL
SERVICE CMNT-IMP: NORMAL

## 2022-07-31 PROCEDURE — 65220000003 HC RM SEMIPRIVATE PSYCH

## 2022-07-31 PROCEDURE — 82962 GLUCOSE BLOOD TEST: CPT

## 2022-07-31 PROCEDURE — 74011250637 HC RX REV CODE- 250/637: Performed by: NURSE PRACTITIONER

## 2022-07-31 RX ADMIN — TRAZODONE HYDROCHLORIDE 150 MG: 50 TABLET ORAL at 21:54

## 2022-07-31 RX ADMIN — ATORVASTATIN CALCIUM 40 MG: 40 TABLET, FILM COATED ORAL at 09:27

## 2022-07-31 RX ADMIN — TRIAMTERENE AND HYDROCHLOROTHIAZIDE 1 TABLET: 37.5; 25 TABLET ORAL at 09:27

## 2022-07-31 RX ADMIN — GLIPIZIDE 10 MG: 5 TABLET ORAL at 09:27

## 2022-07-31 RX ADMIN — HYDROXYZINE HYDROCHLORIDE 25 MG: 25 TABLET ORAL at 12:33

## 2022-07-31 RX ADMIN — FLUVOXAMINE MALEATE 50 MG: 50 TABLET, COATED ORAL at 21:35

## 2022-07-31 RX ADMIN — METFORMIN HYDROCHLORIDE 1000 MG: 500 TABLET ORAL at 09:27

## 2022-07-31 RX ADMIN — OLANZAPINE 5 MG: 5 TABLET, FILM COATED ORAL at 09:27

## 2022-07-31 RX ADMIN — LOSARTAN POTASSIUM 50 MG: 50 TABLET, FILM COATED ORAL at 09:28

## 2022-07-31 RX ADMIN — OLANZAPINE 5 MG: 5 TABLET, FILM COATED ORAL at 21:55

## 2022-07-31 NOTE — BH NOTES
PSYCHIATRIC PROGRESS NOTE       Patient: Barb Pierre MRN: [id number]  SSN: [id number]    YOB: 1975  Age: 50 y.o. Sex: female      Admit Date: 7/26/2022    LOS: 5 days     Chief Complaint: \"I'm ok\"     Interval History:  Barb Pierre states she had a rough morning today. She woke up feeling depressed with no apparent trigger. She states she has been pulled her hair twice today. She thinks the underlying reason for hair pulling was racing thoughts, feeling like she thinks too much. She is anxious about getting out of bed, and worries about discharge from the hospital and \"going back into the world. \" However, she does feel her anxiety is somewhat improved. She is still feeling somewhat lightheaded. She denies current SI/plan/intent and HI/plan/intent. No AVH. Appetite is good. Sleep is still poor. She states she has been awake since 2am. No other changes or acute incidents.      Past Medical History:  Past Medical History:   Diagnosis Date    Anxiety     Depression     ANXIETY & DEPRESSION    Diabetes ([facility] [address]     TYPE II    Hypertension     Infected sebaceous cyst, upper back 4/10/2019    Panic attack     Suicidal thoughts     Vision decreased        ALLERGIES:(reviewed/updated 7/31/2022)  Allergies   Allergen Reactions    Lisinopril Rash     Swelling of the lips     Laboratory report:  Lab Results   Component Value Date/Time    WBC 7.8 07/26/2022 02:48 PM    HGB 12.3 07/26/2022 02:48 PM    HCT 39.2 07/26/2022 02:48 PM    PLATELET 100 82/21/0977 02:48 PM    MCV 81.7 07/26/2022 02:48 PM      Lab Results   Component Value Date/Time    Sodium 139 07/26/2022 02:48 PM    Potassium 3.2 (L) 07/26/2022 02:48 PM    Chloride 103 07/26/2022 02:48 PM    CO2 29 07/26/2022 02:48 PM    Anion gap 7 07/26/2022 02:48 PM    Glucose 146 (H) 07/29/2022 05:44 AM    BUN 16 07/26/2022 02:48 PM    Creatinine 0.75 07/26/2022 02:48 PM    BUN/Creatinine ratio 21 (H) 07/26/2022 02:48 PM    GFR est AA >60 07/26/2022 02:48 PM GFR est non-AA >60 07/26/2022 02:48 PM    Calcium 9.5 07/26/2022 02:48 PM    Bilirubin, total 0.3 07/26/2022 02:48 PM    Alk.  phosphatase 77 07/26/2022 02:48 PM    Protein, total 8.2 07/26/2022 02:48 PM    Albumin 3.5 07/26/2022 02:48 PM    Globulin 4.7 (H) 07/26/2022 02:48 PM    A-G Ratio 0.7 (L) 07/26/2022 02:48 PM    ALT (SGPT) 25 07/26/2022 02:48 PM      Vitals:    07/30/22 0923 07/30/22 1726 07/30/22 1953 07/31/22 0925   BP: (!) 142/75 125/86 116/78 136/77   Pulse: (!) 112 99 99 (!) 111   Resp:  16 18 16   Temp:  98 °F (36.7 °C) 98.1 °F (36.7 °C) 98.2 °F (36.8 °C)   SpO2:  98% 98% 97%   Weight:    91.7 kg (202 lb 3.2 oz)   Height:           No results found for: VALF2, VALAC, VALP, VALPR, DS6, CRBAM, CRBAMP, CARB2, XCRBAM  No results found for: LITHM    Vital Signs  Patient Vitals for the past 24 hrs:   Temp Pulse Resp BP SpO2   07/31/22 0925 98.2 °F (36.8 °C) (!) 111 16 136/77 97 %   07/30/22 1953 98.1 °F (36.7 °C) 99 18 116/78 98 %   07/30/22 1726 98 °F (36.7 °C) 99 16 125/86 98 %       Wt Readings from Last 3 Encounters:   07/31/22 91.7 kg (202 lb 3.2 oz)   05/11/22 98 kg (216 lb)   05/08/22 98 kg (216 lb)     Temp Readings from Last 3 Encounters:   07/31/22 98.2 °F (36.8 °C)   05/16/22 99.2 °F (37.3 °C)   05/08/22 97.4 °F (36.3 °C)     BP Readings from Last 3 Encounters:   07/31/22 136/77   05/16/22 118/77   05/08/22 118/85     Pulse Readings from Last 3 Encounters:   07/31/22 (!) 111   05/16/22 (!) 109   05/08/22 86       Current Facility-Administered Medications   Medication Dose Route Frequency Provider Last Rate Last Admin    hydrOXYzine HCL (ATARAX) tablet 25 mg  25 mg Oral Q4H PRN Bria Koe A, NP   25 mg at 07/30/22 2050    fluvoxaMINE (LUVOX) tablet 50 mg  50 mg Oral QHS Bria Koe A, NP   50 mg at 07/30/22 2050    traZODone (DESYREL) tablet 100 mg  100 mg Oral QHS PRN Bria Koe A, NP   100 mg at 07/30/22 2050    OLANZapine (ZyPREXA) tablet 5 mg  5 mg Oral Q12H Maikel, Lizbeth A, NP   5 mg at 07/31/22 0927    glipiZIDE (GLUCOTROL) tablet 10 mg  10 mg Oral DAILY Lizbeth Ko NP   10 mg at 07/31/22 0927    losartan (COZAAR) tablet 50 mg  50 mg Oral DAILY Lizbeth Ko, NP   50 mg at 07/31/22 4350    metFORMIN (GLUCOPHAGE) tablet 1,000 mg  1,000 mg Oral DAILY Lizbeth Ko NP   1,000 mg at 07/31/22 5925    triamterene-hydroCHLOROthiazide (MAXZIDE) 37.5-25 mg per tablet 1 Tablet  1 Tablet Oral DAILY Lizbeth Ko NP   1 Tablet at 07/31/22 0927    atorvastatin (LIPITOR) tablet 40 mg  40 mg Oral DAILY Lizbeth Ko, NP   40 mg at 07/31/22 0927    OLANZapine (ZyPREXA) tablet 5 mg  5 mg Oral Q6H PRN Marguerite Cabrales, NP   5 mg at 07/28/22 1931    haloperidol lactate (HALDOL) injection 5 mg  5 mg IntraMUSCular Q6H PRN Margueritenick Burdickper, NP        benztropine (COGENTIN) tablet 1 mg  1 mg Oral BID PRN Marguerite Cabrales, NP        diphenhydrAMINE (BENADRYL) injection 50 mg  50 mg IntraMUSCular BID PRN Marguerite Buridckper, NP        acetaminophen (TYLENOL) tablet 650 mg  650 mg Oral Q4H PRN Marguerite Cabrales, NP        magnesium hydroxide (MILK OF MAGNESIA) 400 mg/5 mL oral suspension 30 mL  30 mL Oral DAILY PRN Marguerite Cabrales, NP   30 mL at 07/27/22 0849    midazolam (VERSED) injection 2 mg  2 mg IntraMUSCular Q6H PRN Margueritenick Cabrales, NP           Side Effects: (reviewed/updated 7/31/2022)  None reported or admitted to.     Review of Systems: (reviewed/updated 7/31/2022)  Appetite: good  Sleep: poor   All other Review of Systems: negative    Mental Status Exam:  Eye contact: Good eye contact  Psychomotor activity: wnl  Speech is spontaneous  Thought process: Logical and goal directed   Mood is \"better\"  Affect: blunt  Perception: No avh  Suicidal ideation: No si  Homicidal ideation: No hi  Insight/judgment: Partial   Cognition is grossly intact    Physical Exam:  Musculoskeletal system: steady gait  Tremor not present  Cog wheeling not present    Assessment and Plan:  Jessika Peña meets criteria for a diagnosis of SANTY. Unspecified mood disorder. Trichotillomania. Increased trazodone to 150mg QHS (changed from PRN to scheduled)  Continue rest of  medications as prescribed. We will closely monitor for safety. We will encourage reality orientation. Disposition planning to continue. I certify that this patients inpatient psychiatric hospital services furnished since the previous certification were, and continue to be, required for treatment that could reasonably be expected to improve the patient's condition, or for diagnostic study, and that the patient continues to need, on a daily basis, active treatment furnished directly by or requiring the supervision of inpatient psychiatric facility personnel. In addition, the hospital records show that services furnished were intensive treatment services, admission or related services, or equivalent services.       Signed:  Chacorta Pink NP  7/31/2022

## 2022-07-31 NOTE — BH NOTES
12:33: PRN Medication Documentation    Specific patient behavior that led to need for PRN medication: Pt requested medication for c/o anxiety.   Staff interventions attempted prior to PRN being given: listening, distraction, encouragement, education   PRN medication given: atarax

## 2022-07-31 NOTE — PROGRESS NOTES
Problem: Depressed Mood (Adult/Pediatric)  Goal: *STG: Remains safe in hospital  Outcome: Progressing Towards Goal     Visible on the unit. Anxious mood, denies SI. Compliant with meals and medications.

## 2022-07-31 NOTE — BH NOTES
PRN Medication Documentation    Specific patient behavior that led to need for PRN medication: Anxiety and insomnia  Staff interventions attempted prior to PRN being given: deep breathing and lights dim  PRN medication given: Atarax and trazodone  Patient response/effectiveness of PRN medication: effective

## 2022-07-31 NOTE — PROGRESS NOTES
Problem: Falls - Risk of  Goal: *Absence of Falls  Description: Document Cynthia Smyth Fall Risk and appropriate interventions in the flowsheet. Outcome: Progressing Towards Goal  Note: Fall Risk Interventions:            Medication Interventions: Teach patient to arise slowly    Problem: Patient Education: Go to Patient Education Activity  Goal: Patient/Family Education  Outcome: Progressing Towards Goal     Problem: Depressed Mood (Adult/Pediatric)  Goal: *STG: Participates in treatment plan  Outcome: Progressing Towards Goal  Note: Pt participated in treatment team. Stated she was feeling \"down and depressed this morning\". Stated she pulled her hair twice today due to racing thoughts. Complaining of poor sleep last night. Feeling anxious due to future discharge. Denies any thoughts of self harm.   Reviewed medications during treatment team.   Goal: Interventions  Outcome: Progressing Towards Goal     Problem: Patient Education: Go to Patient Education Activity  Goal: Patient/Family Education  Outcome: Progressing Towards Goal

## 2022-07-31 NOTE — PROGRESS NOTES
Problem: Falls - Risk of  Goal: *Absence of Falls  Description: Document Miko Campos Fall Risk and appropriate interventions in the flowsheet. Outcome: Progressing Towards Goal  Note: Fall Risk Interventions:            Medication Interventions: Teach patient to arise slowly    Resting in bed with eyes closed, no complaints, no distress noted. Safety measures in place, will continue to monitor.

## 2022-08-01 LAB
GLUCOSE BLD STRIP.AUTO-MCNC: 144 MG/DL (ref 65–117)
GLUCOSE BLD STRIP.AUTO-MCNC: 75 MG/DL (ref 65–117)
SERVICE CMNT-IMP: ABNORMAL
SERVICE CMNT-IMP: NORMAL

## 2022-08-01 PROCEDURE — 82962 GLUCOSE BLOOD TEST: CPT

## 2022-08-01 PROCEDURE — 74011250637 HC RX REV CODE- 250/637: Performed by: NURSE PRACTITIONER

## 2022-08-01 PROCEDURE — 74011250637 HC RX REV CODE- 250/637

## 2022-08-01 PROCEDURE — 65220000003 HC RM SEMIPRIVATE PSYCH

## 2022-08-01 RX ADMIN — ATORVASTATIN CALCIUM 40 MG: 40 TABLET, FILM COATED ORAL at 09:02

## 2022-08-01 RX ADMIN — FLUVOXAMINE MALEATE 50 MG: 50 TABLET, COATED ORAL at 21:46

## 2022-08-01 RX ADMIN — TRIAMTERENE AND HYDROCHLOROTHIAZIDE 1 TABLET: 37.5; 25 TABLET ORAL at 09:02

## 2022-08-01 RX ADMIN — ACETAMINOPHEN 650 MG: 325 TABLET ORAL at 21:46

## 2022-08-01 RX ADMIN — GLIPIZIDE 10 MG: 5 TABLET ORAL at 09:02

## 2022-08-01 RX ADMIN — OLANZAPINE 5 MG: 5 TABLET, FILM COATED ORAL at 09:02

## 2022-08-01 RX ADMIN — LOSARTAN POTASSIUM 50 MG: 50 TABLET, FILM COATED ORAL at 09:02

## 2022-08-01 RX ADMIN — OLANZAPINE 5 MG: 5 TABLET, FILM COATED ORAL at 21:46

## 2022-08-01 RX ADMIN — HYDROXYZINE HYDROCHLORIDE 25 MG: 25 TABLET ORAL at 09:45

## 2022-08-01 RX ADMIN — METFORMIN HYDROCHLORIDE 1000 MG: 500 TABLET ORAL at 09:02

## 2022-08-01 RX ADMIN — HYDROXYZINE HYDROCHLORIDE 25 MG: 25 TABLET ORAL at 15:10

## 2022-08-01 RX ADMIN — TRAZODONE HYDROCHLORIDE 150 MG: 50 TABLET ORAL at 21:46

## 2022-08-01 NOTE — PROGRESS NOTES
Patient received resting quietly in bed. No signs of distress. Even and unlabored breathing. Staff will continue to monitor safety q15 and provide support. Problem: Falls - Risk of  Goal: *Absence of Falls  Description: Document Bard  Fall Risk and appropriate interventions in the flowsheet.   Outcome: Progressing Towards Goal  Note: Fall Risk Interventions:       Medication Interventions: Teach patient to arise slowly

## 2022-08-01 NOTE — BH NOTES
PRN Medication Documentation    Specific patient behavior that led to need for PRN medication: c/o anxiety  Staff interventions attempted prior to PRN being given: therapeutic communication  PRN medication given: Atarax 0945  Patient response/effectiveness of PRN medication: Will continue monitor

## 2022-08-01 NOTE — BH NOTES
Behavioral Health Interdisciplinary Rounds     Patient Name: Nhung Ballesteros  Age: 50 y.o. Room/Bed:  731/  Primary Diagnosis: <principal problem not specified>   Admission Status: Voluntary     Readmission within 30 days: no  Power of  in place: no  Patient requires a blocked bed: no          Reason for blocked bed:     VTE Prophylaxis: Not indicated    Mobility needs/Fall risk: no  Flu Vaccine : no   Nutritional Plan: no  Consults:          Labs/Testing due today?: no    Sleep hours:8+       Participation in Care/Groups:  yes  Medication Compliant?: Yes  PRNS (last 24 hours): Antianxiety    Restraints (last 24 hours):  no     CIWA (range last 24 hours):     COWS (range last 24 hours):      Alcohol screening (AUDIT) completed -   AUDIT Score: 0     If applicable, date SBIRT discussed in treatment team AND documented:   AUDIT Screen Score: AUDIT Score: 0      Document Brief Intervention (corresponds directly with the 5 A's, Ask, Advise, Assess, Assist, and Arrange): At- Risk Patients (Score 7-15 for women; 8-15 for men)  Discuss concern patient is drinking at unhealthy levels known to increase risk of alcohol-related health problems. Is Patient ready to commit to change? If No:  Encourage reflection  Discuss short term and long term health risks of consuming alcohol  Barriers to change  Reaffirm willingness to help / Educational materials provided  If Yes:  Set goal  Plan  Educational materials provided    Harmful use or Dependence (Score 16 or greater)  Discuss short term and long term health risks of consuming alcohol  Recommendations  Negotiate drinking goal  Recommend addiction specialist/center  Arrange follow-up appointments.     Tobacco - patient is a smoker: Have You Used Tobacco in the Past 30 Days: No  Illegal Drugs use: Have You Used Any Illegal Substances Over the Past 12 Months: No    24 hour chart check complete: yes ____________________________________________________________________________________________________________    Patient goal(s) for today: Communicate needs to staff   Treatment team focus/goals: Assess pt, manage medications, discharge planning   Progress note Pt reports less SI and no episodes of hair pulling. Pt participated in group. Pt reported stomach pain and light headedness. Dr. Benjamin Drew attributed these to mild side effects of medications.      LOS:  6 Expected LOS: 8/4/22    Financial concerns/prescription coverage:    Family contact:  Carmita Arguelles, mother, [phone]     Family requesting physician contact today:    Discharge plan:TBD   Access to weapons :  no        Outpatient provider(s): Gm Lee NP [facility]   Patient's preferred phone number for follow up call :   Patient's preferred e-mail address :  Participating treatment team members: JENNIFER Lopez, Cynthia Castillo, Dr. Benjamin Drew

## 2022-08-01 NOTE — BH NOTES
GROUP THERAPY PROGRESS NOTE    Patient is participating in substance use group. Group time: 45 minutes    Personal goal for participation: To gain an understanding of how substances can affect the brain and body. Goal orientation: Personal    Group therapy participation: Active     Therapeutic interventions reviewed and discussed:  Group members were guided through developing an understanding of the effects of alcohol and other substances on the brain and body. Handouts provided. Impression of participation: Pt was present and engaged in group discussion. Pt added insight to group topic.  Pt interacted with peers and Mimi Parrish, [id number]

## 2022-08-01 NOTE — BH NOTES
PSYCHIATRIC PROGRESS NOTE       Patient: Deana Ulloa MRN: [id number]  SSN: [id number]    YOB: 1975  Age: 50 y.o. Sex: female      Admit Date: 7/26/2022    LOS: 6 days     Chief Complaint:     Interval History:  Deana Ulloa states that she is slightly improved today. Her mood is \"a little better\". She was able to sleep last night and woke up \"in a more motivated state\". She notes \"stomach pain\" which she attributes to taking Luvox and also has mild light headedness which she attributes to taking Zyprexa. This is slightly better today. She has not had any epsiodes of hair pulling and frequency of SI has decreased. At the present time the patient Deana Ulloa remains compliant with taking medications.  Denies any adverse events from taking them and feels they have been beneficial.       Past Medical History:  Past Medical History:   Diagnosis Date    Anxiety     Depression     ANXIETY & DEPRESSION    Diabetes ([facility] [address]     TYPE II    Hypertension     Infected sebaceous cyst, upper back 4/10/2019    Panic attack     Suicidal thoughts     Vision decreased        ALLERGIES:(reviewed/updated 8/1/2022)  Allergies   Allergen Reactions    Lisinopril Rash     Swelling of the lips     Laboratory report:  Lab Results   Component Value Date/Time    WBC 7.8 07/26/2022 02:48 PM    HGB 12.3 07/26/2022 02:48 PM    HCT 39.2 07/26/2022 02:48 PM    PLATELET 057 23/19/4205 02:48 PM    MCV 81.7 07/26/2022 02:48 PM      Lab Results   Component Value Date/Time    Sodium 139 07/26/2022 02:48 PM    Potassium 3.2 (L) 07/26/2022 02:48 PM    Chloride 103 07/26/2022 02:48 PM    CO2 29 07/26/2022 02:48 PM    Anion gap 7 07/26/2022 02:48 PM    Glucose 146 (H) 07/29/2022 05:44 AM    BUN 16 07/26/2022 02:48 PM    Creatinine 0.75 07/26/2022 02:48 PM    BUN/Creatinine ratio 21 (H) 07/26/2022 02:48 PM    GFR est AA >60 07/26/2022 02:48 PM    GFR est non-AA >60 07/26/2022 02:48 PM    Calcium 9.5 07/26/2022 02:48 PM Bilirubin, total 0.3 07/26/2022 02:48 PM    Alk.  phosphatase 77 07/26/2022 02:48 PM    Protein, total 8.2 07/26/2022 02:48 PM    Albumin 3.5 07/26/2022 02:48 PM    Globulin 4.7 (H) 07/26/2022 02:48 PM    A-G Ratio 0.7 (L) 07/26/2022 02:48 PM    ALT (SGPT) 25 07/26/2022 02:48 PM      Vitals:    07/31/22 0925 07/31/22 1610 07/31/22 2015 08/01/22 0839   BP: 136/77 129/86 127/65 116/81   Pulse: (!) 111 (!) 103 91 100   Resp: 16 16 16 16   Temp: 98.2 °F (36.8 °C) 98.1 °F (36.7 °C) 98 °F (36.7 °C) 97.6 °F (36.4 °C)   SpO2: 97% 99%     Weight: 91.7 kg (202 lb 3.2 oz)      Height:           No results found for: VALF2, VALAC, VALP, VALPR, DS6, CRBAM, CRBAMP, CARB2, XCRBAM  No results found for: LITHM    Vital Signs  Patient Vitals for the past 24 hrs:   Temp Pulse Resp BP SpO2   08/01/22 0839 97.6 °F (36.4 °C) 100 16 116/81 --   07/31/22 2015 98 °F (36.7 °C) 91 16 127/65 --   07/31/22 1610 98.1 °F (36.7 °C) (!) 103 16 129/86 99 %       Wt Readings from Last 3 Encounters:   07/31/22 91.7 kg (202 lb 3.2 oz)   05/11/22 98 kg (216 lb)   05/08/22 98 kg (216 lb)     Temp Readings from Last 3 Encounters:   08/01/22 97.6 °F (36.4 °C)   05/16/22 99.2 °F (37.3 °C)   05/08/22 97.4 °F (36.3 °C)     BP Readings from Last 3 Encounters:   08/01/22 116/81   05/16/22 118/77   05/08/22 118/85     Pulse Readings from Last 3 Encounters:   08/01/22 100   05/16/22 (!) 109   05/08/22 86       Current Facility-Administered Medications   Medication Dose Route Frequency Provider Last Rate Last Admin    traZODone (DESYREL) tablet 150 mg  150 mg Oral QHS Yuliana Irma, NP   150 mg at 07/31/22 2154    hydrOXYzine HCL (ATARAX) tablet 25 mg  25 mg Oral Q4H PRN Lizbeth Ko, NP   25 mg at 08/01/22 0945    fluvoxaMINE (LUVOX) tablet 50 mg  50 mg Oral QHS Lizbeth Ko A, NP   50 mg at 07/31/22 2135    OLANZapine (ZyPREXA) tablet 5 mg  5 mg Oral Q12H Lizbeth Ko, NP   5 mg at 08/01/22 0902    glipiZIDE (GLUCOTROL) tablet 10 mg  10 mg Oral DAILY Lizbeth Ko NP   10 mg at 08/01/22 0902    losartan (COZAAR) tablet 50 mg  50 mg Oral DAILY Lizbeth Ko NP   50 mg at 08/01/22 0902    metFORMIN (GLUCOPHAGE) tablet 1,000 mg  1,000 mg Oral DAILY Lizbeth Ko, NP   1,000 mg at 08/01/22 0902    triamterene-hydroCHLOROthiazide (MAXZIDE) 37.5-25 mg per tablet 1 Tablet  1 Tablet Oral DAILY Lizbeth Ko NP   1 Tablet at 08/01/22 0902    atorvastatin (LIPITOR) tablet 40 mg  40 mg Oral DAILY Lizbeth Ko NP   40 mg at 08/01/22 0902    OLANZapine (ZyPREXA) tablet 5 mg  5 mg Oral Q6H PRN Mariza Ngo NP   5 mg at 07/28/22 1931    haloperidol lactate (HALDOL) injection 5 mg  5 mg IntraMUSCular Q6H PRN Mariza Ngo NP        benztropine (COGENTIN) tablet 1 mg  1 mg Oral BID PRN Mariza Ngo, NP        diphenhydrAMINE (BENADRYL) injection 50 mg  50 mg IntraMUSCular BID PRN Mariza Ngo, NP        acetaminophen (TYLENOL) tablet 650 mg  650 mg Oral Q4H PRN Mariza Ngo NP        magnesium hydroxide (MILK OF MAGNESIA) 400 mg/5 mL oral suspension 30 mL  30 mL Oral DAILY PRN Mariza Ngo NP   30 mL at 07/27/22 0849    midazolam (VERSED) injection 2 mg  2 mg IntraMUSCular Q6H PRN Mariza Ngo NP           Side Effects: (reviewed/updated 8/1/2022)  None reported or admitted to. Review of Systems: (reviewed/updated 8/1/2022)  Appetite: good  Sleep: poor   All other Review of Systems: negative    Mental Status Exam:  Eye contact: Good eye contact  Psychomotor activity: wnl  Speech is spontaneous  Thought process: Logical and goal directed   Mood is \"better\"  Affect: blunt  Perception: No avh  Suicidal ideation: passive SI. Homicidal ideation: No hi  Insight/judgment: Partial   Cognition is grossly intact    Physical Exam:  Musculoskeletal system: steady gait  Tremor not present  Cog wheeling not present    Assessment and Plan:  Kelby Green meets criteria for a diagnosis of SANTY. Unspecified mood disorder. Trichotillomania. Consider increase of Luvox to 100mg tomorrow. Continue rest of  medications as prescribed. We will closely monitor for safety. We will encourage reality orientation. Disposition planning to continue. I certify that this patients inpatient psychiatric hospital services furnished since the previous certification were, and continue to be, required for treatment that could reasonably be expected to improve the patient's condition, or for diagnostic study, and that the patient continues to need, on a daily basis, active treatment furnished directly by or requiring the supervision of inpatient psychiatric facility personnel. In addition, the hospital records show that services furnished were intensive treatment services, admission or related services, or equivalent services.       Signed:  Durga Ortiz MD  8/1/2022

## 2022-08-01 NOTE — PROGRESS NOTES
Problem: Depressed Mood (Adult/Pediatric)  Goal: *STG: Participates in treatment plan  Outcome: Progressing Towards Goal  Note: Engaged on unit social w peers. Brighter affect, mood stable. Reports sleep improved at 8 hours, actively using healthy coping skills and engaged in groups. Reports anxiety is tolerable and urge to pull her hair was this morning after group. Accepted prn atarax at that time. Medication was effective. Denies SI, no self harming behaviors. Daily goal is to stay active.  Staff focus is on offerig support  Goal: *STG: Verbalizes anger, guilt, and other feelings in a constructive manor  Outcome: Progressing Towards Goal  Goal: *STG: Attends activities and groups  Outcome: Progressing Towards Goal  Goal: Interventions  Outcome: Progressing Towards Goal

## 2022-08-01 NOTE — PROGRESS NOTES
Patient up and out on unit, smiling and socializing with others. Patient is calm, cooperative, and pleasant. Patient stated she has been writing in her journal as a way to cope. Patient denies SI/HI, is med and meal compliant. Staff focus is to ensure the patient remains safe while in the hospital and teach the patient additional coping skills.    Problem: Patient Education: Go to Patient Education Activity  Goal: Patient/Family Education  Outcome: Progressing Towards Goal     Problem: Depressed Mood (Adult/Pediatric)  Goal: *STG: Participates in treatment plan  Outcome: Progressing Towards Goal  Goal: Interventions  Outcome: Progressing Towards Goal     Problem: Patient Education: Go to Patient Education Activity  Goal: Patient/Family Education  Outcome: Progressing Towards Goal

## 2022-08-01 NOTE — BH NOTES
GROUP THERAPY PROGRESS NOTE    Patient is participating in Discharge Planning group. Group time: 30 minutes    Personal goal for participation: to gain an understanding and engage in discharge planning    Goal orientation: Personal    Group therapy participation: active     Therapeutic interventions reviewed and discussed:  Group members were guided through learning about discharge procedures and planning. SW provided members with individualized discharge plans. Members were provided with resources for follow up after discharge. Impression of participation:  Pt was present and engaged in group discussion. Pt added insight to group topic. Pt interacted with peers and SW.       Ida LOPEZW

## 2022-08-02 LAB
GLUCOSE BLD STRIP.AUTO-MCNC: 129 MG/DL (ref 65–117)
GLUCOSE BLD STRIP.AUTO-MCNC: 80 MG/DL (ref 65–117)
SERVICE CMNT-IMP: ABNORMAL
SERVICE CMNT-IMP: NORMAL

## 2022-08-02 PROCEDURE — 82962 GLUCOSE BLOOD TEST: CPT

## 2022-08-02 PROCEDURE — 65220000003 HC RM SEMIPRIVATE PSYCH

## 2022-08-02 PROCEDURE — 74011250637 HC RX REV CODE- 250/637: Performed by: NURSE PRACTITIONER

## 2022-08-02 RX ORDER — ONDANSETRON 4 MG/1
4 TABLET, ORALLY DISINTEGRATING ORAL
Status: DISCONTINUED | OUTPATIENT
Start: 2022-08-02 | End: 2022-08-05 | Stop reason: HOSPADM

## 2022-08-02 RX ADMIN — TRAZODONE HYDROCHLORIDE 150 MG: 50 TABLET ORAL at 22:04

## 2022-08-02 RX ADMIN — HYDROXYZINE HYDROCHLORIDE 25 MG: 25 TABLET ORAL at 17:48

## 2022-08-02 RX ADMIN — GLIPIZIDE 10 MG: 5 TABLET ORAL at 08:28

## 2022-08-02 RX ADMIN — TRIAMTERENE AND HYDROCHLOROTHIAZIDE 1 TABLET: 37.5; 25 TABLET ORAL at 08:28

## 2022-08-02 RX ADMIN — HYDROXYZINE HYDROCHLORIDE 25 MG: 25 TABLET ORAL at 12:57

## 2022-08-02 RX ADMIN — LOSARTAN POTASSIUM 50 MG: 50 TABLET, FILM COATED ORAL at 08:28

## 2022-08-02 RX ADMIN — METFORMIN HYDROCHLORIDE 1000 MG: 500 TABLET ORAL at 08:28

## 2022-08-02 RX ADMIN — OLANZAPINE 5 MG: 5 TABLET, FILM COATED ORAL at 08:28

## 2022-08-02 RX ADMIN — ATORVASTATIN CALCIUM 40 MG: 40 TABLET, FILM COATED ORAL at 08:28

## 2022-08-02 RX ADMIN — HYDROXYZINE HYDROCHLORIDE 25 MG: 25 TABLET ORAL at 08:29

## 2022-08-02 RX ADMIN — FLUVOXAMINE MALEATE 50 MG: 50 TABLET, COATED ORAL at 22:04

## 2022-08-02 RX ADMIN — HYDROXYZINE HYDROCHLORIDE 25 MG: 25 TABLET ORAL at 02:34

## 2022-08-02 RX ADMIN — OLANZAPINE 5 MG: 5 TABLET, FILM COATED ORAL at 21:18

## 2022-08-02 NOTE — PROGRESS NOTES
Problem: Depressed Mood (Adult/Pediatric)  Goal: *STG: Participates in treatment plan  Outcome: Progressing Towards Goal  Note: Out on unit affect and mood anxious. Reports sleep broken and racing intrusive thoughts were not tolerable around bedtime last evening and lead to her anxiety being overwhelming. Daily goal is to discuss medication options for this evening. Denies SI, states she is feeling safe.  Staff focus is on offering support  Goal: *STG: Verbalizes anger, guilt, and other feelings in a constructive manor  Outcome: Progressing Towards Goal  Goal: *STG: Attends activities and groups  Outcome: Progressing Towards Goal  Goal: *STG: Remains safe in hospital  Outcome: Progressing Towards Goal  Goal: Interventions  Outcome: Progressing Towards Goal

## 2022-08-02 NOTE — BH NOTES
PRN Medication Documentation    Specific patient behavior that led to need for PRN medication: Patient requested for stated anxiety  Staff interventions attempted prior to PRN being given: Therapeutic communication  PRN medication given: Atarax 50mg  Patient response/effectiveness of PRN medication: Continuing to monitor

## 2022-08-02 NOTE — PROGRESS NOTES
Patient participated in Spirituality Group on 8/3/2022. Rev.  Veronica Tejeda MDiv, [facility], [facility]   paging service: [phone])

## 2022-08-02 NOTE — PROGRESS NOTES
Patient received resting in bed with eyes closed. NAD and no complaints noted. Safety measures in place. Will continue to monitor with q15min rounds. Problem: Falls - Risk of  Goal: *Absence of Falls  Description: Document Warren Maharaj Fall Risk and appropriate interventions in the flowsheet.   Outcome: Progressing Towards Goal  Note: Fall Risk Interventions:            Medication Interventions: Teach patient to arise slowly

## 2022-08-02 NOTE — INTERDISCIPLINARY ROUNDS
Behavioral Health Interdisciplinary Rounds     Patient Name: Esa Urban  Age: 50 y.o. Room/Bed:  731/  Primary Diagnosis: <principal problem not specified>   Admission Status: Voluntary     Readmission within 30 days: no  Power of  in place: no  Patient requires a blocked bed: no             VTE Prophylaxis: Not indicated    Mobility needs/Fall risk: no  Flu Vaccine : no   Nutritional Plan: no  Consults:          Labs/Testing due today?: no    Sleep hours: 8.5       Participation in Care/Groups:  yes  Medication Compliant?: Yes  PRNS (last 24 hours): Antianxiety    Restraints (last 24 hours):  no     CIWA (range last 24 hours):     COWS (range last 24 hours):      Alcohol screening (AUDIT) completed -   AUDIT Score: 0     If applicable, date SBIRT discussed in treatment team AND documented:   AUDIT Screen Score: AUDIT Score: 0      Document Brief Intervention (corresponds directly with the 5 A's, Ask, Advise, Assess, Assist, and Arrange): At- Risk Patients (Score 7-15 for women; 8-15 for men)  Discuss concern patient is drinking at unhealthy levels known to increase risk of alcohol-related health problems. Is Patient ready to commit to change? If No:  Encourage reflection  Discuss short term and long term health risks of consuming alcohol  Barriers to change  Reaffirm willingness to help / Educational materials provided  If Yes:  Set goal  Plan  Educational materials provided    Harmful use or Dependence (Score 16 or greater)  Discuss short term and long term health risks of consuming alcohol  Recommendations  Negotiate drinking goal  Recommend addiction specialist/center  Arrange follow-up appointments.     Tobacco - patient is a smoker: Have You Used Tobacco in the Past 30 Days: No  Illegal Drugs use: Have You Used Any Illegal Substances Over the Past 12 Months: No    24 hour chart check complete: yes ____________________________________________________________________________________________________________    Patient goal(s) for today: Communicate needs to staff   Treatment team focus/goals: Assess pt, manage medications, discharge planning   Progress note Pt reports significant anxiety and restlessness around 4 am. SW observed pts hair pulling episode during group. Pt used PRN for anxiety. Pt is med compliant.      LOS:  7 Expected LOS: 8/5/22    Financial concerns/prescription coverage:    Family contact:  Mom, [phone]     Family requesting physician contact today:   Discharge plan: Pt will discharge home and follow up with [facility]   Access to weapons :  no        Outpatient provider(s): [facility]   Patient's preferred phone number for follow up call :   Patient's preferred e-mail address :  Participating treatment team members: JENNIFER Ulloa, Vincent Mao, RN, Dr. Jesus Mccullough

## 2022-08-02 NOTE — BH NOTES
PSYCHIATRIC PROGRESS NOTE       Patient: Osvaldo Bran MRN: [id number]  SSN: [id number]    YOB: 1975  Age: 50 y.o. Sex: female      Admit Date: 7/26/2022    LOS: 7 days     Chief Complaint:   I had a real bad anxiety attack earlier today. Interval History:  Osvaldo Bran reports having an episode of anxiety earlier today in the morning after waking up at 4 AM. She describes having \"panicky feelings and restlessness\". She took Atarax and this helped her. Later in the morning she had several episodes of hair pulling but denies ingesting her hair. Frequency of SI has continued to decrease. Her nausea and light headedness persist and I will start her on Zofran prior to morning medications. At the present time the patient Osvaldo Bran remains compliant with taking medications.  Denies any adverse events from taking them and feels they have been beneficial.       Past Medical History:  Past Medical History:   Diagnosis Date    Anxiety     Depression     ANXIETY & DEPRESSION    Diabetes ([facility] [address]     TYPE II    Hypertension     Infected sebaceous cyst, upper back 4/10/2019    Panic attack     Suicidal thoughts     Vision decreased        ALLERGIES:(reviewed/updated 8/2/2022)  Allergies   Allergen Reactions    Lisinopril Rash     Swelling of the lips     Laboratory report:  Lab Results   Component Value Date/Time    WBC 7.8 07/26/2022 02:48 PM    HGB 12.3 07/26/2022 02:48 PM    HCT 39.2 07/26/2022 02:48 PM    PLATELET 965 04/68/4018 02:48 PM    MCV 81.7 07/26/2022 02:48 PM      Lab Results   Component Value Date/Time    Sodium 139 07/26/2022 02:48 PM    Potassium 3.2 (L) 07/26/2022 02:48 PM    Chloride 103 07/26/2022 02:48 PM    CO2 29 07/26/2022 02:48 PM    Anion gap 7 07/26/2022 02:48 PM    Glucose 146 (H) 07/29/2022 05:44 AM    BUN 16 07/26/2022 02:48 PM    Creatinine 0.75 07/26/2022 02:48 PM    BUN/Creatinine ratio 21 (H) 07/26/2022 02:48 PM    GFR est AA >60 07/26/2022 02:48 PM    GFR est non-AA >60 07/26/2022 02:48 PM    Calcium 9.5 07/26/2022 02:48 PM    Bilirubin, total 0.3 07/26/2022 02:48 PM    Alk.  phosphatase 77 07/26/2022 02:48 PM    Protein, total 8.2 07/26/2022 02:48 PM    Albumin 3.5 07/26/2022 02:48 PM    Globulin 4.7 (H) 07/26/2022 02:48 PM    A-G Ratio 0.7 (L) 07/26/2022 02:48 PM    ALT (SGPT) 25 07/26/2022 02:48 PM      Vitals:    07/31/22 2015 08/01/22 0839 08/01/22 1740 08/02/22 0812   BP: 127/65 116/81 131/88 (!) 145/75   Pulse: 91 100 (!) 101 100   Resp: 16 16 16 16   Temp: 98 °F (36.7 °C) 97.6 °F (36.4 °C) 97.8 °F (36.6 °C) 97.2 °F (36.2 °C)   SpO2:    98%   Weight:       Height:           No results found for: VALF2, VALAC, VALP, VALPR, DS6, CRBAM, CRBAMP, CARB2, XCRBAM  No results found for: LITHM    Vital Signs  Patient Vitals for the past 24 hrs:   Temp Pulse Resp BP SpO2   08/02/22 0812 97.2 °F (36.2 °C) 100 16 (!) 145/75 98 %   08/01/22 1740 97.8 °F (36.6 °C) (!) 101 16 131/88 --       Wt Readings from Last 3 Encounters:   07/31/22 91.7 kg (202 lb 3.2 oz)   05/11/22 98 kg (216 lb)   05/08/22 98 kg (216 lb)     Temp Readings from Last 3 Encounters:   08/02/22 97.2 °F (36.2 °C)   05/16/22 99.2 °F (37.3 °C)   05/08/22 97.4 °F (36.3 °C)     BP Readings from Last 3 Encounters:   08/02/22 (!) 145/75   05/16/22 118/77   05/08/22 118/85     Pulse Readings from Last 3 Encounters:   08/02/22 100   05/16/22 (!) 109   05/08/22 86       Current Facility-Administered Medications   Medication Dose Route Frequency Provider Last Rate Last Admin    traZODone (DESYREL) tablet 150 mg  150 mg Oral QHS Fran Mike NP   150 mg at 08/01/22 2146    hydrOXYzine HCL (ATARAX) tablet 25 mg  25 mg Oral Q4H PRN Lizbeth Ko NP   25 mg at 08/02/22 0829    fluvoxaMINE (LUVOX) tablet 50 mg  50 mg Oral QHS Lizbeth Ko, NP   50 mg at 08/01/22 2146    OLANZapine (ZyPREXA) tablet 5 mg  5 mg Oral Q12H Lizbeth Ko NP   5 mg at 08/02/22 0828    glipiZIDE (GLUCOTROL) tablet 10 mg 10 mg Oral DAILY Lizbeth Ko NP   10 mg at 08/02/22 0828    losartan (COZAAR) tablet 50 mg  50 mg Oral DAILY Lizbeth Ko NP   50 mg at 08/02/22 4570    metFORMIN (GLUCOPHAGE) tablet 1,000 mg  1,000 mg Oral DAILY Lizbeth Ko NP   1,000 mg at 08/02/22 0828    triamterene-hydroCHLOROthiazide (MAXZIDE) 37.5-25 mg per tablet 1 Tablet  1 Tablet Oral DAILY Lizbeth Ko NP   1 Tablet at 08/02/22 0828    atorvastatin (LIPITOR) tablet 40 mg  40 mg Oral DAILY Lizbeth Ko NP   40 mg at 08/02/22 0828    OLANZapine (ZyPREXA) tablet 5 mg  5 mg Oral Q6H PRN Maggie Guillen, NP   5 mg at 07/28/22 1931    haloperidol lactate (HALDOL) injection 5 mg  5 mg IntraMUSCular Q6H PRN Maggie Guillen, NP        benztropine (COGENTIN) tablet 1 mg  1 mg Oral BID PRN Maggie Guillen, NP        diphenhydrAMINE (BENADRYL) injection 50 mg  50 mg IntraMUSCular BID PRN Maggie Guillen, NP        acetaminophen (TYLENOL) tablet 650 mg  650 mg Oral Q4H PRN Maggie Guillen, NP   650 mg at 08/01/22 2146    magnesium hydroxide (MILK OF MAGNESIA) 400 mg/5 mL oral suspension 30 mL  30 mL Oral DAILY PRN aMggie Guillen NP   30 mL at 07/27/22 0849    midazolam (VERSED) injection 2 mg  2 mg IntraMUSCular Q6H PRN Maggie Guillen, NP           Side Effects: (reviewed/updated 8/2/2022)  None reported or admitted to. Review of Systems: (reviewed/updated 8/2/2022)  Appetite: good  Sleep: poor   All other Review of Systems: negative    Mental Status Exam:  Eye contact: Good eye contact  Psychomotor activity: wnl  Speech is spontaneous  Thought process: Logical and goal directed   Mood is \"better\"  Affect: blunt  Perception: No avh  Suicidal ideation: passive SI. Homicidal ideation: No hi  Insight/judgment: Partial   Cognition is grossly intact    Physical Exam:  Musculoskeletal system: steady gait  Tremor not present  Cog wheeling not present    Assessment and Plan:  Meena Craven meets criteria for a diagnosis of SANTY.  Unspecified mood disorder. Trichotillomania. Consider increase of Luvox to 100mg tomorrow. Continue rest of  medications as prescribed. We will closely monitor for safety. We will encourage reality orientation. Disposition planning to continue. I certify that this patients inpatient psychiatric hospital services furnished since the previous certification were, and continue to be, required for treatment that could reasonably be expected to improve the patient's condition, or for diagnostic study, and that the patient continues to need, on a daily basis, active treatment furnished directly by or requiring the supervision of inpatient psychiatric facility personnel. In addition, the hospital records show that services furnished were intensive treatment services, admission or related services, or equivalent services.       Signed:  Néstor Seaman MD  8/2/2022

## 2022-08-02 NOTE — BH NOTES
PRN Medication Documentation    Specific patient behavior that led to need for PRN medication: Patient complaints of anxiety, restlessness    Staff interventions attempted prior to PRN being given: Therapeutic communication, use of coping skills     PRN medication given: Atarax 25mg PO     Patient response/effectiveness of PRN medication: Will continue to monitor.

## 2022-08-02 NOTE — BH NOTES
PRN Medication Documentation    Specific patient behavior that led to need for PRN medication: pt c/o anxiety  Staff interventions attempted prior to PRN being given: medication education  PRN medication given: Atarax 25mg PO @ 0234  Patient response/effectiveness of PRN medication: will continue to monitor w/ q15min rounds

## 2022-08-02 NOTE — BH NOTES
GROUP THERAPY PROGRESS NOTE    Patient is participating in psychotherapy group. Group time: 60 minutes    Personal goal for participation: To develop an understanding of trauma and grounding techniques    Goal orientation: Personal    Group therapy participation: Active     Therapeutic interventions reviewed and discussed:  Group members were given opportunity to engage in conversation about their experiences with trauma. Members were guided through assessing triggers, warning signs (specific thoughts, behaviors, body sensations, etc.). Members were supported to assess their current management practices or grounding techniques for trauma responses. Members were provided with handouts. Impression of participation:  Pt was present and engaged in group discussion. Pt added insight to group topic.  Pt interacted with peers and JENNIFER Carvajal, QMHP-A

## 2022-08-02 NOTE — DISCHARGE INSTRUCTIONS
DISCHARGE SUMMARY    NAME:Catherine Montes Stable  : 1975  MRN: [id number]    The patient Jessika Peña exhibits the ability to control behavior in a less restrictive environment. Patient's level of functioning is improving. No assaultive/destructive behavior has been observed for the past 24 hours. No suicidal/homicidal threat or behavior has been observed for the past 24 hours. There is no evidence of serious medication side effects. Patient has not been in physical or protective restraints for at least the past 24 hours. If weapons involved, how are they secured? none    Is patient aware of and in agreement with discharge plan? yes    Arrangements for medication:  Prescriptions given to patient, given a weeks supply or 30 day supply. Copy of discharge instructions to provider?:  yes    Arrangements for transportation home:  Aunt will  patient    Keep all follow up appointments as scheduled, continue to take prescribed medications per physician instructions. Mental health crisis number:  [phone] or your local mental health crisis line number at [address] [facility] [address] WARM LINE      [phone] ([phone])      M-F: 9am to 9pm      Sat & Sun: 5pm - 9pm  National suicide prevention lines:                             5-250-TNRBUHR ([phone])       9-705-051-TALK ([phone])    Crisis Text Line:  Text HOME to [place] from Nurse    PATIENT INSTRUCTIONS:    What to do at Home:  Recommended activity: Activity as tolerated    If you experience any of the following symptoms suicidal thoughts go to the nearest ER., please follow up with PCP. *  Please give a list of your current medications to your Primary Care Provider. *  Please update this list whenever your medications are discontinued, doses are      changed, or new medications (including over-the-counter products) are added.     *  Please carry medication information at all times in case of emergency situations. These are general instructions for a healthy lifestyle:    No smoking/ No tobacco products/ Avoid exposure to second hand smoke  Surgeon General's Warning:  Quitting smoking now greatly reduces serious risk to your health. Obesity, smoking, and sedentary lifestyle greatly increases your risk for illness    A healthy diet, regular physical exercise & weight monitoring are important for maintaining a healthy lifestyle    You may be retaining fluid if you have a history of heart failure or if you experience any of the following symptoms:  Weight gain of 3 pounds or more overnight or 5 pounds in a week, increased swelling in our hands or feet or shortness of breath while lying flat in bed. Please call your doctor as soon as you notice any of these symptoms; do not wait until your next office visit. The discharge information has been reviewed with the patient. The patient verbalized understanding. Discharge medications reviewed with the patient and appropriate educational materials and side effects teaching were provided.   ___________________________________________________________________________________________________________________________________

## 2022-08-03 LAB
GLUCOSE BLD STRIP.AUTO-MCNC: 165 MG/DL (ref 65–117)
GLUCOSE BLD STRIP.AUTO-MCNC: 65 MG/DL (ref 65–117)
GLUCOSE BLD STRIP.AUTO-MCNC: 66 MG/DL (ref 65–117)
GLUCOSE BLD STRIP.AUTO-MCNC: 92 MG/DL (ref 65–117)
SERVICE CMNT-IMP: ABNORMAL
SERVICE CMNT-IMP: NORMAL

## 2022-08-03 PROCEDURE — 74011250637 HC RX REV CODE- 250/637: Performed by: NURSE PRACTITIONER

## 2022-08-03 PROCEDURE — 74011250637 HC RX REV CODE- 250/637

## 2022-08-03 PROCEDURE — 82962 GLUCOSE BLOOD TEST: CPT

## 2022-08-03 PROCEDURE — 65220000003 HC RM SEMIPRIVATE PSYCH

## 2022-08-03 PROCEDURE — 74011250637 HC RX REV CODE- 250/637: Performed by: PSYCHIATRY & NEUROLOGY

## 2022-08-03 RX ORDER — TRAZODONE HYDROCHLORIDE 100 MG/1
200 TABLET ORAL
Status: DISCONTINUED | OUTPATIENT
Start: 2022-08-03 | End: 2022-08-04

## 2022-08-03 RX ORDER — FLUVOXAMINE MALEATE 50 MG/1
100 TABLET ORAL
Status: DISCONTINUED | OUTPATIENT
Start: 2022-08-03 | End: 2022-08-05 | Stop reason: HOSPADM

## 2022-08-03 RX ADMIN — TRIAMTERENE AND HYDROCHLOROTHIAZIDE 1 TABLET: 37.5; 25 TABLET ORAL at 08:43

## 2022-08-03 RX ADMIN — LOSARTAN POTASSIUM 50 MG: 50 TABLET, FILM COATED ORAL at 08:44

## 2022-08-03 RX ADMIN — ONDANSETRON 4 MG: 4 TABLET, ORALLY DISINTEGRATING ORAL at 07:45

## 2022-08-03 RX ADMIN — HYDROXYZINE HYDROCHLORIDE 25 MG: 25 TABLET ORAL at 22:22

## 2022-08-03 RX ADMIN — GLIPIZIDE 10 MG: 5 TABLET ORAL at 08:44

## 2022-08-03 RX ADMIN — FLUVOXAMINE MALEATE 100 MG: 50 TABLET, COATED ORAL at 20:31

## 2022-08-03 RX ADMIN — ATORVASTATIN CALCIUM 40 MG: 40 TABLET, FILM COATED ORAL at 08:43

## 2022-08-03 RX ADMIN — HYDROXYZINE HYDROCHLORIDE 25 MG: 25 TABLET ORAL at 05:08

## 2022-08-03 RX ADMIN — TRAZODONE HYDROCHLORIDE 200 MG: 100 TABLET ORAL at 22:22

## 2022-08-03 RX ADMIN — OLANZAPINE 5 MG: 5 TABLET, FILM COATED ORAL at 20:31

## 2022-08-03 RX ADMIN — OLANZAPINE 5 MG: 5 TABLET, FILM COATED ORAL at 05:08

## 2022-08-03 RX ADMIN — METFORMIN HYDROCHLORIDE 1000 MG: 500 TABLET ORAL at 08:43

## 2022-08-03 RX ADMIN — HYDROXYZINE HYDROCHLORIDE 25 MG: 25 TABLET ORAL at 12:03

## 2022-08-03 RX ADMIN — ONDANSETRON 4 MG: 4 TABLET, ORALLY DISINTEGRATING ORAL at 20:30

## 2022-08-03 RX ADMIN — OLANZAPINE 5 MG: 5 TABLET, FILM COATED ORAL at 08:44

## 2022-08-03 NOTE — BH NOTES
GROUP THERAPY PROGRESS NOTE    Patient is participating in psychotherapy group. Group time: 60 minutes    Personal goal for participation: to gain an understanding of boundaries in our relationships    Goal orientation: Personal    Group therapy participation: Active     Therapeutic interventions reviewed and discussed:  Group members were guided through learning about the importance of boundaries. Members gained an understanding of what boundaries are, how to set them, and the differences between healthy and unhealthy boundaries. Examples of situations were provided for the members to assess and provide the appropriate boundary. Handouts provided. Impression of participation:  Pt actively engaged in discussion with peers.        JENNIFER Calhoun, [facility]

## 2022-08-03 NOTE — PROGRESS NOTES
Patient received resting in bed with eyes closed. NAD and no complaints noted. Safety measures in place. Will continue to monitor with q15min rounds. Problem: Falls - Risk of  Goal: *Absence of Falls  Description: Document Alejandro Led Fall Risk and appropriate interventions in the flowsheet.   Outcome: Progressing Towards Goal  Note: Fall Risk Interventions:            Medication Interventions: Teach patient to arise slowly

## 2022-08-03 NOTE — BH NOTES
6837: Patient given 25mg Atarax for anxiety per patient request.  Patient given 5mg Zyprexa for racing thoughts per patient request.

## 2022-08-03 NOTE — PROGRESS NOTES
Problem: Depressed Mood (Adult/Pediatric)  Goal: *STG: Participates in treatment plan  Outcome: Progressing Towards Goal  Goal: *STG: Verbalizes anger, guilt, and other feelings in a constructive manor  Outcome: Progressing Towards Goal  Goal: *STG: Attends activities and groups  Outcome: Progressing Towards Goal    Patient is cooperative, pleasant. Future focused. Medication compliant. No episodes of hair pulling. Attends groups.

## 2022-08-03 NOTE — BH NOTES
PRN Medication Documentation    Specific patient behavior that led to need for PRN medication: \"I just had an anxiety attack. \"  Staff interventions attempted prior to PRN being given: eating lunch, distraction  PRN medication given: 25 mg Atarax PO  Patient response/effectiveness of PRN medication:

## 2022-08-03 NOTE — INTERDISCIPLINARY ROUNDS
Behavioral Health Interdisciplinary Rounds     Patient Name: Lianna Martins  Age: 50 y.o. Room/Bed:  731/  Primary Diagnosis: <principal problem not specified>   Admission Status: Voluntary     Readmission within 30 days: no  Power of  in place: no  Patient requires a blocked bed: no            Sleep hours:        Participation in Care/Groups:  yes  Medication Compliant?: Yes  PRNS (last 24 hours): Antianxiety    Restraints (last 24 hours):  no     Alcohol screening (AUDIT) completed -  AUDIT Score: 0  If applicable, date SBIRT discussed in treatment team AND documented:    Tobacco - patient is a smoker: Have You Used Tobacco in the Past 30 Days: No  Illegal Drugs use: Have You Used Any Illegal Substances Over the Past 12 Months: No    24 hour chart check complete: yes     _______________________________________________    Patient goal(s) for today: Communicate needs to staff  Treatment team focus/goals: Assess pt, manage medications, discharge planning   Progress note: Pts mood is stable. Pts affect is bright. Pt reported anxiety in the morning. Pts medications were adjusted. Pt should discharge tomorrow. Financial concerns/prescription coverage:    Family contact:   SONALIY,[phone]                     Family requesting physician contact today:    Discharge plan: Pt will discharge home and follow up with providers. Access to weapons no :                                                               Outpatient provider(s): [facility], [facility]   Patient's preferred phone number for follow up call :   Patient's preferred e-mail address :  Participating treatment team members:    LOS:  8 Expected LOS: 8/4/22    Participating treatment team members: JENNIFER Cota, Arnaud Myles RN, Dr. Leonel Cuellar

## 2022-08-03 NOTE — BH NOTES
PSYCHIATRIC PROGRESS NOTE       Patient: Jessika Peña MRN: [id number]  SSN: [id number]    YOB: 1975  Age: 50 y.o. Sex: female      Admit Date: 7/26/2022    LOS: 8 days     Chief Complaint:   I had another anxiety attack in the morning. Interval History:  Jessika Peña reports feeling slightly better today. Her nausea has improved  and did not have any episode of hair pulling since yesterday. She did sleep poorly and woke up at 5 AM with a \"panic attack\" for which she had to take a PRN. Feels more relaxed today and says her mood is \"fair to good\" today. Frequency of SI has decreased further. At the present time the patient Jessika Peña remains compliant with taking medications.  Denies any adverse events from taking them and feels they have been beneficial.       Past Medical History:  Past Medical History:   Diagnosis Date    Anxiety     Depression     ANXIETY & DEPRESSION    Di[facility] ([facility]     TYPE II    Hypertension     Infected sebaceous cyst, upper back 4/10/2019    Panic attack     Suicidal thoughts     Vision decreased        ALLERGIES:(reviewed/updated 8/3/2022)  Allergies   Allergen Reactions    Lisinopril Rash     Swelling of the lips     Laboratory report:  Lab Results   Component Value Date/Time    WBC 7.8 07/26/2022 02:48 PM    HGB 12.3 07/26/2022 02:48 PM    HCT 39.2 07/26/2022 02:48 PM    PLATELET 052 84/03/7878 02:48 PM    MCV 81.7 07/26/2022 02:48 PM      Lab Results   Component Value Date/Time    Sodium 139 07/26/2022 02:48 PM    Potassium 3.2 (L) 07/26/2022 02:48 PM    Chloride 103 07/26/2022 02:48 PM    CO2 29 07/26/2022 02:48 PM    Anion gap 7 07/26/2022 02:48 PM    Glucose 146 (H) 07/29/2022 05:44 AM    BUN 16 07/26/2022 02:48 PM    Creatinine 0.75 07/26/2022 02:48 PM    BUN/Creatinine ratio 21 (H) 07/26/2022 02:48 PM    GFR est AA >60 07/26/2022 02:48 PM    GFR est non-AA >60 07/26/2022 02:48 PM    Calcium 9.5 07/26/2022 02:48 PM    Bilirubin, total 0.3 07/26/2022 02:48 PM    Alk.  phosphatase 77 07/26/2022 02:48 PM    Protein, total 8.2 07/26/2022 02:48 PM    Albumin 3.5 07/26/2022 02:48 PM    Globulin 4.7 (H) 07/26/2022 02:48 PM    A-G Ratio 0.7 (L) 07/26/2022 02:48 PM    ALT (SGPT) 25 07/26/2022 02:48 PM      Vitals:    08/01/22 1740 08/02/22 0812 08/02/22 1626 08/03/22 0825   BP: 131/88 (!) 145/75 108/60 118/71   Pulse: (!) 101 100 72 (!) 110   Resp: 16 16 16 16   Temp: 97.8 °F (36.6 °C) 97.2 °F (36.2 °C) 98.4 °F (36.9 °C) 98.5 °F (36.9 °C)   SpO2:  98% 95% 96%   Weight:       Height:           No results found for: VALF2, VALAC, VALP, VALPR, DS6, CRBAM, CRBAMP, CARB2, XCRBAM  No results found for: LITHM    Vital Signs  Patient Vitals for the past 24 hrs:   Temp Pulse Resp BP SpO2   08/03/22 0825 98.5 °F (36.9 °C) (!) 110 16 118/71 96 %   08/02/22 1626 98.4 °F (36.9 °C) 72 16 108/60 95 %       Wt Readings from Last 3 Encounters:   07/31/22 91.7 kg (202 lb 3.2 oz)   05/11/22 98 kg (216 lb)   05/08/22 98 kg (216 lb)     Temp Readings from Last 3 Encounters:   08/03/22 98.5 °F (36.9 °C)   05/16/22 99.2 °F (37.3 °C)   05/08/22 97.4 °F (36.3 °C)     BP Readings from Last 3 Encounters:   08/03/22 118/71   05/16/22 118/77   05/08/22 118/85     Pulse Readings from Last 3 Encounters:   08/03/22 (!) 110   05/16/22 (!) 109   05/08/22 86       Current Facility-Administered Medications   Medication Dose Route Frequency Provider Last Rate Last Admin    fluvoxaMINE (LUVOX) tablet 100 mg  100 mg Oral QHS Jeanne Cunha MD        ondansetron (ZOFRAN ODT) tablet 4 mg  4 mg Oral Q8H PRN Jeanne Cunha MD   4 mg at 08/03/22 0745    traZODone (DESYREL) tablet 150 mg  150 mg Oral QHS Murray Vazquez NP   150 mg at 08/02/22 2204    hydrOXYzine HCL (ATARAX) tablet 25 mg  25 mg Oral Q4H PRN Lizbeth Ko NP   25 mg at 08/03/22 0508    OLANZapine (ZyPREXA) tablet 5 mg  5 mg Oral Q12H Lizbeth Ko NP   5 mg at 08/03/22 0844    glipiZIDE (GLUCOTROL) tablet 10 mg  10 mg Oral DAILY Bria Koe A, NP   10 mg at 08/03/22 0844    losartan (COZAAR) tablet 50 mg  50 mg Oral DAILY Bria Koe A, NP   50 mg at 08/03/22 0844    metFORMIN (GLUCOPHAGE) tablet 1,000 mg  1,000 mg Oral DAILY Brenda Koanie A, NP   1,000 mg at 08/03/22 0843    triamterene-hydroCHLOROthiazide (MAXZIDE) 37.5-25 mg per tablet 1 Tablet  1 Tablet Oral DAILY Lizbeth Ko A, NP   1 Tablet at 08/03/22 0843    atorvastatin (LIPITOR) tablet 40 mg  40 mg Oral DAILY Brenda Koanie A, NP   40 mg at 08/03/22 0843    OLANZapine (ZyPREXA) tablet 5 mg  5 mg Oral Q6H PRN Lucas Sands, NP   5 mg at 08/03/22 8816    haloperidol lactate (HALDOL) injection 5 mg  5 mg IntraMUSCular Q6H PRN Lucas Sands, NP        benztropine (COGENTIN) tablet 1 mg  1 mg Oral BID PRN Lucas Sands, NP        diphenhydrAMINE (BENADRYL) injection 50 mg  50 mg IntraMUSCular BID PRN Lucas Sands, NP        acetaminophen (TYLENOL) tablet 650 mg  650 mg Oral Q4H PRN Lucsa Sands, NP   650 mg at 08/01/22 2146    magnesium hydroxide (MILK OF MAGNESIA) 400 mg/5 mL oral suspension 30 mL  30 mL Oral DAILY PRN Lucas Sands, NP   30 mL at 07/27/22 0849    midazolam (VERSED) injection 2 mg  2 mg IntraMUSCular Q6H PRN Lucas Sands, NP           Side Effects: (reviewed/updated 8/3/2022)  None reported or admitted to. Review of Systems: (reviewed/updated 8/3/2022)  Appetite: good  Sleep: poor   All other Review of Systems: negative    Mental Status Exam:  Eye contact: Good eye contact  Psychomotor activity: wnl  Speech is spontaneous  Thought process: Logical and goal directed   Mood is \"better\"  Affect: blunt  Perception: No avh  Suicidal ideation: passive SI. Homicidal ideation: No hi  Insight/judgment: Partial   Cognition is grossly intact    Physical Exam:  Musculoskeletal system: steady gait  Tremor not present  Cog wheeling not present    Assessment and Plan:  Wilmer Chance meets criteria for a diagnosis of SANTY.  Unspecified mood disorder. Trichotillomania. Increased Trazodone to 200mg QHS. Increased Luvox to 100mg. Continue rest of  medications as prescribed. We will closely monitor for safety. We will encourage reality orientation. Disposition planning to continue. I certify that this patients inpatient psychiatric hospital services furnished since the previous certification were, and continue to be, required for treatment that could reasonably be expected to improve the patient's condition, or for diagnostic study, and that the patient continues to need, on a daily basis, active treatment furnished directly by or requiring the supervision of inpatient psychiatric facility personnel. In addition, the hospital records show that services furnished were intensive treatment services, admission or related services, or equivalent services.       Signed:  Bettie Arredondo MD  8/3/2022

## 2022-08-04 LAB
GLUCOSE BLD STRIP.AUTO-MCNC: 191 MG/DL (ref 65–117)
GLUCOSE BLD STRIP.AUTO-MCNC: 95 MG/DL (ref 65–117)
SERVICE CMNT-IMP: ABNORMAL
SERVICE CMNT-IMP: NORMAL

## 2022-08-04 PROCEDURE — 65220000003 HC RM SEMIPRIVATE PSYCH

## 2022-08-04 PROCEDURE — 74011250637 HC RX REV CODE- 250/637: Performed by: NURSE PRACTITIONER

## 2022-08-04 PROCEDURE — 82962 GLUCOSE BLOOD TEST: CPT

## 2022-08-04 PROCEDURE — 74011250637 HC RX REV CODE- 250/637: Performed by: PSYCHIATRY & NEUROLOGY

## 2022-08-04 PROCEDURE — 74011250637 HC RX REV CODE- 250/637

## 2022-08-04 RX ORDER — HYDROXYZINE 50 MG/1
50 TABLET, FILM COATED ORAL
Status: DISCONTINUED | OUTPATIENT
Start: 2022-08-04 | End: 2022-08-05 | Stop reason: HOSPADM

## 2022-08-04 RX ADMIN — ATORVASTATIN CALCIUM 40 MG: 40 TABLET, FILM COATED ORAL at 08:45

## 2022-08-04 RX ADMIN — METFORMIN HYDROCHLORIDE 1000 MG: 500 TABLET ORAL at 08:44

## 2022-08-04 RX ADMIN — TRAZODONE HYDROCHLORIDE 150 MG: 100 TABLET ORAL at 22:09

## 2022-08-04 RX ADMIN — TRIAMTERENE AND HYDROCHLOROTHIAZIDE 1 TABLET: 37.5; 25 TABLET ORAL at 08:44

## 2022-08-04 RX ADMIN — GLIPIZIDE 10 MG: 5 TABLET ORAL at 08:44

## 2022-08-04 RX ADMIN — OLANZAPINE 5 MG: 5 TABLET, FILM COATED ORAL at 08:44

## 2022-08-04 RX ADMIN — ONDANSETRON 4 MG: 4 TABLET, ORALLY DISINTEGRATING ORAL at 07:45

## 2022-08-04 RX ADMIN — MAGNESIUM HYDROXIDE 30 ML: 400 SUSPENSION ORAL at 14:46

## 2022-08-04 RX ADMIN — HYDROXYZINE HYDROCHLORIDE 50 MG: 50 TABLET, FILM COATED ORAL at 17:43

## 2022-08-04 RX ADMIN — FLUVOXAMINE MALEATE 100 MG: 50 TABLET, COATED ORAL at 21:22

## 2022-08-04 RX ADMIN — HYDROXYZINE HYDROCHLORIDE 25 MG: 25 TABLET ORAL at 09:16

## 2022-08-04 RX ADMIN — HYDROXYZINE HYDROCHLORIDE 50 MG: 50 TABLET, FILM COATED ORAL at 21:46

## 2022-08-04 RX ADMIN — OLANZAPINE 5 MG: 5 TABLET, FILM COATED ORAL at 20:19

## 2022-08-04 RX ADMIN — LOSARTAN POTASSIUM 50 MG: 50 TABLET, FILM COATED ORAL at 08:45

## 2022-08-04 NOTE — PROGRESS NOTES
PRN Medication Documentation    Specific patient behavior that led to need for PRN medication: Patient requested due to nausea and anxiety/insomnia  Staff interventions attempted prior to PRN being given: None needed  PRN medication given: Zofran and Atarax  Patient response/effectiveness of PRN medication: Zofran effective with a decrease in nausea and awaiting effectiveness of Atarax

## 2022-08-04 NOTE — BH NOTES
PRN Medication Documentation    Specific patient behavior that led to need for PRN medication: c/o anxiety.   Staff interventions attempted prior to PRN being given: coping skills  PRN medication given: atarax  Patient response/effectiveness of PRN medication: tl aware

## 2022-08-04 NOTE — PROGRESS NOTES
Problem: Patient Education: Go to Patient Education Activity  Goal: Patient/Family Education  Outcome: Progressing Towards Goal     Problem: Depressed Mood (Adult/Pediatric)  Goal: *STG: Participates in treatment plan  Outcome: Progressing Towards Goal  Goal: *STG: Remains safe in hospital  8/3/2022 2025 by La Palomino RN  Outcome: Progressing Towards Goal  8/3/2022 2020 by La Palomino RN  Outcome: Progressing Towards Goal  Goal: Interventions  Outcome: Progressing Towards Goal     Problem: Patient Education: Go to Patient Education Activity  Goal: Patient/Family Education  Outcome: Progressing Towards Goal

## 2022-08-04 NOTE — INTERDISCIPLINARY ROUNDS
Behavioral Health Interdisciplinary Rounds     Patient Name: Molly Alonzo  Age: 50 y.o. Room/Bed:  731/  Primary Diagnosis: <principal problem not specified>   Admission Status: Voluntary     Readmission within 30 days: no  Power of  in place: no  Patient requires a blocked bed: no          Sleep hours:  7      Participation in Care/Groups:  yes  Medication Compliant?: Yes  PRNS (last 24 hours): Antipsychotic (PO), Antianxiety, and Antiemetic     Restraints (last 24 hours):  no     Alcohol screening (AUDIT) completed -  AUDIT Score: 0  If applicable, date SBIRT discussed in treatment team AND documented:    Tobacco - patient is a smoker: Have You Used Tobacco in the Past 30 Days: No  Illegal Drugs use: Have You Used Any Illegal Substances Over the Past 12 Months: No    24 hour chart check complete: yes     _______________________________________________    Patient goal(s) for today: Communicate needs to staff   Treatment team focus/goals: Assess Pt, manage medications, discharge planning   Progress note: Pt is showing improvement. Pt reports anxiety in early morning. Pt reports less hair pulling. Pt is being more proactive with PRN medications. Pts medication was adjusted. Pt is tentatively set for discharge 8/5/22        Financial concerns/prescription coverage:    Family contact:   Cecile [phone]                     Family requesting physician contact today:    Discharge plan: Pt will return home   Access to weapons no :                                                               Outpatient provider(s): [facility], [facility]   Patient's preferred phone number for follow up call :   Patient's preferred e-mail address :  Participating treatment team members:    LOS:  9 Expected LOS: 8/5/22    Participating treatment team members: JENNIFER Cardenas, Coleman Alberto RN, Dr. Richard Canseco

## 2022-08-04 NOTE — BH NOTES
PSYCHIATRIC PROGRESS NOTE       Patient: Meena Craven MRN: [id number]  SSN: [id number]    YOB: 1975  Age: 50 y.o. Sex: female      Admit Date: 7/26/2022    LOS: 9 days     Chief Complaint:   I was very drowsy. Interval History:  Meena Craven reports feeling excessively drowsy last night and woke up feeling \"zonked\". She did sleep well last night and her mood is slightly improved. She had several \"near panic attacks\" yesterday but was able to get through most of them. Also had one episode of hair pulling earlier today. Denies any SI or plan. Denies any Ah or Vh today. Says her mood is \"more hopeful\" but still obsesses about her inner feelings and feels she \"barely able to hold it together'. At the present time the patient Meena Craven remains compliant with taking medications.  Denies any adverse events from taking them and feels they have been beneficial.         Past Medical History:  Past Medical History:   Diagnosis Date    Anxiety     Depression     ANXIETY & DEPRESSION    Diabetes ([facility]     TYPE II    Hypertension     Infected sebaceous cyst, upper back 4/10/2019    Panic attack     Suicidal thoughts     Vision decreased        ALLERGIES:(reviewed/updated 8/4/2022)  Allergies   Allergen Reactions    Lisinopril Rash     Swelling of the lips     Laboratory report:  Lab Results   Component Value Date/Time    WBC 7.8 07/26/2022 02:48 PM    HGB 12.3 07/26/2022 02:48 PM    HCT 39.2 07/26/2022 02:48 PM    PLATELET 128 75/16/5497 02:48 PM    MCV 81.7 07/26/2022 02:48 PM      Lab Results   Component Value Date/Time    Sodium 139 07/26/2022 02:48 PM    Potassium 3.2 (L) 07/26/2022 02:48 PM    Chloride 103 07/26/2022 02:48 PM    CO2 29 07/26/2022 02:48 PM    Anion gap 7 07/26/2022 02:48 PM    Glucose 146 (H) 07/29/2022 05:44 AM    BUN 16 07/26/2022 02:48 PM    Creatinine 0.75 07/26/2022 02:48 PM    BUN/Creatinine ratio 21 (H) 07/26/2022 02:48 PM    GFR est AA >60 07/26/2022 02:48 PM GFR est non-AA >60 07/26/2022 02:48 PM    Calcium 9.5 07/26/2022 02:48 PM    Bilirubin, total 0.3 07/26/2022 02:48 PM    Alk.  phosphatase 77 07/26/2022 02:48 PM    Protein, total 8.2 07/26/2022 02:48 PM    Albumin 3.5 07/26/2022 02:48 PM    Globulin 4.7 (H) 07/26/2022 02:48 PM    A-G Ratio 0.7 (L) 07/26/2022 02:48 PM    ALT (SGPT) 25 07/26/2022 02:48 PM      Vitals:    08/03/22 0825 08/03/22 1558 08/04/22 0844 08/04/22 0922   BP: 118/71 121/78 135/89 125/78   Pulse: (!) 110 (!) 108 (!) 117 (!) 102   Resp: 16 16  16   Temp: 98.5 °F (36.9 °C) 99 °F (37.2 °C)     SpO2: 96% 96%     Weight:       Height:           No results found for: VALF2, VALAC, VALP, VALPR, DS6, CRBAM, CRBAMP, CARB2, XCRBAM  No results found for: LITHM    Vital Signs  Patient Vitals for the past 24 hrs:   Temp Pulse Resp BP SpO2   08/04/22 0922 -- (!) 102 16 125/78 --   08/04/22 0844 -- (!) 117 -- 135/89 --   08/03/22 1558 99 °F (37.2 °C) (!) 108 16 121/78 96 %       Wt Readings from Last 3 Encounters:   07/31/22 91.7 kg (202 lb 3.2 oz)   05/11/22 98 kg (216 lb)   05/08/22 98 kg (216 lb)     Temp Readings from Last 3 Encounters:   05/16/22 99.2 °F (37.3 °C)   05/08/22 97.4 °F (36.3 °C)   05/06/22 97.6 °F (36.4 °C)     BP Readings from Last 3 Encounters:   08/04/22 125/78   05/16/22 118/77   05/08/22 118/85     Pulse Readings from Last 3 Encounters:   08/04/22 (!) 102   05/16/22 (!) 109   05/08/22 86       Current Facility-Administered Medications   Medication Dose Route Frequency Provider Last Rate Last Admin    traZODone (DESYREL) tablet 150 mg  150 mg Oral QHS Cici Shell MD        hydrOXYzine HCL (ATARAX) tablet 50 mg  50 mg Oral Q4H PRN Cici Shell MD        fluvoxaMINE (LUVOX) tablet 100 mg  100 mg Oral QHS Nicola SINHA MD   100 mg at 08/03/22 2031    ondansetron (ZOFRAN ODT) tablet 4 mg  4 mg Oral Q8H PRN Cici Shell MD   4 mg at 08/04/22 0745    OLANZapine (ZyPREXA) tablet 5 mg  5 mg Oral Q12H Lizbeth Ko NP   5 mg at 08/04/22 0844    glipiZIDE (GLUCOTROL) tablet 10 mg  10 mg Oral DAILY Lizbeth Ko, NP   10 mg at 08/04/22 0844    losartan (COZAAR) tablet 50 mg  50 mg Oral DAILY Lizbeth Ko, NP   50 mg at 08/04/22 0845    metFORMIN (GLUCOPHAGE) tablet 1,000 mg  1,000 mg Oral DAILY Lizbeth Ko, NP   1,000 mg at 08/04/22 0844    triamterene-hydroCHLOROthiazide (MAXZIDE) 37.5-25 mg per tablet 1 Tablet  1 Tablet Oral DAILY Lizbeth Ko, NP   1 Tablet at 08/04/22 0844    atorvastatin (LIPITOR) tablet 40 mg  40 mg Oral DAILY Lizbeth Ko NP   40 mg at 08/04/22 0845    OLANZapine (ZyPREXA) tablet 5 mg  5 mg Oral Q6H PRN Lormario Ngo, NP   5 mg at 08/03/22 4680    haloperidol lactate (HALDOL) injection 5 mg  5 mg IntraMUSCular Q6H PRN Lormario Ngo, NP        benztropine (COGENTIN) tablet 1 mg  1 mg Oral BID PRN Lorrene Huber, NP        diphenhydrAMINE (BENADRYL) injection 50 mg  50 mg IntraMUSCular BID PRN Lormario Ngo, NP        acetaminophen (TYLENOL) tablet 650 mg  650 mg Oral Q4H PRN Lormario Ngo, NP   650 mg at 08/01/22 2146    magnesium hydroxide (MILK OF MAGNESIA) 400 mg/5 mL oral suspension 30 mL  30 mL Oral DAILY PRN Mariza Ngo NP   30 mL at 07/27/22 0849    midazolam (VERSED) injection 2 mg  2 mg IntraMUSCular Q6H PRN Lormario Ngo, NP           Side Effects: (reviewed/updated 8/4/2022)  None reported or admitted to. Review of Systems: (reviewed/updated 8/4/2022)  Appetite: good  Sleep: poor   All other Review of Systems: negative    Mental Status Exam:  Eye contact: Good eye contact  Psychomotor activity: wnl  Speech is spontaneous  Thought process: Logical and goal directed   Mood is \"better\"  Affect: blunt  Perception: No avh  Suicidal ideation: passive SI.    Homicidal ideation: No hi  Insight/judgment: Partial   Cognition is grossly intact    Physical Exam:  Musculoskeletal system: steady gait  Tremor not present  Cog wheeling not present    Assessment and Plan:  Kelby Green meets criteria for a diagnosis of SANTY. Unspecified mood disorder. Trichotillomania. Decreased Trazodone to 150mg QHS. Continue Luvox  100mg. Increased Atarax to 50mg PRN. Continue rest of  medications as prescribed. We will closely monitor for safety. We will encourage reality orientation. Disposition planning to continue. I certify that this patients inpatient psychiatric hospital services furnished since the previous certification were, and continue to be, required for treatment that could reasonably be expected to improve the patient's condition, or for diagnostic study, and that the patient continues to need, on a daily basis, active treatment furnished directly by or requiring the supervision of inpatient psychiatric facility personnel. In addition, the hospital records show that services furnished were intensive treatment services, admission or related services, or equivalent services.       Signed:  Dalton Wahl MD  8/4/2022

## 2022-08-04 NOTE — PROGRESS NOTES
Problem: Falls - Risk of  Goal: *Absence of Falls  Description: Document Scott Flow Fall Risk and appropriate interventions in the flowsheet. Outcome: Progressing Towards Goal  Note: Fall Risk Interventions:     Medication Interventions: Teach patient to arise slowly     Problem: Depressed Mood (Adult/Pediatric)  Goal: *STG: Remains safe in hospital  Outcome: Progressing Towards Goal     Received patient in bed. Appears to be sleeping, no signs of stress. Respirations even and unlabored. Will continue to monitor with q15min safety rounds.

## 2022-08-04 NOTE — BH NOTES
GROUP THERAPY PROGRESS NOTE    Patient is participating in self-care group. Group time: 45 minutes    Personal goal for participation: Develop an understanding of sleep hygiene    Goal orientation: Personal    Group therapy participation: active     Therapeutic interventions reviewed and discussed: Group members were able to develop an understanding of how sleep patterns effect mental health. Members were guided through developing an understanding of sleep hygiene. Members gained insight through discussion about current maladaptive sleep habits and ways to improve sleep quality. Sleep hygiene guideline worksheet provided. Impression of participation: Pt was present and engaged in group discussion. Pt asked relevant questions and added insight to group topic.  Pt interacted with peers and Destiney Davis MSW, QMHP-A

## 2022-08-04 NOTE — PROGRESS NOTES
Spiritual Care Assessment/Progress Note  [facility]      NAME: Jessika Peña      MRN: [id number]  AGE: 50 y.o. SEX: female  Sikhism Affiliation: No Orthodox   Language: English     8/4/2022     Total Time (in minutes): 5     Spiritual Assessment begun in [address] through conversation with:         []Patient        [] Family    [] Friend(s)        Reason for Consult: Initial/Spiritual assessment, patient floor     Spiritual beliefs: (Please include comment if needed)     [] Identifies with a jasvir tradition:         [] Supported by a jasvir community:            [] Claims no spiritual orientation:           [] Seeking spiritual identity:                [] Adheres to an individual form of spirituality:           [x] Not able to assess:                           Identified resources for coping:      [] Prayer                               [] Music                  [] Guided Imagery     [] Family/friends                 [] Pet visits     [] Devotional reading                         [x] Unknown     [] Other:                                               Interventions offered during this visit: (See comments for more details)    Patient Interventions: Other (comment) (Spirituality Group)           Plan of Care:     [] Support spiritual and/or cultural needs    [] Support AMD and/or advance care planning process      [] Support grieving process   [] Coordinate Rites and/or Rituals    [] Coordination with community clergy   [] No spiritual needs identified at this time   [] Detailed Plan of Care below (See Comments)  [] Make referral to Music Therapy  [] Make referral to Pet Therapy     [] Make referral to Addiction services  [] Make referral to [facility]  [] Make referral to Spiritual Care Partner  [] No future visits requested        [x] Contact Spiritual Care for further referrals     Comments:  visit for initial spiritual assessment. Patient resting at the time of this visit. Please contact spiritual care for further referral or consult. Rev.  Bridget Carvajal MDiv, [facility], [facility]   paging service: [phone] ([phone])

## 2022-08-04 NOTE — PROGRESS NOTES
Problem: Patient Education: Go to Patient Education Activity  Goal: Patient/Family Education  Outcome: Progressing Towards Goal     Problem: Depressed Mood (Adult/Pediatric)  Goal: *STG: Participates in treatment plan  Outcome: Progressing Towards Goal  Goal: *STG: Remains safe in hospital  Outcome: Progressing Towards Goal

## 2022-08-04 NOTE — PROGRESS NOTES
Problem: Depressed Mood (Adult/Pediatric)  Goal: *STG: Participates in treatment plan  Outcome: Progressing Towards Goal  Goal: *STG: Verbalizes anger, guilt, and other feelings in a constructive manor  Outcome: Progressing Towards Goal  Goal: *STG: Attends activities and groups  Outcome: Progressing Towards Goal  Goal: *STG: Remains safe in hospital  Outcome: Progressing Towards Goal  Goal: Interventions  Outcome: Progressing Towards Goal   Pt is visible on unit  No voiced complaints or acute distress at present

## 2022-08-04 NOTE — PROGRESS NOTES
Problem: Falls - Risk of  Goal: *Absence of Falls  Description: Document Cristofer Bare Fall Risk and appropriate interventions in the flowsheet.   Outcome: Progressing Towards Goal  Note: Fall Risk Interventions:            Medication Interventions: Teach patient to arise slowly                   Problem: Depressed Mood (Adult/Pediatric)  Goal: *STG: Participates in treatment plan  Outcome: Progressing Towards Goal  Goal: *STG: Verbalizes anger, guilt, and other feelings in a constructive manor  Outcome: Progressing Towards Goal  Goal: *STG: Attends activities and groups  Outcome: Progressing Towards Goal

## 2022-08-05 VITALS
OXYGEN SATURATION: 98 % | HEIGHT: 63 IN | TEMPERATURE: 98.3 F | HEART RATE: 103 BPM | WEIGHT: 202.2 LBS | RESPIRATION RATE: 16 BRPM | BODY MASS INDEX: 35.83 KG/M2 | SYSTOLIC BLOOD PRESSURE: 120 MMHG | DIASTOLIC BLOOD PRESSURE: 80 MMHG

## 2022-08-05 LAB
GLUCOSE BLD STRIP.AUTO-MCNC: 189 MG/DL (ref 65–117)
GLUCOSE BLD STRIP.AUTO-MCNC: 87 MG/DL (ref 65–117)
SERVICE CMNT-IMP: ABNORMAL
SERVICE CMNT-IMP: NORMAL

## 2022-08-05 PROCEDURE — 74011250637 HC RX REV CODE- 250/637: Performed by: PSYCHIATRY & NEUROLOGY

## 2022-08-05 PROCEDURE — 82962 GLUCOSE BLOOD TEST: CPT

## 2022-08-05 PROCEDURE — 74011250637 HC RX REV CODE- 250/637: Performed by: NURSE PRACTITIONER

## 2022-08-05 RX ORDER — METFORMIN HYDROCHLORIDE 1000 MG/1
1000 TABLET ORAL DAILY
Qty: 30 TABLET | Refills: 0 | Status: SHIPPED | OUTPATIENT
Start: 2022-08-05 | End: 2022-09-04

## 2022-08-05 RX ORDER — GLIPIZIDE 10 MG/1
10 TABLET, FILM COATED, EXTENDED RELEASE ORAL DAILY
Qty: 30 TABLET | Refills: 0 | Status: SHIPPED | OUTPATIENT
Start: 2022-08-05

## 2022-08-05 RX ORDER — TRIAMTERENE/HYDROCHLOROTHIAZID 37.5-25 MG
1 TABLET ORAL DAILY
Qty: 30 TABLET | Refills: 0 | Status: SHIPPED | OUTPATIENT
Start: 2022-08-05 | End: 2022-09-04

## 2022-08-05 RX ORDER — LOSARTAN POTASSIUM 50 MG/1
50 TABLET ORAL DAILY
Qty: 30 TABLET | Refills: 0 | Status: SHIPPED | OUTPATIENT
Start: 2022-08-05 | End: 2022-09-04

## 2022-08-05 RX ORDER — TRAZODONE HYDROCHLORIDE 150 MG/1
150 TABLET ORAL
Qty: 30 TABLET | Refills: 0 | Status: SHIPPED | OUTPATIENT
Start: 2022-08-05 | End: 2022-09-04

## 2022-08-05 RX ORDER — OLANZAPINE 5 MG/1
5 TABLET ORAL EVERY 12 HOURS
Qty: 60 TABLET | Refills: 0 | Status: SHIPPED | OUTPATIENT
Start: 2022-08-05 | End: 2022-09-04

## 2022-08-05 RX ORDER — FLUVOXAMINE MALEATE 100 MG/1
100 TABLET, COATED ORAL
Qty: 30 TABLET | Refills: 0 | Status: SHIPPED | OUTPATIENT
Start: 2022-08-05 | End: 2022-09-04

## 2022-08-05 RX ORDER — HYDROXYZINE 50 MG/1
50 TABLET, FILM COATED ORAL
Qty: 30 TABLET | Refills: 0 | Status: SHIPPED | OUTPATIENT
Start: 2022-08-05

## 2022-08-05 RX ORDER — ATORVASTATIN CALCIUM 40 MG/1
40 TABLET, FILM COATED ORAL DAILY
Qty: 30 TABLET | Refills: 0 | Status: SHIPPED | OUTPATIENT
Start: 2022-08-05 | End: 2022-09-04

## 2022-08-05 RX ADMIN — TRIAMTERENE AND HYDROCHLOROTHIAZIDE 1 TABLET: 37.5; 25 TABLET ORAL at 09:31

## 2022-08-05 RX ADMIN — GLIPIZIDE 10 MG: 5 TABLET ORAL at 09:32

## 2022-08-05 RX ADMIN — OLANZAPINE 5 MG: 5 TABLET, FILM COATED ORAL at 09:31

## 2022-08-05 RX ADMIN — LOSARTAN POTASSIUM 50 MG: 50 TABLET, FILM COATED ORAL at 09:31

## 2022-08-05 RX ADMIN — ONDANSETRON 4 MG: 4 TABLET, ORALLY DISINTEGRATING ORAL at 08:15

## 2022-08-05 RX ADMIN — ATORVASTATIN CALCIUM 40 MG: 40 TABLET, FILM COATED ORAL at 09:31

## 2022-08-05 RX ADMIN — METFORMIN HYDROCHLORIDE 1000 MG: 500 TABLET ORAL at 09:31

## 2022-08-05 NOTE — DISCHARGE SUMMARY
PSYCHIATRIC DISCHARGE SUMMARY      Patient: Lianna Martins MRN: [id number]  SSN: [id number]    YOB: 1975  Age: 50 y.o. Sex: female        Date of Admission: 7/26/2022  Date of Discharge:8/5/2022       Type of Discharge:  REGULAR     Admission data:  CHIEF COMPLAINT:  \"I have been having panic attacks. \"     HISTORY OF PRESENTING ILLNESS:  The patient is a 68-year-old female who is admitted at [facility] inpatient psychiatric unit voluntarily. 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 She is currently seeing ANNEL Lechuga through the [facility]. She was on Anafranil for quite some time, but she was changed over to Lexapro about a week ago. She presented to the emergency room with passive suicidal ideation due to increasing anxiety, panic attack, and compulsive behavior of hair pulling. She states that when she gets very anxious, she pulls her hair \"violently. \"  As a result because of the worsening anxiety, she has not slept in 2 weeks. She had been feeling depressed because of this. Feeling hopeless and helpless. She has a history of overdosing about 10 years ago. She states her goal is to get over the trichotillomania. She reports that the compulsive hair pulling has gotten a lot worse and also shares that she has some obsessive thoughts. She states that even with simple things such as getting dressed or decision making that she has to think over and over. Otherwise, her anxiety will worsen even more. While she was in the emergency room, she was observed jerking, hitting herself in the head, and pulling her hair out. Also, when she arrived here in the unit, had to be transferred to the acute unit because she was screaming while pulling her hair. Lengthy discussion with her about medications. She agreed to trial Luvox to help with depression, anxiety, compulsive behavior, and some obsessive thoughts.   She denies suicidal ideation, homicidal ideation, auditory or visual hallucination. PAST MEDICAL HISTORY:  See H and P. Past Medical History:   Diagnosis Date    Anxiety      Depression       ANXIETY & DEPRESSION    Diabetes ([facility] [address]       TYPE II    Hypertension      Infected sebaceous cyst, upper back 4/10/2019    Panic attack      Suicidal thoughts      Vision decreased           Labs: (reviewed/updated 7/29/2022)  Patient Vitals for the past 8 hrs:    BP Temp Pulse Resp SpO2   07/29/22 1123 126/79 98 °F (36.7 °C) 98 16 100 %   07/29/22 0858 133/89 98 °F (36.7 °C) 97 16 100 %            Labs Reviewed   CBC WITH AUTOMATED DIFF - Abnormal; Notable for the following components:       Result Value      MCH 25.6 (*)       RDW 15.2 (*)       IMMATURE GRANULOCYTES 1 (*)       ABS. IMM.  GRANS. 0.1 (*)       All other components within normal limits   METABOLIC PANEL, COMPREHENSIVE - Abnormal; Notable for the following components:     Potassium 3.2 (*)       BUN/Creatinine ratio 21 (*)       AST (SGOT) 11 (*)       Globulin 4.7 (*)       A-G Ratio 0.7 (*)       All other components within normal limits   ACETAMINOPHEN - Abnormal; Notable for the following components:     Acetaminophen level <2 (*)       All other components within normal limits   SALICYLATE - Abnormal; Notable for the following components:     Salicylate level <6.6 (*)       All other components within normal limits   URINALYSIS W/MICROSCOPIC - Abnormal; Notable for the following components:     Appearance CLOUDY (*)       Glucose >1,000 (*)       Epithelial cells MODERATE (*)       Bacteria 1+ (*)       All other components within normal limits   HEMOGLOBIN A1C WITH EAG - Abnormal; Notable for the following components:     Hemoglobin A1c 6.4 (*)       All other components within normal limits   GLUCOSE, FASTING - Abnormal; Notable for the following components:     Glucose 146 (*)       All other components within normal limits   GLUCOSE, POC - Abnormal; Notable for the following components: Glucose (POC) 122 (*)       All other components within normal limits   GLUCOSE, POC - Abnormal; Notable for the following components:     Glucose (POC) 137 (*)       All other components within normal limits   URINE CULTURE HOLD SAMPLE   COVID-19 WITH INFLUENZA A/B   TSH 3RD GENERATION   ETHYL ALCOHOL   DRUG SCREEN, URINE   LIPID PANEL   HCG URINE, QL. - POC   GLUCOSE, POC   GLUCOSE, POC   GLUCOSE, POC            Lab Results   Component Value Date/Time     Sodium 139 07/26/2022 02:48 PM     Potassium 3.2 (L) 07/26/2022 02:48 PM     Chloride 103 07/26/2022 02:48 PM     CO2 29 07/26/2022 02:48 PM     Anion gap 7 07/26/2022 02:48 PM     Glucose 146 (H) 07/29/2022 05:44 AM     BUN 16 07/26/2022 02:48 PM     Creatinine 0.75 07/26/2022 02:48 PM     BUN/Creatinine ratio 21 (H) 07/26/2022 02:48 PM     GFR est AA >60 07/26/2022 02:48 PM     GFR est non-AA >60 07/26/2022 02:48 PM     Calcium 9.5 07/26/2022 02:48 PM     Bilirubin, total 0.3 07/26/2022 02:48 PM     Alk.  phosphatase 77 07/26/2022 02:48 PM     Protein, total 8.2 07/26/2022 02:48 PM     Albumin 3.5 07/26/2022 02:48 PM     Globulin 4.7 (H) 07/26/2022 02:48 PM     A-G Ratio 0.7 (L) 07/26/2022 02:48 PM     ALT (SGPT) 25 07/26/2022 02:48 PM              Admission on 07/26/2022   Component Date Value Ref Range Status    WBC 07/26/2022 7.8 3.6 - 11.0 K/uL Final    RBC 07/26/2022 4.80 3.80 - 5.20 M/uL Final    HGB 07/26/2022 12.3 11.5 - 16.0 g/dL Final    HCT 07/26/2022 39.2 35.0 - 47.0 % Final    MCV 07/26/2022 81.7 80.0 - 99.0 FL Final    MCH 07/26/2022 25.6 (A) 26.0 - 34.0 PG Final    MCHC 07/26/2022 31.4 30.0 - 36.5 g/dL Final    RDW 07/26/2022 15.2 (A) 11.5 - 14.5 % Final    PLATELET 64/18/1634 365 150 - 400 K/uL Final    MPV 07/26/2022 9.5 8.9 - 12.9 FL Final    NRBC 07/26/2022 0.0 0  WBC Final    ABSOLUTE NRBC 07/26/2022 0.00 0.00 - 0.01 K/uL Final    NEUTROPHILS 07/26/2022 58 32 - 75 % Final    LYMPHOCYTES 07/26/2022 31 12 - 49 % Final    MONOCYTES 07/26/2022 7 5 - 13 % Final    EOSINOPHILS 07/26/2022 2 0 - 7 % Final    BASOPHILS 07/26/2022 1 0 - 1 % Final    IMMATURE GRANULOCYTES 07/26/2022 1 (A) 0.0 - 0.5 % Final    ABS. NEUTROPHILS 07/26/2022 4.6 1.8 - 8.0 K/UL Final    ABS. LYMPHOCYTES 07/26/2022 2.4 0.8 - 3.5 K/UL Final    ABS. MONOCYTES 07/26/2022 0.5 0.0 - 1.0 K/UL Final    ABS. EOSINOPHILS 07/26/2022 0.2 0.0 - 0.4 K/UL Final    ABS. BASOPHILS 07/26/2022 0.0 0.0 - 0.1 K/UL Final    ABS. IMM. GRANS. 07/26/2022 0.1 (A) 0.00 - 0.04 K/UL Final    DF 07/26/2022 AUTOMATED   Final    Sodium 07/26/2022 139 136 - 145 mmol/L Final    Potassium 07/26/2022 3.2 (A) 3.5 - 5.1 mmol/L Final    Chloride 07/26/2022 103 97 - 108 mmol/L Final    CO2 07/26/2022 29 21 - 32 mmol/L Final    Anion gap 07/26/2022 7 5 - 15 mmol/L Final    Glucose 07/26/2022 78 65 - 100 mg/dL Final    BUN 07/26/2022 16 6 - 20 MG/DL Final    Creatinine 07/26/2022 0.75 0.55 - 1.02 MG/DL Final    BUN/Creatinine ratio 07/26/2022 21 (A) 12 - 20   Final    GFR est AA 07/26/2022 >60 >60 ml/min/1.73m2 Final    GFR est non-AA 07/26/2022 >60 >60 ml/min/1.73m2 Final    Calcium 07/26/2022 9.5 8.5 - 10.1 MG/DL Final    Bilirubin, total 07/26/2022 0.3 0.2 - 1.0 MG/DL Final    ALT (SGPT) 07/26/2022 25 12 - 78 U/L Final    AST (SGOT) 07/26/2022 11 (A) 15 - 37 U/L Final    Alk.  phosphatase 07/26/2022 77 45 - 117 U/L Final    Protein, total 07/26/2022 8.2 6.4 - 8.2 g/dL Final    Albumin 07/26/2022 3.5 3.5 - 5.0 g/dL Final    Globulin 07/26/2022 4.7 (A) 2.0 - 4.0 g/dL Final    A-G Ratio 07/26/2022 0.7 (A) 1.1 - 2.2   Final    Acetaminophen level 07/26/2022 <2 (A) 10 - 30 ug/mL Final    Salicylate level 24/44/9174 <1.7 (A) 2.8 - 20.0 MG/DL Final    TSH 07/26/2022 1.61 0.36 - 3.74 uIU/mL Final    ALCOHOL(ETHYL),SERUM 07/26/2022 <10 <10 MG/DL Final    Color 07/26/2022 YELLOW/STRAW   Final    Appearance 07/26/2022 CLOUDY (A) CLEAR   Final    Specific gravity 07/26/2022 1.020 1.003 - 1.030   Final    pH (UA) 07/26/2022 8.0 5.0 - 8.0   Final    Protein 07/26/2022 Negative NEG mg/dL Final    Glucose 07/26/2022 >1,000 (A) NEG mg/dL Final    Ketone 07/26/2022 Negative NEG mg/dL Final    Bilirubin 07/26/2022 Negative NEG   Final    Blood 07/26/2022 Negative NEG   Final    Urobilinogen 07/26/2022 0.2 0.2 - 1.0 EU/dL Final    Nitrites 07/26/2022 Negative NEG   Final    Leukocyte Esterase 07/26/2022 Negative NEG   Final    WBC 07/26/2022 0-4 0 - 4 /hpf Final    RBC 07/26/2022 0-5 0 - 5 /hpf Final    Epithelial cells 07/26/2022 MODERATE (A) FEW /lpf Final    Bacteria 07/26/2022 1+ (A) NEG /hpf Final    Hyaline cast 07/26/2022 0-2 0 - 5 /lpf Final    Urine culture hold 07/26/2022 Urine on hold in Microbiology dept for 2 days. If unpreserved urine is submitted, it cannot be used for addtional testing after 24 hours, recollection will be required.    Final    AMPHETAMINES 07/26/2022 Negative NEG   Final    BARBITURATES 07/26/2022 Negative NEG   Final    BENZODIAZEPINES 07/26/2022 Negative NEG   Final    COCAINE 07/26/2022 Negative NEG   Final    METHADONE 07/26/2022 Negative NEG   Final    OPIATES 07/26/2022 Negative NEG   Final    PCP(PHENCYCLIDINE) 07/26/2022 Negative NEG   Final    THC (TH-CANNABINOL) 07/26/2022 Negative NEG   Final    Drug screen comment 07/26/2022 (NOTE)   Final    SARS-CoV-2 by PCR 07/26/2022 Not detected NOTD   Final    Influenza A by PCR 07/26/2022 Not detected NOTD   Final    Influenza B by PCR 07/26/2022 Not detected NOTD   Final    Pregnancy test,urine (POC) 07/26/2022 Negative NEG   Final    Glucose (POC) 07/26/2022 107 65 - 117 mg/dL Final    Performed by 07/26/2022 Jaimie Robles   Final    Glucose (POC) 07/27/2022 92 65 - 117 mg/dL Final    Performed by 07/27/2022 Ruben Veliz   Final    Glucose (POC) 07/27/2022 122 (A) 65 - 117 mg/dL Final    Performed by 07/27/2022 JOSEPHINE Jackson   Final    Glucose (POC) 07/28/2022 70 65 - 117 mg/dL Final    Performed by 07/28/2022 Rachell Seaman   Final    Hemoglobin A1c 07/29/2022 6.4 (A) 4.0 - 5.6 % Final    Est. average glucose 07/29/2022 137 mg/dL Final    Glucose 07/29/2022 146 (A) 65 - 100 MG/DL Final    Cholesterol, total 07/29/2022 100 <200 MG/DL Final    Triglyceride 07/29/2022 59 <150 MG/DL Final    HDL Cholesterol 07/29/2022 54 MG/DL Final    LDL, calculated 07/29/2022 34.2 0 - 100 MG/DL Final    VLDL, calculated 07/29/2022 11.8 MG/DL Final    CHOL/HDL Ratio 07/29/2022 1.9 0.0 - 5.0   Final    Glucose (POC) 07/29/2022 137 (A) 65 - 117 mg/dL Final    Performed by 07/29/2022 Anthony Teran   Final             Vitals:     07/28/22 2212 07/29/22 0016 07/29/22 0858 07/29/22 1123   BP: 113/75   133/89 126/79   Pulse: (!) 105   97 98   Resp: 16 16 16 16   Temp: 98.4 °F (36.9 °C)   98 °F (36.7 °C) 98 °F (36.7 °C)   SpO2: 99%   100% 100%   Weight:           Height:              Recent Results         Recent Results (from the past 24 hour(s))   GLUCOSE, POC     Collection Time: 07/28/22  4:32 PM   Result Value Ref Range     Glucose (POC) 70 65 - 117 mg/dL     Performed by Georgette Youssef     HEMOGLOBIN A1C WITH EAG     Collection Time: 07/29/22  5:44 AM   Result Value Ref Range     Hemoglobin A1c 6.4 (H) 4.0 - 5.6 %     Est. average glucose 137 mg/dL   GLUCOSE, FASTING     Collection Time: 07/29/22  5:44 AM   Result Value Ref Range     Glucose 146 (H) 65 - 100 MG/DL   LIPID PANEL     Collection Time: 07/29/22  5:44 AM   Result Value Ref Range     Cholesterol, total 100 <200 MG/DL     Triglyceride 59 <150 MG/DL     HDL Cholesterol 54 MG/DL     LDL, calculated 34.2 0 - 100 MG/DL     VLDL, calculated 11.8 MG/DL     CHOL/HDL Ratio 1.9 0.0 - 5.0     GLUCOSE, POC     Collection Time: 07/29/22  7:38 AM   Result Value Ref Range     Glucose (POC) 137 (H) 65 - 117 mg/dL     Performed by [address]:  Results from Hospital Encounter encounter on 05/04/22     XR CHEST PA LAT     Narrative  EXAM: XR CHEST PA LAT     INDICATION: Chest Pain     COMPARISON: 6/3/2019. FINDINGS: PA and lateral radiographs of the chest demonstrate clear lungs. The  cardiac and mediastinal contours and pulmonary vascularity are normal.  Degenerative changes are seen in the thoracic spine. Impression  No acute abnormality. XR CHEST PA LAT     Result Date: 5/4/2022  EXAM: XR CHEST PA LAT INDICATION: Chest Pain COMPARISON: 6/3/2019. FINDINGS: PA and lateral radiographs of the chest demonstrate clear lungs. The cardiac and mediastinal contours and pulmonary vascularity are normal. Degenerative changes are seen in the thoracic spine. No acute abnormality. CT SPINE CERV WO CONT     Result Date: 5/4/2022  EXAM: CT SPINE CERV WO CONT CLINICAL HISTORY: neck pain INDICATION: neck pain COMPARISON:  None TECHNIQUE:  Axial neck CT was performed. Noncontrast imaging obtained. Coronal and sagittal reconstructions were performed. CT dose reduction was achieved through use of a standardized protocol tailored for this examination and automatic exposure control for dose modulation. Osseous/bone algorithm was utilized. FINDINGS: The vertebral bodies are anatomically aligned. There is no evidence of fracture or subluxation. The prevertebral soft tissues are grossly within normal limits. The atlantodental interval is within normal limits. The craniocervical junction is intact. Large posterior and anterior osteophytes. Severe multilevel canal stenoses. Cord compression at C5-6 is likely. Multilevel severe foraminal stenoses as well. No apical pneumothorax. There is no acute fracture or dislocation identified. Severe multilevel chronic degenerative change with multiple anterior and posterior disc osteophyte. Severe canal and foraminal stenoses with multilevel cord compression chronic and degenerative in nature is likely. MRI of the cervical spine for further delineation on an nonemergent basis is recommended.                             PAST PSYCHIATRIC HISTORY:  She is seeing Kelsie Reynoso Marilia Bradley at the [facility] as described above. She reports that she has been admitted a few times in the past, most recently was at [address] [place] [address] back in May. PSYCHOSOCIAL HISTORY:  She is single and no children. Her highest level of education is some college. She stated she is employed, but is not working at the moment. She lives with her parents. MENTAL STATUS EXAM:  She is alert and oriented in all spheres. She is dressed appropriately. She reports her mood is okay. Affect is constricted. Speech normal rate and rhythm. Thought process logical and goal directed. She denies suicidal ideation, homicidal ideation, auditory or visual hallucination. Memory is intact. Intelligence is average. Insight is partial.  Judgment is poor. DIAGNOSES:  Generalized anxiety disorder. Unspecified mood disorder. Trichotillomania. TREATMENT PLANNING:  I will continue her inpatient stay. She will be provided with support and encouraged to attend groups. Her safety will be monitored. Her medications will be modified and assessed. Case Management will work on discharge planning. ASSETS AND STRENGTHS:  She is willing to seek help. She is willing to take medication. ESTIMATED LENGTH OF STAY:  5 to 7 days. Hospital Course:    Patient was admitted to the Psychiatric services for acute psychiatric stabilization in regards to symptomatology as described in the HPI above and placed on Q15 minute checks and suicide precautions. She was started back on her usual medication regimen as well as PRN medications including her medical meds, trazodone and atarax. She was started on luvox, zyprexa. Her medications were adjusted. While on the unit Estuardo Smalls was involved in individual, group, occupational and milieu therapy. She improved gradually and was able to integrate into the milieu with help from the nursing staff.  Patients symptoms improved gradually including si, worsening mood, depression, anxiety, poor sleep, trichotillomania. She was appropriate in her interactions, and cooperative with medications and the unit routine. Please see individual progress notes for more specific details regarding patient's hospitalization course. Patient was discharged as per the plan. She had been doing well on the unit as per the report of the nursing staff and my observations. No PRN medication for agitation, seclusion or restraints were required during the last 48 hours of her stay. Charlotte Mann had improved progressively to the point of being stable for discharge and outpatient FU. At this time she did not offer any complaints. Patient denied any SI or HI. Denied any AH or VH. She denied any delusions. Was not considered a danger to self or to others and is safe for discharge. Will FU with her appointments and remains motivated to be in treatment. The patient verbalized understanding of her discharge instructions. Allergies:(reviewed/updated 8/5/2022)  Allergies   Allergen Reactions    Lisinopril Rash     Swelling of the lips       Side Effects: (reviewed/updated 8/5/2022)  None reported or admitted to.     Vital Signs:  Patient Vitals for the past 24 hrs:   Temp Pulse Resp BP SpO2   08/05/22 0824 98.4 °F (36.9 °C) 86 18 126/87 98 %   08/04/22 2208 -- 99 16 113/73 99 %   08/04/22 1934 98.1 °F (36.7 °C) 99 18 107/74 99 %   08/04/22 1555 98.2 °F (36.8 °C) (!) 104 16 107/62 97 %     Wt Readings from Last 3 Encounters:   07/31/22 91.7 kg (202 lb 3.2 oz)   05/11/22 98 kg (216 lb)   05/08/22 98 kg (216 lb)     Temp Readings from Last 3 Encounters:   08/05/22 98.4 °F (36.9 °C)   05/16/22 99.2 °F (37.3 °C)   05/08/22 97.4 °F (36.3 °C)     BP Readings from Last 3 Encounters:   08/05/22 126/87   05/16/22 118/77   05/08/22 118/85     Pulse Readings from Last 3 Encounters:   08/05/22 86   05/16/22 (!) 109   05/08/22 86       Labs: (reviewed/updated 8/5/2022)  Recent Results (from the past 24 hour(s))   GLUCOSE, POC Collection Time: 08/04/22  5:05 PM   Result Value Ref Range    Glucose (POC) 95 65 - 117 mg/dL    Performed by [id number] [facility], POC    Collection Time: 08/05/22  8:08 AM   Result Value Ref Range    Glucose (POC) 189 (H) 65 - 117 mg/dL    Performed by Meagan Bonilla      No results found for: VALF2, VALAC, VALP, VALPR, DS6, CRBAM, CRBAMP, CARB2, XCRBAM  No results found for: [facility] EVALUATION [place]    Radiology (reviewed/updated 8/5/2022)  No results found. Mental Status Exam on Discharge:  General appearance:   Nhung Ballesteros is a 50 y.o. BLACK/ female who is well groomed, psychomotor activity is WNL  Eye contact: makes good eye contact  Speech: Spontaneous and coherent  Affect : Euthymic  Mood: \"OK\"  Thought Process: Logical, goal directed  Perception: Denies any AH or VH. Thought Content: Denies any SI or Plan  Insight: Partial  Judgement: Fair  Cognition: Intact grossly. Discharge Diagnosis:   Generalized anxiety disorder. Unspecified mood disorder. Trichotillomania. Current Discharge Medication List        START taking these medications    Details   fluvoxaMINE (LUVOX) 100 mg tablet Take 1 Tablet by mouth nightly for 30 days. Indications: anxiousness associated with depression  Qty: 30 Tablet, Refills: 0  Start date: 8/5/2022, End date: 9/4/2022      hydrOXYzine HCL (ATARAX) 50 mg tablet Take 1 Tablet by mouth every four (4) hours as needed for Anxiety. Indications: anxious  Qty: 30 Tablet, Refills: 0  Start date: 8/5/2022      OLANZapine (ZyPREXA) 5 mg tablet Take 1 Tablet by mouth every twelve (12) hours for 30 days. Indications: mood  Qty: 60 Tablet, Refills: 0  Start date: 8/5/2022, End date: 9/4/2022           CONTINUE these medications which have CHANGED    Details   atorvastatin (LIPITOR) 40 mg tablet Take 1 Tablet by mouth in the morning for 30 days.  Indications: high cholesterol  Qty: 30 Tablet, Refills: 0  Start date: 8/5/2022, End date: 9/4/2022      glipiZIDE SR (GLUCOTROL XL) 10 mg CR tablet Take 1 Tablet by mouth in the morning. Indications: type 2 diabetes mellitus  Qty: 30 Tablet, Refills: 0  Start date: 8/5/2022      losartan (COZAAR) 50 mg tablet Take 1 Tablet by mouth in the morning for 30 days. Indications: high blood pressure  Qty: 30 Tablet, Refills: 0  Start date: 8/5/2022, End date: 9/4/2022      metFORMIN (GLUCOPHAGE) 1,000 mg tablet Take 1 Tablet by mouth in the morning for 30 days. Indications: type 2 diabetes mellitus  Qty: 30 Tablet, Refills: 0  Start date: 8/5/2022, End date: 9/4/2022      traZODone (DESYREL) 150 mg tablet Take 1 Tablet by mouth nightly for 30 days. Indications: insomnia associated with depression  Qty: 30 Tablet, Refills: 0  Start date: 8/5/2022, End date: 9/4/2022      triamterene-hydroCHLOROthiazide (MAXZIDE) 37.5-25 mg per tablet Take 1 Tablet by mouth in the morning for 30 days. Indications: high blood pressure  Qty: 30 Tablet, Refills: 0  Start date: 8/5/2022, End date: 9/4/2022           STOP taking these medications       LORazepam (ATIVAN) 0.5 mg tablet Comments:   Reason for Stopping:         escitalopram oxalate (LEXAPRO) 10 mg tablet Comments:   Reason for Stopping:         empagliflozin (JARDIANCE) 25 mg tablet Comments:   Reason for Stopping:         clomiPRAMINE (ANAFRANIL) 25 mg capsule Comments:   Reason for Stopping:         multivitamin capsule Comments:   Reason for Stopping:         FLUoxetine (PROzac) 20 mg capsule Comments:   Reason for Stopping:                     Follow-up Information       Follow up With Specialties Details Why Contact Info    [facility]  Go to Follow up with Myra Campos NP for med management [address] [facility] [address]  Go to Rapid access for intake/assessment and counseling [phone] [address]    Miguelito Meneses NP Nurse Practitioner   [address] [place] [id number] [phone]            WOUND CARE: none needed. Prognosis:   Good / [place] based on nature of patient's pathology/ies and treatment compliance issues. Prognosis is greatly dependent upon patient's ability to  follow up on psychiatric/psychotherapy appointments as well as to comply with psychiatric medications as prescribed. I certify that this patients inpatient psychiatric hospital services furnished since the previous certification were, and continue to be, required for treatment that could reasonably be expected to improve the patient's condition, or for diagnostic study, and that the patient continues to need, on a daily basis, active treatment furnished directly by or requiring the supervision of inpatient psychiatric facility personnel. In addition, the hospital records show that services furnished were intensive treatment services, admission or related services, or equivalent services.      Signed:  Kristal Meza NP  8/5/2022

## 2022-08-05 NOTE — INTERDISCIPLINARY ROUNDS
Pt is stable and will discharge today. SW provided pt with discharge packet, follow up instructions, and work note.  Aunt will  pt @ 130pm.    Tx Team   Ruth Ann Don, MSW, Fadia Sanchez, Jacklyn Camargo, NP

## 2022-08-05 NOTE — GROUP NOTE
[facility]  GROUP DOCUMENTATION INDIVIDUAL                                                                          Group Therapy Note    Date: 8/5/2022    Group Start Time: 3597  Group End Time: 1400  Group Topic: Activity Therapy    [facility] [address] [address] [facility] GROUP DOCUMENTATION GROUP    Group Therapy Note    Attendees: 3/9       Attendance: Attended    Patient's Goal:  n/a    Interventions/techniques: Challenged and Supported    Follows Directions: Followed directions    Interactions: Interacted appropriately    Mental Status: Happy    Behavior/appearance: Attentive, Caretaking, Cooperative, Enthusiastic, and Motivated    Goals Achieved: Able to engage in interactions, Able to listen to others, Able to give feedback to another, Able to reflect/comment on own behavior, Able to manage/cope with feelings, and Able to experience relief/decrease in symptoms      Additional Notes:  [employer] students engaged in playing bingo with unit. Ms. Turnerludy Puenteray was engaged and sought new opportunities to have fun.      Starla Chino

## 2022-08-05 NOTE — BH NOTES
Behavioral Health Transition Record to Provider    Patient Name: Mariane Lefort  YOB: 1975  Medical Record Number: [id number]  Date of Admission: 7/26/2022  Date of Discharge: 8/5/22    Attending Provider: Jose R Machuca MD  Discharging Provider: Claudell Peter, NP   To contact this individual call [phone] and ask the  to page. If unavailable, ask to be transferred to [facility] Provider on call. [facility] Provider will be available on call 24/7 and during holidays. Primary Care Provider: Allison Mao NP    Allergies   Allergen Reactions    Lisinopril Rash     Swelling of the lips       Reason for Admission: The patient is a 59-year-old female who is admitted at [facility] inpatient psychiatric unit voluntarily. 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 She is currently seeing ANNEL Hart through the [facility]. She was on Anafranil for quite some time, but she was changed over to Lexapro about a week ago. She presented to the emergency room with passive suicidal ideation due to increasing anxiety, panic attack, and compulsive behavior of hair pulling. She states that when she gets very anxious, she pulls her hair \"violently. \"  As a result because of the worsening anxiety, she has not slept in 2 weeks. She had been feeling depressed because of this. Feeling hopeless and helpless. She has a history of overdosing about 10 years ago. She states her goal is to get over the trichotillomania. She reports that the compulsive hair pulling has gotten a lot worse and also shares that she has some obsessive thoughts. She states that even with simple things such as getting dressed or decision making that she has to think over and over. Otherwise, her anxiety will worsen even more. While she was in the emergency room, she was observed jerking, hitting herself in the head, and pulling her hair out.   Also, when she arrived here in the unit, had to be transferred to the acute unit because she was screaming while pulling her hair. Lengthy discussion with her about medications. She agreed to trial Luvox to help with depression, anxiety, compulsive behavior, and some obsessive thoughts. She denies suicidal ideation, homicidal ideation, auditory or visual hallucination. Admission Diagnosis: Depressed [F32. A]    * No surgery found *    Results for orders placed or performed during the hospital encounter of 07/26/22   URINE CULTURE HOLD SAMPLE    Specimen: Serum; Urine   Result Value Ref Range    Urine culture hold        Urine on hold in Microbiology dept for 2 days. If unpreserved urine is submitted, it cannot be used for addtional testing after 24 hours, recollection will be required. COVID-19 WITH INFLUENZA A/B   Result Value Ref Range    SARS-CoV-2 by PCR Not detected NOTD      Influenza A by PCR Not detected NOTD      Influenza B by PCR Not detected NOTD     CBC WITH AUTOMATED DIFF   Result Value Ref Range    WBC 7.8 3.6 - 11.0 K/uL    RBC 4.80 3.80 - 5.20 M/uL    HGB 12.3 11.5 - 16.0 g/dL    HCT 39.2 35.0 - 47.0 %    MCV 81.7 80.0 - 99.0 FL    MCH 25.6 (L) 26.0 - 34.0 PG    MCHC 31.4 30.0 - 36.5 g/dL    RDW 15.2 (H) 11.5 - 14.5 %    PLATELET 419 211 - 032 K/uL    MPV 9.5 8.9 - 12.9 FL    NRBC 0.0 0  WBC    ABSOLUTE NRBC 0.00 0.00 - 0.01 K/uL    NEUTROPHILS 58 32 - 75 %    LYMPHOCYTES 31 12 - 49 %    MONOCYTES 7 5 - 13 %    EOSINOPHILS 2 0 - 7 %    BASOPHILS 1 0 - 1 %    IMMATURE GRANULOCYTES 1 (H) 0.0 - 0.5 %    ABS. NEUTROPHILS 4.6 1.8 - 8.0 K/UL    ABS. LYMPHOCYTES 2.4 0.8 - 3.5 K/UL    ABS. MONOCYTES 0.5 0.0 - 1.0 K/UL    ABS. EOSINOPHILS 0.2 0.0 - 0.4 K/UL    ABS. BASOPHILS 0.0 0.0 - 0.1 K/UL    ABS. IMM.  GRANS. 0.1 (H) 0.00 - 0.04 K/UL    DF AUTOMATED     METABOLIC PANEL, COMPREHENSIVE   Result Value Ref Range    Sodium 139 136 - 145 mmol/L    Potassium 3.2 (L) 3.5 - 5.1 mmol/L    Chloride 103 97 - 108 mmol/L CO2 29 21 - 32 mmol/L    Anion gap 7 5 - 15 mmol/L    Glucose 78 65 - 100 mg/dL    BUN 16 6 - 20 MG/DL    Creatinine 0.75 0.55 - 1.02 MG/DL    BUN/Creatinine ratio 21 (H) 12 - 20      GFR est AA >60 >60 ml/min/1.73m2    GFR est non-AA >60 >60 ml/min/1.73m2    Calcium 9.5 8.5 - 10.1 MG/DL    Bilirubin, total 0.3 0.2 - 1.0 MG/DL    ALT (SGPT) 25 12 - 78 U/L    AST (SGOT) 11 (L) 15 - 37 U/L    Alk.  phosphatase 77 45 - 117 U/L    Protein, total 8.2 6.4 - 8.2 g/dL    Albumin 3.5 3.5 - 5.0 g/dL    Globulin 4.7 (H) 2.0 - 4.0 g/dL    A-G Ratio 0.7 (L) 1.1 - 2.2     ACETAMINOPHEN   Result Value Ref Range    Acetaminophen level <2 (L) 10 - 30 ug/mL   SALICYLATE   Result Value Ref Range    Salicylate level <4.2 (L) 2.8 - 20.0 MG/DL   TSH 3RD GENERATION   Result Value Ref Range    TSH 1.61 0.36 - 3.74 uIU/mL   ETHYL ALCOHOL   Result Value Ref Range    ALCOHOL(ETHYL),SERUM <10 <10 MG/DL   URINALYSIS W/MICROSCOPIC   Result Value Ref Range    Color YELLOW/STRAW      Appearance CLOUDY (A) CLEAR      Specific gravity 1.020 1.003 - 1.030      pH (UA) 8.0 5.0 - 8.0      Protein Negative NEG mg/dL    Glucose >1,000 (A) NEG mg/dL    Ketone Negative NEG mg/dL    Bilirubin Negative NEG      Blood Negative NEG      Urobilinogen 0.2 0.2 - 1.0 EU/dL    Nitrites Negative NEG      Leukocyte Esterase Negative NEG      WBC 0-4 0 - 4 /hpf    RBC 0-5 0 - 5 /hpf    Epithelial cells MODERATE (A) FEW /lpf    Bacteria 1+ (A) NEG /hpf    Hyaline cast 0-2 0 - 5 /lpf   DRUG SCREEN, URINE   Result Value Ref Range    AMPHETAMINES Negative NEG      BARBITURATES Negative NEG      BENZODIAZEPINES Negative NEG      COCAINE Negative NEG      METHADONE Negative NEG      OPIATES Negative NEG      PCP(PHENCYCLIDINE) Negative NEG      THC (TH-CANNABINOL) Negative NEG      Drug screen comment (NOTE)    HEMOGLOBIN A1C WITH EAG   Result Value Ref Range    Hemoglobin A1c 6.4 (H) 4.0 - 5.6 %    Est. average glucose 137 mg/dL   GLUCOSE, FASTING   Result Value Ref Range    Glucose 146 (H) 65 - 100 MG/DL   LIPID PANEL   Result Value Ref Range    Cholesterol, total 100 <200 MG/DL    Triglyceride 59 <150 MG/DL    HDL Cholesterol 54 MG/DL    LDL, calculated 34.2 0 - 100 MG/DL    VLDL, calculated 11.8 MG/DL    CHOL/HDL Ratio 1.9 0.0 - 5.0     HCG URINE, QL. - POC   Result Value Ref Range    Pregnancy test,urine (POC) Negative NEG     GLUCOSE, POC   Result Value Ref Range    Glucose (POC) 107 65 - 117 mg/dL    Performed by Vida Lazo    GLUCOSE, POC   Result Value Ref Range    Glucose (POC) 92 65 - 117 mg/dL    Performed by Mike Chew    GLUCOSE, POC   Result Value Ref Range    Glucose (POC) 122 (H) 65 - 117 mg/dL    Performed by Marie España    GLUCOSE, POC   Result Value Ref Range    Glucose (POC) 70 65 - 117 mg/dL    Performed by David Pierre    GLUCOSE, POC   Result Value Ref Range    Glucose (POC) 137 (H) 65 - 117 mg/dL    Performed by Mike Chew    GLUCOSE, POC   Result Value Ref Range    Glucose (POC) 64 (L) 65 - 117 mg/dL    Performed by Panama Sloan    GLUCOSE, POC   Result Value Ref Range    Glucose (POC) 67 65 - 117 mg/dL    Performed by Vinicio Mcdonald    GLUCOSE, POC   Result Value Ref Range    Glucose (POC) 78 65 - 117 mg/dL    Performed by David Sloan    GLUCOSE, POC   Result Value Ref Range    Glucose (POC) 126 (H) 65 - 117 mg/dL    Performed by Manish Cuenca    GLUCOSE, POC   Result Value Ref Range    Glucose (POC) 111 65 - 117 mg/dL    Performed by David Pierre    GLUCOSE, POC   Result Value Ref Range    Glucose (POC) 142 (H) 65 - 117 mg/dL    Performed by JOHN REED    GLUCOSE, POC   Result Value Ref Range    Glucose (POC) 64 (L) 65 - 117 mg/dL    Performed by Panama Sloan    GLUCOSE, POC   Result Value Ref Range    Glucose (POC) 67 65 - 117 mg/dL    Performed by David Sloan    GLUCOSE, POC   Result Value Ref Range    Glucose (POC) 107 65 - 117 mg/dL    Performed by David Pierre    GLUCOSE, POC   Result Value Ref Range    Glucose (POC) 144 (H) 65 - 117 mg/dL    Performed by BRENNA Chiu    GLUCOSE, POC   Result Value Ref Range    Glucose (POC) 75 65 - 117 mg/dL    Performed by Jeana Cervantes    GLUCOSE, POC   Result Value Ref Range    Glucose (POC) 129 (H) 65 - 117 mg/dL    Performed by Kole Osuna    GLUCOSE, POC   Result Value Ref Range    Glucose (POC) 80 65 - 117 mg/dL    Performed by [address] [place] [address] Drive, POC   Result Value Ref Range    Glucose (POC) 165 (H) 65 - 117 mg/dL    Performed by Joseph Weems    GLUCOSE, POC   Result Value Ref Range    Glucose (POC) 65 65 - 117 mg/dL    Performed by Rosita Samaniego    GLUCOSE, POC   Result Value Ref Range    Glucose (POC) 66 65 - 117 mg/dL    Performed by Rosita Samaniego    GLUCOSE, POC   Result Value Ref Range    Glucose (POC) 92 65 - 117 mg/dL    Performed by Rosita Samaniego    GLUCOSE, POC   Result Value Ref Range    Glucose (POC) 191 (H) 65 - 117 mg/dL    Performed by Paul Blount S    GLUCOSE, POC   Result Value Ref Range    Glucose (POC) 95 65 - 117 mg/dL    Performed by Rosita Samaniego    GLUCOSE, POC   Result Value Ref Range    Glucose (POC) 189 (H) 65 - 117 mg/dL    Performed by Russell Hudson        Immunizations administered during this encounter: There is no immunization history on file for this patient. Screening for Metabolic Disorders for Patients on Antipsychotic Medications  (Data obtained from the EMR)    Estimated Body Mass Index  Estimated body mass index is 35.82 kg/m² as calculated from the following:    Height as of this encounter: 5' 3\" (1.6 m). Weight as of this encounter: 91.7 kg (202 lb 3.2 oz).      Vital Signs/Blood Pressure  Visit Vitals  /87 (BP 1 Location: Right upper arm, BP Patient Position: Sitting)   Pulse 86   Temp 98.4 °F (36.9 °C)   Resp 18   Ht 5' 3\" (1.6 m)   Wt 91.7 kg (202 lb 3.2 oz)   SpO2 98%   BMI 35.82 kg/m²       Blood Glucose/Hemoglobin A1c  Lab Results   Component Value Date/Time    Glucose 146 (H) 07/29/2022 05:44 AM    Glucose (POC) 189 (H) 08/05/2022 08:08 AM       Lab Results   Component Value Date/Time    Hemoglobin A1c 6.4 (H) 07/29/2022 05:44 AM        Lipid Panel  Lab Results   Component Value Date/Time    Cholesterol, total 100 07/29/2022 05:44 AM    HDL Cholesterol 54 07/29/2022 05:44 AM    LDL, calculated 34.2 07/29/2022 05:44 AM    Triglyceride 59 07/29/2022 05:44 AM    CHOL/HDL Ratio 1.9 07/29/2022 05:44 AM        Discharge Diagnosis:  Generalized anxiety disorder. Unspecified mood disorder. Trichotillomania    Discharge Plan: The patient Liberty Oh exhibits the ability to control behavior in a less restrictive environment. Patient's level of functioning is improving. No assaultive/destructive behavior has been observed for the past 24 hours. No suicidal/homicidal threat or behavior has been observed for the past 24 hours. There is no evidence of serious medication side effects. Patient has not been in physical or protective restraints for at least the past 24 hours. If weapons involved, how are they secured? none    Is patient aware of and in agreement with discharge plan? yes    Arrangements for medication:  Prescriptions given to patient, given a weeks supply or 30 day supply. Copy of discharge instructions to provider?:  yes    Arrangements for transportation home:  Aunt will  patient    Keep all follow up appointments as scheduled, continue to take prescribed medications per physician instructions.   Mental health crisis number:  116 or your local mental health crisis line number at [address] [facility] [phone] WARM LINE      5-254-870-MHAV ([phone])      M-F: 9am to 9pm      Sat & Sun: 5pm - 9pm  National suicide prevention lines:                             7-290-HXXEUAG ([phone])       6-129-450-TALK ([phone])   24/7 Crisis Text Line:  Text HOME to [phone]       Discharge Medication List and Instructions:   Current Discharge Medication List        START taking these medications    Details   fluvoxaMINE (LUVOX) 100 mg tablet Take 1 Tablet by mouth nightly for 30 days. Indications: anxiousness associated with depression  Qty: 30 Tablet, Refills: 0  Start date: 8/5/2022, End date: 9/4/2022      hydrOXYzine HCL (ATARAX) 50 mg tablet Take 1 Tablet by mouth every four (4) hours as needed for Anxiety. Indications: anxious  Qty: 30 Tablet, Refills: 0  Start date: 8/5/2022      OLANZapine (ZyPREXA) 5 mg tablet Take 1 Tablet by mouth every twelve (12) hours for 30 days. Indications: mood  Qty: 60 Tablet, Refills: 0  Start date: 8/5/2022, End date: 9/4/2022           CONTINUE these medications which have CHANGED    Details   atorvastatin (LIPITOR) 40 mg tablet Take 1 Tablet by mouth in the morning for 30 days. Indications: high cholesterol  Qty: 30 Tablet, Refills: 0  Start date: 8/5/2022, End date: 9/4/2022      glipiZIDE SR (GLUCOTROL XL) 10 mg CR tablet Take 1 Tablet by mouth in the morning. Indications: type 2 diabetes mellitus  Qty: 30 Tablet, Refills: 0  Start date: 8/5/2022      losartan (COZAAR) 50 mg tablet Take 1 Tablet by mouth in the morning for 30 days. Indications: high blood pressure  Qty: 30 Tablet, Refills: 0  Start date: 8/5/2022, End date: 9/4/2022      metFORMIN (GLUCOPHAGE) 1,000 mg tablet Take 1 Tablet by mouth in the morning for 30 days. Indications: type 2 diabetes mellitus  Qty: 30 Tablet, Refills: 0  Start date: 8/5/2022, End date: 9/4/2022      traZODone (DESYREL) 150 mg tablet Take 1 Tablet by mouth nightly for 30 days. Indications: insomnia associated with depression  Qty: 30 Tablet, Refills: 0  Start date: 8/5/2022, End date: 9/4/2022      triamterene-hydroCHLOROthiazide (MAXZIDE) 37.5-25 mg per tablet Take 1 Tablet by mouth in the morning for 30 days.  Indications: high blood pressure  Qty: 30 Tablet, Refills: 0  Start date: 8/5/2022, End date: 9/4/2022           STOP taking these medications       LORazepam (ATIVAN) 0.5 mg tablet Comments:   Reason for Stopping:         escitalopram oxalate (LEXAPRO) 10 mg tablet Comments:   Reason for Stopping:         empagliflozin (JARDIANCE) 25 mg tablet Comments:   Reason for Stopping:         clomiPRAMINE (ANAFRANIL) 25 mg capsule Comments:   Reason for Stopping:         multivitamin capsule Comments:   Reason for Stopping:         FLUoxetine (PROzac) 20 mg capsule Comments:   Reason for Stopping:               Unresulted Labs (24h ago, onward)      None          To obtain results of studies pending at discharge, please contact [phone]    Follow-up Information       Follow up With Specialties Details Why Contact Info    [facility]  Go to Follow up with Deretha Primrose, NP for med management [address] [facility] [address]  Go to Rapid access for intake/assessment and counseling [phone] [facility] 05    Minnie Roman NP Nurse Practitioner   [address]  [facility] [address] [phone]              Advanced Directive:   Does the patient have an appointed surrogate decision maker? No  Does the patient have a Medical Advance Directive? No  Does the patient have a Psychiatric Advance Directive? No  If the patient does not have a surrogate or Medical Advance Directive AND Psychiatric Advance Directive, the patient was offered information on these advance directives Patient declined to complete    Patient Instructions: Please continue all medications until otherwise directed by physician. Tobacco Cessation Discharge Plan:   Is the patient a smoker and needs referral for smoking cessation? No  Patient referred to the following for smoking cessation with an appointment? No     Patient was offered medication to assist with smoking cessation at discharge? No  Was education for smoking cessation added to the discharge instructions?  No    Alcohol/Substance Abuse Discharge Plan:   Does the patient have a history of substance/alcohol abuse and requires a referral for treatment? No  Patient referred to the following for substance/alcohol abuse treatment with an appointment? No  Patient was offered medication to assist with alcohol cessation at discharge? No  Was education for substance/alcohol abuse added to discharge instructions? No    Patient discharged to Home; provided to the patient/caregiver either in hard copy or electronically.

## 2022-08-05 NOTE — PROGRESS NOTES
Problem: Depressed Mood (Adult/Pediatric)  Goal: *STG: Participates in treatment plan  Outcome: Progressing Towards Goal  Goal: *STG: Attends activities and groups  Outcome: Progressing Towards Goal  Goal: *STG: Remains safe in hospital  Outcome: Progressing Towards     Patient is resting quietly in bed with eyes closed  Appears to be asleep. No respiratory distress noted.   Will continue to monitor

## 2022-08-05 NOTE — BH NOTES
GROUP THERAPY PROGRESS NOTE    Patient is participating in [address] and Wellness group. Group time: 45 minutes    Personal goal for participation: to develop an understanding of gratitude and it's positive effects to our physical and mental well-being. Goal orientation: Personal    Group therapy participation: active     Therapeutic interventions reviewed and discussed: Members were able to gain an understanding of gratitude. Members developed knowledge of the importance of gratitude as a feeling as well as a practice. Members were introduced to keeping a gratitude journal. Members were given gratitude list templates and writing prompts. Handout provided. Impression of participation: Pt was present and engaged in group discussion. Pt added insight to group topic.  Pt interacted with peers and [employer] JENNIFER, JAMEEL-A

## 2022-08-05 NOTE — PROGRESS NOTES
Problem: Depressed Mood (Adult/Pediatric)  Goal: *STG: Participates in treatment plan  Outcome: Progressing Towards Goal     Problem: Depressed Mood (Adult/Pediatric)  Goal: *STG: Attends activities and groups  Outcome: Progressing Towards Goal     Problem: Depressed Mood (Adult/Pediatric)  Goal: *STG: Remains safe in hospital  Outcome: Progressing Towards Goal     Problem: Depressed Mood (Adult/Pediatric)  Goal: Interventions  Outcome: Progressing Towards Goal  Note: Pt is alert and oriented. Calm and cooperative. Reduced anxiety and depression. Pt reports sleeping appropriately through the night. 1530- pt has signed for discharge. Waiting for her family to pick her up.

## 2022-08-05 NOTE — BH NOTES
PRN Medication Documentation    Specific patient behavior that led to need for PRN medication: c/o anxiety  Staff interventions attempted prior to PRN being given: coping  skills  PRN medication given: atarax  Patient response/effectiveness of PRN medication: lamont aware

## 2022-10-24 ENCOUNTER — HOSPITAL ENCOUNTER (OUTPATIENT)
Dept: GENERAL RADIOLOGY | Age: 47
Discharge: HOME | End: 2022-10-24
Payer: MEDICAID

## 2022-10-24 ENCOUNTER — TRANSCRIBE ORDER (OUTPATIENT)
Dept: REGISTRATION | Age: 47
End: 2022-10-24

## 2022-10-24 DIAGNOSIS — M54.12 CERVICAL RADICULAR PAIN: ICD-10-CM

## 2022-10-24 DIAGNOSIS — M54.12 CERVICAL RADICULAR PAIN: Primary | ICD-10-CM

## 2022-10-24 PROCEDURE — 72050 X-RAY EXAM NECK SPINE 4/5VWS: CPT

## 2022-11-14 ENCOUNTER — TRANSCRIBE ORDER (OUTPATIENT)
Dept: SCHEDULING | Age: 47
End: 2022-11-14

## 2022-11-14 DIAGNOSIS — Z12.31 VISIT FOR SCREENING MAMMOGRAM: Primary | ICD-10-CM

## 2022-11-16 ENCOUNTER — TRANSCRIBE ORDER (OUTPATIENT)
Dept: SCHEDULING | Age: 47
End: 2022-11-16

## 2022-11-16 DIAGNOSIS — S09.90XA TRAUMATIC INJURY OF HEAD, INITIAL ENCOUNTER: Primary | ICD-10-CM

## 2022-11-28 ENCOUNTER — HOSPITAL ENCOUNTER (OUTPATIENT)
Dept: CT IMAGING | Age: 47
Discharge: HOME | End: 2022-11-28
Attending: EMERGENCY MEDICINE
Payer: MEDICAID

## 2022-11-28 DIAGNOSIS — S09.90XA TRAUMATIC INJURY OF HEAD, INITIAL ENCOUNTER: ICD-10-CM

## 2022-11-28 PROCEDURE — 70450 CT HEAD/BRAIN W/O DYE: CPT

## 2023-01-13 ENCOUNTER — HOSPITAL ENCOUNTER (INPATIENT)
Age: 48
LOS: 17 days | Discharge: HOME | DRG: 751 | End: 2023-01-30
Attending: EMERGENCY MEDICINE | Admitting: PSYCHIATRY & NEUROLOGY
Payer: MEDICAID

## 2023-01-13 DIAGNOSIS — R45.851 SUICIDAL IDEATION: ICD-10-CM

## 2023-01-13 DIAGNOSIS — F32.A DEPRESSION, UNSPECIFIED DEPRESSION TYPE: ICD-10-CM

## 2023-01-13 DIAGNOSIS — R45.1 AGITATION: ICD-10-CM

## 2023-01-13 DIAGNOSIS — Z00.00 GENERAL MEDICAL EXAM: ICD-10-CM

## 2023-01-13 DIAGNOSIS — F41.9 ANXIETY DISORDER, UNSPECIFIED TYPE: Primary | ICD-10-CM

## 2023-01-13 DIAGNOSIS — E11.9 TYPE 2 DIABETES MELLITUS WITHOUT COMPLICATION, WITHOUT LONG-TERM CURRENT USE OF INSULIN (HCC): ICD-10-CM

## 2023-01-13 LAB
ALBUMIN SERPL-MCNC: 3.6 G/DL (ref 3.5–5)
ALBUMIN/GLOB SERPL: 0.9 (ref 1.1–2.2)
ALP SERPL-CCNC: 77 U/L (ref 45–117)
ALT SERPL-CCNC: 20 U/L (ref 12–78)
AMPHET UR QL SCN: NEGATIVE
ANION GAP SERPL CALC-SCNC: 2 MMOL/L (ref 5–15)
APAP SERPL-MCNC: <2 UG/ML (ref 10–30)
AST SERPL-CCNC: 8 U/L (ref 15–37)
BARBITURATES UR QL SCN: NEGATIVE
BASOPHILS # BLD: 0 K/UL (ref 0–0.1)
BASOPHILS NFR BLD: 0 % (ref 0–1)
BENZODIAZ UR QL: POSITIVE
BILIRUB SERPL-MCNC: 0.1 MG/DL (ref 0.2–1)
BUN SERPL-MCNC: 13 MG/DL (ref 6–20)
BUN/CREAT SERPL: 17 (ref 12–20)
CALCIUM SERPL-MCNC: 9.2 MG/DL (ref 8.5–10.1)
CANNABINOIDS UR QL SCN: NEGATIVE
CHLORIDE SERPL-SCNC: 107 MMOL/L (ref 97–108)
CO2 SERPL-SCNC: 30 MMOL/L (ref 21–32)
COCAINE UR QL SCN: NEGATIVE
CREAT SERPL-MCNC: 0.75 MG/DL (ref 0.55–1.02)
DIFFERENTIAL METHOD BLD: ABNORMAL
DRUG SCRN COMMENT,DRGCM: ABNORMAL
EOSINOPHIL # BLD: 0.1 K/UL (ref 0–0.4)
EOSINOPHIL NFR BLD: 2 % (ref 0–7)
ERYTHROCYTE [DISTWIDTH] IN BLOOD BY AUTOMATED COUNT: 16.1 % (ref 11.5–14.5)
ETHANOL SERPL-MCNC: <10 MG/DL
FLUAV RNA SPEC QL NAA+PROBE: NOT DETECTED
FLUBV RNA SPEC QL NAA+PROBE: NOT DETECTED
GLOBULIN SER CALC-MCNC: 4.1 G/DL (ref 2–4)
GLUCOSE SERPL-MCNC: 217 MG/DL (ref 65–100)
HCG UR QL: NEGATIVE
HCT VFR BLD AUTO: 35.9 % (ref 35–47)
HGB BLD-MCNC: 11.2 G/DL (ref 11.5–16)
IMM GRANULOCYTES # BLD AUTO: 0 K/UL (ref 0–0.04)
IMM GRANULOCYTES NFR BLD AUTO: 0 % (ref 0–0.5)
LYMPHOCYTES # BLD: 1.8 K/UL (ref 0.8–3.5)
LYMPHOCYTES NFR BLD: 27 % (ref 12–49)
MCH RBC QN AUTO: 26.5 PG (ref 26–34)
MCHC RBC AUTO-ENTMCNC: 31.2 G/DL (ref 30–36.5)
MCV RBC AUTO: 84.9 FL (ref 80–99)
METHADONE UR QL: NEGATIVE
MONOCYTES # BLD: 0.2 K/UL (ref 0–1)
MONOCYTES NFR BLD: 3 % (ref 5–13)
NEUTS SEG # BLD: 4.4 K/UL (ref 1.8–8)
NEUTS SEG NFR BLD: 68 % (ref 32–75)
NRBC # BLD: 0 K/UL (ref 0–0.01)
NRBC BLD-RTO: 0 PER 100 WBC
OPIATES UR QL: NEGATIVE
PCP UR QL: NEGATIVE
PLATELET # BLD AUTO: 300 K/UL (ref 150–400)
PMV BLD AUTO: 9.6 FL (ref 8.9–12.9)
POTASSIUM SERPL-SCNC: 3.6 MMOL/L (ref 3.5–5.1)
PROT SERPL-MCNC: 7.7 G/DL (ref 6.4–8.2)
RBC # BLD AUTO: 4.23 M/UL (ref 3.8–5.2)
SALICYLATES SERPL-MCNC: <1.7 MG/DL (ref 2.8–20)
SARS-COV-2, COV2: NOT DETECTED
SODIUM SERPL-SCNC: 139 MMOL/L (ref 136–145)
UR CULT HOLD, URHOLD: NORMAL
WBC # BLD AUTO: 6.6 K/UL (ref 3.6–11)

## 2023-01-13 PROCEDURE — 80143 DRUG ASSAY ACETAMINOPHEN: CPT

## 2023-01-13 PROCEDURE — 74011250637 HC RX REV CODE- 250/637: Performed by: NURSE PRACTITIONER

## 2023-01-13 PROCEDURE — 99285 EMERGENCY DEPT VISIT HI MDM: CPT

## 2023-01-13 PROCEDURE — 85025 COMPLETE CBC W/AUTO DIFF WBC: CPT

## 2023-01-13 PROCEDURE — 82077 ASSAY SPEC XCP UR&BREATH IA: CPT

## 2023-01-13 PROCEDURE — 80307 DRUG TEST PRSMV CHEM ANLYZR: CPT

## 2023-01-13 PROCEDURE — 87636 SARSCOV2 & INF A&B AMP PRB: CPT

## 2023-01-13 PROCEDURE — 80053 COMPREHEN METABOLIC PANEL: CPT

## 2023-01-13 PROCEDURE — 80179 DRUG ASSAY SALICYLATE: CPT

## 2023-01-13 PROCEDURE — 65220000003 HC RM SEMIPRIVATE PSYCH

## 2023-01-13 PROCEDURE — 36415 COLL VENOUS BLD VENIPUNCTURE: CPT

## 2023-01-13 PROCEDURE — 81025 URINE PREGNANCY TEST: CPT

## 2023-01-13 RX ORDER — ADHESIVE BANDAGE
30 BANDAGE TOPICAL DAILY PRN
Status: DISCONTINUED | OUTPATIENT
Start: 2023-01-13 | End: 2023-01-30 | Stop reason: HOSPADM

## 2023-01-13 RX ORDER — BENZTROPINE MESYLATE 1 MG/1
1 TABLET ORAL
Status: DISCONTINUED | OUTPATIENT
Start: 2023-01-13 | End: 2023-01-30 | Stop reason: HOSPADM

## 2023-01-13 RX ORDER — MIDAZOLAM HYDROCHLORIDE 1 MG/ML
2 INJECTION, SOLUTION INTRAMUSCULAR; INTRAVENOUS
Status: DISCONTINUED | OUTPATIENT
Start: 2023-01-13 | End: 2023-01-30 | Stop reason: HOSPADM

## 2023-01-13 RX ORDER — OLANZAPINE 5 MG/1
5 TABLET ORAL
Status: DISCONTINUED | OUTPATIENT
Start: 2023-01-13 | End: 2023-01-30 | Stop reason: HOSPADM

## 2023-01-13 RX ORDER — HALOPERIDOL 5 MG/ML
5 INJECTION INTRAMUSCULAR
Status: DISCONTINUED | OUTPATIENT
Start: 2023-01-13 | End: 2023-01-30 | Stop reason: HOSPADM

## 2023-01-13 RX ORDER — TRAZODONE HYDROCHLORIDE 50 MG/1
50 TABLET ORAL
Status: DISCONTINUED | OUTPATIENT
Start: 2023-01-13 | End: 2023-01-14

## 2023-01-13 RX ORDER — DIPHENHYDRAMINE HYDROCHLORIDE 50 MG/ML
50 INJECTION, SOLUTION INTRAMUSCULAR; INTRAVENOUS
Status: DISCONTINUED | OUTPATIENT
Start: 2023-01-13 | End: 2023-01-30 | Stop reason: HOSPADM

## 2023-01-13 RX ORDER — ACETAMINOPHEN 325 MG/1
650 TABLET ORAL
Status: DISCONTINUED | OUTPATIENT
Start: 2023-01-13 | End: 2023-01-30 | Stop reason: HOSPADM

## 2023-01-13 RX ORDER — HYDROXYZINE 50 MG/1
50 TABLET, FILM COATED ORAL
Status: DISCONTINUED | OUTPATIENT
Start: 2023-01-13 | End: 2023-01-30 | Stop reason: HOSPADM

## 2023-01-13 RX ADMIN — TRAZODONE HYDROCHLORIDE 50 MG: 50 TABLET ORAL at 22:24

## 2023-01-13 RX ADMIN — ACETAMINOPHEN 650 MG: 325 TABLET ORAL at 22:24

## 2023-01-13 NOTE — ED TRIAGE NOTES
Pt arrives from home for anxiety and depression that has worsened over the last few days. Pt endorses SI and planned to take \"some pills. \" States her anxiety medications have not been helping.

## 2023-01-13 NOTE — ED NOTES
Pt changed into [address] , belongings secured at nursing station, security called for wanding

## 2023-01-13 NOTE — ED PROVIDER NOTES
This is a 64-year-old female with a history of anxiety and depression, diabetes and hypertension. She has been seen for suicidal ideation in the past and states that yesterday she was feeling the same suicidal tendencies. Today she is better and is primarily complaining of just anxiety and agitation. She keeps pulling at her hair. She is not complaining of any headache, shortness of breath, chest pain or nausea and vomiting. She has no abdominal pain or GI or  symptoms noted. She is not sure whether she has been taking all of her medications quite like she is supposed to. She denies any other acute symptomatology. Past Medical History:   Diagnosis Date    Anxiety     Depression     ANXIETY & DEPRESSION    Diabetes ([address]     TYPE II    Hypertension     Infected sebaceous cyst, upper back 4/10/2019    Panic attack     Suicidal thoughts     Vision decreased        Past Surgical History:   Procedure Laterality Date    COLONOSCOPY N/A 11/20/2020    . COLONOSCOPY  :- performed by Ji Pro MD at [facility] ENDOSCOPY    HX OTHER SURGICAL  06/03/2019     Excision of large sebaceous cyst in the midline upper back and with packing- [facility]-DR. Jonathan John         Family History:   Problem Relation Age of Onset    Diabetes Mother     Hypertension Mother     Cancer Father         LUNG CA. SMOKER       Social History     Socioeconomic History    Marital status: SINGLE     Spouse name: Not on file    Number of children: Not on file    Years of education: Not on file    Highest education level: Not on file   Occupational History    Not on file   Tobacco Use    Smoking status: Never    Smokeless tobacco: Never    Tobacco comments:     n/a never smoked tobacco   Substance and Sexual Activity    Alcohol use: No    Drug use: No    Sexual activity: Yes     Partners: Male     Birth control/protection: None   Other Topics Concern    Not on file   Social History Narrative    Not on file     Social Determinants of Health Financial Resource Strain: Not on file   Food Insecurity: Not on file   Transportation Needs: Not on file   Physical Activity: Not on file   Stress: Not on file   Social Connections: Not on file   Intimate Partner Violence: Not on file   Housing Stability: Not on file         ALLERGIES: Lisinopril    Review of Systems   Constitutional:  Negative for activity change, appetite change and fatigue. HENT:  Negative for ear pain, facial swelling, sore throat and trouble swallowing. Eyes:  Negative for pain, discharge and visual disturbance. Respiratory:  Negative for chest tightness, shortness of breath and wheezing. Cardiovascular:  Negative for chest pain and palpitations. Gastrointestinal:  Negative for abdominal pain, blood in stool, nausea and vomiting. Genitourinary:  Negative for difficulty urinating, flank pain and hematuria. Musculoskeletal:  Negative for arthralgias, joint swelling, myalgias and neck pain. Skin:  Negative for color change and rash. Neurological:  Negative for dizziness, weakness, numbness and headaches. Hematological:  Negative for adenopathy. Does not bruise/bleed easily. Psychiatric/Behavioral:  Positive for agitation and suicidal ideas. Negative for behavioral problems, confusion and sleep disturbance. All other systems reviewed and are negative. Vitals:    01/13/23 1118   BP: (!) 140/82   Pulse: 96   Resp: 18   Temp: 98 °F (36.7 °C)   SpO2: 98%   Weight: 98.9 kg (218 lb)   Height: 5' 3\" (1.6 m)            Physical Exam  Vitals and nursing note reviewed. Constitutional:       General: She is not in acute distress. Appearance: She is well-developed. HENT:      Head: Normocephalic and atraumatic. Nose: Nose normal.      Mouth/Throat:      Mouth: Mucous membranes are moist.   Eyes:      General: No scleral icterus. Conjunctiva/sclera: Conjunctivae normal.      Pupils: Pupils are equal, round, and reactive to light.    Neck:      Thyroid: No thyromegaly. Vascular: No JVD. Trachea: No tracheal deviation. Comments: No carotid bruits noted. Cardiovascular:      Rate and Rhythm: Normal rate and regular rhythm. Heart sounds: Normal heart sounds. No murmur heard. No friction rub. No gallop. Pulmonary:      Effort: Pulmonary effort is normal. No respiratory distress. Breath sounds: Normal breath sounds. No wheezing or rales. Chest:      Chest wall: No tenderness. Abdominal:      General: Bowel sounds are normal. There is no distension. Palpations: Abdomen is soft. There is no mass. Tenderness: There is no abdominal tenderness. There is no guarding or rebound. Musculoskeletal:         General: No tenderness. Normal range of motion. Cervical back: Normal range of motion and neck supple. Lymphadenopathy:      Cervical: No cervical adenopathy. Skin:     General: Skin is warm and dry. Capillary Refill: Capillary refill takes 2 to 3 seconds. Findings: No erythema or rash. Neurological:      Mental Status: She is alert and oriented to person, place, and time. Cranial Nerves: No cranial nerve deficit. Coordination: Coordination normal.      Deep Tendon Reflexes: Reflexes are normal and symmetric. Psychiatric:         Behavior: Behavior normal.         Thought Content: Thought content normal.         Judgment: Judgment normal.        Medical Decision Making  This is a 78-year-old female who presents with anxiety and having had a suicidal ideation yesterday. She denies being suicidal today but apparently is having a great deal of difficulty with agitation and anxiety. She saw her psychiatrist yesterday and adjusted a dose of what sounds like trazodone. She denies any homicidal ideation at this time. She has a history of hypertension and diabetes and is taking medications for the same. We will have the counselor evaluate the patient and will obtain labs for admission.   Patient is stable at this time and does not require any medication intervention at this time. Family is with the patient. Amount and/or Complexity of Data Reviewed  Independent Historian: parent  Labs: ordered. Decision-making details documented in ED Course. Risk  Decision regarding hospitalization. Procedures  The patient has been seen by psychiatry and given her past experiences in the hospital, it was felt that the patient should be admitted given her degree of agitation presently. She apparently quickly agitated and escalated the last time she was admitted. Patient appears to be medically stable. Labs are pending and if they are normal will admit for further evaluation. There are no symptoms to suggest that she is having any problems with her diabetes or hypertension at this time. Her admission was reviewed. ED course, patient has been cooperative and in no severe distress in the ED requiring medications. Family is with the patient and she is amenable to admission. Patient's labs were all normal.  She has been stable in the ED in no acute distress. Will clear medically for admission to the psychiatric service.

## 2023-01-13 NOTE — BSMART NOTE
BSMART assessment completed, and suicide risk level noted to be high. Charge Nurse Ameena Mcpherson and Physician Dr. David Quiñones notified. Concerns not observed. Security/Off- has not been notified.

## 2023-01-13 NOTE — BSMART NOTE
Comprehensive Assessment Form Part 1      Section I - Disposition    Generalized Anxiety, per history  Depression, per history  Trichotillomania, per history      Past Medical History:   Diagnosis Date    Anxiety     Depression     ANXIETY & DEPRESSION    Diabetes ([address]     TYPE II    Hypertension     Infected sebaceous cyst, upper back 4/10/2019    Panic attack     Suicidal thoughts     Vision decreased        The Medical Doctor to Psychiatrist conference was not completed. The Medical Doctor is in agreement with Psychiatrist disposition because patient is seeking voluntary inpatient treatment. The plan is to admit to the [facility]. The on-call Psychiatrist consulted was N/A. The admitting Psychiatrist will be TBD. The admitting Diagnosis is anxiety, depression. The Payor source is [facility] MEDICAID/[facility]. This writer reviewed the [address] in nursing flowsheet and the risk level assigned is high risk. Based on this assessment, the risk of suicide is high risk and the plan is to admit to the [facility]. Section II - Integrated Summary  Summary:  Patient arrived to the ED with complaints of increased anxiety and depression. This clinician met with Bhavna Sam and her mother Alessandro Grover face to face in the ED. Bhavna Sam was dressed in a green gown and was laying in the hospital bed. She was oriented x4 and was calm, cooperative, and pleasant during the assessment. She appeared anxious and exhibited pressured speech. She reports that her anxiety and depression have been increasing and are particularly bad in the morning. She states her current levels of depression and anxiety are at a 10 out of 10. Bhavna Sam states that she will pull her hair out and scream and previous records show that she needed to be moved to the acute unit due to these behaviors. Bhavna Sam denies SI/HI today but states that yesterday she experienced SI with a plan to take an overdose.  She has a history of multiple suicide attempts via overdose and currently lives alone. She has a history of multiple inpatient hospitalizations, most recently in July/August 2022. Robbin Miller denies current and historical AH/VH and did not appear to be responding to internal stimuli during the assessment. She reports a decrease in sleep and the sleep she does get is poor quality. No concerns noted with her appetite. No substance use reported. Robbin Miller is not currently enrolled in outpatient therapy but is prescribed medications by Dr. Sudhakar Forde through the [facility] ([facility] She reports medication compliance but does not feel like her medications are working properly. She met with Dr. José Miguel Holden yesterday and he adjusted her Trazodone but no other medications. Robbin Miller states that her symptoms have gotten worse enough that she took medical leave from her job. She finds that it is really difficult to start her day, which increase her anxiety levels. She is currently seeking inpatient MH treatment to stabilize her symptoms. This clinician consulted with her ED provider, Dr. Jennifer Braxton, who agrees with this disposition. She will be presented for voluntary admission once medically clear. It is reported that she has Type II diabetes and hypertension. She does not use any medical assistive devices and can independently complete her ADLs. The patient has demonstrated mental capacity to provide informed consent. The information is given by the patient, parent, and past medical records. The Chief Complaint is anxiety and depression. The Precipitant Factors are unknown. Previous Hospitalizations: She has a history of inpatient admissions, most recently in July/August 2022. The patient has not previously been in restraints. Current Psychiatrist and/or  is Dr. Sudhakar Forde at the [facility] ([facility]     Lethality Assessment:    The potential for suicide noted by the following: previous history of attempts which occured on multiple occassions in the form of an overdose, vague plan, ideation, and means. The potential for homicide is not noted. The patient has not been a perpetrator of sexual or physical abuse. There are not pending charges. The patient is felt to be at risk for self harm or harm to others. The attending nurse was advised to remove potentially harmful or dangerous items from the patient's room , to request a search of the patient's belongings, to remove patient clothing and place it out of immediate access to the patient, the patient is at risk for self harm, and the patient needs supervision. Section III - Psychosocial  The patient's overall mood and attitude is anxious and depressed. Feelings of helplessness and hopelessness are observed by self report. Generalized anxiety is observed by self report. Panic is not observed. Phobias are not observed. Obsessive compulsive tendencies are observed by self report of hair pulling. Section IV - Mental Status Exam  The patient's appearance shows no evidence of impairment. The patient's behavior shows no evidence of impairment. The patient is oriented to time, place, person and situation. The patient's speech is pressured. The patient's mood is depressed and is anxious. The range of affect shows no evidence of impairment. The patient's thought content demonstrates no evidence of impairment. The thought process shows no evidence of impairment. The patient's perception shows no evidence of impairment. The patient's memory shows no evidence of impairment. The patient's appetite shows no evidence of impairment. The patient's sleep has evidence of insomnia. The patient shows little insight. The patient's judgement shows no evidence of impairment. Section V - Substance Abuse  The patient is not using substances. Section VI - Living Arrangements  The patient is single. The patient lives alone. The patient has no children.   The patient does plan to return home upon discharge. The patient does not have legal issues pending. The patient's source of income comes from unknown. Sabianism and cultural practices have not been voiced at this time. The patient's greatest support comes from family and this person will be involved with the treatment. The patient has not been in an event described as horrible or outside the realm of ordinary life experience either currently or in the past.  The patient has not been a victim of sexual/physical abuse. Section VII - Other Areas of Clinical Concern  The highest grade achieved is some college with the overall quality of school experience being described as not assessed. The patient is currently on medical leave and speaks [place] as a primary language. The patient has no communication impairments affecting communication. The patient's preference for learning can be described as: can read and write adequately.   The patient's hearing is normal.  The patient's vision is normal.      Enrico Hernandez LCSW

## 2023-01-14 LAB
GLUCOSE BLD STRIP.AUTO-MCNC: 148 MG/DL (ref 65–117)
SERVICE CMNT-IMP: ABNORMAL

## 2023-01-14 PROCEDURE — 74011250637 HC RX REV CODE- 250/637: Performed by: NURSE PRACTITIONER

## 2023-01-14 PROCEDURE — 82962 GLUCOSE BLOOD TEST: CPT

## 2023-01-14 PROCEDURE — 74011250636 HC RX REV CODE- 250/636: Performed by: NURSE PRACTITIONER

## 2023-01-14 PROCEDURE — 74011250636 HC RX REV CODE- 250/636

## 2023-01-14 PROCEDURE — 65220000003 HC RM SEMIPRIVATE PSYCH

## 2023-01-14 RX ORDER — OLANZAPINE 2.5 MG/1
2.5 TABLET ORAL
Status: ON HOLD | COMMUNITY
End: 2023-01-16

## 2023-01-14 RX ORDER — ALPRAZOLAM 0.5 MG/1
0.5 TABLET ORAL
COMMUNITY
End: 2023-01-30

## 2023-01-14 RX ORDER — PANTOPRAZOLE SODIUM 20 MG/1
20 TABLET, DELAYED RELEASE ORAL DAILY
Status: DISCONTINUED | OUTPATIENT
Start: 2023-01-15 | End: 2023-01-30 | Stop reason: HOSPADM

## 2023-01-14 RX ORDER — ALPRAZOLAM 0.5 MG/1
0.5 TABLET ORAL
Status: DISCONTINUED | OUTPATIENT
Start: 2023-01-14 | End: 2023-01-27

## 2023-01-14 RX ORDER — TRAZODONE HYDROCHLORIDE 100 MG/1
100 TABLET ORAL
Status: DISCONTINUED | OUTPATIENT
Start: 2023-01-14 | End: 2023-01-16

## 2023-01-14 RX ORDER — FLUVOXAMINE MALEATE 100 MG/1
100 TABLET, COATED ORAL 2 TIMES DAILY
COMMUNITY
End: 2023-01-30

## 2023-01-14 RX ORDER — PANTOPRAZOLE SODIUM 20 MG/1
20 TABLET, DELAYED RELEASE ORAL DAILY
COMMUNITY
End: 2023-01-30

## 2023-01-14 RX ORDER — SERTRALINE HYDROCHLORIDE 50 MG/1
25 TABLET, FILM COATED ORAL DAILY
Status: DISCONTINUED | OUTPATIENT
Start: 2023-01-15 | End: 2023-01-16

## 2023-01-14 RX ORDER — OLANZAPINE 2.5 MG/1
2.5 TABLET ORAL
Status: DISCONTINUED | OUTPATIENT
Start: 2023-01-14 | End: 2023-01-17

## 2023-01-14 RX ORDER — TRAZODONE HYDROCHLORIDE 100 MG/1
150 TABLET ORAL
COMMUNITY
End: 2023-01-30

## 2023-01-14 RX ORDER — CHLORPROMAZINE HYDROCHLORIDE 25 MG/ML
50 INJECTION INTRAMUSCULAR ONCE
Status: COMPLETED | OUTPATIENT
Start: 2023-01-14 | End: 2023-01-14

## 2023-01-14 RX ORDER — GLIPIZIDE 5 MG/1
10 TABLET ORAL DAILY
Status: DISCONTINUED | OUTPATIENT
Start: 2023-01-15 | End: 2023-01-18

## 2023-01-14 RX ADMIN — MIDAZOLAM 2 MG: 1 INJECTION INTRAMUSCULAR; INTRAVENOUS at 09:00

## 2023-01-14 RX ADMIN — HALOPERIDOL LACTATE 5 MG: 5 INJECTION, SOLUTION INTRAMUSCULAR at 09:00

## 2023-01-14 RX ADMIN — OLANZAPINE 2.5 MG: 2.5 TABLET, FILM COATED ORAL at 20:07

## 2023-01-14 RX ADMIN — TRAZODONE HYDROCHLORIDE 100 MG: 50 TABLET ORAL at 20:07

## 2023-01-14 RX ADMIN — CHLORPROMAZINE HYDROCHLORIDE 50 MG: 25 INJECTION INTRAMUSCULAR at 11:50

## 2023-01-14 RX ADMIN — OLANZAPINE 5 MG: 5 TABLET, FILM COATED ORAL at 16:14

## 2023-01-14 RX ADMIN — DIPHENHYDRAMINE HYDROCHLORIDE 50 MG: 50 INJECTION, SOLUTION INTRAMUSCULAR; INTRAVENOUS at 09:00

## 2023-01-14 RX ADMIN — ACETAMINOPHEN 650 MG: 325 TABLET ORAL at 20:08

## 2023-01-14 NOTE — H&P
INITIAL PSYCHIATRIC INTERVIEW    CHIEF COMPLAINT: \"Anxiety and depression\"        HISTORY OF PRESENTING COMPLAINT:  Aliza Campo is a 50 y.o. BLACK/ female who is currently admitted to the psychiatric floor at [facility]. She states that she has been dealing with depression and anxiety for many years. She states that she was on xanax, luvox, and trazodone previously. She states that the luvox made her feel confused so she stopped taking it. She states that she has been dealing with hair pulling and would like to start a new medication to stop it. She endorses decreased sleep and states that she has not slept in 2 nights. She endorses having OP psych and has been at [facility] in July. She denies SI/Hi/AVH currently. She denies any drug or alcohol use. UDS was positive for benzos which she is prescribed. PAST PSYCHIATRIC HISTORY and SUBSTANCE ABUSE HISTORY:  See above      PAST MEDICAL HISTORY:  Please see H&P for details. Past Medical History:   Diagnosis Date    Anxiety     Depression     ANXIETY & DEPRESSION    Diabetes ([facility] [address]     TYPE II    Hypertension     Infected sebaceous cyst, upper back 4/10/2019    Panic attack     Suicidal thoughts     Vision decreased      Prior to Admission medications    Medication Sig Start Date End Date Taking? Authorizing Provider   fluvoxaMINE (LUVOX) 100 mg tablet Take 100 mg by mouth two (2) times a day. Yes Provider, Historical   OLANZapine (ZyPREXA) 2.5 mg tablet Take 2.5 mg by mouth nightly. Yes Provider, Historical   traZODone (DESYREL) 100 mg tablet Take 100 mg by mouth nightly. Yes Provider, Historical   ALPRAZolam (XANAX) 0.5 mg tablet Take 0.5 mg by mouth two (2) times daily as needed for Anxiety. Yes Provider, Historical   pantoprazole (Protonix) 40 mg tablet Take 40 mg by mouth daily. Yes Provider, Historical   glipiZIDE SR (GLUCOTROL XL) 10 mg CR tablet Take 1 Tablet by mouth in the morning.  Indications: type 2 diabetes mellitus 8/5/22   Francisco Anil BRASHER NP   hydrOXYzine HCL (ATARAX) 50 mg tablet Take 1 Tablet by mouth every four (4) hours as needed for Anxiety. Indications: anxious 8/5/22   Francisco BRASHER JEREMI     Vitals:    01/13/23 1819 01/13/23 2130 01/13/23 2145 01/14/23 1615   BP: 108/66 117/79 122/76 (!) 164/84   Pulse: 99 73 94 (!) 105   Resp: 18 13 14 16   Temp: 99.5 °F (37.5 °C) 98.9 °F (37.2 °C) 99 °F (37.2 °C) 98.5 °F (36.9 °C)   SpO2: 97% 96% 97% 99%   Weight:  98.9 kg (218 lb)     Height:  5' 3\" (1.6 m)       Lab Results   Component Value Date/Time    WBC 6.6 01/13/2023 12:42 PM    HGB 11.2 (L) 01/13/2023 12:42 PM    HCT 35.9 01/13/2023 12:42 PM    PLATELET 625 32/24/4650 12:42 PM    MCV 84.9 01/13/2023 12:42 PM     Lab Results   Component Value Date/Time    Sodium 139 01/13/2023 12:42 PM    Potassium 3.6 01/13/2023 12:42 PM    Chloride 107 01/13/2023 12:42 PM    CO2 30 01/13/2023 12:42 PM    Anion gap 2 (L) 01/13/2023 12:42 PM    Glucose 217 (H) 01/13/2023 12:42 PM    Glucose 146 (H) 07/29/2022 05:44 AM    BUN 13 01/13/2023 12:42 PM    Creatinine 0.75 01/13/2023 12:42 PM    BUN/Creatinine ratio 17 01/13/2023 12:42 PM    GFR est AA >60 07/26/2022 02:48 PM    GFR est non-AA >60 07/26/2022 02:48 PM    Calcium 9.2 01/13/2023 12:42 PM    Bilirubin, total 0.1 (L) 01/13/2023 12:42 PM    Alk. phosphatase 77 01/13/2023 12:42 PM    Protein, total 7.7 01/13/2023 12:42 PM    Albumin 3.6 01/13/2023 12:42 PM    Globulin 4.1 (H) 01/13/2023 12:42 PM    A-G Ratio 0.9 (L) 01/13/2023 12:42 PM    ALT (SGPT) 20 01/13/2023 12:42 PM    AST (SGOT) 8 (L) 01/13/2023 12:42 PM     No results found for: VALF2, VALAC, VALP, VALPR, DS6, CRBAM, CRBAMP, CARB2, XCRBAM  No results found for: LITHM  RADIOLOGY REPORTS:(reviewed/updated 1/14/2023)  XR SPINE CERV 4 OR 5 V    Result Date: 10/25/2022  EXAM:  XR SPINE CERV 4 OR 5 V INDICATION: Cervical radiculopathy COMPARISON: CT 5/4/2022.  TECHNIQUE: 5 views cervical spine FINDINGS: There is no acute fracture or subluxation. Vertebral body heights are maintained. There are stable diffuse degenerative disc changes with posterior longitudinal ligament mineralization and robust anterior osteophytes at C4-5 through C6-7. There is no abnormality in alignment. There is bilateral neural foraminal narrowing at C5-6 and C6-7. The C1-2 relationship is within normal limits. The prevertebral soft tissues are unremarkable. Stable diffuse cervical degenerative changes with bilateral neural foraminal narrowing at C5-6 and C6-7. CT HEAD WO CONT    Result Date: 11/28/2022  CLINICAL HISTORY: Traumatic head injury, headaches INDICATION: Headaches COMPARISON: None. CT dose reduction was achieved through use of a standardized protocol tailored for this examination and automatic exposure control for dose modulation. TECHNIQUE: Serial axial images with a collimation of 5 mm were obtained from the skull base through the vertex  FINDINGS: The sulci and ventricles are within normal limits for patient age. There is no evidence of an acute infarction, hemorrhage, or mass-effect. There is no evidence of midline shift or hydrocephalus. Posterior fossa structures are unremarkable. No extra-axial collections are seen. Mastoid air cells are well pneumatized and clear. Chronic fall seen calcifications There is no evidence of depressed skull fractures of soft tissue swelling. No acute intracranial process. Lab Results   Component Value Date/Time    HCG urine, QL Negative 01/13/2023 12:49 PM    Pregnancy test,urine (POC) Negative 07/26/2022 02:49 PM    HCG, Ql. NEGATIVE 06/22/2018 11:14 AM          PSYCHOSOCIAL HISTORY:  Lives alone, works for [employer]. MENTAL STATUS EXAM:  General appearance:  well  groomed, psychomotor activity is WNL  Eye contact: Avoids eye contact  Speech: Spontaneous, soft, decreased output.    Affect : Depressed, decreased range  Mood: \"depressed\"  Thought Process: Logical, goal directed  Perception: Denies AH or VH. Thought Content: Denies SI or Plan  Insight: Poor  Judgement: Poor  Cognition: Intact grossly. ASSESSMENT AND PLAN:  Aliza Campo meets criteria for a diagnosis of  Major depressive disorder, recurrent, severe, trichotillomania. Continue inpatient stay for the safety and optimum care of the patient   Routine labs to be ordered as needed. Psychotropic medications will be ordered and adjusted as needed. Supportive, milieu and group therapy. Continue rest of the medications as needed. Strengths include ability to seek help and   Estimated length of stay is 5-7 days. I certify that this patients inpatient psychiatric hospital services furnished since the previous certification were, and continue to be, required for treatment that could reasonably be expected to improve the patient's condition, or for diagnostic study, and that the patient continues to need, on a daily basis, active treatment furnished directly by or requiring the supervision of inpatient psychiatric facility personnel. In addition, the hospital records show that services furnished were intensive treatment services, admission or related services, or equivalent services.

## 2023-01-14 NOTE — PROGRESS NOTES
01/14/23 1615   Vital Signs   Temp 98.5 °F (36.9 °C)   Temp Source Oral   Pulse (Heart Rate) (!) 105   Resp Rate 16   O2 Sat (%) 99 %   Level of Consciousness 0   BP (!) 164/84   MAP (Calculated) 111   MEWS Score 2   Pt reports anxious. Pt is restless with impaired decision making. Pt refuses prn antianxiety medication however she is compliant with po prn Zyprexa currently. Staff will reassesses VS.  Pt is paranoid and suspicious related to medications. Poor insight; selective with medications. Continued education is provided.

## 2023-01-14 NOTE — BH NOTES
Pt refusing vital signs. Stated she wanted her blood glucose monitoring completed on her wrist. Staff educated pt that we monitor blood glucose with her fingers. Came out of her room and ate small amount of breakfast. Stood up and started screaming and pulling her hair out. Walked over to the acute unit. Belongings taken to acute unit.

## 2023-01-14 NOTE — BH NOTES
0900- pt is paranoid. Refuses VS, BS, and po medications. pt is agitated and hostile. Not tolerating education, redirection of therapeutic communication. Pt is attempting to harm self; pt is yelling and pulling her hair. Staff is with pt for safety. Pt is agreeable to IM medication; IM medication administered. Staff remains with pt for safety. Breakfast provided. 0932- pt is resting in bed. Respirations are regular and unlabored. 4856- periodic yelling and pulling her hair. Staff remains with pt providing redirection.

## 2023-01-14 NOTE — BH NOTES
Admission unit:General    Received from:  [facility] [place] [facility]    Admission diagnosis:depression    Admission status: Voluntary; Consent signed     Mood/ affect/ thought process / behaviors: Patient is anxious but euthymic. Mood is incongruent with affect. Patient states she is here for medication management. Patient currently denies SI, HI, AVH. Patient agrees to remain safe on unit. Patient's previously admitted here July 2022. During last admission, pt was excessively pulling her hair out. Alcohol/drug: BAL <10, UDS + Benzos    PTA meds verified: needs to be verified. [address] [phone]    PT or consults required: Diabetic consult needed. Pt is type 2.        Primary Nurse Niharika Ramos RN and Marshall Vázquez RN performed a dual skin assessment on this patient No impairment noted  Daniel score is 23

## 2023-01-14 NOTE — BH NOTES
Pt agitated, ruminating, severely anxious, pulling at hair and clothes. SBP elevated. Declined PRN atarax but given PRN Zyprexa PO per orders. See eMAR for details.

## 2023-01-14 NOTE — BH NOTES
TRANSFER - IN REPORT:    Verbal report received from Ailyn(name) on Dick Varghese  being received from ED (unit) for routine progression of care      Report consisted of patients Situation, Background, Assessment and   Recommendations(SBAR). Information from the following report(s) SBAR was reviewed with the receiving nurse. Opportunity for questions and clarification was provided. Assessment completed upon patients arrival to unit and care assumed.

## 2023-01-14 NOTE — PROGRESS NOTES
Patient received resting in bed with eyes closed. NAD and no complaints noted. Safety measures in place. Will continue to monitor with q15min rounds. Problem: Falls - Risk of  Goal: *Absence of Falls  Description: Document Chas Lucas Fall Risk and appropriate interventions in the flowsheet.   Outcome: Progressing Towards Goal  Note: Fall Risk Interventions:

## 2023-01-14 NOTE — ROUTINE PROCESS
TRANSFER - OUT REPORT:    Verbal report given to CHRISTIANE Chiu(name) on Taliaferro Axon  being transferred to 7 Psych(unit) for routine progression of care       Report consisted of patients Situation, Background, Assessment and   Recommendations(SBAR). Information from the following report(s) SBAR, Kardex, ED Summary, Accordion, Recent Results, and Quality Measures was reviewed with the receiving nurse. Lines:       Opportunity for questions and clarification was provided.       Patient transported with:   NORM Bautista

## 2023-01-14 NOTE — BH NOTES
TRANSFER - OUT REPORT:    Verbal report given to Radu Waite RN(name) on Paul Suggs  being transferred to 7 ACUTE unit(unit) for routine progression of care       Report consisted of patients Situation, Background, Assessment and   Recommendations(SBAR). Information from the following report(s) SBAR was reviewed with the receiving nurse. Lines:       Opportunity for questions and clarification was provided.       Patient transported with:   Registered Nurse

## 2023-01-14 NOTE — PROGRESS NOTES
Problem: Aggression and Hostility (Behavioral Health)  Goal: *Reduce intensity and frequency of aggressive behaviors and verbalizations, treating others with respect  Outcome: Progressing Towards Goal  Goal: *Identify early warning signs of explosive outbursts or catastrophic reaction  Outcome: Progressing Towards Goal  Goal: *Aggression and Hostility Interventions  Outcome: Progressing Towards Goal  Note: Pt is alert and oriented. Staff remains with pt during periods of agitation to promote safety. 1014- pt is resting in bed; restless. Pt reports taking prescribed 5 mg Xanax twice a day for months. Last taken yesterday. Out patient pharmacy [facility] [address] [place] [address] [phone].    1106- pt is impulsive and agitated. Pt requires continuous redirection from self harming behaviors and aggressive outbursts. On call provider paged. 1150- one time order for Thorazine 50 mg IM received by Darryl Ahn NP. Education provided to pt. Medication administrated; security present. 1214- pt continues to yell out and pull her hair. Redirection provided. 1417- pt is visible on the unit eating snacks with peers. Yelling out pulling her own hair. Redirection provided. 1602- pt is allowing VS. Pt is alert and oriented talking to nursing staff. Increased restlessness observed. Pt requires redirection to remain clothed while on the unit. Education provided; pt is agreeable to po prn Zyprexa. Medication is administered. Pt is visible on the unit talking to peers.      [facility] 60  Master Treatment Plan for Laroy Cava    Date Treatment Plan Initiated: 1/14/2023    Treatment Plan Modalities:  Type of Modality Amount  (x minutes) Frequency (x/week) Duration (x days) Name of Responsible Staff   Community & wrap-up meetings to encourage peer interactions 15 7 1   Renetta GUZMAN    Group psychotherapy to assist in building coping skills and internal controls 60 7 1 Faina Kanika Kirby MSW   Therapeutic activity groups to build coping skills 60 7 1 Gabriel Wadsworth MSW   Psychoeducation in group setting to address:   Medication education   Bailey Daniels 39. PharmD   Coping skills   Santana Mayen   Relaxation techniques         Symptom management         Discharge planning   60 2 1400 [facility]   [facility]    60 2 1 Chaplain ARMANDO   60 1 1 volunteer   Recovery/AA/NA      volunteer   Physician medication management   15 7 1 Dr Michael Pham NP, Casandra NP   Family meeting/discharge planning   15 2 1 Fabio Her

## 2023-01-15 LAB
GLUCOSE BLD STRIP.AUTO-MCNC: 178 MG/DL (ref 65–117)
SERVICE CMNT-IMP: ABNORMAL

## 2023-01-15 PROCEDURE — 74011250637 HC RX REV CODE- 250/637: Performed by: NURSE PRACTITIONER

## 2023-01-15 PROCEDURE — 65220000003 HC RM SEMIPRIVATE PSYCH

## 2023-01-15 PROCEDURE — 82962 GLUCOSE BLOOD TEST: CPT

## 2023-01-15 RX ADMIN — GLIPIZIDE 10 MG: 5 TABLET ORAL at 09:08

## 2023-01-15 RX ADMIN — OLANZAPINE 5 MG: 5 TABLET, FILM COATED ORAL at 02:00

## 2023-01-15 RX ADMIN — TRAZODONE HYDROCHLORIDE 100 MG: 50 TABLET ORAL at 22:11

## 2023-01-15 RX ADMIN — PANTOPRAZOLE SODIUM 20 MG: 20 TABLET, DELAYED RELEASE ORAL at 09:08

## 2023-01-15 RX ADMIN — SERTRALINE 25 MG: 50 TABLET, FILM COATED ORAL at 09:08

## 2023-01-15 RX ADMIN — OLANZAPINE 2.5 MG: 2.5 TABLET, FILM COATED ORAL at 22:11

## 2023-01-15 RX ADMIN — HYDROXYZINE HYDROCHLORIDE 50 MG: 50 TABLET ORAL at 02:00

## 2023-01-15 NOTE — PROGRESS NOTES
Spiritual Care Assessment/Progress Note  [facility]      NAME: Thony Swan      MRN: [id number]  AGE: 50 y.o.  SEX: female  Congregation Affiliation: No Advent   Language: English     1/15/2023     Total Time (in minutes): 35     Spiritual Assessment begun in [facility] through conversation with:         [x]Patient        [] Family    [] Friend(s)        Reason for Consult: Request by patient     Spiritual beliefs: (Please include comment if needed)     [x] Identifies with a jasvir tradition: Yarsanism        [x] Supported by a jasvir community:            [] Claims no spiritual orientation:           [] Seeking spiritual identity:                [] Adheres to an individual form of spirituality:           [] Not able to assess:                           Identified resources for coping:      [x] Prayer                               [x] Music                  [] Guided Imagery     [] Family/friends                 [] Pet visits     [x] Devotional reading                         [] Unknown     [] Other:                                               Interventions offered during this visit: (See comments for more details)    Patient Interventions: Initial/Spiritual assessment, patient floor, Congregation beliefs/image of God discussed, Spiritual materials provided (comment), Prayer (actual), Coping skills reviewed/reinforced           Plan of Care:     [x] Support spiritual and/or cultural needs    [] Support AMD and/or advance care planning process      [] Support grieving process   [] Coordinate Rites and/or Rituals    [] Coordination with community clergy   [] No spiritual needs identified at this time   [] Detailed Plan of Care below (See Comments)  [] Make referral to Music Therapy  [] Make referral to Pet Therapy     [] Make referral to Addiction services  [] Make referral to [facility]  [] Make referral to Spiritual Care Partner  [] No future visits requested        [] Contact Spiritual Care for further referrals     Comments: Referral source: Patient requested spiritual support during rounds  Hannah Miranda at [facility] in [facility]. I reviewed the medical record prior to this encounter. We discussed current feelings/concerns and spiritual perspectives. The patient shared the reasons for this admission and the difficult emotions she's been experiencing. She relies on her jasvir for coping; reported that she's a member of a Hindu community and has family/friend support. I cultivated a relationship of support through expression of care; explored spiritual beliefs; emotional concerns; and coping strategies. I provided spiritual guidance, encouragement and prayer in accordance with patient's expressed beliefs. I provided Rastafari reading materials, as requested. Outcome: Hannah Miranda was able to identify spiritual practices that may enable stress reduction and help her connect to her spiritual strengths. She expressed appreciation for pastoral support and the opportunity to share her thoughts and feelings. Plan of Care: Ongoing support during rounds. Patient is aware of  availability and was encouraged to request support as needed. The  on-call can be reached at [phone]). Rev.  Ruma Dougherty MDiv, MS, [facility]  Staff

## 2023-01-15 NOTE — PROGRESS NOTES
Problem: Falls - Risk of  Goal: *Absence of Falls  Description: Document Sarmadzainab Rivera Fall Risk and appropriate interventions in the flowsheet. Outcome: Progressing Towards Goal  Note: Fall Risk Interventions:            Medication Interventions: Teach patient to arise slowly    Resting in bed with eyes closed, no complaints, no distress noted. Safety measures in place, will continue to monitor.

## 2023-01-15 NOTE — PROGRESS NOTES
Problem: Depressed Mood (Adult/Pediatric)  Goal: *STG: Remains safe in hospital  Outcome: Progressing Towards Goal     0730: Pt is currently in her room yelling, encouragement and redirection. 4900: Pt is currently in the dining room eating breakfast.    0826: Pt is currently in the dining room watching television. 11:08: Pt is currently in the dining room attending group. Pt has been visible on the unit. Anxious mood, denies SI. Interacting with select peers appropriately. Compliant with meals and medications.

## 2023-01-15 NOTE — BH NOTES
PSYCHIATRIC PROGRESS NOTE    CHIEF COMPLAINT: \"I'm ok\"    INTERVAL HISTORY: Mignon Pandya states that she is doing ok today. She endorses a slight decrease in the anxiety. She states that she had a spell this morning of hair pulling, but has been doing ok since then. She denies SI/HI/AVH currently. She denies any side effects from the medications.      VITALS:  Patient Vitals for the past 24 hrs:   Temp Pulse Resp BP SpO2   01/15/23 1158 97.9 °F (36.6 °C) 96 16 (!) 154/93 99 %   01/15/23 0718 99.6 °F (37.6 °C) 97 18 135/89 97 %   01/14/23 2005 98.9 °F (37.2 °C) 81 16 (!) 146/88 96 %   01/14/23 1615 98.5 °F (36.9 °C) (!) 105 16 (!) 164/84 99 %      LABS:  Recent Results (from the past 24 hour(s))   GLUCOSE, POC    Collection Time: 01/15/23  7:21 AM   Result Value Ref Range    Glucose (POC) 178 (H) 65 - 117 mg/dL    Performed by [employer]       MEDICATIONS:    Current Facility-Administered Medications:     ALPRAZolam (XANAX) tablet 0.5 mg, 0.5 mg, Oral, BID PRN, Casandra, Reta M, NP    glipiZIDE (GLUCOTROL) tablet 10 mg, 10 mg, Oral, DAILY, Casandra, Reta M, NP, 10 mg at 01/15/23 0908    OLANZapine (ZyPREXA) tablet 2.5 mg, 2.5 mg, Oral, QHS, Casandra, Reta M, NP, 2.5 mg at 01/14/23 2007    pantoprazole (PROTONIX) tablet 20 mg, 20 mg, Oral, DAILY, [place], Reta M, NP, 20 mg at 01/15/23 0908    traZODone (DESYREL) tablet 100 mg, 100 mg, Oral, QHS PRN, Casandra, Reta M, NP, 100 mg at 01/14/23 2007    sertraline (ZOLOFT) tablet 25 mg, 25 mg, Oral, DAILY, Casandra, Reta M, NP, 25 mg at 01/15/23 0908    OLANZapine (ZyPREXA) tablet 5 mg, 5 mg, Oral, Q6H PRN, Colie Pandy, NP, 5 mg at 01/15/23 0200    haloperidol lactate (HALDOL) injection 5 mg, 5 mg, IntraMUSCular, Q6H PRN, Colie Pandy, NP, 5 mg at 01/14/23 0900    benztropine (COGENTIN) tablet 1 mg, 1 mg, Oral, BID PRN, Colie Seniay, NP    diphenhydrAMINE (BENADRYL) injection 50 mg, 50 mg, IntraMUSCular, BID PRN, Colie Pandy, NP, 50 mg at 01/14/23 0900    hydrOXYzine HCL (ATARAX) tablet 50 mg, 50 mg, Oral, TID PRN, Ceclia Sites, NP, 50 mg at 01/15/23 0200    acetaminophen (TYLENOL) tablet 650 mg, 650 mg, Oral, Q4H PRN, Ceclia Sites, NP, 650 mg at 01/14/23 2008    magnesium hydroxide (MILK OF MAGNESIA) 400 mg/5 mL oral suspension 30 mL, 30 mL, Oral, DAILY PRN, Ceclia Sites, NP    midazolam (VERSED) injection 2 mg, 2 mg, IntraMUSCular, Q6H PRN, Ceclia Sites, NP, 2 mg at 01/14/23 0900     PLAN:  Continue current medications.

## 2023-01-15 NOTE — BH NOTES
PRN Medication Documentation    Specific patient behavior that led to need for PRN medication: Patient complained of increased anxiety and racing thought  Staff interventions attempted prior to PRN being given: Offered PRN medications to patient  PRN medication given: Zyprexa 5 mg PO and Atarax 50 mg PO.   Patient response/effectiveness of PRN medication: Will continue to monitor

## 2023-01-16 LAB
GLUCOSE BLD STRIP.AUTO-MCNC: 101 MG/DL (ref 65–117)
GLUCOSE BLD STRIP.AUTO-MCNC: 94 MG/DL (ref 65–117)
SERVICE CMNT-IMP: NORMAL
SERVICE CMNT-IMP: NORMAL

## 2023-01-16 PROCEDURE — 74011250637 HC RX REV CODE- 250/637: Performed by: PSYCHIATRY & NEUROLOGY

## 2023-01-16 PROCEDURE — 65220000001 HC RM PRIVATE PSYCH

## 2023-01-16 PROCEDURE — 99221 1ST HOSP IP/OBS SF/LOW 40: CPT | Performed by: CLINICAL NURSE SPECIALIST

## 2023-01-16 PROCEDURE — 74011250637 HC RX REV CODE- 250/637: Performed by: NURSE PRACTITIONER

## 2023-01-16 PROCEDURE — 82962 GLUCOSE BLOOD TEST: CPT

## 2023-01-16 RX ORDER — METFORMIN HYDROCHLORIDE 500 MG/1
1000 TABLET ORAL 2 TIMES DAILY WITH MEALS
Status: DISCONTINUED | OUTPATIENT
Start: 2023-01-16 | End: 2023-01-30 | Stop reason: HOSPADM

## 2023-01-16 RX ORDER — INSULIN LISPRO 100 [IU]/ML
INJECTION, SOLUTION INTRAVENOUS; SUBCUTANEOUS
Status: DISCONTINUED | OUTPATIENT
Start: 2023-01-16 | End: 2023-01-18

## 2023-01-16 RX ORDER — LOSARTAN POTASSIUM 100 MG/1
100 TABLET ORAL DAILY
COMMUNITY
End: 2023-01-30

## 2023-01-16 RX ORDER — TRIAMTERENE/HYDROCHLOROTHIAZID 37.5-25 MG
1 TABLET ORAL DAILY
Status: DISCONTINUED | OUTPATIENT
Start: 2023-01-16 | End: 2023-01-30 | Stop reason: HOSPADM

## 2023-01-16 RX ORDER — IBUPROFEN 200 MG
4 TABLET ORAL AS NEEDED
Status: DISCONTINUED | OUTPATIENT
Start: 2023-01-16 | End: 2023-01-30 | Stop reason: HOSPADM

## 2023-01-16 RX ORDER — ATORVASTATIN CALCIUM 40 MG/1
40 TABLET, FILM COATED ORAL DAILY
Status: ON HOLD | COMMUNITY
End: 2023-01-30 | Stop reason: SDUPTHER

## 2023-01-16 RX ORDER — LOSARTAN POTASSIUM 50 MG/1
100 TABLET ORAL DAILY
Status: DISCONTINUED | OUTPATIENT
Start: 2023-01-16 | End: 2023-01-30 | Stop reason: HOSPADM

## 2023-01-16 RX ORDER — TRIAMTERENE/HYDROCHLOROTHIAZID 37.5-25 MG
TABLET ORAL DAILY
COMMUNITY
End: 2023-01-30

## 2023-01-16 RX ORDER — FLUTICASONE PROPIONATE 50 MCG
2 SPRAY, SUSPENSION (ML) NASAL DAILY
Status: DISCONTINUED | OUTPATIENT
Start: 2023-01-16 | End: 2023-01-30 | Stop reason: HOSPADM

## 2023-01-16 RX ORDER — FLUTICASONE PROPIONATE 50 MCG
1 SPRAY, SUSPENSION (ML) NASAL DAILY
COMMUNITY
End: 2023-01-30

## 2023-01-16 RX ORDER — METFORMIN HYDROCHLORIDE 1000 MG/1
1000 TABLET ORAL 2 TIMES DAILY WITH MEALS
COMMUNITY
End: 2023-01-30

## 2023-01-16 RX ORDER — ATORVASTATIN CALCIUM 40 MG/1
40 TABLET, FILM COATED ORAL DAILY
Status: DISCONTINUED | OUTPATIENT
Start: 2023-01-16 | End: 2023-01-30 | Stop reason: HOSPADM

## 2023-01-16 RX ORDER — DICLOFENAC SODIUM 75 MG/1
75 TABLET, DELAYED RELEASE ORAL 2 TIMES DAILY
COMMUNITY
End: 2023-01-30

## 2023-01-16 RX ORDER — SERTRALINE HYDROCHLORIDE 50 MG/1
50 TABLET, FILM COATED ORAL DAILY
Status: DISCONTINUED | OUTPATIENT
Start: 2023-01-17 | End: 2023-01-24

## 2023-01-16 RX ADMIN — SERTRALINE 25 MG: 50 TABLET, FILM COATED ORAL at 09:08

## 2023-01-16 RX ADMIN — ATORVASTATIN CALCIUM 40 MG: 40 TABLET, FILM COATED ORAL at 12:21

## 2023-01-16 RX ADMIN — GLIPIZIDE 10 MG: 5 TABLET ORAL at 09:09

## 2023-01-16 RX ADMIN — ALPRAZOLAM 0.5 MG: 0.5 TABLET ORAL at 09:53

## 2023-01-16 RX ADMIN — PANTOPRAZOLE SODIUM 20 MG: 20 TABLET, DELAYED RELEASE ORAL at 09:09

## 2023-01-16 RX ADMIN — ALPRAZOLAM 0.5 MG: 0.5 TABLET ORAL at 17:49

## 2023-01-16 RX ADMIN — LOSARTAN POTASSIUM 100 MG: 50 TABLET, FILM COATED ORAL at 12:21

## 2023-01-16 RX ADMIN — OLANZAPINE 5 MG: 5 TABLET, FILM COATED ORAL at 01:33

## 2023-01-16 RX ADMIN — OLANZAPINE 2.5 MG: 2.5 TABLET, FILM COATED ORAL at 20:09

## 2023-01-16 RX ADMIN — HYDROXYZINE HYDROCHLORIDE 50 MG: 50 TABLET ORAL at 01:33

## 2023-01-16 RX ADMIN — METFORMIN HYDROCHLORIDE 1000 MG: 500 TABLET ORAL at 17:08

## 2023-01-16 RX ADMIN — TRAZODONE HYDROCHLORIDE 150 MG: 50 TABLET ORAL at 22:11

## 2023-01-16 RX ADMIN — TRIAMTERENE AND HYDROCHLOROTHIAZIDE 1 TABLET: 37.5; 25 TABLET ORAL at 12:42

## 2023-01-16 RX ADMIN — FLUTICASONE PROPIONATE 2 SPRAY: 50 SPRAY, METERED NASAL at 12:42

## 2023-01-16 NOTE — BH NOTES
Patient is expressing anxiety, agitation, pulling hair. Refuses medication at this time. Requests rubber  bands. In room yelling out, 7    PRN Medication Documentation    Specific patient behavior that led to need for PRN medication: agitation, yelling out, pulling hair,   Staff interventions attempted prior to PRN being given: calming channel, deep breathing.  Rubber band on wrist for snapping  PRN medication given: alprazolam 0.5 mg  Patient response/effectiveness of PRN medication: pending

## 2023-01-16 NOTE — PROGRESS NOTES
Problem: Depressed Mood (Adult/Pediatric)  Goal: *STG: Verbalizes anger, guilt, and other feelings in a constructive manor  At intervals is calm and cooperative but lability at other times with yelling and screaming    Goal: *STG: Remains safe in hospital  Outcome: Progressing Towards Goal  Pt denies any suicidal or homicidal thoughts. Contracts for safety. Remains on q 15 min safety checks. Problem: Aggression and Hostility (Behavioral Health)  Goal: *Reduce intensity and frequency of aggressive behaviors and verbalizations, treating others with respect  Outcome: Progressing Towards Goal        Denies SI/HI. Mood is somewhat anxious. medication and meal compliant.

## 2023-01-16 NOTE — BH NOTES
PSYCHOSOCIAL ASSESSMENT  :Patient identifying info:   Milton Burroughs is a 50 y.o., female admitted 1/13/2023 11:24 AM     Presenting problem and precipitating factors: The pt's chief complaint is increasing depression and anxiety (rated at a 10/10) as well as trichotillomania. The pt reported SI the day previous to admission and mentioned plans to overdose. Pt has a hx of suicide attempts by overdose. Pt also reports experiencing her anxiety and depression most intensely in the mornings. Pt reports that she frequently screams and pulls her hair out. Pt is currently medicated, but she believes her medications are no longer working. Mental status assessment: Upon admission, the pt's mood was depressed and anxious evidenced by pressured speech. Pt is A&O x4. The pt's judgement, thought content and process, memory, and perception showed no evidence of impairment. The patient's sleep had evidence of insomnia, but her appetite showed no evidence of impairment. The pt showed little insight. The pt denies any SI/HI/AH/VH. Strengths/Recreation/Coping Skills: Pt is voluntary, and she is already connected to a psychiatrist.    Collateral information: N/A    Current psychiatric /substance abuse providers and contact info: Pt currently sees Dr. Stephanie Gomez at the [facility]. She is not currently seeing a therapist. She is med-compliant but believes the medications are no longer working. Previous psychiatric/substance abuse providers and response to treatment: Pt has a hx of inpatient hospitalizations at [facility] with her most recent admission being in July/August of 2022. Family history of mental illness or substance abuse: unk    Substance abuse history:    Social History     Tobacco Use    Smoking status: Never    Smokeless tobacco: Never    Tobacco comments:     n/a never smoked tobacco   Substance Use Topics    Alcohol use: No       History of biomedical complications associated with substance abuse:  No reported history of substance abuse. Patient's current acceptance of treatment or motivation for change: Pt is voluntary. Family constellation: Pt has a mother, Lashell Dural and father. Pt has no children. Is significant other involved? Pt is single. Describe support system: Pt is supported by family and her psychiatrist.    Describe living arrangements and home environment: Pt lives at home alone. GUARDIAN/POA: NO    Guardian Name: N/A    Guardian Contact: N/A    Health issues:   Hospital Problems  Date Reviewed: 2019            Codes Class Noted POA    Depression ICD-10-CM: F32. A  ICD-9-CM: 061  2022 Unknown           Trauma history: None reported. Legal issues: No pending legal issues reported. History of  service: Not reported. Financial status: unk    Latter-day/cultural factors: unk    Education/work history: Pt's highest grade achieved is some college. Pt is currently employed, but has taken medical leave because her symptoms have worsened.     Have you been licensed as a health care professional (current or ): No    Describe coping skills: Inadequate, need improving    Ericka Paris  2023

## 2023-01-16 NOTE — PROGRESS NOTES
Problem: Depressed Mood (Adult/Pediatric)  Goal: *STG: Verbalizes anger, guilt, and other feelings in a constructive manor  Outcome: Progressing Towards Goal     Problem: Depressed Mood (Adult/Pediatric)  Goal: *STG: Attends activities and groups  Outcome: Progressing Towards Goal

## 2023-01-16 NOTE — BH NOTES
PRN Medication Documentation    Specific patient behavior that led to need for PRN medication: pt requested sleep aid  Staff interventions attempted prior to PRN being given: medication education  PRN medication given: Trazodone 100mg PO @ 2211  Patient response/effectiveness of PRN medication: will continue to monitor with q15min rounds and provide support

## 2023-01-16 NOTE — PROGRESS NOTES
Admission Medication Reconciliation:    Information obtained from:  patient interview, Insurance claims data, and [facility]  RxQuery data available¹:  YES    Comments/Recommendations: Updated PTA meds/reviewed patient's allergies. 1)  The patient states that she was taking fluvoxamine 100 mg BID, trazodone 150 mg QHS, and alprazolam 0.5 mg BID prior to admission. She states that she was previously prescribed fluvoxamine 300 mg daily but her psychiatrist decreased it back to 100 mg BID. She also reports that olanzapine was stopped by her outpatient psychiatrist as it was ineffective for her \"hair pulling. \" The patient also confirmed her \"medical\" medications during treatment team.    The patient has previously been hospitalized at [facility]. The dates of hospitalization and psychiatric discharge medications are listed below:  7/26/22-8/5/22 [facility]): fluvoxamine 100 mg QHS (new) + olanzapine 5 mg BID (new) + trazodone 150 gm QHS + hydroxyzine 50 mg every 4 hours PRN anxiety  5/11/22-5/16/22 [facility]): fluoxetine 20 mg daily + trazodone 50 mg QHS PRN + lorazepam 1 mg BID PRN  6/22/18-6/27/18 [facility]): fluoxetine 40 mg daily + risperidone 0.5 mg QHS (new) + zolpidem 10 mg QHS + clonazepam 0.5 mg TID   5/21/16-5/25/16 [facility]): fluoxetine 40 mg daily + risperidone 0.5 mg QHS (new) + zolpidem 10 mg QHS + clonazepam 0.5 mg TID + hydroxyzine 25 mg every 6 hours PRN  3/24/16-3/28/16 [facility]): fluoxetine 40 mg daily + aripiprazole 2.5 mg daily (new) + trazodone 100 mg QHS + lorazepam 0.5 mg QHS PRN    2)  The [place] Prescription Monitoring Program () was assessed to determine fill history of any controlled medications. The patient filled lorazepam 5/2022-7/2022. It was switched to alprazolam 9/2022-12/20/22. The patient reports that she takes it BID everyday (even though written as PRN). 3)  Medication changes (since last review):   Added  - atorvastatin 40 mg daily  - diclofenac 75 mg BID  - fluticasone nasal spray - 1 spray each nostril daily  - losartan 100 mg daily  - triamterene/HCTZ 37.5/25 mg daily  - metformin 1000 mg BID     Adjusted  - pantoprazole 20 mg daily (from 40 mg)    Removed  - olanzapine   ¹RxQuery pharmacy benefit data reflects medications filled and processed through the patient's insurance, however this data does NOT capture whether the medication was picked up or is currently being taken by the patient. Allergies:  Lisinopril    Significant PMH/Disease States:   Past Medical History:   Diagnosis Date    Anxiety     Depression     ANXIETY & DEPRESSION    Diabetes ([facility] [address]     TYPE II    Hypertension     Infected sebaceous cyst, upper back 4/10/2019    Panic attack     Suicidal thoughts     Vision decreased        Chief Complaint for this Admission:    Chief Complaint   Patient presents with    Mental Health Problem     Prior to Admission Medications:   Prior to Admission Medications   Prescriptions Last Dose Informant Taking? ALPRAZolam (XANAX) 0.5 mg tablet   Yes   Sig: Take 0.5 mg by mouth two (2) times daily as needed for Anxiety. atorvastatin (LIPITOR) 40 mg tablet   Yes   Sig: Take 40 mg by mouth daily. diclofenac EC (VOLTAREN) 75 mg EC tablet   Yes   Sig: Take 75 mg by mouth two (2) times a day. fluticasone propionate (FLONASE) 50 mcg/actuation nasal spray   Yes   [place] by Both Nostrils route daily. fluvoxaMINE (LUVOX) 100 mg tablet   Yes   Sig: Take 100 mg by mouth two (2) times a day. glipiZIDE SR (GLUCOTROL XL) 10 mg CR tablet   No   Sig: Take 1 Tablet by mouth in the morning. Indications: type 2 diabetes mellitus   hydrOXYzine HCL (ATARAX) 50 mg tablet   No   Sig: Take 1 Tablet by mouth every four (4) hours as needed for Anxiety. Indications: anxious   losartan (COZAAR) 100 mg tablet   Yes   Sig: Take 100 mg by mouth daily. pantoprazole (PROTONIX) 20 mg tablet   Yes   Sig: Take 20 mg by mouth daily. traZODone (DESYREL) 100 mg tablet   Yes   Sig: Take 150 mg by mouth nightly. triamterene-hydroCHLOROthiazide (MAXZIDE) 37.5-25 mg per tablet   Yes   Sig: Take  by mouth daily.       Facility-Administered Medications: None     Alaina Matamoros, PHARMD

## 2023-01-16 NOTE — BH NOTES
GROUP SESSION    Topic: Safety Plans    Goal: To complete the safety plan and discuss warning signs, coping, social supports, and motivations people reference to combat emotionally charged and dangerous moments. Attendance/Participation: Active    Personal Notes: Pt completed a safety plan. Pt frequently contributed to discussion although she seemed at a loss in ways to combat her trichotillomania. She expressed not having any warning signs since it just starts, but she had strategies for calming down. Toward the end of the session, the pt pulled out her cognitive distortions sheet and shared some with the group, and when asked what groups she would like to see tomorrow, she replied that she wished to go over coping skills.

## 2023-01-16 NOTE — PROGRESS NOTES
Problem: Falls - Risk of  Goal: *Absence of Falls  Description: Document Massimo Shelli Fall Risk and appropriate interventions in the flowsheet. Outcome: Progressing Towards Goal  Note: Fall Risk Interventions:            Medication Interventions: Teach patient to arise slowly    Resting in bed with eyes closed, no complaints, no distress noted. Safety measures in place, will continue to monitor.

## 2023-01-16 NOTE — PROGRESS NOTES
Laboratory Monitoring for Antipsychotics: This patient is currently prescribed the following medication(s):   Current Facility-Administered Medications   Medication Dose Route Frequency    [START ON 1/17/2023] sertraline (ZOLOFT) tablet 50 mg  50 mg Oral DAILY    metFORMIN (GLUCOPHAGE) tablet 1,000 mg  1,000 mg Oral BID WITH MEALS    losartan (COZAAR) tablet 100 mg  100 mg Oral DAILY    triamterene-hydroCHLOROthiazide (MAXZIDE) 37.5-25 mg per tablet 1 Tablet  1 Tablet Oral DAILY    fluticasone propionate (FLONASE) 50 mcg/actuation nasal spray 2 Spray  2 Spray Both Nostrils DAILY    atorvastatin (LIPITOR) tablet 40 mg  40 mg Oral DAILY    insulin lispro (HUMALOG) injection   SubCUTAneous AC&HS    [START ON 1/17/2023] acetylcysteine tab 1,200 mg  1,200 mg Oral DAILY    glipiZIDE (GLUCOTROL) tablet 10 mg  10 mg Oral DAILY    OLANZapine (ZyPREXA) tablet 2.5 mg  2.5 mg Oral QHS    pantoprazole (PROTONIX) tablet 20 mg  20 mg Oral DAILY     The following labs have been completed for monitoring of antipsychotics and/or mood stabilizers:    Height, Weight, BMI Estimation  Estimated body mass index is 38.1 kg/m² as calculated from the following:    Height as of this encounter: 160 cm (63\"). Weight as of this encounter: 97.6 kg (215 lb 1.6 oz). Vital Signs/Blood Pressure  Visit Vitals  BP (!) 161/90   Pulse (!) 110   Temp 99 °F (37.2 °C)   Resp 20   Ht 160 cm (63\")   Wt 97.6 kg (215 lb 1.6 oz)   SpO2 96%   BMI 38.10 kg/m²     Renal Function, Hepatic Function and Chemistry  Estimated Creatinine Clearance: 101 mL/min (by C-G formula based on SCr of 0.75 mg/dL).     Lab Results   Component Value Date/Time    Sodium 139 01/13/2023 12:42 PM    Potassium 3.6 01/13/2023 12:42 PM    Chloride 107 01/13/2023 12:42 PM    CO2 30 01/13/2023 12:42 PM    Anion gap 2 (L) 01/13/2023 12:42 PM    BUN 13 01/13/2023 12:42 PM    Creatinine 0.75 01/13/2023 12:42 PM    BUN/Creatinine ratio 17 01/13/2023 12:42 PM    Bilirubin, total 0.1 (L) 01/13/2023 12:42 PM    Protein, total 7.7 01/13/2023 12:42 PM    Albumin 3.6 01/13/2023 12:42 PM    Globulin 4.1 (H) 01/13/2023 12:42 PM    A-G Ratio 0.9 (L) 01/13/2023 12:42 PM    ALT (SGPT) 20 01/13/2023 12:42 PM    AST (SGOT) 8 (L) 01/13/2023 12:42 PM    Alk. phosphatase 77 01/13/2023 12:42 PM     Lab Results   Component Value Date/Time    Glucose 217 (H) 01/13/2023 12:42 PM    Glucose 146 (H) 07/29/2022 05:44 AM    Glucose (POC) 178 (H) 01/15/2023 07:21 AM     Lab Results   Component Value Date/Time    Hemoglobin A1c 6.4 (H) 07/29/2022 05:44 AM     Hematology  Lab Results   Component Value Date/Time    WBC 6.6 01/13/2023 12:42 PM    RBC 4.23 01/13/2023 12:42 PM    HGB 11.2 (L) 01/13/2023 12:42 PM    HCT 35.9 01/13/2023 12:42 PM    MCV 84.9 01/13/2023 12:42 PM    MCH 26.5 01/13/2023 12:42 PM    MCHC 31.2 01/13/2023 12:42 PM    RDW 16.1 (H) 01/13/2023 12:42 PM    PLATELET 577 05/18/9708 12:42 PM     Lipids  Lab Results   Component Value Date/Time    Cholesterol, total 100 07/29/2022 05:44 AM    HDL Cholesterol 54 07/29/2022 05:44 AM    LDL, calculated 34.2 07/29/2022 05:44 AM    Triglyceride 59 07/29/2022 05:44 AM    CHOL/HDL Ratio 1.9 07/29/2022 05:44 AM     Thyroid Function  Lab Results   Component Value Date/Time    TSH 1.61 07/26/2022 02:48 PM     Pregnancy Status  Lab Results   Component Value Date/Time    HCG urine, QL Negative 01/13/2023 12:49 PM    Pregnancy test,urine (POC) Negative 07/26/2022 02:49 PM     Assessment/Plan:  Will order lipid panel and hemoglobin A1c or fasting glucose to complete the recommended baseline laboratory monitoring based on the patient's current medication regimen. Antihypertensives were restarted this morning - will continue to monitor BP and recommend changes as necessary.      ZAINAB FordD

## 2023-01-16 NOTE — INTERDISCIPLINARY ROUNDS
Behavioral Health Interdisciplinary Rounds     Patient Name: Edgardo Valdez  Age: 50 y.o. Room/Bed:  730/01  Primary Diagnosis: <principal problem not specified>   Admission Status: Voluntary     Readmission within 30 days: no  Power of  in place: no  Patient requires a blocked bed: yes          Reason for blocked bed: behavior  Sleep hours:  7      Participation in Care/Groups:    Medication Compliant?: Yes  PRNS (last 24 hours): Antipsychotic (PO), Antianxiety, and Sleep Aid    Restraints (last 24 hours):  no     Alcohol screening (AUDIT) completed -     If applicable, date SBIRT discussed in treatment team AND documented:    Tobacco - patient is a smoker: Have You Used Tobacco in the Past 30 Days: No  Illegal Drugs use: Have You Used Any Illegal Substances Over the Past 12 Months: No    24 hour chart check complete: yes     _______________________________________________    Patient goal(s) for today: Communicate needs to staff   Treatment team focus/goals: Assess pt, manage medications, discharge planning   Progress note: Pt is stable. Pt denies SI, HI, AVH. Pt reports continued hair pulling due to anxiety. Pt is med compliant. Pt reports that she feels current meds are not working.  SW spoke with Astrid Dunham         Financial concerns/prescription coverage:    Family contact:   Leann Talbot, [phone]                     Family requesting physician contact today:    Discharge plan: TBD   Access to weapons :    no                                                           Outpatient provider(s): Davion Hanna NP/Dr. Carlitos Ang  Patient's preferred phone number for follow up call :   Patient's preferred e-mail address :  Participating treatment team members:    LOS:  3  Expected LOS: TBD     Participating treatment team members: Ulysses Robson, MSW, Elenita Harper Rn, Re Mathis, Dr. Jennifer Read

## 2023-01-16 NOTE — BH NOTES
PSYCHIATRIC PROGRESS NOTE    CHIEF COMPLAINT:   \"my meds werent working\"    INTERVAL HISTORY:   1/16/23  Patient says that for a while now she felt that her medications haven't been working. Eventually she felt that she couldn't get out of bed. She was referred to inpatient setting. Mallika Cabrera says that she's struggled with OCD and Trichotillomania and was on luvox for some time. She felt that it wasn't working. She also says that she hasn't been in ERP for therapy. She says that her anxiety is high and can't function. She denies SI or HI.     1/15/23  Mallika Cabrera states that she is doing ok today. She endorses a slight decrease in the anxiety. She states that she had a spell this morning of hair pulling, but has been doing ok since then. She denies SI/HI/AVH currently. She denies any side effects from the medications. VITALS:  Patient Vitals for the past 24 hrs:   Temp Pulse Resp BP SpO2   01/16/23 0736 98.6 °F (37 °C) (!) 104 16 (!) 161/98 97 %   01/15/23 2021 97.2 °F (36.2 °C) 96 16 135/83 95 %   01/15/23 1600 99.1 °F (37.3 °C) 100 18 (!) 143/86 98 %   01/15/23 1158 97.9 °F (36.6 °C) 96 16 (!) 154/93 99 %        LABS:  No results found for this or any previous visit (from the past 24 hour(s)).      MEDICATIONS:    Current Facility-Administered Medications:     ALPRAZolam (XANAX) tablet 0.5 mg, 0.5 mg, Oral, BID PRN, Casandra, Reta M, NP, 0.5 mg at 01/16/23 0953    glipiZIDE (GLUCOTROL) tablet 10 mg, 10 mg, Oral, DAILY, [place], Reta M, NP, 10 mg at 01/16/23 0909    OLANZapine (ZyPREXA) tablet 2.5 mg, 2.5 mg, Oral, QHS, [place], Reta M, NP, 2.5 mg at 01/15/23 2211    pantoprazole (PROTONIX) tablet 20 mg, 20 mg, Oral, DAILY, Casandra, Reta M, NP, 20 mg at 01/16/23 0909    traZODone (DESYREL) tablet 100 mg, 100 mg, Oral, QHS PRN, [place], Reta M, NP, 100 mg at 01/15/23 2211    sertraline (ZOLOFT) tablet 25 mg, 25 mg, Oral, DAILY, [place], Reta M, NP, 25 mg at 01/16/23 0908    OLANZapine (ZyPREXA) tablet 5 mg, 5 mg, Oral, Q6H PRN, Letta Ligas, NP, 5 mg at 01/16/23 0133    haloperidol lactate (HALDOL) injection 5 mg, 5 mg, IntraMUSCular, Q6H PRN, Letta Ligas, NP, 5 mg at 01/14/23 0900    benztropine (COGENTIN) tablet 1 mg, 1 mg, Oral, BID PRN, Letta Ligas, NP    diphenhydrAMINE (BENADRYL) injection 50 mg, 50 mg, IntraMUSCular, BID PRN, Letta Ligas, NP, 50 mg at 01/14/23 0900    hydrOXYzine HCL (ATARAX) tablet 50 mg, 50 mg, Oral, TID PRN, Letta Ligas, NP, 50 mg at 01/16/23 0133    acetaminophen (TYLENOL) tablet 650 mg, 650 mg, Oral, Q4H PRN, Letta Ligas, NP, 650 mg at 01/14/23 2008    magnesium hydroxide (MILK OF MAGNESIA) 400 mg/5 mL oral suspension 30 mL, 30 mL, Oral, DAILY PRN, Weston Kraft, NP    midazolam (VERSED) injection 2 mg, 2 mg, IntraMUSCular, Q6H PRN, Letta Ligas, NP, 2 mg at 01/14/23 0900     ASSESSMENT:  Mood d/o, NOS  Trichotillomania (r/o OCD)    PLAN:  Increase Zoloft from 25mg to 50mg po qday. Continue zyprexa 2.5mg po qhs. Continue xanax 0.5mg po bid prn. Pharmacy will look into starting NAC for Trichotillomania. Will restart medical medications.

## 2023-01-16 NOTE — DIABETES MGMT
[facility]  DIABETES MANAGEMENT CONSULT    Consulted by Provider for advanced nursing evaluation and care for inpatient blood glucose management. Evaluation and Action Plan   Alix Johnson is a 48yo female admitted to [facility] 1/13/23 with worsening depression/anxiety and trichotillomania (constant hair pulling). A1C on file from July 2022, 6.4%- A1C added on to current lab work. BG on admission <200. POC ordered for ACHS- Metformin and Glipizide (she takes PTA) ordered while on BHU. Appropriate to continue medications - as long as she is taking PO diet consistently. Keep -180 while hospitalized. Management Rationale Action Plan   Medication  May continue with currently ordered Metformin and Glipizide while on BHU   Corrective insulin Using normal sensitivity  ORDERED   Lab [x]        Hemoglobin [id number] as ADD ON     Referral [x] Behavioral health     Additional orders  POC blood glucose checks ACHS  Ordered nightly snacks          Discharge planning   Medication     Referral  []        Outpatient diabetes education   Additional orders            Initial Presentation   Alix Johnson is a 50 y.o. female admitted 1/13/23 after experiencing continued complaint of increasing depression/anxiety, and trichotillomania. She endorses decreased sleep and states that she has not slept in 2 nights. She endorses having OP psych and has been at [facility] in July. She denies SI/Hi/AVH currently. She denies any drug or alcohol use. UDS was positive for benzos which she is prescribed. HX:   Past Medical History:   Diagnosis Date    Anxiety     Depression     ANXIETY & DEPRESSION    Diabetes ([facility] [address]     TYPE II    Hypertension     Infected sebaceous cyst, upper back 4/10/2019    Panic attack     Suicidal thoughts     Vision decreased         INITIAL DX:   Depression [F32. A]     Current Treatment     TX: [facility]   Clinical progress has been complicated by depression/anxiety/ requiring inpatient U   . Diabetes History   T2D- noted A1C in July 2022, 6.4%. Diabetes-related Medical History  Other associated conditions     Depression/ anxiety/ HTN    Diabetes Medication History  Key Antihyperglycemic Medications               metFORMIN (GLUCOPHAGE) 1,000 mg tablet (Taking) Take 1,000 mg by mouth two (2) times daily (with meals). glipiZIDE SR (GLUCOTROL XL) 10 mg CR tablet Take 1 Tablet by mouth in the morning. Indications: type 2 diabetes mellitus             Diabetes self-management practices:   Eating pattern-   [x] Eating a carbohydrate-controlled meal plan    Physical activity pattern-not discussed     Monitoring pattern-checks BG daily in AM, 130-140s consistently   [x] Testing BGs sufficiently to inform self-management adjustments    Taking medications pattern  [x] Consistent administration  [x] Affordable  Social determinants of health impacting diabetes self-management practices   Struggling with anxiety and/or depression and Concerned that you need to know more about how to stay healthy with diabetes  Overall evaluation:    [x] Achieving A1c target with drug therapy & self-care practices    Subjective   I 've been eating a lot of snacks since coming here.      Objective   Physical exam  General Obese AA  female in no acute distress. Conversant and cooperative  Neuro  Alert, oriented   Vital Signs Visit Vitals  BP (!) 161/90   Pulse (!) 110   Temp 99 °F (37.2 °C)   Resp 20   Ht 5' 3\" (1.6 m)   Wt 97.6 kg (215 lb 1.6 oz)   SpO2 96%   BMI 38.10 kg/m²     Skin  Warm and dry. Heart   Regular rate and rhythm. No murmurs, rubs or gallops  Lungs  Clear to auscultation without rales or rhonchi  Extremities No foot wounds    Diabetic foot exam: deferred       Laboratory  No results for input(s): GLU, AGAP, TRIGL, WBC, CREA, GFRNA, AST, ALT in the last 72 hours.     No lab exists for component: A1C    Factors impacting BG management  Factor Dose Comments Nutrition:  Standard meals     60 grams/meal      Other:   Kidney function  Liver function     eGFR >60  WNL      Blood glucose pattern  Minimal POC BG data,   Fasting   Glipizide and Metformin ordered while in patient -appropriate  Significant diabetes-related events over the past 24-72 hours      Assessment and Nursing Intervention   Nursing Diagnosis Risk for unstable blood glucose pattern   Nursing Intervention Domain 5250 Decision-making Support   Nursing Interventions Examined current inpatient diabetes/blood glucose control   Explored factors facilitating and impeding inpatient management  Explored corrective strategies with patient and responsible inpatient provider   Informed patient of rational for insulin strategy while hospitalized     Nursing Diagnosis 16581 Ineffective Health Management   Nursing Intervention Domain 5250 Decision-making Support   Nursing Interventions Identified diabetes self-management practices impeding diabetes control  Discussed diabetes survival skills related to  Healthy Plate eating plan; given handouts  Role of physical activity in improving insulin sensitivity and action  Procedure for blood glucose monitoring & options for low-cost products  Medications plan at discharge     Billing Code(s)   93211     Before making these care recommendations, I personally reviewed the hospitalization record, including notes, laboratory & diagnostic data and current medications, and examined the patient at the bedside (circumstances permitting) before determining care. More than fifty (50) percent of the time was spent in patient counseling and/or care coordination.   Total minutes: [address] [facility] BERKLEY Goins  Diabetes Clinical Nurse Specialist  Program for Diabetes Health  Access via [facility]

## 2023-01-17 ENCOUNTER — HOSPITAL ENCOUNTER (OUTPATIENT)
Dept: MAMMOGRAPHY | Age: 48
Discharge: HOME | End: 2023-01-17

## 2023-01-17 LAB
CHOLEST SERPL-MCNC: 129 MG/DL
EST. AVERAGE GLUCOSE BLD GHB EST-MCNC: 131 MG/DL
GLUCOSE BLD STRIP.AUTO-MCNC: 175 MG/DL (ref 65–117)
GLUCOSE BLD STRIP.AUTO-MCNC: 76 MG/DL (ref 65–117)
GLUCOSE BLD STRIP.AUTO-MCNC: 89 MG/DL (ref 65–117)
HBA1C MFR BLD: 6.2 % (ref 4–5.6)
HDLC SERPL-MCNC: 68 MG/DL
HDLC SERPL: 1.9 (ref 0–5)
LDLC SERPL CALC-MCNC: 52.4 MG/DL (ref 0–100)
SERVICE CMNT-IMP: ABNORMAL
SERVICE CMNT-IMP: NORMAL
SERVICE CMNT-IMP: NORMAL
TRIGL SERPL-MCNC: 43 MG/DL (ref ?–150)
VLDLC SERPL CALC-MCNC: 8.6 MG/DL

## 2023-01-17 PROCEDURE — 74011250637 HC RX REV CODE- 250/637: Performed by: NURSE PRACTITIONER

## 2023-01-17 PROCEDURE — 80061 LIPID PANEL: CPT

## 2023-01-17 PROCEDURE — 83036 HEMOGLOBIN GLYCOSYLATED A1C: CPT

## 2023-01-17 PROCEDURE — 74011250637 HC RX REV CODE- 250/637: Performed by: PSYCHIATRY & NEUROLOGY

## 2023-01-17 PROCEDURE — 65220000001 HC RM PRIVATE PSYCH

## 2023-01-17 PROCEDURE — 36415 COLL VENOUS BLD VENIPUNCTURE: CPT

## 2023-01-17 PROCEDURE — 82962 GLUCOSE BLOOD TEST: CPT

## 2023-01-17 RX ORDER — TRAZODONE HYDROCHLORIDE 100 MG/1
100 TABLET ORAL
Status: DISCONTINUED | OUTPATIENT
Start: 2023-01-17 | End: 2023-01-30 | Stop reason: HOSPADM

## 2023-01-17 RX ORDER — ZIPRASIDONE HYDROCHLORIDE 20 MG/1
20 CAPSULE ORAL 2 TIMES DAILY WITH MEALS
Status: DISCONTINUED | OUTPATIENT
Start: 2023-01-17 | End: 2023-01-22

## 2023-01-17 RX ORDER — CETIRIZINE HYDROCHLORIDE 10 MG/1
10 TABLET ORAL DAILY
Status: DISCONTINUED | OUTPATIENT
Start: 2023-01-17 | End: 2023-01-30 | Stop reason: HOSPADM

## 2023-01-17 RX ADMIN — TRAZODONE HYDROCHLORIDE 150 MG: 50 TABLET ORAL at 21:55

## 2023-01-17 RX ADMIN — FLUTICASONE PROPIONATE 2 SPRAY: 50 SPRAY, METERED NASAL at 09:07

## 2023-01-17 RX ADMIN — METFORMIN HYDROCHLORIDE 1000 MG: 500 TABLET ORAL at 17:17

## 2023-01-17 RX ADMIN — ALPRAZOLAM 0.5 MG: 0.5 TABLET ORAL at 13:42

## 2023-01-17 RX ADMIN — ZIPRASIDONE HYDROCHLORIDE 20 MG: 20 CAPSULE ORAL at 17:17

## 2023-01-17 RX ADMIN — HYDROXYZINE HYDROCHLORIDE 50 MG: 50 TABLET ORAL at 01:48

## 2023-01-17 RX ADMIN — OLANZAPINE 5 MG: 5 TABLET, FILM COATED ORAL at 01:48

## 2023-01-17 RX ADMIN — SERTRALINE 50 MG: 50 TABLET, FILM COATED ORAL at 09:06

## 2023-01-17 RX ADMIN — CETIRIZINE HYDROCHLORIDE 10 MG: 10 TABLET, FILM COATED ORAL at 12:30

## 2023-01-17 RX ADMIN — ZIPRASIDONE HYDROCHLORIDE 20 MG: 20 CAPSULE ORAL at 12:30

## 2023-01-17 RX ADMIN — TRIAMTERENE AND HYDROCHLOROTHIAZIDE 1 TABLET: 37.5; 25 TABLET ORAL at 09:06

## 2023-01-17 RX ADMIN — ATORVASTATIN CALCIUM 40 MG: 40 TABLET, FILM COATED ORAL at 09:05

## 2023-01-17 RX ADMIN — METFORMIN HYDROCHLORIDE 1000 MG: 500 TABLET ORAL at 09:05

## 2023-01-17 RX ADMIN — LOSARTAN POTASSIUM 100 MG: 50 TABLET, FILM COATED ORAL at 09:04

## 2023-01-17 RX ADMIN — GLIPIZIDE 10 MG: 5 TABLET ORAL at 09:06

## 2023-01-17 NOTE — BH NOTES
RECREATIONAL GROUP SESSION    Goal: to practice positive thinking by passing around the question ball. Patients should take heed of their strengths, supports, and skills along with acknowledging those of other patients. Attendance/Participation: Active    Personal Note: Pt answered many questions and interacted with other patients. Pt got a command that said give a compliment to someone else in the room, and she thanked another patient for being there for her and offering lots of advice. The pt shared that one of her goals is to be kinder to herself.

## 2023-01-17 NOTE — PROGRESS NOTES
Patient informed writer that she will not be taking insulin during her stay. She takes metformin and glipizide for her diabetes. Educate patient. At 11:30 patient refuses blood sugar checks. Informs writer that she will not be taking insulin during her stay.  Patient is otherwise medication complant

## 2023-01-17 NOTE — PROGRESS NOTES
Problem: Depressed Mood (Adult/Pediatric)  Goal: *STG: Verbalizes anger, guilt, and other feelings in a constructive manor  Outcome: Not Progressing Towards Goal  Has been irritable and not interactive with staff  Goal: *STG: Attends activities and groups  Outcome:Progressing Towards Goal  Will attend with encouragement     Problem: Aggression and Hostility (Behavioral Health)  Goal: *Reduce intensity and frequency of aggressive behaviors and verbalizations, treating others with respect  Outcome: Progressing Towards Goal  No agitated or aggressive behavior        Appears suspicious and guarded. Denies AH/VH but at intervals appears distracted.

## 2023-01-17 NOTE — DISCHARGE INSTRUCTIONS
DISCHARGE SUMMARY    NAME:Catherine Raymundo  : 1975  MRN: [id number]    The patient Jelly Edgar exhibits the ability to control behavior in a less restrictive environment. Patient's level of functioning is improving. No assaultive/destructive behavior has been observed for the past 24 hours. No suicidal/homicidal threat or behavior has been observed for the past 24 hours. There is no evidence of serious medication side effects. Patient has not been in physical or protective restraints for at least the past 24 hours. If weapons involved, how are they secured? no    Is patient aware of and in agreement with discharge plan? yes    Arrangements for medication:  Prescriptions filed at [facility] 78 of discharge instructions to provider?:  yes    Arrangements for transportation home:  parents to pickup     Keep all follow up appointments as scheduled, continue to take prescribed medications per physician instructions. Mental health crisis number:  203 or your local mental health crisis line number at [address] [facility] Emergency WARM LINE      [phone]-MHAV [phone])      M-F: 9am to 9pm      Sat & Sun: 5pm - 9pm  National suicide prevention lines:                             0-576-PZFROOJ ([phone])       4-697-092-TALK ([phone])    Crisis Text Line:  Text HOME to [address] from Nurse    PATIENT INSTRUCTIONS:      What to do at Home:  Recommended activity: Activity as tolerated,     If you experience any of the following symptoms thoughts of hurting yourself, thoughts of hurting someone else, please follow up with [facility] or your local mental health crisis line number at [phone]. *  Please give a list of your current medications to your Primary Care Provider. *  Please update this list whenever your medications are discontinued, doses are      changed, or new medications (including over-the-counter products) are added.     *  Please carry medication information at all times in case of emergency situations. These are general instructions for a healthy lifestyle:    No smoking/ No tobacco products/ Avoid exposure to second hand smoke  Surgeon General's Warning:  Quitting smoking now greatly reduces serious risk to your health. Obesity, smoking, and sedentary lifestyle greatly increases your risk for illness    A healthy diet, regular physical exercise & weight monitoring are important for maintaining a healthy lifestyle    You may be retaining fluid if you have a history of heart failure or if you experience any of the following symptoms:  Weight gain of 3 pounds or more overnight or 5 pounds in a week, increased swelling in our hands or feet or shortness of breath while lying flat in bed. Please call your doctor as soon as you notice any of these symptoms; do not wait until your next office visit. The discharge information has been reviewed with the patient. The patient verbalized understanding. Discharge medications reviewed with the patient and appropriate educational materials and side effects teaching were provided.   ___________________________________________________________________________________________________________________________________

## 2023-01-17 NOTE — INTERDISCIPLINARY ROUNDS
Behavioral Health Interdisciplinary Rounds     Patient Name: Paul Suggs  Age: 50 y.o. Room/Bed:  724/  Primary Diagnosis: <principal problem not specified>   Admission Status: Voluntary     Readmission within 30 days: no  Power of  in place: no  Patient requires a blocked bed: no          Reason for blocked bed:       Flu Vaccine : no   Consults:          Labs/Testing due today? Have they refused labs?: yes    Sleep hours:  7      Participation in Care/Groups:  yes  Medication Compliant?: Yes  PRNS (last 24 hours): Antianxiety and Sleep Aid    Restraints (last 24 hours):  no     CIWA (range last 24 hours):     COWS (range last 24 hours):      Alcohol screening (AUDIT) completed -         If applicable, date SBIRT discussed in treatment team AND documented:   AUDIT Screen Score:        Document Brief Intervention (corresponds directly with the 5 A's, Ask, Advise, Assess, Assist, and Arrange): At- Risk Patients (Score 7-15 for women; 8-15 for men)  Discuss concern patient is drinking at unhealthy levels known to increase risk of alcohol-related health problems. Is Patient ready to commit to change? If No:  Encourage reflection  Discuss short term and long term health risks of consuming alcohol  Barriers to change  Reaffirm willingness to help / Educational materials provided  If Yes:  Set goal  Plan  Educational materials provided    Harmful use or Dependence (Score 16 or greater)  Discuss short term and long term health risks of consuming alcohol  Recommendations  Negotiate drinking goal  Recommend addiction specialist/center  Arrange follow-up appointments.     Tobacco - patient is a smoker: Have You Used Tobacco in the Past 30 Days: No  Illegal Drugs use: Have You Used Any Illegal Substances Over the Past 12 Months: No    24 hour chart check complete: yes   ____________________________________________________________________________________________________________    Patient goal(s) for today: Communicate needs to staff   Treatment team focus/goals: Assess pt, manage medications, discharge planning   Progress note Pt is stable. Pt denies SI, HI AVH. Pt reports increased hair pulling last night. Pt reports sleep disruption. Pts medications adjusted. SW will provide pt with resources for specialized ERP therapy. Pt left message with pt provider.      LOS:  4  Expected Discharge: TBD    Financial concerns/prescription coverage:    Family contact:       Family requesting physician contact today:    Discharge plan: Pt will discharge home   Access to weapons :         Outpatient provider(s): [facility]   Patient's preferred phone number for follow up call :   Patient's preferred e-mail address :  Participating treatment team members: Dorita Habermann, MSW, Dameon Bobo RN, Nithya Ramírez, Dr. Dorita Parnell

## 2023-01-17 NOTE — PROGRESS NOTES
Problem: Falls - Risk of  Goal: *Absence of Falls  Description: Document [place] Fail Fall Risk and appropriate interventions in the flowsheet. Outcome: Progressing Towards Goal  Note: Fall Risk Interventions:            Medication Interventions: Teach patient to arise slowly    Patient received resting quietly in bed. No signs of distress. Even and unlabored breathing. Staff will continue to monitor safety Q15 and provide support.     0148: Patient given 50mg Atarax for anxiety per patient request.  Patient given 5mg Zyprexa per patient request.

## 2023-01-17 NOTE — BH NOTES
PSYCHIATRIC PROGRESS NOTE    CHIEF COMPLAINT:   \"my meds werent working\"    INTERVAL HISTORY:   1/17/23  Patient states that she struggles with urges to pull her hair. She says that she feels hungry on the zyprexa and ends up eating many snacks at night. Her BG was increased and required 2 units of insulin, but patient refused. Education provided. Patient states that her hair pulling is affecting her life, including her job. She complains of difficulty falling and staying asleep, night time awakenings with snoring. Recommended sleep study. 1/16/23  Patient says that for a while now she felt that her medications haven't been working. Eventually she felt that she couldn't get out of bed. She was referred to inpatient setting. Fran Santillan says that she's struggled with OCD and Trichotillomania and was on luvox for some time. She felt that it wasn't working. She also says that she hasn't been in ERP for therapy. She says that her anxiety is high and can't function. She denies SI or HI.     1/15/23  Fran Santillan states that she is doing ok today. She endorses a slight decrease in the anxiety. She states that she had a spell this morning of hair pulling, but has been doing ok since then. She denies SI/HI/AVH currently. She denies any side effects from the medications.      VITALS:  Patient Vitals for the past 24 hrs:   Temp Pulse Resp BP SpO2   01/17/23 0849 99.2 °F (37.3 °C) (!) 104 16 (!) 136/91 95 %   01/17/23 0802 -- -- 14 -- --   01/16/23 1721 99.8 °F (37.7 °C) (!) 101 16 (!) 146/92 97 %        LABS:  Recent Results (from the past 24 hour(s))   GLUCOSE, POC    Collection Time: 01/16/23  5:01 PM   Result Value Ref Range    Glucose (POC) 94 65 - 117 mg/dL    Performed by [employer]    GLUCOSE, POC    Collection Time: 01/16/23  7:52 PM   Result Value Ref Range    Glucose (POC) 101 65 - 117 mg/dL    Performed by [employer] [facility]    HEMOGLOBIN A1C WITH EAG    Collection Time: 01/17/23  6:15 AM   Result Value Ref Range    Hemoglobin A1c 6.2 (H) 4.0 - 5.6 %    Est. average glucose 131 mg/dL   LIPID PANEL    Collection Time: 01/17/23  6:15 AM   Result Value Ref Range    Cholesterol, total 129 <200 MG/DL    Triglyceride 43 <150 MG/DL    HDL Cholesterol 68 MG/DL    LDL, calculated 52.4 0 - 100 MG/DL    VLDL, calculated 8.6 MG/DL    CHOL/HDL Ratio 1.9 0.0 - 5.0     GLUCOSE, POC    Collection Time: 01/17/23  8:31 AM   Result Value Ref Range    Glucose (POC) 175 (H) 65 - 117 mg/dL    Performed by Brittany Vasquez         MEDICATIONS:    Current Facility-Administered Medications:     sertraline (ZOLOFT) tablet 50 mg, 50 mg, Oral, DAILY, Jaylan Thompson MD, 50 mg at 01/17/23 0906    traZODone (DESYREL) tablet 150 mg, 150 mg, Oral, QHS PRN, Jaylan Thompson MD, 150 mg at 01/16/23 2211    metFORMIN (GLUCOPHAGE) tablet 1,000 mg, 1,000 mg, Oral, BID WITH MEALS, Jaylan Thompson MD, 1,000 mg at 01/17/23 0905    losartan (COZAAR) tablet 100 mg, 100 mg, Oral, DAILY, Jaylan Thompson MD, 100 mg at 01/17/23 0904    triamterene-hydroCHLOROthiazide (MAXZIDE) 37.5-25 mg per tablet 1 Tablet, 1 Tablet, Oral, DAILY, Jaylan Thompson MD, 1 Tablet at 01/17/23 0906    fluticasone propionate (FLONASE) 50 mcg/actuation nasal spray 2 Spray, 2 Spray, Both Nostrils, DAILY, Jaylan Thompson MD, 2 Malone at 01/17/23 0907    atorvastatin (LIPITOR) tablet 40 mg, 40 mg, Oral, DAILY, Jaylan Thompson MD, 40 mg at 01/17/23 0905    glucose chewable tablet 16 g, 4 Tablet, Oral, PRN, Gilford Mo A, CNS    glucagon (GLUCAGEN) injection 1 mg, 1 mg, IntraMUSCular, PRN, Gilford Mo A, CNS    dextrose 10 % infusion 0-250 mL, 0-250 mL, IntraVENous, PRN, Nancy Haney, CNS    insulin lispro (HUMALOG) injection, , SubCUTAneous, AC&HS, Nancy Ennis, CNS    acetylcysteine tab 1,200 mg, 1,200 mg, Oral, DAILY, Jaylan Thompson MD    ALPRAZolam (XANAX) tablet 0.5 mg, 0.5 mg, Oral, BID PRN, Reta Guajardo, NP, 0.5 mg at 01/16/23 1749    glipiZIDE (GLUCOTROL) tablet 10 mg, 10 mg, Oral, DAILY, Conover, Reta M, NP, 10 mg at 01/17/23 0906    pantoprazole (PROTONIX) tablet 20 mg, 20 mg, Oral, DAILY, Casandra, Reta M, NP, 20 mg at 01/16/23 0909    OLANZapine (ZyPREXA) tablet 5 mg, 5 mg, Oral, Q6H PRN, Remonia Burrs, NP, 5 mg at 01/17/23 0148    haloperidol lactate (HALDOL) injection 5 mg, 5 mg, IntraMUSCular, Q6H PRN, Remonia Burrs, NP, 5 mg at 01/14/23 0900    benztropine (COGENTIN) tablet 1 mg, 1 mg, Oral, BID PRN, Remonia Burrs, NP    diphenhydrAMINE (BENADRYL) injection 50 mg, 50 mg, IntraMUSCular, BID PRN, Remonia Burrs, NP, 50 mg at 01/14/23 0900    hydrOXYzine HCL (ATARAX) tablet 50 mg, 50 mg, Oral, TID PRN, Remonia Burrs, NP, 50 mg at 01/17/23 0148    acetaminophen (TYLENOL) tablet 650 mg, 650 mg, Oral, Q4H PRN, Remonia Burrs, NP, 650 mg at 01/14/23 2008    magnesium hydroxide (MILK OF MAGNESIA) 400 mg/5 mL oral suspension 30 mL, 30 mL, Oral, DAILY PRN, Remonia Burrs, NP    midazolam (VERSED) injection 2 mg, 2 mg, IntraMUSCular, Q6H PRN, Remonia Burrs, NP, 2 mg at 01/14/23 0900     ASSESSMENT:  Mood d/o, NOS  Trichotillomania (r/o OCD)    PLAN:    1/17/2023  Discussed d/c zyprexa, instead starting geodon 20mg po bid. Continue zoloft 50mg po qday. Continue Trazodone 150mg but will change to be scheduled. Will add trazodone 100mg po qhs prn. Start NAC 1200mg  Recommend sleep study on an outpatient. 1/16/2023  Increase Zoloft from 25mg to 50mg po qday. Continue zyprexa 2.5mg po qhs. Continue xanax 0.5mg po bid prn. Pharmacy will look into starting NAC for Trichotillomania. Will restart medical medications.

## 2023-01-17 NOTE — BH NOTES
GROUP THERAPY PROGRESS NOTE    Patient is participating in Coping skills group. Group time: 45 minutes    Personal goal for participation: To develop an understanding of Healthy verses unhealthy coping skills    Goal orientation: Personal    Group therapy participation: active    Therapeutic interventions reviewed and discussed: Group members were able to gain an understanding of healthy and unhealthy coping skills. Members were given the opportunity to engage in conversation about their experiences with coping and how it was helpful or hurtful. Impression of participation: Pt was present and engaged in group discussion. Pt added insight to group topic. Pt interacted therapeutically with KUMAR Mata

## 2023-01-17 NOTE — PROGRESS NOTES
Problem: Discharge Planning  Goal: *Discharge to safe environment  1/17/2023 1410 by Ayde Morrow  Outcome: Progressing Towards Goal  1/17/2023 0845 by Ayde Morrow  Outcome: Progressing Towards Goal  Goal: *Knowledge of medication management  1/17/2023 1410 by Ayde Morrow  Outcome: Progressing Towards Goal  1/17/2023 0845 by Ayde Morrow  Outcome: Progressing Towards Goal  Goal: *Knowledge of discharge instructions  1/17/2023 1410 by Ayde Morrow  Outcome: Progressing Towards Goal  1/17/2023 0845 by Ayde Morrow  Outcome: Progressing Towards Goal

## 2023-01-17 NOTE — PROGRESS NOTES
Laboratory Monitoring for Antipsychotics: This patient is currently prescribed the following medication(s):   Current Facility-Administered Medications   Medication Dose Route Frequency    ziprasidone (GEODON) capsule 20 mg  20 mg Oral BID WITH MEALS    cetirizine (ZYRTEC) tablet 10 mg  10 mg Oral DAILY    sertraline (ZOLOFT) tablet 50 mg  50 mg Oral DAILY    metFORMIN (GLUCOPHAGE) tablet 1,000 mg  1,000 mg Oral BID WITH MEALS    losartan (COZAAR) tablet 100 mg  100 mg Oral DAILY    triamterene-hydroCHLOROthiazide (MAXZIDE) 37.5-25 mg per tablet 1 Tablet  1 Tablet Oral DAILY    fluticasone propionate (FLONASE) 50 mcg/actuation nasal spray 2 Spray  2 Spray Both Nostrils DAILY    atorvastatin (LIPITOR) tablet 40 mg  40 mg Oral DAILY    insulin lispro (HUMALOG) injection   SubCUTAneous AC&HS    acetylcysteine tab 1,200 mg  1,200 mg Oral DAILY    glipiZIDE (GLUCOTROL) tablet 10 mg  10 mg Oral DAILY    pantoprazole (PROTONIX) tablet 20 mg  20 mg Oral DAILY     The following labs have been completed for monitoring of antipsychotics and/or mood stabilizers:    Height, Weight, BMI Estimation  Estimated body mass index is 38.1 kg/m² as calculated from the following:    Height as of this encounter: 160 cm (63\"). Weight as of this encounter: 97.6 kg (215 lb 1.6 oz). Vital Signs/Blood Pressure  Visit Vitals  BP (!) 136/91   Pulse (!) 104   Temp 99.2 °F (37.3 °C)   Resp 16   Ht 160 cm (63\")   Wt 97.6 kg (215 lb 1.6 oz)   SpO2 95%   BMI 38.10 kg/m²     Renal Function, Hepatic Function and Chemistry  Estimated Creatinine Clearance: 101 mL/min (by C-G formula based on SCr of 0.75 mg/dL).     Lab Results   Component Value Date/Time    Sodium 139 01/13/2023 12:42 PM    Potassium 3.6 01/13/2023 12:42 PM    Chloride 107 01/13/2023 12:42 PM    CO2 30 01/13/2023 12:42 PM    Anion gap 2 (L) 01/13/2023 12:42 PM    BUN 13 01/13/2023 12:42 PM    Creatinine 0.75 01/13/2023 12:42 PM    BUN/Creatinine ratio 17 01/13/2023 12:42 PM Bilirubin, total 0.1 (L) 01/13/2023 12:42 PM    Protein, total 7.7 01/13/2023 12:42 PM    Albumin 3.6 01/13/2023 12:42 PM    Globulin 4.1 (H) 01/13/2023 12:42 PM    A-G Ratio 0.9 (L) 01/13/2023 12:42 PM    ALT (SGPT) 20 01/13/2023 12:42 PM    AST (SGOT) 8 (L) 01/13/2023 12:42 PM    Alk. phosphatase 77 01/13/2023 12:42 PM     Lab Results   Component Value Date/Time    Glucose 217 (H) 01/13/2023 12:42 PM    Glucose 146 (H) 07/29/2022 05:44 AM    Glucose (POC) 175 (H) 01/17/2023 08:31 AM     Lab Results   Component Value Date/Time    Hemoglobin A1c 6.2 (H) 01/17/2023 06:15 AM     Hematology  Lab Results   Component Value Date/Time    WBC 6.6 01/13/2023 12:42 PM    RBC 4.23 01/13/2023 12:42 PM    HGB 11.2 (L) 01/13/2023 12:42 PM    HCT 35.9 01/13/2023 12:42 PM    MCV 84.9 01/13/2023 12:42 PM    MCH 26.5 01/13/2023 12:42 PM    MCHC 31.2 01/13/2023 12:42 PM    RDW 16.1 (H) 01/13/2023 12:42 PM    PLATELET 203 93/43/9280 12:42 PM     Lipids  Lab Results   Component Value Date/Time    Cholesterol, total 129 01/17/2023 06:15 AM    HDL Cholesterol 68 01/17/2023 06:15 AM    LDL, calculated 52.4 01/17/2023 06:15 AM    Triglyceride 43 01/17/2023 06:15 AM    CHOL/HDL Ratio 1.9 01/17/2023 06:15 AM     Thyroid Function  Lab Results   Component Value Date/Time    TSH 1.61 07/26/2022 02:48 PM     Pregnancy Status  Lab Results   Component Value Date/Time    HCG urine, QL Negative 01/13/2023 12:49 PM    Pregnancy test,urine (POC) Negative 07/26/2022 02:49 PM     Assessment/Plan:  Recommended baseline laboratory monitoring has been completed based on this patient's current medication regimen. No further interventions are needed at this time. Follow-up metabolic monitoring labs should be completed in 3 months (quarterly for the first year of antipsychotic therapy).      Sandro Coyne, PHARMD

## 2023-01-17 NOTE — PROGRESS NOTES
Problem: Discharge Planning  Goal: *Knowledge of medication management  Outcome: Progressing Towards Goal  Goal: *Knowledge of discharge instructions  Outcome: Progressing Towards Goal     Problem: Patient Education: Go to Patient Education Activity  Goal: Patient/Family Education  Outcome: Progressing Towards Goal

## 2023-01-17 NOTE — PROGRESS NOTES
Robbin Miller engaged me during my rounds and requested a recommendation for spiritual counseling. She informed me that she was a member of [employer]. I reminded Robbin Miller that the [employer] as a counseling program that may be a resource. I encouraged Catherine to adhere to the treatment team's plan of care during her admission. For additional spiritual care, please contact the  on-call at [phone]). Rev.  Diaz Holder MDiv, MS, [facility]  Staff

## 2023-01-17 NOTE — PROGRESS NOTES
Problem: Falls - Risk of  Goal: *Absence of Falls  Description: Document Sara Ground Fall Risk and appropriate interventions in the flowsheet. Outcome: Progressing Towards Goal  Note: Fall Risk Intervention    Problem: Depressed Mood (Adult/Pediatric)  Goal: *STG: Participates in treatment plan  Outcome: Progressing Towards Goal     Problem: Depressed Mood (Adult/Pediatric)  Goal: *STG: Attends activities and groups  Outcome: Progressing Towards Goal     Patient was visible on the unit this evening, observed attending group with calm and cooperative behavior. No safety issues noted. Will continue to monitor with Q15 minute observation.

## 2023-01-18 LAB
GLUCOSE BLD STRIP.AUTO-MCNC: 144 MG/DL (ref 65–117)
SERVICE CMNT-IMP: ABNORMAL

## 2023-01-18 PROCEDURE — 65220000001 HC RM PRIVATE PSYCH

## 2023-01-18 PROCEDURE — 74011250637 HC RX REV CODE- 250/637: Performed by: NURSE PRACTITIONER

## 2023-01-18 PROCEDURE — 82962 GLUCOSE BLOOD TEST: CPT

## 2023-01-18 PROCEDURE — 74011250637 HC RX REV CODE- 250/637: Performed by: PSYCHIATRY & NEUROLOGY

## 2023-01-18 RX ORDER — GLIPIZIDE 5 MG/1
5 TABLET ORAL
Status: DISCONTINUED | OUTPATIENT
Start: 2023-01-19 | End: 2023-01-30 | Stop reason: HOSPADM

## 2023-01-18 RX ADMIN — OLANZAPINE 5 MG: 5 TABLET, FILM COATED ORAL at 01:07

## 2023-01-18 RX ADMIN — SERTRALINE 50 MG: 50 TABLET, FILM COATED ORAL at 08:35

## 2023-01-18 RX ADMIN — CETIRIZINE HYDROCHLORIDE 10 MG: 10 TABLET, FILM COATED ORAL at 08:35

## 2023-01-18 RX ADMIN — PANTOPRAZOLE SODIUM 20 MG: 20 TABLET, DELAYED RELEASE ORAL at 08:35

## 2023-01-18 RX ADMIN — METFORMIN HYDROCHLORIDE 1000 MG: 500 TABLET ORAL at 08:35

## 2023-01-18 RX ADMIN — ATORVASTATIN CALCIUM 40 MG: 40 TABLET, FILM COATED ORAL at 08:35

## 2023-01-18 RX ADMIN — TRAZODONE HYDROCHLORIDE 150 MG: 50 TABLET ORAL at 22:21

## 2023-01-18 RX ADMIN — HYDROXYZINE HYDROCHLORIDE 50 MG: 50 TABLET ORAL at 01:06

## 2023-01-18 RX ADMIN — LOSARTAN POTASSIUM 100 MG: 50 TABLET, FILM COATED ORAL at 08:35

## 2023-01-18 RX ADMIN — ZIPRASIDONE HYDROCHLORIDE 20 MG: 20 CAPSULE ORAL at 08:35

## 2023-01-18 RX ADMIN — GLIPIZIDE 10 MG: 5 TABLET ORAL at 08:35

## 2023-01-18 RX ADMIN — ACETAMINOPHEN 650 MG: 325 TABLET ORAL at 01:37

## 2023-01-18 RX ADMIN — TRIAMTERENE AND HYDROCHLOROTHIAZIDE 1 TABLET: 37.5; 25 TABLET ORAL at 08:35

## 2023-01-18 RX ADMIN — ZIPRASIDONE HYDROCHLORIDE 20 MG: 20 CAPSULE ORAL at 17:11

## 2023-01-18 RX ADMIN — ACETAMINOPHEN 650 MG: 325 TABLET ORAL at 22:29

## 2023-01-18 RX ADMIN — FLUTICASONE PROPIONATE 2 SPRAY: 50 SPRAY, METERED NASAL at 08:38

## 2023-01-18 RX ADMIN — METFORMIN HYDROCHLORIDE 1000 MG: 500 TABLET ORAL at 17:11

## 2023-01-18 RX ADMIN — ALPRAZOLAM 0.5 MG: 0.5 TABLET ORAL at 08:38

## 2023-01-18 NOTE — BH NOTES
PSYCHIATRIC PROGRESS NOTE    CHIEF COMPLAINT:   \"my meds werent working\"    INTERVAL HISTORY:   1/18/23  Nursing report that patient had refused insulin im, but it was for 2 units. Patient says that she isn't interested in being on insulin regimen. Patient is agreeable to splitting glipizide for better control. Ms Dominic Olmedo feels tired and having difficulty with concentration, but feels her anxiety is decreased. She feels less urges for hair pulling. 1/17/23  Patient states that she struggles with urges to pull her hair. She says that she feels hungry on the zyprexa and ends up eating many snacks at night. Her BG was increased and required 2 units of insulin, but patient refused. Education provided. Patient states that her hair pulling is affecting her life, including her job. She complains of difficulty falling and staying asleep, night time awakenings with snoring. Recommended sleep study. 1/16/23  Patient says that for a while now she felt that her medications haven't been working. Eventually she felt that she couldn't get out of bed. She was referred to inpatient setting. Robbin Miller says that she's struggled with OCD and Trichotillomania and was on luvox for some time. She felt that it wasn't working. She also says that she hasn't been in ERP for therapy. She says that her anxiety is high and can't function. She denies SI or HI.     1/15/23  Robbin Miller states that she is doing ok today. She endorses a slight decrease in the anxiety. She states that she had a spell this morning of hair pulling, but has been doing ok since then. She denies SI/HI/AVH currently. She denies any side effects from the medications.      VITALS:  Patient Vitals for the past 24 hrs:   Temp Pulse Resp BP SpO2   01/18/23 0800 98.7 °F (37.1 °C) (!) 103 16 127/81 98 %   01/17/23 1700 98.3 °F (36.8 °C) 99 16 123/80 99 %        LABS:  Recent Results (from the past 24 hour(s))   GLUCOSE, POC    Collection Time: 01/17/23  4:40 PM Result Value Ref Range    Glucose (POC) 76 65 - 117 mg/dL    Performed by [address] [place] [address], POC    Collection Time: 01/17/23  8:08 PM   Result Value Ref Range    Glucose (POC) 89 65 - 117 mg/dL    Performed by [address] [place] [address], POC    Collection Time: 01/18/23  8:09 AM   Result Value Ref Range    Glucose (POC) 144 (H) 65 - 117 mg/dL    Performed by Sue Maynard         MEDICATIONS:    Current Facility-Administered Medications:     ziprasidone (GEODON) capsule 20 mg, 20 mg, Oral, BID WITH MEALS, Jaylan Thompson MD, 20 mg at 01/18/23 0835    cetirizine (ZYRTEC) tablet 10 mg, 10 mg, Oral, DAILY, Jaylan Thompson MD, 10 mg at 01/18/23 0835    traZODone (DESYREL) tablet 150 mg, 150 mg, Oral, QHS, Jaylan Thompson MD, 150 mg at 01/17/23 2155    traZODone (DESYREL) tablet 100 mg, 100 mg, Oral, QHS PRN, Jaylan Thompson MD    acetylcysteine tab 1,200 mg (Patient Supplied), 1,200 mg, Oral, DAILY, Jaylan Kerr MD, 1,200 mg at 01/18/23 0838    sertraline (ZOLOFT) tablet 50 mg, 50 mg, Oral, DAILY, Jaylan Kerr MD, 50 mg at 01/18/23 0835    metFORMIN (GLUCOPHAGE) tablet 1,000 mg, 1,000 mg, Oral, BID WITH MEALS, Jaylan Kerr MD, 1,000 mg at 01/18/23 0835    losartan (COZAAR) tablet 100 mg, 100 mg, Oral, DAILY, Jaylan Thompson MD, 100 mg at 01/18/23 0835    triamterene-hydroCHLOROthiazide (MAXZIDE) 37.5-25 mg per tablet 1 Tablet, 1 Tablet, Oral, DAILY, Jaylan Thompson MD, 1 Tablet at 01/18/23 0835    fluticasone propionate (FLONASE) 50 mcg/actuation nasal spray 2 Spray, 2 Spray, Both Nostrils, DAILY, Jaylan Thompson MD, 2 Ismay at 01/18/23 0838    atorvastatin (LIPITOR) tablet 40 mg, 40 mg, Oral, DAILY, Jaylan Thompson MD, 40 mg at 01/18/23 0835    glucose chewable tablet 16 g, 4 Tablet, Oral, PRN, Evelia Coil A, CNS    glucagon (GLUCAGEN) injection 1 mg, 1 mg, IntraMUSCular, PRN, Evelia Coil A, CNS    dextrose 10 % infusion 0-250 mL, 0-250 mL, IntraVENous, PRN, Evelia Coil A, CNS insulin lispro (HUMALOG) injection, , SubCUTAneous, AC&HS, Raymon, Nancy A, CNS    ALPRAZolam (XANAX) tablet 0.5 mg, 0.5 mg, Oral, BID PRN, Casandra, Reta M, NP, 0.5 mg at 01/18/23 0838    glipiZIDE (GLUCOTROL) tablet 10 mg, 10 mg, Oral, DAILY, Casandra, Reta M, NP, 10 mg at 01/18/23 0835    pantoprazole (PROTONIX) tablet 20 mg, 20 mg, Oral, DAILY, Elderon, Reta M, NP, 20 mg at 01/18/23 0835    OLANZapine (ZyPREXA) tablet 5 mg, 5 mg, Oral, Q6H PRN, Neda Bolton, NP, 5 mg at 01/18/23 0107    haloperidol lactate (HALDOL) injection 5 mg, 5 mg, IntraMUSCular, Q6H PRN, Neda Christen, NP, 5 mg at 01/14/23 0900    benztropine (COGENTIN) tablet 1 mg, 1 mg, Oral, BID PRN, Neda Christen, NP    diphenhydrAMINE (BENADRYL) injection 50 mg, 50 mg, IntraMUSCular, BID PRN, Neda Christen, NP, 50 mg at 01/14/23 0900    hydrOXYzine HCL (ATARAX) tablet 50 mg, 50 mg, Oral, TID PRN, Neda Bolton, NP, 50 mg at 01/18/23 0106    acetaminophen (TYLENOL) tablet 650 mg, 650 mg, Oral, Q4H PRN, Neda Bolton, NP, 650 mg at 01/18/23 0614    magnesium hydroxide (MILK OF MAGNESIA) 400 mg/5 mL oral suspension 30 mL, 30 mL, Oral, DAILY PRN, Neda Bolton, NP    midazolam (VERSED) injection 2 mg, 2 mg, IntraMUSCular, Q6H PRN, Neda Christen, NP, 2 mg at 01/14/23 0900       MSE:  Ms Marylou Reyez is a 48yo AAF has short hair and is dressed in hospital gown  Speech has NL rate and volume. Thought process is logical  Thought content is negative for SI  Denies experiencing hallucinations of any type. No paranoia or delusional thoughts  Insight/Judgment are poor    ASSESSMENT:  Mood d/o, NOS  Trichotillomania (r/o OCD)    PLAN:  1/18/23  Continue geodon, zoloft and NAC for treatment of symptoms  Will assess need for further geodon increase. Likely d/c this week w/referral to ERP and sleep study to rule out PATRICIO. 1/17/2023  Discussed d/c zyprexa, instead starting geodon 20mg po bid. Continue zoloft 50mg po qday.  Continue Trazodone 150mg but will change to be scheduled. Will add trazodone 100mg po qhs prn. Start NAC 1200mg  Recommend sleep study on an outpatient. 1/16/2023  Increase Zoloft from 25mg to 50mg po qday. Continue zyprexa 2.5mg po qhs. Continue xanax 0.5mg po bid prn. Pharmacy will look into starting NAC for Trichotillomania. Will restart medical medications.

## 2023-01-18 NOTE — PROGRESS NOTES
Patient visible on unit. Pleasant and cooperative. Compliant with medications. Mood euythmic. Problem: Falls - Risk of  Goal: *Absence of Falls  Description: Document Juhi Escobedo Fall Risk and appropriate interventions in the flowsheet.   1/18/2023 1532 by Devang Ochoa RN  Outcome: Progressing Towards Goal  Note: Fall Risk Interventions:      Medication Interventions: Teach patient to arise slowly      1/18/2023 0720 by Devang Ochoa RN  Outcome: Progressing Towards Goal  Note: Fall Risk Interventions:         Medication Interventions: Teach patient to arise slowly    Problem: Depressed Mood (Adult/Pediatric)  Goal: *STG: Verbalizes anger, guilt, and other feelings in a constructive manor  1/18/2023 1532 by Michelle Rousseau RN  Outcome: Progressing Towards Goal  1/18/2023 0720 by Devang Ochoa RN  Outcome: Progressing Towards Goal  Goal: *STG: Remains safe in hospital  1/18/2023 1532 by Devang Ochoa RN  Outcome: Progressing Towards Goal  1/18/2023 0720 by Devang Ochoa RN  Outcome: Progressing Towards Goal

## 2023-01-18 NOTE — DIABETES MGMT
Diabetes Management Team to sign off at this point as patient's blood glucose remains stable on current oral medications that she was taking PTA. Discharge recommendations are to continue current PTA diabetes medications as prescribed. Please re-consult us if patient needs change. Thank you for including us in their care.     Signed By: BERKLEY Simmons   [facility]    January 18, 2023

## 2023-01-18 NOTE — BH NOTES
PRN Medication Documentation    Specific patient behavior that led to need for PRN medication: pt woke up exhibiting paranoia and anxiety. Staff interventions attempted prior to PRN being given: med education  PRN medication given: atarax 50 mg po and zyprexa 5 mg po  Patient response/effectiveness of PRN medication: staff will continue to monitor and support.

## 2023-01-18 NOTE — PROGRESS NOTES
Problem: Falls - Risk of  Goal: *Absence of Falls  Description: Document Allan Cravens Fall Risk and appropriate interventions in the flowsheet. Outcome: Progressing Towards Goal  Note: Fall Risk Interventions:    Medication Interventions: Teach patient to arise slowly      Patient received resting quietly in bed. No signs of distress. Even and unlabored breathing. Staff will continue to monitor safety q15 and provide support.

## 2023-01-18 NOTE — INTERDISCIPLINARY ROUNDS
Behavioral Health Interdisciplinary Rounds     Patient Name: Riddhi Cuellar  Age: 50 y.o. Room/Bed:  [facility]  Primary Diagnosis: <principal problem not specified>   Admission Status: Voluntary     Readmission within 30 days: no  Power of  in place: no  Patient requires a blocked bed: no          Reason for blocked bed:   Sleep hours:        Participation in Care/Groups:  yes  Medication Compliant?: Yes  PRNS (last 24 hours): Antipsychotic (PO), Antianxiety, and Pain    Restraints (last 24 hours):  no  __________________________________________________  OQ Admission Analysis Survey completed:  OQ Admission Analysis Survey score:  OQ Discharge Analysis Survey completed:  OQ Discharge Analysis Survey score:   __________________________________________________     Alcohol screening (AUDIT) completed -     If applicable, date SBIRT discussed in treatment team AND documented:    Tobacco - patient is a smoker: Have You Used Tobacco in the Past 30 Days: No  Illegal Drugs use: Have You Used Any Illegal Substances Over the Past 12 Months: No    24 hour chart check complete: yes     _______________________________________________    Patient goal(s) for today: communicate needs to staff  Treatment team focus/goals: adjust medications, assess treatment  Progress note: Pt said she was feeling very depressed this morning. Pt said that she only experienced one urge to pull her hair today and that was an improvement. Pt said that she was not experiencing much anxiety today. Pt said that she felt some slight brain fog.         Financial concerns/prescription coverage:    Family contact:  Andre Calderón,   [phone]                      Family requesting physician contact today:  no  Discharge plan: TBD  Access to weapons :      no                                                        Outpatient provider(s): PCP: Jenna Gonzalez MD  Patient's preferred phone number for follow up call : [phone]  Patient's preferred e-mail address :  Participating treatment team members:  Dr. Jenny Wren, MSW, Nikole Angel RN    LOS:  5  Expected LOS:     Participating treatment team members: Tawanda Johnson, * (assigned SW),

## 2023-01-18 NOTE — PROGRESS NOTES
Received patient resting in bed. During treatment team, patient says its hard for her to focus and concentrate because she feels tired from not sleeping well last night. Says she does not feel anxious at this time. Denies SI/HI, denies A/V hallucinations. Problem: Falls - Risk of  Goal: *Absence of Falls  Description: Document Amanda Gamma Fall Risk and appropriate interventions in the flowsheet.   Outcome: Progressing Towards Goal  Note: Fall Risk Interventions:      Medication Interventions: Teach patient to arise slowly         Problem: Depressed Mood (Adult/Pediatric)  Goal: *STG: Verbalizes anger, guilt, and other feelings in a constructive manor  Outcome: Progressing Towards Goal  Goal: *STG: Remains safe in hospital  Outcome: Progressing Towards Goal     Problem: Depressed Mood (Adult/Pediatric)  Goal: *STG: Verbalizes anger, guilt, and other feelings in a constructive manor  Outcome: Progressing Towards Goal     Problem: Depressed Mood (Adult/Pediatric)  Goal: *STG: Remains safe in hospital  Outcome: Progressing Towards Goal

## 2023-01-18 NOTE — PROGRESS NOTES
Referral source: Patient request  Riddhi Cuellar at [facility] in [facility]. I reviewed the medical record prior to this encounter. Landy Zuniga shared the difficulty she's having with using spiritual resources for coping. She reports that a lack of sleep continues to inhibit her concentration and retention when she reads the Bible. I normalized her experience and reinforced the spiritual benefits that result from her efforts. I celebrated the progress she's making and encouraged her to continue adhering to the treatment plan. Outcome: Riddhi Cuellar expressed appreciation for pastoral support and the opportunity to share their thoughts and feelings. Plan of Care: Will revisit later in the shift for spiritual and relational support, as available. Landy Zuniga is interested in reading the Bible together. Rev.  Courtney Tijerina MDiv, MS, [facility]  Staff

## 2023-01-18 NOTE — PROGRESS NOTES
Rc Mckeon at [facility] in [facility]. I reviewed the medical record prior to this encounter. Provided spiritual care through Scripture reading. Ruth Bradley requested references for jasvir-based out-patient counseling. I provided three pastoral counseling locations for her information. [facility] [address] [facility]      Plan of Care: Patient is aware of  availability and was encouraged to request support as needed. The  on-call can be reached at [phone]). Rev.  Jackelyn Duarte MDiv, MS, [facility]  Staff

## 2023-01-18 NOTE — BH NOTES
PSYCHIATRIC PROGRESS NOTE       Patient: Lilliana Garcia MRN: [id number]  SSN: [id number]    YOB: 1975  Age: 50 y.o. Sex: female      Admit Date: 7/26/2022    LOS: 3 days       Chief Complaint:  Not good today, I am depressed. Interval History:  Lilliana Garcia is a little change. Says she is depressed, more anxious. Trichotillomania persists. She feels \"drugged and overmedicated\" this morning. She took atarax this morning which mostly was the culprit of drowsiness. She said she took zyprexa as scheduled plus prn but no sedation. She also says she's nauseous, could be from luvox. She says she feels agitated, irritable. She denies si hi or avh. Circumstantial thought process. Anxiety is much worse that she has a difficult time processing information. I spent a lengthy amount of time explaining to her over and over about tx plan.       Past Medical History:  Past Medical History:   Diagnosis Date    Anxiety     Depression     ANXIETY & DEPRESSION    Diabetes ([facility] [address]     TYPE II    Hypertension     Infected sebaceous cyst, upper back 4/10/2019    Panic attack     Suicidal thoughts     Vision decreased          ALLERGIES:(reviewed/updated 7/29/2022)  Allergies   Allergen Reactions    Lisinopril Rash     Swelling of the lips       Laboratory report:  Lab Results   Component Value Date/Time    WBC 7.8 07/26/2022 02:48 PM    HGB 12.3 07/26/2022 02:48 PM    HCT 39.2 07/26/2022 02:48 PM    PLATELET 707 16/83/5125 02:48 PM    MCV 81.7 07/26/2022 02:48 PM      Lab Results   Component Value Date/Time    Sodium 139 07/26/2022 02:48 PM    Potassium 3.2 (L) 07/26/2022 02:48 PM    Chloride 103 07/26/2022 02:48 PM    CO2 29 07/26/2022 02:48 PM    Anion gap 7 07/26/2022 02:48 PM    Glucose 146 (H) 07/29/2022 05:44 AM    BUN 16 07/26/2022 02:48 PM    Creatinine 0.75 07/26/2022 02:48 PM    BUN/Creatinine ratio 21 (H) 07/26/2022 02:48 PM    GFR est AA >60 07/26/2022 02:48 PM    GFR est non-AA >60 07/26/2022 02:48 PM Patient: Ilya Colon    Procedure Summary     Date: 01/18/23 Room / Location: [address] CARDIOVASCULAR OPERATING ROOM    Anesthesia Start: 0643 Anesthesia Stop: 0858    Procedure: STERNAL WIRE REMOVAL (Chest) Diagnosis:       Sternal wound dehiscence, initial encounter      (Sternal wound dehiscence, initial encounter [T81.32XA])    Surgeons: Jr Tutu Blackmon MD Provider: Denzel Perez MD    Anesthesia Type: general ASA Status: 4          Anesthesia Type: general    Vitals  Vitals Value Taken Time   /75 01/18/23 1100   Temp 36.9 °C (98.5 °F) 01/18/23 0853   Pulse 62 01/18/23 1101   Resp 18 01/18/23 1100   SpO2 96 % 01/18/23 1101   Vitals shown include unvalidated device data.        Post Anesthesia Care and Evaluation    Patient location during evaluation: bedside  Patient participation: complete - patient participated  Level of consciousness: awake and alert  Pain management: adequate    Airway patency: patent  Anesthetic complications: No anesthetic complications  PONV Status: none  Cardiovascular status: acceptable  Respiratory status: acceptable  Hydration status: acceptable    Comments: /75   Pulse 70   Temp 36.9 °C (98.5 °F) (Oral)   Resp 18   SpO2 93%          Calcium 9.5 07/26/2022 02:48 PM    Bilirubin, total 0.3 07/26/2022 02:48 PM    Alk. phosphatase 77 07/26/2022 02:48 PM    Protein, total 8.2 07/26/2022 02:48 PM    Albumin 3.5 07/26/2022 02:48 PM    Globulin 4.7 (H) 07/26/2022 02:48 PM    A-G Ratio 0.7 (L) 07/26/2022 02:48 PM    ALT (SGPT) 25 07/26/2022 02:48 PM      Vitals:    07/28/22 2212 07/29/22 0016 07/29/22 0858 07/29/22 1123   BP: 113/75  133/89 126/79   Pulse: (!) 105  97 98   Resp: 16 16 16 16   Temp: 98.4 °F (36.9 °C)  98 °F (36.7 °C) 98 °F (36.7 °C)   SpO2: 99%  100% 100%   Weight:       Height:           No results found for: VALF2, VALAC, VALP, VALPR, DS6, CRBAM, CRBAMP, CARB2, XCRBAM  No results found for: LITHM    Vital Signs  Patient Vitals for the past 24 hrs:   Temp Pulse Resp BP SpO2   07/29/22 1123 98 °F (36.7 °C) 98 16 126/79 100 %   07/29/22 0858 98 °F (36.7 °C) 97 16 133/89 100 %   07/29/22 0016 -- -- 16 -- --   07/28/22 2212 98.4 °F (36.9 °C) (!) 105 16 113/75 99 %   07/28/22 1857 98.1 °F (36.7 °C) 99 16 113/70 98 %       Wt Readings from Last 3 Encounters:   07/26/22 91.3 kg (201 lb 4.8 oz)   05/11/22 98 kg (216 lb)   05/08/22 98 kg (216 lb)     Temp Readings from Last 3 Encounters:   07/29/22 98 °F (36.7 °C)   05/16/22 99.2 °F (37.3 °C)   05/08/22 97.4 °F (36.3 °C)     BP Readings from Last 3 Encounters:   07/29/22 126/79   05/16/22 118/77   05/08/22 118/85     Pulse Readings from Last 3 Encounters:   07/29/22 98   05/16/22 (!) 109   05/08/22 86       Radiology (reviewed/updated 7/29/2022)  XR CHEST PA LAT    Result Date: 5/4/2022  EXAM: XR CHEST PA LAT INDICATION: Chest Pain COMPARISON: 6/3/2019. FINDINGS: PA and lateral radiographs of the chest demonstrate clear lungs. The cardiac and mediastinal contours and pulmonary vascularity are normal. Degenerative changes are seen in the thoracic spine. No acute abnormality.      CT SPINE CERV WO CONT    Result Date: 5/4/2022  EXAM: CT SPINE CERV WO CONT CLINICAL HISTORY: neck pain INDICATION: neck pain COMPARISON:  None TECHNIQUE:  Axial neck CT was performed. Noncontrast imaging obtained. Coronal and sagittal reconstructions were performed. CT dose reduction was achieved through use of a standardized protocol tailored for this examination and automatic exposure control for dose modulation. Osseous/bone algorithm was utilized. FINDINGS: The vertebral bodies are anatomically aligned. There is no evidence of fracture or subluxation. The prevertebral soft tissues are grossly within normal limits. The atlantodental interval is within normal limits. The craniocervical junction is intact. Large posterior and anterior osteophytes. Severe multilevel canal stenoses. Cord compression at C5-6 is likely. Multilevel severe foraminal stenoses as well. No apical pneumothorax. There is no acute fracture or dislocation identified. Severe multilevel chronic degenerative change with multiple anterior and posterior disc osteophyte. Severe canal and foraminal stenoses with multilevel cord compression chronic and degenerative in nature is likely. MRI of the cervical spine for further delineation on an nonemergent basis is recommended.         Current Facility-Administered Medications   Medication Dose Route Frequency Provider Last Rate Last Admin    hydrOXYzine HCL (ATARAX) tablet 25 mg  25 mg Oral Q4H PRN Lizbeth Ko, NP        fluvoxaMINE (LUVOX) tablet 50 mg  50 mg Oral QHS Lizbeth Ko NP        traZODone (DESYREL) tablet 100 mg  100 mg Oral QHS PRN Brenda Koanie A, NP   100 mg at 07/28/22 2135    OLANZapine (ZyPREXA) tablet 5 mg  5 mg Oral Q12H Bria Koe A, NP   5 mg at 07/29/22 0855    glipiZIDE (GLUCOTROL) tablet 10 mg  10 mg Oral DAILY Bria Koe A, NP   10 mg at 07/29/22 0856    losartan (COZAAR) tablet 50 mg  50 mg Oral DAILY Bria Koe A, NP   50 mg at 07/29/22 0856    metFORMIN (GLUCOPHAGE) tablet 1,000 mg  1,000 mg Oral DAILY Lizbeth Ko NP 1,000 mg at 07/29/22 0856    triamterene-hydroCHLOROthiazide (MAXZIDE) 37.5-25 mg per tablet 1 Tablet  1 Tablet Oral DAILY Lizbeth Ko, NP   1 Tablet at 07/29/22 0855    atorvastatin (LIPITOR) tablet 40 mg  40 mg Oral DAILY Lizbeth Ko A, NP   40 mg at 07/29/22 0856    OLANZapine (ZyPREXA) tablet 5 mg  5 mg Oral Q6H PRN Wyvonne Lo, NP   5 mg at 07/28/22 1931    haloperidol lactate (HALDOL) injection 5 mg  5 mg IntraMUSCular Q6H PRN Wyvonne Lo, NP        benztropine (COGENTIN) tablet 1 mg  1 mg Oral BID PRN Wyvonne Lo, NP        diphenhydrAMINE (BENADRYL) injection 50 mg  50 mg IntraMUSCular BID PRN Wyvonne Lo, NP        acetaminophen (TYLENOL) tablet 650 mg  650 mg Oral Q4H PRN Wyvonne Lo, NP        magnesium hydroxide (MILK OF MAGNESIA) 400 mg/5 mL oral suspension 30 mL  30 mL Oral DAILY PRN Wyvonne Lo, NP   30 mL at 07/27/22 0849    midazolam (VERSED) injection 2 mg  2 mg IntraMUSCular Q6H PRN Wyvonne Lo, NP           Side Effects: (reviewed/updated 7/29/2022)  None reported or admitted to. Review of Systems: (reviewed/updated 7/29/2022)  Appetite: good  Sleep: poor   All other Review of Systems: negative    Mental Status Exam:  Eye contact: Good eye contact  Psychomotor activity: wnl  Speech is spontaneous  Thought process: Logical and goal directed   Mood is \"depressed\"  Affect: blunt  Perception: No avh  Suicidal ideation: No si  Homicidal ideation: No hi  Insight/judgment: Partial   Cognition is grossly intact      Physical Exam:  Musculoskeletal system: steady gait  Tremor not present  Cog wheeling not present      Assessment and Plan:  Pricila Mclaughlin meets criteria for a diagnosis of SANTY. Unspecified mood disorder. Trichotillomania. Decrease atarax to 25 mg prn. Continue rest of  medications as prescribed. We will closely monitor for safety. We will encourage reality orientation. Disposition planning to continue.        I certify that this patients inpatient psychiatric hospital services furnished since the previous certification were, and continue to be, required for treatment that could reasonably be expected to improve the patient's condition, or for diagnostic study, and that the patient continues to need, on a daily basis, active treatment furnished directly by or requiring the supervision of inpatient psychiatric facility personnel. In addition, the hospital records show that services furnished were intensive treatment services, admission or related services, or equivalent services.       Signed:  Kristal Meza NP  7/29/2022

## 2023-01-19 LAB
GLUCOSE BLD STRIP.AUTO-MCNC: 163 MG/DL (ref 65–117)
SERVICE CMNT-IMP: ABNORMAL

## 2023-01-19 PROCEDURE — 65220000003 HC RM SEMIPRIVATE PSYCH

## 2023-01-19 PROCEDURE — 74011250637 HC RX REV CODE- 250/637: Performed by: NURSE PRACTITIONER

## 2023-01-19 PROCEDURE — 82962 GLUCOSE BLOOD TEST: CPT

## 2023-01-19 PROCEDURE — 74011250637 HC RX REV CODE- 250/637: Performed by: PSYCHIATRY & NEUROLOGY

## 2023-01-19 RX ADMIN — ZIPRASIDONE HYDROCHLORIDE 20 MG: 20 CAPSULE ORAL at 08:40

## 2023-01-19 RX ADMIN — ZIPRASIDONE HYDROCHLORIDE 20 MG: 20 CAPSULE ORAL at 17:08

## 2023-01-19 RX ADMIN — ACETAMINOPHEN 650 MG: 325 TABLET ORAL at 21:57

## 2023-01-19 RX ADMIN — PANTOPRAZOLE SODIUM 20 MG: 20 TABLET, DELAYED RELEASE ORAL at 08:39

## 2023-01-19 RX ADMIN — ALPRAZOLAM 0.5 MG: 0.5 TABLET ORAL at 09:43

## 2023-01-19 RX ADMIN — TRAZODONE HYDROCHLORIDE 150 MG: 50 TABLET ORAL at 21:56

## 2023-01-19 RX ADMIN — FLUTICASONE PROPIONATE 2 SPRAY: 50 SPRAY, METERED NASAL at 08:43

## 2023-01-19 RX ADMIN — TRIAMTERENE AND HYDROCHLOROTHIAZIDE 1 TABLET: 37.5; 25 TABLET ORAL at 08:40

## 2023-01-19 RX ADMIN — CETIRIZINE HYDROCHLORIDE 10 MG: 10 TABLET, FILM COATED ORAL at 08:40

## 2023-01-19 RX ADMIN — METFORMIN HYDROCHLORIDE 1000 MG: 500 TABLET ORAL at 08:39

## 2023-01-19 RX ADMIN — ACETAMINOPHEN 650 MG: 325 TABLET ORAL at 04:51

## 2023-01-19 RX ADMIN — METFORMIN HYDROCHLORIDE 1000 MG: 500 TABLET ORAL at 17:07

## 2023-01-19 RX ADMIN — LOSARTAN POTASSIUM 100 MG: 50 TABLET, FILM COATED ORAL at 08:40

## 2023-01-19 RX ADMIN — GLIPIZIDE 5 MG: 5 TABLET ORAL at 17:08

## 2023-01-19 RX ADMIN — GLIPIZIDE 5 MG: 5 TABLET ORAL at 08:40

## 2023-01-19 RX ADMIN — SERTRALINE 50 MG: 50 TABLET, FILM COATED ORAL at 08:40

## 2023-01-19 RX ADMIN — ATORVASTATIN CALCIUM 40 MG: 40 TABLET, FILM COATED ORAL at 08:40

## 2023-01-19 RX ADMIN — HYDROXYZINE HYDROCHLORIDE 50 MG: 50 TABLET ORAL at 04:49

## 2023-01-19 NOTE — BH NOTES
PSYCHIATRIC PROGRESS NOTE    CHIEF COMPLAINT:   \"Things are getting better. \"    INTERVAL HISTORY:   1/19/23  Ms Severa Novak reports sleeping well overnight, but waking early in the morning and experiencing urges to pull her hair. Today she complains of headache and nausea that is now improving. She feels she is improving slowly. She feels that she can see herself returning to function upon discharge. She knows that she would have to engage in therapy, exercise, healthy nutrition. Today she denied having any suicidal or homicidal thoughts, intents or plans. 1/18/23  Nursing report that patient had refused insulin im, but it was for 2 units. Patient says that she isn't interested in being on insulin regimen. Patient is agreeable to splitting glipizide for better control. Ms Severa Novak feels tired and having difficulty with concentration, but feels her anxiety is decreased. She feels less urges for hair pulling. 1/17/23  Patient states that she struggles with urges to pull her hair. She says that she feels hungry on the zyprexa and ends up eating many snacks at night. Her BG was increased and required 2 units of insulin, but patient refused. Education provided. Patient states that her hair pulling is affecting her life, including her job. She complains of difficulty falling and staying asleep, night time awakenings with snoring. Recommended sleep study. 1/16/23  Patient says that for a while now she felt that her medications haven't been working. Eventually she felt that she couldn't get out of bed. She was referred to inpatient setting. Taye Pete says that she's struggled with OCD and Trichotillomania and was on luvox for some time. She felt that it wasn't working. She also says that she hasn't been in ERP for therapy. She says that her anxiety is high and can't function. She denies SI or HI.     1/15/23  Taye Pete states that she is doing ok today. She endorses a slight decrease in the anxiety.  She states that she had a spell this morning of hair pulling, but has been doing ok since then. She denies SI/HI/AVH currently. She denies any side effects from the medications.      VITALS:  Patient Vitals for the past 24 hrs:   Temp Pulse Resp BP SpO2   01/19/23 0804 98.6 °F (37 °C) (!) 112 16 124/76 98 %   01/19/23 0744 -- -- 14 -- --   01/18/23 1630 97.8 °F (36.6 °C) 100 16 105/71 --        LABS:  Recent Results (from the past 24 hour(s))   GLUCOSE, POC    Collection Time: 01/19/23  8:25 AM   Result Value Ref Range    Glucose (POC) 163 (H) 65 - 117 mg/dL    Performed by Julian Lomax         MEDICATIONS:    Current Facility-Administered Medications:     glipiZIDE (GLUCOTROL) tablet 5 mg, 5 mg, Oral, ACB&D, Jaylan Thompson MD, 5 mg at 01/19/23 0840    ziprasidone (GEODON) capsule 20 mg, 20 mg, Oral, BID WITH MEALS, Jaylan Thompson MD, 20 mg at 01/19/23 0840    cetirizine (ZYRTEC) tablet 10 mg, 10 mg, Oral, DAILY, Jaylan Thompson MD, 10 mg at 01/19/23 0840    traZODone (DESYREL) tablet 150 mg, 150 mg, Oral, QHS, Jaylan Thompson MD, 150 mg at 01/18/23 2221    traZODone (DESYREL) tablet 100 mg, 100 mg, Oral, QHS PRN, Jaylan Thompson MD    acetylcysteine tab 1,200 mg (Patient Supplied), 1,200 mg, Oral, DAILY, Jaylan Garrett MD, 1,200 mg at 01/19/23 0842    sertraline (ZOLOFT) tablet 50 mg, 50 mg, Oral, DAILY, Katelin Beltran MD, 50 mg at 01/19/23 0840    metFORMIN (GLUCOPHAGE) tablet 1,000 mg, 1,000 mg, Oral, BID WITH MEALS, Jaylan Garrett MD, 1,000 mg at 01/19/23 0839    losartan (COZAAR) tablet 100 mg, 100 mg, Oral, DAILY, Jaylan Thompson MD, 100 mg at 01/19/23 0840    triamterene-hydroCHLOROthiazide (MAXZIDE) 37.5-25 mg per tablet 1 Tablet, 1 Tablet, Oral, DAILY, Jaylan Thompson MD, 1 Tablet at 01/19/23 0840    fluticasone propionate (FLONASE) 50 mcg/actuation nasal spray 2 Spray, 2 Spray, Both Nostrils, DAILY, Jaylan Thompson MD, 2 Wilkinson at 01/19/23 0843    atorvastatin (LIPITOR) tablet 40 mg, 40 mg, Oral, DAILY, Audie Christy, Lance Kat MD, 40 mg at 01/19/23 0840    glucose chewable tablet 16 g, 4 Tablet, Oral, PRN, Dk BRASHER, CNS    glucagon (GLUCAGEN) injection 1 mg, 1 mg, IntraMUSCular, PRN, Dk BRASHER, CNS    dextrose 10 % infusion 0-250 mL, 0-250 mL, IntraVENous, PRN, Nancy Betancur, CNS    ALPRAZolam (XANAX) tablet 0.5 mg, 0.5 mg, Oral, BID PRN, Reta Guajardo, NP, 0.5 mg at 01/19/23 0943    pantoprazole (PROTONIX) tablet 20 mg, 20 mg, Oral, DAILY, CasandraReta vidales M, NP, 20 mg at 01/19/23 0839    OLANZapine (ZyPREXA) tablet 5 mg, 5 mg, Oral, Q6H PRN, Hallie Irons, NP, 5 mg at 01/18/23 0107    haloperidol lactate (HALDOL) injection 5 mg, 5 mg, IntraMUSCular, Q6H PRN, Hallie Irons, NP, 5 mg at 01/14/23 0900    benztropine (COGENTIN) tablet 1 mg, 1 mg, Oral, BID PRN, Hallie Irons, NP    diphenhydrAMINE (BENADRYL) injection 50 mg, 50 mg, IntraMUSCular, BID PRN, Hallie Irons, NP, 50 mg at 01/14/23 0900    hydrOXYzine HCL (ATARAX) tablet 50 mg, 50 mg, Oral, TID PRN, Hallie Irons, NP, 50 mg at 01/19/23 0449    acetaminophen (TYLENOL) tablet 650 mg, 650 mg, Oral, Q4H PRN, Hallie Irons, NP, 650 mg at 01/19/23 0451    magnesium hydroxide (MILK OF MAGNESIA) 400 mg/5 mL oral suspension 30 mL, 30 mL, Oral, DAILY PRN, Hallie Irons, NP    midazolam (VERSED) injection 2 mg, 2 mg, IntraMUSCular, Q6H PRN, Hallie Irons, NP, 2 mg at 01/14/23 0900       MSE:  Ms Lionel Alvarez is a 50yo AAF has short hair and is dressed in hospital gown  Speech has NL rate and volume. Thought process is logical  Thought content is negative for SI  Denies experiencing hallucinations of any type. No paranoia or delusional thoughts  Insight/Judgment are poor    ASSESSMENT:  Mood d/o, NOS  Trichotillomania (r/o OCD)    PLAN:  1/19/23  Continue current medication regimen  Possible discharge tomorrow. 1/18/23  Continue geodon, zoloft and NAC for treatment of symptoms  Will assess need for further geodon increase.   Likely d/c this week w/referral to ERP and sleep study to rule out PATRICIO. 1/17/2023  Discussed d/c zyprexa, instead starting geodon 20mg po bid. Continue zoloft 50mg po qday. Continue Trazodone 150mg but will change to be scheduled. Will add trazodone 100mg po qhs prn. Start NAC 1200mg  Recommend sleep study on an outpatient. 1/16/2023  Increase Zoloft from 25mg to 50mg po qday. Continue zyprexa 2.5mg po qhs. Continue xanax 0.5mg po bid prn. Pharmacy will look into starting NAC for Trichotillomania. Will restart medical medications.

## 2023-01-19 NOTE — PROGRESS NOTES
Problem: Depressed Mood (Adult/Pediatric)  Goal: *STG: Participates in treatment plan  Outcome: Progressing Towards Goal  HAS ATTENDED  TREATMENT TEAM   Goal: *STG: Remains safe in hospital  Outcome: Progressing Towards Goal  Pt denies any suicidal or homicidal thoughts. Contracts for safety. Remains on q 15 min safety checks.         Problem: Aggression and Hostility (Behavioral Health)  Goal: *Reduce intensity and frequency of aggressive behaviors and verbalizations, treating others with respect  Outcome: Progressing Towards Goal  NO AGGRESSIVE BEHAVIORAL INCIDENTS BUT HAS BEEN SOMEWHAT IRRITABLE,ONE  INCIDENCE OF HAIR PULLING SELF REPORTED

## 2023-01-19 NOTE — BH NOTES
GROUP THERAPY PROGRESS NOTE    Patient is participating in self-care group. Group time: 45 minutes    Personal goal for participation: to develop an understanding of different types of social support, benefits of social support and how we use our supports. Goal orientation: Personal    Group therapy participation: Active    Therapeutic interventions reviewed and discussed:  Group members were given opportunity to engage in conversation about social supports. Members were supported to assess their current social supports and where support may be lacking. The group members engaged in activity to gain insight about boundaries that may prevent adequate support. Members were provided with psychoeducational worksheets. Impression of participation:  SW provided information about social support to pts. Sw discussed material with each pt individually.        JENNIFER Gray, QMHP-A

## 2023-01-19 NOTE — PROGRESS NOTES
[facility] at [facility]. I reviewed the medical record prior to this encounter. Ismael Ulrich was a active member on spiritually group today. We talk about mediation a  form of coping and dropping or anxiety. She took part in the guide mediation as well. She expressed felling more relaxed and less anxious post mediation. Her jasvir is a place of comfort.  offered deep breathing with patient to harmoniz our bodies and the room as a form of ministry of presence and support.  proved active listening, support, orientation to spiritual care and built relation through building a relationship. Advised nurse to contact [facility] for any further referrals.     huang Sanchez MDiv, MAPT

## 2023-01-19 NOTE — BH NOTES
GROUP THERAPY PROGRESS NOTE    Patient is participating in Goals group. Group time: 45 minutes    Personal goal for participation: Develop an understanding of S.M.A.R.T. Goal planning. Goal orientation: Personal    Group therapy participation: Active    Therapeutic interventions reviewed and discussed: Members were introduced to S.M.A.R.T. goal setting. Members were able to develop goals that are, specific, measurable, achievable, relevant, and time-bound. Members were also given examples of unrealistic or unattainable goal setting. Handouts provided    Impression of participation: Pt participated in group activity. Using provided handouts, Pts worked individually on goal setting. SW will review each pts goal plans. Pts discussed goals and worked with staff to design them more effectively.       Brianne RODRIGUEZ, HP-A

## 2023-01-19 NOTE — INTERDISCIPLINARY ROUNDS
Behavioral Health Interdisciplinary Rounds     Patient Name: Denice Rangel  Age: 50 y.o. [facility]  Primary Diagnosis: <principal problem not specified>   Admission Status: Voluntary     Readmission within 30 days: no  Power of  in place: no  Patient requires a blocked bed: no          Reason for blocked bed:   Sleep hours: 7      Participation in Care/Groups:  yes  Medication Compliant?: Yes  PRNS (last 24 hours):  Antianxiety and Pain    Restraints (last 24 hours):  no  __________________________________________________  OQ Admission Analysis Survey completed:  OQ Admission Analysis Survey score:  OQ Discharge Analysis Survey completed:  OQ Discharge Analysis Survey score:   __________________________________________________     Alcohol screening (AUDIT) completed -     If applicable, date SBIRT discussed in treatment team AND documented:    Tobacco - patient is a smoker: Have You Used Tobacco in the Past 30 Days: No  Illegal Drugs use: Have You Used Any Illegal Substances Over the Past 12 Months: No    24 hour chart check complete: yes     _______________________________________________    Patient goal(s) for today:   Treatment team focus/goals:   Progress note:         Financial concerns/prescription coverage:    Family contact:                        Family requesting physician contact today:    Discharge plan:   Access to weapons :                                                              Outpatient provider(s):   Patient's preferred phone number for follow up call :   Patient's preferred e-mail address :  Participating treatment team members:    LOS:  6  Expected LOS:     Participating treatment team members: Denicekatiana Rangel, * (assigned SW),

## 2023-01-19 NOTE — BH NOTES
PRN Medication Documentation    Specific patient behavior that led to need for PRN medication: restless  Staff interventions attempted prior to PRN being given: medication education  PRN medication given: atarax 50mg  Patient response/effectiveness of PRN medication: will continue to monitor q15 min rounds

## 2023-01-20 LAB
GLUCOSE BLD STRIP.AUTO-MCNC: 130 MG/DL (ref 65–117)
SERVICE CMNT-IMP: ABNORMAL

## 2023-01-20 PROCEDURE — 74011250637 HC RX REV CODE- 250/637: Performed by: NURSE PRACTITIONER

## 2023-01-20 PROCEDURE — 82962 GLUCOSE BLOOD TEST: CPT

## 2023-01-20 PROCEDURE — 74011250636 HC RX REV CODE- 250/636: Performed by: NURSE PRACTITIONER

## 2023-01-20 PROCEDURE — 65220000003 HC RM SEMIPRIVATE PSYCH

## 2023-01-20 PROCEDURE — 74011250637 HC RX REV CODE- 250/637: Performed by: PSYCHIATRY & NEUROLOGY

## 2023-01-20 RX ORDER — ONDANSETRON 4 MG/1
4 TABLET, ORALLY DISINTEGRATING ORAL
Status: DISCONTINUED | OUTPATIENT
Start: 2023-01-20 | End: 2023-01-30 | Stop reason: HOSPADM

## 2023-01-20 RX ADMIN — CETIRIZINE HYDROCHLORIDE 10 MG: 10 TABLET, FILM COATED ORAL at 08:46

## 2023-01-20 RX ADMIN — ALPRAZOLAM 0.5 MG: 0.5 TABLET ORAL at 09:16

## 2023-01-20 RX ADMIN — ATORVASTATIN CALCIUM 40 MG: 40 TABLET, FILM COATED ORAL at 08:46

## 2023-01-20 RX ADMIN — METFORMIN HYDROCHLORIDE 1000 MG: 500 TABLET ORAL at 08:45

## 2023-01-20 RX ADMIN — ACETAMINOPHEN 650 MG: 325 TABLET ORAL at 22:46

## 2023-01-20 RX ADMIN — ZIPRASIDONE HYDROCHLORIDE 20 MG: 20 CAPSULE ORAL at 08:46

## 2023-01-20 RX ADMIN — GLIPIZIDE 5 MG: 5 TABLET ORAL at 08:45

## 2023-01-20 RX ADMIN — ONDANSETRON 4 MG: 4 TABLET, ORALLY DISINTEGRATING ORAL at 21:17

## 2023-01-20 RX ADMIN — TRAZODONE HYDROCHLORIDE 150 MG: 50 TABLET ORAL at 22:34

## 2023-01-20 RX ADMIN — SERTRALINE 50 MG: 50 TABLET, FILM COATED ORAL at 08:46

## 2023-01-20 RX ADMIN — GLIPIZIDE 5 MG: 5 TABLET ORAL at 17:17

## 2023-01-20 RX ADMIN — PANTOPRAZOLE SODIUM 20 MG: 20 TABLET, DELAYED RELEASE ORAL at 08:45

## 2023-01-20 RX ADMIN — LOSARTAN POTASSIUM 100 MG: 50 TABLET, FILM COATED ORAL at 08:46

## 2023-01-20 RX ADMIN — METFORMIN HYDROCHLORIDE 1000 MG: 500 TABLET ORAL at 17:17

## 2023-01-20 RX ADMIN — TRIAMTERENE AND HYDROCHLOROTHIAZIDE 1 TABLET: 37.5; 25 TABLET ORAL at 08:45

## 2023-01-20 RX ADMIN — FLUTICASONE PROPIONATE 2 SPRAY: 50 SPRAY, METERED NASAL at 08:46

## 2023-01-20 RX ADMIN — ZIPRASIDONE HYDROCHLORIDE 20 MG: 20 CAPSULE ORAL at 17:18

## 2023-01-20 NOTE — PROGRESS NOTES
Problem: Falls - Risk of  Goal: *Absence of Falls  Description: Document Judi Jung Fall Risk and appropriate interventions in the flowsheet. Outcome: Progressing Towards Goal  Note: Fall Risk Interventions:      Medication Interventions: Teach patient to arise slowly        Patient received resting quietly in bed. No signs of distress. Even and unlabored breathing. Staff will continue to monitor safety q15 and provide support.
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
## 2023-01-20 NOTE — PROGRESS NOTES
Problem: Depressed Mood (Adult/Pediatric)  Goal: *STG: Participates in treatment plan  Outcome: Progressing Towards Goal  WILL ATTEND AND PARTICIPATES  Goal: *STG: Remains safe in hospital  Outcome: Progressing Towards Goal  Pt denies any suicidal or homicidal thoughts. Contracts for safety. Remains on q 15 min safety checks.         Problem: Aggression and Hostility (Behavioral Health)  Goal: *Reduce intensity and frequency of aggressive behaviors and verbalizations, treating others with respect  Outcome: Progressing Towards Goal  IRRITABILITY BUT NO AGGRESSIVE BEHAVIOR       Co nausea which she stated \"I think its the meds\"

## 2023-01-20 NOTE — INTERDISCIPLINARY ROUNDS
Behavioral Health Interdisciplinary Rounds     Patient Name: Portillo Rodriguez  Age: 50 y.o. Room/Bed:  [facility]  Primary Diagnosis: <principal problem not specified>   Admission Status: Voluntary     Readmission within 30 days: no  Power of  in place: no  Patient requires a blocked bed: no          Reason for blocked bed:   Sleep hours: 7+       Participation in Care/Groups:  yes  Medication Compliant?: Yes  PRNS (last 24 hours):  Antianxiety    Restraints (last 24 hours):  no  __________________________________________________  OQ Admission Analysis Survey completed:  OQ Admission Analysis Survey score:  OQ Discharge Analysis Survey completed:  OQ Discharge Analysis Survey score:   __________________________________________________     Alcohol screening (AUDIT) completed -     If applicable, date SBIRT discussed in treatment team AND documented:    Tobacco - patient is a smoker: Have You Used Tobacco in the Past 30 Days: No  Illegal Drugs use: Have You Used Any Illegal Substances Over the Past 12 Months: No    24 hour chart check complete: yes     _______________________________________________    Patient goal(s) for today:   Treatment team focus/goals:   Progress note:         Financial concerns/prescription coverage:    Family contact:                        Family requesting physician contact today:    Discharge plan:   Access to weapons :                                                              Outpatient provider(s):   Patient's preferred phone number for follow up call :   Patient's preferred e-mail address :  Participating treatment team members:    LOS:  7  Expected LOS:     Participating treatment team members: Portillo Rodriguez, * (assigned SW),

## 2023-01-20 NOTE — BH NOTES
PSYCHIATRIC PROGRESS NOTE    CHIEF COMPLAINT:   \"Things are getting better. \"    INTERVAL HISTORY:   01/20/23  Ms Marylou Reyez reports feeling nauseous for the second day in a row. Patient says that she wasn't able to eat this morning and felt like she was going to throw up after brushing teeth. However, she feels anxiety and hair pulling is much decreased. Her mood and sleep have been better. 1/19/23  Ms Marylou Reyez reports sleeping well overnight, but waking early in the morning and experiencing urges to pull her hair. Today she complains of headache and nausea that is now improving. She feels she is improving slowly. She feels that she can see herself returning to function upon discharge. She knows that she would have to engage in therapy, exercise, healthy nutrition. Today she denied having any suicidal or homicidal thoughts, intents or plans. 1/18/23  Nursing report that patient had refused insulin im, but it was for 2 units. Patient says that she isn't interested in being on insulin regimen. Patient is agreeable to splitting glipizide for better control. Ms Marylou Reyez feels tired and having difficulty with concentration, but feels her anxiety is decreased. She feels less urges for hair pulling. 1/17/23  Patient states that she struggles with urges to pull her hair. She says that she feels hungry on the zyprexa and ends up eating many snacks at night. Her BG was increased and required 2 units of insulin, but patient refused. Education provided. Patient states that her hair pulling is affecting her life, including her job. She complains of difficulty falling and staying asleep, night time awakenings with snoring. Recommended sleep study. 1/16/23  Patient says that for a while now she felt that her medications haven't been working. Eventually she felt that she couldn't get out of bed. She was referred to inpatient setting.  Intel says that she's struggled with OCD and Trichotillomania and was on luvox for some time. She felt that it wasn't working. She also says that she hasn't been in ERP for therapy. She says that her anxiety is high and can't function. She denies SI or HI.     1/15/23  Intel states that she is doing ok today. She endorses a slight decrease in the anxiety. She states that she had a spell this morning of hair pulling, but has been doing ok since then. She denies SI/HI/AVH currently. She denies any side effects from the medications.      VITALS:  Patient Vitals for the past 24 hrs:   Temp Pulse Resp BP SpO2   01/20/23 0743 98.6 °F (37 °C) 96 16 119/80 96 %   01/19/23 2019 99.7 °F (37.6 °C) 91 16 114/76 97 %   01/19/23 1709 98.1 °F (36.7 °C) 91 18 122/85 98 %        LABS:  Recent Results (from the past 24 hour(s))   GLUCOSE, POC    Collection Time: 01/20/23  7:35 AM   Result Value Ref Range    Glucose (POC) 130 (H) 65 - 117 mg/dL    Performed by Tez Eduardo         MEDICATIONS:    Current Facility-Administered Medications:     glipiZIDE (GLUCOTROL) tablet 5 mg, 5 mg, Oral, ACB&D, Jaylan Thompson MD, 5 mg at 01/20/23 0845    ziprasidone (GEODON) capsule 20 mg, 20 mg, Oral, BID WITH MEALS, Jaylan Thompson MD, 20 mg at 01/20/23 0846    cetirizine (ZYRTEC) tablet 10 mg, 10 mg, Oral, DAILY, Jaylan Thompson MD, 10 mg at 01/20/23 0846    traZODone (DESYREL) tablet 150 mg, 150 mg, Oral, QHS, Jaylan Thompson MD, 150 mg at 01/19/23 2156    traZODone (DESYREL) tablet 100 mg, 100 mg, Oral, QHS PRN, Jaylan Thompson MD    acetylcysteine tab 1,200 mg (Patient Supplied), 1,200 mg, Oral, DAILY, Jaylan Thompson MD, 1,200 mg at 01/20/23 0846    sertraline (ZOLOFT) tablet 50 mg, 50 mg, Oral, DAILY, Jaylan Thompson MD, 50 mg at 01/20/23 0846    metFORMIN (GLUCOPHAGE) tablet 1,000 mg, 1,000 mg, Oral, BID WITH MEALS, Jaylan Thompson MD, 1,000 mg at 01/20/23 0845    losartan (COZAAR) tablet 100 mg, 100 mg, Oral, DAILY, Jaylan Thompson MD, 100 mg at 01/20/23 0846    triamterene-hydroCHLOROthiazide (Reatha Kar) 37.5-25 mg per tablet 1 Tablet, 1 Tablet, Oral, DAILY, Jaylan Thompson MD, 1 Tablet at 01/20/23 0845    fluticasone propionate (FLONASE) 50 mcg/actuation nasal spray 2 Spray, 2 Spray, Both Nostrils, DAILY, Jaylan Thompson MD, 2 Spray at 01/20/23 0846    atorvastatin (LIPITOR) tablet 40 mg, 40 mg, Oral, DAILY, Jaylan Thompson MD, 40 mg at 01/20/23 0846    glucose chewable tablet 16 g, 4 Tablet, Oral, PRN, Qiana Hdez A, CNS    glucagon (GLUCAGEN) injection 1 mg, 1 mg, IntraMUSCular, PRN, Qiana BRASHER, CNS    dextrose 10 % infusion 0-250 mL, 0-250 mL, IntraVENous, PRN, Nancy Ennis A, CNS    ALPRAZolam (XANAX) tablet 0.5 mg, 0.5 mg, Oral, BID PRN, Casandra Reta M, NP, 0.5 mg at 01/20/23 0916    pantoprazole (PROTONIX) tablet 20 mg, 20 mg, Oral, DAILY, Casandra, Reta M, NP, 20 mg at 01/20/23 0845    OLANZapine (ZyPREXA) tablet 5 mg, 5 mg, Oral, Q6H PRN, Lesley Maha, NP, 5 mg at 01/18/23 0107    haloperidol lactate (HALDOL) injection 5 mg, 5 mg, IntraMUSCular, Q6H PRN, Lesley Maha, NP, 5 mg at 01/14/23 0900    benztropine (COGENTIN) tablet 1 mg, 1 mg, Oral, BID PRN, Lesley Maha, NP    diphenhydrAMINE (BENADRYL) injection 50 mg, 50 mg, IntraMUSCular, BID PRN, Lesley Maha, NP, 50 mg at 01/14/23 0900    hydrOXYzine HCL (ATARAX) tablet 50 mg, 50 mg, Oral, TID PRN, Lesley Maha, NP, 50 mg at 01/19/23 0449    acetaminophen (TYLENOL) tablet 650 mg, 650 mg, Oral, Q4H PRN, Lesley Maha, NP, 650 mg at 01/19/23 2157    magnesium hydroxide (MILK OF MAGNESIA) 400 mg/5 mL oral suspension 30 mL, 30 mL, Oral, DAILY PRN, Lesley Santo NP    midazolam (VERSED) injection 2 mg, 2 mg, IntraMUSCular, Q6H PRN, Lesley Santo NP, 2 mg at 01/14/23 0900       MSE:  Ms Dominic Olmedo is a 50yo AAF has short hair and is dressed in hospital gown  Speech has NL rate and volume. Thought process is logical  Thought content is negative for SI  Denies experiencing hallucinations of any type.   No paranoia or delusional thoughts  Insight/Judgment are poor    ASSESSMENT:  Mood d/o, NOS  Trichotillomania (r/o OCD)    PLAN:  01/20/2023  Will split NAC from 1200mg po qday to 600mg po bid. Other than that Discussed nausea possibly 2/2 to geodon. If this doesn't kalbe tomorrow, will decrease or switch. Likely d/c on Monday. 1/19/23  Continue current medication regimen  Possible discharge tomorrow. 1/18/23  Continue geodon, zoloft and NAC for treatment of symptoms  Will assess need for further geodon increase. Likely d/c this week w/referral to ERP and sleep study to rule out PATRICIO. 1/17/2023  Discussed d/c zyprexa, instead starting geodon 20mg po bid. Continue zoloft 50mg po qday. Continue Trazodone 150mg but will change to be scheduled. Will add trazodone 100mg po qhs prn. Start NAC 1200mg  Recommend sleep study on an outpatient. 1/16/2023  Increase Zoloft from 25mg to 50mg po qday. Continue zyprexa 2.5mg po qhs. Continue xanax 0.5mg po bid prn. Pharmacy will look into starting NAC for Trichotillomania. Will restart medical medications.

## 2023-01-21 LAB
GLUCOSE BLD STRIP.AUTO-MCNC: 148 MG/DL (ref 65–117)
SERVICE CMNT-IMP: ABNORMAL

## 2023-01-21 PROCEDURE — 65220000003 HC RM SEMIPRIVATE PSYCH

## 2023-01-21 PROCEDURE — 74011250637 HC RX REV CODE- 250/637: Performed by: NURSE PRACTITIONER

## 2023-01-21 PROCEDURE — 74011250636 HC RX REV CODE- 250/636: Performed by: NURSE PRACTITIONER

## 2023-01-21 PROCEDURE — 82962 GLUCOSE BLOOD TEST: CPT

## 2023-01-21 PROCEDURE — 74011250637 HC RX REV CODE- 250/637: Performed by: PSYCHIATRY & NEUROLOGY

## 2023-01-21 RX ADMIN — ATORVASTATIN CALCIUM 40 MG: 40 TABLET, FILM COATED ORAL at 08:47

## 2023-01-21 RX ADMIN — ACETAMINOPHEN 650 MG: 325 TABLET ORAL at 22:36

## 2023-01-21 RX ADMIN — GLIPIZIDE 5 MG: 5 TABLET ORAL at 17:16

## 2023-01-21 RX ADMIN — ZIPRASIDONE HYDROCHLORIDE 20 MG: 20 CAPSULE ORAL at 17:16

## 2023-01-21 RX ADMIN — ALPRAZOLAM 0.5 MG: 0.5 TABLET ORAL at 09:09

## 2023-01-21 RX ADMIN — CETIRIZINE HYDROCHLORIDE 10 MG: 10 TABLET, FILM COATED ORAL at 08:47

## 2023-01-21 RX ADMIN — ONDANSETRON 4 MG: 4 TABLET, ORALLY DISINTEGRATING ORAL at 09:20

## 2023-01-21 RX ADMIN — METFORMIN HYDROCHLORIDE 1000 MG: 500 TABLET ORAL at 08:46

## 2023-01-21 RX ADMIN — FLUTICASONE PROPIONATE 2 SPRAY: 50 SPRAY, METERED NASAL at 08:50

## 2023-01-21 RX ADMIN — ZIPRASIDONE HYDROCHLORIDE 20 MG: 20 CAPSULE ORAL at 08:47

## 2023-01-21 RX ADMIN — SERTRALINE 50 MG: 50 TABLET, FILM COATED ORAL at 08:47

## 2023-01-21 RX ADMIN — TRAZODONE HYDROCHLORIDE 150 MG: 50 TABLET ORAL at 22:30

## 2023-01-21 RX ADMIN — LOSARTAN POTASSIUM 100 MG: 50 TABLET, FILM COATED ORAL at 08:48

## 2023-01-21 RX ADMIN — TRIAMTERENE AND HYDROCHLOROTHIAZIDE 1 TABLET: 37.5; 25 TABLET ORAL at 08:48

## 2023-01-21 RX ADMIN — METFORMIN HYDROCHLORIDE 1000 MG: 500 TABLET ORAL at 17:15

## 2023-01-21 RX ADMIN — PANTOPRAZOLE SODIUM 20 MG: 20 TABLET, DELAYED RELEASE ORAL at 08:48

## 2023-01-21 RX ADMIN — GLIPIZIDE 5 MG: 5 TABLET ORAL at 08:46

## 2023-01-21 RX ADMIN — ONDANSETRON 4 MG: 4 TABLET, ORALLY DISINTEGRATING ORAL at 16:22

## 2023-01-21 NOTE — BH NOTES
Pt requests xanax for anxiety. Coping skills encouraged. Medication administered. Pt remains visible on the unit.

## 2023-01-21 NOTE — PROGRESS NOTES
Patient appears to be sleeping. No signs of distress noted at this time. Safety measures in place. Will continue to monitor patient per protocol. Problem: Falls - Risk of  Goal: *Absence of Falls  Description: Document Nyasia Skill Fall Risk and appropriate interventions in the flowsheet.   Outcome: Progressing Towards Goal  Note: Fall Risk Interventions:            Medication Interventions: Teach patient to arise slowly

## 2023-01-21 NOTE — BH NOTES
PSYCHIATRIC PROGRESS NOTE    CHIEF COMPLAINT:   \"Things are getting better,but I am really nauseous\"    INTERVAL HISTORY:   01/21/23  Ms. Brian Paz feels her mental health is improving. However, she reports feeling quite nauseous. She took zofran which is helpful. Denies SI. No passive death wish.    01/20/23  Ms Brian Paz reports feeling nauseous for the second day in a row. Patient says that she wasn't able to eat this morning and felt like she was going to throw up after brushing teeth. However, she feels anxiety and hair pulling is much decreased. Her mood and sleep have been better. 1/19/23  Ms Brian Paz reports sleeping well overnight, but waking early in the morning and experiencing urges to pull her hair. Today she complains of headache and nausea that is now improving. She feels she is improving slowly. She feels that she can see herself returning to function upon discharge. She knows that she would have to engage in therapy, exercise, healthy nutrition. Today she denied having any suicidal or homicidal thoughts, intents or plans. 1/18/23  Nursing report that patient had refused insulin im, but it was for 2 units. Patient says that she isn't interested in being on insulin regimen. Patient is agreeable to splitting glipizide for better control. Ms Brian Paz feels tired and having difficulty with concentration, but feels her anxiety is decreased. She feels less urges for hair pulling. 1/17/23  Patient states that she struggles with urges to pull her hair. She says that she feels hungry on the zyprexa and ends up eating many snacks at night. Her BG was increased and required 2 units of insulin, but patient refused. Education provided. Patient states that her hair pulling is affecting her life, including her job. She complains of difficulty falling and staying asleep, night time awakenings with snoring. Recommended sleep study.     1/16/23  Patient says that for a while now she felt that her medications haven't been working. Eventually she felt that she couldn't get out of bed. She was referred to inpatient setting. Leonel Koo says that she's struggled with OCD and Trichotillomania and was on luvox for some time. She felt that it wasn't working. She also says that she hasn't been in ERP for therapy. She says that her anxiety is high and can't function. She denies SI or HI.     1/15/23  Leonel Koo states that she is doing ok today. She endorses a slight decrease in the anxiety. She states that she had a spell this morning of hair pulling, but has been doing ok since then. She denies SI/HI/AVH currently. She denies any side effects from the medications.      VITALS:  Patient Vitals for the past 24 hrs:   Temp Pulse Resp BP SpO2   01/21/23 0803 98.6 °F (37 °C) (!) 113 16 114/72 96 %   01/20/23 1558 99.2 °F (37.3 °C) 95 16 137/88 99 %        LABS:  Recent Results (from the past 24 hour(s))   GLUCOSE, POC    Collection Time: 01/21/23  8:05 AM   Result Value Ref Range    Glucose (POC) 148 (H) 65 - 117 mg/dL    [facility] by [facility]         MEDICATIONS:    Current Facility-Administered Medications:     acetylcysteine tab 600 mg (Patient Supplied), 600 mg, Oral, BID, Jaylan Thompson MD, 600 mg at 01/21/23 0918    ondansetron (ZOFRAN ODT) tablet 4 mg, 4 mg, Oral, Q6H PRN, Lizbeth Ko NP, 4 mg at 01/21/23 0920    glipiZIDE (GLUCOTROL) tablet 5 mg, 5 mg, Oral, ACB&D, Jaylan Thompson MD, 5 mg at 01/21/23 0846    ziprasidone (GEODON) capsule 20 mg, 20 mg, Oral, BID WITH MEALS, Jaylan Thompson MD, 20 mg at 01/21/23 0847    cetirizine (ZYRTEC) tablet 10 mg, 10 mg, Oral, DAILY, Jaylan Thompson MD, 10 mg at 01/21/23 0847    traZODone (DESYREL) tablet 150 mg, 150 mg, Oral, QHS, Jaylan Thompson MD, 150 mg at 01/20/23 2234    traZODone (DESYREL) tablet 100 mg, 100 mg, Oral, QHS PRN, Jaylan Thompson MD    sertraline (ZOLOFT) tablet 50 mg, 50 mg, Oral, DAILY, [facility], 50 mg at 01/21/23 0847    metFORMIN (GLUCOPHAGE) tablet 1,000 mg, 1,000 mg, Oral, BID WITH MEALS, Jaylan Thompson MD, 1,000 mg at 01/21/23 0846    losartan (COZAAR) tablet 100 mg, 100 mg, Oral, DAILY, Jaylan Thompson MD, 100 mg at 01/21/23 0848    triamterene-hydroCHLOROthiazide (MAXZIDE) 37.5-25 mg per tablet 1 Tablet, 1 Tablet, Oral, DAILY, Jaylan Thompson MD, 1 Tablet at 01/21/23 0848    fluticasone propionate (FLONASE) 50 mcg/actuation nasal spray 2 Spray, 2 Spray, Both Nostrils, DAILY, Jaylan Thompson MD, 2 [place] at 01/21/23 0850    atorvastatin (LIPITOR) tablet 40 mg, 40 mg, Oral, DAILY, Jaylan Thompson MD, 40 mg at 01/21/23 0847    glucose chewable tablet 16 g, 4 Tablet, Oral, PRN, Umesh BRASHER CNS    glucagon (GLUCAGEN) injection 1 mg, 1 mg, IntraMUSCular, PRN, Umesh BRASHER CNS    dextrose 10 % infusion 0-250 mL, 0-250 mL, IntraVENous, PRN, Nancy Ennis, CNS    ALPRAZolam Valaria Paling) tablet 0.5 mg, 0.5 mg, Oral, BID PRN, [place], Reta M, NP, 0.5 mg at 01/21/23 0909    pantoprazole (PROTONIX) tablet 20 mg, 20 mg, Oral, DAILY, Casandra, Reta M, NP, 20 mg at 01/21/23 0848    OLANZapine (ZyPREXA) tablet 5 mg, 5 mg, Oral, Q6H PRN, Chana [place], NP, 5 mg at 01/18/23 0107    haloperidol lactate (HALDOL) injection 5 mg, 5 mg, IntraMUSCular, Q6H PRN, Chana [place], NP, 5 mg at 01/14/23 0900    benztropine (COGENTIN) tablet 1 mg, 1 mg, Oral, BID PRN, Martha [place], NP    diphenhydrAMINE (BENADRYL) injection 50 mg, 50 mg, IntraMUSCular, BID PRN, Martha [place], NP, 50 mg at 01/14/23 0900    hydrOXYzine HCL (ATARAX) tablet 50 mg, 50 mg, Oral, TID PRN, Martha [place], NP, 50 mg at 01/19/23 0449    acetaminophen (TYLENOL) tablet 650 mg, 650 mg, Oral, Q4H PRN, Chana [place], NP, 650 mg at 01/20/23 2246    magnesium hydroxide (MILK OF MAGNESIA) 400 mg/5 mL oral suspension 30 mL, 30 mL, Oral, DAILY PRN, Chana [place], NP    midazolam (VERSED) injection 2 mg, 2 mg, IntraMUSCular, Q6H PRN, Chana [place], NP, 2 mg at 01/14/23 0900       MSE:  Ms Severa Novak is a 50yo AAF has short hair and is dressed in hospital gown  Speech has NL rate and volume. Thought process is logical  Thought content is negative for SI  Denies experiencing hallucinations of any type. No paranoia or delusional thoughts  Insight/Judgment are poor    ASSESSMENT:  Mood d/o, NOS  Trichotillomania (r/o OCD)    PLAN:  01/20/2023  Hold geodon bc of nausea  Continue current medication regimen. 01/20/2023  Will split NAC from 1200mg po qday to 600mg po bid. Other than that Discussed nausea possibly 2/2 to geodon. If this doesn't kaleb tomorrow, will decrease or switch. Likely d/c on Monday. 1/19/23  Continue current medication regimen  Possible discharge tomorrow. 1/18/23  Continue geodon, zoloft and NAC for treatment of symptoms  Will assess need for further geodon increase. Likely d/c this week w/referral to ERP and sleep study to rule out PATRICIO. 1/17/2023  Discussed d/c zyprexa, instead starting geodon 20mg po bid. Continue zoloft 50mg po qday. Continue Trazodone 150mg but will change to be scheduled. Will add trazodone 100mg po qhs prn. Start NAC 1200mg  Recommend sleep study on an outpatient. 1/16/2023  Increase Zoloft from 25mg to 50mg po qday. Continue zyprexa 2.5mg po qhs. Continue xanax 0.5mg po bid prn. Pharmacy will look into starting NAC for Trichotillomania. Will restart medical medications.

## 2023-01-21 NOTE — PROGRESS NOTES
Behavioral Services                                              Medicare Re-Certification    I certify that the inpatient psychiatric hospital services furnished since the previous certification/re-certification were, and continue to be, medically necessary for;    [x] (1) Treatment which could reasonably be expected to improve the patient's condition,    [] (2) Or for diagnostic study. Estimated length of stay/service 3-5    Plan for post-hospital care Outpatient Psychiatry    This patient continues to need, on a daily basis, active treatment furnished directly by or requiring the supervision of inpatient psychiatric personnel.     Electronically signed by Donna Huizar MD on 1/21/2023 at 1:23 PM

## 2023-01-21 NOTE — PROGRESS NOTES
Problem: Depressed Mood (Adult/Pediatric)  Goal: *STG: Participates in treatment plan  Outcome: Progressing Towards Goal  Goal: *STG: Verbalizes anger, guilt, and other feelings in a constructive manor  Outcome: Progressing Towards Goal  Goal: *STG: Attends activities and groups  Outcome: Progressing Towards Goal  Goal: *STG: Remains safe in hospital  Outcome: Progressing Towards Goal

## 2023-01-22 LAB
GLUCOSE BLD STRIP.AUTO-MCNC: 128 MG/DL (ref 65–117)
SERVICE CMNT-IMP: ABNORMAL

## 2023-01-22 PROCEDURE — 82962 GLUCOSE BLOOD TEST: CPT

## 2023-01-22 PROCEDURE — 74011250637 HC RX REV CODE- 250/637: Performed by: NURSE PRACTITIONER

## 2023-01-22 PROCEDURE — 74011250636 HC RX REV CODE- 250/636: Performed by: NURSE PRACTITIONER

## 2023-01-22 PROCEDURE — 65220000003 HC RM SEMIPRIVATE PSYCH

## 2023-01-22 PROCEDURE — 74011250637 HC RX REV CODE- 250/637: Performed by: PSYCHIATRY & NEUROLOGY

## 2023-01-22 RX ORDER — QUETIAPINE FUMARATE 25 MG/1
50 TABLET, FILM COATED ORAL 2 TIMES DAILY
Status: DISCONTINUED | OUTPATIENT
Start: 2023-01-22 | End: 2023-01-23

## 2023-01-22 RX ADMIN — METFORMIN HYDROCHLORIDE 1000 MG: 500 TABLET ORAL at 08:43

## 2023-01-22 RX ADMIN — TRAZODONE HYDROCHLORIDE 150 MG: 50 TABLET ORAL at 22:02

## 2023-01-22 RX ADMIN — ATORVASTATIN CALCIUM 40 MG: 40 TABLET, FILM COATED ORAL at 08:44

## 2023-01-22 RX ADMIN — ONDANSETRON 4 MG: 4 TABLET, ORALLY DISINTEGRATING ORAL at 08:59

## 2023-01-22 RX ADMIN — HYDROXYZINE HYDROCHLORIDE 50 MG: 50 TABLET ORAL at 03:01

## 2023-01-22 RX ADMIN — TRIAMTERENE AND HYDROCHLOROTHIAZIDE 1 TABLET: 37.5; 25 TABLET ORAL at 08:43

## 2023-01-22 RX ADMIN — FLUTICASONE PROPIONATE 2 SPRAY: 50 SPRAY, METERED NASAL at 08:44

## 2023-01-22 RX ADMIN — CETIRIZINE HYDROCHLORIDE 10 MG: 10 TABLET, FILM COATED ORAL at 08:44

## 2023-01-22 RX ADMIN — ALPRAZOLAM 0.5 MG: 0.5 TABLET ORAL at 08:46

## 2023-01-22 RX ADMIN — SERTRALINE 50 MG: 50 TABLET, FILM COATED ORAL at 08:44

## 2023-01-22 RX ADMIN — GLIPIZIDE 5 MG: 5 TABLET ORAL at 08:43

## 2023-01-22 RX ADMIN — QUETIAPINE FUMARATE 50 MG: 25 TABLET ORAL at 17:27

## 2023-01-22 RX ADMIN — PANTOPRAZOLE SODIUM 20 MG: 20 TABLET, DELAYED RELEASE ORAL at 08:44

## 2023-01-22 RX ADMIN — METFORMIN HYDROCHLORIDE 1000 MG: 500 TABLET ORAL at 17:27

## 2023-01-22 RX ADMIN — GLIPIZIDE 5 MG: 5 TABLET ORAL at 17:27

## 2023-01-22 RX ADMIN — LOSARTAN POTASSIUM 100 MG: 50 TABLET, FILM COATED ORAL at 08:44

## 2023-01-22 RX ADMIN — ONDANSETRON 4 MG: 4 TABLET, ORALLY DISINTEGRATING ORAL at 17:27

## 2023-01-22 NOTE — PROGRESS NOTES
Patient received resting in bed with eyes closed. NAD and no complaints noted. Safety measures in place. Will continue to monitor with q15min rounds. Problem: Falls - Risk of  Goal: *Absence of Falls  Description: Document Chas Lucas Fall Risk and appropriate interventions in the flowsheet.   Outcome: Progressing Towards Goal  Note: Fall Risk Interventions:            Medication Interventions: Teach patient to arise slowly

## 2023-01-22 NOTE — BH NOTES
PRN Medication Documentation    Specific patient behavior that led to need for PRN medication: pt reports anxiety, restless  Staff interventions attempted prior to PRN being given: therapeutic communication  PRN medication given: atarax 50 mg  Patient response/effectiveness of PRN medication: will continue to monitor

## 2023-01-22 NOTE — BH NOTES
PSYCHIATRIC PROGRESS NOTE    CHIEF COMPLAINT:   \"I don't feel as good. \"    INTERVAL HISTORY:   01/22/23  Ms. Kimi Latif refused geodon in the morning. She felt increased anxiety and ended up pulling her hair twice today. She doesn't want to stay on Geodon any more. However, she is interested in being on another medications. We discussed starting seroquel and she is agreeable. Urges to pull hair remain. No si.    01/21/23  Ms. Kimi Latif feels her mental health is improving. However, she reports feeling quite nauseous. She took zofran which is helpful. Denies SI. No passive death wish.    01/20/23  Ms Kimi Latif reports feeling nauseous for the second day in a row. Patient says that she wasn't able to eat this morning and felt like she was going to throw up after brushing teeth. However, she feels anxiety and hair pulling is much decreased. Her mood and sleep have been better. 1/19/23  Ms Kimi Latif reports sleeping well overnight, but waking early in the morning and experiencing urges to pull her hair. Today she complains of headache and nausea that is now improving. She feels she is improving slowly. She feels that she can see herself returning to function upon discharge. She knows that she would have to engage in therapy, exercise, healthy nutrition. Today she denied having any suicidal or homicidal thoughts, intents or plans. 1/18/23  Nursing report that patient had refused insulin im, but it was for 2 units. Patient says that she isn't interested in being on insulin regimen. Patient is agreeable to splitting glipizide for better control. Ms Kimi Latif feels tired and having difficulty with concentration, but feels her anxiety is decreased. She feels less urges for hair pulling. 1/17/23  Patient states that she struggles with urges to pull her hair. She says that she feels hungry on the zyprexa and ends up eating many snacks at night.   Her BG was increased and required 2 units of insulin, but patient refused. Education provided. Patient states that her hair pulling is affecting her life, including her job. She complains of difficulty falling and staying asleep, night time awakenings with snoring. Recommended sleep study. 1/16/23  Patient says that for a while now she felt that her medications haven't been working. Eventually she felt that she couldn't get out of bed. She was referred to inpatient setting. Leonel Koo says that she's struggled with OCD and Trichotillomania and was on luvox for some time. She felt that it wasn't working. She also says that she hasn't been in ERP for therapy. She says that her anxiety is high and can't function. She denies SI or HI.     1/15/23  Leonel Koo states that she is doing ok today. She endorses a slight decrease in the anxiety. She states that she had a spell this morning of hair pulling, but has been doing ok since then. She denies SI/HI/AVH currently. She denies any side effects from the medications.      VITALS:  Patient Vitals for the past 24 hrs:   Temp Pulse Resp BP SpO2   01/22/23 1603 98.8 °F (37.1 °C) 92 16 130/81 96 %   01/22/23 1106 97.6 °F (36.4 °C) (!) 106 16 101/62 97 %   01/22/23 0758 98.8 °F (37.1 °C) (!) 104 16 (!) 145/89 95 %        LABS:  Recent Results (from the past 24 hour(s))   GLUCOSE, POC    Collection Time: 01/22/23  7:58 AM   Result Value Ref Range    Glucose (POC) 128 (H) 65 - 117 mg/dL    Performed by Lulu Avila         MEDICATIONS:    Current Facility-Administered Medications:     QUEtiapine (SEROquel) tablet 50 mg, 50 mg, Oral, BID, Jaylan Thompson MD    acetylcysteine tab 600 mg (Patient Supplied), 600 mg, Oral, BID, Jaylan Thompson MD, 600 mg at 01/22/23 0844    ondansetron (ZOFRAN ODT) tablet 4 mg, 4 mg, Oral, Q6H PRN, Lizbeth Ko NP, 4 mg at 01/22/23 0859    glipiZIDE (GLUCOTROL) tablet 5 mg, 5 mg, Oral, ACB&D, Jaylan Thompson MD, 5 mg at 01/22/23 0843    cetirizine (ZYRTEC) tablet 10 mg, 10 mg, Oral, DAILY, Jaylan Thompson, MD, 10 mg at 01/22/23 0844    traZODone (DESYREL) tablet 150 mg, 150 mg, Oral, QHS, Jaylan Thompson MD, 150 mg at 01/21/23 2230    traZODone (DESYREL) tablet 100 mg, 100 mg, Oral, QHS PRN, Jaylan Thompson MD    sertraline (ZOLOFT) tablet 50 mg, 50 mg, Oral, DAILY, Jaylan Thompson MD, 50 mg at 01/22/23 0844    metFORMIN (GLUCOPHAGE) tablet 1,000 mg, 1,000 mg, Oral, BID WITH MEALS, Jaylan Radford MD, 1,000 mg at 01/22/23 0843    losartan (COZAAR) tablet 100 mg, 100 mg, Oral, DAILY, Jaylan Radford MD, 100 mg at 01/22/23 0844    triamterene-hydroCHLOROthiazide (MAXZIDE) 37.5-25 mg per tablet 1 Tablet, 1 Tablet, Oral, DAILY, Jaylan Radford MD, 1 Tablet at 01/22/23 0843    fluticasone propionate (FLONASE) 50 mcg/actuation nasal spray 2 Spray, 2 Spray, Both Nostrils, DAILY, Jaylan Thompson MD, 2 [place] at 01/22/23 0844    atorvastatin (LIPITOR) tablet 40 mg, 40 mg, Oral, DAILY, Jaylan Thompson MD, 40 mg at 01/22/23 0844    glucose chewable tablet 16 g, 4 Tablet, Oral, PRN, BERKLEY Owen    glucagon (GLUCAGEN) injection 1 mg, 1 mg, IntraMUSCular, PRN, Alexa BRASHER, CNS    dextrose 10 % infusion 0-250 mL, 0-250 mL, IntraVENous, PRN, Nancy Williamson CNS    ALPRAZolam (XANAX) tablet 0.5 mg, 0.5 mg, Oral, BID PRN, eRta Guajardo NP, 0.5 mg at 01/22/23 0846    pantoprazole (PROTONIX) tablet 20 mg, 20 mg, Oral, DAILY, Reta Guajardo, NP, 20 mg at 01/22/23 0844    OLANZapine (ZyPREXA) tablet 5 mg, 5 mg, Oral, Q6H PRN, Bancroft Confer, NP, 5 mg at 01/18/23 0107    haloperidol lactate (HALDOL) injection 5 mg, 5 mg, IntraMUSCular, Q6H PRN, Bancroft Confer, NP, 5 mg at 01/14/23 0900    benztropine (COGENTIN) tablet 1 mg, 1 mg, Oral, BID PRN, Bancroft Confer, NP    diphenhydrAMINE (BENADRYL) injection 50 mg, 50 mg, IntraMUSCular, BID PRN, Bancroft Confer, NP, 50 mg at 01/14/23 0900    hydrOXYzine HCL (ATARAX) tablet 50 mg, 50 mg, Oral, TID PRN, Bancroft Confer, NP, 50 mg at 01/22/23 0301 acetaminophen (TYLENOL) tablet 650 mg, 650 mg, Oral, Q4H PRN, Elise Odonnell NP, 650 mg at 01/21/23 2236    magnesium hydroxide (MILK OF MAGNESIA) 400 mg/5 mL oral suspension 30 mL, 30 mL, Oral, DAILY PRN, Elise Odonnell NP    midazolam (VERSED) injection 2 mg, 2 mg, IntraMUSCular, Q6H PRN, Elise Odonnell NP, 2 mg at 01/14/23 0900       MSE:  Ms Victor Hugo Mccartney is a 48yo AAF has short hair and is dressed in hospital gown  Speech has NL rate and volume. Thought process is logical  Thought content is negative for SI  Denies experiencing hallucinations of any type. No paranoia or delusional thoughts  Insight/Judgment are poor    ASSESSMENT:  Mood d/o, NOS  Trichotillomania (r/o OCD)    PLAN:  01/22/23  D/c geodon 20mg po bid  Start seroquel 50mg po bid. Continue zoloft 50mg po qday. Continue NAC 600mg po bid. 01/20/2023  Hold geodon bc of nausea  Continue current medication regimen. 01/20/2023  Will split NAC from 1200mg po qday to 600mg po bid. Other than that Discussed nausea possibly 2/2 to geodon. If this doesn't kaleb tomorrow, will decrease or switch. Likely d/c on Monday. 1/19/23  Continue current medication regimen  Possible discharge tomorrow. 1/18/23  Continue geodon, zoloft and NAC for treatment of symptoms  Will assess need for further geodon increase. Likely d/c this week w/referral to ERP and sleep study to rule out PATRICIO. 1/17/2023  Discussed d/c zyprexa, instead starting geodon 20mg po bid. Continue zoloft 50mg po qday. Continue Trazodone 150mg but will change to be scheduled. Will add trazodone 100mg po qhs prn. Start NAC 1200mg  Recommend sleep study on an outpatient. 1/16/2023  Increase Zoloft from 25mg to 50mg po qday. Continue zyprexa 2.5mg po qhs. Continue xanax 0.5mg po bid prn. Pharmacy will look into starting NAC for Trichotillomania. Will restart medical medications.

## 2023-01-22 NOTE — PROGRESS NOTES
Problem: Depressed Mood (Adult/Pediatric)  Goal: *STG: Participates in treatment plan  Outcome: Progressing Towards Goal  Goal: *STG: Verbalizes anger, guilt, and other feelings in a constructive manor  Outcome: Progressing Towards Goal  Goal: *STG: Remains safe in hospital  Outcome: Progressing Towards Goal  Goal: Interventions  Outcome: Progressing Towards Goal  Note: Pt is alert and oriented visible on the unit. Reports her mood is \"fine. \" Denies feeling depressed or anxious. Medication meal compliant. Denies SI. Pt refuses po Geodon this morning; denies nausea. Pt goal is to talk to the doctor today and visit with her parents after 4 pm.      0512- [facility] informs writer pt pulls her own hair while in the dining room. Milieu is overstimulating. Pt is redirected. Pt coping skill is praying. Pt prays with female peer. 1710- pt requests to replace Geodon with Seroquel to prevent nausea. Pt pulled her hair x 2 today. Reports supportive visit with her parents today. Dr Hernandez Louise made multiple attempts to call parents on mobil number provided by pt; not successful. Updated number needed; nursing staff please request updated number from parents when they call the unit. For provider to call Parents please call [phone], per pt.

## 2023-01-23 LAB
GLUCOSE BLD STRIP.AUTO-MCNC: 127 MG/DL (ref 65–117)
SERVICE CMNT-IMP: ABNORMAL

## 2023-01-23 PROCEDURE — 74011250637 HC RX REV CODE- 250/637: Performed by: NURSE PRACTITIONER

## 2023-01-23 PROCEDURE — 65220000003 HC RM SEMIPRIVATE PSYCH

## 2023-01-23 PROCEDURE — 82962 GLUCOSE BLOOD TEST: CPT

## 2023-01-23 PROCEDURE — 74011250637 HC RX REV CODE- 250/637: Performed by: PSYCHIATRY & NEUROLOGY

## 2023-01-23 RX ORDER — QUETIAPINE FUMARATE 100 MG/1
100 TABLET, FILM COATED ORAL 2 TIMES DAILY
Status: DISCONTINUED | OUTPATIENT
Start: 2023-01-23 | End: 2023-01-25

## 2023-01-23 RX ORDER — QUETIAPINE FUMARATE 25 MG/1
50 TABLET, FILM COATED ORAL ONCE
Status: COMPLETED | OUTPATIENT
Start: 2023-01-23 | End: 2023-01-23

## 2023-01-23 RX ADMIN — TRAZODONE HYDROCHLORIDE 150 MG: 50 TABLET ORAL at 21:47

## 2023-01-23 RX ADMIN — QUETIAPINE FUMARATE 50 MG: 25 TABLET ORAL at 17:24

## 2023-01-23 RX ADMIN — QUETIAPINE FUMARATE 100 MG: 100 TABLET ORAL at 21:48

## 2023-01-23 RX ADMIN — FLUTICASONE PROPIONATE 2 SPRAY: 50 SPRAY, METERED NASAL at 08:57

## 2023-01-23 RX ADMIN — METFORMIN HYDROCHLORIDE 1000 MG: 500 TABLET ORAL at 17:21

## 2023-01-23 RX ADMIN — LOSARTAN POTASSIUM 100 MG: 50 TABLET, FILM COATED ORAL at 08:57

## 2023-01-23 RX ADMIN — GLIPIZIDE 5 MG: 5 TABLET ORAL at 07:26

## 2023-01-23 RX ADMIN — ALPRAZOLAM 0.5 MG: 0.5 TABLET ORAL at 01:35

## 2023-01-23 RX ADMIN — ATORVASTATIN CALCIUM 40 MG: 40 TABLET, FILM COATED ORAL at 08:57

## 2023-01-23 RX ADMIN — SERTRALINE 50 MG: 50 TABLET, FILM COATED ORAL at 08:57

## 2023-01-23 RX ADMIN — TRIAMTERENE AND HYDROCHLOROTHIAZIDE 1 TABLET: 37.5; 25 TABLET ORAL at 08:57

## 2023-01-23 RX ADMIN — ALPRAZOLAM 0.5 MG: 0.5 TABLET ORAL at 09:16

## 2023-01-23 RX ADMIN — GLIPIZIDE 5 MG: 5 TABLET ORAL at 17:21

## 2023-01-23 RX ADMIN — METFORMIN HYDROCHLORIDE 1000 MG: 500 TABLET ORAL at 08:57

## 2023-01-23 RX ADMIN — QUETIAPINE FUMARATE 50 MG: 25 TABLET ORAL at 08:57

## 2023-01-23 RX ADMIN — PANTOPRAZOLE SODIUM 20 MG: 20 TABLET, DELAYED RELEASE ORAL at 08:57

## 2023-01-23 RX ADMIN — CETIRIZINE HYDROCHLORIDE 10 MG: 10 TABLET, FILM COATED ORAL at 08:57

## 2023-01-23 RX ADMIN — HYDROXYZINE HYDROCHLORIDE 50 MG: 50 TABLET ORAL at 13:53

## 2023-01-23 RX ADMIN — HYDROXYZINE HYDROCHLORIDE 50 MG: 50 TABLET ORAL at 01:36

## 2023-01-23 NOTE — PROGRESS NOTES
Problem: Altered Thought Process (Adult/Pediatric)  Goal: *STG: Participates in treatment plan  Outcome: Progressing Towards Goal  Goal: *STG: Seeks staff when feelings of anxiety and fear arise  Outcome: Progressing Towards Goal

## 2023-01-23 NOTE — BH NOTES
PSYCHIATRIC PROGRESS NOTE    CHIEF COMPLAINT:   \"I don't feel as good. \"    INTERVAL HISTORY:   01/23/23  Ms. Lionel Alvarez feels that she is experienced a set back. She feels that she hasn't been able to function because of increased anxiety, leading her to scream and pull her hair. She feels that she can't go on like this or live like this. She feels that this interferes with her thinking and won't be able to work. She is having a difficult time falling asleep. Denies experiencing nausea. Denies suicidal ideations, intents or plans. 01/22/23  Ms. Lionel Alvarez refused geodon in the morning. She felt increased anxiety and ended up pulling her hair twice today. She doesn't want to stay on Geodon any more. However, she is interested in being on another medications. We discussed starting seroquel and she is agreeable. Urges to pull hair remain. No si.    01/21/23  Ms. Lionel Alvarez feels her mental health is improving. However, she reports feeling quite nauseous. She took zofran which is helpful. Denies SI. No passive death wish.    01/20/23  Ms Lionel Alvarez reports feeling nauseous for the second day in a row. Patient says that she wasn't able to eat this morning and felt like she was going to throw up after brushing teeth. However, she feels anxiety and hair pulling is much decreased. Her mood and sleep have been better. 1/19/23  Ms Lionel Alvarez reports sleeping well overnight, but waking early in the morning and experiencing urges to pull her hair. Today she complains of headache and nausea that is now improving. She feels she is improving slowly. She feels that she can see herself returning to function upon discharge. She knows that she would have to engage in therapy, exercise, healthy nutrition. Today she denied having any suicidal or homicidal thoughts, intents or plans. 1/18/23  Nursing report that patient had refused insulin im, but it was for 2 units.  Patient says that she isn't interested in being on insulin regimen. Patient is agreeable to splitting glipizide for better control. Ms Dannie Neville feels tired and having difficulty with concentration, but feels her anxiety is decreased. She feels less urges for hair pulling. 1/17/23  Patient states that she struggles with urges to pull her hair. She says that she feels hungry on the zyprexa and ends up eating many snacks at night. Her BG was increased and required 2 units of insulin, but patient refused. Education provided. Patient states that her hair pulling is affecting her life, including her job. She complains of difficulty falling and staying asleep, night time awakenings with snoring. Recommended sleep study. 1/16/23  Patient says that for a while now she felt that her medications haven't been working. Eventually she felt that she couldn't get out of bed. She was referred to inpatient setting. Landy Zuniga says that she's struggled with OCD and Trichotillomania and was on luvox for some time. She felt that it wasn't working. She also says that she hasn't been in ERP for therapy. She says that her anxiety is high and can't function. She denies SI or HI.     1/15/23  Landy Zuniga states that she is doing ok today. She endorses a slight decrease in the anxiety. She states that she had a spell this morning of hair pulling, but has been doing ok since then. She denies SI/HI/AVH currently. She denies any side effects from the medications.      VITALS:  Patient Vitals for the past 24 hrs:   Temp Pulse Resp BP SpO2   01/23/23 0848 98.6 °F (37 °C) 98 16 122/87 95 %   01/22/23 1603 98.8 °F (37.1 °C) 92 16 130/81 96 %        LABS:  Recent Results (from the past 24 hour(s))   GLUCOSE, POC    Collection Time: 01/23/23  8:29 AM   Result Value Ref Range    Glucose (POC) 127 (H) 65 - 117 mg/dL    Performed by Ismael Velasquez         MEDICATIONS:    Current Facility-Administered Medications:     QUEtiapine (SEROquel) tablet 50 mg, 50 mg, Oral, BID, Jaylan Thompson MD, 50 mg at 01/23/23 0857    acetylcysteine tab 600 mg (Patient Supplied), 600 mg, Oral, BID, Jaylan Thompson MD, 600 mg at 01/23/23 0857    ondansetron (ZOFRAN ODT) tablet 4 mg, 4 mg, Oral, Q6H PRN, Lizbeth Ko NP, 4 mg at 01/22/23 1727    glipiZIDE (GLUCOTROL) tablet 5 mg, 5 mg, Oral, ACB&D, Jaylan Thompson MD, 5 mg at 01/23/23 0726    cetirizine (ZYRTEC) tablet 10 mg, 10 mg, Oral, DAILY, Jaylan Thompson MD, 10 mg at 01/23/23 0857    traZODone (DESYREL) tablet 150 mg, 150 mg, Oral, QHS, Jaylan Thompson MD, 150 mg at 01/22/23 2202    traZODone (DESYREL) tablet 100 mg, 100 mg, Oral, QHS PRN, Jaylan Thompson MD    sertraline (ZOLOFT) tablet 50 mg, 50 mg, Oral, DAILY, Jaylan Radford MD, 50 mg at 01/23/23 0857    metFORMIN (GLUCOPHAGE) tablet 1,000 mg, 1,000 mg, Oral, BID WITH MEALS, Jaylan Radford MD, 1,000 mg at 01/23/23 0857    losartan (COZAAR) tablet 100 mg, 100 mg, Oral, DAILY, Jaylan Radford MD, 100 mg at 01/23/23 0857    triamterene-hydroCHLOROthiazide (MAXZIDE) 37.5-25 mg per tablet 1 Tablet, 1 Tablet, Oral, DAILY, Jaylan Radford MD, 1 Tablet at 01/23/23 0857    fluticasone propionate (FLONASE) 50 mcg/actuation nasal spray 2 Spray, 2 Spray, Both Nostrils, DAILY, Jaylan Thompson MD, 2 [place] at 01/23/23 0857    atorvastatin (LIPITOR) tablet 40 mg, 40 mg, Oral, DAILY, Jaylan Thompson MD, 40 mg at 01/23/23 0857    glucose chewable tablet 16 g, 4 Tablet, Oral, PRN, Nancy Williamson CNS    glucagon (GLUCAGEN) injection 1 mg, 1 mg, IntraMUSCular, PRN, BERKLEY Owen    dextrose 10 % infusion 0-250 mL, 0-250 mL, IntraVENous, PRN, Nancy Ennis, CNS    ALPRAZolam (XANAX) tablet 0.5 mg, 0.5 mg, Oral, BID PRN, Reta Guajardo, NP, 0.5 mg at 01/23/23 0916    pantoprazole (PROTONIX) tablet 20 mg, 20 mg, Oral, DAILY, Reta Guajardo, NP, 20 mg at 01/23/23 0857    OLANZapine (ZyPREXA) tablet 5 mg, 5 mg, Oral, Q6H PRN, Damari Edwards NP, 5 mg at 01/18/23 0107    haloperidol lactate (HALDOL) injection 5 mg, 5 mg, IntraMUSCular, Q6H PRN, Pete Pals, NP, 5 mg at 01/14/23 0900    benztropine (COGENTIN) tablet 1 mg, 1 mg, Oral, BID PRN, Pete Adam, NP    diphenhydrAMINE (BENADRYL) injection 50 mg, 50 mg, IntraMUSCular, BID PRN, Pete Pals, NP, 50 mg at 01/14/23 0900    hydrOXYzine HCL (ATARAX) tablet 50 mg, 50 mg, Oral, TID PRN, Pete Adam, NP, 50 mg at 01/23/23 0136    acetaminophen (TYLENOL) tablet 650 mg, 650 mg, Oral, Q4H PRN, Pete Pals, NP, 650 mg at 01/21/23 2236    magnesium hydroxide (MILK OF MAGNESIA) 400 mg/5 mL oral suspension 30 mL, 30 mL, Oral, DAILY PRN, Pete Adam, NP    midazolam (VERSED) injection 2 mg, 2 mg, IntraMUSCular, Q6H PRN, Pete Adam, NP, 2 mg at 01/14/23 0900       MSE:  Ms Dannie Neville is a 48yo AAF has short hair and is dressed in hospital gown  Speech has NL rate and volume. Thought process is logical  Thought content is negative for SI  Denies experiencing hallucinations of any type. No paranoia or delusional thoughts  Insight/Judgment are poor    ASSESSMENT:  Mood d/o, NOS  Trichotillomania (r/o OCD)    PLAN:  01/23/23  Increase seroquel from 50mg po bid to 100mg po bid. Continue zoloft 50mg po qday. Continue NAC 600mg po bid.    01/22/23  D/c geodon 20mg po bid  Start seroquel 50mg po bid. Continue zoloft 50mg po qday. Continue NAC 600mg po bid. 01/20/2023  Hold geodon bc of nausea  Continue current medication regimen. 01/20/2023  Will split NAC from 1200mg po qday to 600mg po bid. Other than that Discussed nausea possibly 2/2 to geodon. If this doesn't kaleb tomorrow, will decrease or switch. Likely d/c on Monday. 1/19/23  Continue current medication regimen  Possible discharge tomorrow. 1/18/23  Continue geodon, zoloft and NAC for treatment of symptoms  Will assess need for further geodon increase. Likely d/c this week w/referral to ERP and sleep study to rule out PATRICIO.     1/17/2023  Discussed d/c zyprexa, instead starting geodon 20mg po bid. Continue zoloft 50mg po qday. Continue Trazodone 150mg but will change to be scheduled. Will add trazodone 100mg po qhs prn. Start NAC 1200mg  Recommend sleep study on an outpatient. 1/16/2023  Increase Zoloft from 25mg to 50mg po qday. Continue zyprexa 2.5mg po qhs. Continue xanax 0.5mg po bid prn. Pharmacy will look into starting NAC for Trichotillomania. Will restart medical medications. I certify that this patients inpatient psychiatric hospital services furnished since the previous certification were, and continue to be, required for treatment that could reasonably be expected to improve the patient's condition, or for diagnostic study, and that the patient continues to need, on a daily basis, active treatment furnished directly by or requiring the supervision of inpatient psychiatric facility personnel. In addition, the hospital records show that services furnished were intensive treatment services, admission or related services, or equivalent services. A coordinated, multidisplinary treatment team round was conducted with the patient, nurses, pharmcist,  and writer present. Discussions held with , and/or with family members; Complete current electronic health record for patient was reviewed in full including consultant notes, ancillary staff notes, nurses and tech notes, labs and vitals.

## 2023-01-23 NOTE — BH NOTES
GROUP THERAPY PROGRESS NOTE    Patient is participating in Healthy living and Wellness group. Group time: 60 minutes    Personal goal for participation: to develop an understanding of cognitive distortions and how to alter our thinking. Goal orientation: Personal    Group therapy participation: Active    Therapeutic interventions reviewed and discussed: Group members were guided through learning about cognitive distortions. Members gained an understanding of how these types of distortions effect perception, relationships, and overall mental health. Members were guided through learning about methods that can be used for changing negative thought patterns. Members were able to identify distortions and engage in discussion about past experiences. Handout provided. Impression of participation: Pt was present and engaged in group discussion. Pt actively interacted with peers and Sw. Pt asked relevant questions. Pt experienced an episode of trichotillomania and shouted \"stop it\" at herself. The SW told her to breath deeply, and the SW students suggested she sit on her hands if she felt that would help. She did end up sitting on her hands. Pt expressed fear in leaving the hospital, but the SW assured her that healing was an ongoing process, and she shouldn't be discouraged if she doesn't feel \"cured\" before leaving.     DEDE Willard

## 2023-01-23 NOTE — PROGRESS NOTES
DISCHARGE SUMMARY from Nurse    PATIENT INSTRUCTIONS:    What to do at Home:  Recommended activity: Activity as tolerated,         *  Please give a list of your current medications to your Primary Care Provider. *  Please update this list whenever your medications are discontinued, doses are      changed, or new medications (including over-the-counter products) are added. *  Please carry medication information at all times in case of emergency situations. These are general instructions for a healthy lifestyle:    No smoking/ No tobacco products/ Avoid exposure to second hand smoke  Surgeon General's Warning:  Quitting smoking now greatly reduces serious risk to your health. Obesity, smoking, and sedentary lifestyle greatly increases your risk for illness    A healthy diet, regular physical exercise & weight monitoring are important for maintaining a healthy lifestyle    You may be retaining fluid if you have a history of heart failure or if you experience any of the following symptoms:  Weight gain of 3 pounds or more overnight or 5 pounds in a week, increased swelling in our hands or feet or shortness of breath while lying flat in bed. Please call your doctor as soon as you notice any of these symptoms; do not wait until your next office visit. The discharge information has been reviewed with the patient. The patient verbalized understanding. Discharge medications reviewed with the patient and appropriate educational materials and side effects teaching were provided.   ___________________________________________________________________________________________________________________________________  Problem: Discharge Planning  Goal: *Discharge to safe environment  Outcome: Progressing Towards Goal  Goal: *Knowledge of medication management  Outcome: Progressing Towards Goal  Goal: *Knowledge of discharge instructions  Outcome: Progressing Towards Goal

## 2023-01-23 NOTE — BH NOTES
GROUP THERAPY PROGRESS NOTE    PATIENT ATTENDED    Personal goal for completing the handout: To gain an understanding of the 12 basic irrational beliefs and identify personal interpretations as they apply. The SW was unable to meet for the full group session, but she informed the attending participants about the origins of the 12 irrational beliefs in addition leaving a handout for them to complete. She mentioned that they would follow up with the exercise tomorrow. She told them the follow up would discuss how the 12 irrational beliefs affect thought processes and behaviors.      Amauri Guerra BA

## 2023-01-23 NOTE — PROGRESS NOTES
Collaborated with SW team regarding spiritual care visit and updated treatment plan. For additional spiritual care, please contact the  on-call at ([phone]). Rev.  Arnold Sheehan MDiv, MS, [facility]  Staff

## 2023-01-23 NOTE — PROGRESS NOTES
Problem: Falls - Risk of  Goal: *Absence of Falls  Description: Document [place] Fall Risk and appropriate interventions in the flowsheet. Outcome: Progressing Towards Goal  Note: Fall Risk Interventions:            Medication Interventions: Teach patient to arise slowly          Patient received, appears to be asleep, eyes closed, breathing even and unlabored. No signs of distress noted. Will continue to monitor for safety.

## 2023-01-23 NOTE — BH NOTES
PRN Medication Documentation    Specific patient behavior that led to need for PRN medication: increased anxiety, yelling and pulling hair  Staff interventions attempted prior to PRN being given: discussed coping, stress ball given  PRN medication given: Xanax 0.5mg  Patient response/effectiveness of PRN medication: will monitor

## 2023-01-23 NOTE — INTERDISCIPLINARY ROUNDS
Behavioral Health Interdisciplinary Rounds     Patient Name: Xenia Grijalva  Age: 50 y.o. Room/Bed:  728/  Primary Diagnosis: <principal problem not specified>   Admission Status: Voluntary     Readmission within 30 days: no  Power of  in place: no  Patient requires a blocked bed: no          Reason for blocked bed:   Sleep hours: 5       Participation in Care/Groups:  no  Medication Compliant?: Yes  PRNS (last 24 hours): Antianxiety    Restraints (last 24 hours):  no  __________________________________________________  OQ Admission Analysis Survey completed:  OQ Admission Analysis Survey score:  OQ Discharge Analysis Survey completed:  OQ Discharge Analysis Survey score:   __________________________________________________     Alcohol screening (AUDIT) completed -     If applicable, date SBIRT discussed in treatment team AND documented:    Tobacco - patient is a smoker: Have You Used Tobacco in the Past 30 Days: No  Illegal Drugs use: Have You Used Any Illegal Substances Over the Past 12 Months: No    24 hour chart check complete: no     _______________________________________________    Patient goal(s) for today:   Treatment team focus/goals: Refer to [facility] at [address] [place] [address]. Progress note: She has been pleasant and compliant with treatment , but very anxious today about discharge, and does not feel ready for discharge. We are considering PHP         Financial concerns/prescription coverage:  medicaid   Family contact:     mother                    Family requesting physician contact today:  yes, Dr. Giovany López to call mother [phone]   Discharge plan:  She will return home when ready for discharge   Access to weapons :    no                                                           Outpatient provider(s): consider partial   Patient's preferred phone number for follow up call :   Patient's preferred e-mail address :  Participating treatment team members:    LOS:  10  Expected LOS: TBD     Participating treatment team members: Kriss Gonzales, Jey Kline -

## 2023-01-23 NOTE — PROGRESS NOTES
Referral source: patient request during rounds    I visited with Kristin López at . [address]. I reviewed the patient's medical record prior to this encounter. Assessment: Expressed anxiety about being discharged; reports that she does not trust her emotional state    Catherine reported that she is still having episodes of anxiety and fear about going home. She had an episode of pulling her hair and shouting, \"stop it\" during our visit. I helped Catherine calm herself by speaking softly and reciting scriptures passages, which were familiar to her. She refocused as she recited those passages along with me. I offered prayers for protection and healing, which seemed to bring her comfort. Plan of Care: Ongoing support during rounds    Please contact Spiritual Care services for any additional needs ([phone])    _________________________________________  Rev.  Essence Hernandez, Staff Nabeel Gomes, MS, [facility]

## 2023-01-23 NOTE — PROGRESS NOTES
Problem: Depressed Mood (Adult/Pediatric)  Goal: *STG: Participates in treatment plan  Outcome: Progressing Towards Goal  WILL ATTEND AND PARTICIPATE  Goal: *STG: Attends activities and groups  Outcome: Progressing Towards Goal  WILL ATTEND  Goal: *STG: Remains safe in hospital  Outcome: Progressing Towards Goal  Pt denies any suicidal or homicidal thoughts. Contracts for safety. Remains on q 15 min safety checks. DENIES AH/VH. ALERT AND ORIENTED ON UNIT.

## 2023-01-24 LAB
GLUCOSE BLD STRIP.AUTO-MCNC: 120 MG/DL (ref 65–117)
SERVICE CMNT-IMP: ABNORMAL

## 2023-01-24 PROCEDURE — 82962 GLUCOSE BLOOD TEST: CPT

## 2023-01-24 PROCEDURE — 74011250637 HC RX REV CODE- 250/637: Performed by: NURSE PRACTITIONER

## 2023-01-24 PROCEDURE — 65220000003 HC RM SEMIPRIVATE PSYCH

## 2023-01-24 PROCEDURE — 74011250637 HC RX REV CODE- 250/637: Performed by: PSYCHIATRY & NEUROLOGY

## 2023-01-24 RX ORDER — SERTRALINE HYDROCHLORIDE 50 MG/1
100 TABLET, FILM COATED ORAL DAILY
Status: DISCONTINUED | OUTPATIENT
Start: 2023-01-25 | End: 2023-01-30 | Stop reason: HOSPADM

## 2023-01-24 RX ORDER — SERTRALINE HYDROCHLORIDE 50 MG/1
50 TABLET, FILM COATED ORAL ONCE
Status: COMPLETED | OUTPATIENT
Start: 2023-01-24 | End: 2023-01-24

## 2023-01-24 RX ADMIN — PANTOPRAZOLE SODIUM 20 MG: 20 TABLET, DELAYED RELEASE ORAL at 08:36

## 2023-01-24 RX ADMIN — METFORMIN HYDROCHLORIDE 1000 MG: 500 TABLET ORAL at 08:34

## 2023-01-24 RX ADMIN — QUETIAPINE FUMARATE 100 MG: 100 TABLET ORAL at 22:13

## 2023-01-24 RX ADMIN — LOSARTAN POTASSIUM 100 MG: 50 TABLET, FILM COATED ORAL at 08:34

## 2023-01-24 RX ADMIN — FLUTICASONE PROPIONATE 2 SPRAY: 50 SPRAY, METERED NASAL at 08:36

## 2023-01-24 RX ADMIN — TRIAMTERENE AND HYDROCHLOROTHIAZIDE 1 TABLET: 37.5; 25 TABLET ORAL at 08:34

## 2023-01-24 RX ADMIN — SERTRALINE 50 MG: 50 TABLET, FILM COATED ORAL at 08:35

## 2023-01-24 RX ADMIN — ALPRAZOLAM 0.5 MG: 0.5 TABLET ORAL at 18:39

## 2023-01-24 RX ADMIN — METFORMIN HYDROCHLORIDE 1000 MG: 500 TABLET ORAL at 17:24

## 2023-01-24 RX ADMIN — CETIRIZINE HYDROCHLORIDE 10 MG: 10 TABLET, FILM COATED ORAL at 08:35

## 2023-01-24 RX ADMIN — QUETIAPINE FUMARATE 100 MG: 100 TABLET ORAL at 08:33

## 2023-01-24 RX ADMIN — ACETAMINOPHEN 650 MG: 325 TABLET ORAL at 17:36

## 2023-01-24 RX ADMIN — GLIPIZIDE 5 MG: 5 TABLET ORAL at 17:24

## 2023-01-24 RX ADMIN — SERTRALINE 50 MG: 50 TABLET, FILM COATED ORAL at 14:31

## 2023-01-24 RX ADMIN — GLIPIZIDE 5 MG: 5 TABLET ORAL at 08:35

## 2023-01-24 RX ADMIN — ALPRAZOLAM 0.5 MG: 0.5 TABLET ORAL at 08:33

## 2023-01-24 RX ADMIN — ATORVASTATIN CALCIUM 40 MG: 40 TABLET, FILM COATED ORAL at 08:35

## 2023-01-24 RX ADMIN — TRAZODONE HYDROCHLORIDE 150 MG: 50 TABLET ORAL at 22:13

## 2023-01-24 NOTE — PROGRESS NOTES
Problem: Altered Thought Process (Adult/Pediatric)  Goal: *STG: Participates in treatment plan  Outcome: Progressing Towards Goal  ATTENDS AND PARTIICIPATES  Goal: *STG: Remains safe in hospital  Outcome: Progressing Towards Goal  Pt denies any suicidal or homicidal thoughts. Contracts for safety. Remains on q 15 min safety checks. Goal: *STG: Complies with medication therapy  Outcome: Progressing Towards Goal  MEDICATION COMPLIANT DENIES SIDE EFFECTS     PATIENT HAS YELLED OUT AND PULLED HAIR WAS GIVEN XANAX PRN.

## 2023-01-24 NOTE — PROGRESS NOTES
Problem: Altered Thought Process (Adult/Pediatric)  Goal: *STG: Participates in treatment plan  Outcome: Progressing Towards Goal     Problem: Altered Thought Process (Adult/Pediatric)  Goal: *STG: Remains safe in hospital  Outcome: Progressing Towards Goal

## 2023-01-24 NOTE — BH NOTES
GROUP THERAPY PROGRESS NOTE    Patient is participating in Safety Planning group. Group time: 15-30 minutes    Personal goal for participation: To complete safety plan     Goal orientation: Personal    Group therapy participation: Active     Therapeutic interventions reviewed and discussed:  Group members were supported in filling out safety plans. Pts are able to develop and document a strategy to remain safe after discharge. SW provided feedback about questions/concerns of pts. Pts returned safety plans when completed.      Impression of participation:  Pt was present and completed activity      JENNIFER Ley, QMHP-A

## 2023-01-24 NOTE — INTERDISCIPLINARY ROUNDS
Behavioral Health Interdisciplinary Rounds     Patient Name: Afsaneh Wahl  Age: 50 y.o. Room/Bed:  [facility]/  Primary Diagnosis: <principal problem not specified>   Admission Status: Voluntary     Readmission within 30 days: no  Power of  in place: no  Patient requires a blocked bed: no          Reason for blocked bed:     Sleep hours:        Participation in Care/Groups:  no  Medication Compliant?: Yes  PRNS (last 24 hours):  Antianxiety    Restraints (last 24 hours):  no    OQ Admission Analysis Survey completed:  OQ Admission Analysis Survey score:  OQ Discharge Analysis Survey completed:  OQ Discharge Analysis Survey score:       Substance Abuse:  no    Alcohol screening (AUDIT) completed -     If applicable, date SBIRT discussed in treatment team AND documented:   Tobacco -   24 hour chart check complete: yes   ______________________________________    Patient goal(s) for today:   Treatment team focus/goals:   Progress note:         Financial concerns/prescription coverage:    Family contact:                        Family requesting physician contact today:    Discharge plan:   Access to weapons :                                                              Outpatient provider(s):   Patient's preferred phone number for follow up call :   Patient's preferred e-mail address :  Participating treatment team members:    LOS:  11  Expected LOS:     Participating treatment team members: Afsaneh Wahl, * (assigned SW),

## 2023-01-24 NOTE — BH NOTES
PSYCHIATRIC PROGRESS NOTE    CHIEF COMPLAINT:   \"Not a good day. \"    INTERVAL HISTORY:   01/24/23  Ms. Neyda Castro feels her anxiety overall is worse, she has been pulling her hair in the morning. Staff report that she had been pacing and yelling out loud. She is engaged in the evaluation and invested in her care. However, she doesn't feel that she would be able to function with the current level of anxiety. 01/23/23  Ms. Neyda Castro feels that she is experienced a set back. She feels that she hasn't been able to function because of increased anxiety, leading her to scream and pull her hair. She feels that she can't go on like this or live like this. She feels that this interferes with her thinking and won't be able to work. She is having a difficult time falling asleep. Denies experiencing nausea. Denies suicidal ideations, intents or plans. 01/22/23  Ms. Neyda Castro refused geodon in the morning. She felt increased anxiety and ended up pulling her hair twice today. She doesn't want to stay on Geodon any more. However, she is interested in being on another medications. We discussed starting seroquel and she is agreeable. Urges to pull hair remain. No si.    01/21/23  Ms. Neyda Castro feels her mental health is improving. However, she reports feeling quite nauseous. She took zofran which is helpful. Denies SI. No passive death wish.    01/20/23  Ms Neyda Castro reports feeling nauseous for the second day in a row. Patient says that she wasn't able to eat this morning and felt like she was going to throw up after brushing teeth. However, she feels anxiety and hair pulling is much decreased. Her mood and sleep have been better. 1/19/23  Ms Nyeda Castro reports sleeping well overnight, but waking early in the morning and experiencing urges to pull her hair. Today she complains of headache and nausea that is now improving. She feels she is improving slowly. She feels that she can see herself returning to function upon discharge. She knows that she would have to engage in therapy, exercise, healthy nutrition. Today she denied having any suicidal or homicidal thoughts, intents or plans. 1/18/23  Nursing report that patient had refused insulin im, but it was for 2 units. Patient says that she isn't interested in being on insulin regimen. Patient is agreeable to splitting glipizide for better control. Ms Brian Paz feels tired and having difficulty with concentration, but feels her anxiety is decreased. She feels less urges for hair pulling. 1/17/23  Patient states that she struggles with urges to pull her hair. She says that she feels hungry on the zyprexa and ends up eating many snacks at night. Her BG was increased and required 2 units of insulin, but patient refused. Education provided. Patient states that her hair pulling is affecting her life, including her job. She complains of difficulty falling and staying asleep, night time awakenings with snoring. Recommended sleep study. 1/16/23  Patient says that for a while now she felt that her medications haven't been working. Eventually she felt that she couldn't get out of bed. She was referred to inpatient setting. Fran Santillan says that she's struggled with OCD and Trichotillomania and was on luvox for some time. She felt that it wasn't working. She also says that she hasn't been in ERP for therapy. She says that her anxiety is high and can't function. She denies SI or HI.     1/15/23  Fran Santillan states that she is doing ok today. She endorses a slight decrease in the anxiety. She states that she had a spell this morning of hair pulling, but has been doing ok since then. She denies SI/HI/AVH currently. She denies any side effects from the medications.      VITALS:  Patient Vitals for the past 24 hrs:   Temp Pulse Resp BP SpO2   01/24/23 0756 98.6 °F (37 °C) 92 16 106/61 97 %   01/23/23 2140 -- 97 16 128/75 97 %   01/23/23 1552 98.3 °F (36.8 °C) 97 18 121/70 100 % LABS:  Recent Results (from the past 24 hour(s))   GLUCOSE, POC    Collection Time: 01/24/23  7:10 AM   Result Value Ref Range    Glucose (POC) 120 (H) 65 - 117 mg/dL    Performed by Jewels Oneil:    Current Facility-Administered Medications:     QUEtiapine (SEROquel) tablet 100 mg, 100 mg, Oral, BID, Jaylan Thompson MD, 100 mg at 01/24/23 7504    acetylcysteine tab 600 mg (Patient Supplied), 600 mg, Oral, BID, Jaylan Thompson MD, 600 mg at 01/24/23 0836    ondansetron (ZOFRAN ODT) tablet 4 mg, 4 mg, Oral, Q6H PRN, Lizbeth Ko NP, 4 mg at 01/22/23 1727    glipiZIDE (GLUCOTROL) tablet 5 mg, 5 mg, Oral, ACB&D, Jaylan Thompson MD, 5 mg at 01/24/23 0835    cetirizine (ZYRTEC) tablet 10 mg, 10 mg, Oral, DAILY, Jaylan Thompson MD, 10 mg at 01/24/23 0835    traZODone (DESYREL) tablet 150 mg, 150 mg, Oral, QHS, Jaylan Thompson MD, 150 mg at 01/23/23 2147    traZODone (DESYREL) tablet 100 mg, 100 mg, Oral, QHS PRN, Jaylan Rivas MD    sertraline (ZOLOFT) tablet 50 mg, 50 mg, Oral, DAILY, Jaylan Thompson MD, 50 mg at 01/24/23 0835    metFORMIN (GLUCOPHAGE) tablet 1,000 mg, 1,000 mg, Oral, BID WITH MEALS, Jaylan Rivas MD, 1,000 mg at 01/24/23 0834    losartan (COZAAR) tablet 100 mg, 100 mg, Oral, DAILY, Jaylan Thompson MD, 100 mg at 01/24/23 0834    triamterene-hydroCHLOROthiazide (MAXZIDE) 37.5-25 mg per tablet 1 Tablet, 1 Tablet, Oral, DAILY, Jaylan Thompson MD, 1 Tablet at 01/24/23 0834    fluticasone propionate (FLONASE) 50 mcg/actuation nasal spray 2 Spray, 2 Spray, Both Nostrils, DAILY, Jaylan Thompson MD, 2 Spray at 01/24/23 0836    atorvastatin (LIPITOR) tablet 40 mg, 40 mg, Oral, DAILY, Jaylan Thompson MD, 40 mg at 01/24/23 0835    glucose chewable tablet 16 g, 4 Tablet, Oral, PRN, BERKLEY Small    glucagon (GLUCAGEN) injection 1 mg, 1 mg, IntraMUSCular, PRN, BERKLEY Small    dextrose 10 % infusion 0-250 mL, 0-250 mL, IntraVENous, PRN, Natasha Chao, CNS    ALPRAZolam (XANAX) tablet 0.5 mg, 0.5 mg, Oral, BID PRN, [place], Reta M, NP, 0.5 mg at 01/24/23 0833    pantoprazole (PROTONIX) tablet 20 mg, 20 mg, Oral, DAILY, [place], Reta M, NP, 20 mg at 01/24/23 0836    OLANZapine (ZyPREXA) tablet 5 mg, 5 mg, Oral, Q6H PRN, Darolyn Medicine, NP, 5 mg at 01/18/23 0107    haloperidol lactate (HALDOL) injection 5 mg, 5 mg, IntraMUSCular, Q6H PRN, Darolyn Medicine, NP, 5 mg at 01/14/23 0900    benztropine (COGENTIN) tablet 1 mg, 1 mg, Oral, BID PRN, Darolyn Medicine, NP    diphenhydrAMINE (BENADRYL) injection 50 mg, 50 mg, IntraMUSCular, BID PRN, Darolyn Medicine, NP, 50 mg at 01/14/23 0900    hydrOXYzine HCL (ATARAX) tablet 50 mg, 50 mg, Oral, TID PRN, Darolyn Medicine, NP, 50 mg at 01/23/23 1353    acetaminophen (TYLENOL) tablet 650 mg, 650 mg, Oral, Q4H PRN, Darolyn Medicine, NP, 650 mg at 01/21/23 2236    magnesium hydroxide (MILK OF MAGNESIA) 400 mg/5 mL oral suspension 30 mL, 30 mL, Oral, DAILY PRN, Darolyn Medicine, NP    midazolam (VERSED) injection 2 mg, 2 mg, IntraMUSCular, Q6H PRN, Darolyn Medicine, NP, 2 mg at 01/14/23 0900       MSE:  Ms Amrik Chavez is a 50yo AAF has short hair and is dressed in hospital gown  Speech has NL rate and volume. Thought process is logical  Thought content is negative for SI  Denies experiencing hallucinations of any type. No paranoia or delusional thoughts  Insight/Judgment are poor    ASSESSMENT:  Mood d/o, NOS  Trichotillomania (r/o OCD)    PLAN:  01/24/23  Increase zoloft from 50mg to 100mg po qday. To address depressive and anxiety symptoms. Continue seroquel 100mg po bid. To address depressive and anxiety symptoms. Continue xanax 0.5mg po bid prn. Continue NAC for trichotillomania. Patient tells me that her family thinks she should be on disability. However, she is interested in contacting her work to arrange for time off and attend [facility] first.    01/23/23  Increase seroquel from 50mg po bid to 100mg po bid.   Continue zoloft 50mg po qday. Continue NAC 600mg po bid.    01/22/23  D/c geodon 20mg po bid  Start seroquel 50mg po bid. Continue zoloft 50mg po qday. Continue NAC 600mg po bid. 01/20/2023  Hold geodon bc of nausea  Continue current medication regimen. 01/20/2023  Will split NAC from 1200mg po qday to 600mg po bid. Other than that Discussed nausea possibly 2/2 to geodon. If this doesn't kaleb tomorrow, will decrease or switch. Likely d/c on Monday. 1/19/23  Continue current medication regimen  Possible discharge tomorrow. 1/18/23  Continue geodon, zoloft and NAC for treatment of symptoms  Will assess need for further geodon increase. Likely d/c this week w/referral to ERP and sleep study to rule out PATRICIO. 1/17/2023  Discussed d/c zyprexa, instead starting geodon 20mg po bid. Continue zoloft 50mg po qday. Continue Trazodone 150mg but will change to be scheduled. Will add trazodone 100mg po qhs prn. Start NAC 1200mg  Recommend sleep study on an outpatient. 1/16/2023  Increase Zoloft from 25mg to 50mg po qday. Continue zyprexa 2.5mg po qhs. Continue xanax 0.5mg po bid prn. Pharmacy will look into starting NAC for Trichotillomania. Will restart medical medications. I certify that this patients inpatient psychiatric hospital services furnished since the previous certification were, and continue to be, required for treatment that could reasonably be expected to improve the patient's condition, or for diagnostic study, and that the patient continues to need, on a daily basis, active treatment furnished directly by or requiring the supervision of inpatient psychiatric facility personnel. In addition, the hospital records show that services furnished were intensive treatment services, admission or related services, or equivalent services. A coordinated, multidisplinary treatment team round was conducted with the patient, nurses, pharmcist,  and writer present.  Discussions held with , and/or with family members; Complete current electronic health record for patient was reviewed in full including consultant notes, ancillary staff notes, nurses and tech notes, labs and vitals.

## 2023-01-24 NOTE — PROGRESS NOTES
Judge Barker provided an update on the anxiety she's experiencing and her reactions (negative thoughts and pulling hair). She also shared that putting lotion on her hands was a result of OCD. We read passages of Scripture together related to fear, worry, and God's protection. We discussed using her hands and the lotion as a spiritual practice and form of meditation as a way of reframing a negative response into a positive coping activity. Judge Barker was receptive to this suggestion. We ended the visit with prayer for strength and patience. Judge Barker is aware that she can page the  on-call for additional support, as needed. For additional spiritual care, please contact the  on-call at ([phone]). Rev.  Nancy Cummings MDiv, MS, [facility]  Staff

## 2023-01-24 NOTE — BH NOTES
PRN Medication Documentation    Specific patient behavior that led to need for PRN medication: random outbursts of yelling, pulling at hair,  Staff interventions attempted prior to PRN being given: squishy ball, therapeutic communication, calming music  PRN medication given: xanax 0.5 mg  Patient response/effectiveness of PRN medication:  pending

## 2023-01-25 LAB
GLUCOSE BLD STRIP.AUTO-MCNC: 124 MG/DL (ref 65–117)
SERVICE CMNT-IMP: ABNORMAL

## 2023-01-25 PROCEDURE — 82962 GLUCOSE BLOOD TEST: CPT

## 2023-01-25 PROCEDURE — 74011250637 HC RX REV CODE- 250/637: Performed by: NURSE PRACTITIONER

## 2023-01-25 PROCEDURE — 65220000003 HC RM SEMIPRIVATE PSYCH

## 2023-01-25 PROCEDURE — 74011250637 HC RX REV CODE- 250/637: Performed by: PSYCHIATRY & NEUROLOGY

## 2023-01-25 PROCEDURE — 74011250636 HC RX REV CODE- 250/636: Performed by: NURSE PRACTITIONER

## 2023-01-25 RX ADMIN — TRIAMTERENE AND HYDROCHLOROTHIAZIDE 1 TABLET: 37.5; 25 TABLET ORAL at 08:15

## 2023-01-25 RX ADMIN — GLIPIZIDE 5 MG: 5 TABLET ORAL at 17:48

## 2023-01-25 RX ADMIN — ACETAMINOPHEN 650 MG: 325 TABLET ORAL at 12:49

## 2023-01-25 RX ADMIN — QUETIAPINE FUMARATE 100 MG: 100 TABLET ORAL at 08:15

## 2023-01-25 RX ADMIN — QUETIAPINE FUMARATE 150 MG: 100 TABLET ORAL at 22:07

## 2023-01-25 RX ADMIN — METFORMIN HYDROCHLORIDE 1000 MG: 500 TABLET ORAL at 17:48

## 2023-01-25 RX ADMIN — SERTRALINE 100 MG: 50 TABLET, FILM COATED ORAL at 08:14

## 2023-01-25 RX ADMIN — HYDROXYZINE HYDROCHLORIDE 50 MG: 50 TABLET ORAL at 11:31

## 2023-01-25 RX ADMIN — ONDANSETRON 4 MG: 4 TABLET, ORALLY DISINTEGRATING ORAL at 11:40

## 2023-01-25 RX ADMIN — ACETAMINOPHEN 650 MG: 325 TABLET ORAL at 22:10

## 2023-01-25 RX ADMIN — GLIPIZIDE 5 MG: 5 TABLET ORAL at 08:14

## 2023-01-25 RX ADMIN — METFORMIN HYDROCHLORIDE 1000 MG: 500 TABLET ORAL at 08:15

## 2023-01-25 RX ADMIN — FLUTICASONE PROPIONATE 2 SPRAY: 50 SPRAY, METERED NASAL at 10:51

## 2023-01-25 RX ADMIN — TRAZODONE HYDROCHLORIDE 150 MG: 50 TABLET ORAL at 22:08

## 2023-01-25 RX ADMIN — PANTOPRAZOLE SODIUM 20 MG: 20 TABLET, DELAYED RELEASE ORAL at 10:51

## 2023-01-25 RX ADMIN — ATORVASTATIN CALCIUM 40 MG: 40 TABLET, FILM COATED ORAL at 08:14

## 2023-01-25 RX ADMIN — ONDANSETRON 4 MG: 4 TABLET, ORALLY DISINTEGRATING ORAL at 17:57

## 2023-01-25 RX ADMIN — LOSARTAN POTASSIUM 100 MG: 50 TABLET, FILM COATED ORAL at 08:15

## 2023-01-25 RX ADMIN — CETIRIZINE HYDROCHLORIDE 10 MG: 10 TABLET, FILM COATED ORAL at 08:14

## 2023-01-25 RX ADMIN — ALPRAZOLAM 0.5 MG: 0.5 TABLET ORAL at 08:15

## 2023-01-25 RX ADMIN — HYDROXYZINE HYDROCHLORIDE 50 MG: 50 TABLET ORAL at 17:49

## 2023-01-25 NOTE — BH NOTES
Patient given 0.5mg Xanax for anxiety and pulling hair per patient request.    1130:  Patient given 50mg Atarax for continued anxiety and pulling of hair per patient request.

## 2023-01-25 NOTE — BH NOTES
PSYCHIATRIC PROGRESS NOTE    CHIEF COMPLAINT:   \"I am not well today. \"    INTERVAL HISTORY:   01/25/23  Nursing report that patient is still having episodes of hair pulling, but is using the stress balls to help. She yelled out once yesterday. Upon my evaluation she tells me that her nausea returned today. Otherwise she has no other side effects. She slept well last night. Ms Yassine Saleem opens up about a 'Anabaptist warfare' going on in her brain. She says that she can't stop having ruminations of worthlessness and low self esteem. She updates that she has PHP starting on 1/31, however, she feels that she is truly unable to function with her thoughts and compulsions. Denies AVH. Denies SI    01/24/23  Ms. Yassine Saleem feels her anxiety overall is worse, she has been pulling her hair in the morning. Staff report that she had been pacing and yelling out loud. She is engaged in the evaluation and invested in her care. However, she doesn't feel that she would be able to function with the current level of anxiety. 01/23/23  Ms. Metzger Re feels that she is experienced a set back. She feels that she hasn't been able to function because of increased anxiety, leading her to scream and pull her hair. She feels that she can't go on like this or live like this. She feels that this interferes with her thinking and won't be able to work. She is having a difficult time falling asleep. Denies experiencing nausea. Denies suicidal ideations, intents or plans. 01/22/23  Ms. Metzger Re refused geodon in the morning. She felt increased anxiety and ended up pulling her hair twice today. She doesn't want to stay on Geodon any more. However, she is interested in being on another medications. We discussed starting seroquel and she is agreeable. Urges to pull hair remain. No si.    01/21/23  Ms. Metzger Re feels her mental health is improving. However, she reports feeling quite nauseous. She took zofran which is helpful. Denies SI.  No passive death wish.    01/20/23  Ms Mona Hill reports feeling nauseous for the second day in a row. Patient says that she wasn't able to eat this morning and felt like she was going to throw up after brushing teeth. However, she feels anxiety and hair pulling is much decreased. Her mood and sleep have been better. 1/19/23  Ms Mona Hill reports sleeping well overnight, but waking early in the morning and experiencing urges to pull her hair. Today she complains of headache and nausea that is now improving. She feels she is improving slowly. She feels that she can see herself returning to function upon discharge. She knows that she would have to engage in therapy, exercise, healthy nutrition. Today she denied having any suicidal or homicidal thoughts, intents or plans. 1/18/23  Nursing report that patient had refused insulin im, but it was for 2 units. Patient says that she isn't interested in being on insulin regimen. Patient is agreeable to splitting glipizide for better control. Ms Mona Hill feels tired and having difficulty with concentration, but feels her anxiety is decreased. She feels less urges for hair pulling. 1/17/23  Patient states that she struggles with urges to pull her hair. She says that she feels hungry on the zyprexa and ends up eating many snacks at night. Her BG was increased and required 2 units of insulin, but patient refused. Education provided. Patient states that her hair pulling is affecting her life, including her job. She complains of difficulty falling and staying asleep, night time awakenings with snoring. Recommended sleep study. 1/16/23  Patient says that for a while now she felt that her medications haven't been working. Eventually she felt that she couldn't get out of bed. She was referred to inpatient setting. Marilyn Lubaisaias says that she's struggled with OCD and Trichotillomania and was on luvox for some time. She felt that it wasn't working.  She also says that she hasn't been in ERP for therapy. She says that her anxiety is high and can't function. She denies SI or HI.     1/15/23  Landy Zuniga states that she is doing ok today. She endorses a slight decrease in the anxiety. She states that she had a spell this morning of hair pulling, but has been doing ok since then. She denies SI/HI/AVH currently. She denies any side effects from the medications.      VITALS:  Patient Vitals for the past 24 hrs:   Temp Pulse Resp BP SpO2   01/25/23 0830 98.1 °F (36.7 °C) 98 16 118/77 97 %   01/25/23 0815 -- 98 -- 118/78 --   01/24/23 1600 98.7 °F (37.1 °C) 99 16 107/80 97 %        LABS:  Recent Results (from the past 24 hour(s))   GLUCOSE, POC    Collection Time: 01/25/23  8:13 AM   Result Value Ref Range    Glucose (POC) 124 (H) 65 - 117 mg/dL    Performed by [address]:    Current Facility-Administered Medications:     QUEtiapine (SEROquel) tablet 150 mg, 150 mg, Oral, BID, Jaylan Thompson MD    sertraline (ZOLOFT) tablet 100 mg, 100 mg, Oral, DAILY, Jaylan Thompson MD, 100 mg at 01/25/23 0814    acetylcysteine tab 600 mg (Patient Supplied), 600 mg, Oral, BID, Jaylan Thompson MD, 600 mg at 01/25/23 0817    ondansetron (ZOFRAN ODT) tablet 4 mg, 4 mg, Oral, Q6H PRN, Lizbeth Ko NP, 4 mg at 01/25/23 1140    glipiZIDE (GLUCOTROL) tablet 5 mg, 5 mg, Oral, ACB&D, Jaylan Thompson MD, 5 mg at 01/25/23 0814    cetirizine (ZYRTEC) tablet 10 mg, 10 mg, Oral, DAILY, Jaylan Thompson MD, 10 mg at 01/25/23 0814    traZODone (DESYREL) tablet 150 mg, 150 mg, Oral, QHS, Jaylan Thompson MD, 150 mg at 01/24/23 2213    traZODone (DESYREL) tablet 100 mg, 100 mg, Oral, QHS PRN, Celeste Melton MD    metFORMIN (GLUCOPHAGE) tablet 1,000 mg, 1,000 mg, Oral, BID WITH MEALS, Jaylan Thompson MD, 1,000 mg at 01/25/23 0815    losartan (COZAAR) tablet 100 mg, 100 mg, Oral, DAILY, Jaylan Thompson MD, 100 mg at 01/25/23 0815    triamterene-hydroCHLOROthiazide (MAXZIDE) 37.5-25 mg per tablet 1 Tablet, 1 Tablet, Oral, DAILY, Jaylan Thompson MD, 1 Tablet at 01/25/23 0815    fluticasone propionate (FLONASE) 50 mcg/actuation nasal spray 2 Spray, 2 Spray, Both Nostrils, DAILY, Jaylan Thompson MD, 2 Spray at 01/25/23 1051    atorvastatin (LIPITOR) tablet 40 mg, 40 mg, Oral, DAILY, Jaylan Thompson MD, 40 mg at 01/25/23 0814    glucose chewable tablet 16 g, 4 Tablet, Oral, PRN, Colie Kimberly A, CNS    glucagon (GLUCAGEN) injection 1 mg, 1 mg, IntraMUSCular, PRN, Colie Kimberly A, CNS    dextrose 10 % infusion 0-250 mL, 0-250 mL, IntraVENous, PRN, Nancy Ennis, CNS    ALPRAZolam Cordella Loffler) tablet 0.5 mg, 0.5 mg, Oral, BID PRN, [place], Reta M, NP, 0.5 mg at 01/25/23 0815    pantoprazole (PROTONIX) tablet 20 mg, 20 mg, Oral, DAILY, [place], Reta M, NP, 20 mg at 01/25/23 1051    OLANZapine (ZyPREXA) tablet 5 mg, 5 mg, Oral, Q6H PRN, Tami Aspen, NP, 5 mg at 01/18/23 0107    haloperidol lactate (HALDOL) injection 5 mg, 5 mg, IntraMUSCular, Q6H PRN, Tami Aspen, NP, 5 mg at 01/14/23 0900    benztropine (COGENTIN) tablet 1 mg, 1 mg, Oral, BID PRN, Tami Aspen, NP    diphenhydrAMINE (BENADRYL) injection 50 mg, 50 mg, IntraMUSCular, BID PRN, Tami Aspen, NP, 50 mg at 01/14/23 0900    hydrOXYzine HCL (ATARAX) tablet 50 mg, 50 mg, Oral, TID PRN, Tami Aspen, NP, 50 mg at 01/25/23 1131    acetaminophen (TYLENOL) tablet 650 mg, 650 mg, Oral, Q4H PRN, Tami Aspen, NP, 650 mg at 01/24/23 1736    magnesium hydroxide (MILK OF MAGNESIA) 400 mg/5 mL oral suspension 30 mL, 30 mL, Oral, DAILY PRN, Tami Aspen, NP    midazolam (VERSED) injection 2 mg, 2 mg, IntraMUSCular, Q6H PRN, Tami Aspen, NP, 2 mg at 01/14/23 0900       MSE:  Ms Driss Dodson is a 50yo AAF has short hair and is dressed in hospital gown  Speech has NL rate and volume. Thought process is logical  Thought content is negative for SI  Denies experiencing hallucinations of any type.   No paranoia or delusional thoughts  Insight/Judgment are poor    ASSESSMENT:  Mood d/o, NOS  Trichotillomania (r/o OCD)    PLAN:  01/25/23  Patient may board on the general unit  Continue zoloft 100mg po qday. To address depressive and anxiety symptoms. Increase seroquel from 100mg po bid to 150mg po bid. To address depressive and anxiety symptoms. Continue xanax 0.5mg po bid prn. Continue NAC for trichotillomania. PHP on 1/31 01/24/23  Increase zoloft from 50mg to 100mg po qday. To address depressive and anxiety symptoms. Continue seroquel 100mg po bid. To address depressive and anxiety symptoms. Continue xanax 0.5mg po bid prn. Continue NAC for trichotillomania. Patient tells me that her family thinks she should be on disability. However, she is interested in contacting her work to arrange for time off and attend PHP first.    01/23/23  Increase seroquel from 50mg po bid to 100mg po bid. Continue zoloft 50mg po qday. Continue NAC 600mg po bid.    01/22/23  D/c geodon 20mg po bid  Start seroquel 50mg po bid. Continue zoloft 50mg po qday. Continue NAC 600mg po bid. 01/20/2023  Hold geodon bc of nausea  Continue current medication regimen. 01/20/2023  Will split NAC from 1200mg po qday to 600mg po bid. Other than that Discussed nausea possibly 2/2 to geodon. If this doesn't kaleb tomorrow, will decrease or switch. Likely d/c on Monday. 1/19/23  Continue current medication regimen  Possible discharge tomorrow. 1/18/23  Continue geodon, zoloft and NAC for treatment of symptoms  Will assess need for further geodon increase. Likely d/c this week w/referral to ERP and sleep study to rule out PATRICIO. 1/17/2023  Discussed d/c zyprexa, instead starting geodon 20mg po bid. Continue zoloft 50mg po qday. Continue Trazodone 150mg but will change to be scheduled. Will add trazodone 100mg po qhs prn. Start NAC 1200mg  Recommend sleep study on an outpatient. 1/16/2023  Increase Zoloft from 25mg to 50mg po qday.    Continue zyprexa 2.5mg po qhs. Continue xanax 0.5mg po bid prn. Pharmacy will look into starting NAC for Trichotillomania. Will restart medical medications. I certify that this patients inpatient psychiatric hospital services furnished since the previous certification were, and continue to be, required for treatment that could reasonably be expected to improve the patient's condition, or for diagnostic study, and that the patient continues to need, on a daily basis, active treatment furnished directly by or requiring the supervision of inpatient psychiatric facility personnel. In addition, the hospital records show that services furnished were intensive treatment services, admission or related services, or equivalent services. A coordinated, multidisplinary treatment team round was conducted with the patient, nurses, pharmcist,  and writer present. Discussions held with , and/or with family members; Complete current electronic health record for patient was reviewed in full including consultant notes, ancillary staff notes, nurses and tech notes, labs and vitals.

## 2023-01-25 NOTE — PROGRESS NOTES
0715 Rec'd patient this am resting in bed. Compared to this past weekend patient appears markedly more anxious. She has had several episodes of hair pulling this am and one episode of yelling out in the milieu today. She is social with the other patients in the milieu, med/meal compliant. Mood/Affect: anxious, depressed, dull. Denies S/I, H/I, A/H and V/H. No behavior issues. Problem: Falls - Risk of  Goal: *Absence of Falls  Description: Document Tatyana Encarnacion Fall Risk and appropriate interventions in the flowsheet.   Outcome: Progressing Towards Goal  Note: Fall Risk Interventions:   Medication Interventions: Teach patient to arise slowly  Problem: Patient Education: Go to Patient Education Activity  Goal: Patient/Family Education  Outcome: Progressing Towards Goal  Problem: Altered Thought Process (Adult/Pediatric)  Goal: *STG: Participates in treatment plan  Outcome: Progressing Towards Goal  Goal: *STG: Remains safe in hospital  Outcome: Progressing Towards Goal  Problem: Patient Education: Go to Patient Education Activity  Goal: Patient/Family Education  Outcome: Progressing Towards Goal

## 2023-01-25 NOTE — PROGRESS NOTES
Follow up visit with Miranda Dexter. She shared about her fears and continued struggles. I invited Miranda Dexter to participate in a walking meditation which seemed to provide relief. I will continue follow for spiritual care. For additional spiritual care, please contact the  on-call at ([phone]). Rev. Bria Hoffman MDiv, MS, [facility]  Staff

## 2023-01-25 NOTE — INTERDISCIPLINARY ROUNDS
Behavioral Health Interdisciplinary Rounds     Patient Name: Rc Mckeon  Age: 50 y.o. Room/Bed:  [facility]  Primary Diagnosis: <principal problem not specified>   Admission Status: Voluntary     Readmission within 30 days: no  Power of  in place: no  Patient requires a blocked bed: no          Reason for blocked bed:   Sleep hours:  8      Participation in Care/Groups:  no  Medication Compliant?: Yes  PRNS (last 24 hours):  Antianxiety and Pain    Restraints (last 24 hours):  no  __________________________________________________  OQ Admission Analysis Survey completed:  OQ Admission Analysis Survey score:  OQ Discharge Analysis Survey completed:  OQ Discharge Analysis Survey score:   __________________________________________________     Alcohol screening (AUDIT) completed -     If applicable, date SBIRT discussed in treatment team AND documented:    Tobacco - patient is a smoker: Have You Used Tobacco in the Past 30 Days: No  Illegal Drugs use: Have You Used Any Illegal Substances Over the Past 12 Months: No    24 hour chart check complete: yes     _______________________________________________    Patient goal(s) for today:   Treatment team focus/goals:   Progress note:         Financial concerns/prescription coverage:    Family contact:                        Family requesting physician contact today:    Discharge plan:   Access to weapons :                                                              Outpatient provider(s):   Patient's preferred phone number for follow up call :   Patient's preferred e-mail address :  Participating treatment team members:    LOS:  12  Expected LOS:     Participating treatment team members: Rc Linda, * (assigned SW),

## 2023-01-25 NOTE — PROGRESS NOTES
Problem: Falls - Risk of  Goal: *Absence of Falls  Description: Document Clearence Skates Fall Risk and appropriate interventions in the flowsheet. Outcome: Progressing Towards Goal  Note: Fall Risk Interventions:            Medication Interventions: Teach patient to arise slowly    Resting in bed with eyes closed, no complaints, no distress noted. Safety measures in place, will continue to monitor.

## 2023-01-25 NOTE — BH NOTES
GROUP THERAPY PROGRESS NOTE    Patient is participating in Coping skills group. Group time: 45 minutes    Personal goal for participation: To develop an understanding of Healthy verses unhealthy coping skills    Goal orientation: Personal    Group therapy participation: active     Therapeutic interventions reviewed and discussed:  Group members were able to gain an understanding of healthy and unhealthy coping skills. Members were given the opportunity to engage in conversation about their experiences with coping and how it was helpful or hurtful. Impression of participation: Pt was present and engaged in group discussion. Pt added insight to group topic.  Pt interacted therapeutically with JENNIFER Leggett, [facility]

## 2023-01-25 NOTE — BH NOTES
GROUP THERAPY PROGRESS NOTE    Patient is participating in psychotherapy group. Group time: 60 minutes    Personal goal for participation: To gain an understanding of [address] of Needs. Goal orientation: Personal    Group therapy participation: Active    Therapeutic interventions reviewed and discussed:  Members were guided through learning about [address] of Needs. Members gained an understanding of the different levels of needs and how more basic needs must be met before higher needs can be met. Impression of participation:  Pt was engaged in group discussion and was able to identify needs and how they are met or unmet. Pt utilized worksheets to dive further into needs and strategies to meet them.       JENNIFER Mallory, QMHP-A

## 2023-01-26 LAB
GLUCOSE BLD STRIP.AUTO-MCNC: 120 MG/DL (ref 65–117)
SERVICE CMNT-IMP: ABNORMAL

## 2023-01-26 PROCEDURE — 74011250637 HC RX REV CODE- 250/637: Performed by: NURSE PRACTITIONER

## 2023-01-26 PROCEDURE — 74011250637 HC RX REV CODE- 250/637: Performed by: PSYCHIATRY & NEUROLOGY

## 2023-01-26 PROCEDURE — 82962 GLUCOSE BLOOD TEST: CPT

## 2023-01-26 PROCEDURE — 65220000003 HC RM SEMIPRIVATE PSYCH

## 2023-01-26 RX ORDER — QUETIAPINE FUMARATE 25 MG/1
25 TABLET, FILM COATED ORAL
Status: DISCONTINUED | OUTPATIENT
Start: 2023-01-26 | End: 2023-01-30 | Stop reason: HOSPADM

## 2023-01-26 RX ADMIN — GLIPIZIDE 5 MG: 5 TABLET ORAL at 17:19

## 2023-01-26 RX ADMIN — ACETAMINOPHEN 650 MG: 325 TABLET ORAL at 22:21

## 2023-01-26 RX ADMIN — QUETIAPINE FUMARATE 25 MG: 25 TABLET ORAL at 13:59

## 2023-01-26 RX ADMIN — ATORVASTATIN CALCIUM 40 MG: 40 TABLET, FILM COATED ORAL at 08:40

## 2023-01-26 RX ADMIN — METFORMIN HYDROCHLORIDE 1000 MG: 500 TABLET ORAL at 17:19

## 2023-01-26 RX ADMIN — QUETIAPINE FUMARATE 150 MG: 100 TABLET ORAL at 08:39

## 2023-01-26 RX ADMIN — PANTOPRAZOLE SODIUM 20 MG: 20 TABLET, DELAYED RELEASE ORAL at 09:51

## 2023-01-26 RX ADMIN — LOSARTAN POTASSIUM 100 MG: 50 TABLET, FILM COATED ORAL at 08:41

## 2023-01-26 RX ADMIN — ALPRAZOLAM 0.5 MG: 0.5 TABLET ORAL at 08:43

## 2023-01-26 RX ADMIN — TRAZODONE HYDROCHLORIDE 150 MG: 50 TABLET ORAL at 22:20

## 2023-01-26 RX ADMIN — GLIPIZIDE 5 MG: 5 TABLET ORAL at 08:41

## 2023-01-26 RX ADMIN — TRIAMTERENE AND HYDROCHLOROTHIAZIDE 1 TABLET: 37.5; 25 TABLET ORAL at 08:42

## 2023-01-26 RX ADMIN — METFORMIN HYDROCHLORIDE 1000 MG: 500 TABLET ORAL at 08:41

## 2023-01-26 RX ADMIN — CETIRIZINE HYDROCHLORIDE 10 MG: 10 TABLET, FILM COATED ORAL at 08:40

## 2023-01-26 RX ADMIN — TRAZODONE HYDROCHLORIDE 100 MG: 100 TABLET ORAL at 01:01

## 2023-01-26 RX ADMIN — QUETIAPINE FUMARATE 150 MG: 100 TABLET ORAL at 22:19

## 2023-01-26 RX ADMIN — SERTRALINE 100 MG: 50 TABLET, FILM COATED ORAL at 08:39

## 2023-01-26 NOTE — BH NOTES
PRN Medication Documentation    Specific patient behavior that led to need for PRN medication: anxiety, hair pulling, yelling out  Staff interventions attempted prior to PRN being given: stress balls, meditative music  PRN medication given: seroquel 25 mg PO  Patient response/effectiveness of PRN medication: pending

## 2023-01-26 NOTE — BH NOTES
GROUP THERAPY PROGRESS NOTE    Patient is participating in [address] and Wellness group. Group time: 45 minutes    Personal goal for participation: to develop an understanding of gratitude and it's positive effects to our physical and mental well-being. Goal orientation: Personal    Group therapy participation: active     Therapeutic interventions reviewed and discussed: Members were able to gain an understanding of gratitude. Members developed knowledge of the importance of gratitude as a feeling as well as a practice. Members were introduced to keeping a gratitude journal. Members were given gratitude list templates and writing prompts. Handout provided. Impression of participation: Pt was present and engaged in group discussion. Pt added insight to group topic.  Pt interacted with peers and JAMEEL Sage-A

## 2023-01-26 NOTE — BH NOTES
GROUP THERAPY PROGRESS NOTE    Patient is participating in recreational therapy group. Group time: 30 minutes    Personal goal for participation: To develop an understanding and practice of stress relief through art therapy    Goal orientation: Personal    Group therapy participation: Active     Therapeutic interventions reviewed and discussed:  Group members were given the opportunity to engage in conversation about their mental health challenges and strengths while coloring/painting. Group members gained an understanding of how stress relief through artistic practice can be beneficial to their mental health. Impression of participation: Pt and SW engaged in therapeutic conversation while completing craft activity. Pt was calm, cooperative.        Venkat Camacho, MSW, QMHP-A

## 2023-01-26 NOTE — PROGRESS NOTES
Spirituality Group      Meeting Topic: Brittny    Meeting Time:  45-60 minutes    Meeting Goal: Patients will define brittny, describe the value and benefit of brittny, and identify and record ways to strengthen brittny and it's application in life. Kristin López attended and participated in the group appropriately respective of current diagnosis. For additional spiritual care, please contact the  on-call at ([phone]). .  Essence Hernandez MDiv, MS, [facility]  Staff

## 2023-01-26 NOTE — BH NOTES
GROUP THERAPY PROGRESS NOTE    Patient did not participate in [facility] and Wellness group.     Shanna Gilman MSW, QMHP-A

## 2023-01-26 NOTE — PROGRESS NOTES
Problem: Falls - Risk of  Goal: *Absence of Falls  Description: Document Jane Sotomayor Fall Risk and appropriate interventions in the flowsheet. Outcome: Progressing Towards Goal  Note: Fall Risk Interventions:    Medication Interventions: Teach patient to arise slowly    Patient is resting quietly in bed with eyes closed. Appears to be asleep. No acute or respiratory distress noted. Will continue to monitor q15 min rounds.

## 2023-01-26 NOTE — BH NOTES
PSYCHIATRIC PROGRESS NOTE    CHIEF COMPLAINT:   \"I am trying my best.\"    INTERVAL HISTORY:   01/26/23  Nursing report that patient is having episodes of yelling out and pulling her hair. Last night she awakened at 1am and took prn trazodone. During our evaluation she was upset, and couldn't control her urges of scratching her hair. 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 She denies SI, but says that she can't help pulling her hair. 01/25/23  Nursing report that patient is still having episodes of hair pulling, but is using the stress balls to help. She yelled out once yesterday. Upon my evaluation she tells me that her nausea returned today. Otherwise she has no other side effects. She slept well last night. Ms Dominic Olmedo opens up about a 'Sabianism warfare' going on in her brain. She says that she can't stop having ruminations of worthlessness and low self esteem. She updates that she has PHP starting on 1/31, however, she feels that she is truly unable to function with her thoughts and compulsions. Denies AVH. Denies SI    01/24/23  Ms. Dominic Olmedo feels her anxiety overall is worse, she has been pulling her hair in the morning. Staff report that she had been pacing and yelling out loud. She is engaged in the evaluation and invested in her care. However, she doesn't feel that she would be able to function with the current level of anxiety. 01/23/23  Ms. Dominic Olmedo feels that she is experienced a set back. She feels that she hasn't been able to function because of increased anxiety, leading her to scream and pull her hair. She feels that she can't go on like this or live like this. She feels that this interferes with her thinking and won't be able to work. She is having a difficult time falling asleep. Denies experiencing nausea. Denies suicidal ideations, intents or plans. 01/22/23  Ms. Dominic Olmedo refused geodon in the morning. She felt increased anxiety and ended up pulling her hair twice today. She doesn't want to stay on Geodon any more. However, she is interested in being on another medications. We discussed starting seroquel and she is agreeable. Urges to pull hair remain. No si.    01/21/23  Ms. Neyda Castro feels her mental health is improving. However, she reports feeling quite nauseous. She took zofran which is helpful. Denies SI. No passive death wish.    01/20/23  Ms Neyda Castro reports feeling nauseous for the second day in a row. Patient says that she wasn't able to eat this morning and felt like she was going to throw up after brushing teeth. However, she feels anxiety and hair pulling is much decreased. Her mood and sleep have been better. 1/19/23  Ms Neyda Castro reports sleeping well overnight, but waking early in the morning and experiencing urges to pull her hair. Today she complains of headache and nausea that is now improving. She feels she is improving slowly. She feels that she can see herself returning to function upon discharge. She knows that she would have to engage in therapy, exercise, healthy nutrition. Today she denied having any suicidal or homicidal thoughts, intents or plans. 1/18/23  Nursing report that patient had refused insulin im, but it was for 2 units. Patient says that she isn't interested in being on insulin regimen. Patient is agreeable to splitting glipizide for better control. Ms Neyda Castro feels tired and having difficulty with concentration, but feels her anxiety is decreased. She feels less urges for hair pulling. 1/17/23  Patient states that she struggles with urges to pull her hair. She says that she feels hungry on the zyprexa and ends up eating many snacks at night. Her BG was increased and required 2 units of insulin, but patient refused. Education provided. Patient states that her hair pulling is affecting her life, including her job.   She complains of difficulty falling and staying asleep, night time awakenings with snoring. Recommended sleep study. 1/16/23  Patient says that for a while now she felt that her medications haven't been working. Eventually she felt that she couldn't get out of bed. She was referred to inpatient setting. Hermilo Martinez says that she's struggled with OCD and Trichotillomania and was on luvox for some time. She felt that it wasn't working. She also says that she hasn't been in ERP for therapy. She says that her anxiety is high and can't function. She denies SI or HI.     1/15/23  Hermilo Martinez states that she is doing ok today. She endorses a slight decrease in the anxiety. She states that she had a spell this morning of hair pulling, but has been doing ok since then. She denies SI/HI/AVH currently. She denies any side effects from the medications.      VITALS:  Patient Vitals for the past 24 hrs:   Temp Pulse Resp BP SpO2   01/26/23 0847 97.8 °F (36.6 °C) 98 16 111/73 96 %   01/25/23 2116 -- 98 16 126/79 99 %   01/25/23 1550 98.3 °F (36.8 °C) 96 16 115/78 100 %        LABS:  Recent Results (from the past 24 hour(s))   GLUCOSE, POC    Collection Time: 01/26/23  7:32 AM   Result Value Ref Range    Glucose (POC) 120 (H) 65 - 117 mg/dL    Performed by Marge Salas         MEDICATIONS:    Current Facility-Administered Medications:     acetylcysteine tab 1,200 mg, 1,200 mg, Oral, BID, Jaylan Thompson MD    QUEtiapine (SEROquel) tablet 25 mg, 25 mg, Oral, TID PRN, Jaylan Thompson MD    QUEtiapine (SEROquel) tablet 150 mg, 150 mg, Oral, BID, Jaylan Fonseca MD, 150 mg at 01/26/23 0839    sertraline (ZOLOFT) tablet 100 mg, 100 mg, Oral, DAILY, Jaylan Thompson MD, 100 mg at 01/26/23 0839    ondansetron (ZOFRAN ODT) tablet 4 mg, 4 mg, Oral, Q6H PRN, Lizbeth Ko NP, 4 mg at 01/25/23 1757    glipiZIDE (GLUCOTROL) tablet 5 mg, 5 mg, Oral, ACB&D, Sachin Rubio MD, 5 mg at 01/26/23 0841    cetirizine (ZYRTEC) tablet 10 mg, 10 mg, Oral, DAILY, Sachin Rubio MD, 10 mg at 01/26/23 0840    traZODone (DESYREL) tablet 150 mg, 150 mg, Oral, QHS, Jaylan Thompson MD, 150 mg at 01/25/23 2208    traZODone (DESYREL) tablet 100 mg, 100 mg, Oral, QHS PRN, Jaylan Thompson MD, 100 mg at 01/26/23 0101    metFORMIN (GLUCOPHAGE) tablet 1,000 mg, 1,000 mg, Oral, BID WITH MEALS, Jaylan Guerrero MD, 1,000 mg at 01/26/23 0841    losartan (COZAAR) tablet 100 mg, 100 mg, Oral, DAILY, Jaylan Guerrero MD, 100 mg at 01/26/23 0841    triamterene-hydroCHLOROthiazide (MAXZIDE) 37.5-25 mg per tablet 1 Tablet, 1 Tablet, Oral, DAILY, Jaylan Guerrero MD, 1 Tablet at 01/26/23 0842    fluticasone propionate (FLONASE) 50 mcg/actuation nasal spray 2 Spray, 2 Spray, Both Nostrils, DAILY, Jaylan Thompson MD, 2 Spray at 01/25/23 1051    atorvastatin (LIPITOR) tablet 40 mg, 40 mg, Oral, DAILY, Sandra Lugo MD, 40 mg at 01/26/23 0840    glucose chewable tablet 16 g, 4 Tablet, Oral, PRN, Nancy Natarajan, CNS    glucagon (GLUCAGEN) injection 1 mg, 1 mg, IntraMUSCular, PRN, Umesh BRASHER CNS    dextrose 10 % infusion 0-250 mL, 0-250 mL, IntraVENous, PRN, Nancy Ennis, CNS    ALPRAZolam (XANAX) tablet 0.5 mg, 0.5 mg, Oral, BID PRN, Reta Guajardo, NP, 0.5 mg at 01/26/23 0843    pantoprazole (PROTONIX) tablet 20 mg, 20 mg, Oral, DAILY, CasandraReta vidales M, NP, 20 mg at 01/26/23 0951    OLANZapine (ZyPREXA) tablet 5 mg, 5 mg, Oral, Q6H PRN, Martha Rivera, NP, 5 mg at 01/18/23 0107    haloperidol lactate (HALDOL) injection 5 mg, 5 mg, IntraMUSCular, Q6H PRN, Martha Charleston, NP, 5 mg at 01/14/23 0900    benztropine (COGENTIN) tablet 1 mg, 1 mg, Oral, BID PRN, Martha Charleston, NP    diphenhydrAMINE (BENADRYL) injection 50 mg, 50 mg, IntraMUSCular, BID PRN, [place] Charleston, NP, 50 mg at 01/14/23 0900    hydrOXYzine HCL (ATARAX) tablet 50 mg, 50 mg, Oral, TID PRN, [place], NP, 50 mg at 01/25/23 1749    acetaminophen (TYLENOL) tablet 650 mg, 650 mg, Oral, Q4H PRN, [place] Charleston, NP, 762 mg at 01/25/23 2210    magnesium hydroxide (MILK OF MAGNESIA) 400 mg/5 mL oral suspension 30 mL, 30 mL, Oral, DAILY PRN, Liza JEREMI Rajan    midazolam (VERSED) injection 2 mg, 2 mg, IntraMUSCular, Q6H PRN, Liza Satinder, NP, 2 mg at 01/14/23 0900       MSE:  Ms Kimi Latif is a 50yo AAF has short hair and is dressed in hospital gown  Speech has NL rate and volume. Thought process is logical  Thought content is negative for SI  Denies experiencing hallucinations of any type. No paranoia or delusional thoughts  Insight/Judgment are poor    ASSESSMENT:  Mood d/o, NOS  Trichotillomania (r/o OCD)    PLAN:  01/26/23  Patient may board on the general unit  Continue zoloft 100mg po qday. To address depressive and anxiety symptoms, will likely increase. Continue seroquel 150mg po bid. To address depressive and anxiety symptoms. Continue xanax 0.5mg po bid prn. Increase NAC from 600mg po bid to 1200mg po bid. trichotillomania. PHP on 1/31  If patient isn't improved by next Monday, then will delay PHP to a different date. 01/25/23  Patient may board on the general unit  Continue zoloft 100mg po qday. To address depressive and anxiety symptoms. Increase seroquel from 100mg po bid to 150mg po bid. To address depressive and anxiety symptoms. Continue xanax 0.5mg po bid prn. Continue NAC for trichotillomania. PHP on 1/31 01/24/23  Increase zoloft from 50mg to 100mg po qday. To address depressive and anxiety symptoms. Continue seroquel 100mg po bid. To address depressive and anxiety symptoms. Continue xanax 0.5mg po bid prn. Continue NAC for trichotillomania. Patient tells me that her family thinks she should be on disability. However, she is interested in contacting her work to arrange for time off and attend PHP first.    01/23/23  Increase seroquel from 50mg po bid to 100mg po bid. Continue zoloft 50mg po qday.   Continue NAC 600mg po bid.    01/22/23  D/c geodon 20mg po bid  Start seroquel 50mg po bid.  Continue zoloft 50mg po qday. Continue NAC 600mg po bid. 01/20/2023  Hold geodon bc of nausea  Continue current medication regimen. 01/20/2023  Will split NAC from 1200mg po qday to 600mg po bid. Other than that Discussed nausea possibly 2/2 to geodon. If this doesn't kaleb tomorrow, will decrease or switch. Likely d/c on Monday. 1/19/23  Continue current medication regimen  Possible discharge tomorrow. 1/18/23  Continue geodon, zoloft and NAC for treatment of symptoms  Will assess need for further geodon increase. Likely d/c this week w/referral to ERP and sleep study to rule out PATRICIO. 1/17/2023  Discussed d/c zyprexa, instead starting geodon 20mg po bid. Continue zoloft 50mg po qday. Continue Trazodone 150mg but will change to be scheduled. Will add trazodone 100mg po qhs prn. Start NAC 1200mg  Recommend sleep study on an outpatient. 1/16/2023  Increase Zoloft from 25mg to 50mg po qday. Continue zyprexa 2.5mg po qhs. Continue xanax 0.5mg po bid prn. Pharmacy will look into starting NAC for Trichotillomania. Will restart medical medications. I certify that this patients inpatient psychiatric hospital services furnished since the previous certification were, and continue to be, required for treatment that could reasonably be expected to improve the patient's condition, or for diagnostic study, and that the patient continues to need, on a daily basis, active treatment furnished directly by or requiring the supervision of inpatient psychiatric facility personnel. In addition, the hospital records show that services furnished were intensive treatment services, admission or related services, or equivalent services. A coordinated, multidisplinary treatment team round was conducted with the patient, nurses, pharmcist,  and writer present. Discussions held with , and/or with family members;  Complete current electronic health record for patient was reviewed in full including consultant notes, ancillary staff notes, nurses and tech notes, labs and vitals.

## 2023-01-26 NOTE — PROGRESS NOTES
Problem: Altered Thought Process (Adult/Pediatric)  Goal: *STG: Participates in treatment plan  Outcome: Progressing Towards Goal  Goal: *STG: Remains safe in hospital  Outcome: Progressing Towards Goal  Goal: *STG: Attends activities and groups  Outcome: Progressing Towards Goal     Problem: Patient Education: Go to Patient Education Activity  Goal: Patient/Family Education  Outcome: Progressing Towards Goal

## 2023-01-26 NOTE — INTERDISCIPLINARY ROUNDS
Behavioral Health Interdisciplinary Rounds     Patient Name: Afsaneh Wahl  Age: 50 y.o. Room/Bed:  [facility]/  Primary Diagnosis: <principal problem not specified>   Admission Status: Voluntary     Readmission within 30 days: no  Power of  in place: no  Patient requires a blocked bed: no          Reason for blocked bed:   Sleep hours:  7      Participation in Care/Groups:  yes  Medication Compliant?: Yes  PRNS (last 24 hours): Antianxiety, Pain, and zofran     Restraints (last 24 hours):  no  __________________________________________________  OQ Admission Analysis Survey completed:  OQ Admission Analysis Survey score:  OQ Discharge Analysis Survey completed:  OQ Discharge Analysis Survey score:   __________________________________________________     Alcohol screening (AUDIT) completed -     If applicable, date SBIRT discussed in treatment team AND documented:    Tobacco - patient is a smoker: Have You Used Tobacco in the Past 30 Days: No  Illegal Drugs use: Have You Used Any Illegal Substances Over the Past 12 Months: No    24 hour chart check complete: yes     _______________________________________________    Patient goal(s) for today: Communicate needs to staff   Treatment team focus/goals: Assess pt, manage medications, discharge planning   Progress note: Pt has decompensated. Pt has had more outbursts and hair pulling episodes. Pt is med compliant. Pt endorses heightened anxiety.  Pt denies SI, HI, AVH        Financial concerns/prescription coverage:    Family contact:                        Family requesting physician contact today:    Discharge plan: Pt will discharge home  Access to weapons :    no                                                           Outpatient provider(s): F[facility] Head[facility], [facility]   Patient's preferred phone number for follow up call :   Patient's preferred e-mail address :  Participating treatment team members:    LOS:  13  Expected LOS TBD     Participating treatment team members: JENNIFER Ovalles, nEder Mcmahan, CHRISTIANE, Belle Han, Dr. Cecilio Blackmon

## 2023-01-26 NOTE — PROGRESS NOTES
Heidi Ramos shared her feelings and concerns about her diagnosis. She is struggling with not making progress. She is holding onto her jasvir as a source of coping. I offered reassurance, prayer and active listening. Heidi Ramos will benefit from daily  visits for spiritual connection and prayer. Will refer to weekend team for continued care. For additional spiritual care, please contact the  on-call at [phone]). Rev.  Rodney Bains MDiv, MS, [facility]  Staff

## 2023-01-27 LAB
GLUCOSE BLD STRIP.AUTO-MCNC: 115 MG/DL (ref 65–117)
SERVICE CMNT-IMP: NORMAL

## 2023-01-27 PROCEDURE — 74011250636 HC RX REV CODE- 250/636: Performed by: NURSE PRACTITIONER

## 2023-01-27 PROCEDURE — 65220000003 HC RM SEMIPRIVATE PSYCH

## 2023-01-27 PROCEDURE — 74011250637 HC RX REV CODE- 250/637: Performed by: NURSE PRACTITIONER

## 2023-01-27 PROCEDURE — 82962 GLUCOSE BLOOD TEST: CPT

## 2023-01-27 PROCEDURE — 74011250637 HC RX REV CODE- 250/637: Performed by: PSYCHIATRY & NEUROLOGY

## 2023-01-27 RX ORDER — QUETIAPINE FUMARATE 100 MG/1
200 TABLET, FILM COATED ORAL
Status: DISCONTINUED | OUTPATIENT
Start: 2023-01-27 | End: 2023-01-30 | Stop reason: HOSPADM

## 2023-01-27 RX ORDER — QUETIAPINE FUMARATE 100 MG/1
100 TABLET, FILM COATED ORAL DAILY
Status: DISCONTINUED | OUTPATIENT
Start: 2023-01-28 | End: 2023-01-30 | Stop reason: HOSPADM

## 2023-01-27 RX ORDER — CLONAZEPAM 0.5 MG/1
0.5 TABLET ORAL 2 TIMES DAILY
Status: DISCONTINUED | OUTPATIENT
Start: 2023-01-27 | End: 2023-01-30 | Stop reason: HOSPADM

## 2023-01-27 RX ORDER — TRAZODONE HYDROCHLORIDE 100 MG/1
200 TABLET ORAL
Status: DISCONTINUED | OUTPATIENT
Start: 2023-01-27 | End: 2023-01-30 | Stop reason: HOSPADM

## 2023-01-27 RX ADMIN — HYDROXYZINE HYDROCHLORIDE 50 MG: 50 TABLET ORAL at 03:12

## 2023-01-27 RX ADMIN — PANTOPRAZOLE SODIUM 20 MG: 20 TABLET, DELAYED RELEASE ORAL at 08:26

## 2023-01-27 RX ADMIN — MIDAZOLAM 2 MG: 1 INJECTION INTRAMUSCULAR; INTRAVENOUS at 07:45

## 2023-01-27 RX ADMIN — CLONAZEPAM 0.5 MG: 0.5 TABLET ORAL at 12:16

## 2023-01-27 RX ADMIN — TRAZODONE HYDROCHLORIDE 200 MG: 100 TABLET ORAL at 22:09

## 2023-01-27 RX ADMIN — LOSARTAN POTASSIUM 100 MG: 50 TABLET, FILM COATED ORAL at 08:18

## 2023-01-27 RX ADMIN — TRAZODONE HYDROCHLORIDE 100 MG: 100 TABLET ORAL at 00:41

## 2023-01-27 RX ADMIN — QUETIAPINE FUMARATE 200 MG: 100 TABLET ORAL at 22:09

## 2023-01-27 RX ADMIN — GLIPIZIDE 5 MG: 5 TABLET ORAL at 17:25

## 2023-01-27 RX ADMIN — ATORVASTATIN CALCIUM 40 MG: 40 TABLET, FILM COATED ORAL at 08:17

## 2023-01-27 RX ADMIN — CLONAZEPAM 0.5 MG: 0.5 TABLET ORAL at 18:13

## 2023-01-27 RX ADMIN — ACETAMINOPHEN 650 MG: 325 TABLET ORAL at 18:35

## 2023-01-27 RX ADMIN — GLIPIZIDE 5 MG: 5 TABLET ORAL at 08:21

## 2023-01-27 RX ADMIN — SERTRALINE 100 MG: 50 TABLET, FILM COATED ORAL at 08:19

## 2023-01-27 RX ADMIN — HALOPERIDOL LACTATE 5 MG: 5 INJECTION, SOLUTION INTRAMUSCULAR at 07:45

## 2023-01-27 RX ADMIN — MAGNESIUM HYDROXIDE 30 ML: 400 SUSPENSION ORAL at 03:10

## 2023-01-27 RX ADMIN — ACETAMINOPHEN 650 MG: 325 TABLET ORAL at 10:37

## 2023-01-27 RX ADMIN — CETIRIZINE HYDROCHLORIDE 10 MG: 10 TABLET, FILM COATED ORAL at 08:21

## 2023-01-27 RX ADMIN — QUETIAPINE FUMARATE 150 MG: 100 TABLET ORAL at 08:19

## 2023-01-27 RX ADMIN — TRIAMTERENE AND HYDROCHLOROTHIAZIDE 1 TABLET: 37.5; 25 TABLET ORAL at 08:17

## 2023-01-27 RX ADMIN — METFORMIN HYDROCHLORIDE 1000 MG: 500 TABLET ORAL at 08:18

## 2023-01-27 NOTE — BH NOTES
GROUP THERAPY PROGRESS NOTE    Patient is participating in psychotherapy group. Group time: 60 minutes    Personal goal for participation: to gain an understanding of boundaries in our relationships    Goal orientation: Personal    Group therapy participation: Active     Therapeutic interventions reviewed and discussed:  Group members were guided through learning about the importance of boundaries. Members gained an understanding of what boundaries are, how to set them, and the differences between healthy and unhealthy boundaries. Examples of situations were provided for the members to assess and provide the appropriate boundary. Handouts provided. Impression of participation:  Pts were present and engaged in group discussion. Pts added insight to group topic.  Pt interacted with peers and JENNIFER Dhaliwal, QMHP-A

## 2023-01-27 NOTE — PROGRESS NOTES
Problem: Altered Thought Process (Adult/Pediatric)  Goal: *STG: Participates in treatment plan  Outcome: Progressing Towards Goal  Attended treatment team  Goal: *STG: Remains safe in hospital  Outcome: Progressing Towards Goal  Pt denies any suicidal or homicidal thoughts. Contracts for safety. Remains on q 15 min safety checks. Goal: *STG: Demonstrates ability to understand and use improved judgment in daily activities and relationships  Outcome: Not Progressing Towards Goal  Had to be given IM`s because of aggressive harmful behavior to self. Patient hit herself in head and kept pulling her hair and was not redirectable at that time.

## 2023-01-27 NOTE — PROGRESS NOTES
Problem: Falls - Risk of  Goal: *Absence of Falls  Description: Document Nyasia Skill Fall Risk and appropriate interventions in the flowsheet. Outcome: Progressing Towards Goal    Medication Interventions: Teach patient to arise slowly    Patient is resting quietly in bed with eyes closed. Appears to be asleep. No acute or respiratory distress noted. Will continue to monitor q15 min rounds.

## 2023-01-27 NOTE — PROGRESS NOTES
Music Therapy Assessment  [address] [facility] [id number]     1975  50 y.o.  female    Patient Telephone Number: [phone] (home)   Yazidism Affiliation: No Synagogue   Language: English   Patient Active Problem List    Diagnosis Date Noted    Depressed 07/26/2022    Depression 05/12/2022    Moderate episode of recurrent major depressive disorder ([facility] 05/12/2022    Obesity, morbid ([facility] 04/10/2019    Infected sebaceous cyst, upper back 04/10/2019    Trichotillomania 05/25/2016    Depression with anxiety 05/22/2016    Hypertension 05/22/2016    Type II diabetes mellitus ([facility] 05/22/2016    Panic disorder without agoraphobia with severe panic attacks 03/25/2016    Depressive disorder 03/24/2016        Date: 1/27/2023            Total Time (in minutes): 94          [facility] ACUTE [facility]    Mental Status:   [x] Alert [  ] Shiva Moll [  ]  Confused  [  ] Minimally responsive  [  ] Sleeping    Communication Status: [  ] Impaired Speech [  ] Nonverbal -N/A    Physical Status:   [  ] Oxygen in use  [  ] Hard of Hearing [  ] Vision Impaired  [x] Ambulatory  [  ] Ambulatory with assistance [  ] Non-ambulatory     Music Preferences, Background: R&BLamar    Clinical Problem addressed: Emotional support, spiritual support, offer coping strategies through various experiences    Goal(s) met in session:  Physical/Pain management (Scale of 1-10):    Pre-session rating: N/A  Post-session rating: N/A  [x] Increased relaxation   [  ] Affected breathing patterns  [x] Decreased muscle tension   [  ] Decreased agitation  [  ] Affected heart rate    [  ] Increased alertness     Emotional/Psychological:  [x] Increased self-expression   [  ] Decreased aggressive behavior   [  ] Decreased feelings of stress  [x] Discussed healthy coping skills     [x] Improved mood    [  ] Decreased withdrawn behavior     Social:  [x] Decreased feelings of isolation/loneliness [x] Positive social interaction   [  ] Provided support and/or comfort for family/friends    Spiritual:  [x] Spiritual support    [x] Expressed peace  [x] Expressed jasvir    [x] Discussed beliefs    Techniques Utilized (Check all that apply):   [  ] Procedural support MT [x] Music for relaxation [x] Patient preferred music  [x] Alisha analysis  [x] Macdoel Ching choice  [x] Music for validation  [  ] Entrainment  [x] Movement to music [  ] Guided visualization  [  ] Tez How  [  ] Patient instrument playing [x] Song writing  [x] Sing along   [x] Improvisation  [  ] Sensory stimulation  [x] Active Listening  [x] Music for spiritual support [  ] Making of CDs as gifts    Session Observations:  Referred by Chaplain Adarsh; This music therapist (MT) greeted the staff and shared about offering services to the pt. Staff welcomed this and assisted with this process. MT met with the pt in a community area and introduced self/role. Upon learning who referred her to services, pt expressed excitement and an openness to receiving a session at this time. She shared about her current emotional state, as well as the support she receives from her strong jasvir. MT offered a variety of ways she can support herself in moments of anxiety/panic, as well as offered experiences that encouraged self-expression, free singing, songwriting, and mindfulness. Pt increased self-expression throughout the experience as evidenced by (AEB) singing, praising/praying, offering her thoughts on particular lyrics that were close to her heart, clapping along as songs were provided. Pt expressed gratitude in the session, and even had moments of laughter with this MT. Pt later shared she had not laughed on that day, and expressed feeling joyous in this moment. MT thanked her for her openness and provided a brief review on the various things the pt participated . MT encouraged her to explore these experiences independently for further support and pt expressed gratitude for this.  MT concluded the session at this time.     TERRANCE Milton (Music Therapist, Board Certified)  [id number] [facility]

## 2023-01-27 NOTE — INTERDISCIPLINARY ROUNDS
Behavioral Health Interdisciplinary Rounds     Patient Name: Francine Hogan  Age: 50 y.o. Room/Bed:  728/  Primary Diagnosis: <principal problem not specified>   Admission Status: Voluntary     Readmission within 30 days: no  Power of  in place: no  Patient requires a blocked bed: no          Reason for blocked bed:   Sleep hours: 6       Participation in Care/Groups:  yes  Medication Compliant?: Yes  PRNS (last 24 hours): Antianxiety, Sleep Aid, and Pain    Restraints (last 24 hours):  no  __________________________________________________  OQ Admission Analysis Survey completed:  OQ Admission Analysis Survey score:  OQ Discharge Analysis Survey completed:  OQ Discharge Analysis Survey score:   __________________________________________________     Alcohol screening (AUDIT) completed -     If applicable, date SBIRT discussed in treatment team AND documented:    Tobacco - patient is a smoker: Have You Used Tobacco in the Past 30 Days: No  Illegal Drugs use: Have You Used Any Illegal Substances Over the Past 12 Months: No    24 hour chart check complete: yes     _______________________________________________    Patient goal(s) for today: Communicate needs to staff   Treatment team focus/goals: Assess pt, manage medications, discharge planning   Progress note: Pt has decompensated. Pt required IM last evening due to yelling out and aggression. Pt still having sleep disruption. Pts medication has been adjusted. Pt is med compliant.  Pt denies SI, HI, AVH        Financial concerns/prescription coverage:    Family contact:    Ming Khan,                     Family requesting physician contact today:    Discharge plan: TBD   Access to weapons :   no                                                            Outpatient provider(s): [facility]   Patient's preferred phone number for follow up call :   Patient's preferred e-mail address :  Participating treatment team members:    LOS:  14  Expected LOS: TBD Participating treatment team members: JENNIFER Hickey, Anjana Rudolph RN, Luna Myrick, Dr. Vicki Villatoro

## 2023-01-27 NOTE — BH NOTES
PSYCHIATRIC PROGRESS NOTE    CHIEF COMPLAINT:   \"I am trying my best.\"    INTERVAL HISTORY:   01/27/23  Nursing report patient has had many episodes of yelling, hitting her head and pulling her hair. She was so agitated and not responsive to verbal re-direction, needing Haldol 5mg IM this am at 0745. Upon my evaluation, Ms Marylou Ryeez says that she can't live like this anymore and isn't sure what to do. She reports that she feels that her xanax hasn't been as effective as it used to be in the past. She also hadn't utilized prn seroquel. At times she says that she would like to leave, but thinking this through she says that she is just so eager to feel better and complement her treatment with the therapy. She denies any thoughts of suicide, but has recurrent urges of pulling her hair. 01/26/23  Nursing report that patient is having episodes of yelling out and pulling her hair. Last night she awakened at 1am and took prn trazodone. During our evaluation she was upset, and couldn't control her urges of scratching her hair. She was yelling out, 'stop it, stop it.'  She remarks that she is also Jew and uses jasvir to help her overcome this. She denies SI, but says that she can't help pulling her hair. 01/25/23  Nursing report that patient is still having episodes of hair pulling, but is using the stress balls to help. She yelled out once yesterday. Upon my evaluation she tells me that her nausea returned today. Otherwise she has no other side effects. She slept well last night. Ms Marylou Reyez opens up about a 'Jew warfare' going on in her brain. She says that she can't stop having ruminations of worthlessness and low self esteem. She updates that she has PHP starting on 1/31, however, she feels that she is truly unable to function with her thoughts and compulsions. Denies AVH. Denies SI    01/24/23  Ms. Marylou Reyez feels her anxiety overall is worse, she has been pulling her hair in the morning.  Staff report that she had been pacing and yelling out loud. She is engaged in the evaluation and invested in her care. However, she doesn't feel that she would be able to function with the current level of anxiety. 01/23/23  Ms. Amrik Chavez feels that she is experienced a set back. She feels that she hasn't been able to function because of increased anxiety, leading her to scream and pull her hair. She feels that she can't go on like this or live like this. She feels that this interferes with her thinking and won't be able to work. She is having a difficult time falling asleep. Denies experiencing nausea. Denies suicidal ideations, intents or plans. 01/22/23  Ms. Amrik Chavez refused geodon in the morning. She felt increased anxiety and ended up pulling her hair twice today. She doesn't want to stay on Geodon any more. However, she is interested in being on another medications. We discussed starting seroquel and she is agreeable. Urges to pull hair remain. No si.    01/21/23  Ms. Amrik Chavez feels her mental health is improving. However, she reports feeling quite nauseous. She took zofran which is helpful. Denies SI. No passive death wish.    01/20/23  Ms Amrik Chaevz reports feeling nauseous for the second day in a row. Patient says that she wasn't able to eat this morning and felt like she was going to throw up after brushing teeth. However, she feels anxiety and hair pulling is much decreased. Her mood and sleep have been better. 1/19/23  Ms Amrik Chavez reports sleeping well overnight, but waking early in the morning and experiencing urges to pull her hair. Today she complains of headache and nausea that is now improving. She feels she is improving slowly. She feels that she can see herself returning to function upon discharge. She knows that she would have to engage in therapy, exercise, healthy nutrition. Today she denied having any suicidal or homicidal thoughts, intents or plans.     1/18/23  Nursing report that patient had refused insulin im, but it was for 2 units. Patient says that she isn't interested in being on insulin regimen. Patient is agreeable to splitting glipizide for better control. Ms Lionel Alvarez feels tired and having difficulty with concentration, but feels her anxiety is decreased. She feels less urges for hair pulling. 1/17/23  Patient states that she struggles with urges to pull her hair. She says that she feels hungry on the zyprexa and ends up eating many snacks at night. Her BG was increased and required 2 units of insulin, but patient refused. Education provided. Patient states that her hair pulling is affecting her life, including her job. She complains of difficulty falling and staying asleep, night time awakenings with snoring. Recommended sleep study. 1/16/23  Patient says that for a while now she felt that her medications haven't been working. Eventually she felt that she couldn't get out of bed. She was referred to inpatient setting. Keyla Bonilla says that she's struggled with OCD and Trichotillomania and was on luvox for some time. She felt that it wasn't working. She also says that she hasn't been in ERP for therapy. She says that her anxiety is high and can't function. She denies SI or HI.     1/15/23  Keyla Bonilla states that she is doing ok today. She endorses a slight decrease in the anxiety. She states that she had a spell this morning of hair pulling, but has been doing ok since then. She denies SI/HI/AVH currently. She denies any side effects from the medications.      VITALS:  Patient Vitals for the past 24 hrs:   Temp Pulse Resp BP SpO2   01/27/23 0724 98.5 °F (36.9 °C) 96 18 130/73 97 %   01/26/23 1548 98.1 °F (36.7 °C) 86 16 102/65 100 %        LABS:  Recent Results (from the past 24 hour(s))   GLUCOSE, POC    Collection Time: 01/27/23  7:23 AM   Result Value Ref Range    Glucose (POC) 115 65 - 117 mg/dL    Performed by Waqas Ballesteros         MEDICATIONS:    Current Facility-Administered Medications:     QUEtiapine (SEROquel) tablet 200 mg, 200 mg, Oral, QHS, Jaylan Thompson MD    [START ON 1/28/2023] QUEtiapine (SEROquel) tablet 100 mg, 100 mg, Oral, DAILY, Jaylan Thompson MD    traZODone (DESYREL) tablet 200 mg, 200 mg, Oral, QHS, Jaylan Thompson MD    clonazePAM (KlonoPIN) tablet 0.5 mg, 0.5 mg, Oral, BID, Jaylan Thompson MD, 0.5 mg at 01/27/23 1216    acetylcysteine tab 1,200 mg (Patient Supplied), 1,200 mg, Oral, BID, Mario Mccall MD, 1,200 mg at 01/27/23 0816    QUEtiapine (SEROquel) tablet 25 mg, 25 mg, Oral, TID PRN, Jaylan Herrera MD, 25 mg at 01/26/23 1359    sertraline (ZOLOFT) tablet 100 mg, 100 mg, Oral, DAILY, Jaylan Thompson MD, 100 mg at 01/27/23 0819    ondansetron (ZOFRAN ODT) tablet 4 mg, 4 mg, Oral, Q6H PRN, Lizbeth Ko NP, 4 mg at 01/25/23 1757    glipiZIDE (GLUCOTROL) tablet 5 mg, 5 mg, Oral, ACB&D, Jaylan Thompson MD, 5 mg at 01/27/23 0821    cetirizine (ZYRTEC) tablet 10 mg, 10 mg, Oral, DAILY, Jaylan Thompson MD, 10 mg at 01/27/23 8205    traZODone (DESYREL) tablet 100 mg, 100 mg, Oral, QHS PRN, Jaylan Herrera MD, 100 mg at 01/27/23 0041    metFORMIN (GLUCOPHAGE) tablet 1,000 mg, 1,000 mg, Oral, BID WITH MEALS, Jaylan Thompson MD, 1,000 mg at 01/27/23 0818    losartan (COZAAR) tablet 100 mg, 100 mg, Oral, DAILY, Jaylan Thompson MD, 100 mg at 01/27/23 0818    triamterene-hydroCHLOROthiazide (MAXZIDE) 37.5-25 mg per tablet 1 Tablet, 1 Tablet, Oral, DAILY, Jaylan Thompson MD, 1 Tablet at 01/27/23 0817    fluticasone propionate (FLONASE) 50 mcg/actuation nasal spray 2 Spray, 2 Spray, Both Nostrils, DAILY, Jaylan Thompson MD, 2 Spray at 01/25/23 1051    atorvastatin (LIPITOR) tablet 40 mg, 40 mg, Oral, DAILY, Jaylan Thompson MD, 40 mg at 01/27/23 0817    glucose chewable tablet 16 g, 4 Tablet, Oral, PRN, Natividad Doug A, CNS    glucagon (GLUCAGEN) injection 1 mg, 1 mg, IntraMUSCular, PRN, Natividad Doug A, CNS    dextrose 10 % infusion 0-250 mL, 0-250 mL, IntraVENous, PRN, Deshawn Mix A, CNS    pantoprazole (PROTONIX) tablet 20 mg, 20 mg, Oral, DAILY, Casandra Reta ERNESTINE, NP, 20 mg at 01/27/23 0826    OLANZapine (ZyPREXA) tablet 5 mg, 5 mg, Oral, Q6H PRN, Neda Christen, NP, 5 mg at 01/18/23 0107    haloperidol lactate (HALDOL) injection 5 mg, 5 mg, IntraMUSCular, Q6H PRN, Neda Hickory, NP, 5 mg at 01/27/23 0745    benztropine (COGENTIN) tablet 1 mg, 1 mg, Oral, BID PRN, Neda Hickory, NP    diphenhydrAMINE (BENADRYL) injection 50 mg, 50 mg, IntraMUSCular, BID PRN, Neda Christen, NP, 50 mg at 01/14/23 0900    hydrOXYzine HCL (ATARAX) tablet 50 mg, 50 mg, Oral, TID PRN, Neda Christen, NP, 50 mg at 01/27/23 0926    acetaminophen (TYLENOL) tablet 650 mg, 650 mg, Oral, Q4H PRN, Neda Christen, NP, 650 mg at 01/27/23 1037    magnesium hydroxide (MILK OF MAGNESIA) 400 mg/5 mL oral suspension 30 mL, 30 mL, Oral, DAILY PRN, Neda Christen, NP, 30 mL at 01/27/23 0310    midazolam (VERSED) injection 2 mg, 2 mg, IntraMUSCular, Q6H PRN, Neda Christen, NP, 2 mg at 01/27/23 0745       MSE:  Ms Marylou Reyez is a 48yo AAF has short hair and is dressed in hospital gown  Speech has NL rate and volume. Thought process is linear  Thought content is negative for SI, but recurrent urges to pull her hair. Denies experiencing hallucinations of any type. No paranoia or delusional thoughts  Insight/Judgment are poor    ASSESSMENT:  Mood d/o, NOS  Trichotillomania (r/o OCD)    PLAN:  01/27/23  Encourage patient to board on the general unit  Continue zoloft 100mg po qday. To address depressive and anxiety symptoms, will likely increase. Change seroquel dosing to help with sleep. Will decrease am dose from 150 qo 100. Will increase qhs dose from 150 to 200mg. To address depressive and anxiety symptoms. D/C xanax 0.5mg po bid prn. Instead will start scheduled clonazepam 0.5mg po bid. Continue NAC 1200mg po bid for trichotillomania.   PHP on 1/31  If patient isn't improved by next Monday, then will delay PHP to a different date. 01/26/23  Patient may board on the general unit  Continue zoloft 100mg po qday. To address depressive and anxiety symptoms, will likely increase. Continue seroquel 150mg po bid. To address depressive and anxiety symptoms. Continue xanax 0.5mg po bid prn. Increase NAC from 600mg po bid to 1200mg po bid. trichotillomania. PHP on 1/31  If patient isn't improved by next Monday, then will delay PHP to a different date. 01/25/23  Patient may board on the general unit  Continue zoloft 100mg po qday. To address depressive and anxiety symptoms. Increase seroquel from 100mg po bid to 150mg po bid. To address depressive and anxiety symptoms. Continue xanax 0.5mg po bid prn. Continue NAC for trichotillomania. PHP on 1/31 01/24/23  Increase zoloft from 50mg to 100mg po qday. To address depressive and anxiety symptoms. Continue seroquel 100mg po bid. To address depressive and anxiety symptoms. Continue xanax 0.5mg po bid prn. Continue NAC for trichotillomania. Patient tells me that her family thinks she should be on disability. However, she is interested in contacting her work to arrange for time off and attend PHP first.    01/23/23  Increase seroquel from 50mg po bid to 100mg po bid. Continue zoloft 50mg po qday. Continue NAC 600mg po bid.    01/22/23  D/c geodon 20mg po bid  Start seroquel 50mg po bid. Continue zoloft 50mg po qday. Continue NAC 600mg po bid. 01/20/2023  Hold geodon bc of nausea  Continue current medication regimen. 01/20/2023  Will split NAC from 1200mg po qday to 600mg po bid. Other than that Discussed nausea possibly 2/2 to geodon. If this doesn't kaleb tomorrow, will decrease or switch. Likely d/c on Monday. 1/19/23  Continue current medication regimen  Possible discharge tomorrow.     1/18/23  Continue geodon, zoloft and NAC for treatment of symptoms  Will assess need for further geodon increase. Likely d/c this week w/referral to ERP and sleep study to rule out PATRICIO. 1/17/2023  Discussed d/c zyprexa, instead starting geodon 20mg po bid. Continue zoloft 50mg po qday. Continue Trazodone 150mg but will change to be scheduled. Will add trazodone 100mg po qhs prn. Start NAC 1200mg  Recommend sleep study on an outpatient. 1/16/2023  Increase Zoloft from 25mg to 50mg po qday. Continue zyprexa 2.5mg po qhs. Continue xanax 0.5mg po bid prn. Pharmacy will look into starting NAC for Trichotillomania. Will restart medical medications. I certify that this patients inpatient psychiatric hospital services furnished since the previous certification were, and continue to be, required for treatment that could reasonably be expected to improve the patient's condition, or for diagnostic study, and that the patient continues to need, on a daily basis, active treatment furnished directly by or requiring the supervision of inpatient psychiatric facility personnel. In addition, the hospital records show that services furnished were intensive treatment services, admission or related services, or equivalent services. A coordinated, multidisplinary treatment team round was conducted with the patient, nurses, pharmcist,  and writer present. Discussions held with , and/or with family members; Complete current electronic health record for patient was reviewed in full including consultant notes, ancillary staff notes, nurses and tech notes, labs and vitals.

## 2023-01-27 NOTE — BH NOTES
Patient presented at shift change extremely agitated. Hitting head with fists, pulling hair, yelling and screaming.  Unable to be redirected  IM medication administered  5 mg haldol IM  2  mg versed IM

## 2023-01-27 NOTE — BH NOTES
GROUP THERAPY PROGRESS NOTE    Patient is participating in [address] and Wellness group. Group time: 45 minutes    Personal goal for participation: To engage in guided meditation activity  Goal orientation: Personal    Group therapy participation: Active    Therapeutic interventions reviewed and discussed:  Group members were offered education about Meditation. Members were able to participate in a guided meditation to help with feelings of anxiety. Impression of participation: Pt was engaged in group discussion. Pt showed insight into the topic and shared what mindfulness practices resonated with them. Pt received handouts with guided meditations and information on mindfulness.       JENNIFER Root, QMHP-A

## 2023-01-27 NOTE — PROGRESS NOTES
Problem: Altered Thought Process (Adult/Pediatric)  Goal: *STG: Remains safe in hospital  Outcome: Progressing Towards Goal     Problem: Altered Thought Process (Adult/Pediatric)  Goal: *STG: Seeks staff when feelings of anxiety and fear arise  Outcome: Progressing Towards Goal

## 2023-01-28 LAB
GLUCOSE BLD STRIP.AUTO-MCNC: 112 MG/DL (ref 65–117)
SERVICE CMNT-IMP: NORMAL

## 2023-01-28 PROCEDURE — 82962 GLUCOSE BLOOD TEST: CPT

## 2023-01-28 PROCEDURE — 74011250637 HC RX REV CODE- 250/637: Performed by: PSYCHIATRY & NEUROLOGY

## 2023-01-28 PROCEDURE — 74011250637 HC RX REV CODE- 250/637: Performed by: NURSE PRACTITIONER

## 2023-01-28 PROCEDURE — 65220000003 HC RM SEMIPRIVATE PSYCH

## 2023-01-28 RX ADMIN — CLONAZEPAM 0.5 MG: 0.5 TABLET ORAL at 16:57

## 2023-01-28 RX ADMIN — QUETIAPINE FUMARATE 100 MG: 100 TABLET ORAL at 09:18

## 2023-01-28 RX ADMIN — SERTRALINE 100 MG: 50 TABLET, FILM COATED ORAL at 09:17

## 2023-01-28 RX ADMIN — TRIAMTERENE AND HYDROCHLOROTHIAZIDE 1 TABLET: 37.5; 25 TABLET ORAL at 09:18

## 2023-01-28 RX ADMIN — LOSARTAN POTASSIUM 100 MG: 50 TABLET, FILM COATED ORAL at 09:17

## 2023-01-28 RX ADMIN — TRAZODONE HYDROCHLORIDE 200 MG: 100 TABLET ORAL at 21:59

## 2023-01-28 RX ADMIN — CLONAZEPAM 0.5 MG: 0.5 TABLET ORAL at 09:17

## 2023-01-28 RX ADMIN — ATORVASTATIN CALCIUM 40 MG: 40 TABLET, FILM COATED ORAL at 09:18

## 2023-01-28 RX ADMIN — HYDROXYZINE HYDROCHLORIDE 50 MG: 50 TABLET ORAL at 04:34

## 2023-01-28 RX ADMIN — GLIPIZIDE 5 MG: 5 TABLET ORAL at 16:57

## 2023-01-28 RX ADMIN — METFORMIN HYDROCHLORIDE 1000 MG: 500 TABLET ORAL at 09:17

## 2023-01-28 RX ADMIN — GLIPIZIDE 5 MG: 5 TABLET ORAL at 09:18

## 2023-01-28 RX ADMIN — QUETIAPINE FUMARATE 200 MG: 100 TABLET ORAL at 21:59

## 2023-01-28 RX ADMIN — PANTOPRAZOLE SODIUM 20 MG: 20 TABLET, DELAYED RELEASE ORAL at 09:21

## 2023-01-28 NOTE — BH NOTES
PRN Medication Documentation    Specific patient behavior that led to need for PRN medication: pt anxious, restless due to patient screaming on unit abruptly wakening patient up  Staff interventions attempted prior to PRN being given: reassurance, encouragement   PRN medication given: Atarx 50mg PO @ [phone]  Patient response/effectiveness of PRN medication: will continue to monitor with q15min rounds and provide support

## 2023-01-28 NOTE — PROGRESS NOTES
Problem: Altered Thought Process (Adult/Pediatric)  Goal: *STG: Remains safe in hospital  Outcome: Progressing Towards Goal  Goal: *STG: Complies with medication therapy  Outcome: Progressing Towards Goal  Goal: *STG: Attends activities and groups  Outcome: Progressing Towards Goal  Goal: Interventions  Outcome: Progressing Towards Goal  Note: Pt is alert and oriented visible on the unit. Anxious mood; mood and affect congruent. Pt reports feeling well rested after sleeping throughout the night. Medication and meal compliant. Showered. Frequent hair pulling observed during the morning. Redirection provide and coping skills encouraged. Primary coping skill is praying. Pt met with pastoral care this morning. Frequent positive feed back is provided through the milieu; pt tolerates well.    [address] [facility] [address]  Master Treatment Plan for Stu Jcarlos    Date Treatment Plan Initiated: 1/28/2023    Treatment Plan Modalities:  Type of Modality Amount  (x minutes) Frequency (x/week) Duration (x days) Name of Responsible Staff   Community & wrap-up meetings to encourage peer interactions 15 7 1 Flori IVON     Group psychotherapy to assist in building coping skills and internal controls 60 7 1 Anisha Segura [facility]   Therapeutic activity groups to build coping skills 60 7 1 Anisha Segura [facility]   Psychoeducation in group setting to address:   Medication education   [id number] PharmD   Coping skills   [address] [facility]   Relaxation techniques         Symptom management         Discharge planning   60 2 [address] [facility]    60 2 1 Chaplain ARMANDO   60 1 1 volunteer   Recovery/AA/NA      volunteer   Physician medication management   15 7 1 Dr Raza NP   Family meeting/discharge planning   15 2 1 Kanu Sosa

## 2023-01-28 NOTE — PROGRESS NOTES
responded to a follow up request. Shoshana Espinosa shared about her hopes to go hoe on Monday. She sheared bout her support system with her mother and father. Her jasvir is a strong place of comfort for her, prayer is what bring her peace. She was able to name and explain multip coping skills that she uses like deep breath, singing, payer.  proved active listening, support, prayer. Advised nurse to contact [facility] for any further referrals.     Chaplain Gudelia Shepherd MDiv, [facility]

## 2023-01-28 NOTE — PROGRESS NOTES
Problem: Falls - Risk of  Goal: *Absence of Falls  Description: Document Vernia Colder Fall Risk and appropriate interventions in the flowsheet. Outcome: Progressing Towards Goal  Note: Fall Risk Interventions:            Medication Interventions: Teach patient to arise slowly    Resting in bed with eyes closed, no complaints, no distress noted. Safety measures in place, will continue to monitor.

## 2023-01-28 NOTE — PROGRESS NOTES
Problem: Falls - Risk of  Goal: *Absence of Falls  Description: Document Tara Lackey Fall Risk and appropriate interventions in the flowsheet.   Outcome: Progressing Towards Goal  Note: Fall Risk Interventions:            Medication Interventions: Teach patient to arise slowly     Pt is visible on unit  Gait appears steady

## 2023-01-28 NOTE — BH NOTES
1700: Patient refused to take Metformin, stating it \"hurts her stomach and causing constipation. \"     2234: Patient stated she is \"no longer constipated and had a bowel movement at approximately 2134. \"

## 2023-01-28 NOTE — BH NOTES
PSYCHIATRIC PROGRESS NOTE    CHIEF COMPLAINT:   \"I'm OK. \"    INTERVAL HISTORY:   01/28/23  Pt has continued to engage in hair pulling, but it is improving as her anxiety has improved. She states her mood has improved some as well. She denies SI/plan/intent, HI/plan/intent, and AVH. Eating well, but states sleep is still poor. She wishes to try a different sleep medication. She exhibited a bright affect during assessment and gave a big smile at the end, and stated \"it's nico's smile. \" No other changes or new concerns at this time. 01/27/23  Nursing report patient has had many episodes of yelling, hitting her head and pulling her hair. She was so agitated and not responsive to verbal re-direction, needing Haldol 5mg IM this am at 0745. Upon my evaluation, Ms Alvaro Mcmahon says that she can't live like this anymore and isn't sure what to do. She reports that she feels that her xanax hasn't been as effective as it used to be in the past. She also hadn't utilized prn seroquel. At times she says that she would like to leave, but thinking this through she says that she is just so eager to feel better and complement her treatment with the therapy. She denies any thoughts of suicide, but has recurrent urges of pulling her hair. 01/26/23  Nursing report that patient is having episodes of yelling out and pulling her hair. Last night she awakened at 1am and took prn trazodone. During our evaluation she was upset, and couldn't control her urges of scratching her hair. 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 She denies SI, but says that she can't help pulling her hair. 01/25/23  Nursing report that patient is still having episodes of hair pulling, but is using the stress balls to help. She yelled out once yesterday. Upon my evaluation she tells me that her nausea returned today. Otherwise she has no other side effects.   She slept well last night. Ms Alvaro Mcmahon opens up about a 'Episcopal warfare' going on in her brain. She says that she can't stop having ruminations of worthlessness and low self esteem. She updates that she has PHP starting on 1/31, however, she feels that she is truly unable to function with her thoughts and compulsions. Denies AVH. Denies SI    01/24/23  Ms. Alvaro Mcmahon feels her anxiety overall is worse, she has been pulling her hair in the morning. Staff report that she had been pacing and yelling out loud. She is engaged in the evaluation and invested in her care. However, she doesn't feel that she would be able to function with the current level of anxiety. 01/23/23  Ms. Alvaro Mcmahon feels that she is experienced a set back. She feels that she hasn't been able to function because of increased anxiety, leading her to scream and pull her hair. She feels that she can't go on like this or live like this. She feels that this interferes with her thinking and won't be able to work. She is having a difficult time falling asleep. Denies experiencing nausea. Denies suicidal ideations, intents or plans. 01/22/23  Ms. Alvaro Mcmahon refused geodon in the morning. She felt increased anxiety and ended up pulling her hair twice today. She doesn't want to stay on Geodon any more. However, she is interested in being on another medications. We discussed starting seroquel and she is agreeable. Urges to pull hair remain. No si.    01/21/23  Ms. Alvaro Mcmahon feels her mental health is improving. However, she reports feeling quite nauseous. She took zofran which is helpful. Denies SI. No passive death wish.    01/20/23  Ms Alvaro Mcmahon reports feeling nauseous for the second day in a row. Patient says that she wasn't able to eat this morning and felt like she was going to throw up after brushing teeth. However, she feels anxiety and hair pulling is much decreased. Her mood and sleep have been better.     1/19/23  Ms Alvaro Mcmahon reports sleeping well overnight, but waking early in the morning and experiencing urges to pull her hair. Today she complains of headache and nausea that is now improving. She feels she is improving slowly. She feels that she can see herself returning to function upon discharge. She knows that she would have to engage in therapy, exercise, healthy nutrition. Today she denied having any suicidal or homicidal thoughts, intents or plans. 1/18/23  Nursing report that patient had refused insulin im, but it was for 2 units. Patient says that she isn't interested in being on insulin regimen. Patient is agreeable to splitting glipizide for better control. Ms Severa Novak feels tired and having difficulty with concentration, but feels her anxiety is decreased. She feels less urges for hair pulling. 1/17/23  Patient states that she struggles with urges to pull her hair. She says that she feels hungry on the zyprexa and ends up eating many snacks at night. Her BG was increased and required 2 units of insulin, but patient refused. Education provided. Patient states that her hair pulling is affecting her life, including her job. She complains of difficulty falling and staying asleep, night time awakenings with snoring. Recommended sleep study. 1/16/23  Patient says that for a while now she felt that her medications haven't been working. Eventually she felt that she couldn't get out of bed. She was referred to inpatient setting. Taye Pete says that she's struggled with OCD and Trichotillomania and was on luvox for some time. She felt that it wasn't working. She also says that she hasn't been in ERP for therapy. She says that her anxiety is high and can't function. She denies SI or HI.     1/15/23  Taye Pete states that she is doing ok today. She endorses a slight decrease in the anxiety. She states that she had a spell this morning of hair pulling, but has been doing ok since then. She denies SI/HI/AVH currently.  She denies any side effects from the medications.      VITALS:  Patient Vitals for the past 24 hrs:   Temp Pulse Resp BP SpO2   01/28/23 0838 98.2 °F (36.8 °C) (!) 104 16 120/84 98 %   01/27/23 1527 98.3 °F (36.8 °C) (!) 106 16 121/81 99 %        LABS:  Recent Results (from the past 24 hour(s))   GLUCOSE, POC    Collection Time: 01/28/23  7:34 AM   Result Value Ref Range    Glucose (POC) 112 65 - 117 mg/dL    Performed by [facility]         MEDICATIONS:    Current Facility-Administered Medications:     QUEtiapine (SEROquel) tablet 200 mg, 200 mg, Oral, QHS, Jaylan Thompson MD, 200 mg at 01/27/23 2209    QUEtiapine (SEROquel) tablet 100 mg, 100 mg, Oral, DAILY, Jaylan Thompson MD, 100 mg at 01/28/23 0918    traZODone (DESYREL) tablet 200 mg, 200 mg, Oral, QHS, Jaylan Thompson MD, 200 mg at 01/27/23 2209    clonazePAM (KlonoPIN) tablet 0.5 mg, 0.5 mg, Oral, BID, Jaylan Thompson MD, 0.5 mg at 01/28/23 0917    acetylcysteine tab 1,200 mg (Patient Supplied), 1,200 mg, Oral, BID, Jaylan Thompson MD, 1,200 mg at 01/28/23 0919    QUEtiapine (SEROquel) tablet 25 mg, 25 mg, Oral, TID PRN, Sandra Lugo MD, 25 mg at 01/26/23 1359    sertraline (ZOLOFT) tablet 100 mg, 100 mg, Oral, DAILY, Jaylan Thompson MD, 100 mg at 01/28/23 0917    ondansetron (ZOFRAN ODT) tablet 4 mg, 4 mg, Oral, Q6H PRN, Lizbeth Ko NP, 4 mg at 01/25/23 1757    glipiZIDE (GLUCOTROL) tablet 5 mg, 5 mg, Oral, ACB&D, Jaylan Thompson MD, 5 mg at 01/28/23 0918    cetirizine (ZYRTEC) tablet 10 mg, 10 mg, Oral, DAILY, Jaylan Thompson MD, 10 mg at 01/27/23 8810    traZODone (DESYREL) tablet 100 mg, 100 mg, Oral, QHS PRN, Jaylan Guerrero MD, 100 mg at 01/27/23 0041    metFORMIN (GLUCOPHAGE) tablet 1,000 mg, 1,000 mg, Oral, BID WITH MEALS, Jaylan Thompson MD, 1,000 mg at 01/28/23 0917    losartan (COZAAR) tablet 100 mg, 100 mg, Oral, DAILY, Jaylan Thompson MD, 100 mg at 01/28/23 0917    triamterene-hydroCHLOROthiazide (MAXZIDE) 37.5-25 mg per tablet 1 Tablet, 1 Tablet, Oral, DAILY, Alison Quinonez MD, 1 Tablet at 01/28/23 0918    fluticasone propionate (FLONASE) 50 mcg/actuation nasal spray 2 Spray, 2 Spray, Both Nostrils, DAILY, Jaylan Thompson MD, 2 Spray at 01/25/23 1051    atorvastatin (LIPITOR) tablet 40 mg, 40 mg, Oral, DAILY, Jaylan Thompson MD, 40 mg at 01/28/23 0918    glucose chewable tablet 16 g, 4 Tablet, Oral, PRN, Christophe BRASHER, CNS    glucagon (GLUCAGEN) injection 1 mg, 1 mg, IntraMUSCular, PRN, Christophe BRASHER, CNS    dextrose 10 % infusion 0-250 mL, 0-250 mL, IntraVENous, PRN, Nancy Tamayo CNS    pantoprazole (PROTONIX) tablet 20 mg, 20 mg, Oral, DAILY, Reta Guajardo, NP, 20 mg at 01/28/23 0921    OLANZapine (ZyPREXA) tablet 5 mg, 5 mg, Oral, Q6H PRN, Darolyn Medicine, NP, 5 mg at 01/18/23 0107    haloperidol lactate (HALDOL) injection 5 mg, 5 mg, IntraMUSCular, Q6H PRN, Darolyn Medicine, NP, 5 mg at 01/27/23 0745    benztropine (COGENTIN) tablet 1 mg, 1 mg, Oral, BID PRN, Darolyn Medicine, NP    diphenhydrAMINE (BENADRYL) injection 50 mg, 50 mg, IntraMUSCular, BID PRN, Darolyn Medicine, NP, 50 mg at 01/14/23 0900    hydrOXYzine HCL (ATARAX) tablet 50 mg, 50 mg, Oral, TID PRN, Darolyn Medicine, NP, 50 mg at 01/28/23 0434    acetaminophen (TYLENOL) tablet 650 mg, 650 mg, Oral, Q4H PRN, Darolyn Medicine, NP, 650 mg at 01/27/23 1835    magnesium hydroxide (MILK OF MAGNESIA) 400 mg/5 mL oral suspension 30 mL, 30 mL, Oral, DAILY PRN, Darolyn Medicine, NP, 30 mL at 01/27/23 0310    midazolam (VERSED) injection 2 mg, 2 mg, IntraMUSCular, Q6H PRN, Darolyn Medicine, NP, 2 mg at 01/27/23 7268       MSE:  Ms Amrik Chavez is a 50yo AAF has short hair and is dressed in hospital gown  Speech has NL rate and volume. Thought process is linear  Thought content is negative for SI, but recurrent urges to pull her hair. Denies experiencing hallucinations of any type.   No paranoia or delusional thoughts  Insight/Judgment are poor    ASSESSMENT:  Mood d/o, NOS  Trichotillomania (r/o OCD)    PLAN:  01/28/23  No changes today. 01/27/23  Encourage patient to board on the general unit  Continue zoloft 100mg po qday. To address depressive and anxiety symptoms, will likely increase. Change seroquel dosing to help with sleep. Will decrease am dose from 150 qo 100. Will increase qhs dose from 150 to 200mg. To address depressive and anxiety symptoms. D/C xanax 0.5mg po bid prn. Instead will start scheduled clonazepam 0.5mg po bid. Continue NAC 1200mg po bid for trichotillomania. PHP on 1/31  If patient isn't improved by next Monday, then will delay PHP to a different date. 01/26/23  Patient may board on the general unit  Continue zoloft 100mg po qday. To address depressive and anxiety symptoms, will likely increase. Continue seroquel 150mg po bid. To address depressive and anxiety symptoms. Continue xanax 0.5mg po bid prn. Increase NAC from 600mg po bid to 1200mg po bid. trichotillomania. PHP on 1/31  If patient isn't improved by next Monday, then will delay PHP to a different date. 01/25/23  Patient may board on the general unit  Continue zoloft 100mg po qday. To address depressive and anxiety symptoms. Increase seroquel from 100mg po bid to 150mg po bid. To address depressive and anxiety symptoms. Continue xanax 0.5mg po bid prn. Continue NAC for trichotillomania. PHP on 1/31 01/24/23  Increase zoloft from 50mg to 100mg po qday. To address depressive and anxiety symptoms. Continue seroquel 100mg po bid. To address depressive and anxiety symptoms. Continue xanax 0.5mg po bid prn. Continue NAC for trichotillomania. Patient tells me that her family thinks she should be on disability. However, she is interested in contacting her work to arrange for time off and attend PHP first.    01/23/23  Increase seroquel from 50mg po bid to 100mg po bid. Continue zoloft 50mg po qday. Continue NAC 600mg po bid.    01/22/23  D/c geodon 20mg po bid  Start seroquel 50mg po bid.   Continue zoloft 50mg po qday. Continue NAC 600mg po bid. 01/20/2023  Hold geodon bc of nausea  Continue current medication regimen. 01/20/2023  Will split NAC from 1200mg po qday to 600mg po bid. Other than that Discussed nausea possibly 2/2 to geodon. If this doesn't kaleb tomorrow, will decrease or switch. Likely d/c on Monday. 1/19/23  Continue current medication regimen  Possible discharge tomorrow. 1/18/23  Continue geodon, zoloft and NAC for treatment of symptoms  Will assess need for further geodon increase. Likely d/c this week w/referral to ERP and sleep study to rule out PATRICIO. 1/17/2023  Discussed d/c zyprexa, instead starting geodon 20mg po bid. Continue zoloft 50mg po qday. Continue Trazodone 150mg but will change to be scheduled. Will add trazodone 100mg po qhs prn. Start NAC 1200mg  Recommend sleep study on an outpatient. 1/16/2023  Increase Zoloft from 25mg to 50mg po qday. Continue zyprexa 2.5mg po qhs. Continue xanax 0.5mg po bid prn. Pharmacy will look into starting NAC for Trichotillomania. Will restart medical medications. I certify that this patients inpatient psychiatric hospital services furnished since the previous certification were, and continue to be, required for treatment that could reasonably be expected to improve the patient's condition, or for diagnostic study, and that the patient continues to need, on a daily basis, active treatment furnished directly by or requiring the supervision of inpatient psychiatric facility personnel. In addition, the hospital records show that services furnished were intensive treatment services, admission or related services, or equivalent services. A coordinated, multidisplinary treatment team round was conducted with the patient, nurses, pharmcist,  and writer present. Discussions held with , and/or with family members;  Complete current electronic health record for patient was reviewed in full including consultant notes, ancillary staff notes, nurses and tech notes, labs and vitals.

## 2023-01-29 LAB
GLUCOSE BLD STRIP.AUTO-MCNC: 140 MG/DL (ref 65–117)
SERVICE CMNT-IMP: ABNORMAL

## 2023-01-29 PROCEDURE — 74011250637 HC RX REV CODE- 250/637: Performed by: NURSE PRACTITIONER

## 2023-01-29 PROCEDURE — 74011250637 HC RX REV CODE- 250/637: Performed by: PSYCHIATRY & NEUROLOGY

## 2023-01-29 PROCEDURE — 82962 GLUCOSE BLOOD TEST: CPT

## 2023-01-29 PROCEDURE — 65220000003 HC RM SEMIPRIVATE PSYCH

## 2023-01-29 RX ADMIN — METFORMIN HYDROCHLORIDE 1000 MG: 500 TABLET ORAL at 09:28

## 2023-01-29 RX ADMIN — GLIPIZIDE 5 MG: 5 TABLET ORAL at 09:28

## 2023-01-29 RX ADMIN — ACETAMINOPHEN 650 MG: 325 TABLET ORAL at 22:34

## 2023-01-29 RX ADMIN — QUETIAPINE FUMARATE 100 MG: 100 TABLET ORAL at 09:27

## 2023-01-29 RX ADMIN — SERTRALINE 100 MG: 50 TABLET, FILM COATED ORAL at 09:28

## 2023-01-29 RX ADMIN — PANTOPRAZOLE SODIUM 20 MG: 20 TABLET, DELAYED RELEASE ORAL at 09:30

## 2023-01-29 RX ADMIN — QUETIAPINE FUMARATE 200 MG: 100 TABLET ORAL at 22:32

## 2023-01-29 RX ADMIN — TRIAMTERENE AND HYDROCHLOROTHIAZIDE 1 TABLET: 37.5; 25 TABLET ORAL at 09:27

## 2023-01-29 RX ADMIN — CLONAZEPAM 0.5 MG: 0.5 TABLET ORAL at 09:27

## 2023-01-29 RX ADMIN — ATORVASTATIN CALCIUM 40 MG: 40 TABLET, FILM COATED ORAL at 09:27

## 2023-01-29 RX ADMIN — LOSARTAN POTASSIUM 100 MG: 50 TABLET, FILM COATED ORAL at 09:28

## 2023-01-29 RX ADMIN — TRAZODONE HYDROCHLORIDE 200 MG: 100 TABLET ORAL at 22:32

## 2023-01-29 RX ADMIN — GLIPIZIDE 5 MG: 5 TABLET ORAL at 17:38

## 2023-01-29 RX ADMIN — CLONAZEPAM 0.5 MG: 0.5 TABLET ORAL at 17:38

## 2023-01-29 NOTE — PROGRESS NOTES
Problem: Falls - Risk of  Goal: *Absence of Falls  Description: Document Jane Sotomayor Fall Risk and appropriate interventions in the flowsheet. Outcome: Progressing Towards Goal  Note: Fall Risk Interventions:       Medication Interventions: Teach patient to arise slowly       Pt has been resting quietly in bed in no apparent distress. No voiced concerns. Standard safety and fall precautions maintained per provider order. Q15m rounds for safety continued.

## 2023-01-29 NOTE — PROGRESS NOTES
Problem: Altered Thought Process (Adult/Pediatric)  Goal: *STG: Remains safe in hospital  Outcome: Progressing Towards Goal    1628: Visible on the unit. Currently calm and cooperative. Pt denies SI.  Pt's parents visited. Compliant with meals and medications.

## 2023-01-29 NOTE — BH NOTES
PSYCHIATRIC PROGRESS NOTE    CHIEF COMPLAINT:   \"I'm OK. \"    INTERVAL HISTORY:   01/29/23  Catherine slept 8 hours last night per nursing report. She had one incident of hair pulling witnessed by nursing. She had a positive interaction with peers in which she initiated a prayer group. She will be visiting with her parents this afternoon. She was in bright spirits during assessment, states she is doing well. Denies SI/plan/intent, HI/plan/intent, and AVH. States she is eating and sleeping well. Reports hair pulling is decreasing. Tolerating medications well without known adverse effects. No other changes or new concerns at this time. 01/28/23  Pt has continued to engage in hair pulling, but it is improving as her anxiety has improved. She states her mood has improved some as well. She denies SI/plan/intent, HI/plan/intent, and AVH. Eating well, but states sleep is still poor. She wishes to try a different sleep medication. She exhibited a bright affect during assessment and gave a big smile at the end, and stated \"it's nico's smile. \" No other changes or new concerns at this time. 01/27/23  Nursing report patient has had many episodes of yelling, hitting her head and pulling her hair. She was so agitated and not responsive to verbal re-direction, needing Haldol 5mg IM this am at 0745. Upon my evaluation, Ms Oscar Gilbert says that she can't live like this anymore and isn't sure what to do. She reports that she feels that her xanax hasn't been as effective as it used to be in the past. She also hadn't utilized prn seroquel. At times she says that she would like to leave, but thinking this through she says that she is just so eager to feel better and complement her treatment with the therapy. She denies any thoughts of suicide, but has recurrent urges of pulling her hair. 01/26/23  Nursing report that patient is having episodes of yelling out and pulling her hair.   Last night she awakened at 1am and took prn trazodone. During our evaluation she was upset, and couldn't control her urges of scratching her hair. She was yelling out, 'stop it, stop it.'  She remarks that she is also Mandaeism and uses jasvir to help her overcome this. She denies SI, but says that she can't help pulling her hair. 01/25/23  Nursing report that patient is still having episodes of hair pulling, but is using the stress balls to help. She yelled out once yesterday. Upon my evaluation she tells me that her nausea returned today. Otherwise she has no other side effects. She slept well last night. Ms Dat Long opens up about a 'Mandaeism warfare' going on in her brain. She says that she can't stop having ruminations of worthlessness and low self esteem. She updates that she has PHP starting on 1/31, however, she feels that she is truly unable to function with her thoughts and compulsions. Denies AVH. Denies SI    01/24/23  Ms. Dat Long feels her anxiety overall is worse, she has been pulling her hair in the morning. Staff report that she had been pacing and yelling out loud. She is engaged in the evaluation and invested in her care. However, she doesn't feel that she would be able to function with the current level of anxiety. 01/23/23  Ms. Dat Long feels that she is experienced a set back. She feels that she hasn't been able to function because of increased anxiety, leading her to scream and pull her hair. She feels that she can't go on like this or live like this. She feels that this interferes with her thinking and won't be able to work. She is having a difficult time falling asleep. Denies experiencing nausea. Denies suicidal ideations, intents or plans. 01/22/23  Ms. Dat Long refused geodon in the morning. She felt increased anxiety and ended up pulling her hair twice today. She doesn't want to stay on Geodon any more. However, she is interested in being on another medications.  We discussed starting seroquel and she is agreeable. Urges to pull hair remain. No si.    01/21/23  Ms. Mely Mayfield feels her mental health is improving. However, she reports feeling quite nauseous. She took zofran which is helpful. Denies SI. No passive death wish.    01/20/23  Ms Mely Mayfield reports feeling nauseous for the second day in a row. Patient says that she wasn't able to eat this morning and felt like she was going to throw up after brushing teeth. However, she feels anxiety and hair pulling is much decreased. Her mood and sleep have been better. 1/19/23  Ms Mely Mayfield reports sleeping well overnight, but waking early in the morning and experiencing urges to pull her hair. Today she complains of headache and nausea that is now improving. She feels she is improving slowly. She feels that she can see herself returning to function upon discharge. She knows that she would have to engage in therapy, exercise, healthy nutrition. Today she denied having any suicidal or homicidal thoughts, intents or plans. 1/18/23  Nursing report that patient had refused insulin im, but it was for 2 units. Patient says that she isn't interested in being on insulin regimen. Patient is agreeable to splitting glipizide for better control. Ms Mely Mayfield feels tired and having difficulty with concentration, but feels her anxiety is decreased. She feels less urges for hair pulling. 1/17/23  Patient states that she struggles with urges to pull her hair. She says that she feels hungry on the zyprexa and ends up eating many snacks at night. Her BG was increased and required 2 units of insulin, but patient refused. Education provided. Patient states that her hair pulling is affecting her life, including her job. She complains of difficulty falling and staying asleep, night time awakenings with snoring. Recommended sleep study. 1/16/23  Patient says that for a while now she felt that her medications haven't been working.  Eventually she felt that she couldn't get out of bed. She was referred to inpatient setting. Rosi says that she's struggled with OCD and Trichotillomania and was on luvox for some time. She felt that it wasn't working. She also says that she hasn't been in ERP for therapy. She says that her anxiety is high and can't function. She denies SI or HI.     1/15/23  Rosi states that she is doing ok today. She endorses a slight decrease in the anxiety. She states that she had a spell this morning of hair pulling, but has been doing ok since then. She denies SI/HI/AVH currently. She denies any side effects from the medications.      VITALS:  Patient Vitals for the past 24 hrs:   Temp Pulse Resp BP SpO2   01/29/23 0814 99.1 °F (37.3 °C) (!) 110 20 111/81 95 %   01/28/23 1630 98.2 °F (36.8 °C) (!) 102 14 122/80 100 %        LABS:  Recent Results (from the past 24 hour(s))   GLUCOSE, POC    Collection Time: 01/29/23  7:39 AM   Result Value Ref Range    Glucose (POC) 140 (H) 65 - 117 mg/dL    Performed by Eryn Lamb         MEDICATIONS:    Current Facility-Administered Medications:     QUEtiapine (SEROquel) tablet 200 mg, 200 mg, Oral, QHS, Jaylan Thompson MD, 200 mg at 01/28/23 2159    QUEtiapine (SEROquel) tablet 100 mg, 100 mg, Oral, DAILY, Jaylan Thompson MD, 100 mg at 01/29/23 0974    traZODone (DESYREL) tablet 200 mg, 200 mg, Oral, QHS, Jaylan Thompson MD, 200 mg at 01/28/23 2159    clonazePAM (KlonoPIN) tablet 0.5 mg, 0.5 mg, Oral, BID, Jaylan Thompson MD, 0.5 mg at 01/29/23 0000    acetylcysteine tab 1,200 mg (Patient Supplied), 1,200 mg, Oral, BID, Jaylan Thompson MD, 1,200 mg at 01/29/23 0933    QUEtiapine (SEROquel) tablet 25 mg, 25 mg, Oral, TID PRN, April Sanchez MD, 25 mg at 01/26/23 1359    sertraline (ZOLOFT) tablet 100 mg, 100 mg, Oral, DAILY, Jaylan Thompson MD, 100 mg at 01/29/23 0928    ondansetron (ZOFRAN ODT) tablet 4 mg, 4 mg, Oral, Q6H PRN, Lizbeth Ko NP, 4 mg at 01/25/23 1757    glipiZIDE (GLUCOTROL) tablet 5 mg, 5 mg, Oral, ACB&D, Rafa Ho MD, 5 mg at 01/29/23 0928    cetirizine (ZYRTEC) tablet 10 mg, 10 mg, Oral, DAILY, Jaylan Thompson MD, 10 mg at 01/27/23 8634    traZODone (DESYREL) tablet 100 mg, 100 mg, Oral, QHS PRN, Jaylan Singleton MD, 100 mg at 01/27/23 0041    metFORMIN (GLUCOPHAGE) tablet 1,000 mg, 1,000 mg, Oral, BID WITH MEALS, Jaylan Singleton MD, 1,000 mg at 01/29/23 0928    losartan (COZAAR) tablet 100 mg, 100 mg, Oral, DAILY, Jaylan Singleton MD, 100 mg at 01/29/23 5755    triamterene-hydroCHLOROthiazide (MAXZIDE) 37.5-25 mg per tablet 1 Tablet, 1 Tablet, Oral, DAILY, Jaylan Singleton MD, 1 Tablet at 01/29/23 0927    fluticasone propionate (FLONASE) 50 mcg/actuation nasal spray 2 Spray, 2 Spray, Both Nostrils, DAILY, Jaylan Thompson MD, 2 Spray at 01/25/23 1051    atorvastatin (LIPITOR) tablet 40 mg, 40 mg, Oral, DAILY, Rafa Ho MD, 40 mg at 01/29/23 0927    glucose chewable tablet 16 g, 4 Tablet, Oral, PRN, Edwin BRASHER CNS    glucagon (GLUCAGEN) injection 1 mg, 1 mg, IntraMUSCular, PRN, Edwin BRASHER, CNS    dextrose 10 % infusion 0-250 mL, 0-250 mL, IntraVENous, PRN, Elissa Skiff, Michelle A, CNS    pantoprazole (PROTONIX) tablet 20 mg, 20 mg, Oral, DAILY, Reta Guajardo NP, 20 mg at 01/29/23 0930    OLANZapine (ZyPREXA) tablet 5 mg, 5 mg, Oral, Q6H PRN, Ceclia Sites, NP, 5 mg at 01/18/23 0107    haloperidol lactate (HALDOL) injection 5 mg, 5 mg, IntraMUSCular, Q6H PRN, Ceclia Sites, NP, 5 mg at 01/27/23 0745    benztropine (COGENTIN) tablet 1 mg, 1 mg, Oral, BID PRN, Anna Sites, NP    diphenhydrAMINE (BENADRYL) injection 50 mg, 50 mg, IntraMUSCular, BID PRN, Ba Sites, NP, 50 mg at 01/14/23 0900    hydrOXYzine HCL (ATARAX) tablet 50 mg, 50 mg, Oral, TID PRN, Ba Sites, NP, 50 mg at 01/28/23 0434    acetaminophen (TYLENOL) tablet 650 mg, 650 mg, Oral, Q4H PRN, Ba Sites, NP, 650 mg at 01/27/23 1835    magnesium hydroxide (MILK OF MAGNESIA) 400 mg/5 mL oral suspension 30 mL, 30 mL, Oral, DAILY PRN, Kyler Martinez NP, 30 mL at 01/27/23 0310    midazolam (VERSED) injection 2 mg, 2 mg, IntraMUSCular, Q6H PRN, Kyler Martinez NP, 2 mg at 01/27/23 0745       MSE:  Ms Yamile Christie is a 48yo AAF has short hair and is dressed in hospital gown  Speech has NL rate and volume. Thought process is linear  Thought content is negative for SI, but recurrent urges to pull her hair. Denies experiencing hallucinations of any type. No paranoia or delusional thoughts  Insight/Judgment are poor    ASSESSMENT:  Mood d/o, NOS  Trichotillomania (r/o OCD)    PLAN:  01/29/23  Continue medications as ordered    01/28/23  No changes today. 01/27/23  Encourage patient to board on the general unit  Continue zoloft 100mg po qday. To address depressive and anxiety symptoms, will likely increase. Change seroquel dosing to help with sleep. Will decrease am dose from 150 qo 100. Will increase qhs dose from 150 to 200mg. To address depressive and anxiety symptoms. D/C xanax 0.5mg po bid prn. Instead will start scheduled clonazepam 0.5mg po bid. Continue NAC 1200mg po bid for trichotillomania. PHP on 1/31  If patient isn't improved by next Monday, then will delay PHP to a different date. 01/26/23  Patient may board on the general unit  Continue zoloft 100mg po qday. To address depressive and anxiety symptoms, will likely increase. Continue seroquel 150mg po bid. To address depressive and anxiety symptoms. Continue xanax 0.5mg po bid prn. Increase NAC from 600mg po bid to 1200mg po bid. trichotillomania. PHP on 1/31  If patient isn't improved by next Monday, then will delay PHP to a different date. 01/25/23  Patient may board on the general unit  Continue zoloft 100mg po qday. To address depressive and anxiety symptoms. Increase seroquel from 100mg po bid to 150mg po bid. To address depressive and anxiety symptoms. Continue xanax 0.5mg po bid prn.   Continue NAC for trichotillomania. PHP on 1/31 01/24/23  Increase zoloft from 50mg to 100mg po qday. To address depressive and anxiety symptoms. Continue seroquel 100mg po bid. To address depressive and anxiety symptoms. Continue xanax 0.5mg po bid prn. Continue NAC for trichotillomania. Patient tells me that her family thinks she should be on disability. However, she is interested in contacting her work to arrange for time off and attend [facility] first.    01/23/23  Increase seroquel from 50mg po bid to 100mg po bid. Continue zoloft 50mg po qday. Continue NAC 600mg po bid.    01/22/23  D/c geodon 20mg po bid  Start seroquel 50mg po bid. Continue zoloft 50mg po qday. Continue NAC 600mg po bid. 01/20/2023  Hold geodon bc of nausea  Continue current medication regimen. 01/20/2023  Will split NAC from 1200mg po qday to 600mg po bid. Other than that Discussed nausea possibly 2/2 to geodon. If this doesn't kaleb tomorrow, will decrease or switch. Likely d/c on Monday. 1/19/23  Continue current medication regimen  Possible discharge tomorrow. 1/18/23  Continue geodon, zoloft and NAC for treatment of symptoms  Will assess need for further geodon increase. Likely d/c this week w/referral to ERP and sleep study to rule out PATRICIO. 1/17/2023  Discussed d/c zyprexa, instead starting geodon 20mg po bid. Continue zoloft 50mg po qday. Continue Trazodone 150mg but will change to be scheduled. Will add trazodone 100mg po qhs prn. Start NAC 1200mg  Recommend sleep study on an outpatient. 1/16/2023  Increase Zoloft from 25mg to 50mg po qday. Continue zyprexa 2.5mg po qhs. Continue xanax 0.5mg po bid prn. Pharmacy will look into starting NAC for Trichotillomania. Will restart medical medications.     I certify that this patients inpatient psychiatric hospital services furnished since the previous certification were, and continue to be, required for treatment that could reasonably be expected to improve the patient's condition, or for diagnostic study, and that the patient continues to need, on a daily basis, active treatment furnished directly by or requiring the supervision of inpatient psychiatric facility personnel. In addition, the hospital records show that services furnished were intensive treatment services, admission or related services, or equivalent services. A coordinated, multidisplinary treatment team round was conducted with the patient, nurses, pharmcist,  and writer present. Discussions held with , and/or with family members; Complete current electronic health record for patient was reviewed in full including consultant notes, ancillary staff notes, nurses and tech notes, labs and vitals.

## 2023-01-29 NOTE — PROGRESS NOTES
Problem: Altered Thought Process (Adult/Pediatric)  Goal: *STG: Remains safe in hospital  Outcome: Progressing Towards Goal  Goal: *STG: Complies with medication therapy  Outcome: Progressing Towards Goal  Goal: Interventions  Outcome: Progressing Towards Goal  Note: Pt is alert and oriented. Anxious mood; affect congruent. Observed once pulling her hair during the morning. Coping skills encouraged. Medication and meal compliant.

## 2023-01-30 VITALS
WEIGHT: 213.7 LBS | BODY MASS INDEX: 37.86 KG/M2 | RESPIRATION RATE: 18 BRPM | SYSTOLIC BLOOD PRESSURE: 107 MMHG | HEART RATE: 106 BPM | HEIGHT: 63 IN | TEMPERATURE: 98.1 F | DIASTOLIC BLOOD PRESSURE: 67 MMHG | OXYGEN SATURATION: 97 %

## 2023-01-30 LAB
GLUCOSE BLD STRIP.AUTO-MCNC: 132 MG/DL (ref 65–117)
SERVICE CMNT-IMP: ABNORMAL

## 2023-01-30 PROCEDURE — 74011250637 HC RX REV CODE- 250/637: Performed by: NURSE PRACTITIONER

## 2023-01-30 PROCEDURE — 74011250637 HC RX REV CODE- 250/637: Performed by: PSYCHIATRY & NEUROLOGY

## 2023-01-30 PROCEDURE — 82962 GLUCOSE BLOOD TEST: CPT

## 2023-01-30 RX ORDER — TRAZODONE HYDROCHLORIDE 100 MG/1
200 TABLET ORAL
Qty: 60 TABLET | Refills: 0 | Status: SHIPPED | OUTPATIENT
Start: 2023-01-30 | End: 2023-03-01

## 2023-01-30 RX ORDER — QUETIAPINE FUMARATE 200 MG/1
200 TABLET, FILM COATED ORAL
Qty: 30 TABLET | Refills: 0 | Status: SHIPPED | OUTPATIENT
Start: 2023-01-30 | End: 2023-03-01

## 2023-01-30 RX ORDER — CLONAZEPAM 0.5 MG/1
0.5 TABLET ORAL 2 TIMES DAILY
Qty: 60 TABLET | Refills: 0 | Status: SHIPPED | OUTPATIENT
Start: 2023-01-30 | End: 2023-03-01

## 2023-01-30 RX ORDER — PANTOPRAZOLE SODIUM 20 MG/1
20 TABLET, DELAYED RELEASE ORAL DAILY
Qty: 30 TABLET | Refills: 0 | Status: SHIPPED | OUTPATIENT
Start: 2023-01-31 | End: 2023-03-02

## 2023-01-30 RX ORDER — FLUTICASONE PROPIONATE 50 MCG
SPRAY, SUSPENSION (ML) NASAL
Qty: 16 G | Refills: 0 | Status: SHIPPED | OUTPATIENT
Start: 2023-01-31

## 2023-01-30 RX ORDER — ATORVASTATIN CALCIUM 40 MG/1
40 TABLET, FILM COATED ORAL DAILY
Qty: 30 TABLET | Refills: 0 | Status: SHIPPED | OUTPATIENT
Start: 2023-01-30 | End: 2023-03-01

## 2023-01-30 RX ORDER — GLIPIZIDE 5 MG/1
5 TABLET ORAL
Qty: 60 TABLET | Refills: 0 | Status: SHIPPED | OUTPATIENT
Start: 2023-01-30 | End: 2023-03-01

## 2023-01-30 RX ORDER — TRIAMTERENE/HYDROCHLOROTHIAZID 37.5-25 MG
1 TABLET ORAL DAILY
Qty: 30 TABLET | Refills: 0 | Status: SHIPPED | OUTPATIENT
Start: 2023-01-31 | End: 2023-03-02

## 2023-01-30 RX ORDER — SERTRALINE HYDROCHLORIDE 100 MG/1
100 TABLET, FILM COATED ORAL DAILY
Qty: 30 TABLET | Refills: 0 | Status: SHIPPED | OUTPATIENT
Start: 2023-01-31 | End: 2023-03-02

## 2023-01-30 RX ORDER — HYDROXYZINE 50 MG/1
50 TABLET, FILM COATED ORAL
Qty: 60 TABLET | Refills: 0 | Status: SHIPPED | OUTPATIENT
Start: 2023-01-30 | End: 2023-03-01

## 2023-01-30 RX ORDER — CETIRIZINE HYDROCHLORIDE 10 MG/1
10 TABLET ORAL DAILY
Qty: 30 TABLET | Refills: 0 | Status: SHIPPED | OUTPATIENT
Start: 2023-01-31 | End: 2023-03-02

## 2023-01-30 RX ORDER — LOSARTAN POTASSIUM 100 MG/1
100 TABLET ORAL DAILY
Qty: 30 TABLET | Refills: 0 | Status: SHIPPED | OUTPATIENT
Start: 2023-01-31 | End: 2023-03-02

## 2023-01-30 RX ORDER — QUETIAPINE FUMARATE 100 MG/1
100 TABLET, FILM COATED ORAL DAILY
Qty: 30 TABLET | Refills: 0 | Status: SHIPPED | OUTPATIENT
Start: 2023-01-31 | End: 2023-03-02

## 2023-01-30 RX ORDER — METFORMIN HYDROCHLORIDE 1000 MG/1
1000 TABLET ORAL 2 TIMES DAILY WITH MEALS
Qty: 60 TABLET | Refills: 0 | Status: SHIPPED | OUTPATIENT
Start: 2023-01-30 | End: 2023-03-01

## 2023-01-30 RX ADMIN — SERTRALINE 100 MG: 50 TABLET, FILM COATED ORAL at 08:45

## 2023-01-30 RX ADMIN — CLONAZEPAM 0.5 MG: 0.5 TABLET ORAL at 08:46

## 2023-01-30 RX ADMIN — QUETIAPINE FUMARATE 100 MG: 100 TABLET ORAL at 08:44

## 2023-01-30 RX ADMIN — LOSARTAN POTASSIUM 100 MG: 50 TABLET, FILM COATED ORAL at 08:42

## 2023-01-30 RX ADMIN — CETIRIZINE HYDROCHLORIDE 10 MG: 10 TABLET, FILM COATED ORAL at 08:43

## 2023-01-30 RX ADMIN — PANTOPRAZOLE SODIUM 20 MG: 20 TABLET, DELAYED RELEASE ORAL at 09:00

## 2023-01-30 RX ADMIN — TRIAMTERENE AND HYDROCHLOROTHIAZIDE 1 TABLET: 37.5; 25 TABLET ORAL at 08:46

## 2023-01-30 RX ADMIN — FLUTICASONE PROPIONATE 2 SPRAY: 50 SPRAY, METERED NASAL at 08:50

## 2023-01-30 RX ADMIN — METFORMIN HYDROCHLORIDE 1000 MG: 500 TABLET ORAL at 08:46

## 2023-01-30 RX ADMIN — GLIPIZIDE 5 MG: 5 TABLET ORAL at 08:46

## 2023-01-30 RX ADMIN — ATORVASTATIN CALCIUM 40 MG: 40 TABLET, FILM COATED ORAL at 08:44

## 2023-01-30 NOTE — BH NOTES
GROUP THERAPY PROGRESS NOTE     Patient is participating in Healthy living and Wellness group. Group time: 30 minutes     Personal goal for participation: To learn about Window of Tolerance     Goal orientation: Personal     Group therapy participation: Active     Therapeutic interventions reviewed and discussed: Group members gained an understanding of the window of tolerance. Members were able to assess relevant symptoms including hypo/hyperarousal and dysregulation. Members were able to engage in discussion about personal experiences. Handout provided. Impression of participation: Pt joined the session late, but was then engaged with the material. The pt shared examples from her own life. The pt took the handout and an extra from the session she missed this morning. The pt presented with a positive mood and wished to maintain contact with current residents after discharge.      DEDE Whitfield

## 2023-01-30 NOTE — PROGRESS NOTES
Referral source:  initiated visit as a result of Catherine MICHAEL Marie length of stay [address] [place] [address]. I reviewed the patient's medical record prior to this encounter. Assessment: Optimistic for recovery    Catherine reported that she is hoping to be discharged today. She shared the details of her discharge plan and feels optimistic about her future. I honored her request for prayer for healing from anxiety and depression. Outcome:  Mariaelena Woods expressed gratitude for life, healing, and meaningful relationships with friends and family. Plan of Care: No further spiritual needs were identified during this visit. Will continue to follow for support during rounds. Please contact Spiritual Care services for any additional needs ([phone])    _________________________________________  Rev.  Emily Fuentes, Staff Nabeel Gomes, MS, [facility]

## 2023-01-30 NOTE — DISCHARGE SUMMARY
DISCHARGE SUMMARY    Some parts of the discharge summary are from the initial Psychiatric interview that was done on admission by the admitting psychiatrist.     Date of Admission: 1/13/2023    Date of Discharge: 1/30/2023     TYPE OF DISCHARGE:   REGULAR -  YES    ADMISSION EVALUATION:  CHIEF COMPLAINT: \"Anxiety and depression\"      HISTORY OF PRESENTING COMPLAINT:  Edgardo Valdez is a 50 y.o. BLACK/ female who is currently admitted to the psychiatric floor at [facility]. She states that she has been dealing with depression and anxiety for many years. She states that she was on xanax, luvox, and trazodone previously. She states that the luvox made her feel confused so she stopped taking it. She states that she has been dealing with hair pulling and would like to start a new medication to stop it. She endorses decreased sleep and states that she has not slept in 2 nights. She endorses having OP psych and has been at [facility] in July. She denies SI/Hi/AVH currently. She denies any drug or alcohol use. UDS was positive for benzos which she is prescribed. PAST PSYCHIATRIC HISTORY and SUBSTANCE ABUSE HISTORY:  See above      PAST MEDICAL HISTORY:  Please see H&P for details. Past Medical History:   Diagnosis Date    Anxiety      Depression       ANXIETY & DEPRESSION    Diabetes ([facility] [address]       TYPE II    Hypertension      Infected sebaceous cyst, upper back 4/10/2019    Panic attack      Suicidal thoughts      Vision decreased                Prior to Admission medications    Medication Sig Start Date End Date Taking? Authorizing Provider   fluvoxaMINE (LUVOX) 100 mg tablet Take 100 mg by mouth two (2) times a day. Yes Provider, Historical   OLANZapine (ZyPREXA) 2.5 mg tablet Take 2.5 mg by mouth nightly. Yes Provider, Historical   traZODone (DESYREL) 100 mg tablet Take 100 mg by mouth nightly.      Yes Provider, Historical   ALPRAZolam (XANAX) 0.5 mg tablet Take 0.5 mg by mouth two (2) times daily as needed for Anxiety. Yes Provider, Historical   pantoprazole (Protonix) 40 mg tablet Take 40 mg by mouth daily. Yes Provider, Historical   glipiZIDE SR (GLUCOTROL XL) 10 mg CR tablet Take 1 Tablet by mouth in the morning. Indications: type 2 diabetes mellitus 8/5/22     Aliciae Lucia BRASHER NP   hydrOXYzine HCL (ATARAX) 50 mg tablet Take 1 Tablet by mouth every four (4) hours as needed for Anxiety. Indications: anxious 8/5/22     Aliciae Lucia BRASHER NP             Vitals:     01/13/23 1819 01/13/23 2130 01/13/23 2145 01/14/23 1615   BP: 108/66 117/79 122/76 (!) 164/84   Pulse: 99 73 94 (!) 105   Resp: 18 13 14 16   Temp: 99.5 °F (37.5 °C) 98.9 °F (37.2 °C) 99 °F (37.2 °C) 98.5 °F (36.9 °C)   SpO2: 97% 96% 97% 99%   Weight:   98.9 kg (218 lb)       Height:   5' 3\" (1.6 m)                Lab Results   Component Value Date/Time     WBC 6.6 01/13/2023 12:42 PM     HGB 11.2 (L) 01/13/2023 12:42 PM     HCT 35.9 01/13/2023 12:42 PM     PLATELET 996 32/57/5244 12:42 PM     MCV 84.9 01/13/2023 12:42 PM            Lab Results   Component Value Date/Time     Sodium 139 01/13/2023 12:42 PM     Potassium 3.6 01/13/2023 12:42 PM     Chloride 107 01/13/2023 12:42 PM     CO2 30 01/13/2023 12:42 PM     Anion gap 2 (L) 01/13/2023 12:42 PM     Glucose 217 (H) 01/13/2023 12:42 PM     Glucose 146 (H) 07/29/2022 05:44 AM     BUN 13 01/13/2023 12:42 PM     Creatinine 0.75 01/13/2023 12:42 PM     BUN/Creatinine ratio 17 01/13/2023 12:42 PM     GFR est AA >60 07/26/2022 02:48 PM     GFR est non-AA >60 07/26/2022 02:48 PM     Calcium 9.2 01/13/2023 12:42 PM     Bilirubin, total 0.1 (L) 01/13/2023 12:42 PM     Alk.  phosphatase 77 01/13/2023 12:42 PM     Protein, total 7.7 01/13/2023 12:42 PM     Albumin 3.6 01/13/2023 12:42 PM     Globulin 4.1 (H) 01/13/2023 12:42 PM     A-G Ratio 0.9 (L) 01/13/2023 12:42 PM     ALT (SGPT) 20 01/13/2023 12:42 PM     AST (SGOT) 8 (L) 01/13/2023 12:42 PM      No results found for: VALF2, VALAC, VALP, VALPR, DS6, CRBAM, CRBAMP, CARB2, XCRBAM  No results found for: LITHM  RADIOLOGY REPORTS:(reviewed/updated 1/14/2023)  XR SPINE CERV 4 OR 5 V     Result Date: 10/25/2022  EXAM:  XR SPINE CERV 4 OR 5 V INDICATION: Cervical radiculopathy COMPARISON: CT 5/4/2022. TECHNIQUE: 5 views cervical spine FINDINGS: There is no acute fracture or subluxation. Vertebral body heights are maintained. There are stable diffuse degenerative disc changes with posterior longitudinal ligament mineralization and robust anterior osteophytes at C4-5 through C6-7. There is no abnormality in alignment. There is bilateral neural foraminal narrowing at C5-6 and C6-7. The C1-2 relationship is within normal limits. The prevertebral soft tissues are unremarkable. Stable diffuse cervical degenerative changes with bilateral neural foraminal narrowing at C5-6 and C6-7. CT HEAD WO CONT     Result Date: 11/28/2022  CLINICAL HISTORY: Traumatic head injury, headaches INDICATION: Headaches COMPARISON: None. CT dose reduction was achieved through use of a standardized protocol tailored for this examination and automatic exposure control for dose modulation. TECHNIQUE: Serial axial images with a collimation of 5 mm were obtained from the skull base through the vertex  FINDINGS: The sulci and ventricles are within normal limits for patient age. There is no evidence of an acute infarction, hemorrhage, or mass-effect. There is no evidence of midline shift or hydrocephalus. Posterior fossa structures are unremarkable. No extra-axial collections are seen. Mastoid air cells are well pneumatized and clear. Chronic fall seen calcifications There is no evidence of depressed skull fractures of soft tissue swelling. No acute intracranial process.              Lab Results   Component Value Date/Time     HCG urine, QL Negative 01/13/2023 12:49 PM     Pregnancy test,urine (POC) Negative 07/26/2022 02:49 PM     HCG, Ql. NEGATIVE 06/22/2018 11:14 AM            PSYCHOSOCIAL HISTORY:  Lives alone, works for [employer]. MENTAL STATUS EXAM:  General appearance:  well  groomed, psychomotor activity is WNL  Eye contact: Avoids eye contact  Speech: Spontaneous, soft, decreased output. Affect : Depressed, decreased range  Mood: \"depressed\"  Thought Process: Logical, goal directed  Perception: Denies AH or VH. Thought Content: Denies SI or Plan  Insight: Poor  Judgement: Poor  Cognition: Intact grossly. ASSESSMENT AND PLAN:  Chris Fuentes meets criteria for a diagnosis of  Major depressive disorder, recurrent, severe, trichotillomania. Continue inpatient stay for the safety and optimum care of the patient   Routine labs to be ordered as needed. Psychotropic medications will be ordered and adjusted as needed. Supportive, milieu and group therapy. Continue rest of the medications as needed. Strengths include ability to seek help and   Estimated length of stay is 5-7 days. COURSE IN THE HOSPITAL:  Patient was admitted to the inpatient psychiatry unit for acute psychiatric stabilization in regards to symptomatology as described in the HPI above and placed on Q15 minute checks and withdrawal precautions. While on the unit Chris Fuentes was involved in individual, group, occupational and milieu therapy. S    She was cross tapered from luvox to zoloft. She was also started on seroquel for treatment augmentation. 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 We substituted clonazepam for her xanax  and she tolerated that well. She was quite on the unit, appropriate in her interactions, and cooperative with medications and the unit routine. Please see individual progress notes for more specific details regarding patient's hospitalization course. Patient was discharged as per the plan. She had been doing well on the unit as per the report of the nursing staff and my observations.  No PRN medication for agitation, seclusion or restraints were required during the last 48 hours of her stay. Willa Knowles had improved progressively to the point of being stable for discharge and outpatient FU. At this time she did not offer any complaints. Patient denied any SI or HI. Denied any AH or VH. She denied any delusions. Was not considered a danger to self or to others and is safe for discharge. Will FU with her appointments and remains motivated to be in treatment. The patient verbalized understanding of her discharge instructions. DISCHARGE DIAGNOSIS:  Mood d/o, NOS  Trichotillomania (r/o OCD)    Current Discharge Medication List        START taking these medications    Details   glipiZIDE (GLUCOTROL) 5 mg tablet Take 1 Tablet by mouth Before breakfast and dinner for 30 days. Indications: type 2 diabetes mellitus  Qty: 60 Tablet, Refills: 0  Start date: 1/30/2023, End date: 3/1/2023      acetylcysteine 600 mg tab Take 1,200 mg by mouth two (2) times a day. Indications: trichitillomania  Qty: 120 Tablet, Refills: 0  Start date: 1/30/2023      cetirizine (ZYRTEC) 10 mg tablet Take 1 Tablet by mouth daily for 30 days. Indications: persistent hives of unknown cause  Qty: 30 Tablet, Refills: 0  Start date: 1/31/2023, End date: 3/2/2023      clonazePAM (KlonoPIN) 0.5 mg tablet Take 1 Tablet by mouth two (2) times a day for 30 days. Max Daily Amount: 1 mg. Indications: anxiety  Qty: 60 Tablet, Refills: 0  Start date: 1/30/2023, End date: 3/1/2023    Associated Diagnoses: Anxiety disorder, unspecified type      !! QUEtiapine (SEROquel) 200 mg tablet Take 1 Tablet by mouth nightly for 30 days. Indications: additional treatment for major depressive disorder  Qty: 30 Tablet, Refills: 0  Start date: 1/30/2023, End date: 3/1/2023      !! QUEtiapine (SEROquel) 100 mg tablet Take 1 Tablet by mouth daily for 30 days.  Indications: additional treatment for major depressive disorder  Qty: 30 Tablet, Refills: 0  Start date: 1/31/2023, End date: 3/2/2023      sertraline (ZOLOFT) 100 mg tablet Take 1 Tablet by mouth daily for 30 days. Indications: anxiousness associated with depression  Qty: 30 Tablet, Refills: 0  Start date: 1/31/2023, End date: 3/2/2023       !! - Potential duplicate medications found. Please discuss with provider. CONTINUE these medications which have CHANGED    Details   atorvastatin (LIPITOR) 40 mg tablet Take 1 Tablet by mouth daily for 30 days. Indications: excessive fat in the blood  Qty: 30 Tablet, Refills: 0  Start date: 1/30/2023, End date: 3/1/2023      fluticasone propionate (FLONASE) 50 mcg/actuation nasal spray 2 sprays in each nostrils daily  Indications: inflammation of the nose due to an allergy  Qty: 16 g, Refills: 0  Start date: 1/31/2023      hydrOXYzine HCL (ATARAX) 50 mg tablet Take 1 Tablet by mouth three (3) times daily as needed for Anxiety for up to 30 days. Indications: anxious  Qty: 60 Tablet, Refills: 0  Start date: 1/30/2023, End date: 3/1/2023      losartan (COZAAR) 100 mg tablet Take 1 Tablet by mouth daily for 30 days. Indications: high blood pressure  Qty: 30 Tablet, Refills: 0  Start date: 1/31/2023, End date: 3/2/2023      metFORMIN (GLUCOPHAGE) 1,000 mg tablet Take 1 Tablet by mouth two (2) times daily (with meals) for 30 days. Indications: type 2 diabetes mellitus  Qty: 60 Tablet, Refills: 0  Start date: 1/30/2023, End date: 3/1/2023      pantoprazole (PROTONIX) 20 mg tablet Take 1 Tablet by mouth daily for 30 days. Indications: gastroesophageal reflux disease  Qty: 30 Tablet, Refills: 0  Start date: 1/31/2023, End date: 3/2/2023      traZODone (DESYREL) 100 mg tablet Take 2 Tablets by mouth nightly for 30 days. Indications: insomnia associated with depression  Qty: 60 Tablet, Refills: 0  Start date: 1/30/2023, End date: 3/1/2023      triamterene-hydroCHLOROthiazide (MAXZIDE) 37.5-25 mg per tablet Take 1 Tablet by mouth daily for 30 days.  Indications: high blood pressure  Qty: 30 Tablet, Refills: 0  Start date: 1/31/2023, End date: 3/2/2023           STOP taking these medications       diclofenac EC (VOLTAREN) 75 mg EC tablet Comments:   Reason for Stopping:         fluvoxaMINE (LUVOX) 100 mg tablet Comments:   Reason for Stopping:         ALPRAZolam (XANAX) 0.5 mg tablet Comments:   Reason for Stopping:         glipiZIDE SR (GLUCOTROL XL) 10 mg CR tablet Comments:   Reason for Stopping:                No results found for: VALF2, VALAC, VALP, VALPR, DS6, CRBAM, CRBAMP, CARB2, XCRBAM  No results found for: LITHM    Follow-up Information       Follow up With Specialties Details Why Contact Info    [facility]  Follow up Follow up with Andie Johnson NP and Dr. Irineo Acosta for counseling and med mgmt. [address]  [phone]    [facility]  Call To inquire about ERP Therapy [address]  [address]  [place], [address] [facility] [address]  Call To inquire about ERP therapy [facility]  [address]  [address]  [address] [facility]  [phone]    Balaji Osullivan MD Emergency Medicine   [address] [place] [address]  [phone]            WOUND CARE: none needed. PROGNOSIS:   Good / Fair based on nature of patient's pathology/ies and treatment compliance issues. Prognosis is greatly dependent upon patient's ability to  follow up on psychiatric/psychotherapy appointments as well as to comply with psychiatric medications as prescribed.

## 2023-01-30 NOTE — PROGRESS NOTES
Pharmacist Discharge Medication Reconciliation    Discharging Provider: Dr. Leslie Farias    Significant PMH:   Past Medical History:   Diagnosis Date    Anxiety     Depression     ANXIETY & DEPRESSION    Diabetes ([address]     TYPE II    Hypertension     Infected sebaceous cyst, upper back 4/10/2019    Panic attack     Suicidal thoughts     Vision decreased      Chief Complaint for this Admission:   Chief Complaint   Patient presents with    Mental Health Problem     Allergies: Lisinopril    Discharge Medications:   Current Discharge Medication List        START taking these medications    Details   glipiZIDE (GLUCOTROL) 5 mg tablet Take 1 Tablet by mouth Before breakfast and dinner for 30 days. Indications: type 2 diabetes mellitus  Qty: 60 Tablet, Refills: 0  Start date: 1/30/2023, End date: 3/1/2023      acetylcysteine 600 mg tab Take 1,200 mg by mouth two (2) times a day. Indications: trichitillomania  Qty: 120 Tablet, Refills: 0  Start date: 1/30/2023      cetirizine (ZYRTEC) 10 mg tablet Take 1 Tablet by mouth daily for 30 days. Indications: persistent hives of unknown cause  Qty: 30 Tablet, Refills: 0  Start date: 1/31/2023, End date: 3/2/2023      clonazePAM (KlonoPIN) 0.5 mg tablet Take 1 Tablet by mouth two (2) times a day for 30 days. Max Daily Amount: 1 mg. Indications: anxiety  Qty: 60 Tablet, Refills: 0  Start date: 1/30/2023, End date: 3/1/2023    Associated Diagnoses: Anxiety disorder, unspecified type      !! QUEtiapine (SEROquel) 200 mg tablet Take 1 Tablet by mouth nightly for 30 days. Indications: additional treatment for major depressive disorder  Qty: 30 Tablet, Refills: 0  Start date: 1/30/2023, End date: 3/1/2023      !! QUEtiapine (SEROquel) 100 mg tablet Take 1 Tablet by mouth daily for 30 days.  Indications: additional treatment for major depressive disorder  Qty: 30 Tablet, Refills: 0  Start date: 1/31/2023, End date: 3/2/2023      sertraline (ZOLOFT) 100 mg tablet Take 1 Tablet by mouth daily for 30 days. Indications: anxiousness associated with depression  Qty: 30 Tablet, Refills: 0  Start date: 1/31/2023, End date: 3/2/2023       !! - Potential duplicate medications found. Please discuss with provider. CONTINUE these medications which have CHANGED    Details   atorvastatin (LIPITOR) 40 mg tablet Take 1 Tablet by mouth daily for 30 days. Indications: excessive fat in the blood  Qty: 30 Tablet, Refills: 0  Start date: 1/30/2023, End date: 3/1/2023      fluticasone propionate (FLONASE) 50 mcg/actuation nasal spray 2 sprays in each nostrils daily  Indications: inflammation of the nose due to an allergy  Qty: 16 g, Refills: 0  Start date: 1/31/2023      hydrOXYzine HCL (ATARAX) 50 mg tablet Take 1 Tablet by mouth three (3) times daily as needed for Anxiety for up to 30 days. Indications: anxious  Qty: 60 Tablet, Refills: 0  Start date: 1/30/2023, End date: 3/1/2023      losartan (COZAAR) 100 mg tablet Take 1 Tablet by mouth daily for 30 days. Indications: high blood pressure  Qty: 30 Tablet, Refills: 0  Start date: 1/31/2023, End date: 3/2/2023      metFORMIN (GLUCOPHAGE) 1,000 mg tablet Take 1 Tablet by mouth two (2) times daily (with meals) for 30 days. Indications: type 2 diabetes mellitus  Qty: 60 Tablet, Refills: 0  Start date: 1/30/2023, End date: 3/1/2023      pantoprazole (PROTONIX) 20 mg tablet Take 1 Tablet by mouth daily for 30 days. Indications: gastroesophageal reflux disease  Qty: 30 Tablet, Refills: 0  Start date: 1/31/2023, End date: 3/2/2023      traZODone (DESYREL) 100 mg tablet Take 2 Tablets by mouth nightly for 30 days. Indications: insomnia associated with depression  Qty: 60 Tablet, Refills: 0  Start date: 1/30/2023, End date: 3/1/2023      triamterene-hydroCHLOROthiazide (MAXZIDE) 37.5-25 mg per tablet Take 1 Tablet by mouth daily for 30 days.  Indications: high blood pressure  Qty: 30 Tablet, Refills: 0  Start date: 1/31/2023, End date: 3/2/2023           STOP taking these medications       diclofenac EC (VOLTAREN) 75 mg EC tablet Comments:   Reason for Stopping:         fluvoxaMINE (LUVOX) 100 mg tablet Comments:   Reason for Stopping:         ALPRAZolam (XANAX) 0.5 mg tablet Comments:   Reason for Stopping:         glipiZIDE SR (GLUCOTROL XL) 10 mg CR tablet Comments:   Reason for Stopping:               The patient's chart, MAR and AVS were reviewed by Viral Carrasco PHARMD.

## 2023-01-30 NOTE — PROGRESS NOTES
Patient received resting in bed with eyes closed. NAD and no complaints noted. Safety measures in place. Will continue to monitor with q15min rounds. Problem: Falls - Risk of  Goal: *Absence of Falls  Description: Document Jane Sotomayor Fall Risk and appropriate interventions in the flowsheet.   Outcome: Progressing Towards Goal  Note: Fall Risk Interventions:            Medication Interventions: Teach patient to arise slowly

## 2023-01-30 NOTE — BH NOTES
PRN Medication Documentation    Specific patient behavior that led to need for PRN medication: Patient requested medication for pain  Staff interventions attempted prior to PRN being given: Offered PRN medication  PRN medication given: Tylenol 650 mg  Patient response/effectiveness of PRN medication: Will continue to monitor

## 2023-01-30 NOTE — BH NOTES
GROUP THERAPY PROGRESS NOTE    Patient is participating in psychotherapy group. Group time: 60 minutes    Personal goal for participation: To gain an understanding Rumination and Worry     Goal orientation: Personal    Group therapy participation: Active     Therapeutic interventions reviewed and discussed:  Group members were guided through understanding the differences between worry and rumination. Members gained an idea of the effects of overthinking. Members were supported in identifying reoccurring or intrusive thoughts and applying different strategies to adjust their thought patterns. Handouts provided. Impression of participation:  Pt was present and engaged in group discussion. Pt added insight to group topic. Pt was supportive of peers. Pt was calm, cooperative.        JENNIFER Gibbs, QMHP-A

## 2023-01-30 NOTE — PROGRESS NOTES
DISCHARGE SUMMARY from Nurse    PATIENT INSTRUCTIONS:    What to do at Home:  Recommended activity: Activity as tolerated,   *  Please give a list of your current medications to your Primary Care Provider. *  Please update this list whenever your medications are discontinued, doses are      changed, or new medications (including over-the-counter products) are added. *  Please carry medication information at all times in case of emergency situations. These are general instructions for a healthy lifestyle:    No smoking/ No tobacco products/ Avoid exposure to second hand smoke  Surgeon General's Warning:  Quitting smoking now greatly reduces serious risk to your health. Obesity, smoking, and sedentary lifestyle greatly increases your risk for illness    A healthy diet, regular physical exercise & weight monitoring are important for maintaining a healthy lifestyle    You may be retaining fluid if you have a history of heart failure or if you experience any of the following symptoms:  Weight gain of 3 pounds or more overnight or 5 pounds in a week, increased swelling in our hands or feet or shortness of breath while lying flat in bed. Please call your doctor as soon as you notice any of these symptoms; do not wait until your next office visit. The discharge information has been reviewed with the patient. The patient verbalized understanding. Discharge medications reviewed with the patient and appropriate educational materials and side effects teaching were provided. awaiting medication to be filled and parents for transport  ___________________________________________________________________________________________________________________________________  Problem: Altered Thought Process (Adult/Pediatric)  Goal: *STG: Participates in treatment plan  Outcome: Progressing Towards Goal  Has attended and participated  Goal: *STG: Remains safe in hospital  Outcome: Progressing Towards Goal  Pt denies any suicidal or homicidal thoughts. Contracts for safety. Remains on q 15 min safety checks. Goal: *STG: Decreased delusional thinking  Outcome: Progressing Towards Goal   None verbalized    Expresses hope for discharge today plan for her is to\"return to [place] WITH PARENTS. \"

## 2023-01-30 NOTE — INTERDISCIPLINARY ROUNDS
Behavioral Health Interdisciplinary Rounds     Patient Name: Ronald Goode  Age: 50 y.o. Room/Bed:  [facility]/  Primary Diagnosis: <principal problem not specified>   Admission Status: Voluntary     Readmission within 30 days: no  Power of  in place: no  Patient requires a blocked bed: no          Sleep hours: 7       Participation in Care/Groups:  yes  Medication Compliant?: Yes  PRNS (last 24 hours): Pain    Restraints (last 24 hours):  no  __________________________________________________  OQ Admission Analysis Survey completed:  OQ Admission Analysis Survey score:  OQ Discharge Analysis Survey completed:  OQ Discharge Analysis Survey score:   __________________________________________________     Alcohol screening (AUDIT) completed -     If applicable, date SBIRT discussed in treatment team AND documented:    Tobacco - patient is a smoker: Have You Used Tobacco in the Past 30 Days: No  Illegal Drugs use: Have You Used Any Illegal Substances Over the Past 12 Months: No    24 hour chart check complete: yes     _______________________________________________    Patient goal(s) for today:   Treatment team focus/goals: Plan for discharge today. Progress note: She denies SI, denies any issues with her medications,         Financial concerns/prescription coverage:  medicaid   Family contact:      parents                   Family requesting physician contact today:    Discharge plan:  She will be discharged in care of her family   Access to weapons :   no                                                           Outpatient provider(s):   Patient's preferred phone number for follow up call :   Patient's preferred e-mail address :      LOS:  17  Expected LOS:     Participating treatment team members: Ronald GoodeLuis Dr., RN

## 2023-01-31 ENCOUNTER — APPOINTMENT (OUTPATIENT)
Dept: BEHAVIORAL/MENTAL HEALTH | Age: 48
End: 2023-01-31

## 2023-01-31 NOTE — BH NOTES
Behavioral Health Transition Record to Provider    Patient Name: Jelly Edgar  YOB: 1975  Medical Record Number: [id number]  Date of Admission: 1/13/2023  Date of Discharge: 1/30/2023    Attending Provider: No att. providers found  Discharging Provider: Dr. Chito Gibbs   To contact this individual call [phone] and ask the  to page. If unavailable, ask to be transferred to [address] [place] [address] Provider on call. [facility] Provider will be available on call 24/7 and during holidays. Primary Care Provider: Chucho Paulson MD    Allergies   Allergen Reactions    Lisinopril Rash     Swelling of the lips       Reason for Admission: CHIEF COMPLAINT: \"Anxiety and depression\"      HISTORY OF PRESENTING COMPLAINT:  Jelly Edgar is a 50 y.o. BLACK/ female who is currently admitted to the psychiatric floor at [facility]. She states that she has been dealing with depression and anxiety for many years. She states that she was on xanax, luvox, and trazodone previously. She states that the luvox made her feel confused so she stopped taking it. She states that she has been dealing with hair pulling and would like to start a new medication to stop it. She endorses decreased sleep and states that she has not slept in 2 nights. She endorses having OP psych and has been at [facility] in July. She denies SI/Hi/AVH currently. She denies any drug or alcohol use. UDS was positive for benzos which she is prescribed. Admission Diagnosis: Depression [F32. A]    * No surgery found *    Results for orders placed or performed during the hospital encounter of 01/13/23   COVID-19 WITH INFLUENZA A/B   Result Value Ref Range    SARS-CoV-2 by PCR Not detected NOTD      Influenza A by PCR Not detected NOTD      Influenza B by PCR Not detected NOTD     ACETAMINOPHEN   Result Value Ref Range    Acetaminophen level <2 (L) 10 - 30 ug/mL   ETHYL ALCOHOL   Result Value Ref Range ALCOHOL(ETHYL),SERUM <10 <10 MG/DL   CBC WITH AUTOMATED DIFF   Result Value Ref Range    WBC 6.6 3.6 - 11.0 K/uL    RBC 4.23 3.80 - 5.20 M/uL    HGB 11.2 (L) 11.5 - 16.0 g/dL    HCT 35.9 35.0 - 47.0 %    MCV 84.9 80.0 - 99.0 FL    MCH 26.5 26.0 - 34.0 PG    MCHC 31.2 30.0 - 36.5 g/dL    RDW 16.1 (H) 11.5 - 14.5 %    PLATELET 055 402 - 945 K/uL    MPV 9.6 8.9 - 12.9 FL    NRBC 0.0 0  WBC    ABSOLUTE NRBC 0.00 0.00 - 0.01 K/uL    NEUTROPHILS 68 32 - 75 %    LYMPHOCYTES 27 12 - 49 %    MONOCYTES 3 (L) 5 - 13 %    EOSINOPHILS 2 0 - 7 %    BASOPHILS 0 0 - 1 %    IMMATURE GRANULOCYTES 0 0.0 - 0.5 %    ABS. NEUTROPHILS 4.4 1.8 - 8.0 K/UL    ABS. LYMPHOCYTES 1.8 0.8 - 3.5 K/UL    ABS. MONOCYTES 0.2 0.0 - 1.0 K/UL    ABS. EOSINOPHILS 0.1 0.0 - 0.4 K/UL    ABS. BASOPHILS 0.0 0.0 - 0.1 K/UL    ABS. IMM. GRANS. 0.0 0.00 - 0.04 K/UL    DF AUTOMATED     METABOLIC PANEL, COMPREHENSIVE   Result Value Ref Range    Sodium 139 136 - 145 mmol/L    Potassium 3.6 3.5 - 5.1 mmol/L    Chloride 107 97 - 108 mmol/L    CO2 30 21 - 32 mmol/L    Anion gap 2 (L) 5 - 15 mmol/L    Glucose 217 (H) 65 - 100 mg/dL    BUN 13 6 - 20 MG/DL    Creatinine 0.75 0.55 - 1.02 MG/DL    BUN/Creatinine ratio 17 12 - 20      eGFR >60 >60 ml/min/1.73m2    Calcium 9.2 8.5 - 10.1 MG/DL    Bilirubin, total 0.1 (L) 0.2 - 1.0 MG/DL    ALT (SGPT) 20 12 - 78 U/L    AST (SGOT) 8 (L) 15 - 37 U/L    Alk.  phosphatase 77 45 - 117 U/L    Protein, total 7.7 6.4 - 8.2 g/dL    Albumin 3.6 3.5 - 5.0 g/dL    Globulin 4.1 (H) 2.0 - 4.0 g/dL    A-G Ratio 0.9 (L) 1.1 - 2.2     HCG URINE, QL   Result Value Ref Range    HCG urine, QL Negative NEG     DRUG SCREEN, URINE   Result Value Ref Range    AMPHETAMINES Negative NEG      BARBITURATES Negative NEG      BENZODIAZEPINES Positive (A) NEG      COCAINE Negative NEG      METHADONE Negative NEG      OPIATES Negative NEG      PCP(PHENCYCLIDINE) Negative NEG      THC (TH-CANNABINOL) Negative NEG      Drug screen comment (NOTE) SALICYLATE   Result Value Ref Range    Salicylate level <8.3 (L) 2.8 - 20.0 MG/DL   HEMOGLOBIN A1C WITH EAG   Result Value Ref Range    Hemoglobin A1c 6.2 (H) 4.0 - 5.6 %    Est. average glucose 131 mg/dL   LIPID PANEL   Result Value Ref Range    Cholesterol, total 129 <200 MG/DL    Triglyceride 43 <150 MG/DL    HDL Cholesterol 68 MG/DL    LDL, calculated 52.4 0 - 100 MG/DL    VLDL, calculated 8.6 MG/DL    CHOL/HDL Ratio 1.9 0.0 - 5.0     GLUCOSE, POC   Result Value Ref Range    Glucose (POC) 148 (H) 65 - 117 mg/dL    Performed by [facility] Abdiaziz    GLUCOSE, POC   Result Value Ref Range    Glucose (POC) 178 (H) 65 - 117 mg/dL    Performed by [employer]    GLUCOSE, POC   Result Value Ref Range    Glucose (POC) 94 65 - 117 mg/dL    Performed by [employer]    GLUCOSE, POC   Result Value Ref Range    Glucose (POC) 101 65 - 117 mg/dL    Performed by ERWIN GUZMAN    GLUCOSE, POC   Result Value Ref Range    Glucose (POC) 175 (H) 65 - 117 mg/dL    Performed by Alma Rosa Her    GLUCOSE, POC   Result Value Ref Range    Glucose (POC) 76 65 - 117 mg/dL    Performed by Lulu Avila    GLUCOSE, POC   Result Value Ref Range    Glucose (POC) 89 65 - 117 mg/dL    Performed by Ramin Murphy    GLUCOSE, POC   Result Value Ref Range    Glucose (POC) 144 (H) 65 - 117 mg/dL    Performed by Alma Rosa Her    GLUCOSE, POC   Result Value Ref Range    Glucose (POC) 163 (H) 65 - 117 mg/dL    Performed by RAI Tapia    GLUCOSE, POC   Result Value Ref Range    Glucose (POC) 130 (H) 65 - 117 mg/dL    Performed by RAI Tapia    GLUCOSE, POC   Result Value Ref Range    Glucose (POC) 148 (H) 65 - 117 mg/dL    Performed by Theron Sanchez    GLUCOSE, POC   Result Value Ref Range    Glucose (POC) 128 (H) 65 - 117 mg/dL    Performed by Lulu Avila    GLUCOSE, POC   Result Value Ref Range    Glucose (POC) 127 (H) 65 - 117 mg/dL    Performed by Grey Carter    GLUCOSE, POC   Result Value Ref Range    Glucose (POC) 120 (H) 65 - 117 mg/dL    Performed by Tarsha Messer    GLUCOSE, POC   Result Value Ref Range    Glucose (POC) 124 (H) 65 - 117 mg/dL    Performed by Norvel Real    GLUCOSE, POC   Result Value Ref Range    Glucose (POC) 120 (H) 65 - 117 mg/dL    Performed by Norvel Real    GLUCOSE, POC   Result Value Ref Range    Glucose (POC) 115 65 - 117 mg/dL    Performed by Norvel Real    GLUCOSE, POC   Result Value Ref Range    Glucose (POC) 112 65 - 117 mg/dL    Performed by Blair Howard    GLUCOSE, POC   Result Value Ref Range    Glucose (POC) 140 (H) 65 - 117 mg/dL    Performed by Lencho Paz    GLUCOSE, POC   Result Value Ref Range    Glucose (POC) 132 (H) 65 - 117 mg/dL    Performed by [employer]        Immunizations administered during this encounter: There is no immunization history on file for this patient. Screening for Metabolic Disorders for Patients on Antipsychotic Medications  (Data obtained from the EMR)    Estimated Body Mass Index  Estimated body mass index is 37.86 kg/m² as calculated from the following:    Height as of this encounter: 5' 3\" (1.6 m). Weight as of this encounter: 96.9 kg (213 lb 11.2 oz).      Vital Signs/Blood Pressure  Visit Vitals  /67 (BP 1 Location: Left upper arm, BP Patient Position: Sitting)   Pulse (!) 106   Temp 98.1 °F (36.7 °C)   Resp 18   Ht 5' 3\" (1.6 m)   Wt 96.9 kg (213 lb 11.2 oz)   SpO2 97%   BMI 37.86 kg/m²       Blood Glucose/Hemoglobin A1c  Lab Results   Component Value Date/Time    Glucose 217 (H) 01/13/2023 12:42 PM    Glucose 146 (H) 07/29/2022 05:44 AM    Glucose (POC) 132 (H) 01/30/2023 08:01 AM       Lab Results   Component Value Date/Time    Hemoglobin A1c 6.2 (H) 01/17/2023 06:15 AM        Lipid Panel  Lab Results   Component Value Date/Time    Cholesterol, total 129 01/17/2023 06:15 AM    HDL Cholesterol 68 01/17/2023 06:15 AM    LDL, calculated 52.4 01/17/2023 06:15 AM    Triglyceride 43 01/17/2023 06:15 AM    CHOL/HDL Ratio 1.9 01/17/2023 06:15 AM Discharge Diagnosis: Mood d/o, NOS  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 The patient Wilhemena Alt exhibits the ability to control behavior in a less restrictive environment. Patient's level of functioning is improving. No assaultive/destructive behavior has been observed for the past 24 hours. No suicidal/homicidal threat or behavior has been observed for the past 24 hours. There is no evidence of serious medication side effects. Patient has not been in physical or protective restraints for at least the past 24 hours. If weapons involved, how are they secured? no    Is patient aware of and in agreement with discharge plan? yes    Arrangements for medication:  Prescriptions filed at [facility] 78 of discharge instructions to provider?:  yes    Arrangements for transportation home:  parents to pickup     Keep all follow up appointments as scheduled, continue to take prescribed medications per physician instructions. Mental health crisis number:  [id number] or your local mental health crisis line number at [phone]          Discharge Medication List and Instructions:   Discharge Medication List as of 1/30/2023 12:15 PM        START taking these medications    Details   glipiZIDE (GLUCOTROL) 5 mg tablet Take 1 Tablet by mouth Before breakfast and dinner for 30 days. Indications: type 2 diabetes mellitus, Normal, Disp-60 Tablet, R-0      acetylcysteine 600 mg tab Take 1,200 mg by mouth two (2) times a day. Indications: trichitillomania, Normal, Disp-120 Tablet, R-0      cetirizine (ZYRTEC) 10 mg tablet Take 1 Tablet by mouth daily for 30 days. Indications: persistent hives of unknown cause, Normal, Disp-30 Tablet, R-0      clonazePAM (KlonoPIN) 0.5 mg tablet Take 1 Tablet by mouth two (2) times a day for 30 days. Max Daily Amount: 1 mg. Indications: anxiety, Normal, Disp-60 Tablet, R-0      !!  QUEtiapine (SEROquel) 200 mg tablet Take 1 Tablet by mouth nightly for 30 days. Indications: additional treatment for major depressive disorder, Normal, Disp-30 Tablet, R-0      !! QUEtiapine (SEROquel) 100 mg tablet Take 1 Tablet by mouth daily for 30 days. Indications: additional treatment for major depressive disorder, Normal, Disp-30 Tablet, R-0      sertraline (ZOLOFT) 100 mg tablet Take 1 Tablet by mouth daily for 30 days. Indications: anxiousness associated with depression, Normal, Disp-30 Tablet, R-0       !! - Potential duplicate medications found. Please discuss with provider. CONTINUE these medications which have CHANGED    Details   atorvastatin (LIPITOR) 40 mg tablet Take 1 Tablet by mouth daily for 30 days. Indications: excessive fat in the blood, Normal, Disp-30 Tablet, R-0      fluticasone propionate (FLONASE) 50 mcg/actuation nasal spray 2 sprays in each nostrils daily  Indications: inflammation of the nose due to an allergy, Normal, Disp-16 g, R-0      hydrOXYzine HCL (ATARAX) 50 mg tablet Take 1 Tablet by mouth three (3) times daily as needed for Anxiety for up to 30 days. Indications: anxious, Normal, Disp-60 Tablet, R-0      losartan (COZAAR) 100 mg tablet Take 1 Tablet by mouth daily for 30 days. Indications: high blood pressure, Normal, Disp-30 Tablet, R-0      metFORMIN (GLUCOPHAGE) 1,000 mg tablet Take 1 Tablet by mouth two (2) times daily (with meals) for 30 days. Indications: type 2 diabetes mellitus, Normal, Disp-60 Tablet, R-0      pantoprazole (PROTONIX) 20 mg tablet Take 1 Tablet by mouth daily for 30 days. Indications: gastroesophageal reflux disease, Normal, Disp-30 Tablet, R-0      traZODone (DESYREL) 100 mg tablet Take 2 Tablets by mouth nightly for 30 days. Indications: insomnia associated with depression, Normal, Disp-60 Tablet, R-0      triamterene-hydroCHLOROthiazide (MAXZIDE) 37.5-25 mg per tablet Take 1 Tablet by mouth daily for 30 days.  Indications: high blood pressure, Normal, Disp-30 Tablet, R-0 STOP taking these medications       diclofenac EC (VOLTAREN) 75 mg EC tablet Comments:   Reason for Stopping:         fluvoxaMINE (LUVOX) 100 mg tablet Comments:   Reason for Stopping:         ALPRAZolam (XANAX) 0.5 mg tablet Comments:   Reason for Stopping:         glipiZIDE SR (GLUCOTROL XL) 10 mg CR tablet Comments:   Reason for Stopping:               Unresulted Labs (24h ago, onward)      None          To obtain results of studies pending at discharge, please contact [phone]    Follow-up Information       Follow up With Specialties Details Why Contact Info    [facility]  Follow up Follow up with Marsha Webber NP and Dr. Siomara Hayden for counseling and med mgmt. [address] [facility]  [address] [facility] [facility]  [phone]    [facility]  Call To inquire about ERP Therapy [facility] [address]  [address]  [address], [address] [facility]  [facility]  Call To inquire about ERP therapy [facility] Therapy  [address]  [address]  [address] [facility]  [phone]    Arian Haile MD Emergency Medicine   [address] [address] [phone]      PHP at [facility]  Follow up on 2/6/2023  [address] [facility] [address]     you check in at the hospital and then go to   [facility] at [phone]            Advanced Directive:   Does the patient have an appointed surrogate decision maker? No  Does the patient have a Medical Advance Directive? No  Does the patient have a Psychiatric Advance Directive? No  If the patient does not have a surrogate or Medical Advance Directive AND Psychiatric Advance Directive, the patient was offered information on these advance directives Patient declined to complete    Patient Instructions: Please continue all medications until otherwise directed by physician. Tobacco Cessation Discharge Plan:   Is the patient a smoker and needs referral for smoking cessation?  No  Patient referred to the following for smoking cessation with an appointment? No     Patient was offered medication to assist with smoking cessation at discharge? No  Was education for smoking cessation added to the discharge instructions? Yes    Alcohol/Substance Abuse Discharge Plan:   Does the patient have a history of substance/alcohol abuse and requires a referral for treatment? No  Patient referred to the following for substance/alcohol abuse treatment with an appointment? No  Patient was offered medication to assist with alcohol cessation at discharge? No  Was education for substance/alcohol abuse added to discharge instructions? No    Patient discharged to Home; discussed with patient/caregiver and provided to the patient/caregiver either in hard copy or electronically.

## 2023-02-01 ENCOUNTER — APPOINTMENT (OUTPATIENT)
Dept: BEHAVIORAL/MENTAL HEALTH | Age: 48
End: 2023-02-01
Payer: MEDICAID

## 2023-02-02 ENCOUNTER — APPOINTMENT (OUTPATIENT)
Dept: BEHAVIORAL/MENTAL HEALTH | Age: 48
End: 2023-02-02
Payer: MEDICAID

## 2023-02-03 ENCOUNTER — APPOINTMENT (OUTPATIENT)
Dept: BEHAVIORAL/MENTAL HEALTH | Age: 48
End: 2023-02-03
Payer: MEDICAID

## 2023-02-06 ENCOUNTER — HOSPITAL ENCOUNTER (OUTPATIENT)
Dept: MAMMOGRAPHY | Age: 48
Discharge: HOME | End: 2023-02-06
Payer: MEDICAID

## 2023-02-06 ENCOUNTER — HOSPITAL ENCOUNTER (OUTPATIENT)
Dept: BEHAVIORAL/MENTAL HEALTH | Age: 48
Discharge: HOME | End: 2023-02-06
Payer: MEDICAID

## 2023-02-06 VITALS
SYSTOLIC BLOOD PRESSURE: 108 MMHG | DIASTOLIC BLOOD PRESSURE: 79 MMHG | OXYGEN SATURATION: 98 % | TEMPERATURE: 98.9 F | HEART RATE: 80 BPM | RESPIRATION RATE: 17 BRPM

## 2023-02-06 DIAGNOSIS — Z12.31 VISIT FOR SCREENING MAMMOGRAM: ICD-10-CM

## 2023-02-06 PROCEDURE — 77067 SCR MAMMO BI INCL CAD: CPT

## 2023-02-06 PROCEDURE — 90853 GROUP PSYCHOTHERAPY: CPT

## 2023-02-06 NOTE — GROUP NOTE
SCOTT  GROUP DOCUMENTATION INDIVIDUAL                                                                          Group Therapy Note    Date: 2/6/2023    Group Start Time: 10:50 AM  Group End Time: 11:40 AM  Group Topic: Education Group - Outpatient    [address] [facility] 3 ACUTE BEHAV HLTH    Weeks, Dianna    IP [address] [facility] GROUP DOCUMENTATION GROUP    Group Therapy Note    This writer facilitated a cognitive skills group on identification of and challenging cognitive distortions. This writer provided educational handouts and discussed how cognitive distortions develop as well as the various types of distortions. This writer encouraged patients to identify which cognitive distortions they experience most frequently. This writer reviewed prompts for supporting thought challenging and discussed why those various prompts are effective. Attendees: 4       Attendance: Attended    Patient's Goal:  identifying and challenging cognitive distortions    Interventions/techniques: Informed, Validated, and Promoted peer support    Follows Directions: Followed directions    Interactions: Interacted appropriately    Mental Status: Calm and Congruent    Behavior/appearance: Attentive and Cooperative    Goals Achieved: Able to engage in interactions and Able to self-disclose      Additional Notes:  Patient presented as receptive and engaged as evidenced by reading from educational materials and self-disclosure. Patient asked relevant questions and volunteered quickly to read on multiple occasions. Patient identified distortions she does and does not experience. Patient will continue to work on identifying and challenging cognitive distortions.     Ashvin Jameson

## 2023-02-06 NOTE — GROUP NOTE
SCOTT  GROUP DOCUMENTATION INDIVIDUAL                                                                          Group Therapy Note    Date: 2/6/2023    Group Start Time:  9:50 AM  Group End Time: 10:40 AM  Group Topic: Process Group - Outpatient    [address] [facility] 3 ACUTE BEHAV HLTH    Weeks, Dianna    IP 1150 [facility] GROUP DOCUMENTATION GROUP    Group Therapy Note    This writer facilitated a process group focusing on themes of anxiety and depression related to thought distortions. This writer opened with a progressive muscle relaxation activity to address endorsed anxiety and encourage practice of coping skills. This writer encouraged patients to share with the group subjects on which they wanted feedback or progress about which they feel excited/hopeful. This writer used validation, reflective listening, and encouraging patients to share their experiences and coping skill preferences to support discussion around thought distortions. Due to new patients joining the program, this writer reminded patients of confidentiality rules. Attendees: 4       Attendance: Attended    Patient's Goal:  accessing peer support for symptom management    Interventions/techniques: Validated, Promoted peer support, and Provide feedback    Follows Directions: Followed directions    Interactions: Interacted appropriately    Mental Status: Calm and Congruent    Behavior/appearance: Attentive and Cooperative    Goals Achieved: Able to receive feedback and Able to self-disclose      Additional Notes:  Patient presented as receptive and engaged as evidenced by self-disclosure. Patient solicited advice from her peers and presented as receptive to feedback. Patient will continue to work on accessing peer support for symptom management.     Joellen Rios

## 2023-02-06 NOTE — GROUP NOTE
SCOTT  GROUP DOCUMENTATION INDIVIDUAL                                                                          Group Therapy Note    Date: 2/6/2023    Group Start Time:  1:10 PM  Group End Time:  2:10 PM  Group Topic: Process Group - Outpatient    [place] [address] ACUTE BEHAV [address] GROUP DOCUMENTATION GROUP    Group Therapy Note    This writer facilitated the psychoeducation group. This writer encouraged the patients to participate in an ice breaker exercise to welcome new patients. The patients were encouraged to complete a feelings exercise to identify where they feel different feelings in their body. The patients were encouraged to identify feelings from a long list that they did not know the meaning of, and this writer and other patients assisted in defining those feelings. Attendees: 5       Attendance: Attended    Patient's Goal:  identify emotions    Interventions/techniques: Informed    Follows Directions: Followed directions    Interactions: Interacted appropriately    Mental Status: Calm and Congruent    Behavior/appearance: Attentive, Cooperative, and Motivated    Goals Achieved: Able to listen to others, Able to self-disclose, and Identified feelings      Additional Notes: The patient participated in ice breaker exercise to help new patients feel welcome. The patient participated in both feelings' exercises. The patient shared personal information to help his new peers feel comfortable in the group. The patient will continue to work on identifying feelings in psychoeducation group.     Zoila Noland

## 2023-02-06 NOTE — BH NOTES
SAFETY PLAN    A suicide Safety Plan is a document that supports someone when they are having thoughts of suicide. Warning Signs that indicate a suicidal crisis may be developing: What (situations, thoughts, feelings, body sensations, behaviors, etc.) do you experience that lets you know you are beginning to think about suicide? 1. Lay around due to depression  2. Watch TV to avoid things  3. Hair pulling    Internal Coping Strategies:  What things can I do (relaxation techniques, hobbies, physical activities, etc.) to take my mind off my problems without contacting another person? 1. breathing  2. exercising  3. Make myself busy, tasks    People and social settings that provide distraction: Who can I call or where can I go to distract me? 1. Name: Rosalinda Key  Phone: [phone]  2. Name: Coral Rollins  Phone: [phone]   3. Place: beach            4. Place: mall    People whom I can ask for help: Who can I call when I need help - for example, friends, family, clergy, someone else? 1. Name: Berenice Leon (aunt)               Phone: [phone], [phone]  2. Name: Garcia Wade(aunt) Phone: [phone], [phone]  3. Name:   Phone:     Professionals or [address] [place] [address] I can contact during a crisis: Who can I call for help - for example, my doctor, my psychiatrist, my psychologist, a mental health provider, a suicide hotline? 1. Clinician Name: ***   Phone: ***      Clinician Pager or Emergency Contact #: ***    2. Clinician Name: ***   Phone: ***      Clinician Pager or Emergency Contact #: ***    3. Suicide Prevention Lifeline: 6-338-927-TALK (8982)    4. [address] [place] [address] Emergency Services -  for example, [facility], [facility] suicide hotline, [facility] Hotline: ***      Emergency Services Address: ***      Emergency Services Phone: ***    Making the environment safe: How can I make my environment (house/apartment/living space) safer?  For example, can I remove guns, medications, and other items? 1. Posting safety plan in home  2.  Having someone manage medications when in crisis

## 2023-02-06 NOTE — BH NOTES
OP Behavioral Health Intake Packet    OUTPATIENT INTAKE PACKET    DEMOGRAPHICS  Kwasi Leblanc   1975  Artem Guaman   [id number]    2/6/2023  8:54 AM  Information Source: self  50 y.o. Accompanied by/Method of Arrival: Medicaid transportation     Marital Status: Single    Emergency Contact: Giulia Spear (aunt) Phone: [phone], [phone]    Allergies   Allergen Reactions    Lisinopril Rash     Swelling of the lips        Primary Support: Jadaludy Beatris  Relationship: aunt Phone #: [phone], [phone]  Family Spokesperson Name:   Relationship:  Phone #:     [] Guardian [] POA Name:  Language if not English:       REPRODUCTION/SEXUALITY  Sexual Preference/Orientation: straight  Patient's Gender: F   Patient's Gender Identity: F  Pronoun Preference: She, her    PSYCHIATRIC CARE HISTORY  [] None [x] Agency/Hospital Where: [facility] IP  Who:  Date: End of January 2023    Current Patient: [] Yes [x]No Office Location:   [] Outpatient:   [] Inpatient:     [] ECT [] Inpatient  [] Outpatient  Last Treatment:     Last Seen: [] Psychiatrist Name:   Date:      Intent to Follow [] Yes [] No      [] Therapist Name:    Date: *Has one scheduled for after PHP, but could not remember information     Intent to Follow [] Yes [] No    CURRENT/PREVIOUS TREATMENT HISTORY     Problem or Disorder Facility Name  Treatment Type IP/OP/PHP/IOP Dates of Treatment Therapist/Psychiatrist Outcomes of Treatment   SANTY, MDD [facility] IP January 2023 Dr. Keyla Dobson Referred to [facility]    Chief Complaint (patient's own words): \"So I can get better, function, and live on my own, I need y'all's help with that. I struggle with bad thoughts, negative thoughts, I think everything is negative. \"     Pulling out hair (TTM) Waking up, starting the day. 4-5 IP. Hx of suicide attempts, 2, overdose on pills.       What do you hope to accomplish in this program? ***      MENTAL STATUS  Appearance: {Appearance:05324}    Posture: {OP BH INTAKE POSTURE:64007}    Body Movement: {OP BH INTAKE BODY MOVEMENT:79359}    Affect: {OP BH INTAKE RZDJKV:52082}    Mood: {OP BH INTAKE OCPB:05070}    Attitude: {OP BH INTAKE ATTITUDE:03067}    Speech: {OP BH INTAKE SPEECH:92864}    Concentration: {OP BH INTAKE CONCENTRATION:44542}    Thought Process: {OP BH INTAKE THOUGHT PROCESSES:96218}    Thought Content: {OP BH INTAKE THOUGHT MNASLQP:60339}    SUICIDAL IDEATION: COMPLETE C-SSRS SCORE:   Protective Factors:  [] Children     [] Responsibilities  [] No organized plan    [] No means/access to weapons  [] Contracts reliably for Safety  [x] Episcopalian beliefs/value system  [x] Denies intent    [] Adequate family/social support  [] Future oriented/reason to live     SELF-INJURIOUS BEHAVIOR  History of   Current  [] Burning   [] Burning  [] Cutting   [] Cutting  [] Headbanging  [] Headbanging  [x] Other Pulling out hair    HOMICIDAL IDEATION/IMPULSIVITY  [x] Denies [] Present during interview [] Plan (describe):   [] Intended victim:   [] Victim notified by whom:    [] History of homicidal ideation [] Acted on  victim (outcome):    Have you ever been so upset you have lost control: [] Yes  [x] No  [] While hospitalized   [] Property damage  [] Assaultive When:    DO YOU HAVE A WEAPON(S) AT HOME? [] Yes  [x] No  Type of Weapons:     SUBSTANCE USE/ABUSE  Check ALL that apply  Alcohol use: [] Yes  [x] No   [] Non-Drinker [] Former Drinker [] AA involvement [] Date quit:    Type:   Reason:   Length of time:  Time of Sobriety:   Amount/frequency:     Date last used: Age when you began drinking:     [] Yes [x] No  Have you experienced any health/social/legal consequences because of your use? [] Yes [x] No  Have you ever felt the need to cut down on your use? [] Yes [x] No  Have you ever been annoyed by friend's/family's comments about your drinking?   [] Yes [x] No  Have you ever felt bad or guilty about your drinking? [] Yes [x] No  Have you ever needed an eye opener? [] Yes [x] No  Did you ever go 3-4 days without drinking in the past month? If yes, what happened:   [] Withdrawal symptoms [] Seizures [] Delirium tremens  Describe:   Past treatment (where/when):     OTHER SUBSTANCE USE  Type Length of Use Amount/  Frequency Date Last Used   [] Marijuana       [] Cocaine/crack      [] Caffeine      [] Tobacco      [] Prescription      [] Opiates      [] Other (describe)              EDUCATION/WORK HISTORY  [x] Intelligence appears to be consistent with age/education. Highest level of education completed: Bachelor's in communications  [] Intelligence appears to be slow or developmentally delayed:   [] Special Ed   [] Trade school/VoEd  [] Stated developmental delay (describe):   [x] Employed [x] Yes  Occupation: [employer], program       Disability:    [] No  Date Last Worked: Off for two weeks  [x] Patient is satisfied with education/skills training    MEDICATIONS  List medications (incude prescription, over the counter, inhalers, vitamins/minerals/herbal supplements).   [x] Yes   [] No  Medications brought from home  [] Yes  [] No  Sent home with family/friend    Name Prescribed By Dose Times Taken Last Time Taken Understands reasons for medications   Trazodone Dr. Jenniffer Morocho 100 mg 2 at bedtime 2/5 [x] Yes  [] No   Hydroxyzine Dr. Jenniffer Morocho 50 mg PRN 2/5 [x] Yes  [] No   Clonazepam Dr. Jenniffer Morocho 05 mg BID 2/5 [x] Yes  [] No   Seroquel Dr. Jenniffer Morocho 200 mg bedtime 2/5 [x] Yes  [] No   Sertraline HCL Dr. Jenniffer Morocho 100 mg Daily Today [x] Yes  [] No   Seroquel Dr. Jenniffer Morocho 100 mg Daily Today [x] Yes  [] No        [] Yes  [] No        [] Yes  [] No     PREFERRED PHARMACY: [address], [phone]    SLEEP  [x] Yes  [] No   Problems sleeping  [x] Yes  [] No   Frequent night awakening  [x] Yes  [] No   Sleep aids: seroquel, trazodone  [] Yes  [x] No   Difficulty falling asleep  [x] Yes  [] No Early morning awakening  [] Yes  [x] No   Naps during the day: unable to  Sleeps 5 hours daily, usually from 11pm to 5:30 am    NUTRITION  Home Diet: [] Regular [x] Special Diet diabetic    HEALTH HISTORY  Have you had any of the following? [] Stroke  [] Heart Failure [] Emphysema [] Anemia   [] Heart disease [x] Diabetes  [] Blood disorder [] Hypertension  [] Stomach ulcer [] Blood clots  [] Cancer  [] Pacemaker  [] Seizures  [] Rheumatic fever [] Hepatitis  [] Liver disease  [] Arthritis  [] Pneumonia  [] Urinary problems [] Glaucoma  [] Angina/chest pain [] Tuberculosis [] Thyroid problem [] Heart attack  [] Asthma    [] Other   [] Other   [] Other     Primary Care Provider: Dr. Darryl Chappell  Active Medical Problems: Diabetic  Significant Past Surgeries:     Choose three (3) of the problems that bother you the most at this time.   [] Too much sleep  [] [place] suspicious  [] Depressed  [] Hearing voices/noises [] Talking too much  [] Wanting to be alone        other people don't hear [] Racing thoughts  [] Hopelessness  [] Seeing things that other [] Excitable   [] Low self-esteem        people don't see  [] Can't relax   [] Cries easy  [] Trouble remembering [] Worries too much  [] Feels sad most of the time  [] Se inside  [] Unable to concentrate [] Weakness  [] Angry   [] Can't make decisions [] Eating more  [] Fearful/frightened  [] Guilty   [] Eating more  [] Anxious   [] Suicidal   [] Eating less  [] Irritable/easily frustrated [] Pain   [] Mahnaz  [] Poor sleep   [] Wanting to harm self [] Lonely  [] Trouble falling asleep  [] Early to wake  [] Taking Drugs  [] Drinking too much    FAMILY PSYCHIATRIC HISTORY  [] Denies    List the family member affected by each condition Grandma-MDD  Depression and/or Suicide Attempts: No  Psychiatric Treatment: Treatment  Alcohol and/or Drug Abuse: NA  Manic Symptoms: NA    Anticipated Family/Support Participation:  [] Assessment information [] Treatment Plan [] End of Program Planning  [] Therapy   [] None  [] Other    Patient and Family Learning needs identified: ***    Patient:  [] Diagnosis, illness management [] Medications [] ECT  [] Nutrition    [] Medical problems [] Community agencies  [] Pain management   [] Alcohol/substance abuse education  [] Personal effectiveness/coping skills  [] Other    Family:  [] Diagnosis, illness management [] Medications [] ECT  [] Nutrition    [] Medical problems [] Community agencies  [] Pain management   [] Alcohol/substance abuse education  [] Personal effectiveness/coping skills  [] Other    How do you learn best: [] Reading  [] Listening  [x] Pictures  [] Demonstration    [] Video   []  Other     Cognitive: Exhibits ability to grasp concepts and responds to questions?  [x] Yes [] No  Physical Barrier: [] None [] Vision [] Hearing [] Language/spoken [] Read   [] Other   Emotional Barrier: [] None [x] Anxiety [] Anger [] Denial [] Depressed [] Other   Anabaptist/Cultural Barrier: [x] None [] Yes (comment)  Financial Concern: [x] None [] Yes (comment)  Readiness to Learn: [x] Asking questions [x] Interest [] Return demo [] Other   Ability to Access Services: [x] Yes []No [] Other     STRENGTHS  [] Motivated [] Recognizes need for treatment [] Independent with personal care  [] Good support system(s) [] Employed [] No significant physical illness  [] Past positive treatment experience [] Other (comment)    LIMITATIONS  [x] History of noncompliance  [] Unsuccessful treatment history  [] Homeless/impoverished  [] No identified support system  [] Multiple and/or complex medical problems  [] Other (comment)    VISION/HEARING/SPEECH  Trouble with vision:  [x] No [] Yes (describe)  Please check if you use: [] Glasses [] Contacts [] False eye [] Right [] Left  Lens implant: [] Right [] Left  Trouble hearing: [x] No [] Yes (describe)  Use a hearing aid:  [x] No [] Yes [] Right [] Left    Speech/Language: English: [x] Understands [x] Reads [x] [employer] [] Other languages   [] Requires  (name/date/notified)  [] Uses speaking devices [] Uses sign language [] New onset speech difficulties     RECREATION AND LEISURE  Regularly engages in leisure/recreational activities: [x] No [x] Yes *Difficulty doing these due to depression  Leisure/recreational activity interests: Watch TV, go shopping (retail therapy), Hangout with friends  [x] Likes group activities [] Prefers solo activities    Are you currently involved with any community activities/organizations: [] No [x] Yes   [facility]    Current problems pursuing leisure/recreational activities:  [] Knowledge [] Skill [] Time [] Money [] Motivation [] Transportation  [] Physical health issue    [address]  Patient believes in [place]   [x] Yes [] No    Are there any special spiritual, Anglican, traditional, ethnic and/or cultural needs you may have while in the hospital: [x] No [] Yes (what)  Current [employer]  Currently Practicing: [x] Yes [] No    Would you like to speak with spiritual care while you are here [] Yes [x] No   [x] No Referral made [] N/A    PSYCHOSOCIAL HISTORY  Informant: [x] Patient [] Family [] Other  Living situation: [x] Lives alone [] Live with others   [x] Rents [] House/condo [] Apartment [] Group home [] Assisted living  [] Nursing home [] Homeless    Source of income: [x] Employed at: [employer]  [] Full time [x] Part time    Monthly Income: $600 every 2 weeks    Occupation:  [] Retired [] Pension [] [employer] [facility] Compensation    [] Artem  [] [facility]  [] GADIS/ADC [] [address]    [] Unemployment [] [facility] own money [] Has payee (who)    [address] [facility] service: [x] No [] Yes  []  Legal History: Current Charges:  No [] Yes   [] Police hold: [x] No [] Yes    On probation: [x] No [] Yes     [] [facility]PC time: [x] No [] Yes (where/when)    TRAUMA AND ABUSE HISTORY  [x] No [] Yes  [] Emotional/mental [] Sexual []Physical  [] Childhood history   Describe (who, what, when):     [] Previously reported [] Treated for (by whom):    Have you ever experienced being in a war zone or  combat: [x] No [] Yes  Have you ever experienced a bad accident, serious illness or natural disaster and thought you might be killed: [x] No [] Yes  Have you ever been attacked (or witnessed an attack) with intent to kill or injure:   [x] No [] Yes   Describe:     Is there anything which triggers your re-experiencing this event: [x] No [] Yes   Describe:     STRESSORS    Any recent losses: X No [] Yes (what) \"loss of a relationship, boyfriend, happened in Oct 2022, together 7 years    Other severe stressors: Hard on myself, internalize things and cannot let go- impacts daily functioning    As a result, do you have any of the following symptoms: [] Recurrent nightmares  [] Intrusive thoughts [] Numbness [] Avoidance behaviors [] Watchful, guarded  [] Easily startled [] Feelings of detachment from others    How have you coped with stressors in the past? \"I didn't cope\"    What or whom do you rely on in times of stress: friends, Shandell and Eliza Messenger    Areas of function negatively affected by this disorder:  [x] Marital/Intimacy/Family [x] Financial [] Safety [] Spiritual  [] Vocational/Academic [x] Social [] Health [x] Recreational/Leisure    DISPOSITION    [x] Admitted to Program  [] Referred Elsewhere  [] Placed on Wait list    [] Other (comment)    Nory Harris LCSW (social work)  2/6/2023 8:54 AM

## 2023-02-06 NOTE — GROUP NOTE
[facility] GROUP DOCUMENTATION INDIVIDUAL                                                                          Group Therapy Note    Date: 2/6/2023    Group Start Time: 12:10 PM  Group End Time:  1:10 PM  Group Topic: Relapse Prevention/Role Play Group    [facility] 3 ACUTE BEHAV HLTH    Corazon Tobias    IP [facility] GROUP DOCUMENTATION GROUP    Group Therapy Note    This writer facilitated a relapse prevention group. The patients were encouraged to participate in an ice breaker exercise so that they could continue to form relationships. The patient disclosed personal details and processed them with peers in a group. The patients provided each other with support and empathy during group time. Attendees: 4       Attendance: Attended    Patient's Goal:  self-disclose    Interventions/techniques: Promoted peer support and Supported    Follows Directions: Followed directions    Interactions: Interacted appropriately    Mental Status: Calm and Congruent    Behavior/appearance: Attentive, Cooperative, Motivated, and Neatly groomed    Goals Achieved: Able to engage in interactions, Able to listen to others, and Discussed coping      Additional Notes: The patient was encouraged to participate in the ice breaker exercise. The patient shared details of personal life, relationship with parents, and reasons for seeking treatment. The patient was open to discussion with peers and provided peers support and feedback even praying for peers who shared disclosures of abuse. The patient will continue to self-disclose in relapse prevention group.      Will Batres

## 2023-02-06 NOTE — GROUP NOTE
[facility]  GROUP DOCUMENTATION INDIVIDUAL                                                                          Group Therapy Note    Date: 2/6/2023    Group Start Time:  2:00 PM  Group End Time:  2:45 PM  Group Topic: [employer]    [facility] 3 ACUTE BEHAV HLTH    Weeks, Dianna    SCOTT [facility] GROUP DOCUMENTATION GROUP    Group Therapy Note    This writer facilitated a community wrap up group focused on assessing for safety, coping skills practice, and planning ways to engage in healthy coping while in the community. The writer facilitated a symptom and safety check in using the afternoon check in form. The writer facilitated gratitude journaling activity and prompted processing discussion afterwards. The writer closed with brief education on the frequency of psychiatrist visits, program policy around staying after group to meet with the psychiatrist, and ways to contact the program staff regarding attendance issues. Attendees: 4       Attendance: Attended    Patient's Goal:  disclosing safety concerns and engaging with peer support    Interventions/techniques: Informed, Validated, and Promoted peer support    Follows Directions: Followed directions    Interactions: Interacted appropriately    Mental Status: Calm and Congruent    Behavior/appearance: Attentive and Cooperative    Goals Achieved: Able to self-disclose and Displayed empathy      Additional Notes:  Patient presented as receptive and engaged as evidenced by listening respectfully to peers. Patient denied SI/HI/SUN on the check out form. Patient discussed her responses to the gratitude journaling prompt and expressed empathy to a peer who shared about their own frustrations. Patient will continue to work on disclosing safety concerns and engaging with peer support.      Mitch Herron

## 2023-02-07 ENCOUNTER — HOSPITAL ENCOUNTER (OUTPATIENT)
Dept: BEHAVIORAL/MENTAL HEALTH | Age: 48
Discharge: HOME | End: 2023-02-07
Payer: MEDICAID

## 2023-02-07 VITALS
OXYGEN SATURATION: 93 % | TEMPERATURE: 97.9 F | DIASTOLIC BLOOD PRESSURE: 78 MMHG | RESPIRATION RATE: 17 BRPM | SYSTOLIC BLOOD PRESSURE: 117 MMHG | HEART RATE: 91 BPM

## 2023-02-07 PROCEDURE — 90832 PSYTX W PT 30 MINUTES: CPT

## 2023-02-07 PROCEDURE — 90853 GROUP PSYCHOTHERAPY: CPT

## 2023-02-07 NOTE — GROUP NOTE
[facility]  GROUP DOCUMENTATION INDIVIDUAL                                                                          Group Therapy Note    Date: 2/7/2023    Group Start Time:  9:00 AM  Group End Time:  9:45 AM  Group Topic: [employer]    [facility] 3 ACUTE BE[facility]AV HLTH    Weeks, Dianna    SCOTT [facility] GROUP DOCUMENTATION GROUP    Group Therapy Note    This writer facilitated the morning check in community group focused on evaluating presentation, assessing for safety, and processing recent events. The writer encouraged patients to share events in their lives, to include coping skills use, and discuss the impact on their mental health. This writer provided feedback and psychoeducation on how different activities benefit mental health based on reports from patients. The writer prompted and supported peer feedback. Attendees: 2       Attendance: Attended    Patient's Goal:  disclosing safety concerns and engaging with peer support    Interventions/techniques: Validated, Promoted peer support, and Provide feedback    Follows Directions: Followed directions    Interactions: Interacted appropriately    Mental Status: Calm and Congruent    Behavior/appearance: Attentive and Cooperative    Goals Achieved: Able to self-disclose and Discussed coping      Additional Notes:  Patient presented as receptive and engaged as evidenced by self-disclosure and listening respectfully to her peer. Patient denied SI/HI/SUN on the check in form. Patient asked her peer appropriate questions and discussed coping using Adventism activities in the morning. Patient reported continued difficulty related to anxiety and hair pulling, though noted at time of group she felt calmer and hopeful for the day. Patient will continue to work on disclosing safety concerns and engaging with peer support.     Saida Garland

## 2023-02-07 NOTE — GROUP NOTE
SCOTT  GROUP DOCUMENTATION INDIVIDUAL                                                                          Group Therapy Note    Date: 2/7/2023    Group Start Time:  2:00 PM  Group End Time:  2:45 PM  Group Topic: Process Group - Outpatient    [facility] 3 ACUTE BEHAV HLTH    Dion Patel    IP [facility] GROUP DOCUMENTATION GROUP    Group Therapy Note    This writer facilitated wrap up group. The patients were encouraged to complete the daily wrap up assessments. The patients were encouraged to complete an afternoon agenda prioritizing slef-care. The patients were encouraged to share their agendas and to practice box breathing with this writer as a new coping strategy. Attendees: 3       Attendance: Attended    Patient's Goal:  identify emotions and prioritize self-care    Interventions/techniques: Other wrap up assessment and encourage self-care    Follows Directions: Followed directions    Interactions: Interacted appropriately    Mental Status: Calm and Congruent    Behavior/appearance: Attentive, Cooperative, Motivated, and Neatly groomed    Goals Achieved: Able to engage in interactions, Able to listen to others, and Identified feelings      Additional Notes: The patient completed the afternoon wrap up assessment and denied SI/HI. The patient completed the afternoon agenda prioritizing self-care. The patient shared that she is going out to eat with her mother and aunt for self-care. The patient participated in practice of box breathing as a new coping strategy. The patient will continue to identify emotions and prioritize self-care in wrap up group.     Yuriy Alfaro

## 2023-02-07 NOTE — GROUP NOTE
[facility]OTT  GROUP DOCUMENTATION INDIVIDUAL                                                                          Group Therapy Note    Date: 2/7/2023    Group Start Time:  9:50 AM  Group End Time: 10:40 AM  Group Topic: Process Group - Outpatient    [place] [address] ACUTE BEHAV [address] GROUP DOCUMENTATION GROUP    Group Therapy Note    This writer facilitated the process group with the patients. This writer encouraged the patient to discuss goals that they have in treatment as well as complete a goals identification exercise. The patient participated in exercise and in conversation regarding goals including goals for improved self-esteem, less anxiety and depression, and more independence. Attendees: 2       Attendance: Attended    Patient's Goal:  self-disclose and accept feedback     Interventions/techniques: Promoted peer support and Supported    Follows Directions: Followed directions    Interactions: Interacted appropriately    Mental Status: Calm and Congruent    Behavior/appearance: Attentive and Cooperative    Goals Achieved: Able to listen to others, Able to self-disclose, and Displayed empathy      Additional Notes: The patient participated in group discussion and exercise on goal setting. The patient shared that her goals include feeling less anxious and less depressed as well as getting back to being able to live alone. The patient participated n conversation with peer regarding goals and accepted feedback. The patient will continue to self-disclose in process group.      Yovani Mayen

## 2023-02-07 NOTE — GROUP NOTE
SCOTT  GROUP DOCUMENTATION INDIVIDUAL                                                                          Group Therapy Note    Date: 2/7/2023    Group Start Time: 10:50 AM  Group End Time: 11:40 AM  Group Topic: Education Group - Outpatient    [place] [address] ACUTE BEHAV [address] GROUP DOCUMENTATION GROUP    Group Therapy Note    This writer facilitated cognitive skills group. This writer and patient reviewed materials on identifying triggers. The patient was encouraged to complete the trigger identification work sheet. The patient participated in conversation regarding triggers with this writer. Attendees: 2       Attendance: Did not attend    Patient's Goal:  meet with individual therapist    Interventions/techniques: N/A    Follows Directions: N/A    Interactions: N/A    Mental Status: N/A    Behavior/appearance: N/A    Goals Achieved: Individual counseling      Additional Notes:  Patient participated in individual counseling during group time.      Joe Cruz

## 2023-02-07 NOTE — PROGRESS NOTES
Partial Hospitalization ProgramBehavioral Health  Psychotherapy Note      Diagnosis: F33.1 Major depressive disorder, F41.9 Panic disorder    General Information    GOAL: To create treatment plan, identify discharge needs, and conduct individual session    * If patient is absent, state reason for absence, staff intervention, and staff signature. Psychotherapy Session    Start time: 10:50  Stop time: 11:20    Problem number: 1  Short term goal (STP): 1.3    Patient Mental Status and Mood/Affect:Congruent and Other: Calm    Patient Behavior and Appearance: Attentive and Cooperative    Intervention/Techniques: Informed, Validated/Supported, Prompted/Cued, and Provided Feedback    Focus of Session/Patient Response and Progress Towards Goal: Staff met with the patient to develop a treatment plan, identify discharge needs, and conduct individual session. Individual session focused on processing and identification of triggers for anxiety and low mood, goal 1.3. Patient presented as receptive and engaged as evidenced by asking appropriate questions and appropriate eye contact. Patient participated actively in treatment plan development, to include identifying goals and interventions. Patient presented to session with a list of questions relating to desire for insight into her felt anxiety. Patient discussed stressors which  Patient reported need for a psychiatrist, outpatient therapist, and  as part of her discharge planning. She reported intention to discharge on 2/17/2023. Patient presented as receptive to education on her role in processing to develop insight as well as different factors for her to consider as topics for processing to support insight development. Patient discussed stressors and events which contributed to her recent increase in symptoms. Patient continues to present as appropriate for treatment in [facility].     Clinician Signature: JENNIFER Dixon Patient notified of test results and follow up plan.    Copy of results and plan sent to PCP.

## 2023-02-07 NOTE — PROGRESS NOTES
MEDICATION GROUP THERAPY PROGRESS NOTE      Seun Santana was present for medication group. TOPIC: Medication adherence    GROUP TIME: 1:10 - 2:00 PM Tuesday    PERSONAL GOAL FOR PARTICIPATION: To be present for group, participate in discussion, and answer patient-directed questions. GOAL ORIENTATION: Personal    THERAPEUTIC INTERVENTIONS REVIEWED AND DISCUSSED: The following topics were presented: common barriers to taking medications including but not limited to cost, transportation, complexity of regimen, lack of knowledge about medication regimen;  beliefs regarding medications; strategies on how to overcome barriers and beliefs regarding medications in order to improve medication adherence. Patients were given time to ask questions regarding their current therapy. IMPRESSION OF PARTICIPATION: Haseeb Serrano was an active participant in group discussions. Reports multiple side effects from new medications that were started during [facility] admission. Reviewed her psychiatric medications & side effect profiles. Informed her most likely culprit for her side effects include: nausea likely related to sertraline, headache likely related to quetiapine, and forgetfulness likely related to clonazepam. Encouraged her to discuss her concerns with Dr. Isaiah Freeman.        Joe Coppola, PHARMD, BCPS, [facility]  Clinical Pharmacy Specialist, Behavioral Health  Desk: [phone]   Pharmacy: [phone]

## 2023-02-07 NOTE — GROUP NOTE
SCOTT  GROUP DOCUMENTATION INDIVIDUAL                                                                          Group Therapy Note    Date: 2/7/2023    Group Start Time: 12:10 PM  Group End Time:  1:10 PM  Group Topic: Education Group - Outpatient    [facility] ACUTE BEHAV HLTH    Weeks, Dianna    IP [address] [facility] GROUP DOCUMENTATION GROUP    Group Therapy Note    This writer facilitated a treatment planning group. This writer reviewed the life balance wheel and encouraged patients to process and identify their level of life satisfaction in different areas. This writer supported patients in identifying areas of life in which they want to reinvest effort. This writer supported collaborative problem solving to address barriers, to include providing psychoeducation on exposure hierarchies. Attendees: 2       Attendance: Attended    Patient's Goal:  developing solutions for balancing needs    Interventions/techniques: Informed, Promoted peer support, and Provide feedback    Follows Directions: Followed directions    Interactions: Interacted appropriately    Mental Status: Calm and Congruent    Behavior/appearance: Attentive and Cooperative    Goals Achieved: Able to give feedback to another and Discussed coping      Additional Notes:  Patient presented as receptive and engaged as evidenced by self-disclosure. Patient reported feeling strong in the area of spirituality as well as identified need to reinvest in other areas. Patient presented as receptive to education from staff. Patient supported collaborative problem solving with a peer. Patient will continue to work on developing solutions for balancing needs.      Shekhar Carr

## 2023-02-08 ENCOUNTER — HOSPITAL ENCOUNTER (OUTPATIENT)
Dept: BEHAVIORAL/MENTAL HEALTH | Age: 48
Discharge: HOME | End: 2023-02-08
Payer: MEDICAID

## 2023-02-08 VITALS
OXYGEN SATURATION: 95 % | SYSTOLIC BLOOD PRESSURE: 99 MMHG | HEART RATE: 75 BPM | DIASTOLIC BLOOD PRESSURE: 62 MMHG | RESPIRATION RATE: 18 BRPM | TEMPERATURE: 98.7 F

## 2023-02-08 PROCEDURE — 90853 GROUP PSYCHOTHERAPY: CPT

## 2023-02-08 NOTE — GROUP NOTE
SCOTT  GROUP DOCUMENTATION INDIVIDUAL                                                                          Group Therapy Note    Date: 2/8/2023    Group Start Time: 12:15 PM  Group End Time:  1:15 PM  Group Topic: Process Group - Outpatient    [place] [address] ACUTE BEHAV TH    Romerodarren Blas     [facility] GROUP    Group Therapy Note    The patients were encouraged to participate in a gratitude walk. The patients were encouraged to create an entry into their journal describing what they are grateful for in the moment of writing in the journal.  The patients were encouraged to spend time outside during the remainder of the group. Attendees: 3       Attendance: Attended    Patient's Goal:  practice coping strategy    Interventions/techniques: Other practice coping strategy    Follows Directions: Followed directions    Interactions: Interacted appropriately    Mental Status: Calm and Congruent    Behavior/appearance: Attentive and Cooperative    Goals Achieved: Able to listen to others and Discussed coping      Additional Notes: The patient participated in the gratitude walk and journaling exercise. The patient shared what she wrote in her gratitude journal entry with her peers and this writer. The patient used the remainder of the group to discuss gratitude with this writer and peers. The patient will continue to identify and utilize coping strategies in a group.     Otf Marie

## 2023-02-08 NOTE — BH NOTES
OUTPATIENT INTAKE PACKET     DEMOGRAPHICS  Deepa Pretty   1975  [address] 36   [id number]     2/6/2023          8:54 AM           Information Source: self  50 y.o. Accompanied by/Method of Arrival: Medicaid transportation      Marital Status: Single     Emergency Contact: Martha Mills (aunt)          Phone: [phone], [phone]           Allergies   Allergen Reactions    Lisinopril Rash       Swelling of the lips         Primary Support: Martha Mills   Relationship: aunt       Phone #: [phone], [phone]  Family Spokesperson Name:             Relationship:   Phone #:      [] Guardian    [] POA           Name:  Language if not English:         REPRODUCTION/SEXUALITY  Sexual Preference/Orientation: straight  Patient's Gender: F   Patient's Gender Identity: F  Pronoun Preference: She, her     PSYCHIATRIC CARE HISTORY  [] None          [x] Agency/Hospital     Where: [facility] IP              Who:    Date: End of January 2023     Current Patient: [] Yes [x]No Office Location:   [] Outpatient:   [] Inpatient:      [] ECT            [] Inpatient                 [] Outpatient              Last Treatment:      Last Seen:       [] Psychiatrist Name:                        Date:                                       Intent to Follow [] Yes [] No                             [] Therapist Name:                            Date: *Has one scheduled for after PHP, but could not remember information                                      Intent to Follow [] Yes [] No     CURRENT/PREVIOUS TREATMENT HISTORY           Problem or Disorder Facility Name  Treatment Type IP/OP/PHP/IOP Dates of Treatment Therapist/Psychiatrist Outcomes of Treatment   SANTY, MDD [facility] IP January 2023 Dr. Anuradha Perkins Referred to [facility]     Chief Complaint (patient's own words): \"So I can get better, function, and live on my own, I need y'all's help with that.  I struggle with bad thoughts, negative thoughts, I think everything is negative. \"      Pt presented with anxious affect and depressed mood. Pt denied SI HI and AVH at time of assessment. Pt reported she would like treatment to address her depression, anxiety, and overall negative thinking. Pt has hx of 2 suicide attempts via overdose, 4-5 inpatient hospitalizations, the last being after an episode where she was pulling her hair out, which she reports hx of. Pt reported feeling sad due to not being able to smile. Pt reported anhedonia and feeling she has no independence due to mental health.         What do you hope to accomplish in this program? \" I want to not have all these negative thoughts, and I want to smile, I never smile\"        MENTAL STATUS  Appearance: Well-groomed     Posture: Rigid     Body Movement: Nothing unusual     Affect: Flat     Mood: Pleasant, Anxious, Sad, and Depressed     Attitude: Cooperative and Help-seeking     Speech: Soft     Concentration: Attentive    Thought Process: WNL     Thought Content: WNL     SUICIDAL IDEATION: COMPLETE C-SSRS SCORE:   Protective Factors:  [] Children                                                      [] Responsibilities  [] No organized plan                                     [] No means/access to weapons  [] Contracts reliably for Safety                      [x] Yazidi beliefs/value system  [x] Denies intent                                              [] Adequate family/social support  [] Future oriented/reason to live            SELF-INJURIOUS BEHAVIOR  History of                                 Current  [] Burning                               [] Burning  [] Cutting                                [] Cutting  [] Headbanging                     [] Headbanging  [x] Other Pulling out hair     HOMICIDAL IDEATION/IMPULSIVITY  [x] Denies        [] Present during interview    [] Plan (describe):              [] Intended victim:                             [] Victim notified by whom:     [] History of homicidal ideation         [] Acted on  victim (outcome):     Have you ever been so upset you have lost control: [] Yes  [x] No  [] While hospitalized              [] Property damage               [] Assaultive   When:     DO YOU HAVE A WEAPON(S) AT HOME? [] Yes  [x] No  Type of Weapons:      SUBSTANCE USE/ABUSE  Check ALL that apply  Alcohol use:    [] Yes  [x] No              [] Non-Drinker           [] Former Drinker       [] AA involvement      [] Date quit:     Type:   Reason:   Length of time:            Time of Sobriety:                     Amount/frequency:      Date last used: Age when you began drinking:      [] Yes [x] No  Have you experienced any health/social/legal consequences because of your use? [] Yes [x] No  Have you ever felt the need to cut down on your use? [] Yes [x] No  Have you ever been annoyed by friend's/family's comments about your drinking? [] Yes [x] No  Have you ever felt bad or guilty about your drinking? [] Yes [x] No  Have you ever needed an eye opener? [] Yes [x] No  Did you ever go 3-4 days without drinking in the past month? If yes, what happened:   [] Withdrawal symptoms       [] Seizures     [] Delirium tremens  Describe:   Past treatment (where/when):      OTHER SUBSTANCE USE  Type Length of Use Amount/  Frequency Date Last Used   [] Marijuana                [] Cocaine/crack         [] Caffeine         [] Tobacco         [] Prescription         [] Opiates         [] Other (describe)                      EDUCATION/WORK HISTORY  [x] Intelligence appears to be consistent with age/education.   Highest level of education completed: Bachelor's in communications  [] Intelligence appears to be slow or developmentally delayed:   [] Special Ed             [] Trade school/VoEd  [] Stated developmental delay (describe):   [x] Employed   [x] Yes             Occupation: [employer], program  Disability:                          [] No               Date Last Worked: Off for two weeks  [x] Patient is satisfied with education/skills training     MEDICATIONS  List medications (incude prescription, over the counter, inhalers, vitamins/minerals/herbal supplements). [x] Yes   [] No  Medications brought from home  [] Yes  [] No  Sent home with family/friend     Name Prescribed By Dose Times Taken Last Time Taken Understands reasons for medications   Trazodone Dr. Murphy Cuco 100 mg 2 at bedtime 2/5 [x] Yes  [] No   Hydroxyzine Dr. Murphy Cuco 50 mg PRN 2/5 [x] Yes  [] No   Clonazepam Dr. Jeffrey Robertus 05 mg BID 2/5 [x] Yes  [] No   Seroquel Dr. Jeffrey Robertus 200 mg bedtime 2/5 [x] Yes  [] No   Sertraline HCL Dr. Murphy Cuco 100 mg Daily Today [x] Yes  [] No   Seroquel Dr. Murphy Cuco 100 mg Daily Today [x] Yes  [] No             [] Yes  [] No             [] Yes  [] No      PREFERRED PHARMACY: [facility]il [address], [phone]     SLEEP  [x] Yes  [] No   Problems sleeping  [x] Yes  [] No   Frequent night awakening  [x] Yes  [] No   Sleep aids: seroquel, trazodone  [] Yes  [x] No   Difficulty falling asleep  [x] Yes  [] No   Early morning awakening  [] Yes  [x] No   Naps during the day: unable to  Sleeps 5 hours daily, usually from 11pm to 5:30 am     NUTRITION  Home Diet:      [] Regular      [x] Special Diet diabetic     HEALTH HISTORY  Have you had any of the following?   [] Stroke                     [] Heart Failure          [] Emphysema           [] Anemia   [] Heart disease        [x] Diabetes                 [] Blood disorder       [] Hypertension  [] Stomach ulcer        [] Blood clots             [] Cancer                   [] Pacemaker  [] Seizures                 [] Rheumatic fever    [] Hepatitis                 [] Liver disease  [] Arthritis                   [] Pneumonia             [] Urinary problems   [] Glaucoma  [] Angina/chest pain  [] Tuberculosis          [] Thyroid problem     [] Heart attack  [] Asthma                     [] Other   [] Other   [] Other      Primary Care Provider: Dr. Cade Lopez  Active Medical Problems: Diabetic  Significant Past Surgeries:      Choose three (3) of the problems that bother you the most at this time.   [] Too much sleep                 [] Nisswa suspicious              [] Depressed  [] Hearing voices/noises       [] Talking too much               [] Wanting to be alone        other people don't hear     [] Racing thoughts                 [x] Hopelessness  [] Seeing things that other    [] Excitable                            [] Low self-esteem        people don't see                [] Can't relax                          [] Cries easy  [] Trouble remembering        [x] Worries too much              [] Feels sad most of the time  [] Se inside                        [] Unable to concentrate       [] Weakness  [] Angry                                  [] Can't make decisions        [] Eating more  [] Fearful/frightened              [] Guilty                                  [] Eating more  [x] Anxious                              [] Suicidal                              [] Eating less  [] Irritable/easily frustrated    [] Pain                                    [] Mahnaz  [] Poor sleep                          [] Wanting to harm self         [] Lonely  [] Trouble falling asleep  [] Early to wake  [] Taking Drugs  [] Drinking too much     FAMILY PSYCHIATRIC HISTORY  [] Denies     List the family member affected by each condition Grandma-MDD  Depression and/or Suicide Attempts: No  Psychiatric Treatment: Treatment  Alcohol and/or Drug Abuse: NA  Manic Symptoms: NA     Anticipated Family/Support Participation:  [] Assessment information    [] Treatment Plan      [] End of Program Planning  [x] Therapy                              [] None                      [] Other     Patient and Family Learning needs identified: effective coping skills, community resources, psychoeducation     Patient:  [x] Diagnosis, illness management    [x] Medications           [] ECT  [] Nutrition                                          [] Medical problems  [x] Community agencies  [] Pain management                         [] Alcohol/substance abuse education  [x] Personal effectiveness/coping skills  [] Other     Family:  [x] Diagnosis, illness management    [x] Medications           [] ECT  [] Nutrition                                          [] Medical problems  [x] Community agencies  [] Pain management                         [] Alcohol/substance abuse education  [] Personal effectiveness/coping skills  [] Other     How do you learn best: [] Reading  [] Listening  [x] Pictures  [] Demonstration    [] Video   []  Other      Cognitive: Exhibits ability to grasp concepts and responds to questions?  [x] Yes [] No  Physical Barrier: [] None [] Vision [] Hearing [] Language/spoken [] Read   [] Other   Emotional Barrier: [] None [x] Anxiety [] Anger [] Denial [] Depressed [] Other   Evangelical/Cultural Barrier: [x] None [] Yes (comment)  Financial Concern: [x] None [] Yes (comment)  Readiness to Learn: [x] Asking questions [x] Interest [] Return demo [] Other   Ability to Access Services: [x] Yes []No [] Other      STRENGTHS  [x] Motivated   [x] Recognizes need for treatment     [x] Independent with personal care  [] Good support system(s)    [] Employed   [] No significant physical illness  [] Past positive treatment experience [] Other (comment)     LIMITATIONS  [x] History of noncompliance              [] Unsuccessful treatment history  [] Homeless/impoverished                [] No identified support system  [] Multiple and/or complex medical problems           [] Other (comment)     VISION/HEARING/SPEECH  Trouble with vision:  [x] No [] Yes (describe)  Please check if you use: [] Glasses [] Contacts [] False eye [] Right [] Left  Lens implant: [] Right [] Left  Trouble hearing: [x] No [] Yes (describe)  Use a hearing aid:  [x] No [] Yes [] Right [] Left     Speech/Language: English: [x] Understands [x] Reads [x] Writes [] Other languages              [] Requires  (name/date/notified)  [] Uses speaking devices [] Uses sign language [] New onset speech difficulties      RECREATION AND LEISURE  Regularly engages in leisure/recreational activities: [x] No   [x] Yes *Difficulty doing these due to depression  Leisure/recreational activity interests: Watch TV, go shopping (retail therapy), Hangout with friends  [x] Likes group activities         [] Prefers solo activities     Are you currently involved with any community activities/organizations: [] No [x] Yes   Zoroastrianism     Current problems pursuing leisure/recreational activities:  [] Knowledge [] Skill [] Time [] Money [x] Motivation [] Transportation  [] Physical health issue     [address]  Patient believes in [place]   [x] Yes [] No     Are there any special spiritual, Worship, traditional, ethnic and/or cultural needs you may have while in the hospital: [x] No [] Yes (what)  Current [employer]  Currently Practicing: [x] Yes [] No     Would you like to speak with spiritual care while you are here [] Yes [x] No   [x] No Referral made [] N/A     PSYCHOSOCIAL HISTORY  Informant: [x] Patient [] Family [] Other  Living situation: [x] Lives alone [] Live with others   [x] Rents [] House/condo [] Apartment [] Group home [] Assisted living  [] Nursing home [] Homeless     Source of income: [x] Employed at: [employer]                  [] Full time [x] Part time     Monthly Income: $600 every 2 weeks     Occupation:     [] Retired       [] Pension      [] [employer] Compensation                          [] Artem Larsen           [] Randy Silverio             [] NEW/LELO           [] [address]                          [] Unemployment      [] Roman Valdes own money          [] Has payee (who)      service: [x] No [] Yes  []   Legal History: Current Charges:  No [] Yes             [] Police hold: [x] No [] Yes                          On probation:  [x] No [] Yes      [] alf time: [x] No [] Yes (where/when)     TRAUMA AND ABUSE HISTORY  [x] No   [] Yes  [] Emotional/mental  [] Sexual []Physical  [] Childhood history              Describe (who, what, when):      [] Previously reported           [] Treated for (by whom):     Have you ever experienced being in a war zone or  combat: [x] No [] Yes  Have you ever experienced a bad accident, serious illness or natural disaster and thought you might be killed: [x] No [] Yes  Have you ever been attacked (or witnessed an attack) with intent to kill or injure:   [x] No [] Yes              Describe:      Is there anything which triggers your re-experiencing this event: [x] No [] Yes              Describe:      STRESSORS     Any recent losses:  No [x] Yes (what) \"loss of a relationship, boyfriend, happened in Oct 2022, together 7 years     Other severe stressors: Hard on myself, internalize things and cannot let go- impacts daily functioning     As a result, do you have any of the following symptoms: [] Recurrent nightmares  [] Intrusive thoughts [x] Numbness [] Avoidance behaviors [x] Watchful, guarded  [] Easily startled [x] Feelings of detachment from others     How have you coped with stressors in the past? \"I didn't cope\"     What or whom do you rely on in times of stress: friends, Raghuell and Aurora Garcia     Areas of function negatively affected by this disorder:  [x] Marital/Intimacy/Family      [x] Financial     [] Safety         [] Spiritual  [] Vocational/Academic         [x] Social         [] Health         [x] Recreational/Leisure     DISPOSITION     [x] Admitted to Program  [] Referred Elsewhere  [] Placed on Wait list    [] Other (comment)     Dhara Ballard LCSW (social work)  2/6/2023 8:54 AM

## 2023-02-08 NOTE — BH NOTES
SAFETY PLAN     A suicide Safety Plan is a document that supports someone when they are having thoughts of suicide. Warning Signs that indicate a suicidal crisis may be developing: What (situations, thoughts, feelings, body sensations, behaviors, etc.) do you experience that lets you know you are beginning to think about suicide? 1. Lay around due to depression  2. Watch TV to avoid things  3. Hair pulling     Internal Coping Strategies:  What things can I do (relaxation techniques, hobbies, physical activities, etc.) to take my mind off my problems without contacting another person? 1. breathing  2. exercising  3. Make myself busy, tasks     People and social settings that provide distraction: Who can I call or where can I go to distract me? 1. Name: Celeste Dumont                  Phone: [phone]  2. Name: Soco Guerrero                     Phone: [phone]   3. Place: beach            4. Place: mall     People whom I can ask for help: Who can I call when I need help - for example, friends, family, clergy, someone else? 1. Name: Lesli Valera (aunt)               Phone: [phone], [phone]  2. Name: Edwin Wade(aunt)      Phone: [phone], [phone]  3. Name:                     Phone:      Professionals or [address] [place] [address] I can contact during a crisis: Who can I call for help - for example, my doctor, my psychiatrist, my psychologist, a mental health provider, a suicide hotline? 1. Clinician Name: [facility]   Phone: [phone]      Clinician Pager or Emergency Contact #:      2. Clinician Name:    Phone:       Clinician Pager or Emergency Contact #:      3. Suicide Prevention Lifeline: 3-010-821-TALK (7389)     4.  [address] [place] [address] Emergency Services -  for example, [facility], [facility] suicide hotline, [facility] Hotline: [facility]      Emergency Services Address: [address], [facility], [address]      Emergency Services Phone: [phone] Making the environment safe: How can I make my environment (house/apartment/living space) safer? For example, can I remove guns, medications, and other items? 1. Posting safety plan in home  2.  Having someone manage medications when in crisis

## 2023-02-08 NOTE — BH NOTES
Goal: Intake Assessment for PHP          met with patient for intake assessment. Pt denied SI/HI upon assessment. Pt signed MAGGIE for aunt. SW did not complete COWS, Audit-C or CIWA due to pt denying alcohol and opiate use.

## 2023-02-08 NOTE — GROUP NOTE
SCOTT  GROUP DOCUMENTATION INDIVIDUAL                                                                          Group Therapy Note    Date: 2/8/2023    Group Start Time: 10:50 AM  Group End Time: 11:40 AM  Group Topic: Education Group - Outpatient    [facility] 3 ACUTE BEHAV HLTH    Selene Vaughan    IP 1150 [facility] GROUP DOCUMENTATION GROUP    Group Therapy Note  Patients were provided with a worksheet that described various coping skills for anxiety. The patients were asked to reflect on each activity and choose one to engage in as a group. Patients were encouraged to provide feedback and support to their peers. Attendees: 3       Attendance: Attended    Patient's Goal:  To identify coping skills for anxiety and engage in a new coping skill as a group. To provide supportive feedback to peers. Interventions/techniques: Informed and Promoted peer support    Follows Directions: Followed directions    Interactions: Interacted appropriately    Mental Status: Congruent and Other disorganized    Behavior/appearance: Attentive and Cooperative    Goals Achieved: Able to engage in interactions, Able to listen to others, Able to give feedback to another, Able to reflect/comment on own behavior, and Able to receive feedback      Additional Notes: The patient was engaged in the coping skills group and participated in the discussion. The patient presented with a full affect and congruent mood. The patient shared that she has been experiencing anxiety and pulls her hair out when she feels overwhelmed. The patient reports that she stopped this behavior, but is worried that it will come back. The patient reflected on the coping skills described on the worksheet reports that they seem helpful. The patient required some redirection and thoughts were disorganized as evidenced by quickly shifting topics and unrelated speech. Patient was receptive to the redirection and feedback.  The patient identified her room as a safe space and utilize a beach-like location for an imagery grounding activity. The patient provided feedback to peers and will continue to engage in future groups.     JOHN RodriguezW, MSW Student

## 2023-02-08 NOTE — GROUP NOTE
SCOTT  GROUP DOCUMENTATION INDIVIDUAL                                                                          Group Therapy Note    Date: 2/8/2023    Group Start Time:  2:00 PM  Group End Time:  2:45 PM  Group Topic: [employer]    [facility] 3 ACUTE BEHAV HLTH    Weeks, Dianna    SCOTT [address] [facility] GROUP DOCUMENTATION GROUP    Group Therapy Note    This writer facilitated a community wrap up group focused on assessing for safety, coping skills practice, and practicing healthy coping and relaxation skills. The writer facilitated a symptom and safety check in using the afternoon check in form. The writer supported patients in identifying negative thoughts they want to release and led a relevant guided meditation, followed by a review of options for self-acceptance affirmations. The writer encouraged patients to discuss affirmations and engage in peer support. Attendees: 3       Attendance: Attended    Patient's Goal:  disclosing safety concerns and engaging with peer support    Interventions/techniques: Informed, Promoted peer support, and Supported    Follows Directions: Followed directions    Interactions: Interacted appropriately    Mental Status: Calm and Congruent    Behavior/appearance: Attentive and Cooperative    Goals Achieved: Able to give feedback to another and Able to self-disclose      Additional Notes:  Patient presented as receptive and engaged as evidenced by self-disclosure. Patient denied SI/HI/SUN on the check in form. Patient participated in the guided meditation and reported difficulty keeping her mind from wandering. Patient discussed relating to negative thoughts shared by peers and provided supportive feedback. Patient will continue to work on disclosing safety concerns and engaging with peer support.      Yoshi Ornelas

## 2023-02-08 NOTE — PROGRESS NOTES
GOAL: Treatment expectations    Patient approached this writer requesting time for an individual session. It is noted patient had asked this writer 2 times previously the same day about how often PHP staff conduct individual sessions with patients. Patient specifically requested to discuss her list of questions, which she had previously discussed with this writer during her individual session the day prior and during the morning community meeting, which this writer facilitated. Both times, this writer had provided education relevant to the patient's questions as well as education on how to further increase her own insight. This writer provided education again on the services the program provides and encouraged the patient to discuss her questions within the group context to get the benefit of feedback from peers who may have shared experiences. This writer reinforced groups as the primary form of treatment. Patient reported understanding.      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 Mom stated pt has begun communicating with other family members outside the house. Pt engaged well in tasks with SLP, but with minimal verbal output.      Goal 1: Pt will IND produce 1 single words x5     No, mama     []Met  [x]Partially met  []Not met   Goal 2: Pt will imitate enviromental sounds 5x per session given models and verbal/visual cues       Pt imitated ah and ugh this date given maximal prompting and opportunities to imitate this date  []Met  [x]Partially met  []Not met   Goal 3: Patient will utilize a total communication approach to make x10 different requests       More x5  All done x3  Help x2 given prompts []Met  [x]Partially met  []Not met     LONG TERM GOALS/ TREATMENT SESSION:  Goal 1: Pt will increase functional communication by the use of total communication approach for attempts to x10 per session Goal progressing see STG data  []Met  [x]Partially met  []Not met       EDUCATION/HOME EXERCISE PROGRAM (HEP)  New Education/HEP provided to patient/family/caregiver: Withhold desired item prompting pt to imitate     Method of Education:     [x]Discussion     [x]Demonstration    [] Written     []Other  Evaluation of Patients Response to Education:         [x]Patient and or caregiver verbalized understanding  []Patient and or Caregiver Demonstrated without assistance   []Patient and or Caregiver Demonstrated with assistance  []Needs additional instruction to demonstrate understanding of education    ASSESSMENT  Patient tolerated todays treatment session:    [x] Good   []  Fair   []  Poor  Limitations/difficulties with treatment session due to:   []Pain     []Fatigue     []Other medical complications     []Other    Comments:    PLAN  [x]Continue with current plan of care  [][facility]  []IHold per patient request  [] Change Treatment plan:  [] Insurance hold  __ Other     TIME   Time Treatment session was INITIATED 200   Time Treatment session was STOPPED 230   Time Coded Treatment Minutes 30     Charges: 1  Electronically signed by:  Dm Cordero M.A. ,CCC-SLP     Date:1/27/2021

## 2023-02-08 NOTE — GROUP NOTE
SCOTT  GROUP DOCUMENTATION INDIVIDUAL                                                                          Group Therapy Note    Date: 2/8/2023    Group Start Time:  9:00 AM  Group End Time:  9:45 AM  Group Topic: [employer]    [address] 3 ACUTE BEHAV HLTH    Weeks, Dianna    IP 1150 [facility] GROUP DOCUMENTATION GROUP    Group Therapy Note    This writer facilitated the morning check in community group focused on evaluating presentation, assessing for safety, and processing recent events. The writer encouraged patients to share events in their lives, to include coping skills use, and discuss the impact on their mental health. This writer provided feedback and psychoeducation on how different activities benefit mental health based on reports from patients. This writer encouraged patients to reflect on insights gained across time. The writer prompted and supported peer feedback and collaborative problem solving. This writer reviewed program structure and reinforced group as the primary form of treatment per patient request.     Attendees: 2       Attendance: Attended    Patient's Goal:  disclosing safety concerns and engaging with peer support    Interventions/techniques: Informed, Promoted peer support, and Provide feedback    Follows Directions: Followed directions    Interactions: Interacted appropriately    Mental Status: Calm and Congruent    Behavior/appearance: Attentive and Cooperative    Goals Achieved: Able to receive feedback and Able to self-disclose      Additional Notes:  Patient presented as receptive and engaged as evidenced by self-disclosure. Patient denied SI/HI/SUN on the check in form. Patient discussed shared interests with peer. Patient shared questions she has about her mental health and presented as receptive to feedback from her peer and staff. Patient provided supportive feedback to a peer. Patient will continue to work on disclosing safety concerns and engaging with peer support.      Christiane Fung Weeks

## 2023-02-08 NOTE — GROUP NOTE
[facility] GROUP DOCUMENTATION INDIVIDUAL                                                                          Group Therapy Note    Date: 2/8/2023    Group Start Time:  1:15 PM  Group End Time:  2:00 PM  Group Topic: Education Group - Outpatient    [address] [facility] 3 ACUTE BEHAV [facility]    Claude Man    IP 1150 [facility] GROUP DOCUMENTATION GROUP    Group Therapy Note  The patients were encouraged to engage in education about communication and using I statements. The patient were provided education on I statements and encouraged to participate in a role-playing exercise to practice using I statements. The patients were encouraged to use I statements for positive emotions and to give an example of a conflict currently going on in their lives that could benefit from using an I statement. Attendees: 3       Attendance: Attended    Patient's Goal:  improve communication    Interventions/techniques: Informed and Role playing    Follows Directions: Followed directions    Interactions: Interacted appropriately    Mental Status: Calm and Congruent    Behavior/appearance: Attentive and Cooperative    Goals Achieved: Able to engage in interactions and Able to listen to others      Additional Notes: The patient was open to education on I statements. The patient participated in role playing exercise as well as using I statements for a positive experience. The patient used a personal example of feeling unheard by the people in her life. The patient was supportive of peers during the group. The patient will continue to work on improving communication in psychoeducation group.     Trinh Fletcher

## 2023-02-08 NOTE — GROUP NOTE
IP  GROUP DOCUMENTATION INDIVIDUAL                                                                          Group Therapy Note    Date: 2/8/2023    Group Start Time:  9:50 AM  Group End Time: 10:40 AM  Group Topic: Process Group - Outpatient    [facility] 3 ACUTE BEHAV TH    Amber Pollard     [facility]    Group Therapy Note    Patients were given space to process emotions and experiences. Patients discussed and processed thoughts and feelings related to anxiety. Patients were encouraged to offer feedback and support to peers. Patients were asked to reflect on where they draw strength from and how they have demonstrated strength. Attendees: 2       Attendance: Attended    Patient's Goal:  Process emotions and experiences, self-disclose    Interventions/techniques: Validated, Promoted peer support, Provide feedback, and Supported    Follows Directions: Followed directions    Interactions: Interacted appropriately    Mental Status: Calm and Congruent    Behavior/appearance: Attentive and Cooperative    Goals Achieved: Able to give feedback to another, Able to reflect/comment on own behavior, Able to receive feedback, Able to self-disclose, Discussed coping, Identified feelings, Identified resources and support systems, and Identified distorted thoughts/beliefs      Additional Notes: The patient engaged actively in process group. The patient discussed an episode at her job in October, in which she felt anxious and began pulling out her hair. The patient discussed thoughts and feelings related to her anxiety and things she has done since then to challenge anxious thoughts and feelings. The patient discussed the importance of her jasvir as a source of strength. The patient discussed using gratitude and affirmations to reframing unpleasant and anxious thoughts. The patient discussed the role of medication on her anxiety, expressing concern that her medication dose is too strong.  The patient identified wanting to work on not dwelling on small things. The patient discussed the role of her family as a support system. The patient was receptive to and gave feedback to peers. The patient will continue to practice reflection and self-disclosure.     JOHN HaynesW, MSW Student

## 2023-02-09 ENCOUNTER — HOSPITAL ENCOUNTER (OUTPATIENT)
Dept: BEHAVIORAL/MENTAL HEALTH | Age: 48
Discharge: HOME | End: 2023-02-09
Payer: MEDICAID

## 2023-02-09 PROCEDURE — 90853 GROUP PSYCHOTHERAPY: CPT

## 2023-02-09 NOTE — GROUP NOTE
IP  GROUP DOCUMENTATION INDIVIDUAL                                                                          Group Therapy Note    Date: 2/9/2023    Group Start Time:  1:10 PM  Group End Time:  2:00 PM  Group Topic: Education Group - Outpatient    [address] [facility] 3 ACUTE BEHAV HLTH    Rebekah Jaramillo    IP 1150 [facility] GROUP DOCUMENTATION GROUP    Group Therapy Note    This writer facilitated cognitive skills groups on identifying/recognizing signs of stress and anxiety. This writer provided educational materials and reviewed those materials with patients. This writer encouraged the patient to discuss and suggested coping strategies for anxiety to each other. This writer encouraged the patients to participate in a 10-minute body scan medication exercise. Attendees: 3       Attendance: Attended    Patient's Goal:  identify stressors and practice mindfulness    Interventions/techniques: Informed and Other practice mindfulness    Follows Directions: Followed directions    Interactions: Interacted appropriately    Mental Status: Calm and Congruent    Behavior/appearance: Attentive, Cooperative, Motivated, and Neatly groomed    Goals Achieved: Able to listen to others, Able to self-disclose, and Identified triggers      Additional Notes: This writer facilitated cognitive skills groups on identifying/recognizing signs of stress and anxiety. This writer provided educational materials and reviewed those materials with patients. This writer encouraged the patient to discuss and suggested coping strategies for anxiety to each other. The patient expressed curiosity towards peers coping strategies. This writer encouraged the patients to participate in a 10-minute body scan medication exercise.     Flavia Clemente

## 2023-02-09 NOTE — GROUP NOTE
SCOTT  GROUP DOCUMENTATION INDIVIDUAL                                                                          Group Therapy Note    Date: 2/9/2023    Group Start Time: 10:50 AM  Group End Time: 11:40 AM  Group Topic: Education Group - Outpatient    [address] [facility] 3 ACUTE BEHAV [facility] 1150 [facility] GROUP DOCUMENTATION GROUP    Group Therapy Note    This writer facilitated a cognitive skills group. This writer provided patients with educational materials on active listening. The patients were encouraged to participate in review of educational materials. The patients were encouraged to role play active listening with their peers and practice asking open ended questions, reflection, verbal and non-verbal cues when communication. Attendees: 4       Attendance: Attended    Patient's Goal:  improve communication    Interventions/techniques: Informed and Role playing    Follows Directions: Followed directions    Interactions: Interacted appropriately    Mental Status: Calm and Congruent    Behavior/appearance: Attentive, Cooperative, and Neatly groomed    Goals Achieved: Able to listen to others and Able to self-disclose      Additional Notes: The patient was attentive and participated in review of the educational materials. The patient was open with personal details when asked open-ended questions by this writer and peers. The patient was occasionally off topic but would acknowledge thinking about stress this morning and worked to refocus on group topic. The patient participated in the role play exercise. The patient will continue to practice communication skills in the cognitive skills group.      Zhane Chang

## 2023-02-09 NOTE — GROUP NOTE
SCOTT  GROUP DOCUMENTATION INDIVIDUAL                                                                          Group Therapy Note    Date: 2/9/2023    Group Start Time:  9:50 AM  Group End Time: 10:40 AM  Group Topic: Process Group - Outpatient    [facility] 3 ACUTE BEHAV HLTH    Vaughan, Selene    IP 1150 [facility] GROUP DOCUMENTATION GROUP    Group Therapy Note  Patients were encouraged to discuss a situation that they desired feedback or support on. Patients were given the space to express their emotions and feelings. Patients were encouraged to provide supportive feedback to their peers. Attendees: 4       Attendance: Attended    Patient's Goal:    To self-disclose and process their emotions or feelings with the group    Interventions/techniques: Validated, Promoted peer support, and Provide feedback    Follows Directions: Followed directions    Interactions: Interacted appropriately    Mental Status: Congruent    Behavior/appearance: Attentive and Cooperative    Goals Achieved: Able to engage in interactions, Able to listen to others, and Able to give feedback to another      Additional Notes: The patient was engaged in the process group. The patient presented with a full affect and congruent mood. The patient reported that she had a rough morning and identified feelings of anxiety and depression to be the cause. The patient reported that she engaged in hair pulling yesterday evening and this morning. The patient would like to work on identifying her triggers and coping with her anxiety and depression. The patient reflected on the importance of family support and identified this as one of her strengths. The patient provided supportive feedback to others and will continue to self disclose in future groups.      Lc Franco, BSW, MSW Student

## 2023-02-09 NOTE — PROGRESS NOTES
Goal: Treatment Team          Treatment team included Dr. Margert Osgood, Brenda Head MSW, Bere Kearney MSW Student, and Toña Harvey MSW Student. Treatment team discussed medications and patient progress toward goals. Treatment team discussed patient presentation and information relevant to program intervention. Treatment team discussed patient's cognition and ability to process information in groups, including instances of patient interrupting during groups and bringing up irrelevant topics during groups. Will continue to encourage patient participation in group therapy.           Toña Harvey, JOHNW, MSW Student

## 2023-02-09 NOTE — PROGRESS NOTES
GOAL: Treatment expectations and MAGGIE     Staff met with the patient to inform her someone who identified herself as the patient's mother calling with questions about the patient's treatment. Staff stated a release of information would be needed to call and provide that information. Patient verbalized consent and authentically signed the release. Patient asked for an individual session for the day. Staff reminded the patient of the 1 individual session per week policy and reinforced groups as the main form of treatment. Staff utilized meeting the patient where she was at to stated concerns that could be discussed briefly, defined as 5 minutes or less, could be discussed outside of individuals. Patient asked 2 of the same questions she had presented at her individual earlier in the week and discussed with Staff. Staff reinforced the patient's role in tracking her own behaviors/emotions/thoughts/antecedent events to build the knowledge and insight the patient was asking staff to provide. Staff provided brief education on what to track and the benefits, then redirected the patient to group. Patient presented as receptive.      JENNIFER Holland

## 2023-02-09 NOTE — GROUP NOTE
IP  GROUP DOCUMENTATION INDIVIDUAL                                                                          Group Therapy Note    Date: 2/9/2023    Group Start Time: 12:10 PM  Group End Time:  1:00 PM  Group Topic: Reflection/Relaxation    [facility] 3 ACUTE BEHAV HLTH    Vaughan, Selene    IP 1150 [facility] GROUP DOCUMENTATION GROUP    Group Therapy Note  Patients were encouraged to participate in a journaling activity outside. Patients were encouraged to explore gratitude and were provided with three journal prompts to help guide them. Patients were spend time outside for the remainder of the group. Attendees: 4       Attendance: Attended    Patient's Goal:   To practice coping strategies  Interventions/techniques: Art integration    Follows Directions: Followed directions    Interactions: Interacted appropriately    Mental Status: Congruent    Behavior/appearance: Attentive and Cooperative    Goals Achieved: Able to engage in interactions and Able to reflect/comment on own behavior      Additional Notes: The patient participated in the journally exercise. The patient shared what she wrote in her journal with her peers. The patient continued to reflect on gratitude with her peers and sit quietly for the remainder of the group. The patient will continue to identify gratitudes in future groups.     JOHN BeckfordW, MSW Student

## 2023-02-09 NOTE — GROUP NOTE
SCOTT  GROUP DOCUMENTATION INDIVIDUAL                                                                          Group Therapy Note    Date: 2/9/2023    Group Start Time:  2:00 PM  Group End Time:  2:45 PM  Group Topic: [employer]    [facility] [place] 3 ACUTE BEHAV HLTH    Weeks, Dianna    SCOTT 1150 [facility] GROUP DOCUMENTATION GROUP    Group Therapy Note     This writer facilitated a community wrap up group focused on assessing for safety and coping skills practice. The writer facilitated a symptom and safety check in using the afternoon check in form. The writer facilitated an art-based activity for mindfulness practice and affirmation identification. The writer utilized meeting a patient where they are at and supported problem solving to facilitate engagement for a patient expressing discomfort with the activity. Attendees: 4       Attendance: Attended    Patient's Goal:  disclosing safety concerns and engaging with peer support    Interventions/techniques: Art integration, Informed, and Other Redirected    Follows Directions: Followed directions    Interactions: Interacted appropriately    Mental Status: Calm and Congruent    Behavior/appearance: Attentive and Cooperative    Goals Achieved: Able to listen to others and Able to receive feedback      Additional Notes:  Patient presented as receptive and engaged as evidenced by participation in the activity. Patient denied SI/HI/SUN on the check in form. Patient asked questions regarding the program, staffs evaluation of her performance in the program, and when the program ends for the day. Patient presented as receptive to education and redirection from staff. Patient will continue to work on disclosing safety concerns and engaging with peer support.      Tejas Chow

## 2023-02-10 ENCOUNTER — HOSPITAL ENCOUNTER (OUTPATIENT)
Dept: BEHAVIORAL/MENTAL HEALTH | Age: 48
Discharge: HOME | End: 2023-02-10
Payer: MEDICAID

## 2023-02-10 VITALS
DIASTOLIC BLOOD PRESSURE: 59 MMHG | RESPIRATION RATE: 17 BRPM | TEMPERATURE: 99 F | OXYGEN SATURATION: 95 % | SYSTOLIC BLOOD PRESSURE: 102 MMHG | HEART RATE: 73 BPM

## 2023-02-10 PROCEDURE — 90853 GROUP PSYCHOTHERAPY: CPT

## 2023-02-10 NOTE — GROUP NOTE
IP  GROUP DOCUMENTATION INDIVIDUAL                                                                          Group Therapy Note    Date: 2/10/2023    Group Start Time:  9:50 AM  Group End Time: 10:40 AM  Group Topic: Process Group - Outpatient    [facility] 3 ACUTE BEHAV HLTH    Amber Pollard     [facility]    Group Therapy Note    Patients were offered space to openly process emotions and experiences. Patients were encouraged to offer feedback and support to peers. Patients engaged in an icebreaker activity to welcome new member. Patients read through discussion questions related to boundaries and openly discussed their experiences setting boundaries. Attendees: 5       Attendance: Attended    Patient's Goal:  Process emotions and experiences    Interventions/techniques: Validated, Promoted peer support, and Supported    Follows Directions: Followed directions    Interactions: Interacted appropriately    Mental Status: Calm, Congruent, and Preoccupied    Behavior/appearance: Attentive and Cooperative    Goals Achieved: Able to listen to others, Able to reflect/comment on own behavior, Discussed coping, and Identified feelings      Additional Notes: The patient engaged actively in process group. The patient discussed wanting to find coping skills to keep her mind occupied. The patient discussed experiencing inaccurate thoughts of negative self-image. The patient was receptive to feedback and suggestions from peers. The patient offered suggestions to peers. The patient welcomed new group members. The patient engaged in an icebreaker activity, sharing that if she was invisible for a day, she would go shopping without paying. The patient discussed positive experiences setting boundaries with family. The patient will continue to practice self-disclosure and coping skills.     Daniel Keller, JOHNW, MSW Student

## 2023-02-10 NOTE — H&P
History and Physical    Chief Complaint: Trichotillomania  History of Present Illness: 51 yo AA female who was admitted to the inpatient psychiatry unit at [facility] for acute psychiatric stabilization as she has been dealing with depression and anxiety for many years. She states that she was on xanax, luvox, and trazodone previously. She states that the luvox made her feel confused so she stopped taking it. She states that she has been dealing with hair pulling and would like to start a new medication to stop it. She was cross tapered from luvox to zoloft. She was also started on seroquel for treatment augmentation. 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 We substituted clonazepam for her xanax  and she tolerated that well. Discharged in stable condition for further management at this PHP. States that she has only been pulling at her hair for the past few years. Acknowledges stress in her life that she plans to explore in this program.    Past Medical History:   Diagnosis Date    Anxiety     Depression     ANXIETY & DEPRESSION    Diabetes ([facility] [address]     TYPE II    Hypertension     Infected sebaceous cyst, upper back 4/10/2019    Panic attack     Suicidal thoughts     Vision decreased       Past Surgical History:   Procedure Laterality Date    COLONOSCOPY N/A 11/20/2020    . COLONOSCOPY  :- performed by Nicolas Pascal MD at [facility] ENDOSCOPY    HX OTHER SURGICAL  06/03/2019     Excision of large sebaceous cyst in the midline upper back and with packing- [facility]-DR. Stoo Spangler       Social History     Tobacco Use    Smoking status: Never    Smokeless tobacco: Never    Tobacco comments:     n/a never smoked tobacco   Substance Use Topics    Alcohol use: No      Family History   Problem Relation Age of Onset    Diabetes Mother     Hypertension Mother     Cancer Father         LUNG CA. SMOKER        Allergies   Allergen Reactions    Lisinopril Rash     Swelling of the lips       Medications: Zoloft, Seroquel, Klonopin,N-acetylcysteine, hydroxyzine, trazodone, Lipitor, Glucotrol, Metformin, Protonix, Losartan, Flonase,    Review of Systems (Check normal or all that currently apply):  GENERAL [x] Normal    []Abnormal weight loss  []Abnormal weight gain  []Fever  []Fatigue  []Chills/Sweats  []Other:    HEENT  [x] Normal    []Headache  []Blurred vision  []Tinnitus  []Dizziness  []Epistaxis  []Other:  CV  [x] Normal    []Chest  []Palpitations  []Murmur  []PND/orthopnea  []LE swelling  []Other:  PULM [x] Normal    []Wheezing  []Cough  []SOB  []Hemoptysis  []Sputum  []Other:  GYN/URO [] Normal    []Dysuria  []Frequency  []Urgency  []Hematuria  []Pelvic pain  []Other:    NEURO  [x] Normal    []Migraine  []Seizure  []TIA/Stroke  []Weakness/syncope  []Sensory impairment  []Other:  GI   [x] Normal    []CHIDI  []Nausea/Vomiting  []Diarrhea/constipation  []Bowel habit changes  []Melena  []Other:  MS  [x] Normal    []Joint swelling/pain  []Limitations/Neck mobility  []Gait difficulty  []Deformity  []Prosthetic devices  []Other:  ENDO  [x] Normal    []Hair loss  []Excessive sweat  []Excessive thirst  []Heat intolerance  []Cold intolerance  []Other:  HEME/LYMPH [] Normal    []Bleeding tendency  []DVT history  []Enlarged nodes  []Immunosuppression  []Recent steroid use  []Other:      Details for Pertinent Positives: Willingness for management. Mental Status Exam Findings: Jessica Keith is clear coherent and reports feeling well and moods are good. Denies SI/HI/AH/VH. No aggression or violence. Appropriately interactive and aware. Insightful. Impression/Initial Diagnosis: Trichotillomania, Mood disorder NOS    Course of Action/Treatment Plan: Groups and individual therapy.   Medication modification as appropriate    Aundrea Johnson MD  2/9/2023  10:05 PM

## 2023-02-10 NOTE — GROUP NOTE
SCOTT  GROUP DOCUMENTATION INDIVIDUAL                                                                          Group Therapy Note    Date: 2/10/2023    Group Start Time:  2:00 PM  Group End Time:  2:45 PM  Group Topic: [employer]    [facility] [place] 3 ACUTE BEHAV HLTH    Vaughan, [address] [facility] DOCUMENTATION GROUP    Group Therapy Note  Patients were provided a safety plan to have steps in place if a crisis were to occur. Patients were asked to complete their afternoon wrap up sheet to assess for safety and symptoms. Patients were asked to fill out an evening agenda to reflect on their tasks and prioritize self care. Patients were encouraged to share their agendas and to identify coping strategies they will use over the weekend. Attendees: 5       Attendance: Attended    Patient's Goal:  To reflect on self care and engage with peers by providing supportive feedback     Interventions/techniques: Validated and Promoted peer support    Follows Directions: Followed directions    Interactions: Interacted appropriately    Mental Status: Congruent    Behavior/appearance: Attentive and Cooperative    Goals Achieved: Able to engage in interactions, Able to listen to others, Able to give feedback to another, and Able to reflect/comment on own behavior      Additional Notes: The patient was engaged in the wrap up group. The patient denies any SI or HI. The patient asked this writer if their coping skills were good.  This writer encouraged their peers to provide feedback and suggestions. The patient was receptive to feedback. The patient reports that she will be practicing coping skills such as taking a bath, deep breathing, and journaling. The patient was encouraged to share her progress on Monday. The patient will continue to identify triggers and coping skills in future groups.      Andrei Monge, BSW, MSW Student

## 2023-02-10 NOTE — GROUP NOTE
[facility] GROUP DOCUMENTATION INDIVIDUAL                                                                          Group Therapy Note    Date: 2/10/2023    Group Start Time: 12:10 PM  Group End Time:  1:00 PM  Group Topic: Education Group - Outpatient    [facility] 3 ACUTE BEHAV HLTH    Amber Pollard    IP 1150 [facility] GROUP DOCUMENTATION GROUP    Group Therapy Note    Patients picked two emotions randomly from a pile and used body language to convey the emotion to the group. Patients were asked to identify emotions based on peers' body language. Patients were asked to discuss body language and how to identify emotions through body language. Patients were asked to reflect on thoughts and feelings related to body language. Attendees: 5       Attendance: Attended    Patient's Goal:  Convey emotions through body language, identify emotions through body language    Interventions/techniques: Challenged, Promoted peer support, and Role playing    Follows Directions: Followed directions    Interactions: Interacted appropriately    Mental Status: Calm, Congruent, and Preoccupied    Behavior/appearance: Attentive and Cooperative    Goals Achieved: Able to engage in interactions, Able to give feedback to another, and Identified feelings      Additional Notes: The patient engaged actively in communication group. The patient used body language to convey sleepy and sad. The patient was able to identify emotions based on peers body language. The patient got up during group, and appeared distracted by materials in the room unrelated to the group activity. The patient redirected herself. The patient asked questions to peers that were unrelated to the group activity. The patient offered feedback and support to peers. The patient discussed how people mask their emotions throughout life. The patient will continue to practice effective communication skills.     Bruce Victor, BSW, MSW Student

## 2023-02-10 NOTE — GROUP NOTE
SCOTT  GROUP DOCUMENTATION INDIVIDUAL                                                                          Group Therapy Note    Date: 2/10/2023    Group Start Time:  9:00 AM  Group End Time:  9:45 AM  Group Topic: [employer]    [facility] [place] 3 ACUTE BEHAV HLTH    Weeks, Dianna    SCOTT 1150 [facility] GROUP DOCUMENTATION GROUP    Group Therapy Note    This writer facilitated the morning check in community group focused on evaluating presentation, assessing for safety, and processing recent events . The writer encouraged patients to share events in their lives, identify triggers for difficult emotions, and identify ways in which the patients perceive themselves to be making progress. The writer prompted and supported peer feedback. The writer used validation and a trauma-informed approach to support a patient in processing as well as gently correcting feedback from a peer. Attendees: 3       Attendance: Attended    Patient's Goal:  disclosing safety concerns and engaging with peer support    Interventions/techniques: Validated, Promoted peer support, and Provide feedback    Follows Directions: Followed directions    Interactions: Interacted appropriately    Mental Status: Calm and Congruent    Behavior/appearance: Attentive and Cooperative    Goals Achieved: Able to give feedback to another and Able to self-disclose      Additional Notes:  Patient presented as receptive and engaged as evidenced by self-disclosure. Patient denied SI/HI/SUN on the check in form. Patient reported positive events from the evening prior. Patient provided feedback to peers. Patient will continue to work on disclosing safety concerns and engaging with peer support.      Fanta Mulligan

## 2023-02-10 NOTE — GROUP NOTE
IP  GROUP DOCUMENTATION INDIVIDUAL                                                                          Group Therapy Note    Date: 2/10/2023    Group Start Time: 10:50 AM  Group End Time: 11:40 AM  Group Topic: CBT    [facility] [place] 3 ACUTE BEHAV HLTH    Vaughan, Selene    [facility] GROUP DOCUMENTATION GROUP    Group Therapy Note  Patients were asked to identify emotions and behaviors associated with an event or situation theyve experienced recently. Patients were encouraged to reflect on physical sensations and emotion-driven urges. Patients were asked to explore intrusive thoughts and rephrase the statement more accurately. Patients were encouraged to provide supportive feedback to their peers. Attendees: 5       Attendance: Attended    Patient's Goal:  To explore a situation and identify emotions and behaviors. To provide feedback to peers    Interventions/techniques: Informed and Promoted peer support    Follows Directions: Followed directions    Interactions: Disorganized interaction    Mental Status: Congruent    Behavior/appearance: Attentive and Cooperative    Goals Achieved: Able to engage in interactions, Able to listen to others, Able to give feedback to another, and Able to reflect/comment on own behavior      Additional Notes: The patient was engaged in the cognitive skills group. The patient presented with a full affect and congruent mood. The patient appeared preoccupied at times and responding to internal stimuli. At times the patient's responses did not align with the questions or topic of conversation. The patient reported difficulty with memory and asked for her peers and this writer to repeat themselves often. The patient identified a situation that induced feelings of anger and sadness. The patient would like to work on identifying triggers and managing her hair pulling behaviors. The patient will continue to work towards treatment goals and engaging in future groups.       Thu Álvarez, JOHNW, MSW Student

## 2023-02-10 NOTE — GROUP NOTE
IP  GROUP DOCUMENTATION INDIVIDUAL                                                                          Group Therapy Note    Date: 2/10/2023    Group Start Time:  1:10 PM  Group End Time:  2:00 PM  Group Topic: Reflection/Relaxation    [facility] [place] 3 ACUTE BEHAV HLTH    Vermell Blunt    IP 1150 [facility] GROUP DOCUMENTATION GROUP    Group Therapy Note    This writer facilitated a cognitive skills group with patients. The patients were encouraged to join this writer outside and to utilize one of the provided journaling prompts to write about while outside. The patients were encouraged to share the prompts if they would like. Attendees: 4       Attendance: Attended    Patient's Goal:  practice mindfulness    Interventions/techniques: Other mindfulness journaling    Follows Directions: Followed directions    Interactions: Interacted appropriately    Mental Status: Calm and Congruent    Behavior/appearance: Attentive and Cooperative    Goals Achieved: Able to listen to others and Discussed coping      Additional Notes: The patient joined this writer and peers outside. The patient participated in journaling coping strategy exercise. Discuss coping and journaling and is hopeful that it will provide her insight into her triggers. The patient will continue to participate in practicing coping strategies in cognitive skills group.      Crissy Figueroa

## 2023-02-13 ENCOUNTER — HOSPITAL ENCOUNTER (OUTPATIENT)
Dept: BEHAVIORAL/MENTAL HEALTH | Age: 48
Discharge: HOME | End: 2023-02-13
Payer: MEDICAID

## 2023-02-13 VITALS
RESPIRATION RATE: 17 BRPM | DIASTOLIC BLOOD PRESSURE: 76 MMHG | TEMPERATURE: 99 F | SYSTOLIC BLOOD PRESSURE: 118 MMHG | OXYGEN SATURATION: 97 % | HEART RATE: 94 BPM

## 2023-02-13 PROCEDURE — 90832 PSYTX W PT 30 MINUTES: CPT

## 2023-02-13 PROCEDURE — 90853 GROUP PSYCHOTHERAPY: CPT

## 2023-02-13 NOTE — BH NOTES
OUTPATIENT PHYSICIAN PROGRESS NOTE      Chief Complaint/Symptoms/Impairments (as noted in Treatment Plan): Confusion and depression    Criteria for Continued Treatment (check all that apply):   [x] Preventing Decompensation   [] Improving Level of Functioning  [] Reducing Isolative Behaviors  [x] Understanding Diagnosis and Need for Medications  [] Improving Treatment/Medication Compliance  [] Confronting Denial of Illness  [x] Stabilizing Level of Functioning  [] Improving Emotional/Social/Cognitive Functioning  [] Decreasing Frequency of Hospitalizations    Suicidal/Homicidal ideations:   [x] Absent  [] Present  [] Passive  [] Intent  [] Plan  [] Death Wishes      CURRENT CONDITION OF PATIENT & PROGRESS ON TREATMENT PLAN    Subjective (ongoing complaints by patient regarding symptom severity, presentation, affect, function, etc.): Guerda Villar reports falling this weekend. Still confused with headaches. Moods are depressed as a result. Tolerating medications well but feels that they may be too much for her. Eating and sleeping fairly. Behavior (side effects, changes in mental status, objective observations seen in individual sessions or by staff):  Calm cooperative, clear coherent speech of average rate volume and tone. Denies SI/HI/AH/VH. No aggression or violence. Appropriately interactive and aware. Assessment/Plan (functional improvement and/or ongoing impairment, response to treatment, plan for future ongoing treatment and medication management to include revisions): Continue current care. Add motrin 600 mg PO Q 6 hrs prn for headache  Continue Clonazepam 0.5 mg PO Q hs for another week before taper. [] NO NEW Medications at this time. [x] New Medication prescribed and prescription provided to patient  [x] PHP Nurse notified of changes as needed    [x] Regarding any medications: patient instructed on purpose, benefits, side effects,   risks, and alternative treatments. Patient verbalizes understanding of instructions and has had the opportunity to ask questions.     Oneil Diallo MD  02/13/23   4:13 PM

## 2023-02-13 NOTE — GROUP NOTE
SCOTT  GROUP DOCUMENTATION INDIVIDUAL                                                                          Group Therapy Note    Date: 2/13/2023    Group Start Time:  9:00 AM  Group End Time:  9:55 AM  Group Topic: [employer]    [facility] [place] 3 ACUTE BEHAV [facility]    Deena Goode    IP 1150 [facility] GROUP DOCUMENTATION GROUP    Group Therapy Note        Attendees: 4       Attendance: Attended    Patient's Goal:  identify emotions    Interventions/techniques: Informed and Supported    Follows Directions: Followed directions    Interactions: Interacted appropriately    Mental Status: Calm and Congruent    Behavior/appearance: Attentive and Cooperative    Goals Achieved: Able to engage in interactions and Able to listen to others      Additional Notes: The patient participated in a discussion on weekend experiences since last time in treatment. The patient completed the morning assessment and participated n group discussion on healthy coping strategies. The patient will continue to identify emotions in community group.      Griselda Gray

## 2023-02-13 NOTE — GROUP NOTE
SCOTT  GROUP DOCUMENTATION INDIVIDUAL                                                                          Group Therapy Note    Date: 2/13/2023    Group Start Time:  2:00 PM  Group End Time:  2:45 PM  Group Topic: [employer]    [facility] [place] 3 ACUTE BEHAV HLTH    Weeks, Dianna    SCOTT 1150 [facility] GROUP DOCUMENTATION GROUP    Group Therapy Note    This writer facilitated a community wrap up group focused on assessing for safety, coping skills practice, and planning ways to engage in healthy coping while in the community. The writer facilitated a symptom and safety check in using the afternoon check in form. The writer encouraged and supported patients in identifying coping skills to use at home, to include supporting problem solving to address barriers. The writer closed with brief education on the benefits of gratitude and a gratitude journaling activity. Attendees: 3       Attendance: Attended    Patient's Goal:  disclosing safety concerns and engaging with peer support    Interventions/techniques: Validated, Promoted peer support, and Supported    Follows Directions: Followed directions    Interactions: Interacted appropriately    Mental Status: Calm and Congruent    Behavior/appearance: Attentive and Cooperative    Goals Achieved: Able to listen to others and Discussed coping      Additional Notes:  Patient presented as receptive and engaged as evidenced by completion of activities. Patient denied SI/HI/SUN on the check out form. Patient identified healthy coping to use in the evening. Patient will continue to work on disclosing safety concerns and engaging with peer support.      Fanta Mulligan

## 2023-02-13 NOTE — PROGRESS NOTES
Partial Hospitalization ProgramBehavioral Health  Psychotherapy Note      Diagnosis: F33.1 Major depressive disorder, F41.9 Panic disorder    General Information    GOAL: To create treatment plan, identify discharge needs, and conduct individual session    * If patient is absent, state reason for absence, staff intervention, and staff signature. Psychotherapy Session    Start time: 4710   Stop time: 9224    Problem number: 1  Short term goal (STP): 1.3    Patient Mental Status and Mood/Affect:Congruent and Other: Calm    Patient Behavior and Appearance: Neatly Groomed, Attentive, and Cooperative    Intervention/Techniques: Informed, Validated/Supported, Reframed, and Provided Feedback    Focus of Session/Patient Response and Progress Towards Goal: Staff met with the patient to develop a treatment plan and conduct individual session. Individual session focused on processing and identification of triggers for anxiety and low mood, goal 1.3. Patient presented as receptive and engaged with treatment plan review as evidenced by asking questions and providing thorough responses. Patient reported perception of forward progress though noted minimal application of novel coping skills outside of groups. It is noted patient reported desire to receive more coping skills education in groups and struggled to recall coping skills education already provided. Patient reported difficulty concentrating on tasks, to include starting tasks then forgetting what she is doing. Patient endorsed interest in further discussing triggers and presented as receptive to guiding questions to support insight building relating to triggers for anxiety and stress. Patient identified stressors related to familial relationships while also acknowledging ways in which those individuals are supportive of her. Patient identified her father as especially emotionally supportive.  Patient processed stressors related to feeling she cannot be fully authentic about her emotions and was receptive to encouragement to increase contact with emotionally supportive natural supports. Patient expressed belief she is not allowed to \"complain\" or God would not bless her. Patient struggled to define complaining versus healthy discussion and venting of difficulties. Patient presented as receptive to encouragement to discuss further with trusted spiritual advisors. Patient continues to be appropriate for PHP treatment at this time.     Clinician Signature: JENNIFER Echavarria

## 2023-02-13 NOTE — GROUP NOTE
SCOTT  GROUP DOCUMENTATION INDIVIDUAL                                                                          Group Therapy Note    Date: 2/13/2023    Group Start Time: 10:50 AM  Group End Time: 11:40 AM  Group Topic: Education Group - Outpatient    [facility] [place] 3 ACUTE BEHAV HLTH    Weeks, Dianna    SCOTT [address] [facility] GROUP DOCUMENTATION GROUP    Group Therapy Note    This writer facilitated cognitive skills group focused on shame versus healthy guilt versus unhealthy guilt. This writer provided brief education on the purpose of emotions. This writer discussed causes of shame and unhealthy guilt to support insight development. This writer facilitated processing of reported distressing emotions by patients and provided validation. Attendees: 3       Attendance: Attended    Patient's Goal:  differentiating between shame and guilt. Interventions/techniques: Informed, Validated, Promoted peer support, and Provide feedback    Follows Directions: Followed directions    Interactions: Interacted appropriately    Mental Status: Calm and Congruent    Behavior/appearance: Attentive and Cooperative    Goals Achieved: Able to self-disclose and Displayed empathy      Additional Notes:  Patient presented as receptive and engaged as evidenced by reading the educational material aloud for fellow patients. Patient asked peers questions and demonstrated empathy. Patient reported experiencing unhealthy guilt. Patient will continue to work on differentiating between shame and guilt.      Karlie Singh

## 2023-02-13 NOTE — GROUP NOTE
SCOTT  GROUP DOCUMENTATION INDIVIDUAL                                                                          Group Therapy Note    Date: 2/13/2023    Group Start Time: 12:20 PM  Group End Time:  1:10 PM  Group Topic: Education Group - Outpatient    [facility] [place] 3 ACUTE BEHAV HLTH    Rhenda Purpura    [facility] GROUP [facility]ATION GROUP    Group Therapy Note        Attendees: 4       Attendance: Did not attend    Patient's Goal:  identify emotions    Interventions/techniques: N/A    Follows Directions: N/A    Interactions: N/A    Mental Status: N/A    Behavior/appearance: N/A     Goals Achieved: Additional Notes:  met with therapist for individual session during group.      Flavia Clemente

## 2023-02-13 NOTE — GROUP NOTE
SCOTT  GROUP DOCUMENTATION INDIVIDUAL                                                                          Group Therapy Note    Date: 2/13/2023    Group Start Time: 10:00 AM  Group End Time: 10:45 AM  Group Topic: Process Group - Outpatient    [facility] [place] 3 ACUTE BEHAV HLTH    Weeks, Dianna CHAVEZ [facility] GROUP DOCUMENTATION GROUP    Group Therapy Note    This writer facilitated process group focusing on reviewing healthy versus unhealthy coping skills. This writer discussed traits which make a skill healthy or unhealthy and utilized examples and socratic questions to encourage patients to apply information to their lives and consider their own behavioral patterns. This writer provided gentle challenging of certain emotions as immoral. This writer discussed various types of healthy coping skills as well as discussed ways for patients to track their behaviors/emotions/thoughts to increase insight. Attendees: 3       Attendance: Attended    Patient's Goal:  processing behavior patterns to improve coping skills use    Interventions/techniques: Informed, Validated, Promoted peer support, and Provide feedback    Follows Directions: Followed directions    Interactions: Interacted appropriately    Mental Status: Calm and Congruent    Behavior/appearance: Attentive and Cooperative    Goals Achieved: Able to receive feedback and Able to self-disclose      Additional Notes:  Patient presented as receptive and engaged as evidenced by self-disclosure. Patient read from materials provided. Patient demonstrated belief of emotions as unhealthy and presented as receptive to brief feedback. Patient will continue to work on processing behavior patterns to improve coping skills use.      [place] [address]

## 2023-02-13 NOTE — GROUP NOTE
IP  GROUP DOCUMENTATION INDIVIDUAL                                                                          Group Therapy Note    Date: 2/13/2023    Group Start Time:  1:10 PM  Group End Time:  2:00 PM  Group Topic: Education Group - Outpatient    [address] [facility] 3 ACUTE BEHAV HLTH    Massiel Calderón    IP [address] [facility] [address] GROUP DOCUMENTATION GROUP    Group Therapy Note  The patient were encouraged to participate in gratitude exercise outside. The patient were encouraged to discuss personal gratitude in their lives and to provide support and feedback to peers. Attendees: 4       Attendance: Attended    Patient's Goal:  practice gratitude    Interventions/techniques: Other gratitude exercise    Follows Directions: Followed directions    Interactions: Interacted appropriately    Mental Status: Calm and Congruent    Behavior/appearance: Attentive and Cooperative    Goals Achieved: Able to listen to others, Able to give feedback to another, and Able to self-disclose      Additional Notes: The patient practice the gratitude walk and journal ing. The patient shared difficulties with relationship with her mother and in setting high expectations for her self- for returning to work and returning to independent living. The patient provided peers with feedback and support. The patient will continue to self-disclose in treatment planning group.      Silvana Richard

## 2023-02-14 ENCOUNTER — HOSPITAL ENCOUNTER (OUTPATIENT)
Dept: BEHAVIORAL/MENTAL HEALTH | Age: 48
Discharge: HOME | End: 2023-02-14
Payer: MEDICAID

## 2023-02-14 VITALS
OXYGEN SATURATION: 97 % | SYSTOLIC BLOOD PRESSURE: 104 MMHG | DIASTOLIC BLOOD PRESSURE: 73 MMHG | RESPIRATION RATE: 17 BRPM | HEART RATE: 82 BPM | TEMPERATURE: 98.6 F

## 2023-02-14 PROCEDURE — 90853 GROUP PSYCHOTHERAPY: CPT

## 2023-02-14 NOTE — BH NOTES
Goal: Intervention    Writer approached by SW and pt regarding pt's anxiety and increase in hair pulling. Pt sat in writer's office to speak 1:1. Pt presented anxious, constricted affect, rigid body language, observed rapidly pulling at her wig, and when it came off, pulling at her hair intensely, almost in a tick-like manner, and responded \"ok\" to all questions. Writer assisted pt in body scanning exercises, grounding exercises, and breathing exercises. Pt observed pulling hair less frequently, but still anxious. Writer offered taking a walk outside and pt agreed. Pt began talking and sharing more throughout the walk. Pt acted on hair pulling once towards beginning of walk, and only readjusted her wig afterwards. Pt shared that she had a bad night due to pressure of \"having a good day\" but she had bad thoughts. Pt and writer worked on reframing. Pt expressed gratitude for support from parents, not wanting to let them down, and her jasvir. Pt identified Denominational as primary motivation and something she is grateful for. Pt reported her mom is staying with her for the week. Pt shared that she has low self-esteem and doesn't like the way she looks. Pt and writer worked on ways to challenge thoughts, set and work towards smaller goals to achieve larger goal, positive affirmation exercises, and validating the positive things she has done. Pt stated she was ready for lunch, Observed sitting with peer in cafeteria, presented calmer.

## 2023-02-14 NOTE — PROGRESS NOTES
MEDICATION GROUP THERAPY PROGRESS NOTE    Liliane Hargrove was present for medication group. GROUP TIME: Tuesday 1:10 PM - 2:00 PM    TOPIC: Insomnia    PERSONAL GOAL FOR PARTICIPATION: To be present for group, participate in discussion, and answer patient-directed questions. GOAL ORIENTATION: Personal    THERAPEUTIC INTERVENTIONS REVIEWED AND DISCUSSED: The following topics were presented: common causes of insomnia, symptoms of insomnia, why sleep is important, negative effects of untreated insomnia, nonpharmacologic treatment options for insomnia including good sleep hygiene practices, over-the-counter and prescription medications used for treating insomnia including key side effects. IMPRESSION OF PARTICIPATION: Leanne Yang was an active participant in group activities. She repetitively stated her current medications to writer throughout the group. She brought up having trouble with forgetfulness. Writer reminded her of discussion last week regarding likely medication culprit (clonazepam). She asked about cutting her medications in half and encouraged her to speak with [facility] provider about it before making changes herself. Reports she saw PHP provider yesterday but no changes were made to her medications.        Ary Murrell, PHARMD, BCPS, [facility]  Clinical Pharmacy Specialist, [address] [facility]  Desk: [phone] ([phone])  Pharmacy: [phone] ([phone])

## 2023-02-14 NOTE — PROGRESS NOTES
GOAL: Collateral contact    Staff returned a call from the patient's mother to discuss treatment. Patient has provided a release of information for her mother. Mother reported she had received some of the information she had called for already. Mother asked about the patient's presentation. Staff discussed briefly the patient's elevated anxiety and steps taken by staff members to support the patient. Mother identified skills learned by the patient in groups that she has been using at home. Mother asked for other options to help the patient. Staff briefly discussed types of skills which will be addressed with the patient. Mother asked if the patient was discharging Friday, 2/17/2023. Staff stated she needed to discuss discharge dates with the patient further.      JENNIFER Cam

## 2023-02-14 NOTE — PROGRESS NOTES
GOAL: Crisis support    Patient approached staff reporting that she needed help not pulling her hair. Patient stated she began feeling anxious and had started pulling her hair again. Staff used validation and open ended questions to facilitate discussion around causes for anxiety and identifying anxiety intensity. Patient repeatedly pulled her own wig off suddenly and aggressively during the discussion. Patient denied having negative thoughts or worrying about anything. Patient reported her anxiety \"wasn't bad\" then rated her anxiety at a 5 out of 5 on a 0 to 5 scale, where 5 is the highest. The patient selected this scale. Staff asked further questions regarding the discrepancy. Patient then rated her anxiety as a 3 out of 5. With further discussion, patient identified worrying that she will experience low mood upon leaving group. Staff led the patient through deep breathing. Patient counted with staff instead of breathing while staff counted out the breathes for the deep breathing. Staff repeated the instructions and began again. Patient continued counting with staff instead of deep breathing. Staff asked if the patient was aware of this behavior. Patient stated she was not. Staff transitioned to a guided meditation, to which the patient had responded well previously. The patient did not pull at her hair or wig during the meditation but resumed when it ended. Patient presented with increasing frustration with herself as the pulling continued. Staff facilitated discussion about the emotions patient was experiencing. Patient denied experiencing the emotions she appeared to present with. Staff provided education on emotions as valid. Staff and patient transitioned to collaborating on developing a coping routine for the evening when the patient arrived home. Staff supported creation of half the routine then instructed patient to complete remaining time on the routine.  Patient endorsed agreement and agreed to return to groups.     JENNIFER Mccarty

## 2023-02-14 NOTE — BH NOTES
GROUP THERAPY PROGRESS NOTE    Genaro Velez is participating in Process Group. Afternoon  Process Group: Afternoon process group served as a time for all members to reflect on their goals for treatment while in this program. Group members were able to discuss traumas in their lives that are still impacting them as adults. Members were able to support each other while maintaining a safe space for everyone to feel comfortable. Hang Ovalles served as a supportive peer to others who shared. She was able to use active listening skills while others opened up. Hangluis Ovalles requested to speak with facilitator after. Facilitator provided supportive counseling while she processed her concerns. She continues to show progress towards her goals.         Brenda Oh, supervisee in social work

## 2023-02-14 NOTE — GROUP NOTE
SCOTT  GROUP DOCUMENTATION INDIVIDUAL                                                                          Group Therapy Note    Date: 2/14/2023    Group Start Time: 10:40 AM  Group End Time: 11:40 AM  Group Topic: Education Group - Outpatient    [facility] [place] 3 ACUTE BEHAV HLTH    Weeks, Dianna CHAVEZ [address] [facility] GROUP DOCUMENTATION GROUP    Group Therapy Note    This writer facilitated cognitive skills group focused on DBT mindfulness skills. This writer opened with a check in on the emotions of the group. This writer reinforced healthy coping as well as validated difficult emotions. This writer transitioned to providing education on the How skills of mindfulness, to include relation to radical acceptance. This writer provided brief education on the origins for automatic negative thoughts (ANTs) and how the nonjudgmental skill for mindfulness is useful practice for addressing ANTs. Attendees: 4       Attendance: Did not attend      Additional Notes:  Patient was unable to attend group due to meeting with another staff member for crisis support.     Shelly Fernandez

## 2023-02-14 NOTE — PROGRESS NOTES
GOAL: Transportation    Patient reported that her [employer] ride came while she was in the restroom. Patient reported asking the ride to wait for her and that she was on her way out. Patient reported the  was no longer waiting when she arrived outside. Staff contacted [employer] again and spoke to [place]. [place] scheduled a new trip, #[id number].     Leida MoraeschesJENNIFER gordon

## 2023-02-14 NOTE — GROUP NOTE
SCOTT  GROUP DOCUMENTATION INDIVIDUAL                                                                          Group Therapy Note    Date: 2/14/2023    Group Start Time:  2:00 PM  Group End Time:  2:45 PM  Group Topic: [employer]    [address] [facility] [address] 3 ACUTE BEHAV HLTH    Weeks, Dianna    IP [address] [facility] GROUP DOCUMENTATION GROUP    Group Therapy Note    This writer facilitated a community wrap up group. This writer encouraged patient to identify topics for groups they were most interested in to obtain feedback on patient needs. This writer provided a list of topic suggestions as well as encouraged patients to identify additional ones not listed. This writer facilitated rapport building activities to support increased group cohesion and improved dynamics, to include open discussion around patient interests as well as an activity for getting to know each other. Attendees: 5       Attendance: Attended    Patient's Goal:  disclosing safety concerns and engaging with peer support    Interventions/techniques: Validated, Promoted peer support, and Provide feedback    Follows Directions: Followed directions    Interactions: Did not interact appropriately    Mental Status: Calm and Flat    Behavior/appearance: Attentive and Cooperative    Goals Achieved: Able to listen to others and Able to receive feedback      Additional Notes:  Patient presented as receptive and engaged as evidenced by completion of activities and sharing in group. Patient denied SI/HI/SUN on the check out form. Patient interrupted a peer sharing about her interests by prompting another peer to share her response to an activity the group had started but paused due to the disclosures by another patient. Patient was receptive to redirection. Patient will continue to work on disclosing safety concerns and engaging with peer support.      Sharmin Chino

## 2023-02-14 NOTE — BH NOTES
GROUP THERAPY PROGRESS NOTE    Candance Pretty is participating in Process Group. 45 minutes    Morning Process Group: Facilitator served as a fill in for PHP participants this morning. The purpose of this group was to allow a safe space for participants to share and reflect on their mental health journeys. At first, many of the group members were quiet and slow to warm up as this was the first time . Facilitator focused on rapport building which allowed for many of the group members to open up and feel comfortable. Group members were able to support each other while they processed difficult experiences in their lives. Surendra Navarro was the first to open up to the group. She commented that she felt comfortable in the group because the facilitator was \"bubbly. \" Surendra Navarro then discussed her daily routine from the time she wakes up. Surendra Navarro was visibly anxious and facilitator observed her pulling her wig out of her head. Although other members ignored this behavior, she was brave enough to inform the group that when she gets anxious, she pulls her hair. Facilitator praised her vulnerability. Surendra Navarro continues to show progress towards her goals by being an active member in her treatment.        Victorina Munson, supervisee in social work

## 2023-02-14 NOTE — PROGRESS NOTES
GOAL: Transportation    Patient's [place] [address] ride came prior to the end of groups for the day. Patient requested the  wait. The  stated he would wait for 5 minutes and then left at the same time group ended. Staff contacted [place] [address] to get another  sent out due to the  coming at the incorrect time. [place] [address] staff stated they would send another  to schedule a return trip home. The trip number is [id number]. [place] [address] staff stated having a  assigned could take 20 to 30 minutes. Patient reported comfort waiting for the  to arrive.     JENNIFER Collado

## 2023-02-14 NOTE — GROUP NOTE
SCOTT  GROUP DOCUMENTATION INDIVIDUAL                                                                          Group Therapy Note    Date: 2/14/2023    Group Start Time:  9:00 AM  Group End Time:  9:30 AM  Group Topic: [employer]    [facility] [place] 3 ACUTE BEHAV HLTH    Weeks, Dianna    SCOTT [address] [facility] GROUP DOCUMENTATION GROUP    Group Therapy Note    This writer facilitated the morning check in community group focused on evaluating presentation and assessing for safety. The writer facilitated a gratitude mindfulness meditation and briefly discussed benefits. This writer supported patients in processing reactions to the meditation and facilitated discussion normalizing difficulty focusing. This writer closed with a gratitude journaling activity. Attendees: 4       Attendance: Attended    Patient's Goal:  disclosing safety concerns and engaging with peer support    Interventions/techniques: Validated, Promoted peer support, and Supported    Follows Directions: Followed directions    Interactions: Interacted appropriately    Mental Status: Calm and Congruent    Behavior/appearance: Attentive and Cooperative    Goals Achieved: Able to engage in interactions and Able to listen to others      Additional Notes:  Patient presented as receptive and engaged as evidenced by participating in activities. Patient denied SI/HI/SUN on the check in form. Patient reported enjoying the meditation and noted difficulty sustaining attention for the duration. Patient will continue to work on disclosing safety concerns and engaging with peer support.      Ángel Brown

## 2023-02-15 ENCOUNTER — HOSPITAL ENCOUNTER (OUTPATIENT)
Dept: BEHAVIORAL/MENTAL HEALTH | Age: 48
Discharge: HOME | End: 2023-02-15
Payer: MEDICAID

## 2023-02-15 VITALS
SYSTOLIC BLOOD PRESSURE: 114 MMHG | DIASTOLIC BLOOD PRESSURE: 72 MMHG | OXYGEN SATURATION: 97 % | HEART RATE: 85 BPM | RESPIRATION RATE: 17 BRPM | TEMPERATURE: 98.3 F

## 2023-02-15 DIAGNOSIS — F41.9 ANXIETY DISORDER, UNSPECIFIED TYPE: ICD-10-CM

## 2023-02-15 PROCEDURE — 90853 GROUP PSYCHOTHERAPY: CPT

## 2023-02-15 RX ORDER — METFORMIN HYDROCHLORIDE 1000 MG/1
1000 TABLET ORAL 2 TIMES DAILY WITH MEALS
Qty: 60 TABLET | Refills: 0 | Status: SHIPPED | OUTPATIENT
Start: 2023-02-15 | End: 2023-02-17 | Stop reason: SDUPTHER

## 2023-02-15 RX ORDER — SERTRALINE HYDROCHLORIDE 100 MG/1
150 TABLET, FILM COATED ORAL DAILY
Qty: 45 TABLET | Refills: 0 | Status: SHIPPED | OUTPATIENT
Start: 2023-02-15 | End: 2023-02-17 | Stop reason: SDUPTHER

## 2023-02-15 RX ORDER — GLIPIZIDE 5 MG/1
5 TABLET ORAL
Qty: 60 TABLET | Refills: 0 | Status: SHIPPED | OUTPATIENT
Start: 2023-02-15 | End: 2023-03-17

## 2023-02-15 RX ORDER — QUETIAPINE FUMARATE 100 MG/1
100 TABLET, FILM COATED ORAL DAILY
Qty: 30 TABLET | Refills: 0 | Status: SHIPPED | OUTPATIENT
Start: 2023-02-15 | End: 2023-02-17

## 2023-02-15 RX ORDER — CLONAZEPAM 0.5 MG/1
0.25 TABLET ORAL 2 TIMES DAILY
Qty: 30 TABLET | Refills: 0 | Status: SHIPPED | OUTPATIENT
Start: 2023-02-15 | End: 2023-02-17 | Stop reason: SDUPTHER

## 2023-02-15 RX ORDER — TRAZODONE HYDROCHLORIDE 100 MG/1
200 TABLET ORAL
Qty: 60 TABLET | Refills: 0 | Status: SHIPPED | OUTPATIENT
Start: 2023-02-15 | End: 2023-02-17 | Stop reason: SDUPTHER

## 2023-02-15 RX ORDER — QUETIAPINE FUMARATE 200 MG/1
200 TABLET, FILM COATED ORAL
Qty: 30 TABLET | Refills: 0 | Status: SHIPPED | OUTPATIENT
Start: 2023-02-15 | End: 2023-02-17

## 2023-02-15 NOTE — PROGRESS NOTES
This writer and the physician were present for this interaction. Patient met with physician to review medication management. The patient reported recent high levels of anxiety and depressive symptoms. The patient also reported difficulty with memory. Patient reports that 400mg of Motrin is helping headaches. Physician stopped Atarax and replaced it with half doses of Klonopin twice a day. Physician refilled other existing medications. Patient will continue to provide updates on medications and their side-affects throughout her treatment.      Jessie Berger BSW, MSW Student

## 2023-02-15 NOTE — GROUP NOTE
SCOTT  GROUP DOCUMENTATION INDIVIDUAL                                                                          Group Therapy Note    Date: 2/15/2023    Group Start Time:  9:00 AM  Group End Time:  9:45 AM  Group Topic: [employer]    [facility] 3 ACUTE BEHAV HLTH    Weeks, [place]avi    IP [facility] GROUP DOCUMENTATION GROUP    Group Therapy Note    This writer facilitated the morning check in community group focused on evaluating presentation and assessing for safety. Ruy López MSW intern, observed group for educational purposes. The writer utilized a person-centered, strengths-based approach to facilitate open discussion about recent stressors, barriers to forward progress, and recent triggering events. This writer validated difficult emotions and normalized non-linear recovery. This writer gently challenged cognitive distortions exhibited by patients and provided education on skills for practicing thought challenging. This writer supported and prompted peer feedback. Attendees: 6       Attendance: Attended    Patient's Goal:  disclosing safety concerns and engaging with peer support     Interventions/techniques: Validated, Promoted peer support, and Provide feedback    Follows Directions: Followed directions    Interactions: Interacted appropriately    Mental Status: Calm and Congruent    Behavior/appearance: Attentive and Cooperative    Goals Achieved: Able to receive feedback and Able to self-disclose      Additional Notes:  Patient presented as receptive and engaged as evidenced by self-disclosure. Patient denied SI/HI/SUN on the check in form. Patient discussed reactions of family to when she is experiencing distress. Patient exhibited multiple cognitive distortions and presented as receptive to feedback and challenging from peers and staff.  Patient will continue to work on disclosing safety concerns and engaging with peer support     Gunnar Banuelos

## 2023-02-15 NOTE — GROUP NOTE
IP  GROUP DOCUMENTATION INDIVIDUAL                                                                          Group Therapy Note    Date: 2/15/2023    Group Start Time: 10:50 AM  Group End Time: 11:40 AM  Group Topic: Education Group - Outpatient    [facility] [place] [address] ACUTE BEHAV HLTH    [place] Barbara Barrera    IP [address] [facility] GROUP DOCUMENTATION GROUP    Group Therapy Note    Patients were given psychoeducational materials about the cognitive-behavioral model and cognitive distortions. Patients were asked to share what they know about the cognitive-behavioral model. Patients were encouraged to share how they have encountered cognitive distortions and ways they have challenged and reframed thoughts. Patients were encouraged to offer feedback and support to peers. Attendees: 7       Attendance: Attended    Patient's Goal:  Understand and recognize cognitive distortions    Interventions/techniques: Challenged, Informed, and Promoted peer support    Follows Directions: Followed directions    Interactions: Interacted appropriately    Mental Status: Anxious, Calm, and Congruent    Behavior/appearance: Agitated, Attentive, and Cooperative    Goals Achieved: Able to engage in interactions, Able to reflect/comment on own behavior, Able to manage/cope with feelings, Able to receive feedback, Discussed coping, and Identified distorted thoughts/beliefs      Additional Notes: The patient engaged actively in cognitive skills group. The patient identified having heard of the cognitive-behavioral model, but being unfamiliar with what it means. The patient asked questions to peers related to reframing cognitive distortions. The patient identified a goal of reframing negative thoughts. The patient appeared anxious towards the end of group, stating that she needs to find a way to change her thoughts. The patient was able to regulate with support from peers and a .  The patient will continue to practice regulation and recognizing cognitive distortions.      Keara Chance, BSW, MSW Student

## 2023-02-15 NOTE — GROUP NOTE
SCOTT  GROUP DOCUMENTATION INDIVIDUAL                                                                          Group Therapy Note    Date: 2/15/2023    Group Start Time:  1:10 PM  Group End Time:  2:00 PM  Group Topic: Education Group - Outpatient    [address] 3 ACUTE BEHAV [place] [address] [facility] GROUP DOCUMENTATION GROUP    Group Therapy Note    Peer support group was facilitated by Mckenna Spangler, . This writer sat in to record. The  provided education on her role and shared about her life experiences. The  discussed her own mental health journey as well as the mental health journey of a loved one. The  discussed different coping skills, to include hobbies, paced breathing, and self-soothing. The  supported patients in processing frustrations with treatment. Attendees: 6       Attendance: Attended    Patient's Goal:  increasing coping skills use and engaging with peer supports    Interventions/techniques: Informed, Promoted peer support, and Provide feedback    Follows Directions: Followed directions    Interactions: Interacted appropriately    Mental Status: Calm and Congruent    Behavior/appearance: Attentive and Cooperative    Goals Achieved: Able to listen to others and Discussed coping      Additional Notes:  Patient presented as receptive and engaged as evidenced by asking questions and listening attentively. Patient identified coping skills she finds effective. Patient asked for additional examples of coping skills. Patient will continue to work on increasing coping skills use and engaging with peer supports.      [facility]

## 2023-02-15 NOTE — GROUP NOTE
SCOTT  GROUP DOCUMENTATION INDIVIDUAL                                                                          Group Therapy Note    Date: 2/15/2023    Group Start Time:  2:00 PM  Group End Time:  2:45 PM  Group Topic: [employer]    [facility] [place] 3 ACUTE BEHAV HLTH    Weeks, Dianna    SCOTT [address] [facility] GROUP DOCUMENTATION GROUP    Group Therapy Note    This writer facilitated a community wrap up group focused on assessing for safety, coping skills practice, and planning ways to engage in healthy coping while in the community. The writer facilitated a symptom and safety check in using the afternoon check in form. The writer encouraged and supported patients in identifying coping skills to use at home. The writer then provided a visual of the most commonly requested group topics from the feedback solicited the day prior and facilitated discussion, to include brief psychoeducation on the different topics as requested. The writer closed with a tapping exercise and brief discussion. Attendees: 6       Attendance: Attended    Patient's Goal:  disclosing safety concerns and planning for safety in the community    Interventions/techniques: Informed, Promoted peer support, and Provide feedback    Follows Directions: Followed directions    Interactions: Interacted appropriately    Mental Status: Calm and Congruent    Behavior/appearance: Attentive and Cooperative    Goals Achieved: Able to engage in interactions and Able to listen to others      Additional Notes:  Patient presented as receptive and engaged as evidenced by respectful listening. Patient denied SI/HI/SUN on the check out form. Patient presented with attentive body language when brief education was provided and participated in the tapping exercise. Patient will continue to work on disclosing safety concerns and planning for safety in the community.     Tejas Chow

## 2023-02-15 NOTE — GROUP NOTE
SCOTT  GROUP DOCUMENTATION INDIVIDUAL                                                                          Group Therapy Note    Date: 2/15/2023    Group Start Time:  9:50 AM  Group End Time: 10:40 AM  Group Topic: Process Group - Outpatient    [facility] 3 ACUTE BEHAV HLTH    Vaughan, Mimi Sanchez Dianna     [facility]    Group Therapy Note  Patients were encouraged to discuss a situation that they desired feedback or support on. Patients were asked to identify emotions and behaviors associated with the event and process them with the group. Patients were encouraged to self disclose and provide supportive feedback to their peers. Attendees: 6       Attendance: Attended    Patient's Goal:  To reflect on a situation they would like feedback and support on. To identify emotions and behaviors. T To provide feedback to peers. Interventions/techniques: Validated and Promoted peer support    Follows Directions: Followed directions    Interactions: Interacted appropriately    Mental Status: Congruent    Behavior/appearance: Attentive and Cooperative    Goals Achieved: Able to engage in interactions, Able to listen to others, Able to give feedback to another, and Able to reflect/comment on own behavior      Additional Notes: The patient was enaged in the process group. The patient presented with a full affect and congruent mood. The patient reported that they felt anxious this morning and engaged in hair pulling behavior. The patient reported that they engage in self-harming behavior, but did not disclose details of this behaviors. The patient reported that their anxiety is due to not engaging in Latter day practices as frequently. The patient also reports that her family and friends are supportive of her, but feels judgement to be better.  The patient will continue to process and identify coping skills in future groups.     Violet Knox, BSW, MSW Student

## 2023-02-15 NOTE — BH NOTES
OUTPATIENT PHYSICIAN PROGRESS NOTE      Chief Complaint/Symptoms/Impairments (as noted in Treatment Plan): Confusion and depression    Criteria for Continued Treatment (check all that apply):   [x] Preventing Decompensation   [] Improving Level of Functioning  [] Reducing Isolative Behaviors  [x] Understanding Diagnosis and Need for Medications  [] Improving Treatment/Medication Compliance  [] Confronting Denial of Illness  [x] Stabilizing Level of Functioning  [] Improving Emotional/Social/Cognitive Functioning  [] Decreasing Frequency of Hospitalizations    Suicidal/Homicidal ideations:   [x] Absent  [] Present  [] Passive  [] Intent  [] Plan  [] Death Wishes      CURRENT CONDITION OF PATIENT & PROGRESS ON TREATMENT PLAN    Subjective (ongoing complaints by patient regarding symptom severity, presentation, affect, function, etc.): Gerald Allison reports continued memory concerns with anxiety during the day and a sense that she is taking too much medication. Still confused with headaches though medications have helped some. Moods remain depressed as a result. Tolerating medications well. Eating and sleeping fairly. Behavior (side effects, changes in mental status, objective observations seen in individual sessions or by staff):  Calm cooperative, clear coherent speech of average rate volume and tone. Denies SI/HI/AH/VH. No aggression or violence. Appropriately interactive and aware. Clear coherent speech    Assessment/Plan (functional improvement and/or ongoing impairment, response to treatment, plan for future ongoing treatment and medication management to include revisions): Continue current care. Continue motrin 600 mg PO Q 6 hrs prn for headache  Change Clonazepam 0.25 mg PO BID for another week         [] NO NEW Medications at this time.    [x] New Medication prescribed and prescription provided to patient  [x] PHP Nurse notified of changes as needed    [x] Regarding any medications: patient instructed on purpose, benefits, side effects,   risks, and alternative treatments. Patient verbalizes understanding of instructions and has had the opportunity to ask questions.     Mally Solorio MD  02/15/23   4:13 PM

## 2023-02-15 NOTE — GROUP NOTE
SCOTT  GROUP DOCUMENTATION INDIVIDUAL                                                                          Group Therapy Note    Date: 2/15/2023    Group Start Time: 12:20 PM  Group End Time:  3:10 PM  Group Topic: Education Group - Outpatient    [place] [address] ACUTE BEHAV [address] GROUP DOCUMENTATION GROUP    Group Therapy Note    This writer facilitated a group on CBT thought records. This writer used an example from a patient to complete a thought record on the board with group participation. Following the though record review this writer encouraged the patients to participate in an ice breaker exercise with a new peer and to reflect on group. Attendees: 7       Attendance: Attended    Patient's Goal:  challenge cognitive distortions    Interventions/techniques: Other presented thought record    Follows Directions: Followed directions    Interactions: Interacted appropriately    Mental Status: Calm and Congruent    Behavior/appearance: Attentive and Cooperative    Goals Achieved: Able to listen to others and Identified distorted thoughts/beliefs      Additional Notes: The patient was open to and participated in the thought record practice. The patient allowed this writer to use a recent interaction in group to complete the thought record. The patient participated in conversation on cognitive distortions and challenged them. The patient participated in ice breaker exercise with new peers and provided peers with feedback and support during group discussion. The patient will continue to learn and practice CBT concepts in a group.     Brandi Akhtar

## 2023-02-16 ENCOUNTER — HOSPITAL ENCOUNTER (OUTPATIENT)
Dept: BEHAVIORAL/MENTAL HEALTH | Age: 48
Discharge: HOME | End: 2023-02-16
Payer: MEDICAID

## 2023-02-16 VITALS
DIASTOLIC BLOOD PRESSURE: 75 MMHG | SYSTOLIC BLOOD PRESSURE: 113 MMHG | OXYGEN SATURATION: 97 % | TEMPERATURE: 98.5 F | RESPIRATION RATE: 17 BRPM | HEART RATE: 99 BPM

## 2023-02-16 PROCEDURE — 90853 GROUP PSYCHOTHERAPY: CPT

## 2023-02-16 NOTE — GROUP NOTE
SCOTT  GROUP DOCUMENTATION INDIVIDUAL                                                                          Group Therapy Note    Date: 2/16/2023    Group Start Time: 10:50 AM  Group End Time: 11:40 AM  Group Topic: Education Group - Outpatient    [place] [address] ACUTE BEHAV [address] GROUP DOCUMENTATION GROUP    Group Therapy Note    This writer facilitated an education group on improving communication using reflection. The patients were provided educational materials on reflection and encouraged to participate in a role-playing exercise to use reflection. The patients were encouraged to participate in a ice breaker exercise to get to know new peers. Attendees: 7       Attendance: Attended    Patient's Goal:  improve communication    Interventions/techniques: Informed and Role playing    Follows Directions: Followed directions    Interactions: Interacted appropriately    Mental Status: Calm and Congruent    Behavior/appearance: Attentive, Cooperative, and Motivated    Goals Achieved: Able to listen to others and Able to self-disclose      Additional Notes: The patient was receptive to the educational materials on reflective listening. The patient participated in role playing exercises to practice reflective listening. The patient presented with difficulty focusing on group and shared positive affirmations with peers. The patient participated in ice breaker exercise. The patient will continue to work on improving communication skills in education groups.        Kiara Stokes

## 2023-02-16 NOTE — PROGRESS NOTES
GOAL: Treatment plan review    Staff met with patient to conduct treatment plan review. Patient presented as receptive and engaged as evidenced by asking questions, providing thorough responses, and not needing prompting to engage. Staff supported practice of thought challenging as cognitive distortions arose. Patient presented as receptive and reported feeling better having challenged the distortions. Patient expressed need for staff to call her mother to discuss SSDI. Staff provided education on the need to connect with a  and agreed to call the mother later on to also discuss. Patient reported need for a return to work letter for 3/6/2023.      JENNIFER Ramesh

## 2023-02-16 NOTE — GROUP NOTE
IP  GROUP DOCUMENTATION INDIVIDUAL                                                                          Group Therapy Note    Date: 2/16/2023    Group Start Time:  9:00 AM  Group End Time:  9:45 AM  Group Topic: [employer]    [address] 3 ACUTE BEHAV HLAmber Serrano    IP [address] [facility] GROUP DOCUMENTATION GROUP    Group Therapy Note    Patients were asked to discuss what they did the previous evening and during the morning. Patients were asked to reflect on feelings. Patients were asked to identify coping skills and self-care strategies utilized. Patients were encouraged to offer feedback and support to peers. Attendees: 7       Attendance: Attended    Patient's Goal:  Reflect on coping skills, connect with peers    Interventions/techniques: Validated, Promoted peer support, and Supported    Follows Directions: Followed directions    Interactions: Interacted appropriately    Mental Status: Anxious, Calm, and Congruent    Behavior/appearance: Attentive and Cooperative    Goals Achieved: Able to engage in interactions, Able to reflect/comment on own behavior, Able to receive feedback, and Identified feelings      Additional Notes: The patient engaged actively in check-in group. The patient discussed feeling unmotivated to come to treatment this morning, and that her mother Shaheed Ny me come. The patient discussed coping skills and self-care from the previous night. The patient identified feelings of depression and anxiety, and discussed feeling guilty for not taking care of herself physically, emotionally, and spiritually. The patient received a gift from a peer and appeared emotional, and was able to regulate. The patient expressed gratitude and was receptive to feedback and support from peers. The patient will continue to practice reflection and coping skills.     Humberto Tovar, BSW, MSW Student

## 2023-02-16 NOTE — BH NOTES
GOAL: Collateral contact    Staff called the patient's mother    Quick to anger with herself leads to anxiety

## 2023-02-16 NOTE — GROUP NOTE
SCOTT  GROUP DOCUMENTATION INDIVIDUAL                                                                          Group Therapy Note    Date: 2/16/2023    Group Start Time:  2:00 PM  Group End Time:  2:45 PM  Group Topic: [employer]    [facility] [place] 3 ACUTE BEHAV HLTH    Weeks, Dianna    SCOTT [address] [facility] GROUP DOCUMENTATION GROUP    Group Therapy Note     This writer facilitated a community wrap up group focused on assessing for safety and practicing skills. The writer facilitated a symptom and safety check in using the afternoon check in form. The writer utilized an art-based activity for emotion identification practice and discussion. The writer closed with brief discussion around patient plans for self-care. Attendees: 6       Attendance: Attended    Patient's Goal:  disclosing safety concerns and practicing emotion identification     Interventions/techniques: Art integration and Promoted peer support    Follows Directions: Followed directions    Interactions: Interacted appropriately    Mental Status: Calm and Congruent    Behavior/appearance: Attentive and Cooperative    Goals Achieved: Able to engage in interactions and Able to listen to others      Additional Notes:  Patient presented as receptive and engaged as evidenced by respectful listening. Patient denied SI/HI/SUN on the check out form. Patient advocated for her needs within group as well as engaged with peers through the activity.  Patient will continue to work on disclosing safety concerns and practicing emotion identification     Bill Rowell

## 2023-02-16 NOTE — GROUP NOTE
[facility] GROUP DOCUMENTATION INDIVIDUAL                                                                          Group Therapy Note    Date: 2/16/2023    Group Start Time:  1:10 PM  Group End Time:  2:00 PM  Group Topic: Education Group - Outpatient    [facility] [place] 3 ACUTE BEHAV HLTH    Amber Pollard    IP [facility] GROUP DOCUMENTATION GROUP    Group Therapy Note    Patients were given psychoeducation and reflection materials about self-compassion. Patients discussed what self-compassion is, how it is similar to compassion, and ways to show self-compassion/reward themselves. Patients were encouraged to offer examples and to offer feedback and support to peers. Attendees: 6       Attendance: Attended    Patient's Goal:  Understand and practice self-compassion    Interventions/techniques: Challenged, Informed, Validated, and Promoted peer support    Follows Directions: Followed directions    Interactions: Interacted appropriately    Mental Status: Calm and Congruent    Behavior/appearance: Attentive and Cooperative    Goals Achieved: Able to engage in interactions, Able to listen to others, Able to reflect/comment on own behavior, Able to receive feedback, and Discussed coping      Additional Notes: The patient actively engaged in education group. The patient discussed being hard on herself. The patient discussed having difficulty with self-compassion and rewarding herself. The patient discussed having difficulty with taking care of herself when she has depression. The patient discussed not being able to reward herself with food because of her diabetes. The patient was receptive to feedback and suggestions from peers. The patient will continue to practice self-compassion and identifying emotions.     Romario Leiva, BSW, MSW Student

## 2023-02-16 NOTE — BH NOTES
GOAL: Collateral contact    Staff called the patient's mother per patient's request to provide information next steps for SSDI application, to include need for connection to a . It is noted patient has signed an MAGGIE for her mother to support treatment. Staff provided information on options for future treatment and patient's presentation. Mother reported concerns regarding the patient's anxiety and anger towards herself as well as the patient's hair pulling.      JENNIFER June

## 2023-02-16 NOTE — PROGRESS NOTES
Goal: Treatment Team    This writer, Dr. Ramakrishna Haas, Arie Kilgore MSW, and social work interns Tamra Barth, and Valentina Crandall. met for treatment team. The team discussed the patient's presentation in groups, medication, progress on treatment plan and discharge plan. The treatment team will meet weekly to continue to discuss patients. Javy RODRIGUEZ   supervisee in clinical social work.

## 2023-02-16 NOTE — GROUP NOTE
SCOTT  GROUP DOCUMENTATION INDIVIDUAL                                                                          Group Therapy Note    Date: 2/16/2023    Group Start Time: 12:10 PM  Group End Time:  1:00 PM  Group Topic: Reflection/Relaxation    [facility] [place] 3 ACUTE BEHAV HLTH    Vaughan, Selene     [facility]    Group Therapy Note  Patients were encouraged to participate in a gratitude exercise outside. The patients were encouraged to write in their journal what they are grateful for in the moment of writing in their journal. The patients were encouraged to share their gratitudes with the group and spend the rest of the time outside. Attendees: 7       Attendance: Attended    Patient's Goal:  To practice gratitude      Interventions/techniques: Validated and Promoted peer support    Follows Directions: Followed directions    Interactions: Interacted appropriately    Mental Status: Congruent    Behavior/appearance: Attentive and Cooperative    Goals Achieved: Able to engage in interactions and Able to listen to others      Additional Notes: The patient arrived late group. The participant participated in the jouranl exercise. The patient shared what they wrote in the gratitude journal entry with their peers and this writer. The patient spent the rest of the time connecting with peers. The patient will continue to identify and utilize coping strategies in future groups.     Colby Hall, BSW, MSW Student

## 2023-02-16 NOTE — GROUP NOTE
[facility] GROUP DOCUMENTATION INDIVIDUAL                                                                          Group Therapy Note    Date: 2/16/2023    Group Start Time:  9:50 AM  Group End Time: 10:40 AM  Group Topic: Process Group - Outpatient    [facility] 3 ACUTE BEHAV HLTH    Vaughan, Selene    IP [facility] GROUP DOCUMENTATION GROUP    Group Therapy Note  Patients were offered the space to reflect on their emotions and experiences. Patients were encouraged to share situations they desired feedback or support on. Patients were encouraged to provide feedback and support to peers. Attendees: 7       Attendance: Attended    Patient's Goal:  To process emotions and experiences      Interventions/techniques: Validated and Promoted peer support    Follows Directions: Followed directions    Interactions: Interacted appropriately    Mental Status: Congruent    Behavior/appearance: Attentive and Cooperative    Goals Achieved: Able to engage in interactions and Able to listen to others      Additional Notes: The patient was engaged in the process group. The patient appeared preoccupied with her journal and prompted peers to repeat themselves often. The patient reflected on feelings of anxiety and heightened depressive symptoms in the morning. The patient utilized reframing skills to challenge intrusive thoughts. The patient expressed motivation to get better and verbalized goals to achieve this. The patient was receptive to feedback from peers and this writer. The patient will continue to provide feedback to peers and process emotions and experiences in future groups.       JOHN ByrneW, MSW Student

## 2023-02-17 ENCOUNTER — HOSPITAL ENCOUNTER (OUTPATIENT)
Dept: BEHAVIORAL/MENTAL HEALTH | Age: 48
Discharge: HOME | End: 2023-02-17
Payer: MEDICAID

## 2023-02-17 VITALS
SYSTOLIC BLOOD PRESSURE: 95 MMHG | HEART RATE: 94 BPM | TEMPERATURE: 98.6 F | RESPIRATION RATE: 17 BRPM | DIASTOLIC BLOOD PRESSURE: 62 MMHG | OXYGEN SATURATION: 97 %

## 2023-02-17 DIAGNOSIS — F41.9 ANXIETY DISORDER, UNSPECIFIED TYPE: ICD-10-CM

## 2023-02-17 PROCEDURE — 90832 PSYTX W PT 30 MINUTES: CPT

## 2023-02-17 PROCEDURE — 90853 GROUP PSYCHOTHERAPY: CPT

## 2023-02-17 RX ORDER — ONDANSETRON 4 MG/1
4 TABLET, ORALLY DISINTEGRATING ORAL
Qty: 90 TABLET | Refills: 0 | Status: ON HOLD | OUTPATIENT
Start: 2023-02-17

## 2023-02-17 RX ORDER — CLONAZEPAM 0.5 MG/1
0.25 TABLET ORAL 2 TIMES DAILY
Qty: 30 TABLET | Refills: 0 | Status: ON HOLD | OUTPATIENT
Start: 2023-02-17 | End: 2023-02-24

## 2023-02-17 RX ORDER — METFORMIN HYDROCHLORIDE 1000 MG/1
1000 TABLET ORAL 2 TIMES DAILY WITH MEALS
Qty: 60 TABLET | Refills: 0 | Status: ON HOLD | OUTPATIENT
Start: 2023-02-17 | End: 2023-03-19

## 2023-02-17 RX ORDER — TRAZODONE HYDROCHLORIDE 100 MG/1
200 TABLET ORAL
Qty: 60 TABLET | Refills: 0 | Status: SHIPPED | OUTPATIENT
Start: 2023-02-17 | End: 2023-02-22 | Stop reason: SDUPTHER

## 2023-02-17 RX ORDER — SERTRALINE HYDROCHLORIDE 100 MG/1
150 TABLET, FILM COATED ORAL DAILY
Qty: 45 TABLET | Refills: 0 | Status: SHIPPED | OUTPATIENT
Start: 2023-02-17 | End: 2023-02-22

## 2023-02-17 RX ORDER — QUETIAPINE 300 MG/1
300 TABLET, FILM COATED, EXTENDED RELEASE ORAL
Qty: 30 TABLET | Refills: 0 | Status: SHIPPED | OUTPATIENT
Start: 2023-02-17 | End: 2023-02-22 | Stop reason: SDUPTHER

## 2023-02-17 NOTE — PROGRESS NOTES
GOAL: Work letter    Staff provided the patient with a return to work letter listing her discharge date as 3/3/2023, as the patient had previously requested.     JENNIFER Yin

## 2023-02-17 NOTE — PROGRESS NOTES
GOAL: Family session scheduling    Staff confirmed with the patient she was in agreement with having a family session next week.  Patient reported interest.  Staff called patient's mother and scheduled a family session for Tuesday 2/21/2023 at 11:00 AM.     JENNIFER Donohue

## 2023-02-17 NOTE — BH NOTES
OUTPATIENT PHYSICIAN PROGRESS NOTE      Chief Complaint/Symptoms/Impairments (as noted in Treatment Plan): Confusion and depression still    Criteria for Continued Treatment (check all that apply):   [x] Preventing Decompensation   [] Improving Level of Functioning  [] Reducing Isolative Behaviors  [x] Understanding Diagnosis and Need for Medications  [] Improving Treatment/Medication Compliance  [] Confronting Denial of Illness  [x] Stabilizing Level of Functioning  [] Improving Emotional/Social/Cognitive Functioning  [] Decreasing Frequency of Hospitalizations    Suicidal/Homicidal ideations:   [x] Absent  [] Present  [] Passive  [] Intent  [] Plan  [] Death Wishes      CURRENT CONDITION OF PATIENT & PROGRESS ON TREATMENT PLAN    Subjective (ongoing complaints by patient regarding symptom severity, presentation, affect, function, etc.): Hank Lala reports continued concerns with anxiety and her medications. Still has a sense that she is taking too much medication but is wondering if she should take other medications for moods. Still having headaches though medications have helped some. Moods are depressed. Anxious about medications. Tolerating medications well. Eating and sleeping fairly. Discussed plan to change one med at a time and to monitor changes  she has been on Prozac but had nausea. Wants something fast acting and lexapro acts a little faster than the others. Will revisit next week. Behavior (side effects, changes in mental status, objective observations seen in individual sessions or by staff):  Calm cooperative, clear coherent speech of average rate volume and tone. Appears anxious. Clear coherent speech. Denies SI/HI/AH/VH. No aggression or violence. Appropriately interactive and aware.   Clear coherent speech    Assessment/Plan (functional improvement and/or ongoing impairment, response to treatment, plan for future ongoing treatment and medication management to include revisions): Continue current care. Continue motrin 600 mg PO Q 6 hrs prn for headache  Changed Sertraline 150 mg PO Q daily  Continue Clonazepam 0.25 mg PO BID for another week       [x] NO NEW Medications at this time. [] New Medication prescribed and prescription provided to patient  [x] PHP Nurse notified of changes as needed    [x] Regarding any medications: patient instructed on purpose, benefits, side effects,   risks, and alternative treatments. Patient verbalizes understanding of instructions and has had the opportunity to ask questions.     Lacy Lopez MD  02/17/23   4:13 PM

## 2023-02-17 NOTE — PROGRESS NOTES
[facility]  [address], [address], [address] [place]       Pulmonary Progress Note      SUBJECTIVE:  Claims similar with respiratory function, minimal improvement since admission, dyspnea with exertion though (?) ambulating a fair amount, tolerating prednisone so far    OBJECTIVE    Medications    Continuous Infusions:    Scheduled Meds:   montelukast  10 mg Oral Nightly    predniSONE  40 mg Oral Daily    sodium chloride flush  10 mL Intravenous BID    ipratropium-albuterol  1 ampule Inhalation Q4H    budesonide  1,000 mcg Nebulization BID    formoterol  20 mcg Nebulization BID    gabapentin  300 mg Oral TID    QUEtiapine  50 mg Oral Nightly    enoxaparin  40 mg Subcutaneous Daily       PRN Meds:HYDROcodone-acetaminophen    Physical    VITALS:  BP (!) 140/86   Pulse 63   Temp 98.2 °F (36.8 °C) (Axillary)   Resp 16   Ht 5' 8\" (1.727 m)   Wt 160 lb 1 oz (72.6 kg)   SpO2 93%   BMI 24.34 kg/m²     24HR INTAKE/OUTPUT:      Intake/Output Summary (Last 24 hours) at 18 1300  Last data filed at 18 1310   Gross per 24 hour   Intake              480 ml   Output                0 ml   Net              480 ml       24HR PULSE OXIMETRY RANGE:    SpO2  Av %  Min: 93 %  Max: 93 %    General appearance: alert, appears stated age and cooperative  Lungs: rhonchi bilaterally and wheezes bilaterally worse with cough  Heart: regular rate and rhythm, S1, S2 normal, no murmur, click, rub or gallop  Abdomen: soft, non-tender; bowel sounds normal; no masses,  no organomegaly  Extremities: extremities normal, atraumatic, no cyanosis or edema  Neurologic: Mental status: Alert, oriented, thought content appropriate    Data    CBC: No results for input(s): WBC, HGB, HCT, MCV, PLT in the last 72 hours. BMP:No results for input(s): NA, K, CL, CO2, PHOS, BUN, CREATININE in the last 72 hours.     Invalid input(s): [place] [facility]  Psychotherapy Note      Diagnosis: F33.1 Major depressive disorder, F41.9 Panic disorder    General Information    GOAL: To provide crisis support with individual session    * If patient is absent, state reason for absence, staff intervention, and staff signature. Psychotherapy Session    Start time: 945  Stop time: 1030    Problem number: 1  Short term goal (STP): 1.2 and 1.3    Patient Mental Status and Mood/Affect:Depressed, Flat, and Anxious    Patient Behavior and Appearance: Attentive, Cooperative, and Withdrawn/Quiet    Intervention/Techniques: Validated/Supported, Reframed, Listened/Empathized, Reinforced, and Provided Feedback    Focus of Session/Patient Response and Progress Towards Goal: Staff met with the patient to provide crisis support following the patient presenting with high anxiety and tics during group. Session focused on goal 1.2, applying healthy replacement behaviors for negative coping, and goal 1.3, identifying triggers for anxiety/low mood. Patient presented with tics of pulling at her own hair. Patient expressed feelings of emotional exhaustion, discouragement, and frustration. Patient presented with cognitive distortions and self-shaming language. Patient discussed difficulties related to natural supports who do not also have mental health diagnoses. Patient presented as receptive to reframing and gentle challenging of cognitive distortions as well as positive feedback regarding her progress and work inside and outside of groups. Patient reported feelings of numbness following anxiety decreasing. Patient endorsed feelings of urgency related to recovery and presented as receptive to reframing and education on mental health recovery as nonlinear. Patient requested a corrected version of her work letter, which she was provided. Patient endorsed comfort returning to group.  Patient continues to be appropriate for treatment at [facility].    Clinician Signature: JENNIFER Orourke

## 2023-02-17 NOTE — GROUP NOTE
IP  GROUP DOCUMENTATION INDIVIDUAL                                                                          Group Therapy Note    Date: 2/17/2023    Group Start Time:  9:50 AM  Group End Time: 10:40 AM  Group Topic: Process Group - Outpatient    [place] [address] ACUTE BEHAV HLTH    Vaughan, Selene    IP [facility] GROUP DOCUMENTATION GROUP    Group Therapy Note  Patients were offered space to process emotions and experiences. Patients were encouraged to provide feedback and support to their peers. Patients were asked to reflect on their evenings and any coping skills they utilized. Attendees: 7       Attendance: Attended    Patient's Goal:  Reflect on emotions and experiences through peer support      Interventions/techniques: Validated and Promoted peer support    Follows Directions: Followed directions    Interactions: Interacted appropriately    Mental Status: Congruent and Depressed    Behavior/appearance: Attentive and Cooperative    Goals Achieved: Able to listen to others and Able to reflect/comment on own behavior      Additional Notes: The patient arrived during the last five minutes of the group. The patient reported that they were experiencing negative thoughts and engaged in hair-pulling behaviors during the group prior, so they met with their  to process individually.  The patient will continue to process emotions and experiences in future groups    Sebastian Morris BSW, MSW Student

## 2023-02-17 NOTE — BH NOTES
OUTPATIENT PHYSICIAN PROGRESS NOTE      Chief Complaint/Symptoms/Impairments (as noted in Treatment Plan): Confusion and depression    Criteria for Continued Treatment (check all that apply):   [x] Preventing Decompensation   [] Improving Level of Functioning  [] Reducing Isolative Behaviors  [x] Understanding Diagnosis and Need for Medications  [] Improving Treatment/Medication Compliance  [] Confronting Denial of Illness  [x] Stabilizing Level of Functioning  [] Improving Emotional/Social/Cognitive Functioning  [] Decreasing Frequency of Hospitalizations    Suicidal/Homicidal ideations:   [x] Absent  [] Present  [] Passive  [] Intent  [] Plan  [] Death Wishes      CURRENT CONDITION OF PATIENT & PROGRESS ON TREATMENT PLAN    Subjective (ongoing complaints by patient regarding symptom severity, presentation, affect, function, etc.): Hank Lala reports continued concerns with anxiety and her medications. Still has a sense that she is taking too much medication but is wondering if she should take other medications. Still confused but less headaches though medications have helped some. Moods remain depressed as a result. Tolerating medications well. Eating and sleeping fairly. Discussed plan to change one med at a time and to monitor changes    Behavior (side effects, changes in mental status, objective observations seen in individual sessions or by staff):  Calm cooperative, clear coherent speech of average rate volume and tone. Appears anxious. Clear coherent speech. Denies SI/HI/AH/VH. No aggression or violence. Appropriately interactive and aware. Clear coherent speech    Assessment/Plan (functional improvement and/or ongoing impairment, response to treatment, plan for future ongoing treatment and medication management to include revisions): Continue current care.     Continue motrin 600 mg PO Q 6 hrs prn for headache  Consider SSRI for anxiety and depression management  Change Clonazepam 0.25 mg PO BID for another week       [x] NO NEW Medications at this time. [] New Medication prescribed and prescription provided to patient  [x] PHP Nurse notified of changes as needed    [x] Regarding any medications: patient instructed on purpose, benefits, side effects,   risks, and alternative treatments. Patient verbalizes understanding of instructions and has had the opportunity to ask questions.     Johann Lopez MD  02/16/23   4:13 PM

## 2023-02-17 NOTE — GROUP NOTE
IP  GROUP DOCUMENTATION INDIVIDUAL                                                                          Group Therapy Note    Date: 2/17/2023    Group Start Time:  2:00 PM  Group End Time:  2:45 PM  Group Topic: Process Group - Outpatient    [address] [facility] 3 ACUTE BEHAV HLTH    Maci Chandler    IP [facility] GROUP DOCUMENTATION GROUP    Group Therapy Note    This writer facilitated wrap up group with the patients. The patients were encouraged to complete the afternoon assessment and a evening agenda prioritizing self-care. The patients were encouraged to review safety plans prior with this writer during the group. The patients were encouraged to discuss weekend plans with peers. Attendees: 7       Attendance: Attended    Patient's Goal:  identify emotions, plan for self-care    Interventions/techniques: Other safety plan, encourage self-care, and afternoon assessment    Follows Directions: Followed directions    Interactions: Interacted appropriately    Mental Status: Calm and Congruent    Behavior/appearance: Attentive and Disheveled    Goals Achieved: Able to listen to others, Able to self-disclose, and Discussed safety plan      Additional Notes: The patient completed the afternoon agenda. No SI or HI noted, and patient identified mood and emotions. The patient reviewed the safety plan with this writer and peers. The patient completed the afternoon agenda. The patient shared that she is travelling to [place] with her mother and that she does not enjoy going to [place], but feels she has to for her mother. The patient participated in conversation about weekend plans and supported peers. The patient will continue to identify emotions in the wrap up group.     Soundra Clipper

## 2023-02-17 NOTE — GROUP NOTE
[facility]  GROUP DOCUMENTATION INDIVIDUAL                                                                          Group Therapy Note    Date: 2/17/2023    Group Start Time:  9:00 AM  Group End Time:  9:45 AM  Group Topic: [employer]    [facility] 3 ACUTE BEHAV HLTH    Weeks, Dianna    [facility] GROUP DOCUMENTATION GROUP    Group Therapy Note    This writer facilitated the morning check in community group focused on evaluating presentation, assessing for safety, and processing recent events. The writer encouraged patients to share events in their lives, identify triggers for difficult emotions, and identify ways in which the patients perceive themselves to be making progress. The writer utilized a person-centered approach with validation, therapeutic silence, open ended questions, and reflective listening to facilitate processing of endorsed difficult emotions by patients. The writer prompted and supported peer feedback. Attendees: 4       Attendance: Attended    Patient's Goal:  disclosing safety concerns and engaging with peer support    Interventions/techniques: Validated, Promoted peer support, Provide feedback, and Supported    Follows Directions: Followed directions    Interactions: Interacted appropriately    Mental Status: Anxious, Depressed, and Flat    Behavior/appearance: Attentive and Cooperative    Goals Achieved: Able to receive feedback and Able to self-disclose      Additional Notes:  Patient presented as receptive and engaged as evidenced by active participation in discussion. Patient denied SI/HI/SUN on the check in form. Patient presented with elevated anxiety and flat affect. Patient demonstrated tics, to include pulling at her hair, wig, and wig cap without conscious decision to engage in these behaviors. Patient presented as and verbalized distress around these behaviors. Patient discussed feelings of emotional exhaustion. Patient presented as receptive to feedback and support from peers.  Patient will continue to work on disclosing safety concerns and engaging with peer support.      Sun Baron

## 2023-02-17 NOTE — GROUP NOTE
[facility] GROUP DOCUMENTATION INDIVIDUAL                                                                          Group Therapy Note    Date: 2/17/2023    Group Start Time: 10:50 AM  Group End Time: 11:40 AM  Group Topic: Education Group - Outpatient    [address] [facility] 3 ACUTE BEHAV HLTH    Amber Pollard    IP [address] [facility] GROUP DOCUMENTATION GROUP    Group Therapy Note    Patients were offered educational materials about 6 domains of self-care and examples of self-care tasks. Patients were asked to identify domains of self-care and examples of self-care and coping skills that fall into different domains. Patients were encouraged to give feedback and support to others. Attendees: 8       Attendance: Attended    Patient's Goal:  Identify and discuss self-care    Interventions/techniques: Informed, Promoted peer support, and Supported    Follows Directions: Followed directions    Interactions: Interacted appropriately    Mental Status: Anxious, Calm, and Congruent    Behavior/appearance: Attentive and Cooperative    Goals Achieved: Able to engage in interactions, Able to reflect/comment on own behavior, Able to manage/cope with feelings, Able to receive feedback, and Discussed coping      Additional Notes: The patient engaged in cognitive skills group. The patient discussed the difference between different forms of self-care. The patient identified using praying as a form of spiritual self-care. The patient discussed anxious thoughts, and engaged in coping skills to regulate. The patient was receptive to feedback and support from peers. The patient will continue to practice self-care and coping skills.      Virginia Henderson, JOHNW, MSW Student

## 2023-02-17 NOTE — GROUP NOTE
IP  GROUP DOCUMENTATION INDIVIDUAL                                                                          Group Therapy Note    Date: 2/17/2023    Group Start Time: 12:10 PM  Group End Time:  1:00 PM  Group Topic: Recreational/Music Therapy    [facility] 3 ACUTE BEHAV HLTH    PollardMargarito halegh; Misty De Jesus    IP [facility] [facility] GROUP DOCUMENTATION GROUP    Group Therapy Note    Patients engaged in a reflective art activity. Patients were thing about \"peace\" and paint a canvas with what the word made them think of. Patients were encouraged to interact with each other while painting. Patients offered music suggestions to listen to while they painted. Attendees: 7       Attendance: Attended    Patient's Goal:  Reflect on \"peace\", express emotions through painting    Interventions/techniques: Art integration and Promoted peer support    Follows Directions: Followed directions    Interactions: Interacted appropriately    Mental Status: Calm, Congruent, and Happy    Behavior/appearance: Attentive and Cooperative    Goals Achieved: Able to engage in interactions and Able to listen to others      Additional Notes: The patient engaged actively in art group. The patient painted an affirmation on a canvas. The patient sang and danced to music. The patient danced with a peer. The patient interacted actively with peers. The patient will continue to practice coping skills.      Kiya Babcock, BSW, MSW Student

## 2023-02-17 NOTE — GROUP NOTE
IP  GROUP DOCUMENTATION INDIVIDUAL                                                                          Group Therapy Note    Date: 2/17/2023    Group Start Time:  1:10 PM  Group End Time:  2:00 PM  Group Topic: Education Group - Outpatient    [facility] 3 ACUTE BEHAV HLTH    Vaughan, Selene    IP [facility] [facility] GROUP DOCUMENTATION GROUP    Group Therapy Note  Dmitri from [facility] provided patients with information about their services and encouraged them to ask questions. This writer prompted patients to discuss emotions or experiences they would like feedback and support on for the remainder of the group. The patients were encouraged to participate in a guided meditation for the last ten minutes. Attendees: 8       Attendance: Attended    Patient's Goal:  To learn about services available through [employer] and engage with guest speaker. To reflect on emotions or experiences they would like feedback on. To practice mindfulness. Interventions/techniques: Informed and Promoted peer support    Follows Directions: Followed directions    Interactions: Interacted appropriately    Mental Status: Congruent    Behavior/appearance: Attentive and Cooperative    Goals Achieved: Able to engage in interactions, Able to listen to others, and Able to give feedback to another      Additional Notes: The patient was engaged and asked questions to the guest speaker. The patient was receptive to the information and inquired about accessibility. The patient engaged in the mindfulness activity. The patient will continue to engage in future groups.       Aniya Lew, BSW, MSW Student

## 2023-02-20 ENCOUNTER — HOSPITAL ENCOUNTER (OUTPATIENT)
Dept: BEHAVIORAL/MENTAL HEALTH | Age: 48
Discharge: HOME | End: 2023-02-20
Payer: MEDICAID

## 2023-02-20 VITALS
DIASTOLIC BLOOD PRESSURE: 76 MMHG | OXYGEN SATURATION: 97 % | SYSTOLIC BLOOD PRESSURE: 123 MMHG | TEMPERATURE: 98.6 F | RESPIRATION RATE: 17 BRPM | HEART RATE: 102 BPM

## 2023-02-20 PROCEDURE — 90853 GROUP PSYCHOTHERAPY: CPT

## 2023-02-20 NOTE — GROUP NOTE
[facility]  GROUP DOCUMENTATION INDIVIDUAL                                                                          Group Therapy Note    Date: 2/20/2023    Group Start Time:  1:10 PM  Group End Time:  2:00 PM  Group Topic: Education Group - Outpatient    [place] [address] ACUTE BEHAV [address] GROUP DOCUMENTATION GROUP    Group Therapy Note    This writer facilitated a psychoeducation group. The peers were encouraged to share coping strategies that are effective for them during the group activity. The patients were provided educational materials on relationship green flags to review. The patients were encouraged to welcome a new peer and participate in an ice breaker exercise. Attendees: 7       Attendance: Attended    Patient's Goal:  identify coping strategies    Interventions/techniques: Challenged, Informed, and Supported    Follows Directions: Followed directions    Interactions: Interacted appropriately    Mental Status: Flat and Preoccupied    Behavior/appearance: Cooperative    Goals Achieved: Able to listen to others, Able to self-disclose, and Discussed coping      Additional Notes: The patient participated in coping strategy activity. The patient participated in discussion on relationship green flags and was supportive of peers. The patient participated in ice-breaker exercise and was welcoming to new peer. The patient presented preoccupied during group and was often off topic and would interrupt to discuss anxiety and how to cope. Patient was receptive to support from peers. The patient will continue to discuss coping strategies with peers during psychoeducation group.      Tyler Wren

## 2023-02-20 NOTE — GROUP NOTE
SCOTT  GROUP DOCUMENTATION INDIVIDUAL                                                                          Group Therapy Note    Date: 2/20/2023    Group Start Time:  9:50 AM  Group End Time: 10:40 AM  Group Topic: Process Group - Outpatient    [facility] [place] 3 ACUTE BEHAV HLTH    Weeks, Dianna    SCOTT [address] [facility] GROUP DOCUMENTATION GROUP    Group Therapy Note    This writer facilitated process group. This writer utilized a trauma-informed, person-centered approach in conjunction with validation, open ended questions, and reflective listening to facilitate open discussion. This writer encouraged patients to share about events they are looking forward to as well as to discuss with the group recent, current, or upcoming situations about which they are experiencing difficult emotions. The writer prompted and supported peer feedback. Attendees: 4       Attendance: Attended    Patient's Goal:  engage with peer supports to process stressors    Interventions/techniques: Validated, Promoted peer support, and Provide feedback    Follows Directions: Followed directions    Interactions: Interacted appropriately    Mental Status: Calm and Congruent    Behavior/appearance: Attentive and Cooperative    Goals Achieved: Able to engage in interactions and Able to give feedback to another      Additional Notes:  Patient presented as receptive and engaged as evidenced by asking questions. Patient provided supportive feedback to peers. Patient encouraged peers to join discussion. Patient will continue to engage with peer supports to process stressors.      Aspen Raza

## 2023-02-20 NOTE — GROUP NOTE
SCOTT  GROUP DOCUMENTATION INDIVIDUAL                                                                          Group Therapy Note    Date: 2/20/2023    Group Start Time:  9:00 AM  Group End Time:  9:40 AM  Group Topic: [employer]    [facility] [place] [address] GROUP DOCUMENTATION GROUP    Group Therapy Note        Attendees: 5       Attendance: Attended    Patient's Goal:  Identify emotions    Interventions/techniques: Other morning assessment encourage self-disclosure    Follows Directions: Followed directions    Interactions: Interacted appropriately    Mental Status: Anxious and Depressed    Behavior/appearance: Attentive and Negative    Goals Achieved: Able to listen to others and Identified feelings      Additional Notes: The patient completed the morning check in assessment. The patient discussed weekend with parents in [place] and continuing to put pressure on herself to [facility] and no longer experience the anxiety or picking so that she can live alone. The patient accepted support and feedback from peers. The patient will continue to identify emotions in community group.      Lilian Emmanuel

## 2023-02-20 NOTE — GROUP NOTE
SCOTT  GROUP DOCUMENTATION INDIVIDUAL                                                                          Group Therapy Note    Date: 2/20/2023    Group Start Time:  2:00 PM  Group End Time:  2:45 PM  Group Topic: [employer]    [facility] [place] 3 ACUTE BEHAV HLTH    Weeks, Dianna    SCOTT [address] [facility] GROUP DOCUMENTATION GROUP    Group Therapy Note    This writer facilitated a community wrap up group focused on assessing for safety. The writer facilitated a symptom and safety check in using the afternoon check in form. The writer utilized person-centered approach to facilitate discussion processing a stressor endorsed by a patient as well as validating how the themes impact the patients more broadly. The writer closed with brief discussion around patient plans for self-care. Attendees: 7       Attendance: Attended    Patient's Goal:  disclosing safety concerns and planning for safety in the community    Interventions/techniques: Validated, Promoted peer support, and Provide feedback    Follows Directions: Followed directions    Interactions: Interacted appropriately    Mental Status: Calm and Congruent    Behavior/appearance: Attentive and Cooperative    Goals Achieved: Able to engage in interactions and Able to listen to others      Additional Notes:  Patient presented as receptive and engaged as evidenced by respectful listening. Patient denied SI/HI/SUN on the check out form. Patient asked questions of peers to further her understanding of their situation. Patient discussed need to keep herself occupied outside of groups to manager her anxiety and presented as receptive to suggestions from peers. Patient will continue to work on disclosing safety concerns and planning for safety in the community.      Sun Baron

## 2023-02-20 NOTE — GROUP NOTE
SCOTT  GROUP DOCUMENTATION INDIVIDUAL                                                                          Group Therapy Note    Date: 2/20/2023    Group Start Time: 12:20 PM  Group End Time:  1:10 PM  Group Topic: Process Group - Outpatient    [place] [address] ACUTE BEHAV [address] GROUP DOCUMENTATION GROUP    Group Therapy Note    This writer facilitated the morning community group with the patients. The patients were encouraged to complete the morning assessment. The patients were encouraged to discuss their experiences over the weekend. The patients were encouraged to provide support and feedback to peers. Attendees: 6       Attendance: Attended    Patient's Goal:  self-disclose    Interventions/techniques: Promoted peer support and Supported    Follows Directions: Followed directions    Interactions: Interacted appropriately    Mental Status: Anxious and Preoccupied    Behavior/appearance: Attentive and Cooperative    Goals Achieved: Able to listen to others and Discussed coping      Additional Notes: The patient participated in discussion of symptoms with peers. The patient presented preoccupied and would change the topic of conversation to herself and request help with utilizing coping strategies or affirmations. The patient was redirectable. The patient will continue to work on understanding symptoms, triggers, and coping strategies in the treatment planning group.       Genesis Cabral

## 2023-02-20 NOTE — GROUP NOTE
SCOTT  GROUP DOCUMENTATION INDIVIDUAL                                                                          Group Therapy Note    Date: 2/20/2023    Group Start Time: 10:50 AM  Group End Time: 11:40 AM  Group Topic: Education Group - Outpatient    [facility] [place] 3 ACUTE BEHAV HLTH    Weeks, Dianna    SCOTT [address] [facility] GROUP DOCUMENTATION GROUP    Group Therapy Note    This writer facilitated cognitive skills group focused on distress tolerance skills. This writer provided education on TIPP skill and the comparison skill from ACCEPTS. This writer encouraged patients to discuss past experiences with these skills. This writer supported patients in collaboratively problem solving to incorporate skills into daily living. Attendees: 6       Attendance: Attended    Patient's Goal:  learning and applying distress tolerance skills    Interventions/techniques: Informed, Promoted peer support, and Provide feedback    Follows Directions: Followed directions    Interactions: Interacted appropriately    Mental Status: Calm and Congruent    Behavior/appearance: Attentive and Cooperative    Goals Achieved: Able to engage in interactions and Able to listen to others      Additional Notes:  Patient presented as receptive and engaged as evidenced by attentive listening. Patient did not share during group. Patient listened to peers respectfully. Patient will continue to work on learning and applying distress tolerance skills.       Karthikeyan Gillette

## 2023-02-21 ENCOUNTER — HOSPITAL ENCOUNTER (OUTPATIENT)
Dept: BEHAVIORAL/MENTAL HEALTH | Age: 48
Discharge: HOME | End: 2023-02-21
Payer: MEDICAID

## 2023-02-21 PROCEDURE — 90832 PSYTX W PT 30 MINUTES: CPT

## 2023-02-21 PROCEDURE — 90853 GROUP PSYCHOTHERAPY: CPT

## 2023-02-21 NOTE — GROUP NOTE
SCOTT  GROUP DOCUMENTATION INDIVIDUAL                                                                          Group Therapy Note    Date: 2/21/2023    Group Start Time:  2:00 PM  Group End Time:  2:40 PM  Group Topic: [employer]    [facility] [place] 3 ACUTE BEHAV HLTH    Naehossein Romy    IP [address] [facility] GROUP DOCUMENTATION GROUP    Group Therapy Note    Attendees: 3    Wrap Up: Pts filled out afternoon wrap up group assessment. Pts then discussed ways that they are going to take care of themselves tonight, how they feel about the day and what they are proud of themselves for. Pts were supportive of one another and encouraged to reflect. Writer also facilitated a stretching and breathing exercise to increase mindfulness. Attendance: Attended    Patient's Goal:  attend activities and groups     Interventions/techniques: Validated, Promoted peer support, Provide feedback, and Supported    Follows Directions: Followed directions    Interactions: Interacted appropriately    Mental Status: Calm, Congruent, and Flat    Behavior/appearance: Attentive, Motivated, and Neatly groomed    Goals Achieved: Able to engage in interactions, Able to listen to others, Able to reflect/comment on own behavior, and Able to manage/cope with feelings      Additional Notes:  Pt spoke about how she is going to focus on painting and spending time with her family today.  Pt is making progress towards goals by attending and participating in group    [facility]

## 2023-02-21 NOTE — GROUP NOTE
SCOTT  GROUP DOCUMENTATION INDIVIDUAL                                                                          Group Therapy Note    Date: 2/21/2023    Group Start Time: 12:30 PM  Group End Time:  1:20 PM  Group Topic: Process Group - Outpatient    [address] [facility] [address] OP EARNEST Sonihossein Romy    IP 1150 [facility] GROUP DOCUMENTATION GROUP    Group Therapy Note    Attendees: 5    Process: Pts were asked how lunch went as a check in question. Pts were then asked to discuss their healing and what they need in order to gain peace. Pts spoke about boundaries they have had to set with family members and how that has been difficult for them. Pts provided feedback to one another and were encouraged to reflect on group        Attendance: Attended    Patient's Goal:  attend activities and groups     Interventions/techniques: Validated, Promoted peer support, Provide feedback, and Supported    Follows Directions: Followed directions    Interactions: Interacted appropriately    Mental Status: Calm and Congruent    Behavior/appearance: Attentive, Motivated, and Neatly groomed    Goals Achieved: Able to engage in interactions, Able to listen to others, Able to reflect/comment on own behavior, and Able to manage/cope with feelings      Additional Notes:  Pt spoke about how she is proud of working on herself and said that her family is a big motivator.  Pt is making progress towards goals by attending and participating in group     [facility]

## 2023-02-21 NOTE — PROGRESS NOTES
Partial Hospitalization ProgramBehavioral Health  Psychotherapy Note      Diagnosis:   F 33.1 Major Depressive Disorder, F41.9 Panic Disorder  General Information: Family meeting    * If patient is absent, state reason for absence, staff intervention, and staff signature. Psychotherapy Session    Start time: 1050  Stop time: 1140    Problem number: 1  Short term goal (STP): 1.3    Patient Mental Status and Mood/Affect:Anxious    Patient Behavior and Appearance: Attentive, Cooperative, and Motivated    Intervention/Techniques: Other: Encouraged self-disclosure, family therapy    Focus of Session/Patient Response and Progress Towards Goal: This writer met with the patient aunt, and mother for a family session. The patient presented appropriately and oriented to person, place, time, and situation. The patient experienced \"ticks\" multiple times during the session that are characterized by her pulling off her cap and repeating \"no\" or something similar multiple times. The patient is able to regulate following the ticks with breathing exercises, but reported being very frustrated while experiencing them. The patient's mother and aunt were very supportive of the patient and were interested in the why she is experiencing the ticks and anxiety and in the how and when she will stop experiencing them. The patient discussed experience in group which the patient has reported have been positive. The patient shared details of wanting to return to work, of wanting to return to a level of independence that her family will feel safe for her to function on her own. The patient also expressed a hesitation. This writer spoke with the patient about continued treatment due to increased anxiety reported by the patient about returning to independence.  This writer recommended an additional week of treatment to coordinate with outpatient services and allow patient additional time to process in the group as well as an additional meeting with psychiatrist to manage medications. The patient and family agreed. The patient continues to be appropriate for continued treatment in PHP. The patinent and family interacted appropriately with affection and concern for the patient and continue to be willing to support the patient as needed.      Clinician Signature: Harini RODRIGUEZ  Supervisee in Clinical Social Work

## 2023-02-22 ENCOUNTER — HOSPITAL ENCOUNTER (OUTPATIENT)
Dept: BEHAVIORAL/MENTAL HEALTH | Age: 48
Discharge: HOME | End: 2023-02-22
Payer: MEDICAID

## 2023-02-22 PROCEDURE — 90834 PSYTX W PT 45 MINUTES: CPT

## 2023-02-22 PROCEDURE — 90853 GROUP PSYCHOTHERAPY: CPT

## 2023-02-22 PROCEDURE — 90832 PSYTX W PT 30 MINUTES: CPT

## 2023-02-22 RX ORDER — QUETIAPINE 300 MG/1
300 TABLET, FILM COATED, EXTENDED RELEASE ORAL
Qty: 30 TABLET | Refills: 0 | Status: ON HOLD | OUTPATIENT
Start: 2023-02-22

## 2023-02-22 RX ORDER — FLUOXETINE HYDROCHLORIDE 20 MG/1
20 CAPSULE ORAL DAILY
Qty: 30 CAPSULE | Refills: 0 | Status: ON HOLD | OUTPATIENT
Start: 2023-02-22

## 2023-02-22 RX ORDER — TRAZODONE HYDROCHLORIDE 100 MG/1
200 TABLET ORAL
Qty: 60 TABLET | Refills: 0 | Status: ON HOLD | OUTPATIENT
Start: 2023-02-22 | End: 2023-03-24

## 2023-02-22 NOTE — GROUP NOTE
SCOTT  GROUP DOCUMENTATION INDIVIDUAL                                                                          Group Therapy Note    Date: 2/22/2023    Group Start Time:  1:10 PM  Group End Time:  2:00 PM  Group Topic: Education Group - Outpatient    [address] [facility] 3 ACUTE BEHAV [place] [address] [facility] GROUP DOCUMENTATION GROUP    Group Therapy Note    This writer facilitated an psycheducation group on healthy boundries. This writer provided patients with educational materials on the types of boundries and encouraged discussion and reflection on their personal experiences with boundries. This writer encouraged reflection at the end of group. Attendees: 8       Attendance: Attended    Patient's Goal:  improve boundaries    Interventions/techniques: Informed    Follows Directions: Followed directions    Interactions: Interacted appropriately    Mental Status: Calm and Preoccupied    Behavior/appearance: Attentive    Goals Achieved: Able to engage in interactions and Able to listen to others      Additional Notes: This patient was open to educational materials on boundaries. The patient reflected on personal experiences with boundaries during group discussion. The patient discussed boundaries with her family regarding continuing treatment. The patient presented as anxious, but this writer noted not episodes of pulling her hair or cap during group. The patient will continue to be open to educational materials during psych-education groups.     Nadira Sutton

## 2023-02-22 NOTE — GROUP NOTE
IP  GROUP DOCUMENTATION INDIVIDUAL                                                                          Group Therapy Note    Date: 2/22/2023    Group Start Time: 12:10 PM  Group End Time:  1:10 PM  Group Topic: Education Group - Outpatient    [facility] [place] [address] ACUTE BEHAV [facility]    Amber Pollard    IP 1150 [facility] GROUP DOCUMENTATION GROUP    Group Therapy Note    Patients were given educational materials with a chart depicting 4 attachment styles, on a scale from low to high anxiety and low to high avoidance. Patients were asked to discuss their own ideas for their attachment style and how they believe it has impacted their relationships. Patients were encouraged to give and receive feedback. Attendees: 6       Attendance: Did not attend    Patient's Goal:  Understand and discuss attachment styles    Interventions/techniques: Other Did not attend    Follows Directions: Other Did not attend    Interactions: Other Did not attend    Mental Status: Other Did not attend    Behavior/appearance: Did not attend    Goals Achieved: Did not attend      Additional Notes: The patient was not in group, and met individually with a .     Harry Thompson, BSW, MSW Student

## 2023-02-22 NOTE — PROGRESS NOTES
Partial Hospitalization ProgramBehavioral Health  Psychotherapy Note      Diagnosis: F 33.1 Major Depressive Disorder, F41.9 Panic Disorder    General Information: individual session and treatment planing    * If patient is absent, state reason for absence, staff intervention, and staff signature. Psychotherapy Session    Start time: 11:10 am  Stop time: 11:40 am     Problem number: 1.2  Short term goal (STP): 1.2    Patient Mental Status and Mood/Affect:Anxious and Worried    Patient Behavior and Appearance: Attentive, Cooperative, and Negative    Intervention/Techniques: Other: Discharge planning, treatment plan review, family meeting    Focus of Session/Patient Response and Progress Towards Goal: This writer and patient met for an individual session and treatment plan review. This writer and patient discussed discharge plan made the previous day during the family session. The patient shared that her family is making and effort to support her while she is in treatment and that requires her mother and aunt to travel from [place] [address] to support her. The patient shared that is a burden on her family and part of the motivation to discharge on Friday. This writer expressed concern about discharge at this time due to medication non-compliance, continued daily distressing episodes the patient reported experiencing in the morning as well as is experiencing in treatment. This writer and patient contacted the patient's mother and aunt who provider her with support to discuss discharge plan. The patient shared with her family and this writer that she would like to continue in treatment until the following week. The patient's mother and aunt shared that they would like for the patient to discharge Friday, but because the patient is reporting a desire to continue in treatment they will have to consult to see how they can continue to support the patient . This writer and patient reviewed treatment plan. Details of review included in treatment plan. The patient signed treatment plan. The patient is appropriate for continued treatment in [facility] until anticipated discharge date of 3/1/2023    Clinician Signature: Santana RODRIGUEZ  Supervisee in [place] [id number].

## 2023-02-22 NOTE — GROUP NOTE
IP  GROUP DOCUMENTATION INDIVIDUAL                                                                          Group Therapy Note    Date: 2/22/2023    Group Start Time:  9:50 AM  Group End Time: 10:40 AM  Group Topic: Process Group - Outpatient    [facility] [place] [address] ACUTE BEHAV HLTH    Patricia Gallardo; James Shira    IP [address]150 [facility] GROUP DOCUMENTATION GROUP    Group Therapy Note    Patients were given time and space to openly process difficult emotions and experiences from the previous night and morning. Patients were encouraged to self-disclose and identify emotions. Patients were encouraged to give and receive feedback and support to other group members. Attendees: 6       Attendance: Attended    Patient's Goal:  Process emotions and experiences    Interventions/techniques: Validated, Promoted peer support, and Supported    Follows Directions: Followed directions    Interactions: Interacted appropriately    Mental Status: Anxious, Calm, and Congruent    Behavior/appearance: Agitated, Attentive, and Cooperative    Goals Achieved: Able to engage in interactions, Able to listen to others, and Able to give feedback to another      Additional Notes: The patient engaged in process group. The patient offered feedback and support to peers. The patient discussed having had a rough morning, but that she was trying to cope. The patient pulled her hat off during group, shouting Stop it, and when asked by a peer confirmed that she had not been triggered by the group discussion. The patient will continue to practice reflection and coping skills.      JOHN KhanW, MSW Student

## 2023-02-22 NOTE — PROGRESS NOTES
[facility]  Psychotherapy [facility]      Diagnosis: F33.1 Major depressive disorder, F41.9 Panic disorder     General Information     GOAL: To provide crisis support with individual session    * If patient is absent, state reason for absence, staff intervention, and staff signature. Psychotherapy Session    Start time: 12:20  Stop time: 13:05    Problem number: 1  Short term goal (STP): 1.2 and 1.3    Patient Mental Status and Mood/Affect:Labile and Anxious    Patient Behavior and Appearance: Disheveled, Attentive, Cooperative, and Withdrawn/Quiet    Intervention/Techniques: Validated/Supported, Reflected, Challenged, and Listened/Empathized    Focus of Session/Patient Response and Progress Towards Goal: Staff met with the patient to provide crisis support following the patient presenting with high anxiety and tics during group. Session focused on goal 1.2, applying healthy replacement behaviors for negative coping, and goal 1.3, identifying triggers for anxiety/low mood. Patient presented with tics of pulling at her own hair and clothing as well as screaming the phrase \"Stop it. \" Patient had been heard screaming from the bathrooms outside the [facility] suite, at which point staff approached the patient and invited her to join staff in her office. Patient reported she forgot to take her anxiety medications earlier that morning and was feeling anxious. Patient exhibited cognitive distortions and self-shaming language while discussing frustrations with her lack of perceived progress. Patient repeatedly stated she was \"not doing what I am supposed to be doing. \" Patient presented as receptive to gentle challenging by staff regarding these statements. Patient discussed decision to discharge from the program on Friday, 2/24/23.  Patient reported her family wants her to discharge due to feeling the program is ineffective, however the patient reported enjoying and benefiting from the program. Patient presented as indecisive when encouraged to trust her own judgement. Patient reported \"time is of the essence\" relating to her job, though acknowledged that she has not been told by her job that they will not allow her to extend her treatment and still retain her job. Patient expressed feeling pressure to improve her symptoms for her family and presented as tearful while discussing. Patient presented with escalating tic behaviors while discussing. Staff transitioned to supporting the patient in following a tapping meditation video for relieving anxiety. Patient presented as receptive and engaged. It is noted patient appeared to struggle to follow tapping directions, which were provided verbally and visually, both by the video and by staff. Patient presented as more relaxed following the meditation and expressed readiness to rejoin group. Patient continues to present as appropriate for [facility] at [facility] at this time.     Clinician Signature: JENNIFER Bowie

## 2023-02-22 NOTE — PROGRESS NOTES
Goal: Discharge planning. Meeting time 10:00 am    Upon arrival to the program the patient, her mother, and aunt requested to speak with this worker. The patient's mother informed this writer that her some of her medications were not called in to the [address] the previous evening and the patient was unable to take her PRN. The patient's mother also informed this worker that she would like the patient to discharge on Friday 2/24/2023. The current plan for discharge was set the prior day during the family session and was made for discharge the following week. This writer reminded the family of that plan, but the patient, mother, and aunt stated that with coordination of [facility], and the prescribed medication the feel they could help the patient. This writer spoke with the patient individually following entrance into the treatment office. The patient stated that she would like to stay with the plan outlined the previous day. This writer informed the patient that her individual and treatment planning session is due this day and we can contact the patient's support to discuss the plan further. The patient agreed and joined groups in progress. Patient is appropriate for continued treatment in PHP and that is this writer's recommendations due to daily in the morning difficulties the patient reports including the \"ticks\" of pulling her cap and hair and screaming.    -Jb Lara MSW  Supervisee in [id number] Work.

## 2023-02-22 NOTE — GROUP NOTE
IP  GROUP DOCUMENTATION INDIVIDUAL                                                                          Group Therapy Note    Date: 2/22/2023    Group Start Time: 10:50 AM  Group End Time: 11:40 AM  Group Topic: CBT    [address] [facility] 3 ACUTE BEHAV HLTH    Vaughan, Selene    IP [address] [facility] GROUP DOCUMENTATION GROUP    Group Therapy Note  Patients were provided with the space to share their emotions and experiences throughout the previous groups. The patients were encouraged to identify thoughts and behaviors associated with feelings of anxiety. Patient were provided with a worksheet to explore intrusive thoughts and rewrite them more accurately. Patients were encouraged to provide feedback and support to peers. Attendees: 6       Attendance: Attended    Patient's Goal:  To identify thoughts, feelings, and behaviors associated with anxiety     Interventions/techniques: Informed and Promoted peer support    Follows Directions: Followed directions    Interactions: Interacted appropriately    Mental Status: Congruent    Behavior/appearance: Attentive and Cooperative    Goals Achieved: Able to engage in interactions and Able to listen to others      Additional Notes: The patient was engaged in the beginning of the CBT group. The patient reported that they experienced anxiety and engaged in hair pulling behaviors. The patient reported that they scared their mom due to statements that were made throughout the morning. The patient left group to meet with their .  The patient was receptive to feedback and will continue to engage in future groups    JOHN BurgosW, MSW Student

## 2023-02-22 NOTE — BH NOTES
OUTPATIENT PHYSICIAN PROGRESS NOTE      Chief Complaint/Symptoms/Impairments (as noted in Treatment Plan): Confusion and depression    Criteria for Continued Treatment (check all that apply):   [x] Preventing Decompensation   [] Improving Level of Functioning  [] Reducing Isolative Behaviors  [x] Understanding Diagnosis and Need for Medications  [] Improving Treatment/Medication Compliance  [] Confronting Denial of Illness  [x] Stabilizing Level of Functioning  [] Improving Emotional/Social/Cognitive Functioning  [] Decreasing Frequency of Hospitalizations    Suicidal/Homicidal ideations:   [x] Absent  [] Present  [] Passive  [] Intent  [] Plan  [] Death Wishes      CURRENT CONDITION OF PATIENT & PROGRESS ON TREATMENT PLAN    Subjective (ongoing complaints by patient regarding symptom severity, presentation, affect, function, etc.): Jed Avila reports continued concerns with anxiety and her medications. Still has a sense that she is taking too much medication but is wondering if she should take other medications for moods. Moods are depressed. Anxious about medications. Tolerating medications well. Eating and sleeping fairly. Discussed plan to change Zoloft to Prozac or Lexapro due to nausea. Behavior (side effects, changes in mental status, objective observations seen in individual sessions or by staff):  Calm cooperative, clear coherent speech of average rate volume and tone. Appears nervous and anxious. Clear coherent speech. Denies SI/HI/AH/VH. No aggression or violence. Appropriately interactive and aware. Assessment/Plan (functional improvement and/or ongoing impairment, response to treatment, plan for future ongoing treatment and medication management to include revisions): Continue current care.     Continue motrin 600 mg PO Q 6 hrs prn for headache  Discontinued Sertraline  Prozac 20 mg PO  qdaily  Continue Clonazepam 0.25 mg PO BID for another week       [] NO NEW Medications at this time.   [x] New Medication prescribed and prescription provided to patient  [x] PHP Nurse notified of changes as needed    [x] Regarding any medications: patient instructed on purpose, benefits, side effects,   risks, and alternative treatments. Patient verbalizes understanding of instructions and has had the opportunity to ask questions.     Raymon Shahid MD  02/21/23   4:13 PM

## 2023-02-22 NOTE — GROUP NOTE
SCOTT  GROUP DOCUMENTATION INDIVIDUAL                                                                          Group Therapy Note    Date: 2/22/2023    Group Start Time:  2:00 PM  Group End Time:  2:45 PM  Group Topic: Process Group - Outpatient    [place] [address] ACUTE BEHAV [address] GROUP DOCUMENTATION GROUP    Group Therapy Note    This writer facilitated community groups with patients. Patients were encouraged to complete the daily wrap up assessment and create an afternoon agenda prioritizing self-care. The patients were encouraged to reflect on the treatment day and share agenda with peers. Attendees: 8       Attendance: Attended    Patient's Goal:  identify emotions and encourage self-care    Interventions/techniques: Other wrap up assessment and encourage self-care    Follows Directions: Followed directions    Interactions: Interacted appropriately    Mental Status: Calm and Congruent    Behavior/appearance: Attentive and Cooperative    Goals Achieved: Able to listen to others and Identified feelings      Additional Notes: The patients completed the wrap up assessment and denied any thoughts of self-harm or harming others. The patient completed the afternoon agenda. The patient participated in a discussion of self-care agenda. The patient shared that she would interact with her family for self-care but reported difficulty in creating her agenda due to memory issues and anxiety. The patient accepted support from peers. The patient will continue to identify emotions in the wrap up group.     Olivia Cohen

## 2023-02-23 ENCOUNTER — HOSPITAL ENCOUNTER (OUTPATIENT)
Dept: BEHAVIORAL/MENTAL HEALTH | Age: 48
Discharge: HOME | End: 2023-02-23
Payer: MEDICAID

## 2023-02-23 ENCOUNTER — HOSPITAL ENCOUNTER (INPATIENT)
Age: 48
LOS: 11 days | Discharge: HOME | DRG: 760 | End: 2023-03-06
Attending: STUDENT IN AN ORGANIZED HEALTH CARE EDUCATION/TRAINING PROGRAM | Admitting: PSYCHIATRY & NEUROLOGY
Payer: MEDICAID

## 2023-02-23 VITALS
SYSTOLIC BLOOD PRESSURE: 126 MMHG | HEART RATE: 102 BPM | DIASTOLIC BLOOD PRESSURE: 76 MMHG | TEMPERATURE: 99.1 F | OXYGEN SATURATION: 97 %

## 2023-02-23 DIAGNOSIS — F41.8 ANXIETY ASSOCIATED WITH DEPRESSION: Primary | ICD-10-CM

## 2023-02-23 DIAGNOSIS — F63.3 TRICHOTILLOMANIA: ICD-10-CM

## 2023-02-23 DIAGNOSIS — R45.87 IMPULSIVENESS: ICD-10-CM

## 2023-02-23 DIAGNOSIS — F33.1 MODERATE EPISODE OF RECURRENT MAJOR DEPRESSIVE DISORDER (HCC): ICD-10-CM

## 2023-02-23 PROBLEM — F29 PSYCHOSES (HCC): Status: ACTIVE | Noted: 2023-02-23

## 2023-02-23 LAB
ALBUMIN SERPL-MCNC: 3.3 G/DL (ref 3.5–5)
ALBUMIN/GLOB SERPL: 0.8 (ref 1.1–2.2)
ALP SERPL-CCNC: 89 U/L (ref 45–117)
ALT SERPL-CCNC: 30 U/L (ref 12–78)
AMPHET UR QL SCN: NEGATIVE
ANION GAP SERPL CALC-SCNC: 8 MMOL/L (ref 5–15)
APPEARANCE UR: CLEAR
AST SERPL-CCNC: 18 U/L (ref 15–37)
BARBITURATES UR QL SCN: NEGATIVE
BASOPHILS # BLD: 0.1 K/UL (ref 0–0.1)
BASOPHILS NFR BLD: 1 % (ref 0–1)
BENZODIAZ UR QL: NEGATIVE
BILIRUB SERPL-MCNC: 0.2 MG/DL (ref 0.2–1)
BILIRUB UR QL: NEGATIVE
BUN SERPL-MCNC: 12 MG/DL (ref 6–20)
BUN/CREAT SERPL: 14 (ref 12–20)
CALCIUM SERPL-MCNC: 8.7 MG/DL (ref 8.5–10.1)
CANNABINOIDS UR QL SCN: NEGATIVE
CHLORIDE SERPL-SCNC: 100 MMOL/L (ref 97–108)
CO2 SERPL-SCNC: 28 MMOL/L (ref 21–32)
COCAINE UR QL SCN: NEGATIVE
COLOR UR: NORMAL
CREAT SERPL-MCNC: 0.84 MG/DL (ref 0.55–1.02)
DIFFERENTIAL METHOD BLD: ABNORMAL
DRUG SCRN COMMENT,DRGCM: NORMAL
EOSINOPHIL # BLD: 0.3 K/UL (ref 0–0.4)
EOSINOPHIL NFR BLD: 3 % (ref 0–7)
ERYTHROCYTE [DISTWIDTH] IN BLOOD BY AUTOMATED COUNT: 14.5 % (ref 11.5–14.5)
ETHANOL SERPL-MCNC: <10 MG/DL
FLUAV RNA SPEC QL NAA+PROBE: NOT DETECTED
FLUBV RNA SPEC QL NAA+PROBE: NOT DETECTED
GLOBULIN SER CALC-MCNC: 4 G/DL (ref 2–4)
GLUCOSE SERPL-MCNC: 62 MG/DL (ref 65–100)
GLUCOSE UR STRIP.AUTO-MCNC: NEGATIVE MG/DL
HCG UR QL: NEGATIVE
HCT VFR BLD AUTO: 32.8 % (ref 35–47)
HGB BLD-MCNC: 10.6 G/DL (ref 11.5–16)
HGB UR QL STRIP: NEGATIVE
IMM GRANULOCYTES # BLD AUTO: 0 K/UL (ref 0–0.04)
IMM GRANULOCYTES NFR BLD AUTO: 0 % (ref 0–0.5)
KETONES UR QL STRIP.AUTO: NEGATIVE MG/DL
LEUKOCYTE ESTERASE UR QL STRIP.AUTO: NEGATIVE
LYMPHOCYTES # BLD: 3 K/UL (ref 0.8–3.5)
LYMPHOCYTES NFR BLD: 31 % (ref 12–49)
MCH RBC QN AUTO: 27.1 PG (ref 26–34)
MCHC RBC AUTO-ENTMCNC: 32.3 G/DL (ref 30–36.5)
MCV RBC AUTO: 83.9 FL (ref 80–99)
METHADONE UR QL: NEGATIVE
MONOCYTES # BLD: 0.6 K/UL (ref 0–1)
MONOCYTES NFR BLD: 6 % (ref 5–13)
NEUTS SEG # BLD: 5.8 K/UL (ref 1.8–8)
NEUTS SEG NFR BLD: 59 % (ref 32–75)
NITRITE UR QL STRIP.AUTO: NEGATIVE
NRBC # BLD: 0 K/UL (ref 0–0.01)
NRBC BLD-RTO: 0 PER 100 WBC
OPIATES UR QL: NEGATIVE
PCP UR QL: NEGATIVE
PH UR STRIP: 7 (ref 5–8)
PLATELET # BLD AUTO: 303 K/UL (ref 150–400)
PMV BLD AUTO: 8.6 FL (ref 8.9–12.9)
POTASSIUM SERPL-SCNC: 3.3 MMOL/L (ref 3.5–5.1)
PROT SERPL-MCNC: 7.3 G/DL (ref 6.4–8.2)
PROT UR STRIP-MCNC: NEGATIVE MG/DL
RBC # BLD AUTO: 3.91 M/UL (ref 3.8–5.2)
SARS-COV-2 RNA RESP QL NAA+PROBE: NOT DETECTED
SODIUM SERPL-SCNC: 136 MMOL/L (ref 136–145)
SP GR UR REFRACTOMETRY: <1.005
UROBILINOGEN UR QL STRIP.AUTO: 1 EU/DL (ref 0.2–1)
WBC # BLD AUTO: 9.8 K/UL (ref 3.6–11)

## 2023-02-23 PROCEDURE — 90853 GROUP PSYCHOTHERAPY: CPT

## 2023-02-23 PROCEDURE — 80307 DRUG TEST PRSMV CHEM ANLYZR: CPT

## 2023-02-23 PROCEDURE — 87636 SARSCOV2 & INF A&B AMP PRB: CPT

## 2023-02-23 PROCEDURE — 85025 COMPLETE CBC W/AUTO DIFF WBC: CPT

## 2023-02-23 PROCEDURE — 93005 ELECTROCARDIOGRAM TRACING: CPT

## 2023-02-23 PROCEDURE — 90837 PSYTX W PT 60 MINUTES: CPT

## 2023-02-23 PROCEDURE — 90791 PSYCH DIAGNOSTIC EVALUATION: CPT

## 2023-02-23 PROCEDURE — 81025 URINE PREGNANCY TEST: CPT

## 2023-02-23 PROCEDURE — 80053 COMPREHEN METABOLIC PANEL: CPT

## 2023-02-23 PROCEDURE — 36415 COLL VENOUS BLD VENIPUNCTURE: CPT

## 2023-02-23 PROCEDURE — 74011250637 HC RX REV CODE- 250/637

## 2023-02-23 PROCEDURE — 99285 EMERGENCY DEPT VISIT HI MDM: CPT

## 2023-02-23 PROCEDURE — 82077 ASSAY SPEC XCP UR&BREATH IA: CPT

## 2023-02-23 PROCEDURE — 65220000001 HC RM PRIVATE PSYCH

## 2023-02-23 PROCEDURE — 81003 URINALYSIS AUTO W/O SCOPE: CPT

## 2023-02-23 RX ORDER — OLANZAPINE 5 MG/1
5 TABLET ORAL EVERY EVENING
Status: DISCONTINUED | OUTPATIENT
Start: 2023-02-23 | End: 2023-02-24 | Stop reason: HOSPADM

## 2023-02-23 RX ORDER — BENZTROPINE MESYLATE 1 MG/1
1 TABLET ORAL
Status: DISCONTINUED | OUTPATIENT
Start: 2023-02-23 | End: 2023-03-06 | Stop reason: HOSPADM

## 2023-02-23 RX ORDER — DIPHENHYDRAMINE HYDROCHLORIDE 50 MG/ML
50 INJECTION, SOLUTION INTRAMUSCULAR; INTRAVENOUS
Status: DISCONTINUED | OUTPATIENT
Start: 2023-02-23 | End: 2023-03-06 | Stop reason: HOSPADM

## 2023-02-23 RX ORDER — CLONAZEPAM 0.5 MG/1
0.5 TABLET ORAL
Status: DISCONTINUED | OUTPATIENT
Start: 2023-02-23 | End: 2023-02-24 | Stop reason: HOSPADM

## 2023-02-23 RX ORDER — LORAZEPAM 2 MG/ML
1 INJECTION INTRAMUSCULAR
Status: DISCONTINUED | OUTPATIENT
Start: 2023-02-23 | End: 2023-03-06 | Stop reason: HOSPADM

## 2023-02-23 RX ORDER — ADHESIVE BANDAGE
30 BANDAGE TOPICAL DAILY PRN
Status: DISCONTINUED | OUTPATIENT
Start: 2023-02-23 | End: 2023-03-06 | Stop reason: HOSPADM

## 2023-02-23 RX ORDER — TRAZODONE HYDROCHLORIDE 50 MG/1
50 TABLET ORAL
Status: DISCONTINUED | OUTPATIENT
Start: 2023-02-23 | End: 2023-02-24

## 2023-02-23 RX ORDER — OLANZAPINE 5 MG/1
10 TABLET ORAL ONCE
Status: DISCONTINUED | OUTPATIENT
Start: 2023-02-23 | End: 2023-02-23

## 2023-02-23 RX ORDER — HYDROXYZINE 25 MG/1
50 TABLET, FILM COATED ORAL
Status: DISCONTINUED | OUTPATIENT
Start: 2023-02-23 | End: 2023-02-25

## 2023-02-23 RX ORDER — OLANZAPINE 5 MG/1
5 TABLET ORAL
Status: DISCONTINUED | OUTPATIENT
Start: 2023-02-23 | End: 2023-03-06 | Stop reason: HOSPADM

## 2023-02-23 RX ORDER — ACETAMINOPHEN 325 MG/1
650 TABLET ORAL
Status: DISCONTINUED | OUTPATIENT
Start: 2023-02-23 | End: 2023-03-06 | Stop reason: HOSPADM

## 2023-02-23 RX ORDER — HALOPERIDOL 5 MG/ML
5 INJECTION INTRAMUSCULAR
Status: DISCONTINUED | OUTPATIENT
Start: 2023-02-23 | End: 2023-03-06 | Stop reason: HOSPADM

## 2023-02-23 RX ADMIN — ACETAMINOPHEN 650 MG: 325 TABLET, FILM COATED ORAL at 23:15

## 2023-02-23 RX ADMIN — TRAZODONE HYDROCHLORIDE 50 MG: 50 TABLET ORAL at 23:04

## 2023-02-23 NOTE — GROUP NOTE
IP  GROUP DOCUMENTATION INDIVIDUAL                                                                          Group Therapy Note    Date: 2/23/2023    Group Start Time: 10:50 AM  Group End Time: 11:40 AM  Group Topic: Education Group - Outpatient    [facility] [place] [address] ACUTE BEHAV HLTH    Graham Zee    IP [address] [facility] GROUP DOCUMENTATION GROUP    Group Therapy Note    Dayana Stallworth from [employer] [facility] facilitated educational group. Patients were given brochures for [employer], and psychoeducational materials about 6 dimensions of wellness. Dayana Stallworth discussed occupational, physical, social, intellectual, spiritual, and emotional wellbeing. Patients were asked to discuss how they engage in wellness in the different domains. Patients were given a worksheet to depict how they currently engage in wellness, how they wish to engage in wellness, and what their values are. Patients were encouraged to share and discuss openly with the group. Attendees: 7       Attendance: Attended    Patient's Goal:  Listen to guest speaker, understand 6 dimensions of wellness, reflect on personal wellness     Interventions/techniques: Informed and Provide feedback    Follows Directions: Followed directions    Interactions: Interacted appropriately    Mental Status: Anxious, Calm, and Congruent    Behavior/appearance: Agitated, Attentive, and Cooperative    Goals Achieved: Able to engage in interactions, Able to listen to others, Able to reflect/comment on own behavior, Able to manage/cope with feelings, Discussed coping, and Identified resources and support systems      Additional Notes: The patient engaged in cognitive skills group. The patient actively listened to guest speaker, responding to prompts and asking appropriate questions. The patient identified the importance of spirituality in her life, and wanting to work on negative thoughts associated with anxiety.  The patient discussed her pending discharge, and that her mother and aunt want to [facility] care of her. The patient pulled at her hat and screamed stop it twice and apologized. A  heard this and pulled her out of group briefly to regulate. The patient was able to return to group and participate. The patient will continue to practice coping skills and evaluating needs.      JOHN GerberW, MSW Student

## 2023-02-23 NOTE — BH NOTES
OUTPATIENT PHYSICIAN PROGRESS NOTE      Chief Complaint/Symptoms/Impairments (as noted in Treatment Plan): Trichotillomania and depression    Criteria for Continued Treatment (check all that apply):   [x] Preventing Decompensation   [] Improving Level of Functioning  [] Reducing Isolative Behaviors  [x] Understanding Diagnosis and Need for Medications  [] Improving Treatment/Medication Compliance  [] Confronting Denial of Illness  [x] Stabilizing Level of Functioning  [] Improving Emotional/Social/Cognitive Functioning  [] Decreasing Frequency of Hospitalizations    Suicidal/Homicidal ideations:   [x] Absent  [] Present  [] Passive  [] Intent  [] Plan  [] Death Wishes      CURRENT CONDITION OF PATIENT & PROGRESS ON TREATMENT PLAN    Subjective (ongoing complaints by patient regarding symptom severity, presentation, affect, function, etc.): Aminta Velásquez reports that she is being forced to leave the program by her family as they feel that she is not getting any better after 3 weeks. They want to bring her back down to [place] with them. The anxiety created by this [place] Shove has increased her ticks and impulsive behaviors. It is visibly disturbing and she is unable to contain the outbursts. She just started on her new medications. Moods are depressed. Tolerating medications well. Eating and sleeping fairly. Taking Prozac 20 mg Po now. Behavior (side effects, changes in mental status, objective observations seen in individual sessions or by staff):  Calm cooperative, clear coherent speech of average rate volume and tone. Appears nervous and anxious. Random pulling at her head and hair flailing her arms out in a hemibalistic fasion periodically. Clear coherent speech. Denies SI/HI/AH/VH. Irritable with her behaviors. No aggression or violence. Appropriately interactive and aware.     Assessment/Plan (functional improvement and/or ongoing impairment, response to treatment, plan for future ongoing treatment and medication management to include revisions): Continue current care. Continue motrin 600 mg PO Q 6 hrs prn for headache  Prozac 20 mg PO  qdaily  Continue Clonazepam 0.25 mg PO BID for another week   Discharge from program  Look into meditation if trying to calm without medications. Her symptoms are significantly decreased when unstressed. [] NO NEW Medications at this time. [x] New Medication prescribed and prescription provided to patient  [x] PHP Nurse notified of changes as needed    [x] Regarding any medications: patient instructed on purpose, benefits, side effects,   risks, and alternative treatments. Patient verbalizes understanding of instructions and has had the opportunity to ask questions.     Michael Crandall MD  02/23/23   4:13 PM

## 2023-02-23 NOTE — PROGRESS NOTES
Goal: Treatment Team    Treatment team consisted of Dr. Sol Lui, Celina Castorena, and this writer. Team discussed escalation of anxiety and increased tics that disrupt functioning. These impulsive behaviors appear involuntary and consist of hair pulling and grabbing onto things or people.  Team discussed admission to ER for assessment at 2:45pm. Patient will be returning to the program if not admitted but family is placing pressure on her to leave the program.     Jazmin Wu, BSW, MSW Student

## 2023-02-23 NOTE — ED PROVIDER NOTES
José Luis       Pt Name: Melanie Moore  MRN: [id number]  [place] 1975  Date of evaluation: 2/23/2023  Provider: Charmayne Aldo, MD   PCP: Inez Eisenberg MD  Note Started: 3:30 PM 2/23/23     CHIEF COMPLAINT       Chief Complaint   Patient presents with    Mental Health Problem        HISTORY OF PRESENT ILLNESS: 1 or more elements      History From: patient and mom and aunt, History limited by: agitated, anxious     Melanie Moore is a 50 y.o. female presents with anxiety. She notes has been worsening over the past 14 days however she does have a history of this. She is tingling to group therapy and noticed frequent outburst and control behaviors including shouting, biting exercise balls and grabbing hands. Patient notes the anxiety is triggering this and she cannot control it. Denies any SI, HI, AH or VH. Accompanied by her mom and aunt who notes she has been more anxious recently about a trip to [place]. Has been taking her medications normally but had frequent changes including decrease of olanzapine as well as a recent SSRI addition. Please See MDM for Additional Details of the HPI/PMH  Nursing Notes were all reviewed and agreed with or any disagreements were addressed in the HPI. REVIEW OF SYSTEMS        Positives and Pertinent negatives as per HPI. PAST HISTORY     Past Medical History:  Past Medical History:   Diagnosis Date    Anxiety     Depression     ANXIETY & DEPRESSION    Diabetes ([address]     TYPE II    Hypertension     Infected sebaceous cyst, upper back 4/10/2019    Panic attack     Psychoses ([address] 2/23/2023    Suicidal thoughts     Vision decreased        Past Surgical History:  Past Surgical History:   Procedure Laterality Date    COLONOSCOPY N/A 11/20/2020    . COLONOSCOPY  :- performed by Mandy Garrido MD at [facility] ENDOSCOPY    HX OTHER SURGICAL  06/03/2019     Excision of large sebaceous cyst in the midline upper back and with packing- [facility]-DR. Thelma Cabrera       Family History:  Family History   Problem Relation Age of Onset    Diabetes Mother     Hypertension Mother     Cancer Father         LUNG CA. SMOKER       Social History:  Social History     Tobacco Use    Smoking status: Never    Smokeless tobacco: Never    Tobacco comments:     n/a never smoked tobacco   Substance Use Topics    Alcohol use: No    Drug use: No       Allergies: Allergies   Allergen Reactions    Lisinopril Rash     Swelling of the lips       CURRENT MEDICATIONS      Current Discharge Medication List        CONTINUE these medications which have NOT CHANGED    Details   acetylcysteine (NAC) 600 mg tab Take 1,200 mg by mouth daily. glipiZIDE (GLUCOTROL) 5 mg tablet Take 5 mg by mouth daily. clonazePAM (KlonoPIN) 0.25 mg TbDi Take 0.25 mg by mouth nightly. QUEtiapine SR (SEROquel XR) 300 mg sr tablet Take 1 Tablet by mouth nightly. Qty: 30 Tablet, Refills: 0      traZODone (DESYREL) 100 mg tablet Take 2 Tablets by mouth nightly for 30 days. Indications: insomnia associated with depression  Qty: 60 Tablet, Refills: 0      FLUoxetine (PROzac) 20 mg capsule Take 1 Capsule by mouth daily. Qty: 30 Capsule, Refills: 0      metFORMIN (GLUCOPHAGE) 1,000 mg tablet Take 1 Tablet by mouth two (2) times daily (with meals) for 30 days. Indications: type 2 diabetes mellitus  Qty: 60 Tablet, Refills: 0      ondansetron (ZOFRAN ODT) 4 mg disintegrating tablet Take 1 Tablet by mouth every eight (8) hours as needed for Nausea or Vomiting. Qty: 90 Tablet, Refills: 0      atorvastatin (LIPITOR) 40 mg tablet Take 1 Tablet by mouth daily for 30 days. Indications: excessive fat in the blood  Qty: 30 Tablet, Refills: 0      losartan (COZAAR) 100 mg tablet Take 1 Tablet by mouth daily for 30 days.  Indications: high blood pressure  Qty: 30 Tablet, Refills: 0      triamterene-hydroCHLOROthiazide (MAXZIDE) 37.5-25 mg per tablet Take 1 Tablet by mouth daily for 30 days. Indications: high blood pressure  Qty: 30 Tablet, Refills: 0             SCREENINGS               No data recorded         PHYSICAL EXAM      ED Triage Vitals [02/23/23 1518]   ED Encounter Vitals Group      /89      Pulse       Resp Rate 19      Temp 98.8 °F (37.1 °C)      Temp src       SpO2       Weight 200 lb      Height 5' 3\"        Physical Exam  Vitals and nursing note reviewed. HENT:      Head: Normocephalic and atraumatic. Nose: Nose normal.   Musculoskeletal:      Cervical back: Normal range of motion. Neurological:      Mental Status: She is alert. Psychiatric:      Comments: Anxious, jittery, pressured speech, occasionally yelling \"stop it\". DIAGNOSTIC RESULTS   LABS:     No results found for this or any previous visit (from the past 12 hour(s)). EKG: If performed, independent interpretation documented below in the MDM section     RADIOLOGY:  Non-plain film images such as CT, Ultrasound and MRI are read by the radiologist. Plain radiographic images are visualized and preliminarily interpreted by the ED Provider with the findings documented in the MDM section. Interpretation per the Radiologist below, if available at the time of this note:     No results found.       PROCEDURES   Unless otherwise noted below, none  Procedures     CRITICAL CARE TIME   none    EMERGENCY DEPARTMENT COURSE and DIFFERENTIAL DIAGNOSIS/MDM   Vitals:    Vitals:    02/23/23 1518 02/23/23 1848 02/23/23 2124 02/23/23 2200   BP: 123/89 (!) 136/91 (!) 140/55 138/81   Pulse:  88 93 89   Resp: 19 16 18 20   Temp: 98.8 °F (37.1 °C)  98.9 °F (37.2 °C) 98.5 °F (36.9 °C)   SpO2:  100% 98% 98%   Weight: 90.7 kg (200 lb)      Height: 5' 3\" (1.6 m)           Patient was given the following medications:  Medications   OLANZapine (ZyPREXA) tablet 5 mg (has no administration in time range)   OLANZapine (ZyPREXA) tablet 5 mg (has no administration in time range)   haloperidol lactate (HALDOL) injection 5 mg (5 mg IntraMUSCular Given 2/24/23 0723)   benztropine (COGENTIN) tablet 1 mg (has no administration in time range)   diphenhydrAMINE (BENADRYL) injection 50 mg (50 mg IntraMUSCular Given 2/24/23 0723)   hydrOXYzine HCL (ATARAX) tablet 50 mg (has no administration in time range)   LORazepam (ATIVAN) injection 1 mg (1 mg IntraMUSCular Given 2/24/23 0723)   traZODone (DESYREL) tablet 50 mg (50 mg Oral Given 2/23/23 2304)   acetaminophen (TYLENOL) tablet 650 mg (650 mg Oral Given 2/23/23 2315)   magnesium hydroxide (MILK OF MAGNESIA) 400 mg/5 mL oral suspension 30 mL (has no administration in time range)       Medical Decision Making  39-year-old female with history of hypertension, anxiety, panic attack, diabetes, depression presents with anxiety. Vital signs are stable upon arrival.  On exam she is very anxious, jittery occasional outburst yelling stop it. She does have pressured speech. Notably she does appear logical and forward thinking. Notes she is highly stressed from a recent trip and this is been worsening over the past couple days. Has been taking her medications. Notably she denies any SI, HI, AH or VH. She notes she has been yelling stop it at herself because she is frustrated her anxiety. She does endorse that she is having uncontrollable movements and outbursts and is unable to contain them. Denies any drugs or alcohol currently. Denies any physical pain going chest pain, belly pain, nausea, vomiting, headache. I discussed this case further with Yariel casey at her group health therapy who does note she has been having these outbursts there as well. Frequent medication changes as well which might be causing this including a decrease in her olanzapine as well as a new SSRI. As such we will discontinue her SSRI. Discussed the case with diogenes aguayo who has assessed patient and agrees admission will be the safest alternative for her.   As such we will get screening labs for psychiatric admission for anxiety stabilization in light of possible psychotic features and uncontrolled behaviors. Amount and/or Complexity of Data Reviewed  Labs: ordered. ECG/medicine tests: ordered. Risk  Prescription drug management. Decision regarding hospitalization. Reviewed labs, WNL. Medically cleared for admission. FINAL IMPRESSION     1. Anxiety associated with depression    2. Impulsiveness          DISPOSITION/PLAN   Catherine Marie's  results have been reviewed with her. She has been counseled regarding her diagnosis, treatment, and plan. She verbally conveys understanding and agreement of the signs, symptoms, diagnosis, treatment and prognosis and additionally agrees to follow up as discussed. She also agrees with the care-plan and conveys that all of her questions have been answered. CLINICAL IMPRESSION    Admit Note: Pt is being admitted by the hospitalist. The results of their tests and reason(s) for their admission have been discussed with pt and/or available family. They convey agreement and understanding for the need to be admitted and for the admission diagnosis. PATIENT REFERRED TO:  Follow-up Information    None           DISCHARGE MEDICATIONS:  Current Discharge Medication List            DISCONTINUED MEDICATIONS:  Current Discharge Medication List          I am the Primary Clinician of Record. Lei Elias MD (electronically signed)    (Please note that parts of this dictation were completed with voice recognition software. Quite often unanticipated grammatical, syntax, homophones, and other interpretive errors are inadvertently transcribed by the computer software. Please disregards these errors.  Please excuse any errors that have escaped final proofreading.)

## 2023-02-23 NOTE — BSMART NOTE
Comprehensive Assessment Form Part 1    Section I - Disposition    Axis I - Major Depressive Disorder, Generalized Anxiety Disorder, Trichotillomania   Axis II - Deferred  Axis III - Hypertension, Diabetes  Axis IV - Relationship stressors  [place] V - 35      The Medical Doctor to Psychiatrist conference was not completed. The Medical Doctor is in agreement with Psychiatrist disposition because of (reason) patient is requesting voluntary admission. The plan is admit to [facility]. The on-call Psychiatrist consulted was NP [facility]. The admitting Psychiatrist will be Dr. Diana Flores. The admitting Diagnosis is Major Depression. The Payor source is [facility] POST-ACUTE. This writer reviewed the [place] [address] in nursing flowsheet and the risk level assigned is no risk. Based on this assessment, the risk of suicide is low and the plan is admit to inpatient psych. Section II - Integrated Summary  Summary:  Patient is a 50year old female seen face to face in the ER. She was sent to the ER for admission by the [facility] partial hospitalization staff after she became emotionally dysregulated several times, including while in an individual session when she began screaming, pulling at her clothing to the point of exposing her entire bra and stomach, and grabbing the hands of various staff members. While at the [facility] she also growled and was biting and tearing a stress ball and water bottle. When she first arrived in the ER she was repeatedly and randomly yelling stop it without provocation. She denied suicidal and homicidal ideation. This is patient's 3rd week in the partial hospitalization program after an over 2 week admission on [address]. Apparently, patient's parents have been pressuring her to discharge from the [facility] and return to [place] with them.   Patient's mother actually called a PHP  multiple times this morning stating patient was not coming to the program and stating she wanted her discharged. Patient ultimately came to the program and admitted that she did not want to be discharged but her parents were pressuring her to leave the program.  PHP notes indicate that patient's family are a significant stressor and trigger. Patient became impulsive and was unable to manage her behaviors and emotions and [facility]. The ER physician does not feel that patient is stable enough to return home and remain in the community successfully. Upon assessment in the ER patient reported she is depressed and anxious. She stated she has been pulling out her hair since May 2022. [facility] records indicate this behavior pre-dates this time and that she has been psychiatrically hospitalized in the [facility] system 6 times since March 2016. She stated her emotions are all over the place and she wants to be admitted. The patient has demonstrated mental capacity to provide informed consent. The information is given by the patient and past medical records. The Chief Complaint is mental health problem. The Precipitant Factors are relationship stressors. Previous Hospitalizations: yes  The patient has not previously been in restraints. [facility] Psych[facility]. Lethality Assessment:    The potential for suicide is noted by: history of previous attempts. The potential for homicide is not noted. The patient has not been a perpetrator of sexual or physical abuse. There are not pending charges. The patient is not felt to be at risk for self harm or harm to others. The attending nurse was advised that security has not been notified. Section III - Psychosocial  The patient's overall mood and attitude is anxious. Feelings of helplessness and hopelessness are not observed. Generalized anxiety is observed by patient's presentation and self-report. Panic is not observed. Phobias are not observed.   Obsessive compulsive tendencies are not observed. Section IV - Mental Status Exam  The patient's appearance shows no evidence of impairment. The patient's behavior shows poor impulse control. The patient is oriented to time, place, person and situation. The patient's speech is pressured. The patient's mood is anxious. The range of affect is innappropriate. The patient's thought content demonstrates no evidence of impairment. The thought process shows no evidence of impairment. The patient's perception shows no evidence of impairment. The patient's memory shows no evidence of impairment. The patient's appetite is increased and shows signs of weight gain. The patient's sleep has evidence of insomnia. The patient's insight is blaming. The patient's judgement is psychologically impaired. Section V - Substance Abuse  The patient is not using substances. Section VI - Living Arrangements  The patient is single. The patient lives alone. The patient has no children. The patient does plan to return home upon discharge. The patient does not have legal issues pending. The patient's source of income comes from social security. Roman Catholic and cultural practices have not been voiced at this time. The patient's greatest support comes from her mother and this person will be involved with the treatment. The patient has not been in an event described as horrible or outside the realm of ordinary life experience either currently or in the past.  The patient has not been a victim of sexual/physical abuse. Section VII - Other Areas of Clinical Concern  The highest grade achieved is not assessed with the overall quality of school experience being described as unknown. The patient is currently disabled and speaks [place] as a primary language. The patient has no communication impairments affecting communication. The patient's preference for learning can be described as: can read and write adequately.   The patient's hearing is normal.  The patient's vision is normal.      Rita Rhoades MA

## 2023-02-23 NOTE — GROUP NOTE
IP  GROUP DOCUMENTATION INDIVIDUAL                                                                          Group Therapy Note    Date: 2/23/2023    Group Start Time:  9:50 AM  Group End Time: 10:40 AM  Group Topic: Process Group - Outpatient    [place] [address] ACUTE BEHAV HLTH    Vaughan, Selene    IP [address] [facility] GROUP DOCUMENTATION GROUP    Group Therapy Note  Patients were provided with the space to share emotions and experiences that they desired feedback on. Patients were asked to reflect on their morning and any thoughts or emotions that arose during the first group. Patients encouraged to provide support and feedback to their peers. Attendees: 7       Attendance: Attended    Patient's Goal:  To discuss emotions and experiences that they desired feedback on      Interventions/techniques: Validated and Promoted peer support    Follows Directions: Followed directions    Interactions: Interacted appropriately    Mental Status: Congruent    Behavior/appearance: Attentive and Cooperative    Goals Achieved: Able to engage in interactions, Able to listen to others, and Able to reflect/comment on own behavior      Additional Notes: The patient was engaged in the process group. The patient discussed having difficulty leaving the program and explained that her parents wanted to support her themselves. The patient reports that she would like to stay here, but is afraid of hurting them more.  The patient was observed pulling her hat off and pulling her shirt when distressed. The patient appeared frustrate with these actions and practiced deep breathing. The patient was receptive to feedback. The patient will continue to engage in future groups.       Jeananne Nyhan, BSW, MSW Student

## 2023-02-23 NOTE — GROUP NOTE
[facility]  GROUP DOCUMENTATION INDIVIDUAL                                                                          Group Therapy Note    Date: 2/23/2023    Group Start Time: 12:10 PM  Group End Time:  1:10 PM  Group Topic: Reflection/Relaxation    [facility] ACUTE BEHAV TH    Adina Resendez     [facility]    Group Therapy Note  The patients were asked to participate in a reflection activity outside. The patients were encouraged to reflect on their support system and what support feels/looks like to discuss with peers. The patients were encouraged to spend the rest of the time outside and practice mindfulness. Attendees: 7       Attendance: Attended    Patient's Goal:  To utilize coping strategies     Interventions/techniques: Validated and Promoted peer support    Follows Directions: Followed directions    Interactions: Interacted appropriately    Mental Status: Congruent    Behavior/appearance: Cooperative    Goals Achieved: Able to engage in interactions, Able to listen to others, Able to give feedback to another, and Able to reflect/comment on own behavior      Additional Notes: The patient was engaged in the group. The patient discussed spending time with her parents and expressed feelings of anxiety towards staying in [place] with them. The patient reported that they would like to remain in the program, but that it is creating strain on their relationships. The patient spent the rest of the group practicing deep breathing and praying with peers.      Sofia Miller, JOHNW, MSW Student

## 2023-02-23 NOTE — ED NOTES
Patient presents to ED for \"neurology consult\". Story continues to be pieced together as patient is in room. Patient dropped off from group therapy by a woman named \"Dianna\". Mom at bedside with patient. Patient repeatedly and randomly yelling, \"STTTTTTOP ITTTTTTTTT\". Patient denies SI/HI. Emergency Department Nursing Plan of Care       The Nursing Plan of Care is developed from the Nursing assessment and Emergency Department Attending provider initial evaluation. The plan of care may be reviewed in the ED Provider note.     The Plan of Care was developed with the following considerations:   Patient / Family readiness to learn indicated by:verbalized understanding, successful return demonstration and appropriate questions asked  Persons(s) to be included in education: patient  Barriers to Learning/Limitations:No    Signed     Emil Chatman RN    2/23/2023   3:53 PM

## 2023-02-23 NOTE — PROGRESS NOTES
Partial Hospitalization ProgramBehavioral Health  Psychotherapy Note      Diagnosis: F33.1 Major depressive disorder, F41.9 Panic disorder    General Information    GOAL: To conduct individual session, discuss discharge, and provide coping support    * If patient is absent, state reason for absence, staff intervention, and staff signature. Psychotherapy Session    Start time: 1330  Stop time: 14:30    Problem number: 1  Short term goal (STP): 1.2    Patient Mental Status and Mood/Affect:Tearful, Labile, and Anxious    Patient Behavior and Appearance: Disheveled, Agitated, and Cooperative    Intervention/Techniques: Validated/Supported, Reflected, Reframed, Modeled/Rehearsed, Prompted/Cued, Reinforced, and Provided Feedback    Focus of Session/Patient Response and Progress Towards Goal: Staff met with the patient to conduct individual session and discuss reported desire to discharge. Session focused on goal 1.2, applying healthy replacement behaviors for negative coping. Staff utilized validation, reflective listening, open ended questions, and gentle challenging and reframing to support open discussion around the patient's reported intention to discharge and address barriers to continued participation. Staff informed the patient that the clinical recommendation is for the patient to remain in treatment for another week at least due to increased anxiety and escalation of impulsive behaviors such as screaming as well as pulling at clothing and hair. Patient reported she did not want to discharge but that her parents want her to discharge. Patient presented as distressed while discussing this. Patient reported feelings of hopelessness related to her parents changing their mind. Patient did not respond to reflections of her own right to determine treatment.  Patient stated that she does not feel safe living home alone due to difficulties with memory, to include forgetting medications, as well as significant anxiety when alone. Patient denied having resources or other supports which could help when she is outside of group and stated feeling she has no choice but to go with her parents for this reason. Staff discussed concerns about the patient discharging to a lower level of care due to current presentation, to include possibility of future psychiatric hospitalization. Patient presented as fearful and distressed sharing her belief she will be hospitalized if she discharges and moves with her parents. Patient stated she does not believe her family can help her sufficiently to manage her symptoms. Staff discussed options for staff support communicating these concerns with parents. Patient vacillated between agreeing and disagreeing with calling her parents, to include increasing dysregulation. Patient stated intention to discharge. Staff reviewed and closed out the treatment plan with the patient. Patient presented as engaged though with continued distress. Patient began biting a stress ball provided by staff and growling. Patient also crumpled up and bit a plastic water bottle. Patient presented as embarrassed and apologetic immediately following these behaviors. Staff modeled and supported coping skills. Patient presented as receptive however continued to present with impulsive behaviors and screaming. Patient walked out to go to the restroom and continued screaming and pulling at her clothing. Karen Ballard LCSW, joined to support in de-escalation and recommended further coping skills. Patient practiced some and spoke briefly with staff and the psychiatrist, Dr. Tiffanie Price, regarding medication, symptoms, and discharge while practicing one of the TIPP skills. Patient presented as more calm and with fewer impulsive behaviors. Patient was provided the treatment plan to sign to close out. Patient signed and began presenting with more anxiety and resumed behaviors. See crisis note for further details.  Patient is recommended for continued treatment in [facility] if discharged from ED for psychiatric assessment.     Clinician Signature: JENNIFER Levine

## 2023-02-23 NOTE — PROGRESS NOTES
GOAL: Family collaboration and discharge discussion    Patient's mother called this writer stating that the patient \"is in no condition to come in today. \" Staff asked for clarification. Mother stated the patient was highly anxious, yelling, and pulling her hair. This writer provided education on the role of PHP staff in de-escalation and that the behaviors described are behaviors the staff are trained to help with managing and do no constitute behaviors which would prohibit the patient from attending. This writer encouraged the mother to support the patient in coming in if the patient is willing. Mother reported desire for the patient to be discharged without coming in. This writer stated that [address] [place] [address] staff cannot move forward with a discharge without consent from the patient, as the patient is her own legal guardian. Patient's mother expressed concerns that the program was not effective for the patient given her increased symptoms in the last week. This writer validated the valid and discussed how changing medications and accidental medication noncompliance would negatively impact progress. This writer also discussed mental health recovery as nonlinear. This writer encouraged for the patient to come for groups. Mother stated she would call this writer back. Mother called back after several minutes again expressing that the patient should not come in and should be discharged. This writer spoke to the patient, who presented as anxious and reported needing to leave with her family. The patient then became hard to understand and the mother took the phone. The mother stated the patient reported wanting to come in for groups and wanting to stay in the program for another week. Mother then stated the patient needed to discharge. This writer again reiterated that the patient is in charge of her own treatment and treatment decisions cannot be made by parents as the patient is her own guardian.  Mother stated \"I am her guardian, I'm her mother. \" This writer stated Mother would need to provide paperwork showing she has been provided legal guardianship of the patient by the courts before [facility] staff could allow her to make treatment decisions. Mother continued to discuss reservations with the program, make requests for discharge, and ask about case management. This writer continued to re-iterate earlier boundaries. This writer reviewed information on case management to support applying for disability. Mother requested to call back again. Mother called back stating the patient would be in to groups within the hour.     JENNIFER Fan

## 2023-02-23 NOTE — ED NOTES
Puneet Colindres from partial hospitalization program returned phone call. She states family is a big trigger for patient. Patient supposed to go to [place] to stay with family who can support her and help her commute back to [place] for meetings. Puneet Colindres states patient has increase in impulstivity. Today, patient was met with one on one and patient started biting a stress ball growling and tearing the ball as well as pulling at her clothing, exposing herself, as well as grabbing her wig/hat at times.

## 2023-02-23 NOTE — PROGRESS NOTES
GOAL: Crisis support    14:30-15:00 Following individual session with this writer, patient began re-escalating, to include screaming, pulling at her own clothing to the point of exposing her entire bra and stomach, and grabbing the hands of various staff members. These behaviors presented as highly impulsive. Patient presented with high levels of anxiety. Staff directed the patient to use a different TIPP skill, to which the patient agreed. Staff then spoke with Ruiz Lilly LCSW again and was instructed to take the patient to the ED for assessment. Staff discussed with the patient the need for assessment due to escalated symptoms. Staff walked the patient to [facility] ED for psychiatric assessment. Staff provided support with registration as well as coping during the wait. Patient repeatedly expressed concern about the intake but agreed to remain for assessment. Patient was taken back by ED staff. 16:00-16:14 This writer called back ED staff, who had left a message on this writer's phone stating they have questions about the patient. This writer provided background on the patient's treatment in [address] [place] [address], baseline presentation, level of support at home, recent de-compensation, and current stressors to an ED staff member as well as the attending physician. The attending physician asked about the patient's statements about a neuropsychological evaluation. This writer clarified this was likely a miscommunication between the patient and her mother due to this writer in an earlier conversation suggesting neurological consultation to rule out other factors. The attending physician stated he did not believe the patient was stable enough to return home at this time.     JENNIFER Davis

## 2023-02-23 NOTE — GROUP NOTE
[facility] GROUP DOCUMENTATION INDIVIDUAL                                                                          Group Therapy Note    Date: 2/23/2023    Group Start Time: 1310  Group End Time: 1400  Group Topic: Education Group - Outpatient    [address] [facility] 3 ACUTE BEHAV [facility]    Amber Pollard    IP [address] [facility] GROUP DOCUMENTATION GROUP    Group Therapy Note    Patients were given psychoeducational materials about the role of exercise in mental health. Patients were encouraged to identify ways they engage in physical activity and exercise, and how it impacts their mental health and emotions. Patients were encouraged to offer feedback and support to a peer who was coping with stress during group. Attendees: 7       Attendance: Attended    Patient's Goal:  Understand and discuss impacts of exercise on mental health     Interventions/techniques: Informed, Promoted peer support, and Supported    Follows Directions: Followed directions    Interactions: Interacted appropriately    Mental Status: Calm and Congruent    Behavior/appearance: Attentive, Caretaking, and Cooperative    Goals Achieved: Able to engage in interactions, Able to listen to others, Able to give feedback to another, Discussed coping, and Displayed empathy      Additional Notes: The patient engaged in psychoeducation group. The patient discussed enjoying walking around her neighborhood. The patient left group early to have an individual meeting with a . The patient will continue to practice coping skills and self-care. The patient offered support and comfort to a peer, physically rubbing their back.     Allan Sepulveda, JOHNW, MSW Student

## 2023-02-24 ENCOUNTER — APPOINTMENT (OUTPATIENT)
Dept: BEHAVIORAL/MENTAL HEALTH | Age: 48
End: 2023-02-24
Payer: MEDICAID

## 2023-02-24 PROCEDURE — 74011250637 HC RX REV CODE- 250/637

## 2023-02-24 PROCEDURE — 74011250636 HC RX REV CODE- 250/636

## 2023-02-24 PROCEDURE — 65220000001 HC RM PRIVATE PSYCH

## 2023-02-24 RX ORDER — TRAZODONE HYDROCHLORIDE 100 MG/1
200 TABLET ORAL
Status: DISCONTINUED | OUTPATIENT
Start: 2023-02-24 | End: 2023-02-24

## 2023-02-24 RX ORDER — CLONAZEPAM 0.25 MG/1
0.25 TABLET, ORALLY DISINTEGRATING ORAL
Status: DISCONTINUED | OUTPATIENT
Start: 2023-02-24 | End: 2023-03-06 | Stop reason: HOSPADM

## 2023-02-24 RX ORDER — GLIPIZIDE 5 MG/1
5 TABLET ORAL DAILY
Status: DISCONTINUED | OUTPATIENT
Start: 2023-02-24 | End: 2023-03-06 | Stop reason: HOSPADM

## 2023-02-24 RX ORDER — CALCIUM CARBONATE/VITAMIN D3 600MG-62.5
1200 CAPSULE ORAL DAILY
Status: ON HOLD | COMMUNITY
End: 2023-03-06 | Stop reason: SDUPTHER

## 2023-02-24 RX ORDER — ONDANSETRON 4 MG/1
4 TABLET, ORALLY DISINTEGRATING ORAL
Status: DISCONTINUED | OUTPATIENT
Start: 2023-02-24 | End: 2023-03-06 | Stop reason: HOSPADM

## 2023-02-24 RX ORDER — FLUOXETINE HYDROCHLORIDE 20 MG/1
20 CAPSULE ORAL DAILY
Status: DISCONTINUED | OUTPATIENT
Start: 2023-02-25 | End: 2023-02-28

## 2023-02-24 RX ORDER — CLONAZEPAM 0.25 MG/1
0.25 TABLET, ORALLY DISINTEGRATING ORAL
Status: ON HOLD | COMMUNITY
End: 2023-03-06 | Stop reason: SDUPTHER

## 2023-02-24 RX ORDER — ATORVASTATIN CALCIUM 40 MG/1
40 TABLET, FILM COATED ORAL DAILY
Status: DISCONTINUED | OUTPATIENT
Start: 2023-02-24 | End: 2023-03-06 | Stop reason: HOSPADM

## 2023-02-24 RX ORDER — GLIPIZIDE 5 MG/1
5 TABLET ORAL DAILY
Status: ON HOLD | COMMUNITY
End: 2023-03-06 | Stop reason: SDUPTHER

## 2023-02-24 RX ORDER — TRIAMTERENE/HYDROCHLOROTHIAZID 37.5-25 MG
1 TABLET ORAL DAILY
Status: DISCONTINUED | OUTPATIENT
Start: 2023-02-24 | End: 2023-03-06 | Stop reason: HOSPADM

## 2023-02-24 RX ORDER — LOSARTAN POTASSIUM 25 MG/1
100 TABLET ORAL DAILY
Status: DISCONTINUED | OUTPATIENT
Start: 2023-02-24 | End: 2023-03-06 | Stop reason: HOSPADM

## 2023-02-24 RX ORDER — METFORMIN HYDROCHLORIDE 500 MG/1
1000 TABLET ORAL 2 TIMES DAILY WITH MEALS
Status: DISCONTINUED | OUTPATIENT
Start: 2023-02-24 | End: 2023-03-06 | Stop reason: HOSPADM

## 2023-02-24 RX ORDER — TRAZODONE HYDROCHLORIDE 100 MG/1
100 TABLET ORAL
Status: DISCONTINUED | OUTPATIENT
Start: 2023-02-24 | End: 2023-02-25

## 2023-02-24 RX ORDER — QUETIAPINE 300 MG/1
300 TABLET, FILM COATED, EXTENDED RELEASE ORAL
Status: DISCONTINUED | OUTPATIENT
Start: 2023-02-24 | End: 2023-02-25

## 2023-02-24 RX ORDER — LANOLIN ALCOHOL/MO/W.PET/CERES
3 CREAM (GRAM) TOPICAL
Status: DISCONTINUED | OUTPATIENT
Start: 2023-02-24 | End: 2023-02-25

## 2023-02-24 RX ADMIN — ACETAMINOPHEN 650 MG: 325 TABLET, FILM COATED ORAL at 22:04

## 2023-02-24 RX ADMIN — TRIAMTERENE AND HYDROCHLOROTHIAZIDE 1 TABLET: 37.5; 25 TABLET ORAL at 14:37

## 2023-02-24 RX ADMIN — QUETIAPINE FUMARATE 300 MG: 300 TABLET, EXTENDED RELEASE ORAL at 21:47

## 2023-02-24 RX ADMIN — HALOPERIDOL LACTATE 5 MG: 5 INJECTION, SOLUTION INTRAMUSCULAR at 07:23

## 2023-02-24 RX ADMIN — TRAZODONE HYDROCHLORIDE 100 MG: 100 TABLET ORAL at 21:47

## 2023-02-24 RX ADMIN — GLIPIZIDE 5 MG: 5 TABLET ORAL at 14:38

## 2023-02-24 RX ADMIN — ATORVASTATIN CALCIUM 40 MG: 40 TABLET, FILM COATED ORAL at 14:38

## 2023-02-24 RX ADMIN — DIPHENHYDRAMINE HYDROCHLORIDE 50 MG: 50 INJECTION, SOLUTION INTRAMUSCULAR; INTRAVENOUS at 07:23

## 2023-02-24 RX ADMIN — LORAZEPAM 1 MG: 2 INJECTION INTRAMUSCULAR; INTRAVENOUS at 07:23

## 2023-02-24 RX ADMIN — CLONAZEPAM 0.25 MG: 0.25 TABLET, ORALLY DISINTEGRATING ORAL at 21:47

## 2023-02-24 RX ADMIN — LOSARTAN POTASSIUM 100 MG: 50 TABLET, FILM COATED ORAL at 17:06

## 2023-02-24 NOTE — PROGRESS NOTES
Problem: Altered Thought Process (Adult/Pediatric)  Goal: *STG: Participates in treatment plan  Outcome: Progressing Towards Goal  Goal: *STG: Remains safe in hospital  Outcome: Progressing Towards Goal  Goal: *STG: Decreased delusional thinking  Outcome: Progressing Towards Goal  Goal: *LTG: Returns to baseline functioning  Outcome: Progressing Towards Goal

## 2023-02-24 NOTE — BH NOTES
This writer contacted [facility] to gain further information regarding situation prior to hospitalization. [facility] Clinicians recommend neuro consult while patient is hospitalization. This writer will inform MD of recommendation.      Jessica Kaminski, supervisee in social work

## 2023-02-24 NOTE — PROGRESS NOTES
[facility] Medication Reconciliation    Information obtained from: Patient interview, RxQuery  RxQuery data available1: Yes    Comments/recommendations:  Patient is well known to writer having interacted with her for the past 3 weeks at [facility]. Writer previously spoke with patient on 2/21 at [facility] regarding current medications at the request of PHP clinician. PTA list updated to reflect that conversation with patient. This morning she reports no changes in her medications since our previous discussion on 2/21. Patient has been confused regarding medication regimen due to multiple changes being made including:  Tapering off clonazepam secondary to complaints of confusion and memory loss  Switching antidepressant from sertraline to fluoxetine  Switching quetiapine from IR to ER    The [employer] Program () was accessed to determine fill history of any controlled medications. She has been on varying benzodiazepines since May 2022 (lorazepam --> alprazolam --> clonazepam). Most recently filled clonazepam 0.5 mg tablets #60 for 30 DS on 1/30/23 per . Medication changes (since last review): Added  None  Removed  Cetirizine  Fluticasone propionate  Pantoprazole  Adjusted  N-acetylcysteine  Glipizide  Clonazepam     1RxQuery pharmacy benefit data reflects medications filled and processed through the patient's insurance, however                this data does NOT capture whether the medication was picked up or is currently being taken by the patient. Patient allergies: Allergies as of 02/23/2023 - Fully Reviewed 02/23/2023   Allergen Reaction Noted    Lisinopril Rash 03/24/2016         Prior to Admission Medications   Prescriptions Last Dose Informant Patient Reported? Taking? FLUoxetine (PROzac) 20 mg capsule   No Yes   Sig: Take 1 Capsule by mouth daily. QUEtiapine SR (SEROquel XR) 300 mg sr tablet   No Yes   Sig: Take 1 Tablet by mouth nightly.    acetylcysteine (NAC) 600 mg tab   Yes Yes   Sig: Take 1,200 mg by mouth daily. atorvastatin (LIPITOR) 40 mg tablet   No Yes   Sig: Take 1 Tablet by mouth daily for 30 days. Indications: excessive fat in the blood   clonazePAM (KlonoPIN) 0.25 mg TbDi   Yes Yes   Sig: Take 0.25 mg by mouth nightly. glipiZIDE (GLUCOTROL) 5 mg tablet   Yes Yes   Sig: Take 5 mg by mouth daily. losartan (COZAAR) 100 mg tablet   No Yes   Sig: Take 1 Tablet by mouth daily for 30 days. Indications: high blood pressure   metFORMIN (GLUCOPHAGE) 1,000 mg tablet   No Yes   Sig: Take 1 Tablet by mouth two (2) times daily (with meals) for 30 days. Indications: type 2 diabetes mellitus   ondansetron (ZOFRAN ODT) 4 mg disintegrating tablet   No Yes   Sig: Take 1 Tablet by mouth every eight (8) hours as needed for Nausea or Vomiting. traZODone (DESYREL) 100 mg tablet   No Yes   Sig: Take 2 Tablets by mouth nightly for 30 days. Indications: insomnia associated with depression   triamterene-hydroCHLOROthiazide (MAXZIDE) 37.5-25 mg per tablet   No Yes   Sig: Take 1 Tablet by mouth daily for 30 days.  Indications: high blood pressure      Facility-Administered Medications: None          Thank you,  Kimi Siddiqui, PHARMD, BCPS, [facility]  Clinical Pharmacy Specialist, [address] [place] [address] [address]: [phone]  Pharmacy: [phone]

## 2023-02-24 NOTE — PROGRESS NOTES
Writer met patient in her room this morning. Patient shouting , crying, puling her hair, and ripping her clothes off. Code [place] was called and patient was given IM Benadryl, Haldol and Ativan ( see mar). Staff remained with patient for safety. Calm and cooperative after injections. Visible on the mileu later in the day. Observed patient interacting appropriately with staff and peers. Reported anxiety and depression. Denied SI, HI and AVH. Compliant with medications.   Problem: Altered Thought Process (Adult/Pediatric)  Goal: *STG: Remains safe in hospital  Outcome: Progressing Towards Goal  Goal: *STG: Seeks staff when feelings of anxiety and fear arise  Outcome: Progressing Towards Goal  Goal: *STG: Complies with medication therapy  Outcome: Progressing Towards Goal  Goal: *STG: Attends activities and groups  Outcome: Progressing Towards Goal

## 2023-02-24 NOTE — PROGRESS NOTES
Outpatient Clinical Discharge Summary    Deepa Pretty   [id number]   02/24/23 1975    Physician: Dr. Bret Carver    Admission Date: 2/6/23  Admission Reason: \"So I can get better, function, and live on my own, I need y'all's help with that. I struggle with bad thoughts, negative thoughts, I think everything is negative. \"   Discharge Date: 2/24/2023    Admission Diagnosis (DSM 5): F33.1 Major depressive disorder, F41.9 Panic disorder    Discharge Diagnosis (DSM 5): F33.1 Major depressive disorder, F41.9 Panic disorder    Date and Type of Last Contact: 2/23/2023 Patient presented to treatment for groups. Status at Last Contact: Patient presented as oriented times 4 with elevated anxiety. Patient exhibited a significant increase in impulsive behaviors triggered by anxiety compared to her baseline, to include biting a stress ball provided by staff and growling, crumpling up and biting a plastic water bottle, grabbing onto staff members impulsively, screaming \"stop it\" at herself repeatedly, and pulling at her clothing to a degree that it exposed her bra and stomach. Patient presented as fearful and periodically tearful. Patient was escorted to [facility] ED for evaluation for inpatient psychiatric care. Reason for Admission (Summary): So I can get better, function, and live on my own, I need y'all's help with that. I struggle with bad thoughts, negative thoughts, I think everything is negative. \"         Reason for Discharge (check all that apply):  [] Transition from PHP to IOP [] Transition from IOP to OP  [x] Transition to Inpatient Unit  [] Treatment Complete  [] Transfer within Facility  [] Transfer to another Facility  [] Administrative Discharge  [] Financial Discharge  [] Medical Discharge   [] AMA     Summary of Progress and Course of Treatment (including treatment plan goals and objective achieved/not achieved during treatment/level of functioning):  The patient completed an intake, initial treatment plan, and 14 days of treatment groups while in [address] [place] [address]. Patient presented as engaged in groups, both soliciting and providing feedback to peers. Patient demonstrated motivation to utilize skills, to include repeatedly asking staff for further coping skills and taking notes during groups. Patient reported consistently struggling to apply skills outside of groups. Patient was observed to struggle to independently use her coping skills when escalated, though presented as receptive to prompting from staff. Patient consistently advocated for medication needs and reported utilizing a pill planner at home to support medication compliance. Patient reported at least 2 instances of accidental medication noncompliance related to memory or difficulties with her pharmacy. Patient was consistent in reporting feeling unsafe to staff and engaging with support. Patient accessed multiple staff members on multiple occassions throughout services when escalated. Patient consistently denied SI/SUN/HI throughout services. Patient reported and demonstrated ability to utilize affirmations for her self-esteem goal while in groups. Patient reported fluctuating in her consistency of use. Patient reported no use of thought challenging for anxiety or low self-esteem, though she presented as receptive when receiving thought challenging support from staff. All goals closed due to patient disengagement from treatment due to psychiatric inpatient hospitalization. Number of treatment goals met at this time is zero. Discharge Medication List: motrin 600 mg PO Q 6 hrs prn for headache  Prozac 20 mg PO  qdaily  Continue Clonazepam 0.25 mg PO BID for another week     Discharge/Aftercare Plan (including resources available to patient, scheduled follow-up appointments, and ongoing treatment recommendations): Patient provided with Same day access information for case management through [facility]. No appointments are possible through the Same Day Access model. Patient reported she had already scheduled an appointment with an outpatient therapy provider and psychiatrist. Patient reported needing a  to support applying for disability. Psychiatric Aftercare Appointments:  Appointment #1     Same Day Access for case management at [facility] during available hours (no appointment needed). Information printed and provided to patient. Appointment #2       Outpatient therapist appoint with unknown provider on unknown day. Patient could not provide details. Psychiatrist appoint with unknown provider on unknown day. Patient could not provide details.      Medical Aftercare Appointments:  Appointment #1       N/a   Appointment #2       N/a       Bryan Ayers  2/24/2023  3:58 PM

## 2023-02-24 NOTE — BH NOTES
GROUP THERAPY PROGRESS NOTE     Armando Herrera participated in process group focused on the topic of self-forgiveness      Group time/length: 45 minutes, 11:00-11:45 am      Group therapy participation: active     Therapeutic interventions reviewed and discussed:  Patients learned and dicussed the four phases of forgiveness. Patients processed through various situations where they are struggling to forgive others and how to practice self forgiveness. Impression of participation: Patient was an active participant.          doForms Player, supervisee in social work

## 2023-02-24 NOTE — PROGRESS NOTES
Laboratory monitoring for mood stabilizer and antipsychotics:    Recommended baseline monitoring has been completed based on this patient's current medication regimen. The patient is currently taking the following medication(s):   Current Facility-Administered Medications   Medication Dose Route Frequency    atorvastatin (LIPITOR) tablet 40 mg  40 mg Oral DAILY    clonazePAM (KlonoPIN) disintegrating tablet 0.25 mg  0.25 mg Oral QHS    [START ON 2/25/2023] FLUoxetine (PROzac) capsule 20 mg  20 mg Oral DAILY    glipiZIDE (GLUCOTROL) tablet 5 mg  5 mg Oral DAILY    losartan (COZAAR) tablet 100 mg  100 mg Oral DAILY    metFORMIN (GLUCOPHAGE) tablet 1,000 mg  1,000 mg Oral BID WITH MEALS    QUEtiapine SR (SEROquel XR) tablet 300 mg  300 mg Oral QHS    traZODone (DESYREL) tablet 200 mg  200 mg Oral QHS    triamterene-hydroCHLOROthiazide (MAXZIDE) 37.5-25 mg per tablet 1 Tablet  1 Tablet Oral DAILY       Height, Weight, BMI Estimation  Estimated body mass index is 35.43 kg/m² as calculated from the following:    Height as of this encounter: 160 cm (63\"). Weight as of this encounter: 90.7 kg (200 lb). Renal Function, Hepatic Function and Chemistry  Estimated Creatinine Clearance: 86.6 mL/min (based on SCr of 0.84 mg/dL). Lab Results   Component Value Date/Time    Sodium 136 02/23/2023 05:18 PM    Potassium 3.3 (L) 02/23/2023 05:18 PM    Chloride 100 02/23/2023 05:18 PM    CO2 28 02/23/2023 05:18 PM    Anion gap 8 02/23/2023 05:18 PM    Glucose 62 (L) 02/23/2023 05:18 PM    Glucose 146 (H) 07/29/2022 05:44 AM    BUN 12 02/23/2023 05:18 PM    Creatinine 0.84 02/23/2023 05:18 PM    BUN/Creatinine ratio 14 02/23/2023 05:18 PM    GFR est AA >60 07/26/2022 02:48 PM    GFR est non-AA >60 07/26/2022 02:48 PM    Calcium 8.7 02/23/2023 05:18 PM    ALT (SGPT) 30 02/23/2023 05:18 PM    Alk.  phosphatase 89 02/23/2023 05:18 PM    Protein, total 7.3 02/23/2023 05:18 PM    Albumin 3.3 (L) 02/23/2023 05:18 PM    Globulin 4.0 02/23/2023 05:18 PM    A-G Ratio 0.8 (L) 02/23/2023 05:18 PM    Bilirubin, total 0.2 02/23/2023 05:18 PM       Lab Results   Component Value Date/Time    Glucose 62 (L) 02/23/2023 05:18 PM    Glucose 146 (H) 07/29/2022 05:44 AM    Glucose (POC) 132 (H) 01/30/2023 08:01 AM       Lab Results   Component Value Date/Time    Hemoglobin A1c 6.2 (H) 01/17/2023 06:15 AM       Hematology  Lab Results   Component Value Date/Time    WBC 9.8 02/23/2023 05:18 PM    HGB 10.6 (L) 02/23/2023 05:18 PM    HCT 32.8 (L) 02/23/2023 05:18 PM    PLATELET 135 55/16/0681 05:18 PM    MCV 83.9 02/23/2023 05:18 PM       Lipids  Lab Results   Component Value Date/Time    Cholesterol, total 129 01/17/2023 06:15 AM    HDL Cholesterol 68 01/17/2023 06:15 AM    LDL, calculated 52.4 01/17/2023 06:15 AM    Triglyceride 43 01/17/2023 06:15 AM    CHOL/HDL Ratio 1.9 01/17/2023 06:15 AM       Thyroid Function  Lab Results   Component Value Date/Time    TSH 1.61 07/26/2022 02:48 PM     Vitals  Visit Vitals  BP (!) 142/89   Pulse (!) 113   Temp 99 °F (37.2 °C)   Resp 18   Ht 160 cm (63\")   Wt 90.7 kg (200 lb)   SpO2 98%   Breastfeeding No   BMI 35.43 kg/m²       Pregnancy Test  Lab Results   Component Value Date/Time    HCG urine, QL Negative 01/13/2023 12:49 PM    Pregnancy test,urine (POC) Negative 02/23/2023 05:25 PM    HCG, Ql. NEGATIVE 06/22/2018 11:14 AM       Anuja Peel, [address] (pharmacy)

## 2023-02-24 NOTE — PROGRESS NOTES
GOAL: Insurance coordination    Staff called [facility] to inform them of the patient's discharge from [facility]. Staff spoke to a  who transferred staff to the voicemail of the patient's care advocate. The voicemail stated it was HIPAA compliant and discharge information could be left on it. Staff provided the patient's identifying information, discharge date, and reason for discharge. Staff provided her office number.     JENNIFER Kaplan

## 2023-02-24 NOTE — GROUP NOTE
SCOTT  GROUP DOCUMENTATION INDIVIDUAL                                                                          Group Therapy Note    Date: 2/24/2023    Group Start Time: 1400  Group End Time: 1500  Group Topic: Recreational/Music Therapy    [address] [facility] 3 ACUTE [facility]    [facility] Patricio [facility] GROUP    Group Therapy Note    Attendees: 5       Attendance: Did not attend    Elinor Reyes

## 2023-02-24 NOTE — ED NOTES
TRANSFER - OUT REPORT:    Verbal report given to Gerald Cordoba RN (name) on [facility]  being transferred to [address]unit) for routine progression of care       Report consisted of patients Situation, Background, Assessment and   Recommendations(SBAR). Information from the following report(s) SBAR, Kardex, ED Summary, MAR, and Recent Results was reviewed with the receiving nurse. Lines:       Opportunity for questions and clarification was provided.       Patient transported with:  Felicia Valle

## 2023-02-24 NOTE — PROGRESS NOTES
GOAL: Discharge progress note    Diagnosis: F33.1 Major depressive disorder, F41.9 Panic disorder    Patient discharged suddenly following a week of decompensation, to include missed medication dosages, significantly escalated anxiety, and increase in impulsive behaviors. Impulsive behaviors included biting a stress ball while growling, pulling at her clothing, pulling at her hair, and screaming. Patient had been experiencing increased anxiety due to pressure from family to discharge from [facility] before she felt ready and safe. Further details included in her most recent individual session note and crisis support note. The patient completed an intake, initial treatment plan, and 14 days of treatment groups while in [address] [place] [address]. Patient presented as engaged in groups, both soliciting and providing feedback to peers. Treatment plan contains details on progress towards completion of goals. All goals closed due to patient disengagement from treatment due to psychiatric inpatient hospitalization. Number of treatment goals met at this time is zero. Staff provided the patient with the following resources:   Same day access information for case management through [facility]. No appointments are possible through the Same Day Access model. Patient reported she had already scheduled an appointment with an outpatient therapy provider and psychiatrist. Patient reported needing a  to support applying for disability. Next steps include this writer calling to to complete the after care phone contact to provide the patient with additional resources if needed following her discharge from inpatient hospitalization.     JENNIFER Matamoros

## 2023-02-24 NOTE — ED NOTES
Bedside, Verbal and Written shift change report given to Cara Escobar RN (oncoming nurse) by Opal Pike RN (offgoing nurse). Report included the following information SBAR, Kardex, ED Summary and MAR.

## 2023-02-24 NOTE — PROGRESS NOTES
Problem: Discharge Planning  Goal: *Discharge to safe environment  Outcome: Progressing Towards Goal  Note: Patient identifies safe discharge environment   Goal: *Knowledge of medication management  Outcome: Progressing Towards Goal  Note: Patient is aware and compliant with medication routine   Goal: *Knowledge of discharge instructions  Outcome: Progressing Towards Goal  Note: Patient displays insight regarding discharge instructions and plans

## 2023-02-24 NOTE — H&P
INITIAL PSYCHIATRIC EVALUATION            IDENTIFICATION:    Patient Name  Byron Miller   Date of Birth 1975   [facility] [id number]   Medical Record Number  [id number]      Age  50 y.o. PCP Helga Eagle MD   Admit date:  2/23/2023    Room Number  732/52  @ [facility]   Date of Service  2/24/2023            HISTORY         REASON FOR HOSPITALIZATION:  CC: \"worsening anxiety\". Pt admitted under a voluntary basis for anxiety and an inability to care for self. HISTORY OF PRESENT ILLNESS:    The patient, Byron Miller, is a 50 y.o. BLACK/ female with a past psychiatric history significant for depression, anxiety, trichotillomania, who presents at this time with complaints of (and/or evidence of) the following emotional symptoms: depression and anxiety. Additional symptomatology include agitation, hair pulling that have been present for more than two weeks. These symptoms are of severe severity and are intermittent/ fleeting in nature. She endorses good sleep and appetite. Of note, this AM patient became acutely agitated but not aggressive and received PRN IM medications a/o (see MAR) which appeared to be effective. Precipitants include having to leave partial hospitalization program (her parents are her financial support)  and her parents wanting her to return to [place] with them. ALLERGIES:   Allergies   Allergen Reactions    Lisinopril Rash     Swelling of the lips      MEDICATIONS PRIOR TO ADMISSION:   Medications Prior to Admission   Medication Sig    acetylcysteine (NAC) 600 mg tab Take 1,200 mg by mouth daily. glipiZIDE (GLUCOTROL) 5 mg tablet Take 5 mg by mouth daily. clonazePAM (KlonoPIN) 0.25 mg TbDi Take 0.25 mg by mouth nightly. QUEtiapine SR (SEROquel XR) 300 mg sr tablet Take 1 Tablet by mouth nightly. traZODone (DESYREL) 100 mg tablet Take 2 Tablets by mouth nightly for 30 days.  Indications: insomnia associated with depression FLUoxetine (PROzac) 20 mg capsule Take 1 Capsule by mouth daily. metFORMIN (GLUCOPHAGE) 1,000 mg tablet Take 1 Tablet by mouth two (2) times daily (with meals) for 30 days. Indications: type 2 diabetes mellitus    ondansetron (ZOFRAN ODT) 4 mg disintegrating tablet Take 1 Tablet by mouth every eight (8) hours as needed for Nausea or Vomiting. atorvastatin (LIPITOR) 40 mg tablet Take 1 Tablet by mouth daily for 30 days. Indications: excessive fat in the blood    losartan (COZAAR) 100 mg tablet Take 1 Tablet by mouth daily for 30 days. Indications: high blood pressure    triamterene-hydroCHLOROthiazide (MAXZIDE) 37.5-25 mg per tablet Take 1 Tablet by mouth daily for 30 days. Indications: high blood pressure      PAST MEDICAL HISTORY:   Past Medical History:   Diagnosis Date    Anxiety     Depression     ANXIETY & DEPRESSION    [facility]     TYPE II    Hypertension     Infected sebaceous cyst, upper back 4/10/2019    Panic attack     Psychoses ([facility] 2/23/2023    Suicidal thoughts     Vision decreased      Past Surgical History:   Procedure Laterality Date    COLONOSCOPY N/A 11/20/2020    . COLONOSCOPY  :- performed by Gabriel Botello MD at [facility] ENDOSCOPY    HX OTHER SURGICAL  06/03/2019     Excision of large sebaceous cyst in the midline upper back and with packing- [facility]-DR. Allen Salmeron      SOCIAL HISTORY: unemployed, supported by parents  Social History     Socioeconomic History    Marital status: SINGLE     Spouse name: Not on file    Number of children: Not on file    Years of education: Not on file    Highest education level: Not on file   Occupational History    Not on file   Tobacco Use    Smoking status: Never    Smokeless tobacco: Never    Tobacco comments:     n/a never smoked tobacco   Substance and Sexual Activity    Alcohol use: No    Drug use: No    Sexual activity: Yes     Partners: Male     Birth control/protection: None   Other Topics Concern    Not on file   Social History Narrative    Not on file     Social Determinants of Health     Financial Resource Strain: Not on file   Food Insecurity: Not on file   Transportation Needs: Not on file   Physical Activity: Not on file   Stress: Not on file   Social Connections: Not on file   Intimate Partner Violence: Not on file   Housing Stability: Not on file      FAMILY HISTORY: History reviewed. No pertinent family psychiatric history. Family History   Problem Relation Age of Onset    Diabetes Mother     Hypertension Mother     Cancer Father         LUNG CA. SMOKER                  MENTAL STATUS EXAM & VITALS     MENTAL STATUS EXAM (MSE):    MSE FINDINGS ARE WITHIN NORMAL LIMITS (WNL) UNLESS OTHERWISE STATED BELOW. ( ALL OF THE BELOW CATEGORIES OF THE MSE HAVE BEEN REVIEWED (reviewed 2/24/2023) AND UPDATED AS DEEMED APPROPRIATE )  General Presentation age appropriate and within normal Limits, cooperative   Orientation oriented to time, place and person   Vital Signs  See below (reviewed 2/24/2023); Vital Signs (BP, Pulse, & Temp) are within normal limits if not listed below.    Gait and Station Stable/steady, no ataxia   Musculoskeletal System No extrapyramidal symptoms (EPS); no abnormal muscular movements or Tardive Dyskinesia (TD); muscle strength and tone are within normal limits   Language No aphasia or dysarthria   Speech:  normal pitch and normal volume   Thought Processes Logical   Thought Associations goal directed   Thought Content free of delusions and free of hallucinations   Suicidal Ideations none   Homicidal Ideations none   Mood:  euthymic   Affect:  full range   Memory recent  intact   Memory remote:  intact   Concentration/Attention:  intact   Fund of Knowledge average   Insight:  fair   Reliability good   Judgment:  fair          VITALS:     Patient Vitals for the past 24 hrs:   Temp Pulse Resp BP SpO2   02/24/23 0915 99 °F (37.2 °C) (!) 113 18 (!) 142/89 98 %   02/23/23 2200 98.5 °F (36.9 °C) 89 20 138/81 98 %   02/23/23 2124 98.9 °F (37.2 °C) 93 18 (!) 140/55 98 %   02/23/23 1848 -- 88 16 (!) 136/91 100 %     Wt Readings from Last 3 Encounters:   02/23/23 90.7 kg (200 lb)   01/22/23 96.9 kg (213 lb 11.2 oz)   07/31/22 91.7 kg (202 lb 3.2 oz)     Temp Readings from Last 3 Encounters:   02/24/23 99 °F (37.2 °C)   02/23/23 99.1 °F (37.3 °C)   02/20/23 98.6 °F (37 °C)     BP Readings from Last 3 Encounters:   02/24/23 (!) 142/89   02/23/23 126/76   02/20/23 123/76     Pulse Readings from Last 3 Encounters:   02/24/23 (!) 113   02/23/23 (!) 102   02/20/23 (!) 102            DATA     LABORATORY DATA:  Labs Reviewed   CBC WITH AUTOMATED DIFF - Abnormal; Notable for the following components:       Result Value    HGB 10.6 (*)     HCT 32.8 (*)     MPV 8.6 (*)     All other components within normal limits   METABOLIC PANEL, COMPREHENSIVE - Abnormal; Notable for the following components:    Potassium 3.3 (*)     Glucose 62 (*)     Albumin 3.3 (*)     A-G Ratio 0.8 (*)     All other components within normal limits   COVID-19 WITH INFLUENZA A/B   URINALYSIS W/ RFLX MICROSCOPIC   DRUG SCREEN, URINE   ETHYL ALCOHOL   HCG URINE, QL. - POC     Admission on 02/23/2023   Component Date Value Ref Range Status    WBC 02/23/2023 9.8  3.6 - 11.0 K/uL Final    RBC 02/23/2023 3.91  3.80 - 5.20 M/uL Final    HGB 02/23/2023 10.6 (A)  11.5 - 16.0 g/dL Final    HCT 02/23/2023 32.8 (A)  35.0 - 47.0 % Final    MCV 02/23/2023 83.9  80.0 - 99.0 FL Final    MCH 02/23/2023 27.1  26.0 - 34.0 PG Final    MCHC 02/23/2023 32.3  30.0 - 36.5 g/dL Final    RDW 02/23/2023 14.5  11.5 - 14.5 % Final    PLATELET 24/10/1129 973  150 - 400 K/uL Final    MPV 02/23/2023 8.6 (A)  8.9 - 12.9 FL Final    NRBC 02/23/2023 0.0  0  WBC Final    ABSOLUTE NRBC 02/23/2023 0.00  0.00 - 0.01 K/uL Final    NEUTROPHILS 02/23/2023 59  32 - 75 % Final    LYMPHOCYTES 02/23/2023 31  12 - 49 % Final    MONOCYTES 02/23/2023 6  5 - 13 % Final    EOSINOPHILS 02/23/2023 3  0 - 7 % Final    BASOPHILS 02/23/2023 1  0 - 1 % Final    IMMATURE GRANULOCYTES 02/23/2023 0  0.0 - 0.5 % Final    ABS. NEUTROPHILS 02/23/2023 5.8  1.8 - 8.0 K/UL Final    ABS. LYMPHOCYTES 02/23/2023 3.0  0.8 - 3.5 K/UL Final    ABS. MONOCYTES 02/23/2023 0.6  0.0 - 1.0 K/UL Final    ABS. EOSINOPHILS 02/23/2023 0.3  0.0 - 0.4 K/UL Final    ABS. BASOPHILS 02/23/2023 0.1  0.0 - 0.1 K/UL Final    ABS. IMM. GRANS. 02/23/2023 0.0  0.00 - 0.04 K/UL Final    DF 02/23/2023 AUTOMATED    Final    Sodium 02/23/2023 136  136 - 145 mmol/L Final    Potassium 02/23/2023 3.3 (A)  3.5 - 5.1 mmol/L Final    Chloride 02/23/2023 100  97 - 108 mmol/L Final    CO2 02/23/2023 28  21 - 32 mmol/L Final    Anion gap 02/23/2023 8  5 - 15 mmol/L Final    Glucose 02/23/2023 62 (A)  65 - 100 mg/dL Final    BUN 02/23/2023 12  6 - 20 MG/DL Final    Creatinine 02/23/2023 0.84  0.55 - 1.02 MG/DL Final    BUN/Creatinine ratio 02/23/2023 14  12 - 20   Final    eGFR 02/23/2023 >60  >60 ml/min/1.73m2 Final    Calcium 02/23/2023 8.7  8.5 - 10.1 MG/DL Final    Bilirubin, total 02/23/2023 0.2  0.2 - 1.0 MG/DL Final    ALT (SGPT) 02/23/2023 30  12 - 78 U/L Final    AST (SGOT) 02/23/2023 18  15 - 37 U/L Final    Alk.  phosphatase 02/23/2023 89  45 - 117 U/L Final    Protein, total 02/23/2023 7.3  6.4 - 8.2 g/dL Final    Albumin 02/23/2023 3.3 (A)  3.5 - 5.0 g/dL Final    Globulin 02/23/2023 4.0  2.0 - 4.0 g/dL Final    A-G Ratio 02/23/2023 0.8 (A)  1.1 - 2.2   Final    Color 02/23/2023 YELLOW/STRAW    Final    Appearance 02/23/2023 CLEAR  CLEAR   Final    Specific gravity 02/23/2023 <1.005   Final    pH (UA) 02/23/2023 7.0  5.0 - 8.0   Final    Protein 02/23/2023 Negative  NEG mg/dL Final    Glucose 02/23/2023 Negative  NEG mg/dL Final    Ketone 02/23/2023 Negative  NEG mg/dL Final    Bilirubin 02/23/2023 Negative  NEG   Final    Blood 02/23/2023 Negative  NEG   Final    Urobilinogen 02/23/2023 1.0  0.2 - 1.0 EU/dL Final    Nitrites 02/23/2023 Negative  NEG   Final    Leukocyte Esterase 02/23/2023 Negative  NEG   Final    Ventricular Rate 02/23/2023 85  BPM Preliminary    Atrial Rate 02/23/2023 85  BPM Preliminary    P-R Interval 02/23/2023 156  ms Preliminary    QRS Duration 02/23/2023 70  ms Preliminary    Q-T Interval 02/23/2023 370  ms Preliminary    QTC Calculation (Bezet) 02/23/2023 440  ms Preliminary    Calculated P Axis 02/23/2023 63  degrees Preliminary    Calculated R Axis 02/23/2023 0  degrees Preliminary    Calculated T Axis 02/23/2023 28  degrees Preliminary    Diagnosis 02/23/2023    Preliminary                    Value:Normal sinus rhythm  When compared with ECG of 04-MAY-2022 11:25,  No significant change was found      AMPHETAMINES 02/23/2023 Negative  NEG   Final    BARBITURATES 02/23/2023 Negative  NEG   Final    BENZODIAZEPINES 02/23/2023 Negative  NEG   Final    COCAINE 02/23/2023 Negative  NEG   Final    METHADONE 02/23/2023 Negative  NEG   Final    OPIATES 02/23/2023 Negative  NEG   Final    PCP(PHENCYCLIDINE) 02/23/2023 Negative  NEG   Final    THC (TH-CANNABINOL) 02/23/2023 Negative  NEG   Final    Drug screen comment 02/23/2023 (NOTE)   Final    ALCOHOL(ETHYL),SERUM 02/23/2023 <10  <10 MG/DL Final    Pregnancy test,urine (POC) 02/23/2023 Negative  NEG   Final    SARS-CoV-2 by PCR 02/23/2023 Not detected  NOTD   Final    Influenza A by PCR 02/23/2023 Not detected    Final    Influenza B by PCR 02/23/2023 Not detected    Final        RADIOLOGY REPORTS:  Results from Hospital Encounter encounter on 10/24/22    XR SPINE CERV 4 OR 5 V    Narrative  EXAM:  XR SPINE CERV 4 OR 5 V    INDICATION: Cervical radiculopathy    COMPARISON: CT 5/4/2022. TECHNIQUE: 5 views cervical spine    FINDINGS: There is no acute fracture or subluxation. Vertebral body heights are  maintained. There are stable diffuse degenerative disc changes with posterior  longitudinal ligament mineralization and robust anterior osteophytes at C4-5  through C6-7. There is no abnormality in alignment.  There is bilateral neural  foraminal narrowing at C5-6 and C6-7. The C1-2 relationship is within normal  limits. The prevertebral soft tissues are unremarkable. Impression  Stable diffuse cervical degenerative changes with bilateral neural  foraminal narrowing at C5-6 and C6-7. CT HEAD WO CONT    Result Date: 11/28/2022  CLINICAL HISTORY: Traumatic head injury, headaches INDICATION: Headaches COMPARISON: None. CT dose reduction was achieved through use of a standardized protocol tailored for this examination and automatic exposure control for dose modulation. TECHNIQUE: Serial axial images with a collimation of 5 mm were obtained from the skull base through the vertex  FINDINGS: The sulci and ventricles are within normal limits for patient age. There is no evidence of an acute infarction, hemorrhage, or mass-effect. There is no evidence of midline shift or hydrocephalus. Posterior fossa structures are unremarkable. No extra-axial collections are seen. Mastoid air cells are well pneumatized and clear. Chronic fall seen calcifications There is no evidence of depressed skull fractures of soft tissue swelling. No acute intracranial process. [facility] MAMMO BI SCREENING INCL CAD    Result Date: 2/7/2023  STUDY:  Bilateral Digital Screening Mammogram INDICATION:  Screening. Direct comparison is made to prior mammograms. BREAST COMPOSITION: There are scattered fibroglandular densities (approximately 25-50% glandular). FINDINGS: Bilateral digital screening mammography was performed, and is interpreted in conjunction with a computer assisted detection (CAD) system. The breast parenchyma is stable bilaterally. No suspicious masses or calcifications are identified. There is no skin thickening or nipple retraction. There has been no significant change. BIRADS 1: Negative. No mammographic evidence of malignancy. Next screening mammogram is recommended in one year. The patient will be notified of these results.                MEDICATIONS ALL MEDICATIONS  Current Facility-Administered Medications   Medication Dose Route Frequency    atorvastatin (LIPITOR) tablet 40 mg  40 mg Oral DAILY    clonazePAM (KlonoPIN) disintegrating tablet 0.25 mg  0.25 mg Oral QHS    [START ON 2/25/2023] FLUoxetine (PROzac) capsule 20 mg  20 mg Oral DAILY    glipiZIDE (GLUCOTROL) tablet 5 mg  5 mg Oral DAILY    losartan (COZAAR) tablet 100 mg  100 mg Oral DAILY    metFORMIN (GLUCOPHAGE) tablet 1,000 mg  1,000 mg Oral BID WITH MEALS    ondansetron (ZOFRAN ODT) tablet 4 mg  4 mg Oral Q8H PRN    QUEtiapine SR (SEROquel XR) tablet 300 mg  300 mg Oral QHS    traZODone (DESYREL) tablet 200 mg  200 mg Oral QHS    triamterene-hydroCHLOROthiazide (MAXZIDE) 37.5-25 mg per tablet 1 Tablet  1 Tablet Oral DAILY    OLANZapine (ZyPREXA) tablet 5 mg  5 mg Oral Q6H PRN    haloperidol lactate (HALDOL) injection 5 mg  5 mg IntraMUSCular Q6H PRN    benztropine (COGENTIN) tablet 1 mg  1 mg Oral BID PRN    diphenhydrAMINE (BENADRYL) injection 50 mg  50 mg IntraMUSCular BID PRN    hydrOXYzine HCL (ATARAX) tablet 50 mg  50 mg Oral TID PRN    LORazepam (ATIVAN) injection 1 mg  1 mg IntraMUSCular Q4H PRN    traZODone (DESYREL) tablet 50 mg  50 mg Oral QHS PRN    acetaminophen (TYLENOL) tablet 650 mg  650 mg Oral Q4H PRN    magnesium hydroxide (MILK OF MAGNESIA) 400 mg/5 mL oral suspension 30 mL  30 mL Oral DAILY PRN      SCHEDULED MEDICATIONS  Current Facility-Administered Medications   Medication Dose Route Frequency    atorvastatin (LIPITOR) tablet 40 mg  40 mg Oral DAILY    clonazePAM (KlonoPIN) disintegrating tablet 0.25 mg  0.25 mg Oral QHS    [START ON 2/25/2023] FLUoxetine (PROzac) capsule 20 mg  20 mg Oral DAILY    glipiZIDE (GLUCOTROL) tablet 5 mg  5 mg Oral DAILY    losartan (COZAAR) tablet 100 mg  100 mg Oral DAILY    metFORMIN (GLUCOPHAGE) tablet 1,000 mg  1,000 mg Oral BID WITH MEALS    QUEtiapine SR (SEROquel XR) tablet 300 mg  300 mg Oral QHS    traZODone (DESYREL) tablet 200 mg 200 mg Oral QHS    triamterene-hydroCHLOROthiazide (MAXZIDE) 37.5-25 mg per tablet 1 Tablet  1 Tablet Oral DAILY                ASSESSMENT & PLAN        The patient, Dario Cardoso, is a 50 y.o.  female who presents at this time for treatment of the following diagnoses:  Patient C/Jamie [facility] [address] Problem List:       Psychoses [facility]) (2/23/2023)           Assessment: unspecified depressive disorder, generalized anxiety disorder, trichotillomania (per history)        Plan: Restart home psychotropic medications. Admit for safety and stabilization  Medication modification as needed  Group therapy  Disposition planning with social work       A coordinated, multidisplinary treatment team (includes the nurse, unit pharmcist,  and writer) round was conducted for this initial evaluation with the patient present. The following regarding medications was addressed during rounds with patient: the risks and benefits of the proposed medication. The patient was given the opportunity to ask questions. Informed consent given to the use of the above medications. I have reviewed admission (and previous/old) labs and medical tests in the EHR and or transferring hospital documents. I will continue to order blood tests/labs and diagnostic tests as deemed appropriate and review results as they become available (see orders for details). The team will gather additional collateral information from friends, family and o/p treatment team to further elucidate the nature of patient's psychopathology and baselline level of psychiatric functioning.       ESTIMATED LENGTH OF STAY:    5-7 days       STRENGTHS:  Knowledge of medications, Motivated and ready for change, and Patient optimistic that change can occur                                        SIGNED:    Elaina Avila NP  2/24/2023

## 2023-02-24 NOTE — BH NOTES
PSYCHOSOCIAL ASSESSMENT  :Patient identifying info:   Garland Lemus is a 50 y.o., female admitted 2/23/2023  2:59 PM     Presenting problem and precipitating factors: Patient was admitted to [facility] behavioral health unit after being brought in by [facility] staff after experiencing extreme emotional dysregulation. While in the ED, patient was screaming, pulling at her clothing and exposing herself. While on [address] unit, patient reported that she 'needed to get my medications figured out.' Patient reported that she was at [facility] program after discharge from [facility] for 3 weeks and thought the program was supportive. Patient reported that her parents are financially supporting her and didn't want her to continue with the program, causing significant amount of distress because she wanted to continue with treatment at [facility]. 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 Patient denied SI/HI/AH/VH. Mental status assessment: Patient was calm ad corporative  when meeting with this writer. Patient denied SI/HI/AH/VH. Patient presented with euthymic mood. Strengths/Recreation/Coping Skills: insured, community resources    Collateral information: Verbal permission to speak with mom ([phone]). This writer contacted mom briefly to inquire regarding precipitating factors impacting hospitalization. Patients mom reported that she believed patient was in program 'long enough.' The phone was disconnected shortly into conversation. This writer attempted to call back, however, phone went to voicemail. This writer requested return call.  More information is needed from mom to provide clarity regarding situation and reasons for not continuing with [facility] program.    Current psychiatric /substance abuse providers and contact info: Psychiatrist at [address] Linsey Anthony    Previous psychiatric/substance abuse providers and response to treatment: [facility]    Family history of mental illness or substance abuse: none reported     Substance abuse history:    Social History     Tobacco Use    Smoking status: Never    Smokeless tobacco: Never    Tobacco comments:     n/a never smoked tobacco   Substance Use Topics    Alcohol use: No       History of biomedical complications associated with substance abuse: none reported     Patient's current acceptance of treatment or motivation for change: appears motivated and accepts treatment     Family constellation: none reported     Is significant other involved? no    Describe support system: family    Describe living arrangements and home environment: currently lives alone.  Per [facility] note, patients parents want her to move to [address]: NO    Guardian Name: N/A    Guardian Contact: N.A    Health issues:   Hospital Problems  Date Reviewed: 2019            Codes Class Noted POA    [facility] ICD-10-CM: P61  ICD-9-CM: 298.9  2023 Unknown           Trauma history: none reported     Legal issues: none reported     History of  service: non reported     Financial status: no income     Quaker/cultural factors: none reported     Education/work history: unemployed     Have you been licensed as a health care professional (current or ): no    Describe coping skills: limited, ineffective     Quilla Jurist  2023

## 2023-02-25 PROCEDURE — 74011250636 HC RX REV CODE- 250/636

## 2023-02-25 PROCEDURE — 74011250637 HC RX REV CODE- 250/637

## 2023-02-25 PROCEDURE — 65220000001 HC RM PRIVATE PSYCH

## 2023-02-25 RX ORDER — HYDROXYZINE 25 MG/1
50 TABLET, FILM COATED ORAL
Status: DISCONTINUED | OUTPATIENT
Start: 2023-02-25 | End: 2023-03-06 | Stop reason: HOSPADM

## 2023-02-25 RX ORDER — QUETIAPINE 200 MG/1
400 TABLET, FILM COATED, EXTENDED RELEASE ORAL
Status: DISCONTINUED | OUTPATIENT
Start: 2023-02-25 | End: 2023-02-27

## 2023-02-25 RX ORDER — LANOLIN ALCOHOL/MO/W.PET/CERES
3 CREAM (GRAM) TOPICAL
Status: DISCONTINUED | OUTPATIENT
Start: 2023-02-25 | End: 2023-03-06 | Stop reason: HOSPADM

## 2023-02-25 RX ORDER — TRAZODONE HYDROCHLORIDE 50 MG/1
50 TABLET ORAL
Status: DISCONTINUED | OUTPATIENT
Start: 2023-02-25 | End: 2023-02-25

## 2023-02-25 RX ADMIN — LORAZEPAM 1 MG: 2 INJECTION INTRAMUSCULAR; INTRAVENOUS at 08:11

## 2023-02-25 RX ADMIN — HALOPERIDOL LACTATE 5 MG: 5 INJECTION, SOLUTION INTRAMUSCULAR at 08:11

## 2023-02-25 RX ADMIN — LOSARTAN POTASSIUM 100 MG: 50 TABLET, FILM COATED ORAL at 07:59

## 2023-02-25 RX ADMIN — FLUOXETINE 20 MG: 20 CAPSULE ORAL at 07:59

## 2023-02-25 RX ADMIN — TRIAMTERENE AND HYDROCHLOROTHIAZIDE 1 TABLET: 37.5; 25 TABLET ORAL at 07:59

## 2023-02-25 RX ADMIN — HYDROXYZINE HYDROCHLORIDE 50 MG: 25 TABLET, FILM COATED ORAL at 21:16

## 2023-02-25 RX ADMIN — GLIPIZIDE 5 MG: 5 TABLET ORAL at 07:59

## 2023-02-25 RX ADMIN — ACETAMINOPHEN 650 MG: 325 TABLET, FILM COATED ORAL at 22:24

## 2023-02-25 RX ADMIN — METFORMIN HYDROCHLORIDE 1000 MG: 500 TABLET, FILM COATED ORAL at 07:59

## 2023-02-25 RX ADMIN — QUETIAPINE FUMARATE 400 MG: 200 TABLET, EXTENDED RELEASE ORAL at 21:16

## 2023-02-25 RX ADMIN — DIPHENHYDRAMINE HYDROCHLORIDE 50 MG: 50 INJECTION, SOLUTION INTRAMUSCULAR; INTRAVENOUS at 08:11

## 2023-02-25 RX ADMIN — Medication 3 MG: at 21:16

## 2023-02-25 RX ADMIN — ATORVASTATIN CALCIUM 40 MG: 40 TABLET, FILM COATED ORAL at 07:59

## 2023-02-25 RX ADMIN — CLONAZEPAM 0.25 MG: 0.25 TABLET, ORALLY DISINTEGRATING ORAL at 21:16

## 2023-02-25 NOTE — PROGRESS NOTES
Problem: Altered Thought Process (Adult/Pediatric)  Goal: *STG: Participates in treatment plan  Outcome: Progressing Towards Goal  Goal: *STG: Remains safe in hospital  Outcome: Progressing Towards Goal  Goal: *STG: Seeks staff when feelings of anxiety and fear arise  Outcome: Progressing Towards Goal  Goal: *STG: Complies with medication therapy  Outcome: Progressing Towards Goal     Problem: Falls - Risk of  Goal: *Absence of Falls  Description: Document Princess Fall Risk and appropriate interventions in the flowsheet.   Outcome: Progressing Towards Goal  Note: Fall Risk Interventions:            Medication Interventions: Teach patient to arise slowly

## 2023-02-25 NOTE — BH NOTES
Assumed patient care met the pt resting in bed with eyes closed with bilateral even breathing with no distress at room air safety checks initiated and monitored. Patient compliant with medication. Prn Tylenol given for toothache. Appeared to have slept for 8 hrs.

## 2023-02-26 LAB
SARS-COV-2 RNA RESP QL NAA+PROBE: NOT DETECTED
SOURCE, COVRS: NORMAL

## 2023-02-26 PROCEDURE — 65220000001 HC RM PRIVATE PSYCH

## 2023-02-26 PROCEDURE — U0005 INFEC AGEN DETEC AMPLI PROBE: HCPCS

## 2023-02-26 PROCEDURE — 74011250637 HC RX REV CODE- 250/637

## 2023-02-26 PROCEDURE — 74011250636 HC RX REV CODE- 250/636

## 2023-02-26 RX ADMIN — TRIAMTERENE AND HYDROCHLOROTHIAZIDE 1 TABLET: 37.5; 25 TABLET ORAL at 09:16

## 2023-02-26 RX ADMIN — HYDROXYZINE HYDROCHLORIDE 50 MG: 25 TABLET, FILM COATED ORAL at 10:18

## 2023-02-26 RX ADMIN — METFORMIN HYDROCHLORIDE 1000 MG: 500 TABLET, FILM COATED ORAL at 09:16

## 2023-02-26 RX ADMIN — LOSARTAN POTASSIUM 100 MG: 50 TABLET, FILM COATED ORAL at 09:17

## 2023-02-26 RX ADMIN — HALOPERIDOL LACTATE 5 MG: 5 INJECTION, SOLUTION INTRAMUSCULAR at 15:36

## 2023-02-26 RX ADMIN — DIPHENHYDRAMINE HYDROCHLORIDE 50 MG: 50 INJECTION, SOLUTION INTRAMUSCULAR; INTRAVENOUS at 15:36

## 2023-02-26 RX ADMIN — ATORVASTATIN CALCIUM 40 MG: 40 TABLET, FILM COATED ORAL at 09:16

## 2023-02-26 RX ADMIN — QUETIAPINE FUMARATE 400 MG: 200 TABLET, EXTENDED RELEASE ORAL at 21:23

## 2023-02-26 RX ADMIN — ACETAMINOPHEN 650 MG: 325 TABLET, FILM COATED ORAL at 09:22

## 2023-02-26 RX ADMIN — HYDROXYZINE HYDROCHLORIDE 50 MG: 25 TABLET, FILM COATED ORAL at 21:23

## 2023-02-26 RX ADMIN — FLUOXETINE 20 MG: 20 CAPSULE ORAL at 09:16

## 2023-02-26 RX ADMIN — GLIPIZIDE 5 MG: 5 TABLET ORAL at 09:17

## 2023-02-26 RX ADMIN — LORAZEPAM 1 MG: 2 INJECTION INTRAMUSCULAR; INTRAVENOUS at 15:36

## 2023-02-26 RX ADMIN — Medication 3 MG: at 21:23

## 2023-02-26 RX ADMIN — CLONAZEPAM 0.25 MG: 0.25 TABLET, ORALLY DISINTEGRATING ORAL at 21:23

## 2023-02-26 RX ADMIN — ACETAMINOPHEN 650 MG: 325 TABLET, FILM COATED ORAL at 21:27

## 2023-02-26 NOTE — BH NOTES
Pt encountered in dayroom, yelling and screaming to herself and responding to internal stimuli. Pt seen ripping off her paper scrubs. She was able to answer assessment questions but was frequently interrupted by hallucinations. Pt seen yelling at an invisible person \"Stop doing that\". Pt was offered PO medication which she did not wish to take. She was allowed to self soothe and took her normal am medications. Yelling and screaming behavior continued with patient scratching herself. She was then given IM medication.

## 2023-02-26 NOTE — PROGRESS NOTES
Problem: Altered Thought Process (Adult/Pediatric)  Goal: *STG: Participates in treatment plan  Outcome: Progressing Towards Goal  Goal: *STG: Remains safe in hospital  Outcome: Progressing Towards Goal  Goal: *STG: Seeks staff when feelings of anxiety and fear arise  Outcome: Progressing Towards Goal  Goal: *STG: Complies with medication therapy  Outcome: Progressing Towards Goal     Problem: Falls - Risk of  Goal: *Absence of Falls  Description: Document Princess Fall Risk and appropriate interventions in the flowsheet. Outcome: Progressing Towards Goal  Note: Fall Risk Interventions:   Medication Interventions: Teach patient to arise slowly      Patient received resting in her room. Denies SI/HI/AVH, depression and anxiety. Calm, cooperative, in the middle of the afternoon patient became anxious and began pulling her hair. Patient requested IM medications which were administered. Patient refused her evening dose of Metformin but took all other scheduled medications. Will continue to monitor for safety.

## 2023-02-26 NOTE — BH NOTES
Psychiatric Progress Note    Patient: Jessica Keith MRN: [id number]  SSN: [id number]    YOB: 1975  Age: 50 y.o. Sex: female      Admit Date: 2/23/2023    LOS: 2 days     Subjective:     2/25:  Jessica Keith  reports feeling \"anxious\" and affect is mood congruent. Denies SI/HI/AH/VH. She had an episode of acute agitation this morning and received PRN IM medications as ordered with good effect. Appropriately interactive and aware. Tolerating medications well. Eating well and sleeping 8 hours.      Objective:     Vitals:    02/24/23 0915 02/24/23 2007 02/25/23 0736 02/25/23 1941   BP: (!) 142/89 130/68 128/74 111/70   Pulse: (!) 113 88 78 99   Resp: 18 20 18 20   Temp: 99 °F (37.2 °C) 97.8 °F (36.6 °C) 97.3 °F (36.3 °C) 97.5 °F (36.4 °C)   SpO2: 98% 100% 100% 100%   Weight:       Height:            Mental Status Exam:   Sensorium  oriented to time, place and person   Relations cooperative   Eye Contact appropriate   Appearance:  age appropriate   Speech:  normal volume and non-pressured   Thought Process: goal directed and logical   Thought Content free of delusions and free of hallucinations   Suicidal ideations none   Mood:  anxious   Affect:  anxious   Memory   adequate   Concentration:  adequate   Insight:  fair   Judgment:  fair       MEDICATIONS:  Current Facility-Administered Medications   Medication Dose Route Frequency    hydrOXYzine HCL (ATARAX) tablet 50 mg  50 mg Oral QHS    QUEtiapine SR (SEROquel XR) TABLET 400 mg  400 mg Oral QHS    melatonin tablet 3 mg  3 mg Oral QHS    hydrOXYzine HCL (ATARAX) tablet 50 mg  50 mg Oral BID PRN    atorvastatin (LIPITOR) tablet 40 mg  40 mg Oral DAILY    clonazePAM (KlonoPIN) disintegrating tablet 0.25 mg  0.25 mg Oral QHS    FLUoxetine (PROzac) capsule 20 mg  20 mg Oral DAILY    glipiZIDE (GLUCOTROL) tablet 5 mg  5 mg Oral DAILY    losartan (COZAAR) tablet 100 mg  100 mg Oral DAILY    metFORMIN (GLUCOPHAGE) tablet 1,000 mg  1,000 mg Oral BID WITH MEALS    ondansetron (ZOFRAN ODT) tablet 4 mg  4 mg Oral Q8H PRN    triamterene-hydroCHLOROthiazide (MAXZIDE) 37.5-25 mg per tablet 1 Tablet  1 Tablet Oral DAILY    OLANZapine (ZyPREXA) tablet 5 mg  5 mg Oral Q6H PRN    haloperidol lactate (HALDOL) injection 5 mg  5 mg IntraMUSCular Q6H PRN    benztropine (COGENTIN) tablet 1 mg  1 mg Oral BID PRN    diphenhydrAMINE (BENADRYL) injection 50 mg  50 mg IntraMUSCular BID PRN    LORazepam (ATIVAN) injection 1 mg  1 mg IntraMUSCular Q4H PRN    acetaminophen (TYLENOL) tablet 650 mg  650 mg Oral Q4H PRN    magnesium hydroxide (MILK OF MAGNESIA) 400 mg/5 mL oral suspension 30 mL  30 mL Oral DAILY PRN      DISCUSSION:   the risks and benefits of the proposed medication    Lab/Data Review: All lab results for the last 24 hours reviewed. No results found for this or any previous visit (from the past 24 hour(s)). Assessment:     Active Problems:    Psychoses ([address] (2/23/2023)        Plan:     Continue current care  Collateral information  Increase scheduled hs Seroquel to 400 mg. Disposition planning with social work    I certify that this patient's inpatient psychiatric hospital services furnished since the previous certification were, and continue to be, required for treatment that could reasonably be expected to improve the patient's condition, or for diagnostic study, and that the patient continues to need, on a daily basis, active treatment furnished directly by or requiring the supervision of inpatient psychiatric facility personnel. In addition, the hospital records show that services furnished were intensive treatment services, admission or related services, or equivalent services.   Signed By: Venkat Lund NP     February 25, 2023

## 2023-02-26 NOTE — PROGRESS NOTES
Problem: Altered Thought Process (Adult/Pediatric)  Goal: *STG: Participates in treatment plan  Outcome: Progressing Towards Goal  Goal: *STG: Remains safe in hospital  Outcome: Progressing Towards Goal  Goal: *STG: Complies with medication therapy  Outcome: Progressing Towards Goal     Received pt isolative to room, resting in bed, alert and oriented x 4. Presents calm and cooperative with smiling affect and anxious mood. Pt is cooperative with vital signs check and assessment. Pt denies anxiety, depression, SI/HI/AH/VH. Speech unremarkable. Interacts appropriately and aware. Med and meal compliant. Pain level of 0/10 . Scheduled med given. Will continue to monitor for safety. No behavioral issues observed. Nasal swab collected for COVID test and sent to lab. Pt slept for 8 hours.

## 2023-02-27 ENCOUNTER — APPOINTMENT (OUTPATIENT)
Dept: BEHAVIORAL/MENTAL HEALTH | Age: 48
End: 2023-02-27
Payer: MEDICAID

## 2023-02-27 LAB
ATRIAL RATE: 85 BPM
CALCULATED P AXIS, ECG09: 63 DEGREES
CALCULATED R AXIS, ECG10: 0 DEGREES
CALCULATED T AXIS, ECG11: 28 DEGREES
DIAGNOSIS, 93000: NORMAL
P-R INTERVAL, ECG05: 156 MS
Q-T INTERVAL, ECG07: 370 MS
QRS DURATION, ECG06: 70 MS
QTC CALCULATION (BEZET), ECG08: 440 MS
VENTRICULAR RATE, ECG03: 85 BPM

## 2023-02-27 PROCEDURE — 74011250637 HC RX REV CODE- 250/637

## 2023-02-27 PROCEDURE — 65220000001 HC RM PRIVATE PSYCH

## 2023-02-27 PROCEDURE — 74011250636 HC RX REV CODE- 250/636

## 2023-02-27 RX ORDER — QUETIAPINE 300 MG/1
300 TABLET, FILM COATED, EXTENDED RELEASE ORAL
Status: DISCONTINUED | OUTPATIENT
Start: 2023-02-27 | End: 2023-03-02

## 2023-02-27 RX ORDER — PSEUDOEPHED/ACETAMINOPHEN/CPM 30-500-2MG
2 TABLET ORAL
Status: DISCONTINUED | OUTPATIENT
Start: 2023-02-27 | End: 2023-03-06 | Stop reason: HOSPADM

## 2023-02-27 RX ADMIN — HYDROXYZINE HYDROCHLORIDE 50 MG: 25 TABLET, FILM COATED ORAL at 09:56

## 2023-02-27 RX ADMIN — GLIPIZIDE 5 MG: 5 TABLET ORAL at 07:59

## 2023-02-27 RX ADMIN — TRIAMTERENE AND HYDROCHLOROTHIAZIDE 1 TABLET: 37.5; 25 TABLET ORAL at 07:59

## 2023-02-27 RX ADMIN — HYDROXYZINE HYDROCHLORIDE 50 MG: 25 TABLET, FILM COATED ORAL at 22:12

## 2023-02-27 RX ADMIN — CLONAZEPAM 0.25 MG: 0.25 TABLET, ORALLY DISINTEGRATING ORAL at 22:12

## 2023-02-27 RX ADMIN — Medication 3 MG: at 22:12

## 2023-02-27 RX ADMIN — ONDANSETRON 4 MG: 4 TABLET, ORALLY DISINTEGRATING ORAL at 21:11

## 2023-02-27 RX ADMIN — FLUOXETINE 20 MG: 20 CAPSULE ORAL at 07:59

## 2023-02-27 RX ADMIN — METFORMIN HYDROCHLORIDE 1000 MG: 500 TABLET, FILM COATED ORAL at 07:59

## 2023-02-27 RX ADMIN — ACETAMINOPHEN 650 MG: 325 TABLET, FILM COATED ORAL at 21:05

## 2023-02-27 RX ADMIN — ATORVASTATIN CALCIUM 40 MG: 40 TABLET, FILM COATED ORAL at 07:59

## 2023-02-27 RX ADMIN — QUETIAPINE FUMARATE 300 MG: 300 TABLET, EXTENDED RELEASE ORAL at 22:12

## 2023-02-27 RX ADMIN — OLANZAPINE 5 MG: 5 TABLET, FILM COATED ORAL at 18:02

## 2023-02-27 RX ADMIN — LOSARTAN POTASSIUM 100 MG: 50 TABLET, FILM COATED ORAL at 07:59

## 2023-02-27 NOTE — PROGRESS NOTES
Bedside and Verbal shift change report given to Elise Roque RN  (oncoming nurse) by Geni Hamilton RN  (offgoing nurse). Report included the following information SBAR, Kardex, MAR, Accordion, and Recent Results. Assumed care of the patient, alert and oriented times 3. She denies SI, HI and AVH. Depression 5/10 and anxiety 5/10. She had a headache and received Tylenol, see MAR. Medication compliant. Problem: Altered Thought Process (Adult/Pediatric)  Goal: *STG: Attends activities and groups  Outcome: Progressing Towards Goal     Problem: Patient Education: Go to Patient Education Activity  Goal: Patient/Family Education  Outcome: Progressing Towards Goal     Problem: Falls - Risk of  Goal: *Absence of Falls  Description: Document Anais Ping Fall Risk and appropriate interventions in the flowsheet.   Outcome: Progressing Towards Goal  Note: Fall Risk Interventions:  Medication Interventions: Teach patient to arise slowly  Problem: Discharge Planning  Goal: *Knowledge of medication management  Outcome: Progressing Towards Goal

## 2023-02-27 NOTE — PROGRESS NOTES
Problem: Altered Thought Process (Adult/Pediatric)  Goal: *STG: Participates in treatment plan  Outcome: Progressing Towards Goal  Goal: *STG: Remains safe in hospital  Outcome: Progressing Towards Goal  Goal: *STG: Complies with medication therapy  Outcome: Progressing Towards Goal  Goal: *STG: Absence of lethality  Outcome: Progressing Towards Goal     Pt alert and oriented x 3, visibly agitated and anxious with frequent outbursts when left alone. Atarax given PRN for anxiety (See Mar)   Pt denies SI/HI/AVH. When asked to rate anxiety and depression pt stated \"God is working on that\" and began to quote Bible verses. Pt complained of loose stools at approximately 1215. Pt requesting medication for diarrhea. Order received for imodium PRN (See MAR). Writer asked pt to allow nurse to see stool before admin of Imodium. Pt stated 30 minutes later that her loose stools were gone. Pt compliant with meals and medication. Q15 minute checks maintained for safety.

## 2023-02-27 NOTE — BH NOTES
Psychiatric Progress Note    Patient: Julisa Daily MRN: [id number]  SSN: [id number]    YOB: 1975  Age: 50 y.o. Sex: female      Admit Date: 2/23/2023    LOS: 3 days     Subjective:     2/25:  Julisa Daily  reports feeling \"anxious\" and affect is mood congruent. Denies SI/HI/AH/VH. She had an episode of acute agitation this morning and received PRN IM medications as ordered with good effect. Appropriately interactive and aware. Tolerating medications well. Eating well and sleeping 8 hours. 2/26:  Julisa Daily  reports feeling \"OK\" and affect is mood congruent. Denies SI/HI/AH/VH. Her agitation and verbal outbursts appear to be improving, however per chart review she continues to receive IM medication for severe anxiety. Appropriately interactive and aware. Tolerating medications well. Eating well and sleeping 8 hours.      Objective:     Vitals:    02/25/23 0736 02/25/23 1941 02/26/23 0833 02/26/23 2017   BP: 128/74 111/70 (!) 102/58 (!) 146/83   Pulse: 78 99 95 (!) 103   Resp: 18 20 18 20   Temp: 97.3 °F (36.3 °C) 97.5 °F (36.4 °C) 98.4 °F (36.9 °C) 98.4 °F (36.9 °C)   SpO2: 100% 100% (!) 101% 100%   Weight:       Height:            Mental Status Exam:   Sensorium  oriented to time, place and person   Relations cooperative   Eye Contact appropriate   Appearance:  age appropriate   Speech:  normal volume and non-pressured   Thought Process: goal directed and logical   Thought Content free of delusions and free of hallucinations   Suicidal ideations none   Mood:  anxious   Affect:  anxious   Memory   adequate   Concentration:  adequate   Insight:  fair   Judgment:  fair       MEDICATIONS:  Current Facility-Administered Medications   Medication Dose Route Frequency    hydrOXYzine HCL (ATARAX) tablet 50 mg  50 mg Oral QHS    QUEtiapine SR (SEROquel XR) TABLET 400 mg  400 mg Oral QHS    melatonin tablet 3 mg  3 mg Oral QHS    hydrOXYzine HCL (ATARAX) tablet 50 mg  50 mg Oral BID PRN    atorvastatin (LIPITOR) tablet 40 mg  40 mg Oral DAILY    clonazePAM (KlonoPIN) disintegrating tablet 0.25 mg  0.25 mg Oral QHS    FLUoxetine (PROzac) capsule 20 mg  20 mg Oral DAILY    glipiZIDE (GLUCOTROL) tablet 5 mg  5 mg Oral DAILY    losartan (COZAAR) tablet 100 mg  100 mg Oral DAILY    metFORMIN (GLUCOPHAGE) tablet 1,000 mg  1,000 mg Oral BID WITH MEALS    ondansetron (ZOFRAN ODT) tablet 4 mg  4 mg Oral Q8H PRN    triamterene-hydroCHLOROthiazide (MAXZIDE) 37.5-25 mg per tablet 1 Tablet  1 Tablet Oral DAILY    OLANZapine (ZyPREXA) tablet 5 mg  5 mg Oral Q6H PRN    haloperidol lactate (HALDOL) injection 5 mg  5 mg IntraMUSCular Q6H PRN    benztropine (COGENTIN) tablet 1 mg  1 mg Oral BID PRN    diphenhydrAMINE (BENADRYL) injection 50 mg  50 mg IntraMUSCular BID PRN    LORazepam (ATIVAN) injection 1 mg  1 mg IntraMUSCular Q4H PRN    acetaminophen (TYLENOL) tablet 650 mg  650 mg Oral Q4H PRN    magnesium hydroxide (MILK OF MAGNESIA) 400 mg/5 mL oral suspension 30 mL  30 mL Oral DAILY PRN      DISCUSSION:   the risks and benefits of the proposed medication    Lab/Data Review: All lab results for the last 24 hours reviewed. Recent Results (from the past 24 hour(s))   SARS-COV-2    Collection Time: 02/26/23  5:32 AM   Result Value Ref Range    Specimen source Nasopharyngeal      SARS-CoV-2 Not detected NOTD           Assessment:     Active Problems:    Psychoses ([address] (2/23/2023)      Plan:     Continue current care  Collateral information  Continue current treatment plan.    Disposition planning with social work    I certify that this patient's inpatient psychiatric hospital services furnished since the previous certification were, and continue to be, required for treatment that could reasonably be expected to improve the patient's condition, or for diagnostic study, and that the patient continues to need, on a daily basis, active treatment furnished directly by or requiring the supervision of inpatient psychiatric facility personnel. In addition, the hospital records show that services furnished were intensive treatment services, admission or related services, or equivalent services.   Signed By: Kanika Samaniego NP     February 26, 2023

## 2023-02-27 NOTE — BH NOTES
Behavioral Health Interdisciplinary Rounds     Patient Name: Julisa Resendiz  Age: 50 y.o. Room/Bed:  [facility]/  Primary Diagnosis: <principal problem not specified>     Progress note: Per nursing report, patient has been experiencing outbursts with dysregulation including screaming, increased anxiety, and hair pulling. This writer informed MD that neuro consult was recommended. When patient met with this writer, patient was calm and cooperative. Patient reported that she would like to restart PHP, however, she reported that her family was unwilling to allow that to happen. Patient reported that her family is helping her out financially and she wants assistance in applying for disability. This writer informed patient that applying for disability is to be done on an outpatient basis. Patient informed this writer that she did not want anyone to speak with family at this time. This writer will reevaluate with patient tomorrow in order to schedule family session Wednesday via zoom to discuss importance of PHP program for patients mental health and other disposition planning needs.      LOS:  4  Expected LOS: 5-7    Financial concerns/prescription coverage:  no  Family contact: 2/24/2023     Family requesting physician contact today:  no  Discharge plan: to be determined   Access to weapons: no        Outpatient provider(s): to be linked   Patient's preferred phone number for follow up call: [phone]     Participating treatment team members: Shraddha Oscar MD, Thai Courtney, supervisee in social work

## 2023-02-27 NOTE — PROGRESS NOTES
Spiritual Care Assessment/Progress Note  Tana Pitt      NAME: Kim Huynh      MRN: [id number]  AGE: 50 y.o.  SEX: female  Orthodox Affiliation: No Christian   Language: English     2/27/2023     Total Time (in minutes): 39     Spiritual Assessment begun in [place] [id number] [address] [place] [address] through conversation with:         [x]Patient        [] Family    [] Friend(s)        Reason for Consult: Initial/Spiritual assessment, patient floor, Request by patient     Spiritual beliefs: (Please include comment if needed)     [x] Identifies with a jasvir tradition:  Adventist       [] Supported by a jasvir community:            [] Claims no spiritual orientation:           [] Seeking spiritual identity:                [] Adheres to an individual form of spirituality:           [] Not able to assess:                           Identified resources for coping:      [x] Prayer                               [x] Music                  [] Guided Imagery     [x] Family/friends                 [] Pet visits     [x] Devotional reading                         [] Unknown     [] Other:                                               Interventions offered during this visit: (See comments for more details)    Patient Interventions: Affirmation of emotions/emotional suffering, Affirmation of jasvir, Catharsis/review of pertinent events in supportive environment, Coping skills reviewed/reinforced, Iconic (affirming the presence of God/Higher Power), Normalization of emotional/spiritual concerns, Prayer (actual), Prayer (assurance of), Orthodox beliefs/image of God discussed           Plan of Care:     [x] Support spiritual and/or cultural needs    [] Support AMD and/or advance care planning process      [] Support grieving process   [] Coordinate Rites and/or Rituals    [] Coordination with community clergy   [] No spiritual needs identified at this time   [] Detailed Plan of Care below (See Comments)  [] Make referral to Music Therapy  [] Make referral to Pet Therapy     [] Make referral to Addiction services  [] Make [facility] to [facility]  [] Make referral to Spiritual Care Partner  [] No future visits requested        [x] Contact Spiritual Care for further referrals     Comments: Visited with patient  while rounding in the [address]. Introduced myself and offered spiritual care and emotional support to patient. Patient shared that she is [facility] and has a good support system in her parents who live in [place] and her aunt with whom she is very close to. Patient shared that she attends [employer] .  discussed patient's coping skills with her which included utilizing scripture as affirmation and prayer, journaling, stress balls, lotion, coloring, and listening to music. Patient admits that she may not always do these things as much as she should to help her.  provided words of care, concern and understanding, affirmation, encouragement, prayer and music. Advised of  availability. Reviewed chart. Rev.  Marilynn Blanco 10, [address]

## 2023-02-28 ENCOUNTER — APPOINTMENT (OUTPATIENT)
Dept: MRI IMAGING | Age: 48
DRG: 760 | End: 2023-02-28
Attending: PSYCHIATRY & NEUROLOGY
Payer: MEDICAID

## 2023-02-28 ENCOUNTER — APPOINTMENT (OUTPATIENT)
Dept: BEHAVIORAL/MENTAL HEALTH | Age: 48
End: 2023-02-28
Payer: MEDICAID

## 2023-02-28 PROCEDURE — 74011250637 HC RX REV CODE- 250/637

## 2023-02-28 PROCEDURE — A9576 INJ PROHANCE MULTIPACK: HCPCS | Performed by: PSYCHIATRY & NEUROLOGY

## 2023-02-28 PROCEDURE — 70553 MRI BRAIN STEM W/O & W/DYE: CPT

## 2023-02-28 PROCEDURE — 74011250636 HC RX REV CODE- 250/636: Performed by: PSYCHIATRY & NEUROLOGY

## 2023-02-28 PROCEDURE — 65220000001 HC RM PRIVATE PSYCH

## 2023-02-28 RX ORDER — FLUOXETINE HYDROCHLORIDE 20 MG/1
40 CAPSULE ORAL DAILY
Status: DISCONTINUED | OUTPATIENT
Start: 2023-03-01 | End: 2023-03-06 | Stop reason: HOSPADM

## 2023-02-28 RX ADMIN — ATORVASTATIN CALCIUM 40 MG: 40 TABLET, FILM COATED ORAL at 09:16

## 2023-02-28 RX ADMIN — FLUOXETINE 20 MG: 20 CAPSULE ORAL at 09:16

## 2023-02-28 RX ADMIN — QUETIAPINE FUMARATE 300 MG: 300 TABLET, EXTENDED RELEASE ORAL at 22:12

## 2023-02-28 RX ADMIN — GLIPIZIDE 5 MG: 5 TABLET ORAL at 09:16

## 2023-02-28 RX ADMIN — ACETAMINOPHEN 650 MG: 325 TABLET, FILM COATED ORAL at 09:18

## 2023-02-28 RX ADMIN — METFORMIN HYDROCHLORIDE 1000 MG: 500 TABLET, FILM COATED ORAL at 09:15

## 2023-02-28 RX ADMIN — Medication 3 MG: at 22:11

## 2023-02-28 RX ADMIN — HYDROXYZINE HYDROCHLORIDE 50 MG: 25 TABLET, FILM COATED ORAL at 22:12

## 2023-02-28 RX ADMIN — ACETAMINOPHEN 650 MG: 325 TABLET, FILM COATED ORAL at 22:21

## 2023-02-28 RX ADMIN — OLANZAPINE 5 MG: 5 TABLET, FILM COATED ORAL at 10:53

## 2023-02-28 RX ADMIN — LOSARTAN POTASSIUM 100 MG: 50 TABLET, FILM COATED ORAL at 09:16

## 2023-02-28 RX ADMIN — CLONAZEPAM 0.25 MG: 0.25 TABLET, ORALLY DISINTEGRATING ORAL at 22:12

## 2023-02-28 RX ADMIN — GADOTERIDOL 20 ML: 279.3 INJECTION, SOLUTION INTRAVENOUS at 16:40

## 2023-02-28 RX ADMIN — TRIAMTERENE AND HYDROCHLOROTHIAZIDE 1 TABLET: 37.5; 25 TABLET ORAL at 09:16

## 2023-02-28 NOTE — GROUP NOTE
[facility]  GROUP DOCUMENTATION INDIVIDUAL                                                                          Group Therapy Note    Date: 2/28/2023    Group Start Time: 1400  Group End Time: 1500  Group Topic: Recreational/Music Therapy    [address] [facility] 3 ACUTE BE[facility]AV [facility]    [place], [address] [facility] GROUP DOCUMENTATION GROUP    Group Therapy Note    Attendees: 5       Attendance: Attended    Patient's Goal:  To concentrate on selected task    Interventions/techniques: Supported-crafts,games,music    Follows Directions:  Followed directions    Mental Status: Calm    Behavior/appearance: Attentive and Cooperative    Goals Achieved: Able to engage in interactions and Able to listen to others-worked on selected task    Elinor Reyes

## 2023-02-28 NOTE — BH NOTES
Psychiatric Progress Note    Patient: Julissa Monet MRN: [id number]  SSN: [id number]    YOB: 1975  Age: 50 y.o. Sex: female      Admit Date: 2/23/2023    LOS: 5 days     Subjective:     2/25:  Julissa Monet  reports feeling \"anxious\" and affect is mood congruent. Denies SI/HI/AH/VH. She had an episode of acute agitation this morning and received PRN IM medications as ordered with good effect. Appropriately interactive and aware. Tolerating medications well. Eating well and sleeping 8 hours. 2/26:  Julissa Monet  reports feeling \"OK\" and affect is mood congruent. Denies SI/HI/AH/VH. Her agitation and verbal outbursts appear to be improving, however per chart review she continues to receive IM medication for severe anxiety. Appropriately interactive and aware. Tolerating medications well. Eating well and sleeping 8 hours. 2/27:  Julissa Monet  reports feeling \"OK\" and affect is not mood congruent. Denies SI/HI/AH/VH. Her agitation and verbal outbursts appear to be improving moderately and was noted to only received PO medications for agitation/anxiety today. Appropriately interactive and aware. Endorsed headache from seroquel, will decrease dose to 300 mg. Eating well and sleeping 8 hours. 2/28 - Julissa Monet reports feeling well without anxiety, but moods are depressed (6/10). Denies SI/HI/AH/VH. Continues to have impulsive outbursts. No aggression or violence. Appropriately interactive and aware. Tolerating medications well. Eating and sleeping fairly.     Objective:     Vitals:    02/26/23 2017 02/27/23 1048 02/27/23 2038 02/28/23 0749   BP: (!) 146/83 135/88 137/85 (!) 103/59   Pulse: (!) 103 93 (!) 114 93   Resp: 20 18 16 15   Temp: 98.4 °F (36.9 °C) 100.1 °F (37.8 °C) 98.5 °F (36.9 °C) 98.3 °F (36.8 °C)   SpO2: 100% 99% 96% 98%   Weight:       Height:            Mental Status Exam:   Sensorium  oriented to time, place and person   Relations cooperative   Eye Contact appropriate   Appearance:  age appropriate   Speech:  normal volume and non-pressured   Thought Process: goal directed and logical   Thought Content free of delusions and free of hallucinations   Suicidal ideations none   Mood:  anxious   Affect:  anxious   Memory   adequate   Concentration:  adequate   Insight:  fair   Judgment:  fair       MEDICATIONS:  Current Facility-Administered Medications   Medication Dose Route Frequency    loperamide (IMODIUM) 1 mg/7.5 mL oral solution 2 mg  2 mg Oral Q6H PRN    QUEtiapine SR (SEROquel XR) tablet 300 mg  300 mg Oral QHS    hydrOXYzine HCL (ATARAX) tablet 50 mg  50 mg Oral QHS    melatonin tablet 3 mg  3 mg Oral QHS    hydrOXYzine HCL (ATARAX) tablet 50 mg  50 mg Oral BID PRN    atorvastatin (LIPITOR) tablet 40 mg  40 mg Oral DAILY    clonazePAM (KlonoPIN) disintegrating tablet 0.25 mg  0.25 mg Oral QHS    FLUoxetine (PROzac) capsule 20 mg  20 mg Oral DAILY    glipiZIDE (GLUCOTROL) tablet 5 mg  5 mg Oral DAILY    losartan (COZAAR) tablet 100 mg  100 mg Oral DAILY    metFORMIN (GLUCOPHAGE) tablet 1,000 mg  1,000 mg Oral BID WITH MEALS    ondansetron (ZOFRAN ODT) tablet 4 mg  4 mg Oral Q8H PRN    triamterene-hydroCHLOROthiazide (MAXZIDE) 37.5-25 mg per tablet 1 Tablet  1 Tablet Oral DAILY    OLANZapine (ZyPREXA) tablet 5 mg  5 mg Oral Q6H PRN    haloperidol lactate (HALDOL) injection 5 mg  5 mg IntraMUSCular Q6H PRN    benztropine (COGENTIN) tablet 1 mg  1 mg Oral BID PRN    diphenhydrAMINE (BENADRYL) injection 50 mg  50 mg IntraMUSCular BID PRN    LORazepam (ATIVAN) injection 1 mg  1 mg IntraMUSCular Q4H PRN    acetaminophen (TYLENOL) tablet 650 mg  650 mg Oral Q4H PRN    magnesium hydroxide (MILK OF MAGNESIA) 400 mg/5 mL oral suspension 30 mL  30 mL Oral DAILY PRN      DISCUSSION:   the risks and benefits of the proposed medication    Lab/Data Review: All lab results for the last 24 hours reviewed.      No results found for this or any previous visit (from the past 24 hour(s)). 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 Neurology consult   Increase Prozac 40 mg PO Q daily  Disposition planning with social work    I certify that this patient's inpatient psychiatric hospital services furnished since the previous certification were, and continue to be, required for treatment that could reasonably be expected to improve the patient's condition, or for diagnostic study, and that the patient continues to need, on a daily basis, active treatment furnished directly by or requiring the supervision of inpatient psychiatric facility personnel. In addition, the hospital records show that services furnished were intensive treatment services, admission or related services, or equivalent services.   Signed By: Lyric Licona MD     February 28, 2023

## 2023-02-28 NOTE — BH NOTES
Psychiatric Progress Note    Patient: Garland Lemus MRN: [id number]  SSN: [id number]    YOB: 1975  Age: 50 y.o. Sex: female      Admit Date: 2/23/2023    LOS: 4 days     Subjective:     2/25:  Garland Lemus  reports feeling \"anxious\" and affect is mood congruent. Denies SI/HI/AH/VH. She had an episode of acute agitation this morning and received PRN IM medications as ordered with good effect. Appropriately interactive and aware. Tolerating medications well. Eating well and sleeping 8 hours. 2/26:  Garland Lemus  reports feeling \"OK\" and affect is mood congruent. Denies SI/HI/AH/VH. Her agitation and verbal outbursts appear to be improving, however per chart review she continues to receive IM medication for severe anxiety. Appropriately interactive and aware. Tolerating medications well. Eating well and sleeping 8 hours. 2/27:  Garland Lemus  reports feeling \"OK\" and affect is not mood congruent. Denies SI/HI/AH/VH. Her agitation and verbal outbursts appear to be improving moderately and was noted to only received PO medications for agitation/anxiety today. Appropriately interactive and aware. Endorsed headache from seroquel, will decrease dose to 300 mg. Eating well and sleeping 8 hours.      Objective:     Vitals:    02/25/23 1941 02/26/23 0833 02/26/23 2017 02/27/23 1048   BP: 111/70 (!) 102/58 (!) 146/83 135/88   Pulse: 99 95 (!) 103 93   Resp: 20 18 20 18   Temp: 97.5 °F (36.4 °C) 98.4 °F (36.9 °C) 98.4 °F (36.9 °C) 100.1 °F (37.8 °C)   SpO2: 100% (!) 101% 100% 99%   Weight:       Height:            Mental Status Exam:   Sensorium  oriented to time, place and person   Relations cooperative   Eye Contact appropriate   Appearance:  age appropriate   Speech:  normal volume and non-pressured   Thought Process: goal directed and logical   Thought Content free of delusions and free of hallucinations   Suicidal ideations none   Mood:  anxious   Affect:  anxious Memory   adequate   Concentration:  adequate   Insight:  fair   Judgment:  fair       MEDICATIONS:  Current Facility-Administered Medications   Medication Dose Route Frequency    loperamide (IMODIUM) 1 mg/7.5 mL oral solution 2 mg  2 mg Oral Q6H PRN    QUEtiapine SR (SEROquel XR) tablet 300 mg  300 mg Oral QHS    hydrOXYzine HCL (ATARAX) tablet 50 mg  50 mg Oral QHS    melatonin tablet 3 mg  3 mg Oral QHS    hydrOXYzine HCL (ATARAX) tablet 50 mg  50 mg Oral BID PRN    atorvastatin (LIPITOR) tablet 40 mg  40 mg Oral DAILY    clonazePAM (KlonoPIN) disintegrating tablet 0.25 mg  0.25 mg Oral QHS    FLUoxetine (PROzac) capsule 20 mg  20 mg Oral DAILY    glipiZIDE (GLUCOTROL) tablet 5 mg  5 mg Oral DAILY    losartan (COZAAR) tablet 100 mg  100 mg Oral DAILY    metFORMIN (GLUCOPHAGE) tablet 1,000 mg  1,000 mg Oral BID WITH MEALS    ondansetron (ZOFRAN ODT) tablet 4 mg  4 mg Oral Q8H PRN    triamterene-hydroCHLOROthiazide (MAXZIDE) 37.5-25 mg per tablet 1 Tablet  1 Tablet Oral DAILY    OLANZapine (ZyPREXA) tablet 5 mg  5 mg Oral Q6H PRN    haloperidol lactate (HALDOL) injection 5 mg  5 mg IntraMUSCular Q6H PRN    benztropine (COGENTIN) tablet 1 mg  1 mg Oral BID PRN    diphenhydrAMINE (BENADRYL) injection 50 mg  50 mg IntraMUSCular BID PRN    LORazepam (ATIVAN) injection 1 mg  1 mg IntraMUSCular Q4H PRN    acetaminophen (TYLENOL) tablet 650 mg  650 mg Oral Q4H PRN    magnesium hydroxide (MILK OF MAGNESIA) 400 mg/5 mL oral suspension 30 mL  30 mL Oral DAILY PRN      DISCUSSION:   the risks and benefits of the proposed medication    Lab/Data Review: All lab results for the last 24 hours reviewed. No results found for this or any previous visit (from the past 24 hour(s)). Assessment:     Active Problems:    Psychoses ([address] (2/23/2023)      Plan:     Continue current care  Collateral information  Decrease hs quetiapine to 300 mg.    Disposition planning with social work    I certify that this patient's inpatient psychiatric hospital services furnished since the previous certification were, and continue to be, required for treatment that could reasonably be expected to improve the patient's condition, or for diagnostic study, and that the patient continues to need, on a daily basis, active treatment furnished directly by or requiring the supervision of inpatient psychiatric facility personnel. In addition, the hospital records show that services furnished were intensive treatment services, admission or related services, or equivalent services.   Signed By: Zach Mcgee NP     February 27, 2023

## 2023-02-28 NOTE — CONSULTS
Neurology Consult    Patient ID:  Corrinne Craw  [id number]  62 y.o.  1975    Subjective:     Date of Consultation:  February 28, 2023    Referring Physician: Bridger Kothari N.P.    Reason for Consultation:  New onset psychosis    History of Present Illness:   Corrinne Craw is a 50 y.o. woman with past psychiatry history of depression, anxiety, trichotillomania who presented for evaluation of depression anxiety. Patient was noted to be agitated, pulling her hair for approximately 2 weeks. The patient was admitted for further evaluation however she has been noted to be intermittently agitated during her hospitalization. Some impulsive behaviors have been described as biting a stress ball while growling, pulling at her clothing, pulling her hair, and screaming. It was reported that her family was trying to have her discontinue her current program, and take her back to [place]. Neurology was consulted for evaluation of possible underlying medical illness triggering the symptoms. Per nurse report, patient was experiencing outbursts with dysregulation including screaming, increased anxiety. The patient reports having symptoms of trichotillomania prior to a year ago, but this has worsened in the last year. She reports feeling stress and anxiety, and gets the urge to pull her hair. Her nurse also reports that she had an episode where she was pulling at her clothes and screaming \"stop it\". He reports that she was talking to herself during the episode, and was not having any hallucinations. She denies visual or auditory hallucinations. She reports impaired memory since starting her current medications. The patient is currently on Motrin for headache, Prozac, Klonopin. Laboratory valuation revealed hemoglobin of 10.6, hematocrit 32.8, potassium 3.3, glucose 62, hemoglobin A1c 6.2, urine toxicology screen.       Past Medical History:   Diagnosis Date    Anxiety     Depression     ANXIETY & DEPRESSION    Diabetes ([facility]     TYPE II    Hypertension     Infected sebaceous cyst, upper back 4/10/2019    Panic attack     Psychoses ([facility] 2/23/2023    Suicidal thoughts     Vision decreased       Past Surgical History:   Procedure Laterality Date    COLONOSCOPY N/A 11/20/2020    . COLONOSCOPY  :- performed by Shania Matos MD at [address] [place] [address] ENDOSCOPY    HX OTHER SURGICAL  06/03/2019     Excision of large sebaceous cyst in the midline upper back and with packing- [facility]-DR. Thelma Cabrera      Family History   Problem Relation Age of Onset    Diabetes Mother     Hypertension Mother     Cancer Father         LUNG CA. SMOKER    Grandmother- depression  Social History     Tobacco Use    Smoking status: Never    Smokeless tobacco: Never    Tobacco comments:     n/a never smoked tobacco   Substance Use Topics    Alcohol use: No      Allergies   Allergen Reactions    Lisinopril Rash     Swelling of the lips      Prior to Admission medications    Medication Sig Start Date End Date Taking? Authorizing Provider   acetylcysteine (NAC) 600 mg tab Take 1,200 mg by mouth daily. Yes Provider, Historical   glipiZIDE (GLUCOTROL) 5 mg tablet Take 5 mg by mouth daily. Yes Provider, Historical   clonazePAM (KlonoPIN) 0.25 mg TbDi Take 0.25 mg by mouth nightly. Yes Provider, Historical   QUEtiapine SR (SEROquel XR) 300 mg sr tablet Take 1 Tablet by mouth nightly. 2/22/23  Yes Jodie Bautista MD   traZODone (DESYREL) 100 mg tablet Take 2 Tablets by mouth nightly for 30 days. Indications: insomnia associated with depression 2/22/23 3/24/23 Yes Jodie Bautista MD   FLUoxetine (PROzac) 20 mg capsule Take 1 Capsule by mouth daily. 2/22/23  Yes Jodie Bautista MD   metFORMIN (GLUCOPHAGE) 1,000 mg tablet Take 1 Tablet by mouth two (2) times daily (with meals) for 30 days.  Indications: type 2 diabetes mellitus 2/17/23 3/19/23 Yes Jodie Bautista MD   ondansetron (ZOFRAN ODT) 4 mg disintegrating tablet Take 1 Tablet by mouth every eight (8) hours as needed for Nausea or Vomiting. 2/17/23  Yes Gerry Yoder MD   atorvastatin (LIPITOR) 40 mg tablet Take 1 Tablet by mouth daily for 30 days. Indications: excessive fat in the blood 1/30/23 3/1/23 Yes Anish Denson MD   losartan (COZAAR) 100 mg tablet Take 1 Tablet by mouth daily for 30 days. Indications: high blood pressure 1/31/23 3/2/23 Yes Anish Denson MD   triamterene-hydroCHLOROthiazide (MAXZIDE) 37.5-25 mg per tablet Take 1 Tablet by mouth daily for 30 days. Indications: high blood pressure 1/31/23 3/2/23 Yes Anish Denson MD       Review of Systems:  Constitutional:  Patient denies chills, fever, night sweats, or weight loss  Eye:  Patient denies drainage, eye pain, redness, or vision change  ENT:  Patient denies dental pain, ear pain, epistaxis, jaw pain, rhinorrhea, or sore throat  Cardiovascular:  Patient denies chest pain, leg swelling, orthopnea, palpitations, or syncope  Respiratory:  Patient denies cough, dyspnea, dyspnea on exertion, hemoptysis, or wheezing  GI:  Patient denies abdominal pain, constipation, diarrhea, melena, nausea, rectal pain, or vomiting  Genitourinary:  Patient denies discharge, dysuria, flank pain, hematuria, urinary frequency, or urinary retention  Musculoskeletal:  Patient denies calf tenderness, joint pain, or neck pain  Neurologic:  Patient denies headache, neck stiffness, numbness, slurred speech, weakness, or seizure  Skin:  Patient denies diaphoretic, jaundice, pruritus, or rash  Hematologic and Lymphatic:  Patient denies easy bruising, lymphadenopathy, or prolonged bleeding  Allergic and Immunologic:  Patient denies hives, sneezing, or tongue swelling  Endocrine:  Patient denies excessive thirst, fatigue, or nocturia  Psychiatric:  Patient denies hallucination,  or suicidal thoughts  Additional ROS Info: All other organ systems reviewed and are negative except as indicated.     Objective:     Patient Vitals for the past 8 hrs:   BP Temp Pulse Resp SpO2   02/28/23 0749 (!) 103/59 98.3 °F (36.8 °C) 93 15 98 %       Physical Exam:  General: Patient sitting up at bedside, is in no apparent distress. HEENT: Normocephalic, atraumatic. Anicteric sclerae, No obvious abnormalities. NEURO:    MS: Alert and oriented to person, place, and time. No impairment of comprehension, naming or repetition. CN: Visual fields are full to confrontation, extraocular muscles intact, no nystagmus, facial sensation intact to LT. Face symmetric at rest and with activation. Shoulder shrug is full. Motor: Has full strength in all extremities. No drift. Tone is normal.    Sensory: Intact to light touch    Coordination: No dysmetria on finger-nose-finger heel-knee-shin and HKS. No tremor. Gait: Narrow based with four-wheel stride, has no difficulty with toe, heel, or tandem walk. Assessment:     Principal Problem:    Psychoses ([facility] (2/23/2023)    Active Problems:    Type II diabetes mellitus ([facility] (5/22/2016)      Trichotillomania (5/25/2016)    49 yo woman with history of depression, trichotillomania presents for evaluation of worsening symptoms in the last few weeks. Neurology consult was placed for evaluation of late onset psychosis. Exam reveals no neurological impairments. Metabolic, neurogenic vs. Psychiatric etiologies are on the differential. Patient is perimenopausal and decrease in estrogen levels can be susceptible to psychosis. Plan:     -Will order brain MRI To rule rule out central causes of psychosis  -Check EEG to rule out epileptiform abnormalities  -We will check vitamin B12, TSH, ESR, CRP, heavy metals, vitamin D  -Treat anemia, as this may exacerbate symptoms as well   -Recommend adjustment of Seroquel dose, and concurrent use with Olanzapine due to risk of adverse side effects.  -Behavioral therapy, psychotherapy for coping skills.  -Continue to monitor.     I discussed the risks and benefits of a telehealth visit and I obtained the patient's for legally authorized representatives informed verbal consent to conduct this assessment using telehealth tools. All of the patient's or legally authorized representative's questions regarding the telehealth interaction were addressed. I provided my name and disclosed to the patient or legally authorized representative my licensure, certification, or registration. This consultation was remotely performed with the assistance of a trained virtual health liaison working at the originating site. The consultation used real-time licensed and encrypted telehealth equipment which allowed a live video connection between my location and the patient's location. Aspects of the evaluation that could not be adequately evaluated by virtual assessment was shared with both the patient and the consulting provider.           Signed By:  David Wren MD     February 28, 2023

## 2023-02-28 NOTE — BH NOTES
Behavioral Health Interdisciplinary Rounds     Patient Name: Sanjuana Herron  Age: 50 y.o. Room/Bed:  317/01  Primary Diagnosis: Psychoses ([address]     Progress note: Patient met with this writer. Patient reported that she is feeling depressed and anxious today. Patient reported that she domingo with her feelings of distress by pulling on her hair. Patient dicussed that she was pulling her hair during group and had to step out of group to gather herself due to feeling as though she was distracting other group members. Patient reported she attempts to use coping skills such as removing herself from situations and playing with a stress ball. Patient reported that she does not want to work towards going back to [facility] program due to not wanting to upset her family. Patient revoked permission to speak with family at this time. Patient and writer dicussed working on coping skills while in the hospital until discharge plan is determined.       LOS:  5  Expected LOS: 5-7    Financial concerns/prescription coverage:  no  Family contact: 2/24/2023     Family requesting physician contact today:  no  Discharge plan: to be determined   Access to weapons: no        Outpatient provider(s): to be linked   Patient's preferred phone number for follow up call: [phone]     Participating treatment team members: Andres Henderson MD, Facundo Carter, supervisee in social work

## 2023-02-28 NOTE — PROGRESS NOTES
Problem: Altered Thought Process (Adult/Pediatric)  Goal: *STG: Participates in treatment plan  Outcome: Progressing Towards Goal  Goal: *STG: Remains safe in hospital  Outcome: Progressing Towards Goal  Goal: *STG: Seeks staff when feelings of anxiety and fear arise  Outcome: Progressing Towards Goal  Goal: *STG: Complies with medication therapy  Outcome: Progressing Towards Goal   Patient requested Zyprexa for anxiety late morning. She is endorsing anxiety, but denies SI/HI/AVH or depression at this time. @6173 Dr. Storm Fleischer conducted a remoted neuro consult via telemetry.

## 2023-02-28 NOTE — GROUP NOTE
IP  GROUP DOCUMENTATION INDIVIDUAL                                                                          Group Therapy Note    Date: 2/28/2023    Group Start Time: 1000  Group End Time: 1100  Group Topic: Topic Group    [facility] 3 ACUTE BEHAV [facility], [address] [facility] GROUP DOCUMENTATION GROUP    Group Therapy Note    Attendees: 6       Attendance: Attended    Patient's Goal:  To participate in chair exercise routine    Interventions/techniques: Supported-benefits of exercise and other wellness tips    Interactions: Disorganized interaction    Mental Status: Preoccupied    Goals Achieved:  Attended group session-participated in exercise routine      Additional Notes:  Pt experienced involuntary jerks,fidgeting,pulling of hair,shouting-left session early to speak with nurse    Brent Cancino

## 2023-02-28 NOTE — PROGRESS NOTES
Pt was met in the hallway where she was observed walking. Pt was observed to be isolative to herself during this shift. Pt did not display any signs of distress or complaints of adverse reactions to medications. Pt denies, SI, HI, AVH. Pt stated she had a headache and rated it at 5/10. Pt received tylenol for the pain. Rates depression 3/10 and anxiety 4/10. Pt stated that her mood is \"okay\". Pt was calm and cooperative during assessment. Pt will continue to be monitored for her safety. Pt slept for a total of  7.5 hrs. Problem: Altered Thought Process (Adult/Pediatric)  Goal: *STG: Participates in treatment plan  Outcome: Progressing Towards Goal  Goal: *STG: Remains safe in hospital  Outcome: Progressing Towards Goal  Goal: *STG: Complies with medication therapy  Outcome: Progressing Towards Goal     Problem: Patient Education: Go to Patient Education Activity  Goal: Patient/Family Education  Outcome: Progressing Towards Goal     Problem: Falls - Risk of  Goal: *Absence of Falls  Description: Document Princess Fall Risk and appropriate interventions in the flowsheet.   Outcome: Progressing Towards Goal  Note: Fall Risk Interventions:            Medication Interventions: Teach patient to arise slowly

## 2023-02-28 NOTE — BH NOTES
Behavioral Health Interdisciplinary Rounds      Patient Name: Damon Manzano                    Age: 50 y.o. Room/Bed:  317/01  Primary Diagnosis: <principal problem not specified>         Progress note: Per nursing report, pt has been experiencing impulse control issues including screaming, increased anxiety, depression, mimicking patients, \"barking like a dog,\" and hair pulling. Pt has been perseverative regarding diarrhea which is not actually occurring. Pt is redirectable. She continues to request assistance with applying to get disability. When pt met with this writer, pt was calm and cooperative and denied all. She rated her depression and anxiety both at a 10 out of 10. Pt reported \"sleeping fine\" and \"eating fine. \" Pt reported that taking Klonopin and Seroquel were giving her confusion and headaches but that those issues are manageable with Tylenol. Pt reported Zyprexa is helping.       LOS:  5                        Expected LOS: 5-7     Financial concerns/prescription coverage:  no  Family contact: 2/24/2023                                Family requesting physician contact today:  no  Discharge plan: to be determined   Access to weapons: no                                                             Outpatient provider(s): to be linked   Patient's preferred phone number for follow up call: [phone]      Participating treatment team members: Humera Muniz MD, Chetan Alonzo, supervisee in social work

## 2023-03-01 PROCEDURE — 74011250637 HC RX REV CODE- 250/637

## 2023-03-01 PROCEDURE — 65220000001 HC RM PRIVATE PSYCH

## 2023-03-01 PROCEDURE — 74011250637 HC RX REV CODE- 250/637: Performed by: PSYCHIATRY & NEUROLOGY

## 2023-03-01 PROCEDURE — 74011250636 HC RX REV CODE- 250/636

## 2023-03-01 RX ADMIN — ACETAMINOPHEN 650 MG: 325 TABLET, FILM COATED ORAL at 09:40

## 2023-03-01 RX ADMIN — LORAZEPAM 1 MG: 2 INJECTION INTRAMUSCULAR; INTRAVENOUS at 06:45

## 2023-03-01 RX ADMIN — CLONAZEPAM 0.25 MG: 0.25 TABLET, ORALLY DISINTEGRATING ORAL at 22:05

## 2023-03-01 RX ADMIN — GLIPIZIDE 5 MG: 5 TABLET ORAL at 08:54

## 2023-03-01 RX ADMIN — QUETIAPINE FUMARATE 300 MG: 300 TABLET, EXTENDED RELEASE ORAL at 22:05

## 2023-03-01 RX ADMIN — DIPHENHYDRAMINE HYDROCHLORIDE 50 MG: 50 INJECTION, SOLUTION INTRAMUSCULAR; INTRAVENOUS at 06:42

## 2023-03-01 RX ADMIN — TRIAMTERENE AND HYDROCHLOROTHIAZIDE 1 TABLET: 37.5; 25 TABLET ORAL at 08:54

## 2023-03-01 RX ADMIN — ONDANSETRON 4 MG: 4 TABLET, ORALLY DISINTEGRATING ORAL at 09:39

## 2023-03-01 RX ADMIN — METFORMIN HYDROCHLORIDE 1000 MG: 500 TABLET, FILM COATED ORAL at 08:54

## 2023-03-01 RX ADMIN — FLUOXETINE HYDROCHLORIDE 40 MG: 20 CAPSULE ORAL at 08:54

## 2023-03-01 RX ADMIN — OLANZAPINE 5 MG: 5 TABLET, FILM COATED ORAL at 05:51

## 2023-03-01 RX ADMIN — Medication 3 MG: at 22:05

## 2023-03-01 RX ADMIN — HALOPERIDOL LACTATE 5 MG: 5 INJECTION, SOLUTION INTRAMUSCULAR at 06:42

## 2023-03-01 RX ADMIN — HYDROXYZINE HYDROCHLORIDE 50 MG: 25 TABLET, FILM COATED ORAL at 22:04

## 2023-03-01 RX ADMIN — ACETAMINOPHEN 650 MG: 325 TABLET, FILM COATED ORAL at 18:54

## 2023-03-01 RX ADMIN — LOSARTAN POTASSIUM 100 MG: 50 TABLET, FILM COATED ORAL at 08:54

## 2023-03-01 RX ADMIN — ATORVASTATIN CALCIUM 40 MG: 40 TABLET, FILM COATED ORAL at 08:54

## 2023-03-01 NOTE — BH NOTES
Behavioral Health Interdisciplinary Rounds     Patient Name: Samanta Xiao  Age: 50 y.o. Room/Bed:  317/01  Primary Diagnosis: Psychoses ([address]     Progress note: Patient met with this writer. Patient was alert and oriented x4. Patient reported that her moods were improving compared to yesterday. Patient denied SI/HI/AH/VH, anxiety and depression. Patient reported that she couldn't sleep last night, which caused her distress this morning. Patient reported \"I felt overwhelmed starting the day. \" Patient reported that she has been participating in groups today to assist in improving her mood. This writer and patient dicussed mindfulness as an effective coping strategy to utilize when feeling distressed inside the hospital and in the community. This writer and patient practiced box breathing and count breathing. Patient was encouraged to practice these breathing techniques throughout the day. Patient was provided with worksheets to identify and practice various coping strategies. Patient gave permission to speak with mom to discuss discharge planning and to determine if family is able to care for patient after discharge. This writer contacted mother to discuss discharge planning and patients mother reported that she would return call later.       LOS:  6  Expected LOS: 5-7    Financial concerns/prescription coverage:  no  Family contact: 2/24/2023     Family requesting physician contact today:  no  Discharge plan: to be determined   Access to weapons: no        Outpatient provider(s): to be linked   Patient's preferred phone number for follow up call: [phone]     Participating treatment team members: Huy Sin MD, Taylor Pérez, supervisee in social work

## 2023-03-01 NOTE — PROGRESS NOTES
GROUP THERAPY PROGRESS NOTE    Clay Land participated in group.      Group time: 15 minutes    Personal goal for participation: community     Goal orientation: personal    Group therapy participation: active    Therapeutic interventions reviewed and discussed: yes    Impression of participation: good

## 2023-03-01 NOTE — PROGRESS NOTES
Met patient in the hallway this morning. Flat affect. Calm and cooperative. Reports anxiety ,denies depression. Denies SI, HI and AVH. Visible on the unit walking in the hallway. Patient had an episode of self harm and received IM medications on the previous shift. No behavior issues during this shift. Compliant with medications. No acute distress voiced or observed.    Problem: Altered Thought Process (Adult/Pediatric)  Goal: *STG: Remains safe in hospital  Outcome: Progressing Towards Goal  Goal: *STG: Seeks staff when feelings of anxiety and fear arise  Outcome: Progressing Towards Goal  Goal: *STG: Complies with medication therapy  Outcome: Progressing Towards Goal

## 2023-03-01 NOTE — PROGRESS NOTES
Patient actively participated in Spirituality Group about what brings meaning, and being thankful in the [address] [facility] unit.  utilized music, lyrics to favorite songs, words of encouragement and affirmation, scripture, and testimony to facilitate the group discussion and enhance group dynamics. Rev.  Marilynn Pace 57, [address]

## 2023-03-01 NOTE — GROUP NOTE
SCOTT  GROUP DOCUMENTATION INDIVIDUAL                                                                          Group Therapy Note    Date: 3/1/2023    Group Start Time: 1000  Group End Time: 1100  Group Topic: Topic Group    [facility] 3 ACUTE BEHAV [facility]    [address] GROUP    Group Therapy Note    Attendees: 6       Attendance: Did not attend    Richardo Cabot

## 2023-03-01 NOTE — GROUP NOTE
SCOTT  GROUP DOCUMENTATION INDIVIDUAL                                                                          Group Therapy Note    Date: 3/1/2023    Group Start Time: 1400  Group End Time: 1500  Group Topic: Recreational/Music Therapy    [facility] [place] [address] ACUTE BEHAV TH    [place], [address] [facility] GROUP DOCUMENTATION GROUP    Group Therapy Note    Attendees: 7       Attendance: Attended    Patient's Goal:  To concentrate on selected task    Interventions/techniques: Supported-crafts,games,music    Follows Directions:  Followed directions    Interactions: Interacted appropriately    Mental Status: Calm    Behavior/appearance: Attentive and Cooperative    Goals Achieved: Able to engage in interactions and Able to listen to others-worked on selected task-listened to and sang along with music selections      Elinor Reyes

## 2023-03-01 NOTE — BH NOTES
Assumed care of the patient. Patient was visible on the unit, interacting with the peers and the staffs. Patient was cooperative the assessments. She said that her day ended well however start was a rough one. Patient denied S.I/H. I/A/V/H, anxiety and depression. She was medication and meal compliant. PRN Tylenol administered for headache at bedtime. Early this morning, patient woke up requesting Zyprexa, it was given at 0551. Nearly after an hour, patient was heard screaming inside her room, patient seemed agitated when staffs approach. Patient refused to be redirected and said she just wanted to pray. PO PRN offered, patient refused and she continued to escalate screaming, yelling and trying to rip her scrubs. Patient was administered PRN IM injects of 5 mg of Haldol, 50 mg of Benadryl and 1 mg of Ativan without any resistance from her. Patient appeared to be sleeping for about 6.5 hours .

## 2023-03-01 NOTE — BH NOTES
Psychiatric Progress Note    Patient: Julisa Daily MRN: [id number]  SSN: [id number]    YOB: 1975  Age: 50 y.o. Sex: female      Admit Date: 2/23/2023    LOS: 6 days     Subjective:     2/25:  Julisa Daily  reports feeling \"anxious\" and affect is mood congruent. Denies SI/HI/AH/VH. She had an episode of acute agitation this morning and received PRN IM medications as ordered with good effect. Appropriately interactive and aware. Tolerating medications well. Eating well and sleeping 8 hours. 2/26:  Julisa Daily  reports feeling \"OK\" and affect is mood congruent. Denies SI/HI/AH/VH. Her agitation and verbal outbursts appear to be improving, however per chart review she continues to receive IM medication for severe anxiety. Appropriately interactive and aware. Tolerating medications well. Eating well and sleeping 8 hours. 2/27:  Julisa Daily  reports feeling \"OK\" and affect is not mood congruent. Denies SI/HI/AH/VH. Her agitation and verbal outbursts appear to be improving moderately and was noted to only received PO medications for agitation/anxiety today. Appropriately interactive and aware. Endorsed headache from seroquel, will decrease dose to 300 mg. Eating well and sleeping 8 hours. 2/28 - Julisa Daily reports feeling well without anxiety, but moods are depressed (6/10). Denies SI/HI/AH/VH. Continues to have impulsive outbursts. No aggression or violence. Appropriately interactive and aware. Tolerating medications well. Eating and sleeping fairly. 3/1 - Julisa Daily reports feeling well and moods are fair. Continues to have outburst and hair pulling episodes. Nauseas still. Had an anxiety spell this am that required prn IM medications to manage. Denies SI/HI/AH/VH. No aggression or violence. Appropriately interactive and aware. Tolerating medications well. Eating and sleeping fairly.   Believes Seroquel or Klonopin is causing her headaches. Wants to drop one of both medications.       Objective:     Vitals:    02/27/23 1048 02/27/23 2038 02/28/23 0749 02/28/23 2004   BP: 135/88 137/85 (!) 103/59 128/86   Pulse: 93 (!) 114 93 68   Resp: 18 16 15 20   Temp: 100.1 °F (37.8 °C) 98.5 °F (36.9 °C) 98.3 °F (36.8 °C) 98.9 °F (37.2 °C)   SpO2: 99% 96% 98% 98%   Weight:       Height:            Mental Status Exam:   Sensorium  oriented to time, place and person   Relations cooperative   Eye Contact appropriate   Appearance:  age appropriate   Speech:  normal volume and non-pressured   Thought Process: goal directed and logical   Thought Content free of delusions and free of hallucinations   Suicidal ideations none   Mood:  anxious   Affect:  anxious   Memory   adequate   Concentration:  adequate   Insight:  fair   Judgment:  fair       MEDICATIONS:  Current Facility-Administered Medications   Medication Dose Route Frequency    FLUoxetine (PROzac) capsule 40 mg  40 mg Oral DAILY    loperamide (IMODIUM) 1 mg/7.5 mL oral solution 2 mg  2 mg Oral Q6H PRN    QUEtiapine SR (SEROquel XR) tablet 300 mg  300 mg Oral QHS    hydrOXYzine HCL (ATARAX) tablet 50 mg  50 mg Oral QHS    melatonin tablet 3 mg  3 mg Oral QHS    hydrOXYzine HCL (ATARAX) tablet 50 mg  50 mg Oral BID PRN    atorvastatin (LIPITOR) tablet 40 mg  40 mg Oral DAILY    clonazePAM (KlonoPIN) disintegrating tablet 0.25 mg  0.25 mg Oral QHS    glipiZIDE (GLUCOTROL) tablet 5 mg  5 mg Oral DAILY    losartan (COZAAR) tablet 100 mg  100 mg Oral DAILY    metFORMIN (GLUCOPHAGE) tablet 1,000 mg  1,000 mg Oral BID WITH MEALS    ondansetron (ZOFRAN ODT) tablet 4 mg  4 mg Oral Q8H PRN    triamterene-hydroCHLOROthiazide (MAXZIDE) 37.5-25 mg per tablet 1 Tablet  1 Tablet Oral DAILY    OLANZapine (ZyPREXA) tablet 5 mg  5 mg Oral Q6H PRN    haloperidol lactate (HALDOL) injection 5 mg  5 mg IntraMUSCular Q6H PRN    benztropine (COGENTIN) tablet 1 mg  1 mg Oral BID PRN    diphenhydrAMINE (BENADRYL) injection 50 mg 50 mg IntraMUSCular BID PRN    LORazepam (ATIVAN) injection 1 mg  1 mg IntraMUSCular Q4H PRN    acetaminophen (TYLENOL) tablet 650 mg  650 mg Oral Q4H PRN    magnesium hydroxide (MILK OF MAGNESIA) 400 mg/5 mL oral suspension 30 mL  30 mL Oral DAILY PRN      DISCUSSION:   the risks and benefits of the proposed medication    Lab/Data Review: All lab results for the last 24 hours reviewed. No results found for this or any previous visit (from the past 24 hour(s)). Assessment:     Principal Problem:    Psychoses ([facility] [address] (2/23/2023)    Active Problems:    Type II diabetes mellitus ([facility] [address] (5/22/2016)      Trichotillomania (5/25/2016)      Plan:     Continue current care  Collateral information  Continue Quetiapine 300 mg. Neurology consult   Continue Prozac 40 mg PO Q daily  Disposition planning with social work    I certify that this patient's inpatient psychiatric hospital services furnished since the previous certification were, and continue to be, required for treatment that could reasonably be expected to improve the patient's condition, or for diagnostic study, and that the patient continues to need, on a daily basis, active treatment furnished directly by or requiring the supervision of inpatient psychiatric facility personnel. In addition, the hospital records show that services furnished were intensive treatment services, admission or related services, or equivalent services.   Signed By: Ra Butt MD     March 1, 2023

## 2023-03-01 NOTE — PROGRESS NOTES
GROUP THERAPY PROGRESS NOTE    Garland Lemus participated in group.      Group time: 15 minutes    Personal goal for participation: community     Goal orientation: personal    Group therapy participation: active    Therapeutic interventions reviewed and discussed: yes    Impression of participation: good

## 2023-03-02 LAB
CRP SERPL HS-MCNC: 7.9 MG/L
ERYTHROCYTE [SEDIMENTATION RATE] IN BLOOD: 107 MM/HR (ref 0–20)

## 2023-03-02 PROCEDURE — 86376 MICROSOMAL ANTIBODY EACH: CPT

## 2023-03-02 PROCEDURE — 74011250637 HC RX REV CODE- 250/637: Performed by: PSYCHIATRY & NEUROLOGY

## 2023-03-02 PROCEDURE — 74011250637 HC RX REV CODE- 250/637

## 2023-03-02 PROCEDURE — 74011250636 HC RX REV CODE- 250/636

## 2023-03-02 PROCEDURE — 84445 ASSAY OF TSI GLOBULIN: CPT

## 2023-03-02 PROCEDURE — 82175 ASSAY OF ARSENIC: CPT

## 2023-03-02 PROCEDURE — 85652 RBC SED RATE AUTOMATED: CPT

## 2023-03-02 PROCEDURE — 86141 C-REACTIVE PROTEIN HS: CPT

## 2023-03-02 PROCEDURE — 86800 THYROGLOBULIN ANTIBODY: CPT

## 2023-03-02 PROCEDURE — 65220000003 HC RM SEMIPRIVATE PSYCH

## 2023-03-02 PROCEDURE — 86256 FLUORESCENT ANTIBODY TITER: CPT

## 2023-03-02 PROCEDURE — 36415 COLL VENOUS BLD VENIPUNCTURE: CPT

## 2023-03-02 RX ORDER — TRAZODONE HYDROCHLORIDE 50 MG/1
50 TABLET ORAL
Status: DISCONTINUED | OUTPATIENT
Start: 2023-03-02 | End: 2023-03-03

## 2023-03-02 RX ADMIN — TRIAMTERENE AND HYDROCHLOROTHIAZIDE 1 TABLET: 37.5; 25 TABLET ORAL at 08:42

## 2023-03-02 RX ADMIN — LOSARTAN POTASSIUM 100 MG: 50 TABLET, FILM COATED ORAL at 08:42

## 2023-03-02 RX ADMIN — ACETAMINOPHEN 650 MG: 325 TABLET, FILM COATED ORAL at 22:59

## 2023-03-02 RX ADMIN — Medication 3 MG: at 22:37

## 2023-03-02 RX ADMIN — ACETAMINOPHEN 650 MG: 325 TABLET, FILM COATED ORAL at 12:20

## 2023-03-02 RX ADMIN — ATORVASTATIN CALCIUM 40 MG: 40 TABLET, FILM COATED ORAL at 08:42

## 2023-03-02 RX ADMIN — TRAZODONE HYDROCHLORIDE 50 MG: 50 TABLET ORAL at 22:37

## 2023-03-02 RX ADMIN — FLUOXETINE HYDROCHLORIDE 40 MG: 20 CAPSULE ORAL at 08:41

## 2023-03-02 RX ADMIN — ONDANSETRON 4 MG: 4 TABLET, ORALLY DISINTEGRATING ORAL at 12:41

## 2023-03-02 RX ADMIN — HALOPERIDOL LACTATE 5 MG: 5 INJECTION, SOLUTION INTRAMUSCULAR at 16:24

## 2023-03-02 RX ADMIN — GLIPIZIDE 5 MG: 5 TABLET ORAL at 08:42

## 2023-03-02 RX ADMIN — HYDROXYZINE HYDROCHLORIDE 50 MG: 25 TABLET, FILM COATED ORAL at 22:36

## 2023-03-02 RX ADMIN — CLONAZEPAM 0.25 MG: 0.25 TABLET, ORALLY DISINTEGRATING ORAL at 22:36

## 2023-03-02 RX ADMIN — LORAZEPAM 1 MG: 2 INJECTION INTRAMUSCULAR; INTRAVENOUS at 16:24

## 2023-03-02 RX ADMIN — DIPHENHYDRAMINE HYDROCHLORIDE 50 MG: 50 INJECTION, SOLUTION INTRAMUSCULAR; INTRAVENOUS at 16:24

## 2023-03-02 RX ADMIN — METFORMIN HYDROCHLORIDE 1000 MG: 500 TABLET, FILM COATED ORAL at 08:41

## 2023-03-02 NOTE — BH NOTES
Patient was in the dayroom when she started pulling at her hair and scratching herself, visibly upset and tearful, agitated, anxious, delusional. She was yelling and screaming \"STOP IT!!!\" Multiple times in her room and was scaring her room mate. Writer tried to redirect but she was not receptive. She refused PRN po offered.  IM PRN (Haldol, Benadryl and Ativan) given without difficulty to help her adjust.

## 2023-03-02 NOTE — BH NOTES
Patient completed permission to disclose PHI form, allowing sharing of all information with Daniela Morales ([phone]) or ([phone]) and Jt Davis ([phone]). Form put in chart.

## 2023-03-02 NOTE — PROGRESS NOTES
Patient has been visible out on the unit observed using the telephone. Denies SIHI/AVH. Patient has been calm and cooperative this shift. No PRNs given or requested. Patient slept for 7 hours this shift.

## 2023-03-02 NOTE — PROGRESS NOTES
While rounding in the [facility] Unit, staff informed me that patient was having a crisis and could possibly benefit from my services.  talked to patient and provided a calming presence, prayed for her, providing reassurance and affirmation, and guiding her through some deep breathing exercises. Advised of  availability. Rev.  Marilynn Iyer 85, [address]

## 2023-03-02 NOTE — GROUP NOTE
[facility]  GROUP DOCUMENTATION INDIVIDUAL                                                                          Group Therapy Note    Date: 3/2/2023    Group Start Time: 0900  Group End Time: 1000  Group Topic: Topic Group    [facility] 3 ACUTE BEHAV TH    [address]  [facility] GROUP DOCUMENTATION GROUP    Group Therapy Note    Attendees: 6       Attendance: Did not attend      Eugene Peralta

## 2023-03-02 NOTE — PROGRESS NOTES
Behavioral Services                                              Medicare Re-Certification    I certify that the inpatient psychiatric hospital services furnished since the previous certification/re-certification were, and continue to be, medically necessary for;    [x] (1) Treatment which could reasonably be expected to improve the patient's condition,    [x] (2) Or for diagnostic study. Estimated length of stay/service 3 - 5 days    Plan for post-hospital care per social work    This patient continues to need, on a daily basis, active treatment furnished directly by or requiring the supervision of inpatient psychiatric personnel.     Electronically signed by Mally Solorio MD on 3/2/2023 at 12:35 PM

## 2023-03-02 NOTE — PROGRESS NOTES
Met with patient in the hallway. Flat affect. Pleasant and cooperative during assessment. Denies depression and anxiety. Denies SI, HI and AVH. Compliant with medications. Patient was in afternoon group and got agitated and began yelling and pulling her hair. Unable to redirect. IM medication given (see mar). Patient was calm and cooperative later in the day.    Problem: Altered Thought Process (Adult/Pediatric)  Goal: *STG: Remains safe in hospital  Outcome: Progressing Towards Goal  Goal: *STG: Complies with medication therapy  Outcome: Progressing Towards Goal  Goal: *STG: Attends activities and groups  Outcome: Progressing Towards Goal

## 2023-03-02 NOTE — BH NOTES
Psychiatric Progress Note    Patient: Isaías Galeano MRN: [id number]  SSN: [id number]    YOB: 1975  Age: 50 y.o. Sex: female      Admit Date: 2/23/2023    LOS: 7 days     Subjective:     2/25:  Isaías Galeano  reports feeling \"anxious\" and affect is mood congruent. Denies SI/HI/AH/VH. She had an episode of acute agitation this morning and received PRN IM medications as ordered with good effect. Appropriately interactive and aware. Tolerating medications well. Eating well and sleeping 8 hours. 2/26:  Isaías Galeano  reports feeling \"OK\" and affect is mood congruent. Denies SI/HI/AH/VH. Her agitation and verbal outbursts appear to be improving, however per chart review she continues to receive IM medication for severe anxiety. Appropriately interactive and aware. Tolerating medications well. Eating well and sleeping 8 hours. 2/27:  Isaías Galeano  reports feeling \"OK\" and affect is not mood congruent. Denies SI/HI/AH/VH. Her agitation and verbal outbursts appear to be improving moderately and was noted to only received PO medications for agitation/anxiety today. Appropriately interactive and aware. Endorsed headache from seroquel, will decrease dose to 300 mg. Eating well and sleeping 8 hours. 2/28 - Isaías Galeano reports feeling well without anxiety, but moods are depressed (6/10). Denies SI/HI/AH/VH. Continues to have impulsive outbursts. No aggression or violence. Appropriately interactive and aware. Tolerating medications well. Eating and sleeping fairly. 3/1 - Isaías Galeano reports feeling well and moods are fair. Continues to have outburst and hair pulling episodes. Nauseas still. Had an anxiety spell this am that required prn IM medications to manage. Denies SI/HI/AH/VH. No aggression or violence. Appropriately interactive and aware. Tolerating medications well. Eating and sleeping fairly.   Believes Seroquel or Klonopin is causing her headaches. Wants to drop one of both medications. 3/2 - Alexy Meth reports feeling well and moods are good. Has not pulled at her hair or been stressed today. Discussed her headaches and poor sleep. Denies SI/HI/AH/VH. No aggression or violence. Appropriately interactive and aware. Tolerating medications but believes that seroquel is her headache culprit. Eating and sleeping fairly.     Objective:     Vitals:    02/28/23 2004 03/01/23 1600 03/01/23 2000 03/02/23 0812   BP: 128/86 108/62 105/60 133/82   Pulse: 68 72 (!) 102 98   Resp: 20 18 16    Temp: 98.9 °F (37.2 °C) 98.1 °F (36.7 °C) 98.4 °F (36.9 °C) 98.1 °F (36.7 °C)   SpO2: 98%   98%   Weight:       Height:            Mental Status Exam:   Sensorium  oriented to time, place and person   Relations cooperative   Eye Contact appropriate   Appearance:  age appropriate   Speech:  normal volume and non-pressured   Thought Process: goal directed and logical   Thought Content free of delusions and free of hallucinations   Suicidal ideations none   Mood:  anxious   Affect:  anxious   Memory   adequate   Concentration:  adequate   Insight:  fair   Judgment:  fair       MEDICATIONS:  Current Facility-Administered Medications   Medication Dose Route Frequency    acetylcysteine tab 1,200 mg (Patient Supplied)  1,200 mg Oral DAILY    FLUoxetine (PROzac) capsule 40 mg  40 mg Oral DAILY    loperamide (IMODIUM) 1 mg/7.5 mL oral solution 2 mg  2 mg Oral Q6H PRN    QUEtiapine SR (SEROquel XR) tablet 300 mg  300 mg Oral QHS    hydrOXYzine HCL (ATARAX) tablet 50 mg  50 mg Oral QHS    melatonin tablet 3 mg  3 mg Oral QHS    hydrOXYzine HCL (ATARAX) tablet 50 mg  50 mg Oral BID PRN    atorvastatin (LIPITOR) tablet 40 mg  40 mg Oral DAILY    clonazePAM (KlonoPIN) disintegrating tablet 0.25 mg  0.25 mg Oral QHS    glipiZIDE (GLUCOTROL) tablet 5 mg  5 mg Oral DAILY    losartan (COZAAR) tablet 100 mg  100 mg Oral DAILY    metFORMIN (GLUCOPHAGE) tablet 1,000 mg  1,000 mg Oral BID WITH MEALS    ondansetron (ZOFRAN ODT) tablet 4 mg  4 mg Oral Q8H PRN    triamterene-hydroCHLOROthiazide (MAXZIDE) 37.5-25 mg per tablet 1 Tablet  1 Tablet Oral DAILY    OLANZapine (ZyPREXA) tablet 5 mg  5 mg Oral Q6H PRN    haloperidol lactate (HALDOL) injection 5 mg  5 mg IntraMUSCular Q6H PRN    benztropine (COGENTIN) tablet 1 mg  1 mg Oral BID PRN    diphenhydrAMINE (BENADRYL) injection 50 mg  50 mg IntraMUSCular BID PRN    LORazepam (ATIVAN) injection 1 mg  1 mg IntraMUSCular Q4H PRN    acetaminophen (TYLENOL) tablet 650 mg  650 mg Oral Q4H PRN    magnesium hydroxide (MILK OF MAGNESIA) 400 mg/5 mL oral suspension 30 mL  30 mL Oral DAILY PRN      DISCUSSION:   the risks and benefits of the proposed medication    Lab/Data Review: All lab results for the last 24 hours reviewed. No results found for this or any previous visit (from the past 24 hour(s)). 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 MRI WNL  Trazodone 50 mg  PO Q hs titrate to 200 mg as needed for sleep  Continue Prozac 40 mg PO Q daily  Disposition planning with social work    I certify that this patient's inpatient psychiatric hospital services furnished since the previous certification were, and continue to be, required for treatment that could reasonably be expected to improve the patient's condition, or for diagnostic study, and that the patient continues to need, on a daily basis, active treatment furnished directly by or requiring the supervision of inpatient psychiatric facility personnel. In addition, the hospital records show that services furnished were intensive treatment services, admission or related services, or equivalent services.   Signed By: Arash Larios MD     March 2, 2023

## 2023-03-02 NOTE — BH NOTES
Behavioral Health Interdisciplinary Rounds     Patient Name: Quique Cardoso  Age: 50 y.o. Room/Bed:  326/02  Primary Diagnosis: Psychoses ([address]     Progress note: Patient met with this writer and was calm and cooperative. Patient was alert and oriented x4. Patient denied distress and any hair pulling today. Patient reported that she has been feeling well today and has not needed PRN medication today. Family session  via zoom scheduled for 3/3/2023 to discuss safety and discharge planning. Tentative discharge date is Monday 3/6/2023 back with family for support with connection to outpatient providers made. Email for zoom: [email or web address]    LOS:  7  Expected LOS: 5-7    Financial concerns/prescription coverage:  no  Family contact: 2/24/2023     Family requesting physician contact today:  no  Discharge plan: to be determined   Access to weapons: no        Outpatient provider(s): to be linked   Patient's preferred phone number for follow up call: [phone]     Participating treatment team members: Tyler Case MD, Radha Machuca, supervisee in social work

## 2023-03-03 LAB
ANTI-HU AB: NEGATIVE TITER
ANTI-RI AB: NEGATIVE TITER
ANTI-YO ANTIBODIES, 808955: NEGATIVE TITER
THYROPEROXIDASE AB SERPL-ACNC: 10 IU/ML (ref 0–34)
TSI ACT/NOR SER: <0.1 IU/L (ref 0–0.55)

## 2023-03-03 PROCEDURE — 74011250637 HC RX REV CODE- 250/637

## 2023-03-03 PROCEDURE — 74011250637 HC RX REV CODE- 250/637: Performed by: PSYCHIATRY & NEUROLOGY

## 2023-03-03 PROCEDURE — 65220000003 HC RM SEMIPRIVATE PSYCH

## 2023-03-03 RX ORDER — TRAZODONE HYDROCHLORIDE 100 MG/1
200 TABLET ORAL
Status: DISCONTINUED | OUTPATIENT
Start: 2023-03-03 | End: 2023-03-06 | Stop reason: HOSPADM

## 2023-03-03 RX ADMIN — ACETAMINOPHEN 650 MG: 325 TABLET, FILM COATED ORAL at 10:23

## 2023-03-03 RX ADMIN — TRAZODONE HYDROCHLORIDE 200 MG: 100 TABLET ORAL at 22:01

## 2023-03-03 RX ADMIN — LOSARTAN POTASSIUM 100 MG: 50 TABLET, FILM COATED ORAL at 09:07

## 2023-03-03 RX ADMIN — METFORMIN HYDROCHLORIDE 1000 MG: 500 TABLET, FILM COATED ORAL at 09:07

## 2023-03-03 RX ADMIN — Medication 3 MG: at 22:01

## 2023-03-03 RX ADMIN — OLANZAPINE 5 MG: 5 TABLET, FILM COATED ORAL at 00:53

## 2023-03-03 RX ADMIN — HYDROXYZINE HYDROCHLORIDE 50 MG: 25 TABLET, FILM COATED ORAL at 22:01

## 2023-03-03 RX ADMIN — GLIPIZIDE 5 MG: 5 TABLET ORAL at 09:07

## 2023-03-03 RX ADMIN — CLONAZEPAM 0.25 MG: 0.25 TABLET, ORALLY DISINTEGRATING ORAL at 22:02

## 2023-03-03 RX ADMIN — TRIAMTERENE AND HYDROCHLOROTHIAZIDE 1 TABLET: 37.5; 25 TABLET ORAL at 09:07

## 2023-03-03 RX ADMIN — ATORVASTATIN CALCIUM 40 MG: 40 TABLET, FILM COATED ORAL at 09:07

## 2023-03-03 RX ADMIN — FLUOXETINE HYDROCHLORIDE 40 MG: 20 CAPSULE ORAL at 09:07

## 2023-03-03 NOTE — BH NOTES
Psychiatric Progress Note    Patient: Gerald Allison MRN: [id number]  SSN: [id number]    YOB: 1975  Age: 50 y.o. Sex: female      Admit Date: 2/23/2023    LOS: 8 days     Subjective:     2/25:  Gerald Allison  reports feeling \"anxious\" and affect is mood congruent. Denies SI/HI/AH/VH. She had an episode of acute agitation this morning and received PRN IM medications as ordered with good effect. Appropriately interactive and aware. Tolerating medications well. Eating well and sleeping 8 hours. 2/26:  Gerald Allison  reports feeling \"OK\" and affect is mood congruent. Denies SI/HI/AH/VH. Her agitation and verbal outbursts appear to be improving, however per chart review she continues to receive IM medication for severe anxiety. Appropriately interactive and aware. Tolerating medications well. Eating well and sleeping 8 hours. 2/27:  Gerald Allison  reports feeling \"OK\" and affect is not mood congruent. Denies SI/HI/AH/VH. Her agitation and verbal outbursts appear to be improving moderately and was noted to only received PO medications for agitation/anxiety today. Appropriately interactive and aware. Endorsed headache from seroquel, will decrease dose to 300 mg. Eating well and sleeping 8 hours. 2/28 - Gerald Allison reports feeling well without anxiety, but moods are depressed (6/10). Denies SI/HI/AH/VH. Continues to have impulsive outbursts. No aggression or violence. Appropriately interactive and aware. Tolerating medications well. Eating and sleeping fairly. 3/1 - Gerald Allison reports feeling well and moods are fair. Continues to have outburst and hair pulling episodes. Nauseas still. Had an anxiety spell this am that required prn IM medications to manage. Denies SI/HI/AH/VH. No aggression or violence. Appropriately interactive and aware. Tolerating medications well. Eating and sleeping fairly.   Believes Seroquel or Klonopin is causing her headaches. Wants to drop one of both medications. 3/2 - Julissa Monet reports feeling well and moods are good. Has not pulled at her hair or been stressed today. Discussed her headaches and poor sleep. Denies SI/HI/AH/VH. No aggression or violence. Appropriately interactive and aware. Tolerating medications but believes that seroquel is her headache culprit. Eating and sleeping fairly. 3/3 - Julissa Monet reports having a bad anxiety attack the other day and required prns to manage. She was pulling her hair and attacking herself again. She has not had any further occurrences. Poor sleep with medication questions. Denies SI/HI/AH/VH. No aggression or violence. Appropriately interactive and aware. Tolerating medications well. Eating and sleeping fairly.     Objective:     Vitals:    03/01/23 2000 03/02/23 0812 03/02/23 2047 03/03/23 0733   BP: 105/60 133/82 (!) 110/53 121/79   Pulse: (!) 102 98 67 91   Resp: 16 17 16 18   Temp: 98.4 °F (36.9 °C) 98.1 °F (36.7 °C) 98.4 °F (36.9 °C) 98.4 °F (36.9 °C)   SpO2:  98% 99% 99%   Weight:       Height:            Mental Status Exam:   Sensorium  oriented to time, place and person   Relations cooperative   Eye Contact appropriate   Appearance:  age appropriate   Speech:  normal volume and non-pressured   Thought Process: goal directed and logical   Thought Content free of delusions and free of hallucinations   Suicidal ideations none   Mood:  anxious   Affect:  anxious   Memory   adequate   Concentration:  adequate   Insight:  fair   Judgment:  fair       MEDICATIONS:  Current Facility-Administered Medications   Medication Dose Route Frequency    traZODone (DESYREL) tablet 50 mg  50 mg Oral QHS    acetylcysteine tab 1,200 mg (Patient Supplied)  1,200 mg Oral DAILY    FLUoxetine (PROzac) capsule 40 mg  40 mg Oral DAILY    loperamide (IMODIUM) 1 mg/7.5 mL oral solution 2 mg  2 mg Oral Q6H PRN    hydrOXYzine HCL (ATARAX) tablet 50 mg  50 mg Oral QHS melatonin tablet 3 mg  3 mg Oral QHS    hydrOXYzine HCL (ATARAX) tablet 50 mg  50 mg Oral BID PRN    atorvastatin (LIPITOR) tablet 40 mg  40 mg Oral DAILY    clonazePAM (KlonoPIN) disintegrating tablet 0.25 mg  0.25 mg Oral QHS    glipiZIDE (GLUCOTROL) tablet 5 mg  5 mg Oral DAILY    losartan (COZAAR) tablet 100 mg  100 mg Oral DAILY    metFORMIN (GLUCOPHAGE) tablet 1,000 mg  1,000 mg Oral BID WITH MEALS    ondansetron (ZOFRAN ODT) tablet 4 mg  4 mg Oral Q8H PRN    triamterene-hydroCHLOROthiazide (MAXZIDE) 37.5-25 mg per tablet 1 Tablet  1 Tablet Oral DAILY    OLANZapine (ZyPREXA) tablet 5 mg  5 mg Oral Q6H PRN    haloperidol lactate (HALDOL) injection 5 mg  5 mg IntraMUSCular Q6H PRN    benztropine (COGENTIN) tablet 1 mg  1 mg Oral BID PRN    diphenhydrAMINE (BENADRYL) injection 50 mg  50 mg IntraMUSCular BID PRN    LORazepam (ATIVAN) injection 1 mg  1 mg IntraMUSCular Q4H PRN    acetaminophen (TYLENOL) tablet 650 mg  650 mg Oral Q4H PRN    magnesium hydroxide (MILK OF MAGNESIA) 400 mg/5 mL oral suspension 30 mL  30 mL Oral DAILY PRN      DISCUSSION:   the risks and benefits of the proposed medication    Lab/Data Review: All lab results for the last 24 hours reviewed. No results found for this or any previous visit (from the past 24 hour(s)).         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)      Plan:     Continue current care  Collateral information  Increase Trazodone 200 mg for sleep  Continue Prozac 40 mg PO Q daily  Disposition planning with social work    I certify that this patient's inpatient psychiatric hospital services furnished since the previous certification were, and continue to be, required for treatment that could reasonably be expected to improve the patient's condition, or for diagnostic study, and that the patient continues to need, on a daily basis, active treatment furnished directly by or requiring the supervision of inpatient psychiatric facility personnel. In addition, the hospital records show that services furnished were intensive treatment services, admission or related services, or equivalent services.   Signed By: Roselyn Ovalles MD     March 3, 2023

## 2023-03-03 NOTE — BH NOTES
Family Session:    Patient and this writer met with patients parents via zoom. Patient shared that she has been feeling well. Patient presented with bright affect and mood. Patients family reported that they plan to come to [facility] and stay with patient after she is discharged to continue to monitor for safety and care for patient. Patient is agreeable. This writer will set patient up with outpatient psychiatrist though [facility] and individual therapy appointment with Donaldo Saenz through [facility] ([phone]). Parents dicussed need to get connected with outpatient . This writer instructed patient to go to [address] [place] [address] the day after discharge to get connected with outpatient  and complete intake assessment. Patient is agreeable. Tentative discharge date is Monday 3/6/2023 at 4 pm. Patients parents will pick patient up and transport home.      Julio Duron, supervisee in social work

## 2023-03-03 NOTE — PROGRESS NOTES
Problem: Altered Thought Process (Adult/Pediatric)  Goal: *STG: Participates in treatment plan  Outcome: Progressing Towards Goal  Goal: *STG: Remains safe in hospital  Outcome: Progressing Towards Goal     Pt encountered in hallway smiling, pleasant when engaged. She reported a restful nights sleep. Pt denies all SI/HI, AVH, anxiety and depression at this time. She is both meal and med compliant. There are no complaints of pain or discomfort at this time. Monitoring will continue for changes in condition.

## 2023-03-03 NOTE — GROUP NOTE
SCOTT  GROUP DOCUMENTATION INDIVIDUAL                                                                          Group Therapy Note    Date: 3/3/2023    Group Start Time: 1500  Group End Time: 1600  Group Topic: Recreational/Music Therapy    [facility] [place] [address] ACUTE BEHAV [facility]    [place], [address] [facility] GROUP DOCUMENTATION GROUP    Group Therapy Note    Attendees: 8       Attendance: Attended    Patient's Goal:  To concentrate on selected task    Interventions/techniques: Supported-crafts,games,music    Follows Directions:  Followed directions    Interactions: Interacted appropriately    Mental Status: Calm-pleasant    Behavior/appearance: Attentive and Cooperative    Goals Achieved: Able to engage in interactions and Able to listen to others-worked on selected task    Héctor Meeks

## 2023-03-03 NOTE — GROUP NOTE
SCOTT  GROUP DOCUMENTATION INDIVIDUAL                                                                          Group Therapy Note    Date: 3/3/2023    Group Start Time: 0915  Group End Time: 0935  Group Topic: Community Meeting    [address] [facility], [address] [facility] GROUP DOCUMENTATION GROUP    Group Therapy Note    Attendees: 3       Attendance: Attended    Patient's Goal:  Attend group    Interventions/techniques: Informed    Follows Directions:  Followed directions    Interactions: Interacted appropriately    Mental Status: Calm    Behavior/appearance: Attentive and Cooperative    Goals Achieved: Able to engage in interactions and Able to listen to others    Homero Brito

## 2023-03-03 NOTE — PROGRESS NOTES
Problem: Altered Thought Process (Adult/Pediatric)  Goal: *STG: Participates in treatment plan  Outcome: Progressing Towards Goal  Goal: *STG: Remains safe in hospital  Outcome: Progressing Towards Goal  Goal: *STG: Seeks staff when feelings of anxiety and fear arise  Outcome: Progressing Towards Goal

## 2023-03-03 NOTE — PROGRESS NOTES
Pt screened per LOS policy. No acute nutrition or weight issues identified. Compliant with CC diet. Hx notable for DM (6.2), HTN, obesity.   Ht: 5'3\"  Wt: 200 lb  BMI: 35.43 kg/(m^2) c/w obesity class II  Est energy needs: 1825 kcal, 68 g protein, 2200 mL fluids  Pt will consume > 75% of meals at follow up 7-10 days  LOS

## 2023-03-03 NOTE — GROUP NOTE
SCOTT  GROUP DOCUMENTATION INDIVIDUAL                                                                          Group Therapy Note    Date: 3/3/2023    Group Start Time: 0900  Group End Time: 1000  Group Topic: Topic Group    [address] [facility] [address] ACUTE BEHAV [facility]    [place], [address] [facility] GROUP DOCUMENTATION GROUP    Group Therapy Note    Attendees: 5       Attendance: Attended    Patient's Goal:  To participate in relaxation activity    Interventions/techniques: Supported-art    Follows Directions:  Followed directions    Interactions: Interacted appropriately    Mental Status: Calm    Behavior/appearance: Attentive and Cooperative    Goals Achieved: Able to engage in interactions, Able to listen to others, and Discussed coping    Ysabel Waggoner

## 2023-03-03 NOTE — PROGRESS NOTES
Catherine denied SI, HI, AVH and pain. She was med compliant. She had a snack. She appears to be sleeping well. She received Tylenol for a mild headache and Zyprexa for feeling agitated.

## 2023-03-03 NOTE — PROGRESS NOTES
Checked in on patient while rounding in the [address] [facility]. Patient shared that she is doing a lot better and may be able to go home on Monday, to [place] and live with her parents who have been concerned about her. Patient was smiling and very grateful.  provided words of affirmation, encouragement, and assurance of prayer. Advised of  availability. Reviewed chart. Rev.  Marilynn Iyer 42, [address]

## 2023-03-04 LAB
ARSENIC BLD-MCNC: 1 UG/L (ref 0–9)
GLUCOSE BLD STRIP.AUTO-MCNC: 166 MG/DL (ref 65–117)
HISPANIC, LDP2T: NORMAL
LEAD BLD-MCNC: 1.1 UG/DL (ref 0–3.4)
MERCURY BLD-MCNC: <1 UG/L (ref 0–14.9)
RACE, 017371: NORMAL
SERVICE CMNT-IMP: ABNORMAL
SPECIMEN SOURCE: NORMAL
TEST PURPOSE, LDP4T: NORMAL
THYROGLOB AB SERPL-ACNC: <1 IU/ML (ref 0–0.9)

## 2023-03-04 PROCEDURE — 74011250637 HC RX REV CODE- 250/637: Performed by: PSYCHIATRY & NEUROLOGY

## 2023-03-04 PROCEDURE — 74011250637 HC RX REV CODE- 250/637

## 2023-03-04 PROCEDURE — 82962 GLUCOSE BLOOD TEST: CPT

## 2023-03-04 PROCEDURE — 99231 SBSQ HOSP IP/OBS SF/LOW 25: CPT | Performed by: PSYCHIATRY & NEUROLOGY

## 2023-03-04 PROCEDURE — 65220000003 HC RM SEMIPRIVATE PSYCH

## 2023-03-04 RX ORDER — LOPERAMIDE HYDROCHLORIDE 2 MG/1
2 CAPSULE ORAL ONCE
Status: COMPLETED | OUTPATIENT
Start: 2023-03-04 | End: 2023-03-04

## 2023-03-04 RX ADMIN — ACETAMINOPHEN 650 MG: 325 TABLET, FILM COATED ORAL at 17:16

## 2023-03-04 RX ADMIN — CLONAZEPAM 0.25 MG: 0.25 TABLET, ORALLY DISINTEGRATING ORAL at 22:08

## 2023-03-04 RX ADMIN — LOSARTAN POTASSIUM 100 MG: 50 TABLET, FILM COATED ORAL at 08:23

## 2023-03-04 RX ADMIN — Medication 3 MG: at 22:09

## 2023-03-04 RX ADMIN — TRIAMTERENE AND HYDROCHLOROTHIAZIDE 1 TABLET: 37.5; 25 TABLET ORAL at 08:23

## 2023-03-04 RX ADMIN — FLUOXETINE HYDROCHLORIDE 40 MG: 20 CAPSULE ORAL at 08:23

## 2023-03-04 RX ADMIN — ATORVASTATIN CALCIUM 40 MG: 40 TABLET, FILM COATED ORAL at 08:23

## 2023-03-04 RX ADMIN — HYDROXYZINE HYDROCHLORIDE 50 MG: 25 TABLET, FILM COATED ORAL at 14:50

## 2023-03-04 RX ADMIN — OLANZAPINE 5 MG: 5 TABLET, FILM COATED ORAL at 14:51

## 2023-03-04 RX ADMIN — LOPERAMIDE HYDROCHLORIDE 2 MG: 2 CAPSULE ORAL at 14:51

## 2023-03-04 RX ADMIN — GLIPIZIDE 5 MG: 5 TABLET ORAL at 08:24

## 2023-03-04 RX ADMIN — TRAZODONE HYDROCHLORIDE 200 MG: 100 TABLET ORAL at 22:09

## 2023-03-04 RX ADMIN — METFORMIN HYDROCHLORIDE 1000 MG: 500 TABLET, FILM COATED ORAL at 08:24

## 2023-03-04 NOTE — PROGRESS NOTES
Patient observed sitting in dayroom during initial assessment. Patient presented as calm and cooperative  . Patient alert/oriented X4. Patient reported no immediate concerns. Patient displayed no obvious signs of medical or psychiatric distress. Patient reported overall improved symptoms. Patient reported symptoms of depression and anxiety 5/10. Patient denied hallucinations of any type. Patient denied S/I or H/I. Patient stated that she is looking forward to d/c and plans to spend time with family and return to work ([address]) on 03/13/23. Patient received all scheduled HS medications with no concerns. Patient spent most of the evening in dayroom until going to bed around 2300. Patient observed resting in bed with no concerns. Staff will continue to monitor. Patient slept for 7.5 hours.      Problem: Altered Thought Process (Adult/Pediatric)  Goal: *STG: Remains safe in hospital  Outcome: Progressing Towards Goal  Goal: *STG: Seeks staff when feelings of anxiety and fear arise  Outcome: Progressing Towards Goal  Goal: *STG: Complies with medication therapy  Outcome: Progressing Towards Goal

## 2023-03-04 NOTE — PROGRESS NOTES
Problem: Altered Thought Process (Adult/Pediatric)  Goal: *STG: Participates in treatment plan  Outcome: Progressing Towards Goal  Goal: *STG: Remains safe in hospital  Outcome: Progressing Towards Goal  Goal: *STG: Seeks staff when feelings of anxiety and fear arise  Outcome: Progressing Towards Goal  Goal: *STG: Complies with medication therapy  Outcome: Progressing Towards Goal  Patient encountered in the dayroom. She continues to confirm feelings of both anxiety and depression but denies SI/HI,AVH. Pt has a bright affect and is pleasant. She interacts well with both peers and staff. There are no current complaints of pain to note. Monitoring for changes in condition will continue.

## 2023-03-05 LAB
GLUCOSE BLD STRIP.AUTO-MCNC: 155 MG/DL (ref 65–117)
SERVICE CMNT-IMP: ABNORMAL

## 2023-03-05 PROCEDURE — 74011250637 HC RX REV CODE- 250/637

## 2023-03-05 PROCEDURE — 74011250637 HC RX REV CODE- 250/637: Performed by: PSYCHIATRY & NEUROLOGY

## 2023-03-05 PROCEDURE — 82962 GLUCOSE BLOOD TEST: CPT

## 2023-03-05 PROCEDURE — 99232 SBSQ HOSP IP/OBS MODERATE 35: CPT | Performed by: PSYCHIATRY & NEUROLOGY

## 2023-03-05 PROCEDURE — 65220000003 HC RM SEMIPRIVATE PSYCH

## 2023-03-05 RX ORDER — BUSPIRONE HYDROCHLORIDE 5 MG/1
5 TABLET ORAL EVERY 12 HOURS
Status: DISCONTINUED | OUTPATIENT
Start: 2023-03-05 | End: 2023-03-06 | Stop reason: HOSPADM

## 2023-03-05 RX ADMIN — ACETAMINOPHEN 650 MG: 325 TABLET, FILM COATED ORAL at 23:04

## 2023-03-05 RX ADMIN — GLIPIZIDE 5 MG: 5 TABLET ORAL at 08:19

## 2023-03-05 RX ADMIN — CLONAZEPAM 0.25 MG: 0.25 TABLET, ORALLY DISINTEGRATING ORAL at 22:06

## 2023-03-05 RX ADMIN — OLANZAPINE 5 MG: 5 TABLET, FILM COATED ORAL at 12:56

## 2023-03-05 RX ADMIN — Medication 3 MG: at 22:06

## 2023-03-05 RX ADMIN — LOSARTAN POTASSIUM 100 MG: 50 TABLET, FILM COATED ORAL at 08:19

## 2023-03-05 RX ADMIN — FLUOXETINE HYDROCHLORIDE 40 MG: 20 CAPSULE ORAL at 08:18

## 2023-03-05 RX ADMIN — TRAZODONE HYDROCHLORIDE 200 MG: 100 TABLET ORAL at 22:07

## 2023-03-05 RX ADMIN — OLANZAPINE 5 MG: 5 TABLET, FILM COATED ORAL at 23:05

## 2023-03-05 RX ADMIN — ATORVASTATIN CALCIUM 40 MG: 40 TABLET, FILM COATED ORAL at 08:18

## 2023-03-05 RX ADMIN — TRIAMTERENE AND HYDROCHLOROTHIAZIDE 1 TABLET: 37.5; 25 TABLET ORAL at 08:19

## 2023-03-05 RX ADMIN — BUSPIRONE HYDROCHLORIDE 5 MG: 5 TABLET ORAL at 14:04

## 2023-03-05 RX ADMIN — BUSPIRONE HYDROCHLORIDE 5 MG: 5 TABLET ORAL at 22:06

## 2023-03-05 RX ADMIN — METFORMIN HYDROCHLORIDE 1000 MG: 500 TABLET, FILM COATED ORAL at 08:18

## 2023-03-05 RX ADMIN — HYDROXYZINE HYDROCHLORIDE 50 MG: 25 TABLET, FILM COATED ORAL at 22:07

## 2023-03-05 RX ADMIN — HYDROXYZINE HYDROCHLORIDE 50 MG: 25 TABLET, FILM COATED ORAL at 00:28

## 2023-03-05 RX ADMIN — MAGNESIUM HYDROXIDE 30 ML: 400 SUSPENSION ORAL at 22:09

## 2023-03-05 NOTE — PROGRESS NOTES
Patient met sitting in dayroom for initial assessment. Patient presented as alert, calm and cooperative. Patient A/O X4. Patient reported no immediate concerns and displayed no obvious signs of medical or psychiatric distress. Patient stated that she feels as though her symptoms of anxiety have not improved. Patient stated that she feels unsure about being prepared for d/c, as symptom-management remains an issue. Patient reported symptoms of anxiety and depression 5/10. Patient denied hallucinations, S/I or H/I. Patient took all prescribed HS medications with no concerns. Patient observed resting/sleeping in bed throughout the night with no concerns. Staff will continue to assess and monitor. Patient slept for 7.5 hours.       Problem: Altered Thought Process (Adult/Pediatric)  Goal: *STG: Remains safe in hospital  Outcome: Progressing Towards Goal  Goal: *STG: Seeks staff when feelings of anxiety and fear arise  Outcome: Progressing Towards Goal  Goal: *STG: Complies with medication therapy  Outcome: Progressing Towards Goal

## 2023-03-05 NOTE — PROGRESS NOTES
Assumed care of patient from [facility]. Calm, cooperative, pleasant, out on the unit, interacting with her peers. Refused evening dose of Metformin due to patient stating she does not take an evening dose at home. Compliant with all other medications. Will continue to monitor for safety.

## 2023-03-05 NOTE — PROGRESS NOTES
Problem: Altered Thought Process (Adult/Pediatric)  Goal: *STG: Participates in treatment plan  Outcome: Progressing Towards Goal  Goal: *STG: Remains safe in hospital  Outcome: Progressing Towards Goal  Goal: *STG: Seeks staff when feelings of anxiety and fear arise  Outcome: Progressing Towards Goal      [id number]- Report  is given by Owen Vargas and I assume care of the patient. She is med and meal compliant and her mood is good. She is without any pain or discomfort at this time.

## 2023-03-05 NOTE — BH NOTES
Psychiatric Progress Note    Patient: Candance Pretty MRN: [id number]  SSN: [id number]    YOB: 1975  Age: 50 y.o. Sex: female      Admit Date: 2/23/2023    LOS: 9 days     Subjective:     2/25:  Candance Pretty  reports feeling \"anxious\" and affect is mood congruent. Denies SI/HI/AH/VH. She had an episode of acute agitation this morning and received PRN IM medications as ordered with good effect. Appropriately interactive and aware. Tolerating medications well. Eating well and sleeping 8 hours. 2/26:  Candance Pretty  reports feeling \"OK\" and affect is mood congruent. Denies SI/HI/AH/VH. Her agitation and verbal outbursts appear to be improving, however per chart review she continues to receive IM medication for severe anxiety. Appropriately interactive and aware. Tolerating medications well. Eating well and sleeping 8 hours. 2/27:  Candance Pretty  reports feeling \"OK\" and affect is not mood congruent. Denies SI/HI/AH/VH. Her agitation and verbal outbursts appear to be improving moderately and was noted to only received PO medications for agitation/anxiety today. Appropriately interactive and aware. Endorsed headache from seroquel, will decrease dose to 300 mg. Eating well and sleeping 8 hours. 2/28 - Candance Pretty reports feeling well without anxiety, but moods are depressed (6/10). Denies SI/HI/AH/VH. Continues to have impulsive outbursts. No aggression or violence. Appropriately interactive and aware. Tolerating medications well. Eating and sleeping fairly. 3/1 - Candance Pretty reports feeling well and moods are fair. Continues to have outburst and hair pulling episodes. Nauseas still. Had an anxiety spell this am that required prn IM medications to manage. Denies SI/HI/AH/VH. No aggression or violence. Appropriately interactive and aware. Tolerating medications well. Eating and sleeping fairly.   Believes Seroquel or Klonopin is causing her headaches. Wants to drop one of both medications. 3/2 - Sony De Leon reports feeling well and moods are good. Has not pulled at her hair or been stressed today. Discussed her headaches and poor sleep. Denies SI/HI/AH/VH. No aggression or violence. Appropriately interactive and aware. Tolerating medications but believes that seroquel is her headache culprit. Eating and sleeping fairly. 3/3 - Sony De Leon reports having a bad anxiety attack the other day and required prns to manage. She was pulling her hair and attacking herself again. She has not had any further occurrences. Poor sleep with medication questions. Denies SI/HI/AH/VH. No aggression or violence. Appropriately interactive and aware. Tolerating medications well. Eating and sleeping fairly. 03/04 - the patient is bizarre, screaming at times and labile, though seemingly nonsensical (not directing her comments at anyone). Patient reports she is depressed, anxious, and got labwork this AM. She slept 7.5 hours overnight, she is medication compliant and able to make her needs known today.     Objective:     Vitals:    03/03/23 0733 03/03/23 2000 03/04/23 0741 03/04/23 2000   BP: 121/79 (!) 145/88 111/67 135/82   Pulse: 91 83 71 96   Resp: 18 17 16 17   Temp: 98.4 °F (36.9 °C) 98.3 °F (36.8 °C) 98 °F (36.7 °C) 98.2 °F (36.8 °C)   SpO2: 99% 98% 99% 98%   Weight:       Height:            Mental Status Exam:   Sensorium  oriented to time, place and person   Relations cooperative   Eye Contact appropriate   Appearance:  age appropriate   Speech:  normal volume and non-pressured   Thought Process: goal directed and logical   Thought Content free of delusions and free of hallucinations   Suicidal ideations none   Mood:  anxious   Affect:  anxious   Memory   adequate   Concentration:  adequate   Insight:  fair   Judgment:  fair       MEDICATIONS:  Current Facility-Administered Medications   Medication Dose Route Frequency    traZODone (DESYREL) tablet 200 mg  200 mg Oral QHS    acetylcysteine tab 1,200 mg (Patient Supplied)  1,200 mg Oral DAILY    FLUoxetine (PROzac) capsule 40 mg  40 mg Oral DAILY    loperamide (IMODIUM) 1 mg/7.5 mL oral solution 2 mg  2 mg Oral Q6H PRN    hydrOXYzine HCL (ATARAX) tablet 50 mg  50 mg Oral QHS    melatonin tablet 3 mg  3 mg Oral QHS    hydrOXYzine HCL (ATARAX) tablet 50 mg  50 mg Oral BID PRN    atorvastatin (LIPITOR) tablet 40 mg  40 mg Oral DAILY    clonazePAM (KlonoPIN) disintegrating tablet 0.25 mg  0.25 mg Oral QHS    glipiZIDE (GLUCOTROL) tablet 5 mg  5 mg Oral DAILY    losartan (COZAAR) tablet 100 mg  100 mg Oral DAILY    metFORMIN (GLUCOPHAGE) tablet 1,000 mg  1,000 mg Oral BID WITH MEALS    ondansetron (ZOFRAN ODT) tablet 4 mg  4 mg Oral Q8H PRN    triamterene-hydroCHLOROthiazide (MAXZIDE) 37.5-25 mg per tablet 1 Tablet  1 Tablet Oral DAILY    OLANZapine (ZyPREXA) tablet 5 mg  5 mg Oral Q6H PRN    haloperidol lactate (HALDOL) injection 5 mg  5 mg IntraMUSCular Q6H PRN    benztropine (COGENTIN) tablet 1 mg  1 mg Oral BID PRN    diphenhydrAMINE (BENADRYL) injection 50 mg  50 mg IntraMUSCular BID PRN    LORazepam (ATIVAN) injection 1 mg  1 mg IntraMUSCular Q4H PRN    acetaminophen (TYLENOL) tablet 650 mg  650 mg Oral Q4H PRN    magnesium hydroxide (MILK OF MAGNESIA) 400 mg/5 mL oral suspension 30 mL  30 mL Oral DAILY PRN      DISCUSSION:   the risks and benefits of the proposed medication    Lab/Data Review: All lab results for the last 24 hours reviewed.      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)      Plan:     Continue current care  Collateral information  CONTINUE Trazodone 200 mg for sleep  Continue Prozac 40 mg PO Q daily  Disposition planning with social work    I certify that this patient's inpatient psychiatric hospital services furnished since the previous certification were, and continue to be, required for treatment that could reasonably be expected to improve the patient's condition, or for diagnostic study, and that the patient continues to need, on a daily basis, active treatment furnished directly by or requiring the supervision of inpatient psychiatric facility personnel. In addition, the hospital records show that services furnished were intensive treatment services, admission or related services, or equivalent services.   Signed By: Monae Fry MD     March 4, 2023

## 2023-03-06 VITALS
OXYGEN SATURATION: 99 % | RESPIRATION RATE: 18 BRPM | SYSTOLIC BLOOD PRESSURE: 110 MMHG | DIASTOLIC BLOOD PRESSURE: 70 MMHG | WEIGHT: 200 LBS | TEMPERATURE: 98 F | HEART RATE: 91 BPM | BODY MASS INDEX: 35.44 KG/M2 | HEIGHT: 63 IN

## 2023-03-06 LAB
GLUCOSE BLD STRIP.AUTO-MCNC: 153 MG/DL (ref 65–117)
SERVICE CMNT-IMP: ABNORMAL

## 2023-03-06 PROCEDURE — 74011250637 HC RX REV CODE- 250/637

## 2023-03-06 PROCEDURE — 74011250637 HC RX REV CODE- 250/637: Performed by: PSYCHIATRY & NEUROLOGY

## 2023-03-06 PROCEDURE — 82962 GLUCOSE BLOOD TEST: CPT

## 2023-03-06 RX ORDER — CLONAZEPAM 0.25 MG/1
0.25 TABLET, ORALLY DISINTEGRATING ORAL
Qty: 30 TABLET | Refills: 0 | Status: SHIPPED | OUTPATIENT
Start: 2023-03-06

## 2023-03-06 RX ORDER — CALCIUM CARBONATE/VITAMIN D3 600MG-62.5
1200 CAPSULE ORAL DAILY
Qty: 60 TABLET | Refills: 0 | Status: SHIPPED | OUTPATIENT
Start: 2023-03-06

## 2023-03-06 RX ORDER — ATORVASTATIN CALCIUM 40 MG/1
40 TABLET, FILM COATED ORAL DAILY
Qty: 30 TABLET | Refills: 0 | Status: SHIPPED | OUTPATIENT
Start: 2023-03-06 | End: 2023-04-05

## 2023-03-06 RX ORDER — ONDANSETRON 4 MG/1
4 TABLET, ORALLY DISINTEGRATING ORAL
Qty: 90 TABLET | Refills: 0 | Status: SHIPPED | OUTPATIENT
Start: 2023-03-06

## 2023-03-06 RX ORDER — QUETIAPINE 300 MG/1
300 TABLET, FILM COATED, EXTENDED RELEASE ORAL
Qty: 30 TABLET | Refills: 0 | Status: SHIPPED | OUTPATIENT
Start: 2023-03-06

## 2023-03-06 RX ORDER — TRAZODONE HYDROCHLORIDE 100 MG/1
200 TABLET ORAL
Qty: 60 TABLET | Refills: 0 | Status: SHIPPED | OUTPATIENT
Start: 2023-03-06 | End: 2023-04-05

## 2023-03-06 RX ORDER — GLIPIZIDE 5 MG/1
5 TABLET ORAL DAILY
Qty: 30 TABLET | Refills: 0 | Status: SHIPPED | OUTPATIENT
Start: 2023-03-06

## 2023-03-06 RX ORDER — LOSARTAN POTASSIUM 100 MG/1
100 TABLET ORAL DAILY
Qty: 30 TABLET | Refills: 0 | Status: SHIPPED | OUTPATIENT
Start: 2023-03-06 | End: 2023-04-05

## 2023-03-06 RX ORDER — FLUOXETINE HYDROCHLORIDE 40 MG/1
40 CAPSULE ORAL DAILY
Qty: 30 CAPSULE | Refills: 0 | Status: SHIPPED | OUTPATIENT
Start: 2023-03-07

## 2023-03-06 RX ORDER — TRIAMTERENE/HYDROCHLOROTHIAZID 37.5-25 MG
1 TABLET ORAL DAILY
Qty: 30 TABLET | Refills: 0 | Status: SHIPPED | OUTPATIENT
Start: 2023-03-06 | End: 2023-04-05

## 2023-03-06 RX ORDER — HYDROXYZINE 50 MG/1
50 TABLET, FILM COATED ORAL
Qty: 30 TABLET | Refills: 0 | Status: SHIPPED | OUTPATIENT
Start: 2023-03-06 | End: 2023-04-05

## 2023-03-06 RX ORDER — LANOLIN ALCOHOL/MO/W.PET/CERES
3 CREAM (GRAM) TOPICAL
Qty: 30 TABLET | Refills: 0 | Status: SHIPPED | OUTPATIENT
Start: 2023-03-06

## 2023-03-06 RX ORDER — METFORMIN HYDROCHLORIDE 1000 MG/1
1000 TABLET ORAL 2 TIMES DAILY WITH MEALS
Qty: 60 TABLET | Refills: 0 | Status: SHIPPED | OUTPATIENT
Start: 2023-03-06 | End: 2023-04-05

## 2023-03-06 RX ADMIN — TRIAMTERENE AND HYDROCHLOROTHIAZIDE 1 TABLET: 37.5; 25 TABLET ORAL at 08:51

## 2023-03-06 RX ADMIN — FLUOXETINE HYDROCHLORIDE 40 MG: 20 CAPSULE ORAL at 08:52

## 2023-03-06 RX ADMIN — ATORVASTATIN CALCIUM 40 MG: 40 TABLET, FILM COATED ORAL at 08:51

## 2023-03-06 RX ADMIN — LOSARTAN POTASSIUM 100 MG: 50 TABLET, FILM COATED ORAL at 08:52

## 2023-03-06 RX ADMIN — BUSPIRONE HYDROCHLORIDE 5 MG: 5 TABLET ORAL at 08:52

## 2023-03-06 RX ADMIN — GLIPIZIDE 5 MG: 5 TABLET ORAL at 08:52

## 2023-03-06 RX ADMIN — ACETAMINOPHEN 650 MG: 325 TABLET, FILM COATED ORAL at 15:26

## 2023-03-06 RX ADMIN — METFORMIN HYDROCHLORIDE 1000 MG: 500 TABLET, FILM COATED ORAL at 08:52

## 2023-03-06 RX ADMIN — ACETAMINOPHEN 650 MG: 325 TABLET, FILM COATED ORAL at 09:41

## 2023-03-06 NOTE — GROUP NOTE
SCOTT  GROUP DOCUMENTATION INDIVIDUAL                                                                          Group Therapy Note    Date: 3/6/2023    Group Start Time: 0900  Group End Time: 1000  Group Topic: Topic Group    [facility] [place] [address] ACUTE BEHAV [facility]    [place], [address] [facility] GROUP DOCUMENTATION GROUP    Group Therapy Note    Attendees: 8       Attendance: Attended    Patient's Goal:  To identify positive ways to cope a-z    Interventions/techniques: Supported-game    Follows Directions:  Followed directions    Interactions: Interacted appropriately    Mental Status: Calm    Behavior/appearance: Attentive and Cooperative    Goals Achieved: Able to engage in interactions, Able to listen to others, and Discussed coping      Farideh Paz

## 2023-03-06 NOTE — PROGRESS NOTES
Problem: Altered Thought Process (Adult/Pediatric)  Goal: *STG: Participates in treatment plan  3/6/2023 1144 by Jamaal Ricketts RN  Outcome: Resolved/Met  3/6/2023 1028 by Jamaal Ricketts RN  Outcome: Progressing Towards Goal  Goal: *STG: Remains safe in hospital  3/6/2023 1144 by Jamaal Ricketts RN  Outcome: Resolved/Met  3/6/2023 1028 by Jamaal Ricketts RN  Outcome: Progressing Towards Goal  Goal: *STG: Seeks staff when feelings of anxiety and fear arise  3/6/2023 1144 by Jamaal Ricketts RN  Outcome: Resolved/Met  3/6/2023 1028 by Jamaal Ricketts RN  Outcome: Progressing Towards Goal  Goal: *STG: Complies with medication therapy  3/6/2023 1144 by Jamala Ricketts RN  Outcome: Resolved/Met  3/6/2023 1028 by Jamaal Ricketts RN  Outcome: Progressing Towards Goal  Goal: *STG: Attends activities and groups  3/6/2023 1144 by Jamaal Ricketts RN  Outcome: Resolved/Met  3/6/2023 1028 by Jamaal Ricketts RN  Outcome: Progressing Towards Goal  Goal: *STG: Decreased delusional thinking  3/6/2023 1144 by Jamaal Ricketts RN  Outcome: Resolved/Met  3/6/2023 1028 by Jamaal Ricketts RN  Outcome: Progressing Towards Goal  Goal: *STG: Decreased hallucinations  Outcome: Resolved/Met  Goal: *STG: Absence of lethality  Outcome: Resolved/Met  Goal: *STG: Demonstrates ability to understand and use improved judgment in daily activities and relationships  Outcome: Resolved/Met  Goal: *LTG: Returns to baseline functioning  Outcome: Resolved/Met  Goal: Interventions  Outcome: Resolved/Met     Problem: Patient Education: Go to Patient Education Activity  Goal: Patient/Family Education  Outcome: Resolved/Met     Problem: Falls - Risk of  Goal: *Absence of Falls  Description: Document Princess Fall Risk and appropriate interventions in the flowsheet.   Outcome: Resolved/Met     Problem: Patient Education: Go to Patient Education Activity  Goal: Patient/Family Education  Outcome: Resolved/Met     Problem: Discharge Planning  Goal: *Discharge to safe environment  Outcome: Resolved/Met  Goal: *Knowledge of medication management  Outcome: Resolved/Met  Goal: *Knowledge of discharge instructions  Outcome: Resolved/Met

## 2023-03-06 NOTE — BH NOTES
DISCHARGE SUMMARY    NAME:Catherine Martinez  : 1975  MRN: [id number]    The patient Sony De Leon exhibits the ability to control behavior in a less restrictive environment. Patient's level of functioning is improving. No assaultive/destructive behavior has been observed for the past 24 hours. No suicidal/homicidal threat or behavior has been observed for the past 24 hours. There is no evidence of serious medication side effects. Patient has not been in physical or protective restraints for at least the past 24 hours. If weapons involved, how are they secured? No access     Is patient aware of and in agreement with discharge plan? Patient will discharge home with family. Patient was instructed to follow up with [facility] for psychiatry services, [facility] for therapy, and [address] [place] [address] to get connected with     Arrangements for medication:  Prescriptions sent to pharmacy     Copy of discharge instructions to provider?:      Arrangements for transportation home:  Patients parents will transport home    Keep all follow up appointments as scheduled, continue to take prescribed medications per physician instructions.   Mental health crisis number:  385 or your local mental health crisis line number at   [place] [address] Emergency WARM LINE      [phone] ([phone])      M-F: 9am to 9pm      Sat & Sun: 5pm - 9pm  National suicide prevention lines:                  Audie Bermudez, supervisee in social work               [phone] ([phone])       [phone] ([phone])    Crisis Text Line:  Text HOME to [phone]

## 2023-03-06 NOTE — DISCHARGE SUMMARY
PSYCHIATRIC DISCHARGE SUMMARY         IDENTIFICATION:    Patient Name  Gerald Allison   Date of Birth 1975   [facility] [id number]   Medical Record Number  [id number]      Age  50 y.o. PCP Qian Schaefer MD   Admit date:  2/23/2023    Discharge date: 3/9/2023   Room Number  326/02  @ [facility]   Date of Service  3/9/2023            TYPE OF DISCHARGE: REGULAR               CONDITION AT DISCHARGE: improved and fair       PROVISIONAL & DISCHARGE DIAGNOSES:    Problem List  Date Reviewed: 7/17/2019            Codes Class    * (Principal) Psychoses ([facility].) ICD-10-CM: F29  ICD-9-CM: 298.9         Depressed ICD-10-CM: F32. A  ICD-9-CM: 311         Depression ICD-10-CM: F32. A  ICD-9-CM: 311         Moderate episode of recurrent major depressive disorder (HCC) ICD-10-CM: F33.1  ICD-9-CM: 296.32         Obesity, morbid (HCC) ICD-10-CM: E66.01  ICD-9-CM: 278.01         Infected sebaceous cyst, upper back ICD-10-CM: L72.3, L08.9  ICD-9-CM: 706.2         Trichotillomania ICD-10-CM: F63.3  ICD-9-CM: 312.39         Depression with anxiety ICD-10-CM: F41.8  ICD-9-CM: 300.4         Hypertension ICD-10-CM: I10  ICD-9-CM: 401.9         Type II diabetes mellitus ([facility] ICD-10-CM: E11.9  ICD-9-CM: 250.00         Panic disorder without agoraphobia with severe panic attacks ICD-10-CM: F41.0  ICD-9-CM: 300.01         Depressive disorder ICD-10-CM: F32. A  ICD-9-CM: [facility] Problems    *Psychoses ([facility]      Trichotillomania      Type II diabetes mellitus ([facility].)      DISCHARGE DIAGNOSIS:   Axis I:  SEE ABOVE  Axis II: SEE ABOVE  Axis III: SEE ABOVE  Axis IV:  lack of structure  Axis V:  <50 on admission, 55+ on discharge     CC & HISTORY OF PRESENT ILLNESS:  50 y.o.   BLACK/ female with a past psychiatric history significant for depression, anxiety, trichotillomania, who presents at this time with complaints of (and/or evidence of) the following emotional symptoms: depression and anxiety. Additional symptomatology include agitation, hair pulling that have been present for more than two weeks. These symptoms are of severe severity and are intermittent/ fleeting in nature. She endorses good sleep and appetite. Of note, this AM patient became acutely agitated but not aggressive and received PRN IM medications a/o (see MAR) which appeared to be effective. Precipitants include having to leave partial hospitalization program (her parents are her financial support)  and her parents wanting her to return to [facility] with them. SOCIAL HISTORY:    Social History     Socioeconomic History    Marital status: SINGLE     Spouse name: Not on file    Number of children: Not on file    Years of education: Not on file    Highest education level: Not on file   Occupational History    Not on file   Tobacco Use    Smoking status: Never    Smokeless tobacco: Never    Tobacco comments:     n/a never smoked tobacco   Substance and Sexual Activity    Alcohol use: No    Drug use: No    Sexual activity: Yes     Partners: Male     Birth control/protection: None   Other Topics Concern    Not on file   Social History Narrative    Not on file     Social Determinants of Health     Financial Resource Strain: Not on file   Food Insecurity: Not on file   Transportation Needs: Not on file   Physical Activity: Not on file   Stress: Not on file   Social Connections: Not on file   Intimate Partner Violence: Not on file   Housing Stability: Not on file      FAMILY HISTORY:   Family History   Problem Relation Age of Onset    Diabetes Mother     Hypertension Mother     Cancer Father         LUNG CA. SMOKER             HOSPITALIZATION COURSE:    Guerda Villar was admitted to the inpatient psychiatric unit [facility] for acute psychiatric stabilization in regards to symptomatology as described in the HPI above.  The differential diagnosis at time of admission included: schizophrenia vs substance induced psychotic disorder schizoaffective vs bipolar MDD vs adjustment disorder. While on the unit Claudeen Gazella was involved in individual, group, occupational and milieu therapy. Psychiatric medications were adjusted during this hospitalization. Claudeen Gazella demonstrated a progressive improvement in overall condition. Much of patient's initial presentation appeared to be related to situational stressors, effects of medication non-compliance and psychological factors. Please see individual progress notes for more specific details regarding patient's hospitalization course. Claudeen Gazella reports feeling better than on admission with only one episode of anxiety with hair pulling this weekend after a conversation with family on the phone. Moods are fair. Denies SI/HI/AH/VH. No aggression or violence. Appropriately interactive and aware. Tolerating medications well. Eating and sleeping fairly. Patient with request for discharge today. There are no grounds to seek a TDO. At time of discharge, Claudeen Gazella is without significant problems of depression, psychosis, or valentín. Patient free of suicidal and homicidal ideations (appears to be at very low risk of suicide or homicide) and reports many positive predictive factors in terms of not attempting suicide or homicide. Overall presentation at time of discharge is most consistent with the diagnosis of Trichotillomania. Patient has maximized benefit to be derived from acute inpatient psychiatric treatment. All members of the treatment team concur with each other in regards to plans for discharge today. Patient and family are aware and in agreement with discharge and discharge plan.          LABS AND IMAGAING:    Labs Reviewed   CBC WITH AUTOMATED DIFF - Abnormal; Notable for the following components:       Result Value    HGB 10.6 (*)     HCT 32.8 (*)     MPV 8.6 (*)     All other components within normal limits   METABOLIC PANEL, COMPREHENSIVE - Abnormal; Notable for the following components:    Potassium 3.3 (*)     Glucose 62 (*)     Albumin 3.3 (*)     A-G Ratio 0.8 (*)     All other components within normal limits   SED RATE (ESR) - Abnormal; Notable for the following components:    Sed rate, automated 107 (*)     All other components within normal limits   GLUCOSE, POC - Abnormal; Notable for the following components:    Glucose (POC) 166 (*)     All other components within normal limits   GLUCOSE, POC - Abnormal; Notable for the following components:    Glucose (POC) 155 (*)     All other components within normal limits   GLUCOSE, POC - Abnormal; Notable for the following components:    Glucose (POC) 153 (*)     All other components within normal limits   COVID-19 WITH INFLUENZA A/B   URINALYSIS W/ RFLX MICROSCOPIC   DRUG SCREEN, URINE   ETHYL ALCOHOL   SARS-COV-2   ANTI-HU, RI, YO ANTIBODY PROFILE, SERUM   CRP, HIGH SENSITIVITY   HEAVY METALS PROFILE I, BLOOD   THYROGLOBULIN AB   THYROID PEROXIDASE (TPO) AB   THYROID STIMULATING IMMUNOGLOBULIN   HCG URINE, QL. - POC     No results found for: DS35, PHEN, PHENO, PHENT, DILF, DS39, PHENY, PTN, VALF2, VALAC, VALP, VALPR, DS6, CRBAM, CRBAMP, CARB2, XCRBAM  Admission on 02/23/2023, Discharged on 03/06/2023   Component Date Value Ref Range Status    WBC 02/23/2023 9.8  3.6 - 11.0 K/uL Final    RBC 02/23/2023 3.91  3.80 - 5.20 M/uL Final    HGB 02/23/2023 10.6 (A)  11.5 - 16.0 g/dL Final    HCT 02/23/2023 32.8 (A)  35.0 - 47.0 % Final    MCV 02/23/2023 83.9  80.0 - 99.0 FL Final    MCH 02/23/2023 27.1  26.0 - 34.0 PG Final    MCHC 02/23/2023 32.3  30.0 - 36.5 g/dL Final    RDW 02/23/2023 14.5  11.5 - 14.5 % Final    PLATELET 19/78/0790 221  150 - 400 K/uL Final    MPV 02/23/2023 8.6 (A)  8.9 - 12.9 FL Final    NRBC 02/23/2023 0.0  0  WBC Final    ABSOLUTE NRBC 02/23/2023 0.00  0.00 - 0.01 K/uL Final    NEUTROPHILS 02/23/2023 59  32 - 75 % Final    LYMPHOCYTES 02/23/2023 31  12 - 49 % Final MONOCYTES 02/23/2023 6  5 - 13 % Final    EOSINOPHILS 02/23/2023 3  0 - 7 % Final    BASOPHILS 02/23/2023 1  0 - 1 % Final    IMMATURE GRANULOCYTES 02/23/2023 0  0.0 - 0.5 % Final    ABS. NEUTROPHILS 02/23/2023 5.8  1.8 - 8.0 K/UL Final    ABS. LYMPHOCYTES 02/23/2023 3.0  0.8 - 3.5 K/UL Final    ABS. MONOCYTES 02/23/2023 0.6  0.0 - 1.0 K/UL Final    ABS. EOSINOPHILS 02/23/2023 0.3  0.0 - 0.4 K/UL Final    ABS. BASOPHILS 02/23/2023 0.1  0.0 - 0.1 K/UL Final    ABS. IMM. GRANS. 02/23/2023 0.0  0.00 - 0.04 K/UL Final    DF 02/23/2023 AUTOMATED    Final    Sodium 02/23/2023 136  136 - 145 mmol/L Final    Potassium 02/23/2023 3.3 (A)  3.5 - 5.1 mmol/L Final    Chloride 02/23/2023 100  97 - 108 mmol/L Final    CO2 02/23/2023 28  21 - 32 mmol/L Final    Anion gap 02/23/2023 8  5 - 15 mmol/L Final    Glucose 02/23/2023 62 (A)  65 - 100 mg/dL Final    BUN 02/23/2023 12  6 - 20 MG/DL Final    Creatinine 02/23/2023 0.84  0.55 - 1.02 MG/DL Final    BUN/Creatinine ratio 02/23/2023 14  12 - 20   Final    eGFR 02/23/2023 >60  >60 ml/min/1.73m2 Final    Calcium 02/23/2023 8.7  8.5 - 10.1 MG/DL Final    Bilirubin, total 02/23/2023 0.2  0.2 - 1.0 MG/DL Final    ALT (SGPT) 02/23/2023 30  12 - 78 U/L Final    AST (SGOT) 02/23/2023 18  15 - 37 U/L Final    Alk.  phosphatase 02/23/2023 89  45 - 117 U/L Final    Protein, total 02/23/2023 7.3  6.4 - 8.2 g/dL Final    Albumin 02/23/2023 3.3 (A)  3.5 - 5.0 g/dL Final    Globulin 02/23/2023 4.0  2.0 - 4.0 g/dL Final    A-G Ratio 02/23/2023 0.8 (A)  1.1 - 2.2   Final    Color 02/23/2023 YELLOW/STRAW    Final    Appearance 02/23/2023 CLEAR  CLEAR   Final    Specific gravity 02/23/2023 <1.005   Final    pH (UA) 02/23/2023 7.0  5.0 - 8.0   Final    Protein 02/23/2023 Negative  NEG mg/dL Final    Glucose 02/23/2023 Negative  NEG mg/dL Final    Ketone 02/23/2023 Negative  NEG mg/dL Final    Bilirubin 02/23/2023 Negative  NEG   Final    Blood 02/23/2023 Negative  NEG   Final    Urobilinogen 02/23/2023 1.0  0.2 - 1.0 EU/dL Final    Nitrites 02/23/2023 Negative  NEG   Final    Leukocyte Esterase 02/23/2023 Negative  NEG   Final    Ventricular Rate 02/23/2023 85  BPM Final    Atrial Rate 02/23/2023 85  BPM Final    P-R Interval 02/23/2023 156  ms Final    QRS Duration 02/23/2023 70  ms Final    Q-T Interval 02/23/2023 370  ms Final    QTC Calculation (Bezet) 02/23/2023 440  ms Final    Calculated P Axis 02/23/2023 63  degrees Final    Calculated R Axis 02/23/2023 0  degrees Final    Calculated T Axis 02/23/2023 28  degrees Final    Diagnosis 02/23/2023    Final                    Value:Normal sinus rhythm  When compared with ECG of 04-MAY-2022 11:25,  No significant change was found  Confirmed by Claudio Rodriguez M.D., Norma Pickard ([id number]) on 2/27/2023 8:48:26 AM      AMPHETAMINES 02/23/2023 Negative  NEG   Final    BARBITURATES 02/23/2023 Negative  NEG   Final    BENZODIAZEPINES 02/23/2023 Negative  NEG   Final    COCAINE 02/23/2023 Negative  NEG   Final    METHADONE 02/23/2023 Negative  NEG   Final    OPIATES 02/23/2023 Negative  NEG   Final    PCP(PHENCYCLIDINE) 02/23/2023 Negative  NEG   Final    THC (TH-CANNABINOL) 02/23/2023 Negative  NEG   Final    Drug screen comment 02/23/2023 (NOTE)   Final    ALCOHOL(ETHYL),SERUM 02/23/2023 <10  <10 MG/DL Final    Pregnancy test,urine (POC) 02/23/2023 Negative  NEG   Final    SARS-CoV-2 by PCR 02/23/2023 Not detected  NOTD   Final    Influenza A by PCR 02/23/2023 Not detected    Final    Influenza B by PCR 02/23/2023 Not detected    Final    Specimen source 02/26/2023 Nasopharyngeal    Final    SARS-CoV-2 02/26/2023 Not detected  NOTD   Final    Anti-Hu Ab 03/02/2023 Negative  Negative Titer Final    Purkinje cell/Yo Ab 03/02/2023 Negative  Negative titer Final    Anti-Ri Ab 03/02/2023 Negative  Negative Titer Final    CRP, High sensitivity 03/02/2023 7.9  mg/L Final    Race 03/02/2023 BLOOD    Final     03/02/2023 BLOOD    Final    Sample source 03/02/2023 BLOOD Final    Test purpose 03/02/2023 BLOOD    Final    Lead, blood 03/02/2023 1.1  0.0 - 3.4 ug/dL Final    Arsenic 03/02/2023 1  0 - 9 ug/L Final    Mercury, blood 03/02/2023 <1.0  0.0 - 14.9 ug/L Final    Sed rate, automated 03/02/2023 107 (A)  0 - 20 mm/hr Final    Thyroglobulin Ab 03/02/2023 <1.0  0.0 - 0.9 IU/mL Final    Thyroid peroxidase Ab 03/02/2023 10  0 - 34 IU/mL Final    Thyroid Stim Immunoglobulin 03/02/2023 <0.10  0.00 - 0.55 IU/L Final    Glucose (POC) 03/04/2023 166 (A)  65 - 117 mg/dL Final    Performed by 03/04/2023 Kaylin Stone Destiny   Final    Glucose (POC) 03/05/2023 155 (A)  65 - 117 mg/dL Final    Performed by 03/05/2023 Deep Ochoa RN   Final    Glucose (POC) 03/06/2023 153 (A)  65 - 117 mg/dL Final    Performed by 03/06/2023 Daniel Lindquist   Final     MRI BRAIN W WO CONT    Result Date: 2/28/2023  EXAM:  MRI BRAIN W WO CONT INDICATION:    new onset psychosis COMPARISON:  CT head 11/28/2022. CONTRAST: 20 ml ProHance. TECHNIQUE:  Multiplanar multisequence acquisition without and with contrast of the brain. FINDINGS: The ventricles are normal in size and position. The brain parenchyma has normal signal characteristics for age, with minimal tiny nonspecific T2/FLAIR white matter hyperintensities in the frontal lobes that are of doubtful clinical significance. There is no acute infarct, hemorrhage, extra-axial fluid collection, or mass effect. There is no cerebellar tonsillar herniation. Expected arterial flow-voids are present. No evidence of abnormal enhancement. The paranasal sinuses, mastoid air cells, and middle ears are clear. The orbital contents are within normal limits. No significant osseous or scalp lesions are identified. 1. No acute or significant intracranial abnormality. [facility] MAMMO BI SCREENING INCL CAD    Result Date: 2/7/2023  STUDY:  Bilateral Digital Screening Mammogram INDICATION:  Screening. Direct comparison is made to prior mammograms.  BREAST COMPOSITION: There are scattered fibroglandular densities (approximately 25-50% glandular). FINDINGS: Bilateral digital screening mammography was performed, and is interpreted in conjunction with a computer assisted detection (CAD) system. The breast parenchyma is stable bilaterally. No suspicious masses or calcifications are identified. There is no skin thickening or nipple retraction. There has been no significant change. BIRADS 1: Negative. No mammographic evidence of malignancy. Next screening mammogram is recommended in one year. The patient will be notified of these results. DISPOSITION:    Home. Patient to f/u with drug/etoh rehabilitation, psychiatric, and psychotherapy appointments. Patient is to f/u with internist as directed. FOLLOW-UP CARE:    Activity as tolerated  Regular diet  Wound Care: none needed. Follow-up Information       Follow up With Specialties Details Why [address]  Follow up on 3/7/2023 New Requests for Mental Health and Substance Use Services:    Same Day Access to services is available at our [facility]. Individuals requesting services are encouraged to walk into the office with a photo ID, copy of insurance card if the individual has insurance, or proof of income if the individual is uninsured. Same Day Access is available: Monday through Wednesday: 7:30 to 3:00pm, Thursdays: 7:30 to 5:30 pm. Friday: 7:30 to 11:00 am.  During the Same Day Access process you can expect to be here for 2-2 ½ hours where you will be asked to fill out paperwork about you, your concerns and any health problems, meet with an Intake Screener who will review your information and enter it into the computer, complete insurance information and review general information about the Agency, and meet with a licensed clinician who will gather information about your treatment needs, history and other important aspects of your life.  You and the clinician will discuss what type of treatment is best for you and a timeframe for how long the treatment may last. If you and the clinician determine you will be better served by other community providers, the clinician will assist you in making a referral for care. [address] [facility], [address]    [phone]    [employer]  Follow up on 3/13/2023 Please arrive to address above for pshyciatry appointment on 3/13/2023 at 3 pm. [address] [facility], [facility]  [phone]    [facility]  Follow up Please contact number above to schedule therapy appointment. Jaya Edwards, [address], [facility] [place] [address]    Luis Alfredo MD Emergency Medicine   [address]  [phone]                     PROGNOSIS:   Fair ---- based on nature of patient's pathology/ies and treatment compliance issues. Prognosis is greatly dependent upon patient's ability to remain sober and to follow up with scheduled appointments as well as to comply with psychiatric medications as prescribed. DISCHARGE MEDICATIONS:     Informed consent given for the use of following psychotropic medications:  Discharge Medication List as of 3/6/2023  2:40 PM        START taking these medications    Details   hydrOXYzine HCL (ATARAX) 50 mg tablet Take 1 Tablet by mouth nightly for 30 days. Indications: insomnia, Normal, Disp-30 Tablet, R-0      melatonin 3 mg tablet Take 1 Tablet by mouth nightly. Indications: insomnia, Normal, Disp-30 Tablet, R-0   Buspirone 5 mg  tablet                  Take 1 Tablet by mouth Twice daily with meals                                                            Disp-60 Tablet, R-0     CONTINUE these medications which have CHANGED    Details   QUEtiapine SR (Seroquel XR) 300 mg sr tablet Take 1 Tablet by mouth nightly.  Indications: bipolar depression, Normal, Disp-30 Tablet, R-0      acetylcysteine (NAC) 600 mg tab Take 1,200 mg by mouth daily. Indications: memory, Normal, Disp-60 Tablet, R-0      atorvastatin (LIPITOR) 40 mg tablet Take 1 Tablet by mouth daily for 30 days. Indications: excessive fat in the blood, Normal, Disp-30 Tablet, R-0      clonazePAM (KlonoPIN) 0.25 mg TbDi Take 1 Tablet by mouth nightly. Max Daily Amount: 0.25 mg. Indications: panic disorder, Normal, Disp-30 Tablet, R-0      FLUoxetine (PROzac) 40 mg capsule Take 1 Capsule by mouth daily. Indications: anxiousness associated with depression, Normal, Disp-30 Capsule, R-0      glipiZIDE (GLUCOTROL) 5 mg tablet Take 1 Tablet by mouth daily. Indications: type 2 diabetes mellitus, Normal, Disp-30 Tablet, R-0      losartan (COZAAR) 100 mg tablet Take 1 Tablet by mouth daily for 30 days. Indications: high blood pressure, Normal, Disp-30 Tablet, R-0      metFORMIN (GLUCOPHAGE) 1,000 mg tablet Take 1 Tablet by mouth two (2) times daily (with meals) for 30 days. Indications: type 2 diabetes mellitus, Normal, Disp-60 Tablet, R-0      ondansetron (ZOFRAN ODT) 4 mg disintegrating tablet Take 1 Tablet by mouth every eight (8) hours as needed for Nausea or Vomiting. Indications: vomiting in children due to acute gastroenteritis, Normal, Disp-90 Tablet, R-0      traZODone (DESYREL) 100 mg tablet Take 2 Tablets by mouth nightly for 30 days. Indications: insomnia associated with depression, Normal, Disp-60 Tablet, R-0      triamterene-hydroCHLOROthiazide (MAXZIDE) 37.5-25 mg per tablet Take 1 Tablet by mouth daily for 30 days. Indications: high blood pressure, Normal, Disp-30 Tablet, R-0                    A coordinated, multidisplinary treatment team round was conducted with Catherine Marie---this is done daily here at [facility]. This team consists of the nurse, psychiatric unit pharmacist,  and Slava Childs. I have spent greater than 35 minutes on discharge work.     Signed:  Flory Hager MD  3/9/2023

## 2023-03-06 NOTE — PROGRESS NOTES
GROUP THERAPY PROGRESS NOTE    Shirin Olivares participated in group.      Group time: 15 minutes    Personal goal for participation: community  and positivity    Goal orientation: personal    Group therapy participation: active    Therapeutic interventions reviewed and discussed: yes, positivity    Impression of participation: good

## 2023-03-06 NOTE — BH NOTES
Behavioral Health Transition Record to Provider    Patient Name: Melanie Moore  YOB: 1975  Medical Record Number: [id number]  Date of Admission: 2/23/2023  Date of Discharge: 3/6/2023    Attending Provider: Nitish Mota MD  Discharging Provider: Kurt Farmer MD  To contact this individual call [phone] and ask the  to page. If unavailable, ask to be transferred to [facility] Provider on call. [facility] Provider will be available on call 24/7 and during holidays. Primary Care Provider: Inez Eisenberg MD    Allergies   Allergen Reactions    Lisinopril Rash     Swelling of the lips       Reason for Admission: Patient was admitted to [facility] behavioral health unit after being brought in by [facility] staff after experiencing extreme emotional dysregulation. While in the ED, patient was screaming, pulling at her clothing and exposing herself. While on [facility] unit, patient reported that she 'needed to get my medications figured out.' Patient reported that she was at [facility] program after discharge from [facility] for 3 weeks and thought the program was supportive. Patient reported that her parents are financially supporting her and didn't want her to continue with the program, causing significant amount of distress because she wanted to continue with treatment at [facility]. 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 Patient denied SI/HI/AH/VH.     Admission Diagnosis: Psychoses ([address] [F29]    * No surgery found *    Results for orders placed or performed during the hospital encounter of 02/23/23   COVID-19 WITH INFLUENZA A/B   Result Value Ref Range    SARS-CoV-2 by PCR Not detected NOTD      Influenza A by PCR Not detected      Influenza B by PCR Not detected     CBC WITH AUTOMATED DIFF   Result Value Ref Range    WBC 9.8 3.6 - 11.0 K/uL    RBC 3.91 3.80 - 5.20 M/uL    HGB 10.6 (L) 11.5 - 16.0 g/dL    HCT 32.8 (L) 35.0 - 47.0 %    MCV 83.9 80.0 - 99.0 FL    MCH 27.1 26.0 - 34.0 PG    MCHC 32.3 30.0 - 36.5 g/dL    RDW 14.5 11.5 - 14.5 %    PLATELET 924 246 - 627 K/uL    MPV 8.6 (L) 8.9 - 12.9 FL    NRBC 0.0 0  WBC    ABSOLUTE NRBC 0.00 0.00 - 0.01 K/uL    NEUTROPHILS 59 32 - 75 %    LYMPHOCYTES 31 12 - 49 %    MONOCYTES 6 5 - 13 %    EOSINOPHILS 3 0 - 7 %    BASOPHILS 1 0 - 1 %    IMMATURE GRANULOCYTES 0 0.0 - 0.5 %    ABS. NEUTROPHILS 5.8 1.8 - 8.0 K/UL    ABS. LYMPHOCYTES 3.0 0.8 - 3.5 K/UL    ABS. MONOCYTES 0.6 0.0 - 1.0 K/UL    ABS. EOSINOPHILS 0.3 0.0 - 0.4 K/UL    ABS. BASOPHILS 0.1 0.0 - 0.1 K/UL    ABS. IMM. GRANS. 0.0 0.00 - 0.04 K/UL    DF AUTOMATED     METABOLIC PANEL, COMPREHENSIVE   Result Value Ref Range    Sodium 136 136 - 145 mmol/L    Potassium 3.3 (L) 3.5 - 5.1 mmol/L    Chloride 100 97 - 108 mmol/L    CO2 28 21 - 32 mmol/L    Anion gap 8 5 - 15 mmol/L    Glucose 62 (L) 65 - 100 mg/dL    BUN 12 6 - 20 MG/DL    Creatinine 0.84 0.55 - 1.02 MG/DL    BUN/Creatinine ratio 14 12 - 20      eGFR >60 >60 ml/min/1.73m2    Calcium 8.7 8.5 - 10.1 MG/DL    Bilirubin, total 0.2 0.2 - 1.0 MG/DL    ALT (SGPT) 30 12 - 78 U/L    AST (SGOT) 18 15 - 37 U/L    Alk.  phosphatase 89 45 - 117 U/L    Protein, total 7.3 6.4 - 8.2 g/dL    Albumin 3.3 (L) 3.5 - 5.0 g/dL    Globulin 4.0 2.0 - 4.0 g/dL    A-G Ratio 0.8 (L) 1.1 - 2.2     URINALYSIS W/ RFLX MICROSCOPIC   Result Value Ref Range    Color YELLOW/STRAW      Appearance CLEAR CLEAR      Specific gravity <1.005     pH (UA) 7.0 5.0 - 8.0      Protein Negative NEG mg/dL    Glucose Negative NEG mg/dL    Ketone Negative NEG mg/dL    Bilirubin Negative NEG      Blood Negative NEG      Urobilinogen 1.0 0.2 - 1.0 EU/dL    Nitrites Negative NEG      Leukocyte Esterase Negative NEG     DRUG SCREEN, URINE   Result Value Ref Range    AMPHETAMINES Negative NEG      BARBITURATES Negative NEG      BENZODIAZEPINES Negative NEG      COCAINE Negative NEG      METHADONE Negative NEG      OPIATES Negative NEG      PCP(PHENCYCLIDINE) Negative NEG      THC (TH-CANNABINOL) Negative NEG      Drug screen comment (NOTE)    ETHYL ALCOHOL   Result Value Ref Range    ALCOHOL(ETHYL),SERUM <10 <10 MG/DL   SARS-COV-2   Result Value Ref Range    Specimen source Nasopharyngeal      SARS-CoV-2 Not detected NOTD     ANTI-HU, RI, YO ANTIBODY PROFILE, SERUM   Result Value Ref Range    Anti-Hu Ab Negative Negative Titer    Purkinje cell/Yo Ab Negative Negative titer    Anti-Ri Ab Negative Negative Titer   CRP, HIGH SENSITIVITY   Result Value Ref Range    CRP, High sensitivity 7.9 mg/L   HEAVY METALS PROFILE I, BLOOD   Result Value Ref Range    Race BLOOD       BLOOD      Sample source BLOOD      Test purpose BLOOD      Lead, blood 1.1 0.0 - 3.4 ug/dL    Arsenic 1 0 - 9 ug/L    Mercury, blood <1.0 0.0 - 14.9 ug/L   SED RATE (ESR)   Result Value Ref Range    Sed rate, automated 107 (H) 0 - 20 mm/hr   THYROGLOBULIN AB   Result Value Ref Range    Thyroglobulin Ab <1.0 0.0 - 0.9 IU/mL   THYROID PEROXIDASE (TPO) AB   Result Value Ref Range    Thyroid peroxidase Ab 10 0 - 34 IU/mL   THYROID STIMULATING IMMUNOGLOBULIN   Result Value Ref Range    Thyroid Stim Immunoglobulin <0.10 0.00 - 0.55 IU/L   HCG URINE, QL. - POC   Result Value Ref Range    Pregnancy test,urine (POC) Negative NEG     GLUCOSE, POC   Result Value Ref Range    Glucose (POC) 166 (H) 65 - 117 mg/dL    Performed by Hannah Dixon    GLUCOSE, POC   Result Value Ref Range    Glucose (POC) 155 (H) 65 - 117 mg/dL    Performed by Tiffani Feng RN    GLUCOSE, POC   Result Value Ref Range    Glucose (POC) 153 (H) 65 - 117 mg/dL    Performed by [facility]    EKG, 12 LEAD, INITIAL   Result Value Ref Range    Ventricular Rate 85 BPM    Atrial Rate 85 BPM    P-R Interval 156 ms    QRS Duration 70 ms    Q-T Interval 370 ms    QTC Calculation (Bezet) 440 ms    Calculated P Axis 63 degrees    Calculated R Axis 0 degrees Calculated T Axis 28 degrees    Diagnosis       Normal sinus rhythm  When compared with ECG of 04-MAY-2022 11:25,  No significant change was found  Confirmed by Alexi Martines M.D., Tony Quinn ([id number]) on 2/27/2023 8:48:26 AM         Immunizations administered during this encounter: There is no immunization history on file for this patient. Screening for Metabolic Disorders for Patients on Antipsychotic Medications  (Data obtained from the EMR)    Estimated Body Mass Index  Estimated body mass index is 35.43 kg/m² as calculated from the following:    Height as of this encounter: 5' 3\" (1.6 m). Weight as of this encounter: 90.7 kg (200 lb). Vital Signs/Blood Pressure  Visit Vitals  /70 (BP 1 Location: Left upper arm, BP Patient Position: At rest)   Pulse 91   Temp 98 °F (36.7 °C)   Resp 18   Ht 5' 3\" (1.6 m)   Wt 90.7 kg (200 lb)   SpO2 99%   Breastfeeding No   BMI 35.43 kg/m²       Blood Glucose/Hemoglobin A1c  Lab Results   Component Value Date/Time    Glucose 62 (L) 02/23/2023 05:18 PM    Glucose 146 (H) 07/29/2022 05:44 AM    Glucose (POC) 153 (H) 03/06/2023 08:23 AM       Lab Results   Component Value Date/Time    Hemoglobin A1c 6.2 (H) 01/17/2023 06:15 AM        Lipid Panel  Lab Results   Component Value Date/Time    Cholesterol, total 129 01/17/2023 06:15 AM    HDL Cholesterol 68 01/17/2023 06:15 AM    LDL, calculated 52.4 01/17/2023 06:15 AM    Triglyceride 43 01/17/2023 06:15 AM    CHOL/HDL Ratio 1.9 01/17/2023 06:15 AM        Discharge Diagnosis: Please see physicians discharge summary    Discharge Plan: Patient will discharge home with family. Patient was instructed to follow up with [facility] for psychiatry services, [facility] for therapy, and [address] [place] [address] to get connected with .      Discharge Medication List and Instructions:   Current Discharge Medication List          Unresulted Labs (24h ago, onward)      None          To obtain results of studies pending at discharge, please contact [phone]    Follow-up Information       Follow up With Specialties Details [facility] [address]  Follow up on 3/7/2023 New Requests for Mental Health and Substance Use Services:    Same Day Access to services is available at our [facility]. Individuals requesting services are encouraged to walk into the office with a photo ID, copy of insurance card if the individual has insurance, or proof of income if the individual is uninsured. Same Day Access is available: Monday through Wednesday: 7:30 to 3:00pm, Thursdays: 7:30 to 5:30 pm. Friday: 7:30 to 11:00 am.  During the Same Day Access process you can expect to be here for 2-2 ½ hours where you will be asked to fill out paperwork about you, your concerns and any health problems, meet with an Intake Screener who will review your information and enter it into the computer, complete insurance information and review general information about the Agency, and meet with a licensed clinician who will gather information about your treatment needs, history and other important aspects of your life. You and the clinician will discuss what type of treatment is best for you and a timeframe for how long the treatment may last. If you and the clinician determine you will be better served by other community providers, the clinician will assist you in making a referral for care. [facility], [address] [place] [address]    [phone]    [facility]  Follow up on 3/13/2023 Please arrive to address above for pshyciatry appointment on 3/13/2023 at 3 pm. [address] [facility], [facility]  [phone]    [facility]  Follow up Please contact number above to schedule therapy appointment. Morteza Gao, [address] [facility] [address], [address], [address] [address]            Advanced Directive:   Does the patient have an appointed surrogate decision maker?  No  Does the patient have a Medical Advance Directive? No  Does the patient have a Psychiatric Advance Directive? No  If the patient does not have a surrogate or Medical Advance Directive AND Psychiatric Advance Directive, the patient was offered information on these advance directives Patient will complete at a later time    Patient Instructions: Please continue all medications until otherwise directed by physician. Tobacco Cessation Discharge Plan:   Is the patient a smoker and needs referral for smoking cessation? Not applicable  Patient referred to the following for smoking cessation with an appointment? Not applicable     Patient was offered medication to assist with smoking cessation at discharge? Not applicable  Was education for smoking cessation added to the discharge instructions? Not applicable    Alcohol/Substance Abuse Discharge Plan:   Does the patient have a history of substance/alcohol abuse and requires a referral for treatment? Not applicable  Patient referred to the following for substance/alcohol abuse treatment with an appointment? Not applicable  Patient was offered medication to assist with alcohol cessation at discharge? Not applicable  Was education for substance/alcohol abuse added to discharge instructions?  Not applicable    Patient discharged to Home; discussed with patient/caregiver    Facundo Carter, supervisee in social work

## 2023-03-06 NOTE — BH NOTES
Pt is alert and oriented x person, place and time. Pt denies any SI/HI or AV hallucinations. Pt denies any depression. Pt plans to return home . Discharge information reviewed with patient. Pt verbalizes understanding. Pt's belongings/valuables returned. Pt to be transported home by family.

## 2023-03-06 NOTE — PROGRESS NOTES
Problem: Altered Thought Process (Adult/Pediatric)  Goal: *STG: Remains safe in hospital  Outcome: Progressing Towards Goal     Problem: Altered Thought Process (Adult/Pediatric)  Goal: *STG: Seeks staff when feelings of anxiety and fear arise  Outcome: Progressing Towards Goal     Problem: Altered Thought Process (Adult/Pediatric)  Goal: *STG: Complies with medication therapy  Outcome: Progressing Towards Goal     Problem: Falls - Risk of  Goal: *Absence of Falls  Description: Document Princess Fall Risk and appropriate interventions in the flowsheet.   Outcome: Progressing Towards Goal  Note: Fall Risk Interventions:     Medication Interventions: Teach patient to arise slowly

## 2023-03-06 NOTE — BH NOTES
Patient's care assumed with the change of shift report received from the outgoing nurse - Maria Eugenia Stage. Patient received in the dayroom sitting quietly, alert and oriented with flat affect she appeared preoccupied, denied SI, HI, AVH, and depression. Patient endorsed anxiety and headache with a pain score of 5/10. Patient compliant with scheduled medication, prn tylenol and Zyprexa and milk of magnesia administered as requested by patient. Safety maintained by Q 15 safety checks. Patient observed throughout the night, appeared asleep for approximately 6 hours.

## 2023-03-06 NOTE — DISCHARGE INSTRUCTIONS
Psychosis: Care Instructions  Your Care Instructions  A person with psychosis cannot tell the difference between what is real and what is not real. It can cause strange thoughts and behaviors. A person with psychosis may have:  Delusions. These are beliefs that are not real.  Hallucinations. These are things that the person sees or hears that are not really there. Personality changes. Psychosis can be treated with medicines and counseling. It is important to take your medicines exactly as prescribed, even when you feel well. You will need ongoing follow-up care and may need lifelong treatment. When psychosis is not treated, the risks are higher for suicide, a hospital stay, and other problems. Early treatment called coordinated specialty care [facility]) may help a person who is having his or her first episode of psychotic thoughts. Ask your doctor about [address]. Follow-up care is a key part of your treatment and safety. Be sure to make and go to all appointments, and call your doctor if you are having problems. It's also a good idea to know your test results and keep a list of the medicines you take. How can you care for yourself at home? Be safe with medicines. Take your medicines exactly as prescribed. Call your doctor if you think you are having a problem with your medicine. You will get more details on the specific medicines your doctor prescribes. Go to your counseling sessions and follow-up appointments. Join a self-help or support group. These groups can be very helpful for some people with psychosis. Get at least 30 minutes of exercise on most days of the week. Walking is a good choice. You also may want to do other activities, such as running, swimming, cycling, or playing tennis or team sports. Get enough sleep. Eat a healthy, balanced diet. It includes whole grains, dairy, fruits and vegetables, and protein. Eat a variety of foods from each of those groups.  This will get you all the nutrients you need.  Avoid alcohol and drugs. Where to get help 24 hours a day, 7 days a week   If you or someone you know talks about suicide, self-harm, a mental health crisis, a substance use crisis, or any other kind of emotional distress, get help right away. You can:  Call the Suicide and Crisis Lifeline at 65. Call 2-641-654-TALK (7-706.338.6805). Text HOME to 626910 to access the Crisis Text Line. Consider saving these numbers in your phone. For the caregiver  If you are caring for someone with psychosis, it is important that you take care of yourself as well. Learn about psychosis. Know the first signs that symptoms are getting worse. Make a plan with all family members about how to take care of your loved one when symptoms are bad. Talk about your fears and concerns and those of other family members. Seek counseling if you need to. Know your legal rights and the legal rights of your family member or loved one. Take care of yourself. Stay involved with your own interests, such as your career, hobbies, and friends. Try things like exercise, positive self-talk, relaxation, and deep breathing to help manage your stress. Give yourself time to grieve. You may need to deal with emotions such as anger, fear, and frustration. After you work through your feelings, you will be better able to care for yourself and your family. When should you call for help? Call 911 anytime you think you may need emergency care. For example, call if:    You feel you cannot stop from hurting yourself or someone else. Someone who has psychosis displays dangerous behavior, and you think the person might hurt themself or someone else. Where to get help 24 hours a day, 7 days a week   If you or someone you know talks about suicide, self-harm, a mental health crisis, a substance use crisis, or any other kind of emotional distress, get help right away. You can:    Call the Suicide and Crisis Lifeline at 65.      Call 6-636-916-TALK (3-873.759.4643). Text HOME to 160554 to access the Crisis Text Line. Consider saving these numbers in your phone. Call your doctor now or seek immediate medical care if:    A person with psychosis mentions suicide. If a suicide threat seems real, with a specific plan and a way to carry it out, you should stay with the person, or ask someone you trust to stay with the person, until you get help. A person who has psychosis:  Starts to give away possessions. Uses illegal drugs or drinks alcohol heavily. Talks or writes about death, including writing suicide notes and talking about guns, knives, or pills. Starts to spend a lot of time alone. Acts very aggressively or suddenly appears calm. You hear voices or think you see things that are not there. You have a sudden change in behavior. You have difficulty taking care of yourself or become confused doing simple chores or tasks. Watch closely for changes in your health, and be sure to contact your doctor if:    Your symptoms repeatedly upset your daily activities. You have symptoms of psychosis that are new or different from those you had before. Current as of: February 9, 2022               Content Version: 13.4  © 2006-2022 [employer]. Care instructions adapted under license by [employer] (which disclaims liability or warranty for this information). If you have questions about a medical condition or this instruction, always ask your healthcare professional. [place] [address] any warranty or liability for your use of this information.

## 2023-03-06 NOTE — BH NOTES
GROUP THERAPY PROGRESS NOTE     Matthew Castillo is participating in patterns of cognitive distortions. Group time: 10:45am-11:30am     Personal goal for participation: Review 10 common cognitive distortions and provide personal examples.      Impression of participation: Pt actively participated

## 2023-03-06 NOTE — BH NOTES
Psychiatric Progress Note    Patient: Claudeen Gazella MRN: [id number]  SSN: [id number]    YOB: 1975  Age: 50 y.o. Sex: female      Admit Date: 2/23/2023    LOS: 10 days     Subjective:     2/25:  Claudeen Gazella  reports feeling \"anxious\" and affect is mood congruent. Denies SI/HI/AH/VH. She had an episode of acute agitation this morning and received PRN IM medications as ordered with good effect. Appropriately interactive and aware. Tolerating medications well. Eating well and sleeping 8 hours. 2/26:  Claudeen Gazella  reports feeling \"OK\" and affect is mood congruent. Denies SI/HI/AH/VH. Her agitation and verbal outbursts appear to be improving, however per chart review she continues to receive IM medication for severe anxiety. Appropriately interactive and aware. Tolerating medications well. Eating well and sleeping 8 hours. 2/27:  Claudeen Gazella  reports feeling \"OK\" and affect is not mood congruent. Denies SI/HI/AH/VH. Her agitation and verbal outbursts appear to be improving moderately and was noted to only received PO medications for agitation/anxiety today. Appropriately interactive and aware. Endorsed headache from seroquel, will decrease dose to 300 mg. Eating well and sleeping 8 hours. 2/28 - Claudeen Gazella reports feeling well without anxiety, but moods are depressed (6/10). Denies SI/HI/AH/VH. Continues to have impulsive outbursts. No aggression or violence. Appropriately interactive and aware. Tolerating medications well. Eating and sleeping fairly. 3/1 - Claudeen Gazella reports feeling well and moods are fair. Continues to have outburst and hair pulling episodes. Nauseas still. Had an anxiety spell this am that required prn IM medications to manage. Denies SI/HI/AH/VH. No aggression or violence. Appropriately interactive and aware. Tolerating medications well. Eating and sleeping fairly.   Believes Seroquel or Klonopin is causing her headaches. Wants to drop one of both medications. 3/2 - Melanie Moore reports feeling well and moods are good. Has not pulled at her hair or been stressed today. Discussed her headaches and poor sleep. Denies SI/HI/AH/VH. No aggression or violence. Appropriately interactive and aware. Tolerating medications but believes that seroquel is her headache culprit. Eating and sleeping fairly. 3/3 - Melanie Moore reports having a bad anxiety attack the other day and required prns to manage. She was pulling her hair and attacking herself again. She has not had any further occurrences. Poor sleep with medication questions. Denies SI/HI/AH/VH. No aggression or violence. Appropriately interactive and aware. Tolerating medications well. Eating and sleeping fairly. 03/04 - the patient is bizarre, screaming at times and labile, though seemingly nonsensical (not directing her comments at anyone). Patient reports she is depressed, anxious, and got labwork this AM. She slept 7.5 hours overnight, she is medication compliant and able to make her needs known today. 03/05 - no acute overnight events. Patient visible, oddly related, she slept 7.5 hours overnight and is able to make her needs known. Patient medication compliant and with no episodes of agitation today. She has been requesting an agent for anxiety, discussed Buspar and she is amenable to starting. She also requests A1C test performed. Patient denies active thoughts of self harm and is able to make her needs known.      Objective:     Vitals:    03/04/23 0741 03/04/23 2000 03/05/23 0800 03/05/23 2000   BP: 111/67 135/82 108/60 129/78   Pulse: 71 96 86 89   Resp: 16 17 18 17   Temp: 98 °F (36.7 °C) 98.2 °F (36.8 °C) 98.3 °F (36.8 °C) 98 °F (36.7 °C)   SpO2: 99% 98% 97% 99%   Weight:       Height:            Mental Status Exam:   Sensorium  oriented to time, place and person   Relations cooperative   Eye Contact appropriate   Appearance:  age appropriate   Speech:  normal volume and non-pressured   Thought Process: goal directed and logical   Thought Content free of delusions and free of hallucinations   Suicidal ideations none   Mood:  anxious   Affect:  anxious   Memory   adequate   Concentration:  adequate   Insight:  fair   Judgment:  fair       MEDICATIONS:  Current Facility-Administered Medications   Medication Dose Route Frequency    busPIRone (BUSPAR) tablet 5 mg  5 mg Oral Q12H    traZODone (DESYREL) tablet 200 mg  200 mg Oral QHS    acetylcysteine tab 1,200 mg (Patient Supplied)  1,200 mg Oral DAILY    FLUoxetine (PROzac) capsule 40 mg  40 mg Oral DAILY    loperamide (IMODIUM) 1 mg/7.5 mL oral solution 2 mg  2 mg Oral Q6H PRN    hydrOXYzine HCL (ATARAX) tablet 50 mg  50 mg Oral QHS    melatonin tablet 3 mg  3 mg Oral QHS    hydrOXYzine HCL (ATARAX) tablet 50 mg  50 mg Oral BID PRN    atorvastatin (LIPITOR) tablet 40 mg  40 mg Oral DAILY    clonazePAM (KlonoPIN) disintegrating tablet 0.25 mg  0.25 mg Oral QHS    glipiZIDE (GLUCOTROL) tablet 5 mg  5 mg Oral DAILY    losartan (COZAAR) tablet 100 mg  100 mg Oral DAILY    metFORMIN (GLUCOPHAGE) tablet 1,000 mg  1,000 mg Oral BID WITH MEALS    ondansetron (ZOFRAN ODT) tablet 4 mg  4 mg Oral Q8H PRN    triamterene-hydroCHLOROthiazide (MAXZIDE) 37.5-25 mg per tablet 1 Tablet  1 Tablet Oral DAILY    OLANZapine (ZyPREXA) tablet 5 mg  5 mg Oral Q6H PRN    haloperidol lactate (HALDOL) injection 5 mg  5 mg IntraMUSCular Q6H PRN    benztropine (COGENTIN) tablet 1 mg  1 mg Oral BID PRN    diphenhydrAMINE (BENADRYL) injection 50 mg  50 mg IntraMUSCular BID PRN    LORazepam (ATIVAN) injection 1 mg  1 mg IntraMUSCular Q4H PRN    acetaminophen (TYLENOL) tablet 650 mg  650 mg Oral Q4H PRN    magnesium hydroxide (MILK OF MAGNESIA) 400 mg/5 mL oral suspension 30 mL  30 mL Oral DAILY PRN      DISCUSSION:   the risks and benefits of the proposed medication    Lab/Data Review:   All lab results for the last 24 hours reviewed. 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)      Plan:     Continue current care  Collateral information  CONTINUE Trazodone 200 mg for sleep  - START Buspar 5 mg Q12H for anxiety  - A1C performed 1/17  Continue Prozac 40 mg PO Q daily  Disposition planning with social work    I certify that this patient's inpatient psychiatric hospital services furnished since the previous certification were, and continue to be, required for treatment that could reasonably be expected to improve the patient's condition, or for diagnostic study, and that the patient continues to need, on a daily basis, active treatment furnished directly by or requiring the supervision of inpatient psychiatric facility personnel. In addition, the hospital records show that services furnished were intensive treatment services, admission or related services, or equivalent services.   Signed By: Roselyn Shelby MD     March 5, 2023

## 2023-03-09 RX ORDER — BUSPIRONE HYDROCHLORIDE 5 MG/1
5 TABLET ORAL 2 TIMES DAILY
Qty: 60 TABLET | Refills: 0 | Status: SHIPPED | OUTPATIENT
Start: 2023-03-09

## 2023-04-10 ENCOUNTER — HOSPITAL ENCOUNTER (INPATIENT)
Age: 48
LOS: 18 days | Discharge: HOME | DRG: 753 | End: 2023-04-28
Attending: EMERGENCY MEDICINE | Admitting: PSYCHIATRY & NEUROLOGY
Payer: MEDICAID

## 2023-04-10 DIAGNOSIS — F41.1 ANXIETY STATE: Primary | ICD-10-CM

## 2023-04-10 DIAGNOSIS — E11.9 TYPE 2 DIABETES MELLITUS WITHOUT COMPLICATION, WITHOUT LONG-TERM CURRENT USE OF INSULIN (HCC): ICD-10-CM

## 2023-04-10 PROBLEM — F39 UNSPECIFIED MOOD (AFFECTIVE) DISORDER (HCC): Status: ACTIVE | Noted: 2023-04-10

## 2023-04-10 LAB
ALBUMIN SERPL-MCNC: 3.7 G/DL (ref 3.5–5)
ALBUMIN/GLOB SERPL: 0.9 (ref 1.1–2.2)
ALP SERPL-CCNC: 80 U/L (ref 45–117)
ALT SERPL-CCNC: 25 U/L (ref 12–78)
AMPHET UR QL SCN: NEGATIVE
ANION GAP SERPL CALC-SCNC: 3 MMOL/L (ref 5–15)
APAP SERPL-MCNC: <2 UG/ML (ref 10–30)
APPEARANCE UR: CLEAR
AST SERPL-CCNC: 15 U/L (ref 15–37)
BACTERIA URNS QL MICRO: ABNORMAL /HPF
BARBITURATES UR QL SCN: NEGATIVE
BASOPHILS # BLD: 0 K/UL (ref 0–0.1)
BASOPHILS NFR BLD: 1 % (ref 0–1)
BENZODIAZ UR QL: NEGATIVE
BILIRUB SERPL-MCNC: 0.2 MG/DL (ref 0.2–1)
BILIRUB UR QL: NEGATIVE
BUN SERPL-MCNC: 11 MG/DL (ref 6–20)
BUN/CREAT SERPL: 13 (ref 12–20)
CALCIUM SERPL-MCNC: 9.5 MG/DL (ref 8.5–10.1)
CANNABINOIDS UR QL SCN: NEGATIVE
CHLORIDE SERPL-SCNC: 103 MMOL/L (ref 97–108)
CO2 SERPL-SCNC: 30 MMOL/L (ref 21–32)
COCAINE UR QL SCN: NEGATIVE
COLOR UR: ABNORMAL
COMMENT, HOLDF: NORMAL
CREAT SERPL-MCNC: 0.83 MG/DL (ref 0.55–1.02)
DIFFERENTIAL METHOD BLD: ABNORMAL
DRUG SCRN COMMENT,DRGCM: NORMAL
EOSINOPHIL # BLD: 0.4 K/UL (ref 0–0.4)
EOSINOPHIL NFR BLD: 5 % (ref 0–7)
EPITH CASTS URNS QL MICRO: ABNORMAL /LPF
ERYTHROCYTE [DISTWIDTH] IN BLOOD BY AUTOMATED COUNT: 14.6 % (ref 11.5–14.5)
ETHANOL SERPL-MCNC: <10 MG/DL
FLUAV RNA SPEC QL NAA+PROBE: NOT DETECTED
FLUBV RNA SPEC QL NAA+PROBE: NOT DETECTED
GLOBULIN SER CALC-MCNC: 4.2 G/DL (ref 2–4)
GLUCOSE BLD STRIP.AUTO-MCNC: 86 MG/DL (ref 65–117)
GLUCOSE SERPL-MCNC: 38 MG/DL (ref 65–100)
GLUCOSE UR STRIP.AUTO-MCNC: NEGATIVE MG/DL
HCG UR QL: NEGATIVE
HCT VFR BLD AUTO: 37.7 % (ref 35–47)
HGB BLD-MCNC: 11.6 G/DL (ref 11.5–16)
HGB UR QL STRIP: NEGATIVE
HYALINE CASTS URNS QL MICRO: ABNORMAL /LPF (ref 0–5)
IMM GRANULOCYTES # BLD AUTO: 0 K/UL (ref 0–0.04)
IMM GRANULOCYTES NFR BLD AUTO: 0 % (ref 0–0.5)
KETONES UR QL STRIP.AUTO: NEGATIVE MG/DL
LEUKOCYTE ESTERASE UR QL STRIP.AUTO: NEGATIVE
LYMPHOCYTES # BLD: 2.6 K/UL (ref 0.8–3.5)
LYMPHOCYTES NFR BLD: 32 % (ref 12–49)
MCH RBC QN AUTO: 26.4 PG (ref 26–34)
MCHC RBC AUTO-ENTMCNC: 30.8 G/DL (ref 30–36.5)
MCV RBC AUTO: 85.9 FL (ref 80–99)
METHADONE UR QL: NEGATIVE
MONOCYTES # BLD: 0.6 K/UL (ref 0–1)
MONOCYTES NFR BLD: 7 % (ref 5–13)
NEUTS SEG # BLD: 4.5 K/UL (ref 1.8–8)
NEUTS SEG NFR BLD: 55 % (ref 32–75)
NITRITE UR QL STRIP.AUTO: NEGATIVE
NRBC # BLD: 0 K/UL (ref 0–0.01)
NRBC BLD-RTO: 0 PER 100 WBC
OPIATES UR QL: NEGATIVE
PCP UR QL: NEGATIVE
PH UR STRIP: 6.5 (ref 5–8)
PLATELET # BLD AUTO: 302 K/UL (ref 150–400)
PMV BLD AUTO: 9.2 FL (ref 8.9–12.9)
POTASSIUM SERPL-SCNC: 3.4 MMOL/L (ref 3.5–5.1)
PROT SERPL-MCNC: 7.9 G/DL (ref 6.4–8.2)
PROT UR STRIP-MCNC: NEGATIVE MG/DL
RBC # BLD AUTO: 4.39 M/UL (ref 3.8–5.2)
RBC #/AREA URNS HPF: ABNORMAL /HPF (ref 0–5)
SALICYLATES SERPL-MCNC: 1.9 MG/DL (ref 2.8–20)
SAMPLES BEING HELD,HOLD: NORMAL
SARS-COV-2 RNA RESP QL NAA+PROBE: NOT DETECTED
SERVICE CMNT-IMP: NORMAL
SODIUM SERPL-SCNC: 136 MMOL/L (ref 136–145)
SP GR UR REFRACTOMETRY: 1.01 (ref 1–1.03)
UR CULT HOLD, URHOLD: NORMAL
UROBILINOGEN UR QL STRIP.AUTO: 0.2 EU/DL (ref 0.2–1)
WBC # BLD AUTO: 8.2 K/UL (ref 3.6–11)
WBC URNS QL MICRO: ABNORMAL /HPF (ref 0–4)

## 2023-04-10 PROCEDURE — 87636 SARSCOV2 & INF A&B AMP PRB: CPT

## 2023-04-10 PROCEDURE — 80179 DRUG ASSAY SALICYLATE: CPT

## 2023-04-10 PROCEDURE — 85025 COMPLETE CBC W/AUTO DIFF WBC: CPT

## 2023-04-10 PROCEDURE — 80053 COMPREHEN METABOLIC PANEL: CPT

## 2023-04-10 PROCEDURE — 82962 GLUCOSE BLOOD TEST: CPT

## 2023-04-10 PROCEDURE — 81025 URINE PREGNANCY TEST: CPT

## 2023-04-10 PROCEDURE — 80143 DRUG ASSAY ACETAMINOPHEN: CPT

## 2023-04-10 PROCEDURE — 65220000003 HC RM SEMIPRIVATE PSYCH

## 2023-04-10 PROCEDURE — 36415 COLL VENOUS BLD VENIPUNCTURE: CPT

## 2023-04-10 PROCEDURE — 80307 DRUG TEST PRSMV CHEM ANLYZR: CPT

## 2023-04-10 PROCEDURE — 99285 EMERGENCY DEPT VISIT HI MDM: CPT

## 2023-04-10 PROCEDURE — 83036 HEMOGLOBIN GLYCOSYLATED A1C: CPT

## 2023-04-10 PROCEDURE — 82077 ASSAY SPEC XCP UR&BREATH IA: CPT

## 2023-04-10 PROCEDURE — 74011250637 HC RX REV CODE- 250/637

## 2023-04-10 PROCEDURE — 81001 URINALYSIS AUTO W/SCOPE: CPT

## 2023-04-10 RX ORDER — ADHESIVE BANDAGE
30 BANDAGE TOPICAL DAILY PRN
Status: DISCONTINUED | OUTPATIENT
Start: 2023-04-10 | End: 2023-04-28 | Stop reason: HOSPADM

## 2023-04-10 RX ORDER — HALOPERIDOL 5 MG/ML
5 INJECTION INTRAMUSCULAR
Status: DISCONTINUED | OUTPATIENT
Start: 2023-04-10 | End: 2023-04-28 | Stop reason: HOSPADM

## 2023-04-10 RX ORDER — OLANZAPINE 5 MG/1
5 TABLET ORAL
Status: DISCONTINUED | OUTPATIENT
Start: 2023-04-10 | End: 2023-04-28 | Stop reason: HOSPADM

## 2023-04-10 RX ORDER — HYDROXYZINE 50 MG/1
50 TABLET, FILM COATED ORAL
Status: DISCONTINUED | OUTPATIENT
Start: 2023-04-10 | End: 2023-04-28 | Stop reason: HOSPADM

## 2023-04-10 RX ORDER — BENZTROPINE MESYLATE 1 MG/1
1 TABLET ORAL
Status: DISCONTINUED | OUTPATIENT
Start: 2023-04-10 | End: 2023-04-28 | Stop reason: HOSPADM

## 2023-04-10 RX ORDER — ACETAMINOPHEN 325 MG/1
650 TABLET ORAL
Status: DISCONTINUED | OUTPATIENT
Start: 2023-04-10 | End: 2023-04-16

## 2023-04-10 RX ORDER — TRAZODONE HYDROCHLORIDE 100 MG/1
200 TABLET ORAL ONCE
Status: COMPLETED | OUTPATIENT
Start: 2023-04-10 | End: 2023-04-10

## 2023-04-10 RX ORDER — LORAZEPAM 2 MG/ML
1 INJECTION INTRAMUSCULAR
Status: DISCONTINUED | OUTPATIENT
Start: 2023-04-10 | End: 2023-04-28 | Stop reason: HOSPADM

## 2023-04-10 RX ORDER — DIPHENHYDRAMINE HYDROCHLORIDE 50 MG/ML
50 INJECTION, SOLUTION INTRAMUSCULAR; INTRAVENOUS
Status: DISCONTINUED | OUTPATIENT
Start: 2023-04-10 | End: 2023-04-28 | Stop reason: HOSPADM

## 2023-04-10 RX ORDER — TRAZODONE HYDROCHLORIDE 50 MG/1
50 TABLET ORAL
Status: DISCONTINUED | OUTPATIENT
Start: 2023-04-10 | End: 2023-04-28 | Stop reason: HOSPADM

## 2023-04-10 RX ADMIN — TRAZODONE HYDROCHLORIDE 200 MG: 100 TABLET ORAL at 23:04

## 2023-04-11 LAB
EST. AVERAGE GLUCOSE BLD GHB EST-MCNC: 137 MG/DL
GLUCOSE BLD STRIP.AUTO-MCNC: 144 MG/DL (ref 65–117)
GLUCOSE BLD STRIP.AUTO-MCNC: 160 MG/DL (ref 65–117)
GLUCOSE BLD STRIP.AUTO-MCNC: 48 MG/DL (ref 65–117)
HBA1C MFR BLD: 6.4 % (ref 4–5.6)
SERVICE CMNT-IMP: ABNORMAL

## 2023-04-11 PROCEDURE — 65220000003 HC RM SEMIPRIVATE PSYCH

## 2023-04-11 PROCEDURE — 74011250637 HC RX REV CODE- 250/637: Performed by: PSYCHIATRY & NEUROLOGY

## 2023-04-11 PROCEDURE — 74011250636 HC RX REV CODE- 250/636

## 2023-04-11 PROCEDURE — 74011250637 HC RX REV CODE- 250/637: Performed by: CLINICAL NURSE SPECIALIST

## 2023-04-11 PROCEDURE — 74011250637 HC RX REV CODE- 250/637

## 2023-04-11 PROCEDURE — 82962 GLUCOSE BLOOD TEST: CPT

## 2023-04-11 PROCEDURE — 99221 1ST HOSP IP/OBS SF/LOW 40: CPT | Performed by: CLINICAL NURSE SPECIALIST

## 2023-04-11 RX ORDER — QUETIAPINE 300 MG/1
300 TABLET, FILM COATED, EXTENDED RELEASE ORAL
Status: DISCONTINUED | OUTPATIENT
Start: 2023-04-11 | End: 2023-04-28 | Stop reason: HOSPADM

## 2023-04-11 RX ORDER — IBUPROFEN 200 MG
4 TABLET ORAL AS NEEDED
Status: DISCONTINUED | OUTPATIENT
Start: 2023-04-11 | End: 2023-04-28 | Stop reason: HOSPADM

## 2023-04-11 RX ORDER — ONDANSETRON 4 MG/1
4 TABLET, ORALLY DISINTEGRATING ORAL
Status: DISCONTINUED | OUTPATIENT
Start: 2023-04-11 | End: 2023-04-28 | Stop reason: HOSPADM

## 2023-04-11 RX ORDER — LANOLIN ALCOHOL/MO/W.PET/CERES
3 CREAM (GRAM) TOPICAL EVERY EVENING
Status: DISCONTINUED | OUTPATIENT
Start: 2023-04-11 | End: 2023-04-28 | Stop reason: HOSPADM

## 2023-04-11 RX ORDER — FLUOXETINE HYDROCHLORIDE 20 MG/1
40 CAPSULE ORAL DAILY
Status: DISCONTINUED | OUTPATIENT
Start: 2023-04-11 | End: 2023-04-12

## 2023-04-11 RX ORDER — INSULIN LISPRO 100 [IU]/ML
INJECTION, SOLUTION INTRAVENOUS; SUBCUTANEOUS
Status: DISCONTINUED | OUTPATIENT
Start: 2023-04-11 | End: 2023-04-28 | Stop reason: HOSPADM

## 2023-04-11 RX ORDER — BUSPIRONE HYDROCHLORIDE 5 MG/1
5 TABLET ORAL 2 TIMES DAILY
Status: DISCONTINUED | OUTPATIENT
Start: 2023-04-11 | End: 2023-04-12

## 2023-04-11 RX ORDER — GLIPIZIDE 5 MG/1
5 TABLET ORAL
Status: DISCONTINUED | OUTPATIENT
Start: 2023-04-11 | End: 2023-04-28 | Stop reason: HOSPADM

## 2023-04-11 RX ORDER — CLONAZEPAM 0.5 MG/1
0.25 TABLET ORAL
Status: DISCONTINUED | OUTPATIENT
Start: 2023-04-11 | End: 2023-04-28 | Stop reason: HOSPADM

## 2023-04-11 RX ADMIN — OLANZAPINE 5 MG: 5 TABLET, FILM COATED ORAL at 18:09

## 2023-04-11 RX ADMIN — OLANZAPINE 5 MG: 5 TABLET, FILM COATED ORAL at 08:27

## 2023-04-11 RX ADMIN — DIPHENHYDRAMINE HYDROCHLORIDE 50 MG: 50 INJECTION INTRAMUSCULAR; INTRAVENOUS at 09:03

## 2023-04-11 RX ADMIN — ACETAMINOPHEN 650 MG: 325 TABLET ORAL at 22:32

## 2023-04-11 RX ADMIN — HYDROXYZINE HYDROCHLORIDE 50 MG: 50 TABLET, FILM COATED ORAL at 08:27

## 2023-04-11 RX ADMIN — ACETAMINOPHEN 650 MG: 325 TABLET ORAL at 09:10

## 2023-04-11 RX ADMIN — HYDROXYZINE HYDROCHLORIDE 50 MG: 50 TABLET, FILM COATED ORAL at 18:09

## 2023-04-11 RX ADMIN — GLIPIZIDE 5 MG: 5 TABLET ORAL at 09:10

## 2023-04-11 RX ADMIN — FLUOXETINE 40 MG: 20 CAPSULE ORAL at 17:30

## 2023-04-11 RX ADMIN — BUSPIRONE HYDROCHLORIDE 5 MG: 5 TABLET ORAL at 17:30

## 2023-04-11 RX ADMIN — TRAZODONE HYDROCHLORIDE 50 MG: 50 TABLET ORAL at 22:32

## 2023-04-11 RX ADMIN — QUETIAPINE 300 MG: 300 TABLET, EXTENDED RELEASE ORAL at 22:32

## 2023-04-11 RX ADMIN — HALOPERIDOL LACTATE 5 MG: 5 INJECTION, SOLUTION INTRAMUSCULAR at 09:04

## 2023-04-11 RX ADMIN — Medication 3 MG: at 22:32

## 2023-04-11 RX ADMIN — LORAZEPAM 1 MG: 2 INJECTION INTRAMUSCULAR; INTRAVENOUS at 09:03

## 2023-04-12 LAB
GLUCOSE BLD STRIP.AUTO-MCNC: 112 MG/DL (ref 65–117)
GLUCOSE BLD STRIP.AUTO-MCNC: 131 MG/DL (ref 65–117)
GLUCOSE BLD STRIP.AUTO-MCNC: 137 MG/DL (ref 65–117)
GLUCOSE BLD STRIP.AUTO-MCNC: 211 MG/DL (ref 65–117)
SERVICE CMNT-IMP: ABNORMAL
SERVICE CMNT-IMP: NORMAL

## 2023-04-12 PROCEDURE — 65220000003 HC RM SEMIPRIVATE PSYCH

## 2023-04-12 PROCEDURE — 82962 GLUCOSE BLOOD TEST: CPT

## 2023-04-12 PROCEDURE — 74011250637 HC RX REV CODE- 250/637

## 2023-04-12 PROCEDURE — 74011250637 HC RX REV CODE- 250/637: Performed by: PSYCHIATRY & NEUROLOGY

## 2023-04-12 PROCEDURE — 74011250637 HC RX REV CODE- 250/637: Performed by: CLINICAL NURSE SPECIALIST

## 2023-04-12 RX ORDER — DIVALPROEX SODIUM 250 MG/1
250 TABLET, DELAYED RELEASE ORAL EVERY 12 HOURS
Status: DISCONTINUED | OUTPATIENT
Start: 2023-04-12 | End: 2023-04-13

## 2023-04-12 RX ORDER — FLUOXETINE HYDROCHLORIDE 20 MG/1
60 CAPSULE ORAL DAILY
Status: DISCONTINUED | OUTPATIENT
Start: 2023-04-13 | End: 2023-04-13

## 2023-04-12 RX ADMIN — TRAZODONE HYDROCHLORIDE 50 MG: 50 TABLET ORAL at 21:57

## 2023-04-12 RX ADMIN — BUSPIRONE HYDROCHLORIDE 5 MG: 5 TABLET ORAL at 08:30

## 2023-04-12 RX ADMIN — DIVALPROEX SODIUM 250 MG: 250 TABLET, DELAYED RELEASE ORAL at 12:33

## 2023-04-12 RX ADMIN — ACETAMINOPHEN 650 MG: 325 TABLET ORAL at 20:24

## 2023-04-12 RX ADMIN — DIVALPROEX SODIUM 250 MG: 250 TABLET, DELAYED RELEASE ORAL at 21:57

## 2023-04-12 RX ADMIN — GLIPIZIDE 5 MG: 5 TABLET ORAL at 08:30

## 2023-04-12 RX ADMIN — QUETIAPINE 300 MG: 300 TABLET, EXTENDED RELEASE ORAL at 21:57

## 2023-04-12 RX ADMIN — Medication 3 MG: at 20:14

## 2023-04-12 RX ADMIN — FLUOXETINE 40 MG: 20 CAPSULE ORAL at 08:30

## 2023-04-13 LAB
GLUCOSE BLD STRIP.AUTO-MCNC: 148 MG/DL (ref 65–117)
GLUCOSE BLD STRIP.AUTO-MCNC: 176 MG/DL (ref 65–117)
GLUCOSE BLD STRIP.AUTO-MCNC: 210 MG/DL (ref 65–117)
GLUCOSE BLD STRIP.AUTO-MCNC: 98 MG/DL (ref 65–117)
SARS-COV-2 RNA RESP QL NAA+PROBE: NOT DETECTED
SARS-COV-2, COV2: NORMAL
SERVICE CMNT-IMP: ABNORMAL
SERVICE CMNT-IMP: NORMAL
SOURCE, COVRS: NORMAL

## 2023-04-13 PROCEDURE — U0005 INFEC AGEN DETEC AMPLI PROBE: HCPCS

## 2023-04-13 PROCEDURE — 74011250637 HC RX REV CODE- 250/637: Performed by: PSYCHIATRY & NEUROLOGY

## 2023-04-13 PROCEDURE — 65220000003 HC RM SEMIPRIVATE PSYCH

## 2023-04-13 PROCEDURE — 82962 GLUCOSE BLOOD TEST: CPT

## 2023-04-13 PROCEDURE — 74011250637 HC RX REV CODE- 250/637

## 2023-04-13 RX ORDER — LOSARTAN POTASSIUM 50 MG/1
50 TABLET ORAL DAILY
COMMUNITY
End: 2023-04-28

## 2023-04-13 RX ORDER — FLUOXETINE HYDROCHLORIDE 40 MG/1
40 CAPSULE ORAL DAILY
COMMUNITY
End: 2023-04-28

## 2023-04-13 RX ORDER — FLUOXETINE HYDROCHLORIDE 20 MG/1
20 CAPSULE ORAL DAILY
COMMUNITY
End: 2023-04-28

## 2023-04-13 RX ORDER — TRIAMTERENE/HYDROCHLOROTHIAZID 37.5-25 MG
1 TABLET ORAL DAILY
Status: ON HOLD | COMMUNITY
End: 2023-04-28 | Stop reason: SDUPTHER

## 2023-04-13 RX ORDER — ATORVASTATIN CALCIUM 40 MG/1
40 TABLET, FILM COATED ORAL DAILY
Status: ON HOLD | COMMUNITY
End: 2023-04-28 | Stop reason: SDUPTHER

## 2023-04-13 RX ORDER — GLIPIZIDE 5 MG/1
5 TABLET ORAL 2 TIMES DAILY
COMMUNITY
End: 2023-04-28

## 2023-04-13 RX ORDER — DIVALPROEX SODIUM 500 MG/1
500 TABLET, DELAYED RELEASE ORAL EVERY 12 HOURS
Status: DISCONTINUED | OUTPATIENT
Start: 2023-04-13 | End: 2023-04-18

## 2023-04-13 RX ORDER — CLONAZEPAM 0.5 MG/1
0.25 TABLET ORAL
COMMUNITY
End: 2023-04-28

## 2023-04-13 RX ORDER — BUSPIRONE HYDROCHLORIDE 15 MG/1
15 TABLET ORAL 2 TIMES DAILY
COMMUNITY
End: 2023-04-28

## 2023-04-13 RX ORDER — METFORMIN HYDROCHLORIDE 1000 MG/1
1000 TABLET ORAL DAILY
COMMUNITY
End: 2023-04-28

## 2023-04-13 RX ORDER — FLUOXETINE HYDROCHLORIDE 20 MG/1
60 CAPSULE ORAL
Status: DISCONTINUED | OUTPATIENT
Start: 2023-04-14 | End: 2023-04-14

## 2023-04-13 RX ORDER — TRAZODONE HYDROCHLORIDE 100 MG/1
200 TABLET ORAL
Status: ON HOLD | COMMUNITY
End: 2023-04-28 | Stop reason: SDUPTHER

## 2023-04-13 RX ADMIN — ACETAMINOPHEN 650 MG: 325 TABLET ORAL at 22:59

## 2023-04-13 RX ADMIN — TRAZODONE HYDROCHLORIDE 50 MG: 50 TABLET ORAL at 22:28

## 2023-04-13 RX ADMIN — HYDROXYZINE HYDROCHLORIDE 50 MG: 50 TABLET, FILM COATED ORAL at 18:26

## 2023-04-13 RX ADMIN — DIVALPROEX SODIUM 250 MG: 250 TABLET, DELAYED RELEASE ORAL at 08:56

## 2023-04-13 RX ADMIN — FLUOXETINE 60 MG: 20 CAPSULE ORAL at 08:56

## 2023-04-13 RX ADMIN — QUETIAPINE 300 MG: 300 TABLET, EXTENDED RELEASE ORAL at 22:27

## 2023-04-13 RX ADMIN — ACETAMINOPHEN 650 MG: 325 TABLET ORAL at 13:27

## 2023-04-13 RX ADMIN — DIVALPROEX SODIUM 500 MG: 500 TABLET, DELAYED RELEASE ORAL at 22:27

## 2023-04-13 RX ADMIN — Medication 3 MG: at 22:27

## 2023-04-14 LAB
GLUCOSE BLD STRIP.AUTO-MCNC: 110 MG/DL (ref 65–117)
GLUCOSE BLD STRIP.AUTO-MCNC: 135 MG/DL (ref 65–117)
GLUCOSE BLD STRIP.AUTO-MCNC: 146 MG/DL (ref 65–117)
GLUCOSE BLD STRIP.AUTO-MCNC: 169 MG/DL (ref 65–117)
SERVICE CMNT-IMP: ABNORMAL
SERVICE CMNT-IMP: NORMAL

## 2023-04-14 PROCEDURE — 65220000003 HC RM SEMIPRIVATE PSYCH

## 2023-04-14 PROCEDURE — 74011250637 HC RX REV CODE- 250/637: Performed by: PSYCHIATRY & NEUROLOGY

## 2023-04-14 PROCEDURE — 74011250637 HC RX REV CODE- 250/637

## 2023-04-14 PROCEDURE — 82962 GLUCOSE BLOOD TEST: CPT

## 2023-04-14 RX ORDER — ATORVASTATIN CALCIUM 40 MG/1
40 TABLET, FILM COATED ORAL DAILY
Status: DISCONTINUED | OUTPATIENT
Start: 2023-04-15 | End: 2023-04-28 | Stop reason: HOSPADM

## 2023-04-14 RX ORDER — FLUOXETINE HYDROCHLORIDE 20 MG/1
60 CAPSULE ORAL DAILY
Status: DISCONTINUED | OUTPATIENT
Start: 2023-04-14 | End: 2023-04-16

## 2023-04-14 RX ORDER — LOSARTAN POTASSIUM 50 MG/1
50 TABLET ORAL DAILY
Status: DISCONTINUED | OUTPATIENT
Start: 2023-04-15 | End: 2023-04-28 | Stop reason: HOSPADM

## 2023-04-14 RX ADMIN — OLANZAPINE 5 MG: 5 TABLET, FILM COATED ORAL at 12:50

## 2023-04-14 RX ADMIN — QUETIAPINE 300 MG: 300 TABLET, EXTENDED RELEASE ORAL at 22:40

## 2023-04-14 RX ADMIN — DIVALPROEX SODIUM 500 MG: 500 TABLET, DELAYED RELEASE ORAL at 09:31

## 2023-04-14 RX ADMIN — HYDROXYZINE HYDROCHLORIDE 50 MG: 50 TABLET, FILM COATED ORAL at 12:50

## 2023-04-14 RX ADMIN — Medication 3 MG: at 22:40

## 2023-04-14 RX ADMIN — ACETAMINOPHEN 650 MG: 325 TABLET ORAL at 11:39

## 2023-04-14 RX ADMIN — DIVALPROEX SODIUM 500 MG: 500 TABLET, DELAYED RELEASE ORAL at 22:37

## 2023-04-14 RX ADMIN — FLUOXETINE 60 MG: 20 CAPSULE ORAL at 11:56

## 2023-04-15 LAB
GLUCOSE BLD STRIP.AUTO-MCNC: 116 MG/DL (ref 65–117)
GLUCOSE BLD STRIP.AUTO-MCNC: 139 MG/DL (ref 65–117)
GLUCOSE BLD STRIP.AUTO-MCNC: 174 MG/DL (ref 65–117)
GLUCOSE BLD STRIP.AUTO-MCNC: 178 MG/DL (ref 65–117)
SERVICE CMNT-IMP: ABNORMAL
SERVICE CMNT-IMP: NORMAL

## 2023-04-15 PROCEDURE — 74011250637 HC RX REV CODE- 250/637: Performed by: PSYCHIATRY & NEUROLOGY

## 2023-04-15 PROCEDURE — 74011250637 HC RX REV CODE- 250/637

## 2023-04-15 PROCEDURE — 65220000003 HC RM SEMIPRIVATE PSYCH

## 2023-04-15 PROCEDURE — 74011250636 HC RX REV CODE- 250/636: Performed by: PSYCHIATRY & NEUROLOGY

## 2023-04-15 PROCEDURE — 82962 GLUCOSE BLOOD TEST: CPT

## 2023-04-15 RX ADMIN — TRAZODONE HYDROCHLORIDE 50 MG: 50 TABLET ORAL at 01:10

## 2023-04-15 RX ADMIN — TRAZODONE HYDROCHLORIDE 50 MG: 50 TABLET ORAL at 22:13

## 2023-04-15 RX ADMIN — Medication 3 MG: at 22:12

## 2023-04-15 RX ADMIN — ONDANSETRON 4 MG: 4 TABLET, ORALLY DISINTEGRATING ORAL at 15:33

## 2023-04-15 RX ADMIN — DIVALPROEX SODIUM 500 MG: 500 TABLET, DELAYED RELEASE ORAL at 22:12

## 2023-04-15 RX ADMIN — LOSARTAN POTASSIUM 50 MG: 50 TABLET, FILM COATED ORAL at 08:30

## 2023-04-15 RX ADMIN — FLUOXETINE 60 MG: 20 CAPSULE ORAL at 08:30

## 2023-04-15 RX ADMIN — ATORVASTATIN CALCIUM 40 MG: 40 TABLET, FILM COATED ORAL at 08:30

## 2023-04-15 RX ADMIN — QUETIAPINE 300 MG: 300 TABLET, EXTENDED RELEASE ORAL at 22:12

## 2023-04-15 RX ADMIN — DIVALPROEX SODIUM 500 MG: 500 TABLET, DELAYED RELEASE ORAL at 08:30

## 2023-04-15 RX ADMIN — ACETAMINOPHEN 650 MG: 325 TABLET ORAL at 21:15

## 2023-04-16 LAB
GLUCOSE BLD STRIP.AUTO-MCNC: 110 MG/DL (ref 65–117)
GLUCOSE BLD STRIP.AUTO-MCNC: 145 MG/DL (ref 65–117)
GLUCOSE BLD STRIP.AUTO-MCNC: 146 MG/DL (ref 65–117)
GLUCOSE BLD STRIP.AUTO-MCNC: 175 MG/DL (ref 65–117)
SERVICE CMNT-IMP: ABNORMAL
SERVICE CMNT-IMP: NORMAL

## 2023-04-16 PROCEDURE — 74011250637 HC RX REV CODE- 250/637: Performed by: PSYCHIATRY & NEUROLOGY

## 2023-04-16 PROCEDURE — 65220000003 HC RM SEMIPRIVATE PSYCH

## 2023-04-16 PROCEDURE — 74011250636 HC RX REV CODE- 250/636: Performed by: PSYCHIATRY & NEUROLOGY

## 2023-04-16 PROCEDURE — 82962 GLUCOSE BLOOD TEST: CPT

## 2023-04-16 PROCEDURE — 74011250637 HC RX REV CODE- 250/637

## 2023-04-16 RX ORDER — FLUOXETINE HYDROCHLORIDE 20 MG/1
80 CAPSULE ORAL DAILY
Status: DISCONTINUED | OUTPATIENT
Start: 2023-04-17 | End: 2023-04-18

## 2023-04-16 RX ORDER — IBUPROFEN 400 MG/1
400 TABLET ORAL
Status: DISCONTINUED | OUTPATIENT
Start: 2023-04-16 | End: 2023-04-28 | Stop reason: HOSPADM

## 2023-04-16 RX ORDER — FLUOXETINE HYDROCHLORIDE 20 MG/1
20 CAPSULE ORAL ONCE
Status: COMPLETED | OUTPATIENT
Start: 2023-04-16 | End: 2023-04-16

## 2023-04-16 RX ADMIN — FLUOXETINE 20 MG: 20 CAPSULE ORAL at 14:23

## 2023-04-16 RX ADMIN — OLANZAPINE 5 MG: 5 TABLET, FILM COATED ORAL at 09:38

## 2023-04-16 RX ADMIN — FLUOXETINE 60 MG: 20 CAPSULE ORAL at 08:31

## 2023-04-16 RX ADMIN — HYDROXYZINE HYDROCHLORIDE 50 MG: 50 TABLET, FILM COATED ORAL at 09:38

## 2023-04-16 RX ADMIN — Medication 3 MG: at 22:25

## 2023-04-16 RX ADMIN — IBUPROFEN 400 MG: 400 TABLET, FILM COATED ORAL at 17:15

## 2023-04-16 RX ADMIN — DIVALPROEX SODIUM 500 MG: 500 TABLET, DELAYED RELEASE ORAL at 22:25

## 2023-04-16 RX ADMIN — QUETIAPINE 300 MG: 300 TABLET, EXTENDED RELEASE ORAL at 22:25

## 2023-04-16 RX ADMIN — IBUPROFEN 400 MG: 400 TABLET, FILM COATED ORAL at 21:59

## 2023-04-16 RX ADMIN — LOSARTAN POTASSIUM 50 MG: 50 TABLET, FILM COATED ORAL at 08:31

## 2023-04-16 RX ADMIN — ATORVASTATIN CALCIUM 40 MG: 40 TABLET, FILM COATED ORAL at 08:31

## 2023-04-16 RX ADMIN — DIVALPROEX SODIUM 500 MG: 500 TABLET, DELAYED RELEASE ORAL at 08:31

## 2023-04-16 RX ADMIN — TRAZODONE HYDROCHLORIDE 50 MG: 50 TABLET ORAL at 22:25

## 2023-04-16 RX ADMIN — ONDANSETRON 4 MG: 4 TABLET, ORALLY DISINTEGRATING ORAL at 16:38

## 2023-04-17 LAB
GLUCOSE BLD STRIP.AUTO-MCNC: 126 MG/DL (ref 65–117)
GLUCOSE BLD STRIP.AUTO-MCNC: 139 MG/DL (ref 65–117)
SERVICE CMNT-IMP: ABNORMAL
SERVICE CMNT-IMP: ABNORMAL

## 2023-04-17 PROCEDURE — 74011250637 HC RX REV CODE- 250/637: Performed by: PSYCHIATRY & NEUROLOGY

## 2023-04-17 PROCEDURE — 65220000003 HC RM SEMIPRIVATE PSYCH

## 2023-04-17 PROCEDURE — 74011250636 HC RX REV CODE- 250/636: Performed by: PSYCHIATRY & NEUROLOGY

## 2023-04-17 PROCEDURE — 74011250637 HC RX REV CODE- 250/637

## 2023-04-17 PROCEDURE — 82962 GLUCOSE BLOOD TEST: CPT

## 2023-04-17 RX ADMIN — LOSARTAN POTASSIUM 50 MG: 50 TABLET, FILM COATED ORAL at 08:31

## 2023-04-17 RX ADMIN — IBUPROFEN 400 MG: 400 TABLET, FILM COATED ORAL at 15:44

## 2023-04-17 RX ADMIN — ATORVASTATIN CALCIUM 40 MG: 40 TABLET, FILM COATED ORAL at 08:31

## 2023-04-17 RX ADMIN — HYDROXYZINE HYDROCHLORIDE 50 MG: 50 TABLET, FILM COATED ORAL at 17:42

## 2023-04-17 RX ADMIN — TRAZODONE HYDROCHLORIDE 50 MG: 50 TABLET ORAL at 22:06

## 2023-04-17 RX ADMIN — DIVALPROEX SODIUM 500 MG: 500 TABLET, DELAYED RELEASE ORAL at 08:30

## 2023-04-17 RX ADMIN — MAGNESIUM HYDROXIDE 30 ML: 400 SUSPENSION ORAL at 08:49

## 2023-04-17 RX ADMIN — ONDANSETRON 4 MG: 4 TABLET, ORALLY DISINTEGRATING ORAL at 15:21

## 2023-04-17 RX ADMIN — DIVALPROEX SODIUM 500 MG: 500 TABLET, DELAYED RELEASE ORAL at 22:05

## 2023-04-17 RX ADMIN — FLUOXETINE 80 MG: 20 CAPSULE ORAL at 08:31

## 2023-04-17 RX ADMIN — QUETIAPINE 300 MG: 300 TABLET, EXTENDED RELEASE ORAL at 22:05

## 2023-04-17 RX ADMIN — Medication 3 MG: at 22:05

## 2023-04-17 RX ADMIN — IBUPROFEN 400 MG: 400 TABLET, FILM COATED ORAL at 21:27

## 2023-04-18 LAB
GLUCOSE BLD STRIP.AUTO-MCNC: 149 MG/DL (ref 65–117)
GLUCOSE BLD STRIP.AUTO-MCNC: 153 MG/DL (ref 65–117)
GLUCOSE BLD STRIP.AUTO-MCNC: 215 MG/DL (ref 65–117)
SERVICE CMNT-IMP: ABNORMAL

## 2023-04-18 PROCEDURE — 82962 GLUCOSE BLOOD TEST: CPT

## 2023-04-18 PROCEDURE — 74011250637 HC RX REV CODE- 250/637

## 2023-04-18 PROCEDURE — 74011250637 HC RX REV CODE- 250/637: Performed by: PSYCHIATRY & NEUROLOGY

## 2023-04-18 PROCEDURE — 65220000003 HC RM SEMIPRIVATE PSYCH

## 2023-04-18 PROCEDURE — 74011250636 HC RX REV CODE- 250/636: Performed by: PSYCHIATRY & NEUROLOGY

## 2023-04-18 RX ORDER — FLUOXETINE HYDROCHLORIDE 20 MG/1
40 CAPSULE ORAL 2 TIMES DAILY
Status: DISCONTINUED | OUTPATIENT
Start: 2023-04-19 | End: 2023-04-28 | Stop reason: HOSPADM

## 2023-04-18 RX ORDER — DIVALPROEX SODIUM 500 MG/1
500 TABLET, EXTENDED RELEASE ORAL
Status: DISCONTINUED | OUTPATIENT
Start: 2023-04-18 | End: 2023-04-20

## 2023-04-18 RX ADMIN — Medication 3 MG: at 21:30

## 2023-04-18 RX ADMIN — IBUPROFEN 400 MG: 400 TABLET, FILM COATED ORAL at 20:38

## 2023-04-18 RX ADMIN — FLUOXETINE 80 MG: 20 CAPSULE ORAL at 08:58

## 2023-04-18 RX ADMIN — LOSARTAN POTASSIUM 50 MG: 50 TABLET, FILM COATED ORAL at 08:58

## 2023-04-18 RX ADMIN — MAGNESIUM HYDROXIDE 30 ML: 400 SUSPENSION ORAL at 18:52

## 2023-04-18 RX ADMIN — ONDANSETRON 4 MG: 4 TABLET, ORALLY DISINTEGRATING ORAL at 11:09

## 2023-04-18 RX ADMIN — IBUPROFEN 400 MG: 400 TABLET, FILM COATED ORAL at 08:59

## 2023-04-18 RX ADMIN — ATORVASTATIN CALCIUM 40 MG: 40 TABLET, FILM COATED ORAL at 08:58

## 2023-04-18 RX ADMIN — DIVALPROEX SODIUM 500 MG: 500 TABLET, DELAYED RELEASE ORAL at 08:58

## 2023-04-18 RX ADMIN — DIVALPROEX SODIUM 500 MG: 500 TABLET, EXTENDED RELEASE ORAL at 21:30

## 2023-04-18 RX ADMIN — TRAZODONE HYDROCHLORIDE 50 MG: 50 TABLET ORAL at 22:00

## 2023-04-18 RX ADMIN — QUETIAPINE 300 MG: 300 TABLET, EXTENDED RELEASE ORAL at 21:00

## 2023-04-19 LAB
GLUCOSE BLD STRIP.AUTO-MCNC: 114 MG/DL (ref 65–117)
GLUCOSE BLD STRIP.AUTO-MCNC: 130 MG/DL (ref 65–117)
GLUCOSE BLD STRIP.AUTO-MCNC: 219 MG/DL (ref 65–117)
SERVICE CMNT-IMP: ABNORMAL
SERVICE CMNT-IMP: ABNORMAL
SERVICE CMNT-IMP: NORMAL

## 2023-04-19 PROCEDURE — 74011250637 HC RX REV CODE- 250/637

## 2023-04-19 PROCEDURE — 65220000003 HC RM SEMIPRIVATE PSYCH

## 2023-04-19 PROCEDURE — 74011250636 HC RX REV CODE- 250/636: Performed by: PSYCHIATRY & NEUROLOGY

## 2023-04-19 PROCEDURE — 82962 GLUCOSE BLOOD TEST: CPT

## 2023-04-19 PROCEDURE — 74011250637 HC RX REV CODE- 250/637: Performed by: PSYCHIATRY & NEUROLOGY

## 2023-04-19 RX ORDER — MAG HYDROX/ALUMINUM HYD/SIMETH 200-200-20
30 SUSPENSION, ORAL (FINAL DOSE FORM) ORAL
Status: DISCONTINUED | OUTPATIENT
Start: 2023-04-19 | End: 2023-04-28 | Stop reason: HOSPADM

## 2023-04-19 RX ADMIN — ATORVASTATIN CALCIUM 40 MG: 40 TABLET, FILM COATED ORAL at 09:17

## 2023-04-19 RX ADMIN — ALUMINUM HYDROXIDE, MAGNESIUM HYDROXIDE, AND SIMETHICONE 30 ML: 200; 200; 20 SUSPENSION ORAL at 13:23

## 2023-04-19 RX ADMIN — IBUPROFEN 400 MG: 400 TABLET, FILM COATED ORAL at 20:54

## 2023-04-19 RX ADMIN — FLUOXETINE 40 MG: 20 CAPSULE ORAL at 17:38

## 2023-04-19 RX ADMIN — LOSARTAN POTASSIUM 50 MG: 50 TABLET, FILM COATED ORAL at 09:17

## 2023-04-19 RX ADMIN — ONDANSETRON 4 MG: 4 TABLET, ORALLY DISINTEGRATING ORAL at 10:19

## 2023-04-19 RX ADMIN — Medication 3 MG: at 22:01

## 2023-04-19 RX ADMIN — IBUPROFEN 400 MG: 400 TABLET, FILM COATED ORAL at 15:15

## 2023-04-19 RX ADMIN — DIVALPROEX SODIUM 500 MG: 500 TABLET, EXTENDED RELEASE ORAL at 22:01

## 2023-04-19 RX ADMIN — QUETIAPINE 300 MG: 300 TABLET, EXTENDED RELEASE ORAL at 22:01

## 2023-04-19 RX ADMIN — FLUOXETINE 40 MG: 20 CAPSULE ORAL at 09:17

## 2023-04-19 RX ADMIN — TRAZODONE HYDROCHLORIDE 50 MG: 50 TABLET ORAL at 22:01

## 2023-04-20 LAB
GLUCOSE BLD STRIP.AUTO-MCNC: 139 MG/DL (ref 65–117)
GLUCOSE BLD STRIP.AUTO-MCNC: 146 MG/DL (ref 65–117)
GLUCOSE BLD STRIP.AUTO-MCNC: 154 MG/DL (ref 65–117)
SERVICE CMNT-IMP: ABNORMAL

## 2023-04-20 PROCEDURE — 74011250637 HC RX REV CODE- 250/637: Performed by: PSYCHIATRY & NEUROLOGY

## 2023-04-20 PROCEDURE — 74011250637 HC RX REV CODE- 250/637

## 2023-04-20 PROCEDURE — 65220000003 HC RM SEMIPRIVATE PSYCH

## 2023-04-20 PROCEDURE — 82962 GLUCOSE BLOOD TEST: CPT

## 2023-04-20 RX ORDER — DIVALPROEX SODIUM 250 MG/1
250 TABLET, EXTENDED RELEASE ORAL
Status: DISCONTINUED | OUTPATIENT
Start: 2023-04-20 | End: 2023-04-24

## 2023-04-20 RX ORDER — CLONIDINE HYDROCHLORIDE 0.1 MG/1
0.1 TABLET ORAL
Status: DISCONTINUED | OUTPATIENT
Start: 2023-04-20 | End: 2023-04-28 | Stop reason: HOSPADM

## 2023-04-20 RX ORDER — DICLOFENAC SODIUM 10 MG/G
2 GEL TOPICAL 4 TIMES DAILY
Status: DISCONTINUED | OUTPATIENT
Start: 2023-04-20 | End: 2023-04-28 | Stop reason: HOSPADM

## 2023-04-20 RX ADMIN — OLANZAPINE 5 MG: 5 TABLET, FILM COATED ORAL at 11:17

## 2023-04-20 RX ADMIN — DICLOFENAC SODIUM 2 G: 10 GEL TOPICAL at 17:11

## 2023-04-20 RX ADMIN — Medication 3 MG: at 22:05

## 2023-04-20 RX ADMIN — IBUPROFEN 400 MG: 400 TABLET, FILM COATED ORAL at 09:58

## 2023-04-20 RX ADMIN — FLUOXETINE 40 MG: 20 CAPSULE ORAL at 17:12

## 2023-04-20 RX ADMIN — FLUOXETINE 40 MG: 20 CAPSULE ORAL at 08:50

## 2023-04-20 RX ADMIN — IBUPROFEN 400 MG: 400 TABLET, FILM COATED ORAL at 22:05

## 2023-04-20 RX ADMIN — IBUPROFEN 400 MG: 400 TABLET, FILM COATED ORAL at 17:40

## 2023-04-20 RX ADMIN — CLONIDINE HYDROCHLORIDE 0.1 MG: 0.1 TABLET ORAL at 17:56

## 2023-04-20 RX ADMIN — QUETIAPINE 300 MG: 300 TABLET, EXTENDED RELEASE ORAL at 22:05

## 2023-04-20 RX ADMIN — TRAZODONE HYDROCHLORIDE 50 MG: 50 TABLET ORAL at 22:05

## 2023-04-20 RX ADMIN — LOSARTAN POTASSIUM 50 MG: 50 TABLET, FILM COATED ORAL at 08:50

## 2023-04-20 RX ADMIN — ATORVASTATIN CALCIUM 40 MG: 40 TABLET, FILM COATED ORAL at 08:50

## 2023-04-20 RX ADMIN — DIVALPROEX SODIUM 250 MG: 250 TABLET, EXTENDED RELEASE ORAL at 22:34

## 2023-04-20 RX ADMIN — DICLOFENAC SODIUM 2 G: 10 GEL TOPICAL at 22:06

## 2023-04-20 RX ADMIN — HYDROXYZINE HYDROCHLORIDE 50 MG: 50 TABLET, FILM COATED ORAL at 11:17

## 2023-04-21 LAB
GLUCOSE BLD STRIP.AUTO-MCNC: 135 MG/DL (ref 65–117)
GLUCOSE BLD STRIP.AUTO-MCNC: 147 MG/DL (ref 65–117)
GLUCOSE BLD STRIP.AUTO-MCNC: 73 MG/DL (ref 65–117)
SERVICE CMNT-IMP: ABNORMAL
SERVICE CMNT-IMP: ABNORMAL
SERVICE CMNT-IMP: NORMAL

## 2023-04-21 PROCEDURE — 74011250637 HC RX REV CODE- 250/637: Performed by: PSYCHIATRY & NEUROLOGY

## 2023-04-21 PROCEDURE — 65220000003 HC RM SEMIPRIVATE PSYCH

## 2023-04-21 PROCEDURE — 74011250637 HC RX REV CODE- 250/637

## 2023-04-21 PROCEDURE — 74011250637 HC RX REV CODE- 250/637: Performed by: CLINICAL NURSE SPECIALIST

## 2023-04-21 PROCEDURE — 82962 GLUCOSE BLOOD TEST: CPT

## 2023-04-21 RX ORDER — TRIAMTERENE/HYDROCHLOROTHIAZID 37.5-25 MG
1 TABLET ORAL DAILY
Status: DISCONTINUED | OUTPATIENT
Start: 2023-04-22 | End: 2023-04-28 | Stop reason: HOSPADM

## 2023-04-21 RX ADMIN — IBUPROFEN 400 MG: 400 TABLET, FILM COATED ORAL at 19:28

## 2023-04-21 RX ADMIN — TRAZODONE HYDROCHLORIDE 50 MG: 50 TABLET ORAL at 22:03

## 2023-04-21 RX ADMIN — Medication 3 MG: at 22:04

## 2023-04-21 RX ADMIN — GLIPIZIDE 5 MG: 5 TABLET ORAL at 09:03

## 2023-04-21 RX ADMIN — LOSARTAN POTASSIUM 50 MG: 50 TABLET, FILM COATED ORAL at 09:03

## 2023-04-21 RX ADMIN — FLUOXETINE 40 MG: 20 CAPSULE ORAL at 18:16

## 2023-04-21 RX ADMIN — HYDROXYZINE HYDROCHLORIDE 50 MG: 50 TABLET, FILM COATED ORAL at 09:57

## 2023-04-21 RX ADMIN — FLUOXETINE 40 MG: 20 CAPSULE ORAL at 09:03

## 2023-04-21 RX ADMIN — QUETIAPINE 300 MG: 300 TABLET, EXTENDED RELEASE ORAL at 22:03

## 2023-04-21 RX ADMIN — DICLOFENAC SODIUM 2 G: 10 GEL TOPICAL at 09:04

## 2023-04-21 RX ADMIN — DICLOFENAC SODIUM 2 G: 10 GEL TOPICAL at 13:47

## 2023-04-21 RX ADMIN — IBUPROFEN 400 MG: 400 TABLET, FILM COATED ORAL at 13:50

## 2023-04-21 RX ADMIN — OLANZAPINE 5 MG: 5 TABLET, FILM COATED ORAL at 09:57

## 2023-04-21 RX ADMIN — ATORVASTATIN CALCIUM 40 MG: 40 TABLET, FILM COATED ORAL at 09:03

## 2023-04-21 RX ADMIN — DICLOFENAC SODIUM 2 G: 10 GEL TOPICAL at 21:01

## 2023-04-22 LAB
GLUCOSE BLD STRIP.AUTO-MCNC: 144 MG/DL (ref 65–117)
GLUCOSE BLD STRIP.AUTO-MCNC: 149 MG/DL (ref 65–117)
GLUCOSE BLD STRIP.AUTO-MCNC: 150 MG/DL (ref 65–117)
SERVICE CMNT-IMP: ABNORMAL

## 2023-04-22 PROCEDURE — 74011250637 HC RX REV CODE- 250/637: Performed by: CLINICAL NURSE SPECIALIST

## 2023-04-22 PROCEDURE — 74011250637 HC RX REV CODE- 250/637: Performed by: PSYCHIATRY & NEUROLOGY

## 2023-04-22 PROCEDURE — 65220000003 HC RM SEMIPRIVATE PSYCH

## 2023-04-22 PROCEDURE — 74011250636 HC RX REV CODE- 250/636: Performed by: PSYCHIATRY & NEUROLOGY

## 2023-04-22 PROCEDURE — 74011250637 HC RX REV CODE- 250/637: Performed by: NURSE PRACTITIONER

## 2023-04-22 PROCEDURE — 82962 GLUCOSE BLOOD TEST: CPT

## 2023-04-22 PROCEDURE — 74011250637 HC RX REV CODE- 250/637

## 2023-04-22 RX ORDER — CETIRIZINE HYDROCHLORIDE 10 MG/1
10 TABLET ORAL EVERY EVENING
Status: DISCONTINUED | OUTPATIENT
Start: 2023-04-22 | End: 2023-04-28 | Stop reason: HOSPADM

## 2023-04-22 RX ADMIN — QUETIAPINE 300 MG: 300 TABLET, EXTENDED RELEASE ORAL at 21:56

## 2023-04-22 RX ADMIN — ATORVASTATIN CALCIUM 40 MG: 40 TABLET, FILM COATED ORAL at 08:08

## 2023-04-22 RX ADMIN — CLONAZEPAM 0.25 MG: 0.5 TABLET ORAL at 01:37

## 2023-04-22 RX ADMIN — DICLOFENAC SODIUM 2 G: 10 GEL TOPICAL at 08:08

## 2023-04-22 RX ADMIN — HYDROXYZINE HYDROCHLORIDE 50 MG: 50 TABLET, FILM COATED ORAL at 18:05

## 2023-04-22 RX ADMIN — IBUPROFEN 400 MG: 400 TABLET, FILM COATED ORAL at 09:05

## 2023-04-22 RX ADMIN — GLIPIZIDE 5 MG: 5 TABLET ORAL at 08:08

## 2023-04-22 RX ADMIN — LOSARTAN POTASSIUM 50 MG: 50 TABLET, FILM COATED ORAL at 08:08

## 2023-04-22 RX ADMIN — CETIRIZINE HYDROCHLORIDE 10 MG: 10 TABLET, FILM COATED ORAL at 17:20

## 2023-04-22 RX ADMIN — FLUOXETINE 40 MG: 20 CAPSULE ORAL at 08:08

## 2023-04-22 RX ADMIN — DIVALPROEX SODIUM 250 MG: 250 TABLET, EXTENDED RELEASE ORAL at 21:56

## 2023-04-22 RX ADMIN — TRAZODONE HYDROCHLORIDE 50 MG: 50 TABLET ORAL at 21:57

## 2023-04-22 RX ADMIN — DICLOFENAC SODIUM 2 G: 10 GEL TOPICAL at 13:12

## 2023-04-22 RX ADMIN — ONDANSETRON 4 MG: 4 TABLET, ORALLY DISINTEGRATING ORAL at 10:45

## 2023-04-22 RX ADMIN — TRIAMTERENE AND HYDROCHLOROTHIAZIDE 1 TABLET: 37.5; 25 TABLET ORAL at 08:08

## 2023-04-22 RX ADMIN — DICLOFENAC SODIUM 2 G: 10 GEL TOPICAL at 17:21

## 2023-04-22 RX ADMIN — Medication 3 MG: at 21:56

## 2023-04-22 RX ADMIN — IBUPROFEN 400 MG: 400 TABLET, FILM COATED ORAL at 14:25

## 2023-04-22 RX ADMIN — DICLOFENAC SODIUM 2 G: 10 GEL TOPICAL at 21:57

## 2023-04-22 RX ADMIN — FLUOXETINE 40 MG: 20 CAPSULE ORAL at 17:20

## 2023-04-22 RX ADMIN — IBUPROFEN 400 MG: 400 TABLET, FILM COATED ORAL at 21:02

## 2023-04-23 LAB
GLUCOSE BLD STRIP.AUTO-MCNC: 131 MG/DL (ref 65–117)
GLUCOSE BLD STRIP.AUTO-MCNC: 133 MG/DL (ref 65–117)
GLUCOSE BLD STRIP.AUTO-MCNC: 133 MG/DL (ref 65–117)
SERVICE CMNT-IMP: ABNORMAL

## 2023-04-23 PROCEDURE — 65220000003 HC RM SEMIPRIVATE PSYCH

## 2023-04-23 PROCEDURE — 74011250637 HC RX REV CODE- 250/637: Performed by: PSYCHIATRY & NEUROLOGY

## 2023-04-23 PROCEDURE — 74011250636 HC RX REV CODE- 250/636: Performed by: PSYCHIATRY & NEUROLOGY

## 2023-04-23 PROCEDURE — 74011250637 HC RX REV CODE- 250/637: Performed by: CLINICAL NURSE SPECIALIST

## 2023-04-23 PROCEDURE — 74011250636 HC RX REV CODE- 250/636

## 2023-04-23 PROCEDURE — 82962 GLUCOSE BLOOD TEST: CPT

## 2023-04-23 PROCEDURE — 74011250637 HC RX REV CODE- 250/637: Performed by: NURSE PRACTITIONER

## 2023-04-23 PROCEDURE — 74011250637 HC RX REV CODE- 250/637

## 2023-04-23 RX ORDER — METFORMIN HYDROCHLORIDE 500 MG/1
500 TABLET, EXTENDED RELEASE ORAL
Status: DISCONTINUED | OUTPATIENT
Start: 2023-04-23 | End: 2023-04-28 | Stop reason: HOSPADM

## 2023-04-23 RX ADMIN — TRIAMTERENE AND HYDROCHLOROTHIAZIDE 1 TABLET: 37.5; 25 TABLET ORAL at 08:36

## 2023-04-23 RX ADMIN — DIVALPROEX SODIUM 250 MG: 250 TABLET, EXTENDED RELEASE ORAL at 22:11

## 2023-04-23 RX ADMIN — IBUPROFEN 400 MG: 400 TABLET, FILM COATED ORAL at 09:25

## 2023-04-23 RX ADMIN — OLANZAPINE 5 MG: 5 TABLET, FILM COATED ORAL at 18:42

## 2023-04-23 RX ADMIN — ATORVASTATIN CALCIUM 40 MG: 40 TABLET, FILM COATED ORAL at 08:37

## 2023-04-23 RX ADMIN — CETIRIZINE HYDROCHLORIDE 10 MG: 10 TABLET, FILM COATED ORAL at 18:02

## 2023-04-23 RX ADMIN — HYDROXYZINE HYDROCHLORIDE 50 MG: 50 TABLET, FILM COATED ORAL at 08:37

## 2023-04-23 RX ADMIN — DICLOFENAC SODIUM 2 G: 10 GEL TOPICAL at 20:47

## 2023-04-23 RX ADMIN — DICLOFENAC SODIUM 2 G: 10 GEL TOPICAL at 09:00

## 2023-04-23 RX ADMIN — OLANZAPINE 5 MG: 5 TABLET, FILM COATED ORAL at 09:25

## 2023-04-23 RX ADMIN — HYDROXYZINE HYDROCHLORIDE 50 MG: 50 TABLET, FILM COATED ORAL at 18:42

## 2023-04-23 RX ADMIN — QUETIAPINE 300 MG: 300 TABLET, EXTENDED RELEASE ORAL at 22:11

## 2023-04-23 RX ADMIN — IBUPROFEN 400 MG: 400 TABLET, FILM COATED ORAL at 21:35

## 2023-04-23 RX ADMIN — ONDANSETRON 4 MG: 4 TABLET, ORALLY DISINTEGRATING ORAL at 18:13

## 2023-04-23 RX ADMIN — TRAZODONE HYDROCHLORIDE 50 MG: 50 TABLET ORAL at 22:12

## 2023-04-23 RX ADMIN — IBUPROFEN 400 MG: 400 TABLET, FILM COATED ORAL at 14:34

## 2023-04-23 RX ADMIN — FLUOXETINE 40 MG: 20 CAPSULE ORAL at 18:02

## 2023-04-23 RX ADMIN — GLIPIZIDE 5 MG: 5 TABLET ORAL at 08:37

## 2023-04-23 RX ADMIN — METFORMIN HYDROCHLORIDE 500 MG: 500 TABLET, EXTENDED RELEASE ORAL at 18:02

## 2023-04-23 RX ADMIN — HALOPERIDOL LACTATE 5 MG: 5 INJECTION, SOLUTION INTRAMUSCULAR at 10:51

## 2023-04-23 RX ADMIN — FLUOXETINE 40 MG: 20 CAPSULE ORAL at 08:36

## 2023-04-23 RX ADMIN — DICLOFENAC SODIUM 2 G: 10 GEL TOPICAL at 13:00

## 2023-04-23 RX ADMIN — LOSARTAN POTASSIUM 50 MG: 50 TABLET, FILM COATED ORAL at 08:37

## 2023-04-23 RX ADMIN — Medication 3 MG: at 20:23

## 2023-04-24 LAB
GLUCOSE BLD STRIP.AUTO-MCNC: 111 MG/DL (ref 65–117)
GLUCOSE BLD STRIP.AUTO-MCNC: 112 MG/DL (ref 65–117)
GLUCOSE BLD STRIP.AUTO-MCNC: 156 MG/DL (ref 65–117)
SERVICE CMNT-IMP: ABNORMAL
SERVICE CMNT-IMP: NORMAL
SERVICE CMNT-IMP: NORMAL

## 2023-04-24 PROCEDURE — 74011250637 HC RX REV CODE- 250/637

## 2023-04-24 PROCEDURE — 74011250637 HC RX REV CODE- 250/637: Performed by: PSYCHIATRY & NEUROLOGY

## 2023-04-24 PROCEDURE — 74011250637 HC RX REV CODE- 250/637: Performed by: CLINICAL NURSE SPECIALIST

## 2023-04-24 PROCEDURE — 65220000003 HC RM SEMIPRIVATE PSYCH

## 2023-04-24 PROCEDURE — 82962 GLUCOSE BLOOD TEST: CPT

## 2023-04-24 PROCEDURE — 74011250637 HC RX REV CODE- 250/637: Performed by: NURSE PRACTITIONER

## 2023-04-24 RX ADMIN — DICLOFENAC SODIUM 2 G: 10 GEL TOPICAL at 17:26

## 2023-04-24 RX ADMIN — HYDROXYZINE HYDROCHLORIDE 50 MG: 50 TABLET, FILM COATED ORAL at 08:40

## 2023-04-24 RX ADMIN — IBUPROFEN 400 MG: 400 TABLET, FILM COATED ORAL at 10:34

## 2023-04-24 RX ADMIN — GLIPIZIDE 5 MG: 5 TABLET ORAL at 08:38

## 2023-04-24 RX ADMIN — FLUOXETINE 40 MG: 20 CAPSULE ORAL at 17:23

## 2023-04-24 RX ADMIN — TRIAMTERENE AND HYDROCHLOROTHIAZIDE 1 TABLET: 37.5; 25 TABLET ORAL at 08:37

## 2023-04-24 RX ADMIN — IBUPROFEN 400 MG: 400 TABLET, FILM COATED ORAL at 21:07

## 2023-04-24 RX ADMIN — DICLOFENAC SODIUM 2 G: 10 GEL TOPICAL at 21:06

## 2023-04-24 RX ADMIN — DICLOFENAC SODIUM 2 G: 10 GEL TOPICAL at 12:44

## 2023-04-24 RX ADMIN — LOSARTAN POTASSIUM 50 MG: 50 TABLET, FILM COATED ORAL at 08:37

## 2023-04-24 RX ADMIN — METFORMIN HYDROCHLORIDE 500 MG: 500 TABLET, EXTENDED RELEASE ORAL at 17:23

## 2023-04-24 RX ADMIN — CETIRIZINE HYDROCHLORIDE 10 MG: 10 TABLET, FILM COATED ORAL at 17:23

## 2023-04-24 RX ADMIN — QUETIAPINE 300 MG: 300 TABLET, EXTENDED RELEASE ORAL at 21:08

## 2023-04-24 RX ADMIN — ATORVASTATIN CALCIUM 40 MG: 40 TABLET, FILM COATED ORAL at 08:38

## 2023-04-24 RX ADMIN — Medication 3 MG: at 21:08

## 2023-04-24 RX ADMIN — FLUOXETINE 40 MG: 20 CAPSULE ORAL at 08:38

## 2023-04-24 NOTE — BH NOTES
Chief Complaint: \"Things are still the same. \"    Length of Stay: 14 Days    Interval History:  04/24/2023  Genesis Thompson reports experiencing hair pulling as well as yelling fits of 'stop it, stop it.' She says that she walks and utilizes the squeeze ball, but eventually needing medication. She says that she has anger and irritability towards the anxiety, but not towards anyone else. Denies SI or self harm thoughts. 4/23 - Genesis Thompson reports ongoing anxiety this morning. Some yelling outbursts noted this AM. Energy level remains fair. She reports sleeping well. She complains of headache but it is not clear if this is a side effect or unrelated. We discussed holding Prozac but she is not in favor of this. Some PI persists. 4/22 - Genesis Thompson reports little change in mood. She complains of headache which may be related to elevated blood pressure. She is in no acute distress on interview. She denies SI / HI. Somatic preoccupation / delusion noted on interview. She denies AVH currently. Some paranoid ideation regarding medications. 04/21/2023  Genesis Thompson states she is feeling good currently. However, she had an episode this morning again where she pulled her hair and yelled \"stop it. \" There was no trigger. She denies SI/plan/intent, denies HI/plan/intent, denies AVH. Pt states she is still having headaches. She is sleeping well at night. Appetite is good. She has several somatic complaints, states there is something tingling from her ear to her arm. She has been taking Voltaren for this and finds it helpful. She states she does have depression in the mornings as well. She states she feels she constantly has \"congestion\" in her head, which again she thinks is a side effect from a medication. 04/20/2023  Patient reports sleeping well overnight. However, she says that she had 'an episode this morning.'  She reports that she had a good jasvir group. She felt nauseous, had a headache, felt her heart racing, feels tired.  She says that this takes about 10miutes. Don Davis thinks that these episodes are getting better, however, as she hadn't had any such episodes for the past 48 hours. She says that her headache is quite bothersome and is interested in further decrease of depakote. 04/19/2023  Reports sleeping well last night. She is eating her meals and taking medications. She report feeling 'head congestion' improved, but feels her dyspepsia is the same. Don Davis feels hopeful about her symptoms because this is the second day of not experiencing symptoms of hair pulling, yelling, or self harm. She is anxious about the side-effects not going away and says that they are bothersome. She is agreeable to use mylanta prn and  if no improvement, she is agreeable to further reduction in depakote. 04/18/2023  Patient reports sleeping 7 hours. She attempted to board on the general unit, but she was unable to stay there any more than 15 minutes. She remains in a blocked room because of her behaviors of yelling and hair pulling. She feels improvement in her symptoms of hair pulling and yelling out. She hasn't experienced any of those symptoms yesterday. However she complains of experiencing bothersome headache and nausea. She requests changes in her medication regimen. She denies SI. Denies HI.    04/17/2023  Patient had 2 episodes of hair pulling and calling out. She says that her increase in medication dose has caused nausea and headache, but she is tolerating this. She will think about whether or not she'd prefer prozac at night. Focussed patient's attention on the 'what' of her experience prior to hair pulling, or saying 'stop,' or clothes pulling, in order to define her stressors, and triggers; in order to focus work with her therapist.  Met with patient' family and answered questions about next steps. 04/16/2023  Patient reportedly had good sleep last night of 7 hours.   Nursing report experienced significant yelling during the day today, biting on the stress ball, but no hair pulling. Patient reports that today she was pulling her hair, yelling out, biting stress balls. Took prn zyprex  and atarax this morning after. She felt they were helping. She reports that the urges come on in waves, and she doesn't know what causes them. She reports this occurs 2-3 times per day. 04/15/2023  Reports yelling out at night. Nurses report witnessing patient bite on her stress ball. She slept well. Complains of lightheadedness, but otherwise well. Denies having SI. Denies HI. Denies experiencing AH    04/14/2023  Nursing report patient bit the stress ball yesterday and had one episode of hair pulling. Last night she got up and screamed in the middle of night. She denies having side-effects of increasing depakote dose, but does feel that she is lightheaded. At this time she has no urges or thoughts to harm herself. Repots slept really well. She requests changing prozac to day time instead of night time. 04/13/2023  Nursing report patient had no acute events overnight. Patient remains in a blocked room. However, when she got out of her room this am, she did pat her hair. No hair pulling or screaming noted. She had tylenol and trazodone prn last night. No IM medications. Treatment team evaluated her in her room this morning. She says that her mood is fair, but that she struggles with anger. She reports appreciating the 's help in empowering her to deal with her anger. She tolerated starting depakote and denies having any side-effects to it. She isn't sure if it is helping, but she reports feeling about the same, but no worsening of symptoms. 04/12/2023  Nursing report patient slept well and has taken her medications. Describes experiencing worsening of mood and irritability for the past days, with patient describing 'screaming fits' of 'stop it, stop it. ,' at the 'anxiety' as it is tormenting her.    Daniel Delgado shares that she quit her job because she was unable to function while struggling with these symptoms. She did have an admission to [address] [place] [address] after she was discharged from here and after finishing the PHP program. Her symptoms had been worsening for the past three days, and says that she presented here at the urging of her parents. She shares that she feels quite low and doesn't have hope for the future. Her LMP was 1 month ago, but her menstruation hadn't been regular. Past Medical History:  Past Medical History:   Diagnosis Date    Anxiety     Depression     ANXIETY & DEPRESSION    [facility] ([email or web address]     TYPE II    Hypertension     Infected sebaceous cyst, upper back 4/10/2019    Panic attack     Psychoses ([facility] 2/23/2023    Suicidal thoughts     Vision decreased        Labs:  Lab Results   Component Value Date/Time    WBC 8.2 04/10/2023 01:07 PM    HGB 11.6 04/10/2023 01:07 PM    HCT 37.7 04/10/2023 01:07 PM    PLATELET 169 49/55/9010 01:07 PM    MCV 85.9 04/10/2023 01:07 PM      Lab Results   Component Value Date/Time    Sodium 136 04/10/2023 01:07 PM    Potassium 3.4 (L) 04/10/2023 01:07 PM    Chloride 103 04/10/2023 01:07 PM    CO2 30 04/10/2023 01:07 PM    Anion gap 3 (L) 04/10/2023 01:07 PM    Glucose 38 (LL) 04/10/2023 01:07 PM    Glucose 146 (H) 07/29/2022 05:44 AM    BUN 11 04/10/2023 01:07 PM    Creatinine 0.83 04/10/2023 01:07 PM    BUN/Creatinine ratio 13 04/10/2023 01:07 PM    GFR est AA >60 07/26/2022 02:48 PM    GFR est non-AA >60 07/26/2022 02:48 PM    Calcium 9.5 04/10/2023 01:07 PM    Bilirubin, total 0.2 04/10/2023 01:07 PM    Alk.  phosphatase 80 04/10/2023 01:07 PM    Protein, total 7.9 04/10/2023 01:07 PM    Albumin 3.7 04/10/2023 01:07 PM    Globulin 4.2 (H) 04/10/2023 01:07 PM    A-G Ratio 0.9 (L) 04/10/2023 01:07 PM    ALT (SGPT) 25 04/10/2023 01:07 PM        Vitals:    04/23/23 0821 04/23/23 1621 04/24/23 0816 04/24/23 0829   BP:  (!) 150/98 127/88 127/88   Pulse:  91 94 94   Resp:  16 16 16   Temp:  99.5 °F (37.5 °C) 98.7 °F (37.1 °C) 98.7 °F (37.1 °C)   SpO2:  97% 97%    Weight: 100.5 kg (221 lb 9.6 oz)      Height:             Current Facility-Administered Medications   Medication Dose Route Frequency Provider Last Rate Last Admin    metFORMIN ER (GLUCOPHAGE XR) tablet 500 mg  500 mg Oral DAILY WITH DINNER Hilario Parnell MD   500 mg at 04/23/23 1802    cetirizine (ZYRTEC) tablet 10 mg  10 mg Oral QPM Hilario Parnell MD   10 mg at 04/23/23 1802    triamterene-hydroCHLOROthiazide (MAXZIDE) 37.5-25 mg per tablet 1 Tablet  1 Tablet Oral DAILY Makayla Cotter NP   1 Tablet at 04/24/23 0837    divalproex ER (DEPAKOTE ER) 24 hour tablet 250 mg  250 mg Oral QHS Jaylan Thompson MD   250 mg at 04/23/23 2211    diclofenac (VOLTAREN) 1 % topical gel 2 g  2 g Topical QID Jaylan Thompson MD   2 g at 04/23/23 2047    cloNIDine HCL (CATAPRES) tablet 0.1 mg  0.1 mg Oral Q6H PRN Dick Patricio NP   0.1 mg at 04/20/23 1756    alum-mag hydroxide-simeth (MYLANTA) oral suspension 30 mL  30 mL Oral Q4H PRN Jaylan Thompson MD   30 mL at 04/19/23 1323    FLUoxetine (PROzac) capsule 40 mg  40 mg Oral BID Jaylan Thompson MD   40 mg at 04/24/23 9551    ibuprofen (MOTRIN) tablet 400 mg  400 mg Oral Q4H PRN Jaylan Thompson MD   400 mg at 04/24/23 1034    atorvastatin (LIPITOR) tablet 40 mg  40 mg Oral DAILY Jaylan Thompson MD   40 mg at 04/24/23 0838    losartan (COZAAR) tablet 50 mg  50 mg Oral DAILY Jaylan Thompson MD   50 mg at 04/24/23 0837    glipiZIDE (GLUCOTROL) tablet 5 mg  5 mg Oral ACB Juanice Alaina A, CNS   5 mg at 04/24/23 0838    glucose chewable tablet 16 g  4 Tablet Oral PRN Juanicdaly Foley A, CNS        glucagon (GLUCAGEN) injection 1 mg  1 mg IntraMUSCular PRN BERKLEY Sethi        dextrose 10 % infusion 0-250 mL  0-250 mL IntraVENous PRN BERKLEY Sethi        insulin lispro (HUMALOG) injection   SubCUTAneous TIDA Nancy Ennis CNS        clonazePAM (KlonoPIN) tablet 0.25 mg  0.25 mg Oral QHS PRN Wei Adler MD   0.25 mg at 04/22/23 0137    melatonin tablet 3 mg  3 mg Oral QPM Jaylan Thompson MD   3 mg at 04/23/23 2023    QUEtiapine SR (SEROquel XR) tablet 300 mg  300 mg Oral QHS Jaylan Thompson MD   300 mg at 04/23/23 2211    ondansetron (ZOFRAN ODT) tablet 4 mg  4 mg Oral Q8H PRN Jaylan Thompson MD   4 mg at 04/23/23 1813    OLANZapine (ZyPREXA) tablet 5 mg  5 mg Oral Q6H PRN Santosh Darting, NP   5 mg at 04/23/23 1842    haloperidol lactate (HALDOL) injection 5 mg  5 mg IntraMUSCular Q6H PRN Santosh Darting, NP   5 mg at 04/23/23 1051    benztropine (COGENTIN) tablet 1 mg  1 mg Oral BID PRN Santosh Darting, NP        diphenhydrAMINE (BENADRYL) injection 50 mg  50 mg IntraMUSCular BID PRN Santosh Darting, NP   50 mg at 04/11/23 0903    hydrOXYzine HCL (ATARAX) tablet 50 mg  50 mg Oral TID PRN Santosh Darting, NP   50 mg at 04/24/23 0840    LORazepam (ATIVAN) injection 1 mg  1 mg IntraMUSCular Q4H PRN Santosh Darting, NP   1 mg at 04/11/23 0903    traZODone (DESYREL) tablet 50 mg  50 mg Oral QHS PRN Santosh Darting, NP   50 mg at 04/23/23 2212    magnesium hydroxide (MILK OF MAGNESIA) 400 mg/5 mL oral suspension 30 mL  30 mL Oral DAILY PRN Santosh Darting, NP   30 mL at 04/18/23 1852     Mental Status Exam:  Obese 48yo AAF is engaged in the evaluation. She has short hair and maintains good eye contact throughout. Speech is spontaneous  Mood is \"fine\"  Affect: congruent, dysthymic and even  Denies SI. Denies HI. Perception: no paranoia or delusions  Cognition is grossly intact    Physical Exam:  Body habitus: Body mass index is 34.71 kg/m². Musculoskeletal system: normal gait  Tremor - neg  Cog wheeling - neg    Assessment and Plan:  Kanu Russo meets criteria for a diagnosis of   Mood d/o nos;  Psychotic d/ nos  Trichotillomania    4/24/2023  D/c depakote  Recommend ERP outpatient in addition to prozac and seroquel. Transfer pt to general side once safe.     4/23  Continue current treatment  Metformin  mg 4/22 -   Continue current treatment  Depakote level Wednesday  Discharge planning    04/21/2023  Added HCTZ 37.5-25 daily per home med rec, BP has been elevated. No other changes today. 04/20/2023  Decrease depakote dr from 500 to 250mg po qhs to mitigate side-effects of headache, fatigue and nausea. Discussed possible increase of seroquel, possible cross taper to zyprexa. Continue prozac 40mg po bid  Tranfer to general side. 04/19/2023  Continue current medication regimen  Patient may board on general unit  Transfer tomorrow if she does well. Could decrease depakote further to mitigate side-effects. 04/18/2023  D/c depakote dr 500mg po bid. Instead start depakote er 500mg po qhs  D/c prozac 80mg po qday  Instead start prozac 40mg po bid starting tomorrow  Encouraged patient to take prn clonazepam 0.25mg po prn anxiety and ibuprofen prn headache  04/17/2023  Continue current medications and dosages. 04/16/2023  Increae prozac from 60 to 80mg po qday. Will give the difference of 20mg today. Continue seroquel xr 300mg po qhs  Continue depakote dr 500mg po bid.    04/15/2023  Continue current medications. 04/14/2023  Continue current medications. 04/13/2023  Increase depakote from 250mg po bid to 500mg po bid. Will change prozac 60mg po qday to night time to simplify medication administration. 04/12/2023  Patient felt that NAC wasn't helping so she stopped it. At [facility] her prozac was increased from 40 to 60mg po qday, so I will increase her prozac here as well. I will continue prescribed seroquel 300mg po qhs. We discussed a trial of depakote DR 200mg po bid to target irritability and anger. Discussed with patient medication side-effects including being contraindication for pregnancy. Discussed d/c of buspar to decrease risk of polypharmacy. A coordinated, multidisplinary treatment team round was conducted with the patient, nurses, pharmcist,  and writer present. Discussions held with , and/or with family members; Complete current electronic health record for patient was reviewed in full including consultant notes, ancillary staff notes, nurses and tech notes, labs and vitals. I certify that this patients inpatient psychiatric hospital services furnished since the previous certification were, and continue to be, required for treatment that could reasonably be expected to improve the patient's condition, or for diagnostic study, and that the patient continues to need, on a daily basis, active treatment furnished directly by or requiring the supervision of inpatient psychiatric facility personnel. In addition, the hospital records show that services furnished were intensive treatment services, admission or related services, or equivalent services.

## 2023-04-24 NOTE — BH NOTES
GROUP THERAPY PROGRESS NOTE    Patient did not participate in Safety Planning group.     Jerry RODRIGUEZ, [facility]

## 2023-04-24 NOTE — BH NOTES
GROUP THERAPY PROGRESS NOTE    Patient is participating in recreational therapy group. Group time: 30 minutes    Personal goal for participation: To engage in discussion using Would you Rather questionnaire     Goal orientation: Personal    Group therapy participation: Active     Therapeutic interventions reviewed and discussed:  Group members were given the opportunity to answer a series of questions. Members were able to gain more of an understanding of personal values, morals, beliefs, and preferences. Members were able to discuss similarities and differences in answers to promote social and communication skills. Impression of participation: Pt was present and engaged in group discussion and activity. Pt added insight to group topic and socialized with peers.        JENNIFER Birmingham, [facility]-A

## 2023-04-24 NOTE — PROGRESS NOTES
Problem: Anxiety-Behavioral Health (Adult/Pediatric)  Goal: *STG: Remains safe in hospital  Outcome: Progressing Towards Goal.  Pt denies any suicidal or homicidal thoughts. Contracts for safety. Remains on q 15 min safety checks.      Goal: *STG: Seeks staff when feelings of anxiety and fear arise  Outcome: Progressing Towards Goal  RELATIONSHIP WITH STAFF IS POSITIVE  Goal: *STG/LTG: Trigger identification  Outcome: Not Progressing Towards Goal  PATIENT REPORTS NO IDEA \"WHAT TRIGGERS HER SCREAMING  AND HAIR PULLING   Goal: *STG/LTG: Complies with medication therapy  Outcome: Progressing Towards Goal   MEDICATION COMPLIANT

## 2023-04-24 NOTE — DISCHARGE INSTRUCTIONS
DISCHARGE SUMMARY    NAME:Catherine Unger  : 1975  MRN: [id number]    The patient Jed Avila exhibits the ability to control behavior in a less restrictive environment. Patient's level of functioning is improving. No assaultive/destructive behavior has been observed for the past 24 hours. No suicidal/homicidal threat or behavior has been observed for the past 24 hours. There is no evidence of serious medication side effects. Patient has not been in physical or protective restraints for at least the past 24 hours. If weapons involved, how are they secured? None     Is patient aware of and in agreement with discharge plan? Yes     Arrangements for medication:  Prescriptions filled here     Copy of discharge instructions to provider?:  faxed to [facility] for transportation home:  parents to      Keep all follow up appointments as scheduled, continue to take prescribed medications per physician instructions. Mental health crisis number:  [phone] or your local mental health crisis line number at [address] [facility] [phone] WARM [facility]      [phone] ([phone])      M-F: 9am to 9pm      Sat & Sun: 5pm - 9pm  National suicide prevention lines:                             2-751-KCDHJPT (3-787-528-7777)       9-401-938-TALK ([phone])    Crisis Text Line:  Text HOME to [address] from Nurse    PATIENT INSTRUCTIONS:      What to do at Home:  Recommended activity: Activity as tolerated,         *  Please give a list of your current medications to your Primary Care Provider. *  Please update this list whenever your medications are discontinued, doses are      changed, or new medications (including over-the-counter products) are added. *  Please carry medication information at all times in case of emergency situations.     These are general instructions for a healthy lifestyle:    No smoking/ No tobacco products/ Avoid exposure to second hand smoke  Surgeon General's Warning:  Quitting smoking now greatly reduces serious risk to your health. Obesity, smoking, and sedentary lifestyle greatly increases your risk for illness    A healthy diet, regular physical exercise & weight monitoring are important for maintaining a healthy lifestyle    You may be retaining fluid if you have a history of heart failure or if you experience any of the following symptoms:  Weight gain of 3 pounds or more overnight or 5 pounds in a week, increased swelling in our hands or feet or shortness of breath while lying flat in bed. Please call your doctor as soon as you notice any of these symptoms; do not wait until your next office visit. The discharge information has been reviewed with the patient. The patient verbalized understanding. Discharge medications reviewed with the patient and appropriate educational materials and side effects teaching were provided.   ___________________________________________________________________________________________________________________________________

## 2023-04-24 NOTE — INTERDISCIPLINARY ROUNDS
Behavioral Health Interdisciplinary Rounds     Patient Name: Ryanne Oneal  Age: 50 y.o. Room/Bed:  6/  Primary Diagnosis: <principal problem not specified>   Admission Status: Voluntary     Readmission within 30 days: no  Power of  in place: no  Patient requires a blocked bed: no         Sleep hours:  7      Participation in Care/Groups:  yes  Medication Compliant?: Yes  PRNS (last 24 hours): Antipsychotic (PO), Antianxiety, Sleep Aid, Pain, and Antiemetic     Restraints (last 24 hours):  no  __________________________________________________  OQ Admission Analysis Survey completed:no  OQ Admission Analysis Survey score:  OQ Discharge Analysis Survey completed:  OQ Discharge Analysis Survey score:   __________________________________________________     Alcohol screening (AUDIT) completed -  AUDIT Score: 0  If applicable, date SBIRT discussed in treatment team AND documented:    Tobacco - patient is a smoker: Have You Used Tobacco in the Past 30 Days: No  Illegal Drugs use: Have You Used Any Illegal Substances Over the Past 12 Months: No    24 hour chart check complete: yes     _______________________________________________    Patient goal(s) for today:   Treatment team focus/goals: Plan to set up her out patient treatment. Progress note: She continue to pull her hair and yell out      Spiritual Care Consult:   Financial concerns/prescription coverage:  [place]   Family contact:     parents                    Family requesting physician contact today:    Discharge plan:  She will return home with her family   Access to weapons : no                                                             Outpatient provider(s): [facility]   Patient's preferred phone number for follow up call :   Patient's preferred e-mail address :    LOS:  14  Expected LOS: TBD     Participating treatment team members: Joey Henry Dr., RN

## 2023-04-24 NOTE — BH NOTES
GROUP THERAPY PROGRESS NOTE    Patient is participating in Coping skills group. Group time: 45 minutes    Personal goal for participation: To develop an understanding of Healthy verses unhealthy coping skills    Goal orientation: Personal    Group therapy participation: passive     Therapeutic interventions reviewed and discussed:  Group members were able to gain an understanding of healthy and unhealthy coping skills. Members were given the opportunity to engage in conversation about their experiences with coping and how it was helpful or hurtful. Impression of participation: SW provided pt with handouts outlining coping skills. Pt will complete independently and engage in discussion in group tomorrow.       JENNIFER Rooney, HP-A

## 2023-04-24 NOTE — PROGRESS NOTES
Patient received resting in bed with eyes closed. NAD and no complaints noted. Safety measures in place. Will continue to monitor with q15min rounds. Problem: Falls - Risk of  Goal: *Absence of Falls  Description: Document Kimberli Adair Fall Risk and appropriate interventions in the flowsheet.   Outcome: Progressing Towards Goal  Note: Fall Risk Interventions:            Medication Interventions: Teach patient to arise slowly

## 2023-04-25 LAB
GLUCOSE BLD STRIP.AUTO-MCNC: 100 MG/DL (ref 65–117)
GLUCOSE BLD STRIP.AUTO-MCNC: 119 MG/DL (ref 65–117)
GLUCOSE BLD STRIP.AUTO-MCNC: 149 MG/DL (ref 65–117)
SERVICE CMNT-IMP: ABNORMAL
SERVICE CMNT-IMP: ABNORMAL
SERVICE CMNT-IMP: NORMAL

## 2023-04-25 PROCEDURE — 74011250636 HC RX REV CODE- 250/636

## 2023-04-25 PROCEDURE — 74011250637 HC RX REV CODE- 250/637

## 2023-04-25 PROCEDURE — 74011250637 HC RX REV CODE- 250/637: Performed by: NURSE PRACTITIONER

## 2023-04-25 PROCEDURE — 74011250637 HC RX REV CODE- 250/637: Performed by: PSYCHIATRY & NEUROLOGY

## 2023-04-25 PROCEDURE — 74011250637 HC RX REV CODE- 250/637: Performed by: CLINICAL NURSE SPECIALIST

## 2023-04-25 PROCEDURE — 65220000003 HC RM SEMIPRIVATE PSYCH

## 2023-04-25 PROCEDURE — 82962 GLUCOSE BLOOD TEST: CPT

## 2023-04-25 RX ADMIN — FLUOXETINE 40 MG: 20 CAPSULE ORAL at 09:08

## 2023-04-25 RX ADMIN — Medication 3 MG: at 22:17

## 2023-04-25 RX ADMIN — FLUOXETINE 40 MG: 20 CAPSULE ORAL at 17:47

## 2023-04-25 RX ADMIN — GLIPIZIDE 5 MG: 5 TABLET ORAL at 09:08

## 2023-04-25 RX ADMIN — ATORVASTATIN CALCIUM 40 MG: 40 TABLET, FILM COATED ORAL at 09:08

## 2023-04-25 RX ADMIN — DICLOFENAC SODIUM 2 G: 10 GEL TOPICAL at 17:47

## 2023-04-25 RX ADMIN — CETIRIZINE HYDROCHLORIDE 10 MG: 10 TABLET, FILM COATED ORAL at 17:47

## 2023-04-25 RX ADMIN — LOSARTAN POTASSIUM 50 MG: 50 TABLET, FILM COATED ORAL at 09:08

## 2023-04-25 RX ADMIN — LORAZEPAM 1 MG: 2 INJECTION INTRAMUSCULAR; INTRAVENOUS at 09:08

## 2023-04-25 RX ADMIN — HALOPERIDOL LACTATE 5 MG: 5 INJECTION, SOLUTION INTRAMUSCULAR at 09:09

## 2023-04-25 RX ADMIN — TRIAMTERENE AND HYDROCHLOROTHIAZIDE 1 TABLET: 37.5; 25 TABLET ORAL at 09:08

## 2023-04-25 RX ADMIN — TRAZODONE HYDROCHLORIDE 50 MG: 50 TABLET ORAL at 22:19

## 2023-04-25 RX ADMIN — DICLOFENAC SODIUM 2 G: 10 GEL TOPICAL at 12:37

## 2023-04-25 RX ADMIN — METFORMIN HYDROCHLORIDE 500 MG: 500 TABLET, EXTENDED RELEASE ORAL at 17:47

## 2023-04-25 RX ADMIN — DICLOFENAC SODIUM 2 G: 10 GEL TOPICAL at 22:17

## 2023-04-25 RX ADMIN — QUETIAPINE 300 MG: 300 TABLET, EXTENDED RELEASE ORAL at 23:18

## 2023-04-25 NOTE — PROGRESS NOTES
Behavioral Services                                              Medicare Re-Certification    I certify that the inpatient psychiatric hospital services furnished since the previous certification/re-certification were, and continue to be, medically necessary for;    [x] (1) Treatment which could reasonably be expected to improve the patient's condition,    [] (2) Or for diagnostic study. Estimated length of stay/service 3-5 days    Plan for post-hospital care PHP/IOP for OCD    This patient continues to need, on a daily basis, active treatment furnished directly by or requiring the supervision of inpatient psychiatric personnel.     Electronically signed by Moshe Milligan MD on 4/25/2023 at 12:45 PM

## 2023-04-25 NOTE — PROGRESS NOTES
Problem: Anxiety-Behavioral Health (Adult/Pediatric)  Goal: *STG: Participates in treatment plan  Outcome: Progressing Towards Goal  Goal: *STG: Remains safe in hospital  Outcome: Progressing Towards Goal  Goal: *STG: Seeks staff when feelings of anxiety and fear arise  Outcome: Progressing Towards Goal  Goal: *STG: Attends activities and groups  Outcome: Progressing Towards Goal  Goal: *STG: Demonstrates decrease in ritualistic behavior  Outcome: Progressing Towards Goal  Goal: *STG: Demonstrates effective anxiety reduction strategies  Outcome: Progressing Towards Goal  Goal: *STG/LTG: Trigger identification  Outcome: Progressing Towards Goal  Goal: *STG/LTG: Complies with medication therapy  Outcome: Progressing Towards Goal  Goal: *LTG:  Verbalizes understanding of personal adaptive level of functioning  Outcome: Progressing Towards Goal  Goal: Interventions  Outcome: Progressing Towards Goal

## 2023-04-25 NOTE — PROGRESS NOTES
Problem: Anxiety-Behavioral Health (Adult/Pediatric)  Goal: *STG: Participates in treatment plan  Outcome: Progressing Towards Goal  Goal: *STG: Remains safe in hospital  Outcome: Progressing Towards Goal  Goal: *STG/LTG: Complies with medication therapy  Outcome: Progressing Towards Goal  Patient complaint with scheduled medications     Problem: Patient Education: Go to Patient Education Activity  Goal: Patient/Family Education  Outcome: Progressing Towards Goal    Patient calm and cooperative on shift, no yelling or hair pulling on shift. Patient interacts well with staff and peers.

## 2023-04-25 NOTE — INTERDISCIPLINARY ROUNDS
Behavioral Health Interdisciplinary Rounds     Patient Name: Mable Elder  Age: 50 y.o. Room/Bed:  [facility] [place]  Primary Diagnosis: <principal problem not specified>   Admission Status: Voluntary     Readmission within 30 days: no  Power of  in place: no  Patient requires a blocked bed: no          Reason for blocked bed:   Sleep hours: 7+       Participation in Care/Groups:  yes  Medication Compliant?: Yes  PRNS (last 24 hours):  Antianxiety and Pain    Restraints (last 24 hours):  no  __________________________________________________  OQ Admission Analysis Survey completed:no  OQ Admission Analysis Survey score:  OQ Discharge Analysis Survey completed:  OQ Discharge Analysis Survey score:   __________________________________________________     Alcohol screening (AUDIT) completed -  AUDIT Score: 0  If applicable, date SBIRT discussed in treatment team AND documented:    Tobacco - patient is a smoker: Have You Used Tobacco in the Past 30 Days: No  Illegal Drugs use: Have You Used Any Illegal Substances Over the Past 12 Months: No    24 hour chart check complete: yes     _______________________________________________    Patient goal(s) for today:   Treatment team focus/goals: Plan to set up therapy as an outpatient with [facility]  Progress note:    She had one outburst today that lead to needing IM medications   Spiritual Care Consult:   Financial concerns/prescription coverage: medicaid    Family contact:    parents                     Family requesting physician contact today:    Discharge plan: she will return home   Access to weapons : no                                                              Outpatient provider(s): [facility]   Patient's preferred phone number for follow up call :   Patient's preferred e-mail address :    LOS:  15  Expected LOS: TBD     Participating treatment team members: Mable Elder, Sedrick Santos

## 2023-04-25 NOTE — PROGRESS NOTES
Problem: Anxiety-Behavioral Health (Adult/Pediatric)  Goal: *STG: Participates in treatment plan  Outcome: Progressing Towards Goal  Goal: *STG: Seeks staff when feelings of anxiety and fear arise  Outcome: Progressing Towards Goal  Goal: *STG: Demonstrates decrease in ritualistic behavior  Outcome: Not Progressing Towards Goal  Goal: *STG: Demonstrates effective anxiety reduction strategies  Outcome: Not Progressing Towards Goal

## 2023-04-25 NOTE — BH NOTES
Chief Complaint:  \"I had an episode. \"    Length of Stay: 15 Days    Interval History:  04/25/2023  Patient says that she experienced an episode of yelling and hair pulling that lasted for an hour. She says that she didn't have control over what was going on. She received IM medications. Patient says that she tried walking up and down the bustos. She says that she has no warning signs. She reports benefit from the IM medications. She reflects back and says that these episodes have no triggers. 04/24/2023  Catherine reports experiencing hair pulling as well as yelling fits of 'stop it, stop it.' She says that she walks and utilizes the squeeze ball, but eventually needing medication. She says that she has anger and irritability towards the anxiety, but not towards anyone else. Denies SI or self harm thoughts. 4/23 - Genesis Thompson reports ongoing anxiety this morning. Some yelling outbursts noted this AM. Energy level remains fair. She reports sleeping well. She complains of headache but it is not clear if this is a side effect or unrelated. We discussed holding Prozac but she is not in favor of this. Some PI persists. 4/22 - Genesis Thompson reports little change in mood. She complains of headache which may be related to elevated blood pressure. She is in no acute distress on interview. She denies SI / HI. Somatic preoccupation / delusion noted on interview. She denies AVH currently. Some paranoid ideation regarding medications. 04/21/2023  Genesis Thompson states she is feeling good currently. However, she had an episode this morning again where she pulled her hair and yelled \"stop it. \" There was no trigger. She denies SI/plan/intent, denies HI/plan/intent, denies AVH. Pt states she is still having headaches. She is sleeping well at night. Appetite is good. She has several somatic complaints, states there is something tingling from her ear to her arm. She has been taking Voltaren for this and finds it helpful.  She states she does have depression in the mornings as well. She states she feels she constantly has \"congestion\" in her head, which again she thinks is a side effect from a medication. 04/20/2023  Patient reports sleeping well overnight. However, she says that she had 'an episode this morning.'  She reports that she had a good jasvir group. She felt nauseous, had a headache, felt her heart racing, feels tired. She says that this takes about 10miutes. Cristi Bell thinks that these episodes are getting better, however, as she hadn't had any such episodes for the past 48 hours. She says that her headache is quite bothersome and is interested in further decrease of depakote. 04/19/2023  Reports sleeping well last night. She is eating her meals and taking medications. She report feeling 'head congestion' improved, but feels her dyspepsia is the same. Cristi Bell feels hopeful about her symptoms because this is the second day of not experiencing symptoms of hair pulling, yelling, or self harm. She is anxious about the side-effects not going away and says that they are bothersome. She is agreeable to use mylanta prn and  if no improvement, she is agreeable to further reduction in depakote. 04/18/2023  Patient reports sleeping 7 hours. She attempted to board on the general unit, but she was unable to stay there any more than 15 minutes. She remains in a blocked room because of her behaviors of yelling and hair pulling. She feels improvement in her symptoms of hair pulling and yelling out. She hasn't experienced any of those symptoms yesterday. However she complains of experiencing bothersome headache and nausea. She requests changes in her medication regimen. She denies SI. Denies HI.    04/17/2023  Patient had 2 episodes of hair pulling and calling out. She says that her increase in medication dose has caused nausea and headache, but she is tolerating this.  She will think about whether or not she'd prefer prozac at night. Focussed patient's attention on the 'what' of her experience prior to hair pulling, or saying 'stop,' or clothes pulling, in order to define her stressors, and triggers; in order to focus work with her therapist.  Met with patient' family and answered questions about next steps. 04/16/2023  Patient reportedly had good sleep last night of 7 hours. Nursing report experienced significant yelling during the day today, biting on the stress ball, but no hair pulling. Patient reports that today she was pulling her hair, yelling out, biting stress balls. Took prn zyprex  and atarax this morning after. She felt they were helping. She reports that the urges come on in waves, and she doesn't know what causes them. She reports this occurs 2-3 times per day. 04/15/2023  Reports yelling out at night. Nurses report witnessing patient bite on her stress ball. She slept well. Complains of lightheadedness, but otherwise well. Denies having SI. Denies HI. Denies experiencing AH    04/14/2023  Nursing report patient bit the stress ball yesterday and had one episode of hair pulling. Last night she got up and screamed in the middle of night. She denies having side-effects of increasing depakote dose, but does feel that she is lightheaded. At this time she has no urges or thoughts to harm herself. Repots slept really well. She requests changing prozac to day time instead of night time. 04/13/2023  Nursing report patient had no acute events overnight. Patient remains in a blocked room. However, when she got out of her room this am, she did pat her hair. No hair pulling or screaming noted. She had tylenol and trazodone prn last night. No IM medications. Treatment team evaluated her in her room this morning. She says that her mood is fair, but that she struggles with anger. She reports appreciating the 's help in empowering her to deal with her anger.  She tolerated starting depakote and denies having any side-effects to it. She isn't sure if it is helping, but she reports feeling about the same, but no worsening of symptoms. 04/12/2023  Nursing report patient slept well and has taken her medications. Describes experiencing worsening of mood and irritability for the past days, with patient describing 'screaming fits' of 'stop it, stop it. ,' at the 'anxiety' as it is tormenting her. Julisa Rubin shares that she quit her job because she was unable to function while struggling with these symptoms. She did have an admission to [facility] after she was discharged from here and after finishing the PHP program. Her symptoms had been worsening for the past three days, and says that she presented here at the urging of her parents. She shares that she feels quite low and doesn't have hope for the future. Her LMP was 1 month ago, but her menstruation hadn't been regular.     Past Medical History:  Past Medical History:   Diagnosis Date    Anxiety     Depression     ANXIETY & DEPRESSION    Di[facility]     TYPE II    Hypertension     Infected sebaceous cyst, upper back 4/10/2019    Panic attack     Psychoses ([facility] 2/23/2023    Suicidal thoughts     Vision decreased        Labs:  Lab Results   Component Value Date/Time    WBC 8.2 04/10/2023 01:07 PM    HGB 11.6 04/10/2023 01:07 PM    HCT 37.7 04/10/2023 01:07 PM    PLATELET 037 25/67/0938 01:07 PM    MCV 85.9 04/10/2023 01:07 PM      Lab Results   Component Value Date/Time    Sodium 136 04/10/2023 01:07 PM    Potassium 3.4 (L) 04/10/2023 01:07 PM    Chloride 103 04/10/2023 01:07 PM    CO2 30 04/10/2023 01:07 PM    Anion gap 3 (L) 04/10/2023 01:07 PM    Glucose 38 (LL) 04/10/2023 01:07 PM    Glucose 146 (H) 07/29/2022 05:44 AM    BUN 11 04/10/2023 01:07 PM    Creatinine 0.83 04/10/2023 01:07 PM    BUN/Creatinine ratio 13 04/10/2023 01:07 PM    GFR est AA >60 07/26/2022 02:48 PM    GFR est non-AA >60 07/26/2022 02:48 PM    Calcium 9.5 04/10/2023 01:07 PM    Bilirubin, total 0.2 04/10/2023 01:07 PM    Alk.  phosphatase 80 04/10/2023 01:07 PM    Protein, total 7.9 04/10/2023 01:07 PM    Albumin 3.7 04/10/2023 01:07 PM    Globulin 4.2 (H) 04/10/2023 01:07 PM    A-G Ratio 0.9 (L) 04/10/2023 01:07 PM    ALT (SGPT) 25 04/10/2023 01:07 PM        Vitals:    04/24/23 0816 04/24/23 0829 04/24/23 2131 04/25/23 0812   BP: 127/88 127/88 127/88    Pulse: 94 94 94    Resp: 16 16 16 16   Temp: 98.7 °F (37.1 °C) 98.7 °F (37.1 °C) 98.7 °F (37.1 °C)    SpO2: 97%      Weight:       Height:             Current Facility-Administered Medications   Medication Dose Route Frequency Provider Last Rate Last Admin    metFORMIN ER (GLUCOPHAGE XR) tablet 500 mg  500 mg Oral DAILY WITH DINNER Jean Haynes MD   500 mg at 04/24/23 1723    cetirizine (ZYRTEC) tablet 10 mg  10 mg Oral QPM Jean Haynes MD   10 mg at 04/24/23 1723    triamterene-hydroCHLOROthiazide (MAXZIDE) 37.5-25 mg per tablet 1 Tablet  1 Tablet Oral DAILY Eb Cisse NP   1 Tablet at 04/25/23 0908    diclofenac (VOLTAREN) 1 % topical gel 2 g  2 g Topical QID Jaylan Thompson MD   2 g at 04/24/23 2106    cloNIDine HCL (CATAPRES) tablet 0.1 mg  0.1 mg Oral Q6H PRN Ange Chaparro NP   0.1 mg at 04/20/23 1756    alum-mag hydroxide-simeth (MYLANTA) oral suspension 30 mL  30 mL Oral Q4H PRN Jaylan Thompson MD   30 mL at 04/19/23 1323    FLUoxetine (PROzac) capsule 40 mg  40 mg Oral BID Jaylan Thompson MD   40 mg at 04/25/23 0908    ibuprofen (MOTRIN) tablet 400 mg  400 mg Oral Q4H PRN Jaylan Thompson MD   400 mg at 04/24/23 2107    atorvastatin (LIPITOR) tablet 40 mg  40 mg Oral DAILY Jaylan Thompson MD   40 mg at 04/25/23 0908    losartan (COZAAR) tablet 50 mg  50 mg Oral DAILY Jaylan Thompson MD   50 mg at 04/25/23 0908    glipiZIDE (GLUCOTROL) tablet 5 mg  5 mg Oral ACB Sandibecky Meraz A, CNS   5 mg at 04/25/23 0908    glucose chewable tablet 16 g  4 Tablet Oral PRN Sandibecky Meraz A, CNS        glucagon (GLUCAGEN) injection 1 mg  1 mg IntraMUSCular PRN Coco Bulmaro A, CNS        dextrose 10 % infusion 0-250 mL  0-250 mL IntraVENous PRN Coco Bulmaro A, CNS        insulin lispro (HUMALOG) injection   SubCUTAneous TIDAC Nancy Ennis CNS        clonazePAM (KlonoPIN) tablet 0.25 mg  0.25 mg Oral QHS PRN Jaylan Thompson MD   0.25 mg at 04/22/23 0137    melatonin tablet 3 mg  3 mg Oral QPM Jaylan Thompson MD   3 mg at 04/24/23 2108    QUEtiapine SR (SEROquel XR) tablet 300 mg  300 mg Oral QHS Jaylan Thompson MD   300 mg at 04/24/23 2108    ondansetron (ZOFRAN ODT) tablet 4 mg  4 mg Oral Q8H PRN Jaylan Thompson MD   4 mg at 04/23/23 1813    OLANZapine (ZyPREXA) tablet 5 mg  5 mg Oral Q6H PRN Venessa Maile, NP   5 mg at 04/23/23 1842    haloperidol lactate (HALDOL) injection 5 mg  5 mg IntraMUSCular Q6H PRN Venessa Maile, NP   5 mg at 04/25/23 0909    benztropine (COGENTIN) tablet 1 mg  1 mg Oral BID PRN Venessa Maile, NP        diphenhydrAMINE (BENADRYL) injection 50 mg  50 mg IntraMUSCular BID PRN Venessa Maile, NP   50 mg at 04/11/23 0903    hydrOXYzine HCL (ATARAX) tablet 50 mg  50 mg Oral TID PRN Venessa Maile, NP   50 mg at 04/24/23 0840    LORazepam (ATIVAN) injection 1 mg  1 mg IntraMUSCular Q4H PRN Venessa Maile, NP   1 mg at 04/25/23 0908    traZODone (DESYREL) tablet 50 mg  50 mg Oral QHS PRN Venessa Maile, NP   50 mg at 04/23/23 2212    magnesium hydroxide (MILK OF MAGNESIA) 400 mg/5 mL oral suspension 30 mL  30 mL Oral DAILY PRN Venessa Maile, NP   30 mL at 04/18/23 1852     Mental Status Exam:  Obese 50yo AAF is engaged in the evaluation. She has short hair and maintains good eye contact throughout. Speech is spontaneous  Mood is \"fine\"  Affect: congruent, dysthymic and even  Denies SI. Denies HI. Perception: no paranoia or delusions  Cognition is grossly intact    Physical Exam:  Body habitus: Body mass index is 34.71 kg/m².   Musculoskeletal system: normal gait  Tremor - neg  Cog wheeling - neg    Assessment and Plan:  Geovanny Bonilla meets criteria for a diagnosis of   Mood d/o nos;  Psychotic d/ nos  Trichotillomania    04/25/2023  Continue current medication regimen. Recommend PHP/IOP for OCD.    4/24/2023  D/c depakote  Recommend ERP outpatient in addition to prozac and seroquel. Transfer pt to general side once safe. 4/23  Continue current treatment  Metformin  mg     4/22 -   Continue current treatment  Depakote level Wednesday  Discharge planning    04/21/2023  Added HCTZ 37.5-25 daily per home med rec, BP has been elevated. No other changes today. 04/20/2023  Decrease depakote dr from 500 to 250mg po qhs to mitigate side-effects of headache, fatigue and nausea. Discussed possible increase of seroquel, possible cross taper to zyprexa. Continue prozac 40mg po bid  Tranfer to general side. 04/19/2023  Continue current medication regimen  Patient may board on general unit  Transfer tomorrow if she does well. Could decrease depakote further to mitigate side-effects. 04/18/2023  D/c depakote dr 500mg po bid. Instead start depakote er 500mg po qhs  D/c prozac 80mg po qday  Instead start prozac 40mg po bid starting tomorrow  Encouraged patient to take prn clonazepam 0.25mg po prn anxiety and ibuprofen prn headache  04/17/2023  Continue current medications and dosages. 04/16/2023  Increae prozac from 60 to 80mg po qday. Will give the difference of 20mg today. Continue seroquel xr 300mg po qhs  Continue depakote dr 500mg po bid.    04/15/2023  Continue current medications. 04/14/2023  Continue current medications. 04/13/2023  Increase depakote from 250mg po bid to 500mg po bid. Will change prozac 60mg po qday to night time to simplify medication administration. 04/12/2023  Patient felt that NAC wasn't helping so she stopped it. At [facility] her prozac was increased from 40 to 60mg po qday, so I will increase her prozac here as well. I will continue prescribed seroquel 300mg po qhs.   We discussed a trial of depakote DR 200mg po bid to target irritability and anger. Discussed with patient medication side-effects including being contraindication for pregnancy. Discussed d/c of buspar to decrease risk of polypharmacy. A coordinated, multidisplinary treatment team round was conducted with the patient, nurses, pharmcist,  and writer present. Discussions held with , and/or with family members; Complete current electronic health record for patient was reviewed in full including consultant notes, ancillary staff notes, nurses and tech notes, labs and vitals. I certify that this patients inpatient psychiatric hospital services furnished since the previous certification were, and continue to be, required for treatment that could reasonably be expected to improve the patient's condition, or for diagnostic study, and that the patient continues to need, on a daily basis, active treatment furnished directly by or requiring the supervision of inpatient psychiatric facility personnel. In addition, the hospital records show that services furnished were intensive treatment services, admission or related services, or equivalent services.

## 2023-04-25 NOTE — BH NOTES
GROUP THERAPY PROGRESS NOTE    Patient is participating in Coping Skills group. Group time: 60 minutes    Personal goal for participation: To develop an understanding of Powerlessness and the need for control    Goal orientation: Personal    Group therapy participation: Active     Therapeutic interventions reviewed and discussed:  SW guided members through recognizing powerlessness in their lives. Pts used handouts to help visualize areas of life that are out of their control. SW helped members develop coping strategies to help navigate our lack of control. Handout provided. Impression of participation:  Pt was present and engaged in group discussion. Pt added insight to group topic. Pt interacted with SW and peers. Pt was calm, cooperative.        JENNIFER Salas, QMHP-A

## 2023-04-25 NOTE — PROGRESS NOTES
Problem: Falls - Risk of  Goal: *Absence of Falls  Description: Document Anushka Aponte Fall Risk and appropriate interventions in the flowsheet. Outcome: Progressing Towards Goal  Note: Fall Risk Interventions:      Medication Interventions: Assess postural VS orthostatic hypotension, Utilize gait belt for transfers/ambulation      Patient currently resting quietly in bed. No signs of distress. Even and unlabored breathing. Staff will continue to monitor safety q15 and provide support.

## 2023-04-25 NOTE — PROGRESS NOTES
Problem: Anxiety-Behavioral Health (Adult/Pediatric)  Goal: *STG: Participates in treatment plan  4/25/2023 0816 by Merle Durham RN  Outcome: Progressing Towards Goal  WILL ATTEND AND PARTICIPATE  Goal: *STG: Remains safe in hospital  Outcome: Progressing Towards Goal  Pt denies any suicidal or homicidal thoughts. Contracts for safety. Remains on q 15 min safety checks.      Goal: *STG: Seeks staff when feelings of anxiety and fear arise  Outcome: Progressing Towards Goal  WILL EXPRESS HER NEEDS APPROPRIATELY  Goal: *STG: Demonstrates decrease in ritualistic behavior  4/25/2023 0816 by Merle Durham RN  Outcome: Not Progressing Towards Goal  CONTINUES TO YELL OUT ON UNIT LOUDLY \"STOP IT\"  Goal: *STG: Demonstrates effective anxiety reduction strategies  4/25/2023 0816 by Merel Durham, RN  WILL JOURNAL SOME BUT CONTINUES OCD PATTERN OF YELLING AND SCREAMING AS COPING MECHANISM RELATED TO ANGER AND ANXIETY

## 2023-04-26 LAB
GLUCOSE BLD STRIP.AUTO-MCNC: 138 MG/DL (ref 65–117)
GLUCOSE BLD STRIP.AUTO-MCNC: 153 MG/DL (ref 65–117)
GLUCOSE BLD STRIP.AUTO-MCNC: 96 MG/DL (ref 65–117)
SERVICE CMNT-IMP: ABNORMAL
SERVICE CMNT-IMP: ABNORMAL
SERVICE CMNT-IMP: NORMAL

## 2023-04-26 PROCEDURE — 74011250637 HC RX REV CODE- 250/637

## 2023-04-26 PROCEDURE — 74011250637 HC RX REV CODE- 250/637: Performed by: NURSE PRACTITIONER

## 2023-04-26 PROCEDURE — 65220000003 HC RM SEMIPRIVATE PSYCH

## 2023-04-26 PROCEDURE — 82962 GLUCOSE BLOOD TEST: CPT

## 2023-04-26 PROCEDURE — 74011250637 HC RX REV CODE- 250/637: Performed by: PSYCHIATRY & NEUROLOGY

## 2023-04-26 PROCEDURE — 74011250637 HC RX REV CODE- 250/637: Performed by: CLINICAL NURSE SPECIALIST

## 2023-04-26 RX ADMIN — Medication 3 MG: at 19:12

## 2023-04-26 RX ADMIN — IBUPROFEN 400 MG: 400 TABLET, FILM COATED ORAL at 22:01

## 2023-04-26 RX ADMIN — LOSARTAN POTASSIUM 50 MG: 50 TABLET, FILM COATED ORAL at 08:47

## 2023-04-26 RX ADMIN — IBUPROFEN 400 MG: 400 TABLET, FILM COATED ORAL at 16:05

## 2023-04-26 RX ADMIN — QUETIAPINE 300 MG: 300 TABLET, EXTENDED RELEASE ORAL at 23:09

## 2023-04-26 RX ADMIN — DICLOFENAC SODIUM 2 G: 10 GEL TOPICAL at 17:25

## 2023-04-26 RX ADMIN — FLUOXETINE 40 MG: 20 CAPSULE ORAL at 08:47

## 2023-04-26 RX ADMIN — METFORMIN HYDROCHLORIDE 500 MG: 500 TABLET, EXTENDED RELEASE ORAL at 17:24

## 2023-04-26 RX ADMIN — DICLOFENAC SODIUM 2 G: 10 GEL TOPICAL at 13:00

## 2023-04-26 RX ADMIN — DICLOFENAC SODIUM 2 G: 10 GEL TOPICAL at 22:31

## 2023-04-26 RX ADMIN — GLIPIZIDE 5 MG: 5 TABLET ORAL at 08:47

## 2023-04-26 RX ADMIN — FLUOXETINE 40 MG: 20 CAPSULE ORAL at 17:23

## 2023-04-26 RX ADMIN — TRAZODONE HYDROCHLORIDE 50 MG: 50 TABLET ORAL at 22:33

## 2023-04-26 RX ADMIN — HYDROXYZINE HYDROCHLORIDE 50 MG: 50 TABLET, FILM COATED ORAL at 08:48

## 2023-04-26 RX ADMIN — TRIAMTERENE AND HYDROCHLOROTHIAZIDE 1 TABLET: 37.5; 25 TABLET ORAL at 08:47

## 2023-04-26 RX ADMIN — ATORVASTATIN CALCIUM 40 MG: 40 TABLET, FILM COATED ORAL at 08:47

## 2023-04-26 RX ADMIN — CETIRIZINE HYDROCHLORIDE 10 MG: 10 TABLET, FILM COATED ORAL at 17:24

## 2023-04-26 NOTE — BH NOTES
PRN Medication Documentation    Specific patient behavior that led to need for PRN medication: c/o feeling anxious  Staff interventions attempted prior to PRN being given: therapeutic communication  PRN medication given: Atarax  Patient response/effectiveness of PRN medication: Will continue to monitor

## 2023-04-26 NOTE — BH NOTES
PRN Medication Documentation    Specific patient behavior that led to need for PRN medication: Patient requests motrin for 5 out of 10 headache  Staff interventions attempted prior to PRN being given: distraction, relaxation  PRN medication given: PRN motrin given PO  Patient response/effectiveness of PRN medication: Patient visible on unit, will reassess pain

## 2023-04-26 NOTE — INTERDISCIPLINARY ROUNDS
Behavioral Health Interdisciplinary Rounds     Patient Name: Dominic Wikc  Age: 50 y.o. Room/Bed:  726/  Primary Diagnosis: <principal problem not specified>   Admission Status: Voluntary     Readmission within 30 days: no  Power of  in place: no  Patient requires a blocked bed: no            Sleep hours: 7       Participation in Care/Groups:  yes  Medication Compliant?: Yes  PRNS (last 24 hours): Antipsychotic (PO), Antipsychotic (IM), and Sleep Aid    Restraints (last 24 hours):  no  __________________________________________________  OQ Admission Analysis Survey completed:no  OQ Admission Analysis Survey score:  OQ Discharge Analysis Survey completed:  OQ Discharge Analysis Survey score:   __________________________________________________     Alcohol screening (AUDIT) completed -  AUDIT Score: 0  If applicable, date SBIRT discussed in treatment team AND documented:    Tobacco - patient is a smoker: Have You Used Tobacco in the Past 30 Days: No  Illegal Drugs use: Have You Used Any Illegal Substances Over the Past 12 Months: No    24 hour chart check complete: yes     _______________________________________________    Patient goal(s) for today:   Treatment team focus/goals: Plan to transfer to the general unit.     Progress note:    no issues the last 24 hours, she has been compliant with her medications and treatment   Spiritual Care Consult:   Financial concerns/prescription coverage: medicaid   Family contact:    parents                     Family requesting physician contact today:    Discharge plan: she will return home with her parents in [place]   Access to weapons : No                                                              Outpatient provider(s): [facility]   Patient's preferred phone number for follow up call :   Patient's preferred e-mail address :    LOS:  16  Expected LOS: TBD     Participating treatment team members: ANGIE Mane Dr., RN

## 2023-04-26 NOTE — PROGRESS NOTES
Patient received resting in bed with eyes closed. NAD and no complaints noted. Safety measures in place. Will continue to monitor with q15min rounds. Problem: Falls - Risk of  Goal: *Absence of Falls  Description: Document Janelle Paz Fall Risk and appropriate interventions in the flowsheet.   Outcome: Progressing Towards Goal  Note: Fall Risk Interventions:            Medication Interventions: Teach patient to arise slowly

## 2023-04-26 NOTE — BH NOTES
GROUP THERAPY PROGRESS NOTE    Patient is participating in [address] and Wellness group. Group time: 45 minutes    Personal goal for participation: To complete Adult Wellness Self-Assessment. Goal orientation: Personal    Group therapy participation: Active     Therapeutic interventions reviewed and discussed:  Group members were asked to complete a wellness self-assessment which included areas of relationships, spirituality, physicality, stress management, rest, and vocation. Group members were able to visualize the areas of wellness that may need improvement     Impression of participation: SW read over education about healthy lifestyle maintenance and then provided pts with assessment to complete independently. SW will met briefly  with pts to discuss assessment findings.        Drew Platt, MSW, QMHP-A

## 2023-04-26 NOTE — BH NOTES
GROUP THERAPY PROGRESS NOTE    Patient is participating in psychotherapy group. Group time: 45 minutes    Personal goal for participation: to develop an understanding of cognitive distortions and how to alter our thinking. Goal orientation: Personal    Group therapy participation:  Active     Therapeutic interventions reviewed and discussed:  Group members were guided through learning about cognitive distortions. Members gained an understanding of how these types of distortions effect perception, relationships, and overall mental health. Members were guided through learning about methods that can be used for changing negative thought patterns. Members were able to identify distortions and engage in discussion about past experiences. Handout provided. Impression of participation:  Pt was present and engaged in group discussion. Pt added insight to group topic and asked relevant questions. Pt interacted with SW and peers. Pt was calm, cooperative.        JENNIFER Warren, [facility]

## 2023-04-26 NOTE — BH NOTES
TRANSFER - IN REPORT:    Verbal report received from [facility]) on Lyndol McCormick  being received from [address] acute(unit) for routine progression of care      Report consisted of patients Situation, Background, Assessment and   Recommendations(SBAR). Information from the following report(s) SBAR, Kardex, and ED Summary was reviewed with the receiving nurse. Opportunity for questions and clarification was provided. Assessment completed upon patients arrival to unit and care assumed.

## 2023-04-26 NOTE — PROGRESS NOTES
Problem: Anxiety-Behavioral Health (Adult/Pediatric)  Goal: *STG: Participates in treatment plan  Outcome: Progressing Towards Goal  Goal: *STG: Remains safe in hospital  Outcome: Progressing Towards Goal  Goal: *STG: Seeks staff when feelings of anxiety and fear arise  Outcome: Progressing Towards Goal  Goal: *STG: Demonstrates decrease in ritualistic behavior  Outcome: Progressing Towards Goal  Goal: *STG: Demonstrates effective anxiety reduction strategies  Outcome: Progressing Towards Goal  Goal: *STG/LTG: Complies with medication therapy  Outcome: Progressing Towards Goal     Problem: Falls - Risk of  Goal: *Absence of Falls  Description: Document Princess Fall Risk and appropriate interventions in the flowsheet. Outcome: Progressing Towards Goal  Note: Fall Risk Interventions:    Medication Interventions: Teach patient to arise slowly  Pt out on unit engaged with peers. Denies current SI/HI/AVH. Remains medication and meal compliant. Will continue to monitor q15 minutes for safety.

## 2023-04-26 NOTE — PROGRESS NOTES
Problem: Anxiety-Behavioral Health (Adult/Pediatric)  Goal: *STG: Remains safe in hospital  Outcome: Progressing Towards Goal  Pt denies any suicidal or homicidal thoughts. Contracts for safety. Remains on q 15 min safety checks.      Goal: *STG: Seeks staff when feelings of anxiety and fear arise  Outcome: Progressing Towards Goal  Will express needs as needed  Goal: *STG: Demonstrates decrease in ritualistic behavior  Outcome: Not Progressing Towards Goal  Screaming ,yelling and hair pulling  Goal: *STG: Demonstrates effective anxiety reduction strategies  Outcome: Progressing Towards Goal  Has journaled and listened to music

## 2023-04-26 NOTE — BH NOTES
Chief Complaint:  \"I had an episode. \"    Length of Stay: 16 Days    Interval History:  04/26/2023  Nursing report patient slept well last night. She reports feeling quite empowered by her jasvir today. She reports no worsened symptoms or urges of self harm. She's had no hair pulling or yelling events. She requests to be transferred to the general side. 04/25/2023  Patient says that she experienced an episode of yelling and hair pulling that lasted for an hour. She says that she didn't have control over what was going on. She received IM medications. Patient says that she tried walking up and down the bustos. She says that she has no warning signs. She reports benefit from the IM medications. She reflects back and says that these episodes have no triggers. 04/24/2023  Catherine reports experiencing hair pulling as well as yelling fits of 'stop it, stop it.' She says that she walks and utilizes the squeeze ball, but eventually needing medication. She says that she has anger and irritability towards the anxiety, but not towards anyone else. Denies SI or self harm thoughts. 4/23 - Shadia Reece reports ongoing anxiety this morning. Some yelling outbursts noted this AM. Energy level remains fair. She reports sleeping well. She complains of headache but it is not clear if this is a side effect or unrelated. We discussed holding Prozac but she is not in favor of this. Some PI persists. 4/22 - Shadia Reece reports little change in mood. She complains of headache which may be related to elevated blood pressure. She is in no acute distress on interview. She denies SI / HI. Somatic preoccupation / delusion noted on interview. She denies AVH currently. Some paranoid ideation regarding medications. 04/21/2023  Shadia Reece states she is feeling good currently. However, she had an episode this morning again where she pulled her hair and yelled \"stop it. \" There was no trigger.  She denies SI/plan/intent, denies HI/plan/intent, denies AVH. Pt states she is still having headaches. She is sleeping well at night. Appetite is good. She has several somatic complaints, states there is something tingling from her ear to her arm. She has been taking Voltaren for this and finds it helpful. She states she does have depression in the mornings as well. She states she feels she constantly has \"congestion\" in her head, which again she thinks is a side effect from a medication. 04/20/2023  Patient reports sleeping well overnight. However, she says that she had 'an episode this morning.'  She reports that she had a good jasvir group. She felt nauseous, had a headache, felt her heart racing, feels tired. She says that this takes about 10miutes. Clementina Chappell thinks that these episodes are getting better, however, as she hadn't had any such episodes for the past 48 hours. She says that her headache is quite bothersome and is interested in further decrease of depakote. 04/19/2023  Reports sleeping well last night. She is eating her meals and taking medications. She report feeling 'head congestion' improved, but feels her dyspepsia is the same. Clementina Chappell feels hopeful about her symptoms because this is the second day of not experiencing symptoms of hair pulling, yelling, or self harm. She is anxious about the side-effects not going away and says that they are bothersome. She is agreeable to use mylanta prn and  if no improvement, she is agreeable to further reduction in depakote. 04/18/2023  Patient reports sleeping 7 hours. She attempted to board on the general unit, but she was unable to stay there any more than 15 minutes. She remains in a blocked room because of her behaviors of yelling and hair pulling. She feels improvement in her symptoms of hair pulling and yelling out. She hasn't experienced any of those symptoms yesterday. However she complains of experiencing bothersome headache and nausea.  She requests changes in her medication regimen. She denies SI. Denies HI.    04/17/2023  Patient had 2 episodes of hair pulling and calling out. She says that her increase in medication dose has caused nausea and headache, but she is tolerating this. She will think about whether or not she'd prefer prozac at night. Focussed patient's attention on the 'what' of her experience prior to hair pulling, or saying 'stop,' or clothes pulling, in order to define her stressors, and triggers; in order to focus work with her therapist.  Met with patient' family and answered questions about next steps. 04/16/2023  Patient reportedly had good sleep last night of 7 hours. Nursing report experienced significant yelling during the day today, biting on the stress ball, but no hair pulling. Patient reports that today she was pulling her hair, yelling out, biting stress balls. Took prn zyprex  and atarax this morning after. She felt they were helping. She reports that the urges come on in waves, and she doesn't know what causes them. She reports this occurs 2-3 times per day. 04/15/2023  Reports yelling out at night. Nurses report witnessing patient bite on her stress ball. She slept well. Complains of lightheadedness, but otherwise well. Denies having SI. Denies HI. Denies experiencing AH    04/14/2023  Nursing report patient bit the stress ball yesterday and had one episode of hair pulling. Last night she got up and screamed in the middle of night. She denies having side-effects of increasing depakote dose, but does feel that she is lightheaded. At this time she has no urges or thoughts to harm herself. Repots slept really well. She requests changing prozac to day time instead of night time. 04/13/2023  Nursing report patient had no acute events overnight. Patient remains in a blocked room. However, when she got out of her room this am, she did pat her hair. No hair pulling or screaming noted. She had tylenol and trazodone prn last night.  No IM medications. Treatment team evaluated her in her room this morning. She says that her mood is fair, but that she struggles with anger. She reports appreciating the 's help in empowering her to deal with her anger. She tolerated starting depakote and denies having any side-effects to it. She isn't sure if it is helping, but she reports feeling about the same, but no worsening of symptoms. 04/12/2023  Nursing report patient slept well and has taken her medications. Describes experiencing worsening of mood and irritability for the past days, with patient describing 'screaming fits' of 'stop it, stop it. ,' at the 'anxiety' as it is tormenting her. Matthieu Telma shares that she quit her job because she was unable to function while struggling with these symptoms. She did have an admission to [facility] after she was discharged from here and after finishing the PHP program. Her symptoms had been worsening for the past three days, and says that she presented here at the urging of her parents. She shares that she feels quite low and doesn't have hope for the future. Her LMP was 1 month ago, but her menstruation hadn't been regular.     Past Medical History:  Past Medical History:   Diagnosis Date    Anxiety     Depression     ANXIETY & DEPRESSION    Diabetes ([facility] [address]     TYPE II    Hypertension     Infected sebaceous cyst, upper back 4/10/2019    Panic attack     Psychoses ([facility] [address] 2/23/2023    Suicidal thoughts     Vision decreased        Labs:  Lab Results   Component Value Date/Time    WBC 8.2 04/10/2023 01:07 PM    HGB 11.6 04/10/2023 01:07 PM    HCT 37.7 04/10/2023 01:07 PM    PLATELET 196 85/60/0929 01:07 PM    MCV 85.9 04/10/2023 01:07 PM      Lab Results   Component Value Date/Time    Sodium 136 04/10/2023 01:07 PM    Potassium 3.4 (L) 04/10/2023 01:07 PM    Chloride 103 04/10/2023 01:07 PM    CO2 30 04/10/2023 01:07 PM    Anion gap 3 (L) 04/10/2023 01:07 PM    Glucose 38 (LL) 04/10/2023 01:07 PM    Glucose 146 (H) 07/29/2022 05:44 AM    BUN 11 04/10/2023 01:07 PM    Creatinine 0.83 04/10/2023 01:07 PM    BUN/Creatinine ratio 13 04/10/2023 01:07 PM    GFR est AA >60 07/26/2022 02:48 PM    GFR est non-AA >60 07/26/2022 02:48 PM    Calcium 9.5 04/10/2023 01:07 PM    Bilirubin, total 0.2 04/10/2023 01:07 PM    Alk.  phosphatase 80 04/10/2023 01:07 PM    Protein, total 7.9 04/10/2023 01:07 PM    Albumin 3.7 04/10/2023 01:07 PM    Globulin 4.2 (H) 04/10/2023 01:07 PM    A-G Ratio 0.9 (L) 04/10/2023 01:07 PM    ALT (SGPT) 25 04/10/2023 01:07 PM        Vitals:    04/24/23 2131 04/25/23 0812 04/25/23 1617 04/26/23 0725   BP: 127/88  103/62 (!) 131/90   Pulse: 94  (!) 104 98   Resp: 16 16 16 16   Temp: 98.7 °F (37.1 °C)  98.6 °F (37 °C) 98.2 °F (36.8 °C)   SpO2:   96% 95%   Weight:       Height:             Current Facility-Administered Medications   Medication Dose Route Frequency Provider Last Rate Last Admin    metFORMIN ER (GLUCOPHAGE XR) tablet 500 mg  500 mg Oral DAILY WITH DINNER Loyda Ceja MD   500 mg at 04/25/23 1747    cetirizine (ZYRTEC) tablet 10 mg  10 mg Oral QPM Loyda Ceja MD   10 mg at 04/25/23 1747    triamterene-hydroCHLOROthiazide (MAXZIDE) 37.5-25 mg per tablet 1 Tablet  1 Tablet Oral DAILY Janusz Harden NP   1 Tablet at 04/26/23 0847    diclofenac (VOLTAREN) 1 % topical gel 2 g  2 g Topical QID Jaylan Thompson MD   2 g at 04/25/23 2217    cloNIDine HCL (CATAPRES) tablet 0.1 mg  0.1 mg Oral Q6H PRN Lucas Macias, NP   0.1 mg at 04/20/23 1756    alum-mag hydroxide-simeth (MYLANTA) oral suspension 30 mL  30 mL Oral Q4H PRN Jaylan Thompson MD   30 mL at 04/19/23 1323    FLUoxetine (PROzac) capsule 40 mg  40 mg Oral BID Jaylan Thompson MD   40 mg at 04/26/23 0847    ibuprofen (MOTRIN) tablet 400 mg  400 mg Oral Q4H PRN Jaylan Thompson MD   400 mg at 04/24/23 2107    atorvastatin (LIPITOR) tablet 40 mg  40 mg Oral DAILY Jaylan Thompson MD   40 mg at 04/26/23 0847    losartan (COZAAR) tablet 50 mg  50 mg Oral DAILY Amy Quinn MD   50 mg at 04/26/23 0847    glipiZIDE (GLUCOTROL) tablet 5 mg  5 mg Oral ACB Mohan Vega A, CNS   5 mg at 04/26/23 0847    glucose chewable tablet 16 g  4 Tablet Oral PRN [place] Vega A, CNS        glucagon (GLUCAGEN) injection 1 mg  1 mg IntraMUSCular PRN [place] Vega A, CNS        dextrose 10 % infusion 0-250 mL  0-250 mL IntraVENous PRN [place] Vega A, CNS        insulin lispro (HUMALOG) injection   SubCUTAneous TIDAC Nancy Ennis, CNS        clonazePAM (KlonoPIN) tablet 0.25 mg  0.25 mg Oral QHS PRN Jaylan Thompson MD   0.25 mg at 04/22/23 0137    melatonin tablet 3 mg  3 mg Oral QPM Jaylan Thompson MD   3 mg at 04/25/23 2217    QUEtiapine SR (SEROquel XR) tablet 300 mg  300 mg Oral QHS Jaylan Thompson MD   300 mg at 04/25/23 2318    ondansetron (ZOFRAN ODT) tablet 4 mg  4 mg Oral Q8H PRN Jaylan Thompson MD   4 mg at 04/23/23 1813    OLANZapine (ZyPREXA) tablet 5 mg  5 mg Oral Q6H PRN Kiel Roper NP   5 mg at 04/23/23 1842    haloperidol lactate (HALDOL) injection 5 mg  5 mg IntraMUSCular Q6H PRN Kiel Roper, NP   5 mg at 04/25/23 0909    benztropine (COGENTIN) tablet 1 mg  1 mg Oral BID PRN Kiel Roper, NP        diphenhydrAMINE (BENADRYL) injection 50 mg  50 mg IntraMUSCular BID PRN Kiel Roper NP   50 mg at 04/11/23 0903    hydrOXYzine HCL (ATARAX) tablet 50 mg  50 mg Oral TID PRN Kiel Roper NP   50 mg at 04/26/23 0848    LORazepam (ATIVAN) injection 1 mg  1 mg IntraMUSCular Q4H PRN Kiel Roper, NP   1 mg at 04/25/23 0908    traZODone (DESYREL) tablet 50 mg  50 mg Oral QHS PRN Kiel Roper, NP   50 mg at 04/25/23 8759    magnesium hydroxide (MILK OF MAGNESIA) 400 mg/5 mL oral suspension 30 mL  30 mL Oral DAILY PRN Kiel Roper NP   30 mL at 04/18/23 2723     Mental Status Exam:  Obese 48yo AAF is engaged in the evaluation. She has short hair and maintains good eye contact throughout.   Speech is spontaneous  Mood is \"fine\"  Affect: congruent, dysthymic and even  Denies SI. Denies HI. Perception: no paranoia or delusions  Cognition is grossly intact    Physical Exam:  Body habitus: Body mass index is 34.71 kg/m². Musculoskeletal system: normal gait  Tremor - neg  Cog wheeling - neg    Assessment and Plan:  Ryanne Oneal meets criteria for a diagnosis of   Mood d/o nos;  Psychotic d/ nos  Trichotillomania    04/26/2023  Transfer patient to general side. Continue current medications as prescribed. 04/25/2023  Continue current medication regimen. Recommend PHP/IOP for OCD.    4/24/2023  D/c depakote  Recommend ERP outpatient in addition to prozac and seroquel. Transfer pt to general side once safe. 4/23  Continue current treatment  Metformin  mg     4/22 -   Continue current treatment  Depakote level Wednesday  Discharge planning    04/21/2023  Added HCTZ 37.5-25 daily per home med rec, BP has been elevated. No other changes today. 04/20/2023  Decrease depakote dr from 500 to 250mg po qhs to mitigate side-effects of headache, fatigue and nausea. Discussed possible increase of seroquel, possible cross taper to zyprexa. Continue prozac 40mg po bid  Tranfer to general side. 04/19/2023  Continue current medication regimen  Patient may board on general unit  Transfer tomorrow if she does well. Could decrease depakote further to mitigate side-effects. 04/18/2023  D/c depakote dr 500mg po bid. Instead start depakote er 500mg po qhs  D/c prozac 80mg po qday  Instead start prozac 40mg po bid starting tomorrow  Encouraged patient to take prn clonazepam 0.25mg po prn anxiety and ibuprofen prn headache  04/17/2023  Continue current medications and dosages. 04/16/2023  Increae prozac from 60 to 80mg po qday. Will give the difference of 20mg today. Continue seroquel xr 300mg po qhs  Continue depakote dr 500mg po bid.    04/15/2023  Continue current medications. 04/14/2023  Continue current medications.     04/13/2023  Increase depakote from 250mg po bid to 500mg po bid. Will change prozac 60mg po qday to night time to simplify medication administration. 04/12/2023  Patient felt that NAC wasn't helping so she stopped it. At [facility] her prozac was increased from 40 to 60mg po qday, so I will increase her prozac here as well. I will continue prescribed seroquel 300mg po qhs. We discussed a trial of depakote DR 200mg po bid to target irritability and anger. Discussed with patient medication side-effects including being contraindication for pregnancy. Discussed d/c of buspar to decrease risk of polypharmacy. A coordinated, multidisplinary treatment team round was conducted with the patient, nurses, pharmcist,  and writer present. Discussions held with , and/or with family members; Complete current electronic health record for patient was reviewed in full including consultant notes, ancillary staff notes, nurses and tech notes, labs and vitals. I certify that this patients inpatient psychiatric hospital services furnished since the previous certification were, and continue to be, required for treatment that could reasonably be expected to improve the patient's condition, or for diagnostic study, and that the patient continues to need, on a daily basis, active treatment furnished directly by or requiring the supervision of inpatient psychiatric facility personnel. In addition, the hospital records show that services furnished were intensive treatment services, admission or related services, or equivalent services.

## 2023-04-27 LAB
GLUCOSE BLD STRIP.AUTO-MCNC: 109 MG/DL (ref 65–117)
GLUCOSE BLD STRIP.AUTO-MCNC: 147 MG/DL (ref 65–117)
GLUCOSE BLD STRIP.AUTO-MCNC: 174 MG/DL (ref 65–117)
SERVICE CMNT-IMP: ABNORMAL
SERVICE CMNT-IMP: ABNORMAL
SERVICE CMNT-IMP: NORMAL

## 2023-04-27 PROCEDURE — 74011250637 HC RX REV CODE- 250/637: Performed by: NURSE PRACTITIONER

## 2023-04-27 PROCEDURE — 82962 GLUCOSE BLOOD TEST: CPT

## 2023-04-27 PROCEDURE — 74011250637 HC RX REV CODE- 250/637: Performed by: PSYCHIATRY & NEUROLOGY

## 2023-04-27 PROCEDURE — 74011250637 HC RX REV CODE- 250/637: Performed by: HOSPITALIST

## 2023-04-27 PROCEDURE — 74011250637 HC RX REV CODE- 250/637

## 2023-04-27 PROCEDURE — 74011250637 HC RX REV CODE- 250/637: Performed by: CLINICAL NURSE SPECIALIST

## 2023-04-27 PROCEDURE — 74011250636 HC RX REV CODE- 250/636: Performed by: HOSPITALIST

## 2023-04-27 PROCEDURE — 65220000003 HC RM SEMIPRIVATE PSYCH

## 2023-04-27 RX ORDER — METHYLPREDNISOLONE 4 MG/1
4 TABLET ORAL ONCE
Status: COMPLETED | OUTPATIENT
Start: 2023-04-27 | End: 2023-04-27

## 2023-04-27 RX ORDER — METHYLPREDNISOLONE 4 MG/1
8 TABLET ORAL ONCE
Status: COMPLETED | OUTPATIENT
Start: 2023-04-27 | End: 2023-04-27

## 2023-04-27 RX ORDER — METHYLPREDNISOLONE 4 MG/1
4 TABLET ORAL 2 TIMES DAILY
Status: DISCONTINUED | OUTPATIENT
Start: 2023-05-01 | End: 2023-04-28 | Stop reason: HOSPADM

## 2023-04-27 RX ORDER — METHYLPREDNISOLONE 4 MG/1
8 TABLET ORAL ONCE
Status: DISCONTINUED | OUTPATIENT
Start: 2023-04-28 | End: 2023-04-28 | Stop reason: HOSPADM

## 2023-04-27 RX ORDER — METHYLPREDNISOLONE 4 MG/1
4 TABLET ORAL 3 TIMES DAILY
Status: DISCONTINUED | OUTPATIENT
Start: 2023-04-30 | End: 2023-04-28 | Stop reason: HOSPADM

## 2023-04-27 RX ORDER — METHYLPREDNISOLONE 4 MG/1
4 TABLET ORAL
Status: DISCONTINUED | OUTPATIENT
Start: 2023-05-02 | End: 2023-04-28 | Stop reason: HOSPADM

## 2023-04-27 RX ORDER — METHYLPREDNISOLONE 4 MG/1
4 TABLET ORAL
Status: DISCONTINUED | OUTPATIENT
Start: 2023-04-28 | End: 2023-04-28 | Stop reason: HOSPADM

## 2023-04-27 RX ORDER — ACETAMINOPHEN 325 MG/1
650 TABLET ORAL 3 TIMES DAILY
Status: DISCONTINUED | OUTPATIENT
Start: 2023-04-27 | End: 2023-04-28 | Stop reason: HOSPADM

## 2023-04-27 RX ORDER — METHYLPREDNISOLONE 4 MG/1
4 TABLET ORAL
Status: DISCONTINUED | OUTPATIENT
Start: 2023-04-29 | End: 2023-04-28 | Stop reason: HOSPADM

## 2023-04-27 RX ADMIN — METHYLPREDNISOLONE 4 MG: 4 TABLET ORAL at 22:01

## 2023-04-27 RX ADMIN — METFORMIN HYDROCHLORIDE 500 MG: 500 TABLET, EXTENDED RELEASE ORAL at 17:12

## 2023-04-27 RX ADMIN — ACETAMINOPHEN 650 MG: 325 TABLET ORAL at 22:00

## 2023-04-27 RX ADMIN — DICLOFENAC SODIUM 2 G: 10 GEL TOPICAL at 22:00

## 2023-04-27 RX ADMIN — GLIPIZIDE 5 MG: 5 TABLET ORAL at 09:16

## 2023-04-27 RX ADMIN — DICLOFENAC SODIUM 2 G: 10 GEL TOPICAL at 09:15

## 2023-04-27 RX ADMIN — DICLOFENAC SODIUM 2 G: 10 GEL TOPICAL at 13:14

## 2023-04-27 RX ADMIN — HYDROXYZINE HYDROCHLORIDE 50 MG: 50 TABLET, FILM COATED ORAL at 01:49

## 2023-04-27 RX ADMIN — METHYLPREDNISOLONE 4 MG: 4 TABLET ORAL at 17:12

## 2023-04-27 RX ADMIN — FLUOXETINE 40 MG: 20 CAPSULE ORAL at 09:16

## 2023-04-27 RX ADMIN — HYDROXYZINE HYDROCHLORIDE 50 MG: 50 TABLET, FILM COATED ORAL at 09:16

## 2023-04-27 RX ADMIN — IBUPROFEN 400 MG: 400 TABLET, FILM COATED ORAL at 14:45

## 2023-04-27 RX ADMIN — IBUPROFEN 400 MG: 400 TABLET, FILM COATED ORAL at 09:15

## 2023-04-27 RX ADMIN — ATORVASTATIN CALCIUM 40 MG: 40 TABLET, FILM COATED ORAL at 09:16

## 2023-04-27 RX ADMIN — TRIAMTERENE AND HYDROCHLOROTHIAZIDE 1 TABLET: 37.5; 25 TABLET ORAL at 10:18

## 2023-04-27 RX ADMIN — QUETIAPINE 300 MG: 300 TABLET, EXTENDED RELEASE ORAL at 22:47

## 2023-04-27 RX ADMIN — DICLOFENAC SODIUM 2 G: 10 GEL TOPICAL at 17:14

## 2023-04-27 RX ADMIN — LOSARTAN POTASSIUM 50 MG: 50 TABLET, FILM COATED ORAL at 09:16

## 2023-04-27 RX ADMIN — METHYLPREDNISOLONE 8 MG: 4 TABLET ORAL at 22:00

## 2023-04-27 RX ADMIN — TRAZODONE HYDROCHLORIDE 50 MG: 50 TABLET ORAL at 22:00

## 2023-04-27 RX ADMIN — Medication 3 MG: at 22:00

## 2023-04-27 RX ADMIN — METHYLPREDNISOLONE 8 MG: 4 TABLET ORAL at 17:13

## 2023-04-27 RX ADMIN — FLUOXETINE 40 MG: 20 CAPSULE ORAL at 17:11

## 2023-04-27 RX ADMIN — CETIRIZINE HYDROCHLORIDE 10 MG: 10 TABLET, FILM COATED ORAL at 17:11

## 2023-04-27 NOTE — PROGRESS NOTES
Problem: Depressed Mood (Adult/Pediatric)  Goal: *STG: Participates in treatment plan  Outcome: Progressing Towards Goal  Note: Out on unit engaged and social w peers. Demonstrates appropriate behaviors, ability to follow direction and unit routine. Interacting w peers. Reports thoughts feeling clear, organized and goal directed. Mood stable and hopeful. Discussed upcoming discharge with specific therapy to focus on OCD. Pt verbalized understanding.  Staff focus is on offering support reassurance  Goal: *STG: Attends activities and groups  Outcome: Progressing Towards Goal  Goal: *STG: Demonstrates reduction in symptoms and increase in insight into coping skills/future focused  Outcome: Progressing Towards Goal  Goal: Interventions  Outcome: Progressing Towards Goal

## 2023-04-27 NOTE — INTERDISCIPLINARY ROUNDS
Behavioral Health Interdisciplinary Rounds     Patient Name: Fito Burns  Age: 50 y.o. Room/Bed:  6/  Primary Diagnosis: <principal problem not specified>   Admission Status: Voluntary     Readmission within 30 days: no  Power of  in place: no  Patient requires a blocked bed: no            Sleep hours: 7       Participation in Care/Groups:  yes  Medication Compliant?: Yes  PRNS (last 24 hours): Antianxiety, Sleep Aid, and Pain    Restraints (last 24 hours):  no  __________________________________________________  OQ Admission Analysis Survey completed:no  OQ Admission Analysis Survey score:  OQ Discharge Analysis Survey completed:  OQ Discharge Analysis Survey score:   __________________________________________________     Alcohol screening (AUDIT) completed -  AUDIT Score: 0  If applicable, date SBIRT discussed in treatment team AND documented:    Tobacco - patient is a smoker: Have You Used Tobacco in the Past 30 Days: No  Illegal Drugs use: Have You Used Any Illegal Substances Over the Past 12 Months: No    24 hour chart check complete: yes     _______________________________________________    Patient goal(s) for today:   Treatment team focus/goals: Plan to set up discharge. Progress note: She denies any issues with her medications,      Spiritual Care Consult:   Financial concerns/prescription coverage:  medicaid   Family contact:    parents                     Family requesting physician contact today:    Discharge plan:  She will go home with her parents   Access to weapons : no                                                             Outpatient provider(s): Dr. Cristi Jane and therapist   Patient's preferred phone number for follow up call :   Patient's preferred e-mail address :    LOS:  17  Expected LOS: TBD    Participating treatment team members: Jose Cruz Dr.

## 2023-04-27 NOTE — PROGRESS NOTES
Patient received resting in bed with eyes closed. NAD and no complaints noted. Safety measures in place. Will continue to monitor with q15min rounds. Problem: Falls - Risk of  Goal: *Absence of Falls  Description: Document Sherran Pretty Fall Risk and appropriate interventions in the flowsheet.   Outcome: Progressing Towards Goal  Note: Fall Risk Interventions:            Medication Interventions: Teach patient to arise slowly

## 2023-04-27 NOTE — CONSULTS
Medical Consultation    Date of Service:  4/27/2023  Primary Care Provider: Juventino Santiago MD  Source of information: The patient and Chart review    Chief Complaint: left arm pain      History of Presenting Illness:   Corrinne Haines is a 50 y.o. female who presents with left arm pain and paresthesias. She is admitted to [facility] for psychotic d/o, mood d/o, OCD. We are consulted for left arm pain. The patient tells me she developed LUE pain about a week ago. It starts in her neck near her left ear and radiates down her arm to her fingertips. It is constant and worsened when she lays on her left side, without alleviating factors. It is associated with paresthesias of the arm. She denies neck trauma, other focal weakness/paresthesias, headache. She's been getting ibuprofen PRN here and diclofenac gel with mild improvement. REVIEW OF SYSTEMS:  A comprehensive review of systems was negative except for that written in the History of Present Illness. Past Medical History:   Diagnosis Date    Anxiety     Depression     ANXIETY & DEPRESSION    Diabetes ([facility] [address]     TYPE II    Hypertension     Infected sebaceous cyst, upper back 4/10/2019    Panic attack     Psychoses ([facility] [address] 2/23/2023    Suicidal thoughts     Vision decreased       Past Surgical History:   Procedure Laterality Date    COLONOSCOPY N/A 11/20/2020    . COLONOSCOPY  :- performed by Leydi Hernandez MD at [facility] ENDOSCOPY    HX OTHER SURGICAL  06/03/2019     Excision of large sebaceous cyst in the midline upper back and with packing- [facility]-DR. Roshni Valladares     Prior to Admission medications    Medication Sig Start Date End Date Taking? Authorizing Provider   FLUoxetine (PROzac) 20 mg capsule Take 1 Capsule by mouth daily. (with 40 mg for total of 60 mg)   Yes Provider, Historical   busPIRone (BUSPAR) 15 mg tablet Take 1 Tablet by mouth two (2) times a day.    Yes Provider, Historical   clonazePAM (KlonoPIN) 0.5 mg tablet Take 0.5 Tablets by mouth nightly. Max Daily Amount: 0.25 mg. Yes Provider, Historical   glipiZIDE (GLUCOTROL) 5 mg tablet Take 1 Tablet by mouth two (2) times a day. Yes Provider, Historical   atorvastatin (LIPITOR) 40 mg tablet Take 1 Tablet by mouth daily. Yes Provider, Historical   traZODone (DESYREL) 100 mg tablet Take 2 Tablets by mouth nightly. Yes Provider, Historical   losartan (COZAAR) 50 mg tablet Take 1 Tablet by mouth daily. Yes Provider, Historical   triamterene-hydroCHLOROthiazide (MAXZIDE) 37.5-25 mg per tablet Take 1 Tablet by mouth daily. Yes Provider, Historical   metFORMIN (GLUCOPHAGE) 1,000 mg tablet Take 1 Tablet by mouth daily. Yes Provider, Historical   FLUoxetine (PROzac) 40 mg capsule Take 1 Capsule by mouth daily. (with 20 mg for total of 60 mg)   Yes Provider, Historical   acetylcysteine (NAC) 600 mg tab Take 1,200 mg by mouth daily. Indications: memory 3/6/23  Yes Sabas Childress MD   QUEtiapine SR (Seroquel XR) 300 mg sr tablet Take 1 Tablet by mouth nightly. Indications: bipolar depression 3/6/23   Sabas Childress MD   ondansetron (ZOFRAN ODT) 4 mg disintegrating tablet Take 1 Tablet by mouth every eight (8) hours as needed for Nausea or Vomiting. Indications: vomiting in children due to acute gastroenteritis 3/6/23   Sabas Childress MD   melatonin 3 mg tablet Take 1 Tablet by mouth nightly. Indications: insomnia 3/6/23   Sabas Childress MD     Allergies   Allergen Reactions    Lisinopril Rash     Swelling of the lips      Family History   Problem Relation Age of Onset    Diabetes Mother     Hypertension Mother     Cancer Father         LUNG CA. SMOKER      Social History:  reports that she has never smoked. She has never used smokeless tobacco. She reports that she does not drink alcohol and does not use drugs.    Social Determinants of Health     Tobacco Use: Low Risk     Smoking Tobacco Use: Never    Smokeless Tobacco Use: Never    Passive Exposure: Not on file   Alcohol Use: Not on file   Financial Resource Strain: Not on file   Food Insecurity: Not on file   Transportation Needs: Not on file   Physical Activity: Not on file   Stress: Not on file   Social Connections: Not on file   Intimate Partner Violence: Not on file   Depression: Not at risk    PHQ-2 Score: 2   Housing Stability: Not on file        Medications were reconciled to the best of my ability given all available resources at the time of admission. Route is PO if not otherwise noted. Family and social history were personally reviewed, all pertinent and relevant details are outlined as above. Objective:   Visit Vitals  /84   Pulse 96   Temp 97.9 °F (36.6 °C)   Resp 16   Ht 5' 7\" (1.702 m)   Wt 100.5 kg (221 lb 9.6 oz)   SpO2 94%   BMI 34.71 kg/m²      O2 Device: None (Room air)    PHYSICAL EXAM:   General: NAD non-toxic  HEENT: PEERL, EOMI, moist mucus membranes  Neck: Supple, no JVD, no meningeal signs  Chest: Clear to auscultation bilaterally   CVS: RRR, S1 S2 heard, no murmurs/rubs/gallops  Abd: Soft, non-tender, non-distended, +bowel sounds   Ext: No clubbing, no cyanosis, no edema  Neuro/Psych: grossly non-focal, 5/5 strength b/l but there is weakness of the LUE compared to the right.  There is positive Spurling's test  Cap refill: Brisk, less than 3 seconds  Pulses: 2+, symmetric in all extremities  Skin: Warm, dry, without rashes or lesions    Data Review:   I have independently reviewed and interpreted patient's lab and all other diagnostic data    Abnormal Labs Reviewed   CBC WITH AUTOMATED DIFF - Abnormal; Notable for the following components:       Result Value    RDW 14.6 (*)     All other components within normal limits   METABOLIC PANEL, COMPREHENSIVE - Abnormal; Notable for the following components:    Potassium 3.4 (*)     Anion gap 3 (*)     Glucose 38 (*)     Globulin 4.2 (*)     A-G Ratio 0.9 (*)     All other components within normal limits   URINALYSIS W/MICROSCOPIC - Abnormal; Notable for the following components:    Bacteria 1+ (*)     All other components within normal limits   ACETAMINOPHEN - Abnormal; Notable for the following components:    Acetaminophen level <2 (*)     All other components within normal limits   SALICYLATE - Abnormal; Notable for the following components:    Salicylate level 1.9 (*)     All other components within normal limits   HEMOGLOBIN A1C WITH EAG - Abnormal; Notable for the following components:    Hemoglobin A1c 6.4 (*)     All other components within normal limits   GLUCOSE, POC - Abnormal; Notable for the following components:    Glucose (POC) 160 (*)     All other components within normal limits   GLUCOSE, POC - Abnormal; Notable for the following components:    Glucose (POC) 48 (*)     All other components within normal limits   GLUCOSE, POC - Abnormal; Notable for the following components:    Glucose (POC) 144 (*)     All other components within normal limits   GLUCOSE, POC - Abnormal; Notable for the following components:    Glucose (POC) 131 (*)     All other components within normal limits   GLUCOSE, POC - Abnormal; Notable for the following components:    Glucose (POC) 211 (*)     All other components within normal limits   GLUCOSE, POC - Abnormal; Notable for the following components:    Glucose (POC) 137 (*)     All other components within normal limits   GLUCOSE, POC - Abnormal; Notable for the following components:    Glucose (POC) 148 (*)     All other components within normal limits   GLUCOSE, POC - Abnormal; Notable for the following components:    Glucose (POC) 210 (*)     All other components within normal limits   GLUCOSE, POC - Abnormal; Notable for the following components:    Glucose (POC) 176 (*)     All other components within normal limits   GLUCOSE, POC - Abnormal; Notable for the following components:    Glucose (POC) 135 (*)     All other components within normal limits   GLUCOSE, POC - Abnormal; Notable for the following components:    Glucose (POC) 169 (*)     All other components within normal limits   GLUCOSE, POC - Abnormal; Notable for the following components:    Glucose (POC) 146 (*)     All other components within normal limits   GLUCOSE, POC - Abnormal; Notable for the following components:    Glucose (POC) 139 (*)     All other components within normal limits   GLUCOSE, POC - Abnormal; Notable for the following components:    Glucose (POC) 178 (*)     All other components within normal limits   GLUCOSE, POC - Abnormal; Notable for the following components:    Glucose (POC) 174 (*)     All other components within normal limits   GLUCOSE, POC - Abnormal; Notable for the following components:    Glucose (POC) 145 (*)     All other components within normal limits   GLUCOSE, POC - Abnormal; Notable for the following components:    Glucose (POC) 175 (*)     All other components within normal limits   GLUCOSE, POC - Abnormal; Notable for the following components:    Glucose (POC) 146 (*)     All other components within normal limits   GLUCOSE, POC - Abnormal; Notable for the following components:    Glucose (POC) 126 (*)     All other components within normal limits   GLUCOSE, POC - Abnormal; Notable for the following components:    Glucose (POC) 139 (*)     All other components within normal limits   GLUCOSE, POC - Abnormal; Notable for the following components:    Glucose (POC) 149 (*)     All other components within normal limits   GLUCOSE, POC - Abnormal; Notable for the following components:    Glucose (POC) 215 (*)     All other components within normal limits   GLUCOSE, POC - Abnormal; Notable for the following components:    Glucose (POC) 153 (*)     All other components within normal limits   GLUCOSE, POC - Abnormal; Notable for the following components:    Glucose (POC) 130 (*)     All other components within normal limits   GLUCOSE, POC - Abnormal; Notable for the following components:    Glucose (POC) 219 (*)     All other components within normal limits   GLUCOSE, POC - Abnormal; Notable for the following components:    Glucose (POC) 139 (*)     All other components within normal limits   GLUCOSE, POC - Abnormal; Notable for the following components:    Glucose (POC) 154 (*)     All other components within normal limits   GLUCOSE, POC - Abnormal; Notable for the following components:    Glucose (POC) 146 (*)     All other components within normal limits   GLUCOSE, POC - Abnormal; Notable for the following components:    Glucose (POC) 135 (*)     All other components within normal limits   GLUCOSE, POC - Abnormal; Notable for the following components:    Glucose (POC) 147 (*)     All other components within normal limits   GLUCOSE, POC - Abnormal; Notable for the following components:    Glucose (POC) 149 (*)     All other components within normal limits   GLUCOSE, POC - Abnormal; Notable for the following components:    Glucose (POC) 150 (*)     All other components within normal limits   GLUCOSE, POC - Abnormal; Notable for the following components:    Glucose (POC) 144 (*)     All other components within normal limits   GLUCOSE, POC - Abnormal; Notable for the following components:    Glucose (POC) 133 (*)     All other components within normal limits   GLUCOSE, POC - Abnormal; Notable for the following components:    Glucose (POC) 133 (*)     All other components within normal limits   GLUCOSE, POC - Abnormal; Notable for the following components:    Glucose (POC) 131 (*)     All other components within normal limits   GLUCOSE, POC - Abnormal; Notable for the following components:    Glucose (POC) 156 (*)     All other components within normal limits   GLUCOSE, POC - Abnormal; Notable for the following components:    Glucose (POC) 149 (*)     All other components within normal limits   GLUCOSE, POC - Abnormal; Notable for the following components:    Glucose (POC) 119 (*)     All other components within normal limits   GLUCOSE, POC - Abnormal; Notable for the following components:    Glucose (POC) 153 (*)     All other components within normal limits   GLUCOSE, POC - Abnormal; Notable for the following components:    Glucose (POC) 138 (*)     All other components within normal limits   GLUCOSE, POC - Abnormal; Notable for the following components:    Glucose (POC) 147 (*)     All other components within normal limits   GLUCOSE, POC - Abnormal; Notable for the following components:    Glucose (POC) 174 (*)     All other components within normal limits       All Micro Results       Procedure Component Value Units Date/Time    COVID-19 WITH INFLUENZA A/B [[id number]] Collected: 04/10/23 1308    Order Status: Completed Specimen: Nasopharyngeal Updated: 04/10/23 1340     SARS-CoV-2 by PCR Not detected        Comment: Not Detected results do not preclude SARS-CoV-2 infection and should not be used as the sole basis for patient management decisions. Results must be combined with clinical observations, patient history, and epidemiological information. Influenza A by PCR Not detected        Influenza B by PCR Not detected        Comment: Testing was performed using kelly Esperanza SARS-CoV-2 and Influenza A/B nucleic acid assay. This test is a multiplex Real-Time Reverse Transcriptase Polymerase Chain Reaction (RT-PCR) based in vitro diagnostic test intended for the qualitative detection of nucleic acids from SARS-CoV-2, Influenza A, and Influenza B in nasopharyngeal and nasal swab specimens for use under the FDA's Emergency Use Authorization (EUA) only. Fact sheet for Patients: FindDrives.pl  Fact sheet for Healthcare Providers: FindDrives[employer]pl         URINE CULTURE HOLD SAMPLE [[id number]] Collected: 04/10/23 1308    Order Status: Completed Specimen: Urine Updated: 04/10/23 1324     Urine culture hold       Urine on hold in Microbiology dept for 2 days.   If unpreserved urine is submitted, it cannot be used for addtional testing after 24 hours, recollection will be required. IMAGING:   No orders to display        ECG/ECHO:    Results for orders placed or performed during the hospital encounter of 02/23/23   EKG, 12 LEAD, INITIAL   Result Value Ref Range    Ventricular Rate 85 BPM    Atrial Rate 85 BPM    P-R Interval 156 ms    QRS Duration 70 ms    Q-T Interval 370 ms    QTC Calculation (Bezet) 440 ms    Calculated P Axis 63 degrees    Calculated R Axis 0 degrees    Calculated T Axis 28 degrees    Diagnosis       Normal sinus rhythm  When compared with ECG of 04-MAY-2022 11:25,  No significant change was found  Confirmed by Felicia Hanson M.D., Miguel Pickard ([id number]) on 2/27/2023 8:48:26 AM            Notes reviewed from all clinical/nonclinical/nursing services involved in patient's clinical care. Care coordination discussions were held with appropriate clinical/nonclinical/ nursing providers based on care coordination needs. Assessment:   Given the patient's current clinical presentation, there is a high level of concern for decompensation if discharged from the emergency department. Complex decision making was performed, which includes reviewing the patient's available past medical records, laboratory results, and imaging studies. Active Problems:    Unspecified mood (affective) disorder (HCC) (4/10/2023)        Plan:     L arm pain: radicular in nature, positive Spurling's test. I recommend an MRI of the cervical spine (can be done outpatient) with neurosurgical referral if positive. In the meantime, will start a Medrol dose-pack, schedule acetaminophen for 2 days, continue PRN ibuprofen. Gabapentin can be considered but I noted polypharmacy is a concern per psychiatry. Noted plan is for discharge tomorrow; discussed with the patient to follow-up with her PCP. She will make an appointment now. Thank you for this consultation.         DIET: ADULT DIET Regular; 4 carb choices (60 gm/meal); Diabetic Tray  ADULT ORAL NUTRITION SUPPLEMENT AM Snack, HS Snack; Snack; see below  DIET ONE TIME MESSAGE   ISOLATION PRECAUTIONS: There are currently no Active Isolations  CODE STATUS: Full Code     CRITICAL CARE WAS PERFORMED FOR THIS ENCOUNTER: NO.      Signed By: Ambrocio La MD     April 27, 2023         Please note that this dictation may have been completed with Dragon, the computer voice recognition software. Quite often unanticipated grammatical, syntax, homophones, and other interpretive errors are inadvertently transcribed by the computer software. Please disregard these errors. Please excuse any errors that have escaped final proofreading.

## 2023-04-27 NOTE — BH NOTES
GROUP THERAPY PROGRESS NOTE    Patient did not participate in recreational therapy group.     JENNIFER Richter, HP-A

## 2023-04-27 NOTE — BH NOTES
GROUP THERAPY PROGRESS NOTE    Patient is participating in [address] and Wellness group. Group time: 45 minutes    Personal goal for participation: To develop an understanding of [employer]. Goal orientation: Personal    Group therapy participation:  Active     Therapeutic interventions reviewed and discussed:  Group members were offered education about [employer]. Members learned about the three aspects of Health, physical, mental, and social. Members were provided with a handout assessment so that they may gain a visual understanding of balanced and unbalanced health maintenance. Impression of participation: Pt was present and engaged in group discussion. Pt added insight to group topic. Pt was able to visualize deficiencies in areas of physical, mental, social health. Pt was calm, cooperative.        JENNIFER Hays, QMHP-A

## 2023-04-27 NOTE — PROGRESS NOTES
MUSIC THERAPY GROUP PROGRESS NOTE        4/27/2023    The patient Rizwana Saldaña a 50 y.o. female participated in [facility] group on [facility]. Group time: 1 hour    Personal goal for participation: Patient (pt) will actively participate in at least one-third of the group session time for which she's present. Goal orientation: community    Group therapy participation: active    Therapeutic interventions: Music listening, reflective journaling using written prompts, group song writing. Observation of participation: The patient actively participated in the entirety of the Music Therapy group session time. She shared her music preferences, engaged in self-reflection by completing a song-writing template, and in self-expression by sharing her responses to the template with the group during the songwriting process. Pt had a pleasant affect throughout the session and listened respectfully to the other group members. Pt completed the pre- and post-session survey.     TERRANCE Deleon (Music Therapist-Board Certified)  Spiritual Care Department  Referral-based service

## 2023-04-27 NOTE — BH NOTES
GROUP THERAPY PROGRESS NOTE    Patient is participating in Self-care group. Group time: 60 minutes    Personal goal for participation: To gain an understanding of toxic positivity. Goal orientation: Personal    Group therapy participation: Active     Therapeutic interventions reviewed and discussed:  Group members were guided through the concept of toxic positivity. Members were given opportunity to engage in conversation about Toxic positivity. Members were supported to identify the occurrence of toxic positivity in their current interactions. SW offered support in explaining how and why this concept can damage relationships. Handouts provided. Impression of participation:  Pt was present and engaged in group discussion. Pt was insightful and asked relevant questions.  Pt interacted with peers and JENNIFER Cardona, QMHP-A Visit Information Date & Time Provider Department Dept. Phone Encounter #  
 8/31/2017 10:00 AM Rosa Ardon MD [address] [place] [address] [phone] [id number] Follow-up Instructions Return in about 4 weeks (around 9/28/2017). Upcoming Health Maintenance Date Due FOBT Q 1 YEAR AGE 50-75 1/25/2018 DTaP/Tdap/Td series (2 - Td) 4/29/2024 Allergies as of 8/31/2017  Review Complete On: 8/31/2017 By: Jose Luis Watson LPN No Known Allergies Current Immunizations  Reviewed on 12/22/2015 Name Date Tdap 4/29/2014 Not reviewed this visit You Were Diagnosed With   
  
 Codes Comments Essential hypertension    -  Primary ICD-10-CM: I10 
ICD-9-CM: 401.9 not optimal OFF Rx  
 Lipid disorder     ICD-10-CM: E78.9 ICD-9-CM: 272.9 he is NOT on statin Gout, unspecified cause, unspecified chronicity, unspecified site     ICD-10-CM: M10.9 ICD-9-CM: 274.9 Snoring     ICD-10-CM: R06.83 
ICD-9-CM: 786.09 Cataract of left eye, unspecified cataract type     ICD-10-CM: H26.9 ICD-9-CM: 366.9 Dr. Seth Cotton surgery 9/7/2017 Vitals BP Pulse Temp Resp Height(growth percentile) Weight(growth percentile) (!) 152/103 69 98 °F (36.7 °C) (Oral) 18 5' 10\" (1.778 m) 241 lb 12.8 oz (109.7 kg) SpO2 BMI Smoking Status 97% 34.69 kg/m2 Never Smoker Vitals History BMI and BSA Data Body Mass Index Body Surface Area  
 34.69 kg/m 2 2.33 m 2 Preferred Pharmacy Pharmacy Name Phone [facility] [address]  [facility] [phone] Your Updated Medication List  
  
   
This list is accurate as of: 8/31/17 10:32 AM.  Always use your most recent med list.  
  
  
  
  
 allopurinol 300 mg tablet Commonly known as:  ZYLOPRIM  
TAKE 1 TABLET BY MOUTH EVERY DAY  
  
 aspirin 81 mg chewable tablet Take 1 Tab by mouth daily. losartan 100 mg tablet Commonly known as:  COZAAR Take 1 Tab by mouth daily. pravastatin 40 mg tablet Commonly known as:  PRAVACHOL Take 1 Tab by mouth nightly. Prescriptions Sent to Pharmacy Refills  
 losartan (COZAAR) 100 mg tablet 3 Sig: Take 1 Tab by mouth daily. Class: Normal  
 Pharmacy: [place] [id number] AT [facility] [address] ([facility] Ph #: [phone] Route: Oral  
 pravastatin (PRAVACHOL) 40 mg tablet 3 Sig: Take 1 Tab by mouth nightly. Class: Normal  
 Pharmacy: [facility] [id number] AT [facility] [address] ([facility] Ph #: [phone] Route: Oral  
 allopurinol (ZYLOPRIM) 300 mg tablet 3 Sig: TAKE 1 TABLET BY MOUTH EVERY DAY Class: Normal  
 Pharmacy: [place] [id number] AT [facility] [address] [facility] [address] [facility] ([facility] Ph #: [phone] We Performed the Following ALT [id number] CPT(R)] LDL, DIRECT [id number] CPT(R)] METABOLIC PANEL, BASIC [12595 CPT(R)] Follow-up Instructions Return in about 4 weeks (around 9/28/2017). Patient Instructions Stop lisinopril. Take losartan 100 mg daily for blood pressure Continue pravastatin 40 mg daily for cholesterol Continue allopurinol 300 mg for gout prevention Focus on regular exercise (150 minutes each week) and healthy eating. Eat more fruits and vegetables. Eat more protein (egg whites, beans, and nuts you know you tolerate) and less carbohydrates (white bread, white rice, white pasta, white potatoes, sodas, and sweets). Eat appropriately small portion sizes. Obtain sleep evaluation with Dr. Santi Julien #[phone] Please call Corinne Barrera to help arrange and authorize any tests and/or referrals. Her # is [phone] Central Scheduling at [facility] is #[phone] OK for cataract surgery Introducing [facility]! Dear Darline Chiang: Thank you for requesting a [facility] account. Our records indicate that you already have an active [facility] account. You can access your account anytime at [email or web address]. [employer] Did you know that you can access your hospital and ER discharge instructions at any time in [facility]? You can also review all of your test results from your hospital stay or ER visit. Additional Information If you have questions, please visit the Frequently Asked Questions section of the [facility] website at [email or web address] [employer]/. Remember, [facility] is NOT to be used for urgent needs. For medical emergencies, dial 911. Now available from your iPhone and Android! Please provide this summary of care documentation to your next provider. Your primary care clinician is listed as Renay Olson. If you have any questions after today's visit, please call [phone].

## 2023-04-27 NOTE — INTERDISCIPLINARY ROUNDS
Behavioral Health Interdisciplinary Rounds     Patient Name: Attila Hurd  Age: 50 y.o. Room/Bed:  [facility]  Primary Diagnosis: <principal problem not specified>   Admission Status: Voluntary     Readmission within 30 days: no  Power of  in place: no  Patient requires a blocked bed: no            Sleep hours: 7       Participation in Care/Groups:  yes  Medication Compliant?: Yes  PRNS (last 24 hours):  Antianxiety, Sleep Aid, and Pain    Restraints (last 24 hours):  no  __________________________________________________  OQ Admission Analysis Survey completed:  OQ Admission Analysis Survey score:  OQ Discharge Analysis Survey completed:  OQ Discharge Analysis Survey score:   __________________________________________________     Alcohol screening (AUDIT) completed -  AUDIT Score: 0  If applicable, date SBIRT discussed in treatment team AND documented:    Tobacco - patient is a smoker: Have You Used Tobacco in the Past 30 Days: No  Illegal Drugs use: Have You Used Any Illegal Substances Over the Past 12 Months: No    24 hour chart check complete: yes     _______________________________________________    Patient goal(s) for today:   Treatment team focus/goals:   Progress note:      Spiritual Care Consult:   Financial concerns/prescription coverage:    Family contact:                        Family requesting physician contact today:    Discharge plan:   Access to weapons :                                                              Outpatient provider(s):   Patient's preferred phone number for follow up call :   Patient's preferred e-mail address :    LOS:  17  Expected LOS:     Participating treatment team members: Attila Hurd, * (assigned SW),

## 2023-04-27 NOTE — BH NOTES
Chief Complaint:  \"I feel blessed. .\"    Length of Stay: 17 Days    Interval History:  04/27/2023  Nursing report no acute events overnight. She said that switching to the general unit was challenging for her as she felt the acute unit was a much more intimate setting. She complains of not sleeping well because of L shoulder pain. She feels that this pain can't wait for an outpatient appointment. Patient says that she's had no episodes of hair pulling. She denies any thoughts of self harm or suicide. 04/26/2023  Nursing report patient slept well last night. She reports feeling quite empowered by her jasvir today. She reports no worsened symptoms or urges of self harm. She's had no hair pulling or yelling events. She requests to be transferred to the general side. 04/25/2023  Patient says that she experienced an episode of yelling and hair pulling that lasted for an hour. She says that she didn't have control over what was going on. She received IM medications. Patient says that she tried walking up and down the bustos. She says that she has no warning signs. She reports benefit from the IM medications. She reflects back and says that these episodes have no triggers. 04/24/2023  Catherine reports experiencing hair pulling as well as yelling fits of 'stop it, stop it.' She says that she walks and utilizes the squeeze ball, but eventually needing medication. She says that she has anger and irritability towards the anxiety, but not towards anyone else. Denies SI or self harm thoughts. 4/23 - Matthieu Hollis reports ongoing anxiety this morning. Some yelling outbursts noted this AM. Energy level remains fair. She reports sleeping well. She complains of headache but it is not clear if this is a side effect or unrelated. We discussed holding Prozac but she is not in favor of this. Some PI persists. 4/22 - Matthieu Hollis reports little change in mood.  She complains of headache which may be related to elevated blood pressure. She is in no acute distress on interview. She denies SI / HI. Somatic preoccupation / delusion noted on interview. She denies AVH currently. Some paranoid ideation regarding medications. 04/21/2023  Linda Santoro states she is feeling good currently. However, she had an episode this morning again where she pulled her hair and yelled \"stop it. \" There was no trigger. She denies SI/plan/intent, denies HI/plan/intent, denies AVH. Pt states she is still having headaches. She is sleeping well at night. Appetite is good. She has several somatic complaints, states there is something tingling from her ear to her arm. She has been taking Voltaren for this and finds it helpful. She states she does have depression in the mornings as well. She states she feels she constantly has \"congestion\" in her head, which again she thinks is a side effect from a medication. 04/20/2023  Patient reports sleeping well overnight. However, she says that she had 'an episode this morning.'  She reports that she had a good jasvir group. She felt nauseous, had a headache, felt her heart racing, feels tired. She says that this takes about 10miutes. Linda Santoro thinks that these episodes are getting better, however, as she hadn't had any such episodes for the past 48 hours. She says that her headache is quite bothersome and is interested in further decrease of depakote. 04/19/2023  Reports sleeping well last night. She is eating her meals and taking medications. She report feeling 'head congestion' improved, but feels her dyspepsia is the same. Linda Santoro feels hopeful about her symptoms because this is the second day of not experiencing symptoms of hair pulling, yelling, or self harm. She is anxious about the side-effects not going away and says that they are bothersome. She is agreeable to use mylanta prn and  if no improvement, she is agreeable to further reduction in depakote. 04/18/2023  Patient reports sleeping 7 hours.  She attempted to board on the general unit, but she was unable to stay there any more than 15 minutes. She remains in a blocked room because of her behaviors of yelling and hair pulling. She feels improvement in her symptoms of hair pulling and yelling out. She hasn't experienced any of those symptoms yesterday. However she complains of experiencing bothersome headache and nausea. She requests changes in her medication regimen. She denies SI. Denies HI.    04/17/2023  Patient had 2 episodes of hair pulling and calling out. She says that her increase in medication dose has caused nausea and headache, but she is tolerating this. She will think about whether or not she'd prefer prozac at night. Focussed patient's attention on the 'what' of her experience prior to hair pulling, or saying 'stop,' or clothes pulling, in order to define her stressors, and triggers; in order to focus work with her therapist.  Met with patient' family and answered questions about next steps. 04/16/2023  Patient reportedly had good sleep last night of 7 hours. Nursing report experienced significant yelling during the day today, biting on the stress ball, but no hair pulling. Patient reports that today she was pulling her hair, yelling out, biting stress balls. Took prn zyprex  and atarax this morning after. She felt they were helping. She reports that the urges come on in waves, and she doesn't know what causes them. She reports this occurs 2-3 times per day. 04/15/2023  Reports yelling out at night. Nurses report witnessing patient bite on her stress ball. She slept well. Complains of lightheadedness, but otherwise well. Denies having SI. Denies HI. Denies experiencing AH    04/14/2023  Nursing report patient bit the stress ball yesterday and had one episode of hair pulling. Last night she got up and screamed in the middle of night.   She denies having side-effects of increasing depakote dose, but does feel that she is lightheaded. At this time she has no urges or thoughts to harm herself. Repots slept really well. She requests changing prozac to day time instead of night time. 04/13/2023  Nursing report patient had no acute events overnight. Patient remains in a blocked room. However, when she got out of her room this am, she did pat her hair. No hair pulling or screaming noted. She had tylenol and trazodone prn last night. No IM medications. Treatment team evaluated her in her room this morning. She says that her mood is fair, but that she struggles with anger. She reports appreciating the 's help in empowering her to deal with her anger. She tolerated starting depakote and denies having any side-effects to it. She isn't sure if it is helping, but she reports feeling about the same, but no worsening of symptoms. 04/12/2023  Nursing report patient slept well and has taken her medications. Describes experiencing worsening of mood and irritability for the past days, with patient describing 'screaming fits' of 'stop it, stop it. ,' at the 'anxiety' as it is tormenting her. Rebekah Machuca shares that she quit her job because she was unable to function while struggling with these symptoms. She did have an admission to [facility] after she was discharged from here and after finishing the PHP program. Her symptoms had been worsening for the past three days, and says that she presented here at the urging of her parents. She shares that she feels quite low and doesn't have hope for the future. Her LMP was 1 month ago, but her menstruation hadn't been regular.     Past Medical History:  Past Medical History:   Diagnosis Date    Anxiety     Depression     ANXIETY & DEPRESSION    Diabetes ([facility] [address]     TYPE II    Hypertension     Infected sebaceous cyst, upper back 4/10/2019    Panic attack     Psychoses ([facility] [address] 2/23/2023    Suicidal thoughts     Vision decreased        Labs:  Lab Results   Component Value Date/Time    WBC 8.2 04/10/2023 01:07 PM    HGB 11.6 04/10/2023 01:07 PM    HCT 37.7 04/10/2023 01:07 PM    PLATELET 660  01:07 PM    MCV 85.9 04/10/2023 01:07 PM      Lab Results   Component Value Date/Time    Sodium 136 04/10/2023 01:07 PM    Potassium 3.4 (L) 04/10/2023 01:07 PM    Chloride 103 04/10/2023 01:07 PM    CO2 30 04/10/2023 01:07 PM    Anion gap 3 (L) 04/10/2023 01:07 PM    Glucose 38 (LL) 04/10/2023 01:07 PM    Glucose 146 (H) 2022 05:44 AM    BUN 11 04/10/2023 01:07 PM    Creatinine 0.83 04/10/2023 01:07 PM    BUN/Creatinine ratio 13 04/10/2023 01:07 PM    GFR est AA >60 2022 02:48 PM    GFR est non-AA >60 2022 02:48 PM    Calcium 9.5 04/10/2023 01:07 PM    Bilirubin, total 0.2 04/10/2023 01:07 PM    Alk.  phosphatase 80 04/10/2023 01:07 PM    Protein, total 7.9 04/10/2023 01:07 PM    Albumin 3.7 04/10/2023 01:07 PM    Globulin 4.2 (H) 04/10/2023 01:07 PM    A-G Ratio 0.9 (L) 04/10/2023 01:07 PM    ALT (SGPT) 25 04/10/2023 01:07 PM        Vitals:    23 1617 23 0725 23 1556 23 0916   BP: 103/62 (!) 131/90 109/76 127/84   Pulse: (!) 104 98 83 96   Resp: 16 16 16    Temp: 98.6 °F (37 °C) 98.2 °F (36.8 °C) 97.2 °F (36.2 °C)    SpO2: 96% 95% 96%    Weight:       Height:             Current Facility-Administered Medications   Medication Dose Route Frequency Provider Last Rate Last Admin    metFORMIN ER (GLUCOPHAGE XR) tablet 500 mg  500 mg Oral DAILY WITH DINNER Gurjit Robles MD   500 mg at 23 1724    cetirizine (ZYRTEC) tablet 10 mg  10 mg Oral QPM Gurjit Robles MD   10 mg at 23 1724    triamterene-hydroCHLOROthiazide (MAXZIDE) 37.5-25 mg per tablet 1 Tablet  1 Tablet Oral DAILY Sachin Serrano NP   1 Tablet at 23 0847    diclofenac (VOLTAREN) 1 % topical gel 2 g  2 g Topical QID Jaylan Thompson MD   2 g at 23 0915    cloNIDine HCL (CATAPRES) tablet 0.1 mg  0.1 mg Oral Q6H PRN Carmenza Edwards NP   0.1 mg at 23 1756    alum-mag hydroxide-[facility] (MYLANTA) oral suspension 30 mL  30 mL Oral Q4H PRN Jaylan Thompson MD   30 mL at 04/19/23 1323    FLUoxetine (PROzac) capsule 40 mg  40 mg Oral BID Jaylan Thompson MD   40 mg at 04/27/23 0916    ibuprofen (MOTRIN) tablet 400 mg  400 mg Oral Q4H PRN Jaylan Thompson MD   400 mg at 04/27/23 0915    atorvastatin (LIPITOR) tablet 40 mg  40 mg Oral DAILY Jaylan Thompson MD   40 mg at 04/27/23 0916    losartan (COZAAR) tablet 50 mg  50 mg Oral DAILY Jaylan Thompson MD   50 mg at 04/27/23 0916    glipiZIDE (GLUCOTROL) tablet 5 mg  5 mg Oral ACB Bria Prophet A, CNS   5 mg at 04/27/23 0916    glucose chewable tablet 16 g  4 Tablet Oral PRN Bria Prophet A, CNS        glucagon (GLUCAGEN) injection 1 mg  1 mg IntraMUSCular PRN Bria Prophet A, CNS        dextrose 10 % infusion 0-250 mL  0-250 mL IntraVENous PRN Bria Prophet A, CNS        insulin lispro (HUMALOG) injection   SubCUTAneous TIDA Nancy Ennis, CNS        clonazePAM (KlonoPIN) tablet 0.25 mg  0.25 mg Oral QHS PRN Jaylan Thompson MD   0.25 mg at 04/22/23 0137    melatonin tablet 3 mg  3 mg Oral QPM Jaylan Thompson MD   3 mg at 04/26/23 1912    QUEtiapine SR (SEROquel XR) tablet 300 mg  300 mg Oral QHS Jaylan Thompson MD   300 mg at 04/26/23 2309    ondansetron (ZOFRAN ODT) tablet 4 mg  4 mg Oral Q8H PRN Jaylan Thompson MD   4 mg at 04/23/23 1813    OLANZapine (ZyPREXA) tablet 5 mg  5 mg Oral Q6H PRN Valencia Vogt NP   5 mg at 04/23/23 1842    haloperidol lactate (HALDOL) injection 5 mg  5 mg IntraMUSCular Q6H PRN Valencia Vogt NP   5 mg at 04/25/23 0909    benztropine (COGENTIN) tablet 1 mg  1 mg Oral BID PRN Valencia Vogt NP        diphenhydrAMINE (BENADRYL) injection 50 mg  50 mg IntraMUSCular BID PRN Valencia Vogt NP   50 mg at 04/11/23 0903    hydrOXYzine HCL (ATARAX) tablet 50 mg  50 mg Oral TID PRN Valencia Vogt NP   50 mg at 04/27/23 0916    LORazepam (ATIVAN) injection 1 mg  1 mg IntraMUSCular Q4H PRN Valencia Vogt NP   1 mg at 04/25/23 0908    traZODone (DESYREL) tablet 50 mg  50 mg Oral QHS PRN Reginold Nilsa, NP   50 mg at 04/26/23 2233    magnesium hydroxide (MILK OF MAGNESIA) 400 mg/5 mL oral suspension 30 mL  30 mL Oral DAILY PRN Reginold Nilsa, NP   30 mL at 04/18/23 1852     Mental Status Exam:  Obese 50yo AAF is engaged in the evaluation. She has short hair and maintains good eye contact throughout. Speech is spontaneous  Mood is \"fine\"  Affect: congruent, dysthymic and even  Denies SI. Denies HI. Perception: no paranoia or delusions  Cognition is grossly intact    Physical Exam:  Body habitus: Body mass index is 34.71 kg/m². Musculoskeletal system: normal gait  Tremor - neg  Cog wheeling - neg    Assessment and Plan:  Hailee Shelby meets criteria for a diagnosis of   Mood d/o nos;  Psychotic d/ nos  Trichotillomania  Rule out OCD    04/27/2023  Plan d/c tomorrow with referral to OCD IOP.    04/26/2023  Transfer patient to general side. Continue current medications as prescribed. 04/25/2023  Continue current medication regimen. Recommend PHP/IOP for OCD.    4/24/2023  D/c depakote  Recommend ERP outpatient in addition to prozac and seroquel. Transfer pt to general side once safe. 4/23  Continue current treatment  Metformin  mg     4/22 -   Continue current treatment  Depakote level Wednesday  Discharge planning    04/21/2023  Added HCTZ 37.5-25 daily per home med rec, BP has been elevated. No other changes today. 04/20/2023  Decrease depakote dr from 500 to 250mg po qhs to mitigate side-effects of headache, fatigue and nausea. Discussed possible increase of seroquel, possible cross taper to zyprexa. Continue prozac 40mg po bid  Tranfer to general side. 04/19/2023  Continue current medication regimen  Patient may board on general unit  Transfer tomorrow if she does well. Could decrease depakote further to mitigate side-effects. 04/18/2023  D/c depakote dr 500mg po bid.   Instead start depakote er 500mg po qhs  D/c prozac 80mg po qday  Instead start prozac 40mg po bid starting tomorrow  Encouraged patient to take prn clonazepam 0.25mg po prn anxiety and ibuprofen prn headache  04/17/2023  Continue current medications and dosages. 04/16/2023  Increae prozac from 60 to 80mg po qday. Will give the difference of 20mg today. Continue seroquel xr 300mg po qhs  Continue depakote dr 500mg po bid.    04/15/2023  Continue current medications. 04/14/2023  Continue current medications. 04/13/2023  Increase depakote from 250mg po bid to 500mg po bid. Will change prozac 60mg po qday to night time to simplify medication administration. 04/12/2023  Patient felt that NAC wasn't helping so she stopped it. At [facility] her prozac was increased from 40 to 60mg po qday, so I will increase her prozac here as well. I will continue prescribed seroquel 300mg po qhs. We discussed a trial of depakote DR 200mg po bid to target irritability and anger. Discussed with patient medication side-effects including being contraindication for pregnancy. Discussed d/c of buspar to decrease risk of polypharmacy. A coordinated, multidisplinary treatment team round was conducted with the patient, nurses, pharmcist,  and writer present. Discussions held with , and/or with family members; Complete current electronic health record for patient was reviewed in full including consultant notes, ancillary staff notes, nurses and tech notes, labs and vitals. I certify that this patients inpatient psychiatric hospital services furnished since the previous certification were, and continue to be, required for treatment that could reasonably be expected to improve the patient's condition, or for diagnostic study, and that the patient continues to need, on a daily basis, active treatment furnished directly by or requiring the supervision of inpatient psychiatric facility personnel.  In addition, the hospital records show that services furnished were intensive treatment services, admission or related services, or equivalent services.

## 2023-04-27 NOTE — PROGRESS NOTES
Problem: Depressed Mood (Adult/Pediatric)  Goal: *STG: Participates in treatment plan  Outcome: Progressing Towards Goal  Goal: *STG: Attends activities and groups  Outcome: Progressing Towards Goal  Goal: *STG: Demonstrates reduction in symptoms and increase in insight into coping skills/future focused  Outcome: Progressing Towards Goal  Goal: Interventions  Outcome: Progressing Towards Goal   No voiced complaints of increased sx of depression  Affect appears brighter today

## 2023-04-28 VITALS
SYSTOLIC BLOOD PRESSURE: 124 MMHG | HEART RATE: 102 BPM | RESPIRATION RATE: 16 BRPM | HEIGHT: 67 IN | TEMPERATURE: 98 F | BODY MASS INDEX: 34.78 KG/M2 | OXYGEN SATURATION: 98 % | DIASTOLIC BLOOD PRESSURE: 80 MMHG | WEIGHT: 221.6 LBS

## 2023-04-28 LAB
GLUCOSE BLD STRIP.AUTO-MCNC: 102 MG/DL (ref 65–117)
GLUCOSE BLD STRIP.AUTO-MCNC: 174 MG/DL (ref 65–117)
SERVICE CMNT-IMP: ABNORMAL
SERVICE CMNT-IMP: NORMAL

## 2023-04-28 PROCEDURE — 74011250637 HC RX REV CODE- 250/637: Performed by: NURSE PRACTITIONER

## 2023-04-28 PROCEDURE — 74011250637 HC RX REV CODE- 250/637: Performed by: PSYCHIATRY & NEUROLOGY

## 2023-04-28 PROCEDURE — 74011250637 HC RX REV CODE- 250/637: Performed by: CLINICAL NURSE SPECIALIST

## 2023-04-28 PROCEDURE — 74011250636 HC RX REV CODE- 250/636: Performed by: HOSPITALIST

## 2023-04-28 PROCEDURE — 74011250637 HC RX REV CODE- 250/637: Performed by: HOSPITALIST

## 2023-04-28 PROCEDURE — 74011250637 HC RX REV CODE- 250/637

## 2023-04-28 PROCEDURE — 82962 GLUCOSE BLOOD TEST: CPT

## 2023-04-28 RX ORDER — METFORMIN HYDROCHLORIDE 500 MG/1
500 TABLET, EXTENDED RELEASE ORAL
Qty: 30 TABLET | Refills: 0 | Status: SHIPPED | OUTPATIENT
Start: 2023-04-28 | End: 2023-05-28

## 2023-04-28 RX ORDER — TRAZODONE HYDROCHLORIDE 100 MG/1
200 TABLET ORAL
Qty: 60 TABLET | Refills: 0 | Status: SHIPPED | OUTPATIENT
Start: 2023-04-28 | End: 2023-05-28

## 2023-04-28 RX ORDER — TRIAMTERENE/HYDROCHLOROTHIAZID 37.5-25 MG
1 TABLET ORAL DAILY
Qty: 30 TABLET | Refills: 0 | Status: SHIPPED | OUTPATIENT
Start: 2023-04-28 | End: 2023-05-28

## 2023-04-28 RX ORDER — METHYLPREDNISOLONE 4 MG/1
4 TABLET ORAL
Qty: 3 TABLET | Refills: 0 | Status: SHIPPED | OUTPATIENT
Start: 2023-04-28 | End: 2023-04-29

## 2023-04-28 RX ORDER — HYDROXYZINE 50 MG/1
50 TABLET, FILM COATED ORAL
Qty: 30 TABLET | Refills: 0 | Status: SHIPPED | OUTPATIENT
Start: 2023-04-28 | End: 2023-05-28

## 2023-04-28 RX ORDER — QUETIAPINE 300 MG/1
300 TABLET, FILM COATED, EXTENDED RELEASE ORAL
Qty: 30 TABLET | Refills: 0 | Status: SHIPPED | OUTPATIENT
Start: 2023-04-28 | End: 2023-05-28

## 2023-04-28 RX ORDER — METHYLPREDNISOLONE 4 MG/1
4 TABLET ORAL
Qty: 4 TABLET | Refills: 0 | Status: SHIPPED | OUTPATIENT
Start: 2023-04-29 | End: 2023-04-30

## 2023-04-28 RX ORDER — CETIRIZINE HYDROCHLORIDE 10 MG/1
10 TABLET ORAL EVERY EVENING
Qty: 30 TABLET | Refills: 0 | Status: SHIPPED | OUTPATIENT
Start: 2023-04-28 | End: 2023-05-28

## 2023-04-28 RX ORDER — FLUOXETINE HYDROCHLORIDE 40 MG/1
40 CAPSULE ORAL 2 TIMES DAILY
Qty: 60 CAPSULE | Refills: 0 | Status: SHIPPED | OUTPATIENT
Start: 2023-04-28 | End: 2023-05-28

## 2023-04-28 RX ORDER — LOSARTAN POTASSIUM 50 MG/1
50 TABLET ORAL DAILY
Qty: 30 TABLET | Refills: 0 | Status: SHIPPED | OUTPATIENT
Start: 2023-04-28 | End: 2023-05-28

## 2023-04-28 RX ORDER — LANOLIN ALCOHOL/MO/W.PET/CERES
3 CREAM (GRAM) TOPICAL EVERY EVENING
Qty: 30 TABLET | Refills: 0 | Status: SHIPPED | OUTPATIENT
Start: 2023-04-28 | End: 2023-05-28

## 2023-04-28 RX ORDER — GLIPIZIDE 5 MG/1
5 TABLET ORAL
Qty: 30 TABLET | Refills: 0 | Status: SHIPPED | OUTPATIENT
Start: 2023-04-28 | End: 2023-05-28

## 2023-04-28 RX ORDER — METHYLPREDNISOLONE 8 MG/1
8 TABLET ORAL ONCE
Qty: 1 TABLET | Refills: 0 | Status: SHIPPED | OUTPATIENT
Start: 2023-04-28 | End: 2023-04-28

## 2023-04-28 RX ORDER — ATORVASTATIN CALCIUM 40 MG/1
40 TABLET, FILM COATED ORAL DAILY
Qty: 30 TABLET | Refills: 0 | Status: SHIPPED | OUTPATIENT
Start: 2023-04-28 | End: 2023-05-28

## 2023-04-28 RX ORDER — CLONAZEPAM 0.5 MG/1
0.25 TABLET ORAL
Qty: 30 TABLET | Refills: 0 | Status: SHIPPED | OUTPATIENT
Start: 2023-04-28 | End: 2023-05-28

## 2023-04-28 RX ORDER — METHYLPREDNISOLONE 4 MG/1
4 TABLET ORAL 3 TIMES DAILY
Qty: 3 TABLET | Refills: 0 | Status: SHIPPED | OUTPATIENT
Start: 2023-04-30 | End: 2023-05-01

## 2023-04-28 RX ORDER — METHYLPREDNISOLONE 4 MG/1
4 TABLET ORAL 2 TIMES DAILY
Qty: 2 TABLET | Refills: 0 | Status: SHIPPED | OUTPATIENT
Start: 2023-05-01 | End: 2023-05-02

## 2023-04-28 RX ORDER — METHYLPREDNISOLONE 4 MG/1
4 TABLET ORAL
Qty: 1 TABLET | Refills: 0 | Status: SHIPPED | OUTPATIENT
Start: 2023-05-02 | End: 2023-05-03

## 2023-04-28 RX ADMIN — DICLOFENAC SODIUM 2 G: 10 GEL TOPICAL at 09:00

## 2023-04-28 RX ADMIN — METHYLPREDNISOLONE 4 MG: 4 TABLET ORAL at 09:51

## 2023-04-28 RX ADMIN — HYDROXYZINE HYDROCHLORIDE 50 MG: 50 TABLET, FILM COATED ORAL at 00:39

## 2023-04-28 RX ADMIN — HYDROXYZINE HYDROCHLORIDE 50 MG: 50 TABLET, FILM COATED ORAL at 09:53

## 2023-04-28 RX ADMIN — IBUPROFEN 400 MG: 400 TABLET, FILM COATED ORAL at 13:10

## 2023-04-28 RX ADMIN — METHYLPREDNISOLONE 4 MG: 4 TABLET ORAL at 13:10

## 2023-04-28 RX ADMIN — GLIPIZIDE 5 MG: 5 TABLET ORAL at 09:52

## 2023-04-28 RX ADMIN — ACETAMINOPHEN 650 MG: 325 TABLET ORAL at 09:51

## 2023-04-28 RX ADMIN — TRIAMTERENE AND HYDROCHLOROTHIAZIDE 1 TABLET: 37.5; 25 TABLET ORAL at 09:50

## 2023-04-28 RX ADMIN — LOSARTAN POTASSIUM 50 MG: 50 TABLET, FILM COATED ORAL at 09:52

## 2023-04-28 RX ADMIN — FLUOXETINE 40 MG: 20 CAPSULE ORAL at 09:50

## 2023-04-28 RX ADMIN — ATORVASTATIN CALCIUM 40 MG: 40 TABLET, FILM COATED ORAL at 09:51

## 2023-04-28 NOTE — PROGRESS NOTES
Problem: Depressed Mood (Adult/Pediatric)  Goal: *STG: Participates in treatment plan  Outcome: Progressing Towards Goal  Note: Out on unit smiling engaged and hopeful for future. Thoughts organized, behaviors appropriate. Denies SI, no self harming behaviors. Daily goal is to d/c home.  Staff focus is on d/c planning  Goal: *STG: Attends activities and groups  Outcome: Progressing Towards Goal  Goal: *STG: Demonstrates reduction in symptoms and increase in insight into coping skills/future focused  Outcome: Progressing Towards Goal  Goal: Interventions  Outcome: Progressing Towards Goal

## 2023-04-28 NOTE — BH NOTES
Behavioral Health Transition Record to Provider    Patient Name: Rizwana Saldaña  YOB: 1975  Medical Record Number: [id number]  Date of Admission: 4/10/2023  Date of Discharge: 4/28/23    Attending Provider: Marina Clemente MD  Discharging Provider: Dr. Rocco Cevallos  To contact this individual call [phone] and ask the  to page. If unavailable, ask to be transferred to [facility] Provider on call. [facility] Provider will be available on call 24/7 and during holidays. Primary Care Provider: лЕена Aldrich MD    Allergies   Allergen Reactions    Lisinopril Rash     Swelling of the lips       Reason for Admission: Rizwana Saldaña is a 50 y.o. BLACK/ female who is currently admitted to the acute side of 7th floor behavioral health Unit at [facility].      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 She presented to [facility] ED on 4/10/2023 in distress with complaints that she wasn't able to function as she continues to pull her hair, clothes and scream 'stop it, stop it.' She was admitted to the inpatient behavioral health unit overnight. This morning upon waking she complained of experiencing undue anxiety for which she tried prn hydroxyzine to no avail. She wasn't verbally re-directible and nursing offered her PRN IM medications which she accepted. Upon my evaluation, patient was snoring and sound asleep. Her evaluation was deferred to give her an opportunity to obtain overdue rest and relief    Admission Diagnosis: Unspecified mood (affective) disorder ([address] [F39]    * No surgery found *    Results for orders placed or performed during the hospital encounter of 04/10/23   URINE CULTURE HOLD SAMPLE    Specimen: Urine   Result Value Ref Range    Urine culture hold        Urine on hold in Microbiology dept for 2 days.   If unpreserved urine is submitted, it cannot be used for addtional testing after 24 hours, recollection will be required. COVID-19 WITH INFLUENZA A/B   Result Value Ref Range    SARS-CoV-2 by PCR Not detected NOTD      Influenza A by PCR Not detected NOTD      Influenza B by PCR Not detected NOTD     CBC WITH AUTOMATED DIFF   Result Value Ref Range    WBC 8.2 3.6 - 11.0 K/uL    RBC 4.39 3.80 - 5.20 M/uL    HGB 11.6 11.5 - 16.0 g/dL    HCT 37.7 35.0 - 47.0 %    MCV 85.9 80.0 - 99.0 FL    MCH 26.4 26.0 - 34.0 PG    MCHC 30.8 30.0 - 36.5 g/dL    RDW 14.6 (H) 11.5 - 14.5 %    PLATELET 767 177 - 002 K/uL    MPV 9.2 8.9 - 12.9 FL    NRBC 0.0 0  WBC    ABSOLUTE NRBC 0.00 0.00 - 0.01 K/uL    NEUTROPHILS 55 32 - 75 %    LYMPHOCYTES 32 12 - 49 %    MONOCYTES 7 5 - 13 %    EOSINOPHILS 5 0 - 7 %    BASOPHILS 1 0 - 1 %    IMMATURE GRANULOCYTES 0 0.0 - 0.5 %    ABS. NEUTROPHILS 4.5 1.8 - 8.0 K/UL    ABS. LYMPHOCYTES 2.6 0.8 - 3.5 K/UL    ABS. MONOCYTES 0.6 0.0 - 1.0 K/UL    ABS. EOSINOPHILS 0.4 0.0 - 0.4 K/UL    ABS. BASOPHILS 0.0 0.0 - 0.1 K/UL    ABS. IMM. GRANS. 0.0 0.00 - 0.04 K/UL    DF AUTOMATED     METABOLIC PANEL, COMPREHENSIVE   Result Value Ref Range    Sodium 136 136 - 145 mmol/L    Potassium 3.4 (L) 3.5 - 5.1 mmol/L    Chloride 103 97 - 108 mmol/L    CO2 30 21 - 32 mmol/L    Anion gap 3 (L) 5 - 15 mmol/L    Glucose 38 (LL) 65 - 100 mg/dL    BUN 11 6 - 20 MG/DL    Creatinine 0.83 0.55 - 1.02 MG/DL    BUN/Creatinine ratio 13 12 - 20      eGFR >60 >60 ml/min/1.73m2    Calcium 9.5 8.5 - 10.1 MG/DL    Bilirubin, total 0.2 0.2 - 1.0 MG/DL    ALT (SGPT) 25 12 - 78 U/L    AST (SGOT) 15 15 - 37 U/L    Alk.  phosphatase 80 45 - 117 U/L    Protein, total 7.9 6.4 - 8.2 g/dL    Albumin 3.7 3.5 - 5.0 g/dL    Globulin 4.2 (H) 2.0 - 4.0 g/dL    A-G Ratio 0.9 (L) 1.1 - 2.2     SAMPLES BEING HELD   Result Value Ref Range    SAMPLES BEING HELD 1RED     COMMENT        Add-on orders for these samples will be processed based on acceptable specimen integrity and analyte stability, which may vary by analyte.    URINALYSIS W/MICROSCOPIC   Result Value Ref Range    Color YELLOW/STRAW      Appearance CLEAR CLEAR      Specific gravity 1.012 1.003 - 1.030      pH (UA) 6.5 5.0 - 8.0      Protein Negative NEG mg/dL    Glucose Negative NEG mg/dL    Ketone Negative NEG mg/dL    Bilirubin Negative NEG      Blood Negative NEG      Urobilinogen 0.2 0.2 - 1.0 EU/dL    Nitrites Negative NEG      Leukocyte Esterase Negative NEG      WBC 0-4 0 - 4 /hpf    RBC 0-5 0 - 5 /hpf    Epithelial cells FEW FEW /lpf    Bacteria 1+ (A) NEG /hpf    Hyaline cast 0-2 0 - 5 /lpf   ACETAMINOPHEN   Result Value Ref Range    Acetaminophen level <2 (L) 10 - 30 ug/mL   SALICYLATE   Result Value Ref Range    Salicylate level 1.9 (L) 2.8 - 20.0 MG/DL   DRUG SCREEN, URINE   Result Value Ref Range    AMPHETAMINES Negative NEG      BARBITURATES Negative NEG      BENZODIAZEPINES Negative NEG      COCAINE Negative NEG      METHADONE Negative NEG      OPIATES Negative NEG      PCP(PHENCYCLIDINE) Negative NEG      THC (TH-CANNABINOL) Negative NEG      Drug screen comment (NOTE)    ETHYL ALCOHOL   Result Value Ref Range    ALCOHOL(ETHYL),SERUM <10 <10 MG/DL   HEMOGLOBIN A1C WITH EAG   Result Value Ref Range    Hemoglobin A1c 6.4 (H) 4.0 - 5.6 %    Est. average glucose 137 mg/dL   SARS-COV-2   Result Value Ref Range    SARS-CoV-2 by PCR Please find results under separate order     SARS-COV-2   Result Value Ref Range    Specimen source Nasopharyngeal      SARS-CoV-2 Not detected NOTD     HCG URINE, QL. - POC   Result Value Ref Range    Pregnancy test,urine (POC) Negative NEG     GLUCOSE, POC   Result Value Ref Range    Glucose (POC) 86 65 - 117 mg/dL    Performed by Lakia Cates \"Curt\"    GLUCOSE, POC   Result Value Ref Range    Glucose (POC) 160 (H) 65 - 117 mg/dL    Performed by Malvin Mishra    GLUCOSE, POC   Result Value Ref Range    Glucose (POC) 48 (LL) 65 - 117 mg/dL    Performed by Brittani Conway    GLUCOSE, POC Result Value Ref Range    Glucose (POC) 144 (H) 65 - 117 mg/dL    Performed by [employer]    GLUCOSE, POC   Result Value Ref Range    Glucose (POC) 131 (H) 65 - 117 mg/dL    Performed by Teresa Dodrill  [employer]    GLUCOSE, POC   Result Value Ref Range    Glucose (POC) 211 (H) 65 - 117 mg/dL    Performed by Teresa Dodrill  [employer]    GLUCOSE, POC   Result Value Ref Range    Glucose (POC) 112 65 - 117 mg/dL    Performed by [address] [place] [address], POC   Result Value Ref Range    Glucose (POC) 137 (H) 65 - 117 mg/dL    Performed by [employer]    GLUCOSE, POC   Result Value Ref Range    Glucose (POC) 148 (H) 65 - 117 mg/dL    Performed by [employer]    GLUCOSE, POC   Result Value Ref Range    Glucose (POC) 210 (H) 65 - 117 mg/dL    Performed by [employer]    GLUCOSE, POC   Result Value Ref Range    Glucose (POC) 98 65 - 117 mg/dL    Performed by [employer]    GLUCOSE, POC   Result Value Ref Range    Glucose (POC) 176 (H) 65 - 117 mg/dL    Performed by [employer]    GLUCOSE, POC   Result Value Ref Range    Glucose (POC) 135 (H) 65 - 117 mg/dL    Performed by SIFUENTES Milagra  [employer]    GLUCOSE, POC   Result Value Ref Range    Glucose (POC) 169 (H) 65 - 117 mg/dL    Performed by [employer]    GLUCOSE, POC   Result Value Ref Range    Glucose (POC) 110 65 - 117 mg/dL    Performed by [employer]    GLUCOSE, POC   Result Value Ref Range    Glucose (POC) 146 (H) 65 - 117 mg/dL    Performed by [address] [place] [address], POC   Result Value Ref Range    Glucose (POC) 139 (H) 65 - 117 mg/dL    Performed by [employer]    GLUCOSE, POC   Result Value Ref Range    Glucose (POC) 178 (H) 65 - 117 mg/dL    Performed by [employer]    GLUCOSE, POC   Result Value Ref Range    Glucose (POC) 116 65 - 117 mg/dL    Performed by [employer]    GLUCOSE, POC   Result Value Ref Range    Glucose (POC) 174 (H) 65 - 117 mg/dL    Performed by Robin Samaniego    GLUCOSE, POC   Result Value Ref Range    Glucose (POC) 145 (H) 65 - 117 mg/dL    Performed by Liza Martínez  [employer]    GLUCOSE, POC   Result Value Ref Range    Glucose (POC) 175 (H) 65 - 117 mg/dL    Performed by MARIA Wakefield  [employer]    GLUCOSE, POC   Result Value Ref Range    Glucose (POC) 110 65 - 117 mg/dL    Performed by Rhonda Chandra    GLUCOSE, POC   Result Value Ref Range    Glucose (POC) 146 (H) 65 - 117 mg/dL    Performed by [address] [place] [address], POC   Result Value Ref Range    Glucose (POC) 126 (H) 65 - 117 mg/dL    Performed by [employer]    GLUCOSE, POC   Result Value Ref Range    Glucose (POC) 139 (H) 65 - 117 mg/dL    Performed by Isidra Adamson    GLUCOSE, POC   Result Value Ref Range    Glucose (POC) 149 (H) 65 - 117 mg/dL    Performed by [employer]    GLUCOSE, POC   Result Value Ref Range    Glucose (POC) 215 (H) 65 - 117 mg/dL    Performed by Adama Abelardo    GLUCOSE, POC   Result Value Ref Range    Glucose (POC) 153 (H) 65 - 117 mg/dL    Performed by Uma Parker    GLUCOSE, POC   Result Value Ref Range    Glucose (POC) 130 (H) 65 - 117 mg/dL    Performed by [employer]    GLUCOSE, POC   Result Value Ref Range    Glucose (POC) 219 (H) 65 - 117 mg/dL    Performed by [employer]    GLUCOSE, POC   Result Value Ref Range    Glucose (POC) 114 65 - 117 mg/dL    Performed by Barrington Lantigua    GLUCOSE, POC   Result Value Ref Range    Glucose (POC) 139 (H) 65 - 117 mg/dL    Performed by [employer]    GLUCOSE, POC   Result Value Ref Range    Glucose (POC) 154 (H) 65 - 117 mg/dL    Performed by Adama Abelardo    GLUCOSE, POC   Result Value Ref Range    Glucose (POC) 146 (H) 65 - 117 mg/dL    Performed by Adama Abelardo    GLUCOSE, POC   Result Value Ref Range    Glucose (POC) 135 (H) 65 - 117 mg/dL    Performed by Adama Abelardo    GLUCOSE, POC   Result Value Ref Range    Glucose (POC) 147 (H) 65 - 117 mg/dL    Performed by Adama Abelardo    GLUCOSE, POC   Result Value Ref Range    Glucose (POC) 73 65 - 117 mg/dL    Performed by [facility] Frank[place]    GLUCOSE, POC   Result Value Ref Range    Glucose (POC) 149 (H) 65 - 117 mg/dL    Performed by Aki Rawls, POC   Result Value Ref Range    Glucose (POC) 150 (H) 65 - 117 mg/dL    Performed by Anderson Sanchez    GLUCOSE, POC   Result Value Ref Range    Glucose (POC) 144 (H) 65 - 117 mg/dL    Performed by BRENNA Chiu    GLUCOSE, POC   Result Value Ref Range    Glucose (POC) 133 (H) 65 - 117 mg/dL    Performed by [address] [place] [address], POC   Result Value Ref Range    Glucose (POC) 133 (H) 65 - 117 mg/dL    Performed by Anderson Sanchez    GLUCOSE, POC   Result Value Ref Range    Glucose (POC) 131 (H) 65 - 117 mg/dL    Performed by Justin Sanchez    GLUCOSE, POC   Result Value Ref Range    Glucose (POC) 156 (H) 65 - 117 mg/dL    Performed by Ludin Guillenory    GLUCOSE, POC   Result Value Ref Range    Glucose (POC) 111 65 - 117 mg/dL    Performed by [facility]    GLUCOSE, POC   Result Value Ref Range    Glucose (POC) 112 65 - 117 mg/dL    Performed by Mikhail Lr    GLUCOSE, POC   Result Value Ref Range    Glucose (POC) 149 (H) 65 - 117 mg/dL    Performed by [facility]    GLUCOSE, POC   Result Value Ref Range    Glucose (POC) 100 65 - 117 mg/dL    Performed by Ludin Charley    GLUCOSE, POC   Result Value Ref Range    Glucose (POC) 119 (H) 65 - 117 mg/dL    Performed by Juwan Kang    GLUCOSE, POC   Result Value Ref Range    Glucose (POC) 153 (H) 65 - 117 mg/dL    Performed by [facility]    GLUCOSE, POC   Result Value Ref Range    Glucose (POC) 138 (H) 65 - 117 mg/dL    Performed by Jr Mendoza    GLUCOSE, POC   Result Value Ref Range    Glucose (POC) 96 65 - 117 mg/dL    Performed by Kristi Somers    GLUCOSE, POC   Result Value Ref Range    Glucose (POC) 147 (H) 65 - 117 mg/dL    Performed by KARLA Liu    GLUCOSE, POC   Result Value Ref Range    Glucose (POC) 174 (H) 65 - 117 mg/dL    Performed by Noah Vuong    GLUCOSE, POC   Result Value Ref Range    Glucose (POC) 109 65 - 117 mg/dL Performed by Alberto Jones 19, POC   Result Value Ref Range    Glucose (POC) 174 (H) 65 - 117 mg/dL    Performed by Yamila Zapata        Immunizations administered during this encounter: There is no immunization history on file for this patient. Screening for Metabolic Disorders for Patients on Antipsychotic Medications  (Data obtained from the EMR)    Estimated Body Mass Index  Estimated body mass index is 34.71 kg/m² as calculated from the following:    Height as of this encounter: 5' 7\" (1.702 m). Weight as of this encounter: 100.5 kg (221 lb 9.6 oz). Vital Signs/Blood Pressure  Visit Vitals  BP (!) 146/89   Pulse 91   Temp 98 °F (36.7 °C)   Resp 16   Ht 5' 7\" (1.702 m)   Wt 100.5 kg (221 lb 9.6 oz)   SpO2 98%   BMI 34.71 kg/m²       Blood Glucose/Hemoglobin A1c  Lab Results   Component Value Date/Time    Glucose 38 (LL) 04/10/2023 01:07 PM    Glucose 146 (H) 07/29/2022 05:44 AM    Glucose (POC) 174 (H) 04/28/2023 07:33 AM       Lab Results   Component Value Date/Time    Hemoglobin A1c 6.4 (H) 04/10/2023 01:07 PM        Lipid Panel  Lab Results   Component Value Date/Time    Cholesterol, total 129 01/17/2023 06:15 AM    HDL Cholesterol 68 01/17/2023 06:15 AM    LDL, calculated 52.4 01/17/2023 06:15 AM    Triglyceride 43 01/17/2023 06:15 AM    CHOL/HDL Ratio 1.9 01/17/2023 06:15 AM        Discharge Diagnosis: Mood d/o NOS  Psychotic d/o NOS  Trichotillomania       Discharge Plan:  The patient Chito Kennedy exhibits the ability to control behavior in a less restrictive environment. Patient's level of functioning is improving. No assaultive/destructive behavior has been observed for the past 24 hours. No suicidal/homicidal threat or behavior has been observed for the past 24 hours. There is no evidence of serious medication side effects. Patient has not been in physical or protective restraints for at least the past 24 hours. If weapons involved, how are they secured? None     Is patient aware of and in agreement with discharge plan? Yes     Arrangements for medication:  Prescriptions filled here     Copy of discharge instructions to provider?:  faxed to [facility] for transportation home:  parents to      Keep all follow up appointments as scheduled, continue to take prescribed medications per physician instructions. Mental health crisis number:  214 or your local mental health crisis line number at [phone] WARM [address]      [phone]-MHAV ([phone])      M-F: 9am to 9pm      Sat & Sun: 5pm - 9pm  National suicide prevention lines:                             3-725-ONJWGGX ([phone])       8-252-585-TALK ([phone])   24/7 Crisis Text Line:  Text HOME to [phone]       Discharge Medication List and Instructions:   Current Discharge Medication List        START taking these medications    Details   metFORMIN ER (GLUCOPHAGE XR) 500 mg tablet Take 1 Tablet by mouth daily (with dinner) for 30 days. Indications: type 2 diabetes mellitus  Qty: 30 Tablet, Refills: 0  Start date: 4/28/2023, End date: 5/28/2023      cetirizine (ZYRTEC) 10 mg tablet Take 1 Tablet by mouth every evening for 30 days. Indications: inflammation of the nose due to an allergy  Qty: 30 Tablet, Refills: 0  Start date: 4/28/2023, End date: 5/28/2023      hydrOXYzine HCL (ATARAX) 50 mg tablet Take 1 Tablet by mouth daily as needed for Anxiety for up to 30 days. Indications: anxious  Qty: 30 Tablet, Refills: 0  Start date: 4/28/2023, End date: 5/28/2023      !! methylPREDNISolone (MEDROL) 4 mg tab Take 1 Tablet by mouth three (3) times daily (with meals) for 3 doses. Indications: neuritis  Qty: 3 Tablet, Refills: 0  Start date: 4/28/2023, End date: 4/29/2023      !! methylPREDNISolone (MEDROL) 8 mg tablet Take 1 Tablet by mouth once for 1 dose. Indications: neuritis  Qty: 1 Tablet, Refills: 0  Start date: 4/28/2023, End date: 4/28/2023      !! methylPREDNISolone (MEDROL) 4 mg tab Take 1 Tablet by mouth four (4) times daily (with meals) for 4 doses. Indications: neuritis  Qty: 4 Tablet, Refills: 0  Start date: 4/29/2023, End date: 4/30/2023      !! methylPREDNISolone (MEDROL) 4 mg tab Take 1 Tablet by mouth three (3) times daily for 3 doses. Indications: neuritis  Qty: 3 Tablet, Refills: 0  Start date: 4/30/2023, End date: 5/1/2023      !! methylPREDNISolone (MEDROL) 4 mg tab Take 1 Tablet by mouth two (2) times a day for 2 doses. Indications: neuritis  Qty: 2 Tablet, Refills: 0  Start date: 5/1/2023, End date: 5/2/2023      !! methylPREDNISolone (MEDROL) 4 mg tab Take 1 Tablet by mouth Daily (before breakfast) for 1 dose. Indications: neuritis  Qty: 1 Tablet, Refills: 0  Start date: 5/2/2023, End date: 5/3/2023       !! - Potential duplicate medications found. Please discuss with provider. CONTINUE these medications which have CHANGED    Details   clonazePAM (KlonoPIN) 0.5 mg tablet Take 0.5 Tablets by mouth nightly as needed for Anxiety (first line at bedtime) for up to 30 days. Max Daily Amount: 0.25 mg. Indications: anxiety  Qty: 30 Tablet, Refills: 0  Start date: 4/28/2023, End date: 5/28/2023    Associated Diagnoses: Anxiety state      FLUoxetine (PROzac) 40 mg capsule Take 1 Capsule by mouth two (2) times a day for 30 days. Indications: anxiousness associated with depression  Qty: 60 Capsule, Refills: 0  Start date: 4/28/2023, End date: 5/28/2023      glipiZIDE (GLUCOTROL) 5 mg tablet Take 1 Tablet by mouth Daily (before breakfast) for 30 days. Indications: type 2 diabetes mellitus  Qty: 30 Tablet, Refills: 0  Start date: 4/28/2023, End date: 5/28/2023      losartan (COZAAR) 50 mg tablet Take 1 Tablet by mouth daily for 30 days. Indications: high blood pressure  Qty: 30 Tablet, Refills: 0  Start date: 4/28/2023, End date: 5/28/2023      melatonin 3 mg tablet Take 1 Tablet by mouth every evening for 30 days.  Indications: insomnia  Qty: 30 Tablet, Refills: 0  Start date: 4/28/2023, End date: 5/28/2023      QUEtiapine SR (Seroquel XR) 300 mg sr tablet Take 1 Tablet by mouth nightly for 30 days. Indications: bipolar depression  Qty: 30 Tablet, Refills: 0  Start date: 4/28/2023, End date: 5/28/2023      traZODone (DESYREL) 100 mg tablet Take 2 Tablets by mouth nightly for 30 days. Indications: insomnia associated with depression  Qty: 60 Tablet, Refills: 0  Start date: 4/28/2023, End date: 5/28/2023      triamterene-hydroCHLOROthiazide (MAXZIDE) 37.5-25 mg per tablet Take 1 Tablet by mouth daily for 30 days. Indications: high blood pressure  Qty: 30 Tablet, Refills: 0  Start date: 4/28/2023, End date: 5/28/2023      atorvastatin (LIPITOR) 40 mg tablet Take 1 Tablet by mouth daily for 30 days. Indications: excessive fat in the blood  Qty: 30 Tablet, Refills: 0  Start date: 4/28/2023, End date: 5/28/2023           STOP taking these medications       busPIRone (BUSPAR) 15 mg tablet Comments:   Reason for Stopping:         metFORMIN (GLUCOPHAGE) 1,000 mg tablet Comments:   Reason for Stopping:         acetylcysteine (NAC) 600 mg tab Comments:   Reason for Stopping:         ondansetron (ZOFRAN ODT) 4 mg disintegrating tablet Comments:   Reason for Stopping:         clonazePAM (KlonoPIN) 0.25 mg TbDi Comments:   Reason for Stopping:               Unresulted Labs (24h ago, onward)      None          To obtain results of studies pending at discharge, please contact [phone]    Follow-up Information       Follow up With Specialties Details Why Contact Info    [facility]  Follow up establish appointment prior to discharge [phone]    [employer]  Go to for follow up care with Dr. Treasure [address],   [place], [address]  Phone: [phone]    Tucker Juárez MD Emergency Medicine   [facility]  [phone]              Advanced Directive:   Does the patient have an appointed surrogate decision maker?  No  Does the patient have a Medical Advance Directive? No  Does the patient have a Psychiatric Advance Directive? No  If the patient does not have a surrogate or Medical Advance Directive AND Psychiatric Advance Directive, the patient was offered information on these advance directives Patient declined to complete    Patient Instructions: Please continue all medications until otherwise directed by physician. Tobacco Cessation Discharge Plan:   Is the patient a smoker and needs referral for smoking cessation? Not applicable  Patient referred to the following for smoking cessation with an appointment? Not applicable     Patient was offered medication to assist with smoking cessation at discharge? Not applicable  Was education for smoking cessation added to the discharge instructions? Not applicable    Alcohol/Substance Abuse Discharge Plan:   Does the patient have a history of substance/alcohol abuse and requires a referral for treatment? Not applicable  Patient referred to the following for substance/alcohol abuse treatment with an appointment? Not applicable  Patient was offered medication to assist with alcohol cessation at discharge? Not applicable  Was education for substance/alcohol abuse added to discharge instructions? Not applicable    Patient discharged to Home; provided to the patient/caregiver either in hard copy or electronically.

## 2023-04-28 NOTE — PROGRESS NOTES
Pharmacist Discharge Medication Reconciliation    Discharging Provider: Bob Conley NP    Significant PMH:   Past Medical History:   Diagnosis Date    Anxiety     Depression     ANXIETY & DEPRESSION    Diabetes ([facility] [address]     TYPE II    Hypertension     Infected sebaceous cyst, upper back 4/10/2019    Panic attack     Psychoses ([facility] [address] 2/23/2023    Suicidal thoughts     Vision decreased      Chief Complaint for this Admission:   Chief Complaint   Patient presents with    Anxiety     Allergies: Lisinopril    Discharge Medications:   Current Discharge Medication List        START taking these medications    Details   metFORMIN ER (GLUCOPHAGE XR) 500 mg tablet Take 1 Tablet by mouth daily (with dinner) for 30 days. Indications: type 2 diabetes mellitus  Qty: 30 Tablet, Refills: 0  Start date: 4/28/2023, End date: 5/28/2023      cetirizine (ZYRTEC) 10 mg tablet Take 1 Tablet by mouth every evening for 30 days. Indications: inflammation of the nose due to an allergy  Qty: 30 Tablet, Refills: 0  Start date: 4/28/2023, End date: 5/28/2023      hydrOXYzine HCL (ATARAX) 50 mg tablet Take 1 Tablet by mouth daily as needed for Anxiety for up to 30 days. Indications: anxious  Qty: 30 Tablet, Refills: 0  Start date: 4/28/2023, End date: 5/28/2023      !! methylPREDNISolone (MEDROL) 4 mg tab Take 1 Tablet by mouth three (3) times daily (with meals) for 3 doses. Indications: neuritis  Qty: 3 Tablet, Refills: 0  Start date: 4/28/2023, End date: 4/29/2023      !! methylPREDNISolone (MEDROL) 8 mg tablet Take 1 Tablet by mouth once for 1 dose. Indications: neuritis  Qty: 1 Tablet, Refills: 0  Start date: 4/28/2023, End date: 4/28/2023      !! methylPREDNISolone (MEDROL) 4 mg tab Take 1 Tablet by mouth four (4) times daily (with meals) for 4 doses.  Indications: neuritis  Qty: 4 Tablet, Refills: 0  Start date: 4/29/2023, End date: 4/30/2023      !! methylPREDNISolone (MEDROL) 4 mg tab Take 1 Tablet by mouth three (3) times daily for 3 doses. Indications: neuritis  Qty: 3 Tablet, Refills: 0  Start date: 4/30/2023, End date: 5/1/2023      !! methylPREDNISolone (MEDROL) 4 mg tab Take 1 Tablet by mouth two (2) times a day for 2 doses. Indications: neuritis  Qty: 2 Tablet, Refills: 0  Start date: 5/1/2023, End date: 5/2/2023      !! methylPREDNISolone (MEDROL) 4 mg tab Take 1 Tablet by mouth Daily (before breakfast) for 1 dose. Indications: neuritis  Qty: 1 Tablet, Refills: 0  Start date: 5/2/2023, End date: 5/3/2023       !! - Potential duplicate medications found. Please discuss with provider. CONTINUE these medications which have CHANGED    Details   clonazePAM (KlonoPIN) 0.5 mg tablet Take 0.5 Tablets by mouth nightly as needed for Anxiety (first line at bedtime) for up to 30 days. Max Daily Amount: 0.25 mg. Indications: anxiety  Qty: 30 Tablet, Refills: 0  Start date: 4/28/2023, End date: 5/28/2023    Associated Diagnoses: Anxiety state      FLUoxetine (PROzac) 40 mg capsule Take 1 Capsule by mouth two (2) times a day for 30 days. Indications: anxiousness associated with depression  Qty: 60 Capsule, Refills: 0  Start date: 4/28/2023, End date: 5/28/2023      glipiZIDE (GLUCOTROL) 5 mg tablet Take 1 Tablet by mouth Daily (before breakfast) for 30 days. Indications: type 2 diabetes mellitus  Qty: 30 Tablet, Refills: 0  Start date: 4/28/2023, End date: 5/28/2023      losartan (COZAAR) 50 mg tablet Take 1 Tablet by mouth daily for 30 days. Indications: high blood pressure  Qty: 30 Tablet, Refills: 0  Start date: 4/28/2023, End date: 5/28/2023      melatonin 3 mg tablet Take 1 Tablet by mouth every evening for 30 days. Indications: insomnia  Qty: 30 Tablet, Refills: 0  Start date: 4/28/2023, End date: 5/28/2023      QUEtiapine SR (Seroquel XR) 300 mg sr tablet Take 1 Tablet by mouth nightly for 30 days.  Indications: bipolar depression  Qty: 30 Tablet, Refills: 0  Start date: 4/28/2023, End date: 5/28/2023      traZODone (DESYREL) 100 mg tablet Take 2 Tablets by mouth nightly for 30 days. Indications: insomnia associated with depression  Qty: 60 Tablet, Refills: 0  Start date: 4/28/2023, End date: 5/28/2023      triamterene-hydroCHLOROthiazide (MAXZIDE) 37.5-25 mg per tablet Take 1 Tablet by mouth daily for 30 days. Indications: high blood pressure  Qty: 30 Tablet, Refills: 0  Start date: 4/28/2023, End date: 5/28/2023      atorvastatin (LIPITOR) 40 mg tablet Take 1 Tablet by mouth daily for 30 days.  Indications: excessive fat in the blood  Qty: 30 Tablet, Refills: 0  Start date: 4/28/2023, End date: 5/28/2023           STOP taking these medications       busPIRone (BUSPAR) 15 mg tablet Comments:   Reason for Stopping:         metFORMIN (GLUCOPHAGE) 1,000 mg tablet Comments:   Reason for Stopping:         acetylcysteine (NAC) 600 mg tab Comments:   Reason for Stopping:         ondansetron (ZOFRAN ODT) 4 mg disintegrating tablet Comments:   Reason for Stopping:         clonazePAM (KlonoPIN) 0.25 mg TbDi Comments:   Reason for Stopping:             The patient's chart, MAR and AVS were reviewed by Verónica Bland PHARMD

## 2023-04-28 NOTE — DISCHARGE SUMMARY
DISCHARGE SUMMARY    Some parts of the discharge summary are from the initial Psychiatric interview that was done on admission by the admitting psychiatrist.     Date of Admission: 4/10/2023    Date of Discharge: 4/28/2023     TYPE OF DISCHARGE:   REGULAR -  YES    ADMISSION EVALUATION:  CHIEF COMPLAINT:  Patient was sound asleep     HISTORY OF PRESENTING COMPLAINT:  Kaia Paredes is a 50 y.o. BLACK/ female who is currently admitted to the acute side of 7th floor behavioral health Unit at [facility].      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 She presented to [facility] ED on 4/10/2023 in distress with complaints that she wasn't able to function as she continues to pull her hair, clothes and scream 'stop it, stop it.' She was admitted to the inpatient behavioral health unit overnight. This morning upon waking she complained of experiencing undue anxiety for which she tried prn hydroxyzine to no avail. She wasn't verbally re-directible and nursing offered her PRN IM medications which she accepted. Upon my evaluation, patient was snoring and sound asleep. Her evaluation was deferred to give her an opportunity to obtain overdue rest and relief        PAST PSYCHIATRIC HISTORY   several past inpatient psychiatric admissions, last being in 2/2023 at [address] [place] [address]:  No known past hx of substance use     PAST MEDICAL HISTORY:     Please see H&P for details. Past Medical History:   Diagnosis Date    Anxiety      Depression       ANXIETY & DEPRESSION    Diabetes ([address]       TYPE II    Hypertension      Infected sebaceous cyst, upper back 4/10/2019    Panic attack      Psychoses ([address] 2/23/2023    Suicidal thoughts      Vision decreased                Prior to Admission medications    Medication Sig Start Date End Date Taking?  Authorizing Provider   busPIRone (BUSPAR) 5 mg tablet Take 1 Tablet by mouth two (2) times a day. Indications: repeated episodes of anxiety 3/9/23     Marian Bustillos MD   QUEtiapine SR (Seroquel XR) 300 mg sr tablet Take 1 Tablet by mouth nightly. Indications: bipolar depression 3/6/23     Marian Bustillos MD   acetylcysteine (NAC) 600 mg tab Take 1,200 mg by mouth daily. Indications: memory 3/6/23     Marian Bustillos MD   clonazePAM (KlonoPIN) 0.25 mg TbDi Take 1 Tablet by mouth nightly. Max Daily Amount: 0.25 mg. Indications: panic disorder 3/6/23     Marian Bustillos MD   FLUoxetine (PROzac) 40 mg capsule Take 1 Capsule by mouth daily. Indications: anxiousness associated with depression 3/7/23     Marian Bustillos MD   glipiZIDE (GLUCOTROL) 5 mg tablet Take 1 Tablet by mouth daily. Indications: type 2 diabetes mellitus 3/6/23     Marian Bustillos MD   ondansetron (ZOFRAN ODT) 4 mg disintegrating tablet Take 1 Tablet by mouth every eight (8) hours as needed for Nausea or Vomiting. Indications: vomiting in children due to acute gastroenteritis 3/6/23     Marian Bustillos MD   melatonin 3 mg tablet Take 1 Tablet by mouth nightly.  Indications: insomnia 3/6/23     Marian Bustillos MD             Vitals:     04/10/23 1105 04/10/23 2149 04/10/23 2215 04/11/23 0800   BP: (!) 145/83 (!) 170/132 115/78     Pulse: 76 68 84     Resp: 16 18 16 16   Temp: 97.3 °F (36.3 °C) 98.4 °F (36.9 °C) 99.1 °F (37.3 °C)     SpO2: 96% 98% 99%              Lab Results   Component Value Date/Time     WBC 8.2 04/10/2023 01:07 PM     HGB 11.6 04/10/2023 01:07 PM     HCT 37.7 04/10/2023 01:07 PM     PLATELET 001 76/89/6871 01:07 PM     MCV 85.9 04/10/2023 01:07 PM            Lab Results   Component Value Date/Time     Sodium 136 04/10/2023 01:07 PM     Potassium 3.4 (L) 04/10/2023 01:07 PM     Chloride 103 04/10/2023 01:07 PM     CO2 30 04/10/2023 01:07 PM     Anion gap 3 (L) 04/10/2023 01:07 PM     Glucose 38 (LL) 04/10/2023 01:07 PM     Glucose 146 (H) 07/29/2022 05:44 AM     BUN 11 04/10/2023 01:07 PM     Creatinine 0.83 04/10/2023 01:07 PM     BUN/Creatinine ratio 13 04/10/2023 01:07 PM     GFR est AA >60 07/26/2022 02:48 PM     GFR est non-AA >60 07/26/2022 02:48 PM     Calcium 9.5 04/10/2023 01:07 PM     Bilirubin, total 0.2 04/10/2023 01:07 PM     Alk. phosphatase 80 04/10/2023 01:07 PM     Protein, total 7.9 04/10/2023 01:07 PM     Albumin 3.7 04/10/2023 01:07 PM     Globulin 4.2 (H) 04/10/2023 01:07 PM     A-G Ratio 0.9 (L) 04/10/2023 01:07 PM     ALT (SGPT) 25 04/10/2023 01:07 PM     AST (SGOT) 15 04/10/2023 01:07 PM      No results found for: VALF2, VALAC, VALP, VALPR, DS6, CRBAM, CRBAMP, CARB2, XCRBAM  No results found for: LITHM  RADIOLOGY REPORTS:(reviewed/updated 4/11/2023)  MRI BRAIN W WO CONT     Result Date: 2/28/2023  EXAM:  MRI BRAIN W WO CONT INDICATION:    new onset psychosis COMPARISON:  CT head 11/28/2022. CONTRAST: 20 ml ProHance. TECHNIQUE:  Multiplanar multisequence acquisition without and with contrast of the brain. FINDINGS: The ventricles are normal in size and position. The brain parenchyma has normal signal characteristics for age, with minimal tiny nonspecific T2/FLAIR white matter hyperintensities in the frontal lobes that are of doubtful clinical significance. There is no acute infarct, hemorrhage, extra-axial fluid collection, or mass effect. There is no cerebellar tonsillar herniation. Expected arterial flow-voids are present. No evidence of abnormal enhancement. The paranasal sinuses, mastoid air cells, and middle ears are clear. The orbital contents are within normal limits. No significant osseous or scalp lesions are identified. 1. No acute or significant intracranial abnormality. BRANDO MAMMO BI SCREENING INCL CAD     Result Date: 2/7/2023  STUDY:  Bilateral Digital Screening Mammogram INDICATION:  Screening. Direct comparison is made to prior mammograms.  BREAST COMPOSITION: There are scattered fibroglandular densities (approximately 25-50% glandular). FINDINGS: Bilateral digital screening mammography was performed, and is interpreted in conjunction with a computer assisted detection (CAD) system. The breast parenchyma is stable bilaterally. No suspicious masses or calcifications are identified. There is no skin thickening or nipple retraction. There has been no significant change. BIRADS 1: Negative. No mammographic evidence of malignancy. Next screening mammogram is recommended in one year. The patient will be notified of these results. Lab Results   Component Value Date/Time     HCG urine, QL Negative 01/13/2023 12:49 PM     Pregnancy test,urine (POC) Negative 04/10/2023 01:13 PM     HCG, Ql. NEGATIVE 06/22/2018 11:14 AM         FAMILY HISTORY:  Unknown     PSYCHOSOCIAL HISTORY:  Unknown     MENTAL STATUS EXAM:  48yo AAF is sound asleep. She is in fair grooming and is dressed in hospital gown. Her exam is deferred. Insight is poor. Judgment is poor  Cognition: Intact grossly. ASSESSMENT:  Luca Vergara meets criteria for a diagnosis of   . Mood d/o NOS  Psychotic d/o NOS  Trichotillomania     PLAN:  Recommend inpatient psychiatric admission to mitigate the risk of further decompensation. Will obtain pertinent labs and collateral information. Encourage patient to participate in programming and group activities. Will restart patient's glipizide 5mg po qday, melatonin 3mg po qhs, prozac 40mg po qday, clonazepam 0.25mg po qhs prn, seroquel XR 300mg po qhs and NAC 1200mg po qday      COURSE IN THE HOSPITAL:    Patient was admitted to the inpatient psychiatry unit for acute psychiatric stabilization in regards to symptomatology as described in the HPI above and placed on Q15 minute checks and withdrawal precautions. Interval History:  04/28/2023  She was started on a Medrol taper by hospitalist for addressing neuritis. 04/27/2023  Nursing report no acute events overnight.   She said that switching to the general unit was challenging for her as she felt the acute unit was a much more intimate setting. She complains of not sleeping well because of L shoulder pain. She feels that this pain can't wait for an outpatient appointment. Patient says that she's had no episodes of hair pulling. She denies any thoughts of self harm or suicide. 04/26/2023  Nursing report patient slept well last night. She reports feeling quite empowered by her jasvir today. She reports no worsened symptoms or urges of self harm. She's had no hair pulling or yelling events. She requests to be transferred to the general side. 04/25/2023  Patient says that she experienced an episode of yelling and hair pulling that lasted for an hour. She says that she didn't have control over what was going on. She received IM medications. Patient says that she tried walking up and down the bustos. She says that she has no warning signs. She reports benefit from the IM medications. She reflects back and says that these episodes have no triggers. 04/24/2023  Catherine reports experiencing hair pulling as well as yelling fits of 'stop it, stop it.' She says that she walks and utilizes the squeeze ball, but eventually needing medication. She says that she has anger and irritability towards the anxiety, but not towards anyone else. Denies SI or self harm thoughts. 4/23 - Ceci Dave reports ongoing anxiety this morning. Some yelling outbursts noted this AM. Energy level remains fair. She reports sleeping well. She complains of headache but it is not clear if this is a side effect or unrelated. We discussed holding Prozac but she is not in favor of this. Some PI persists. 4/22 - Ceci Dave reports little change in mood. She complains of headache which may be related to elevated blood pressure. She is in no acute distress on interview. She denies SI / HI. Somatic preoccupation / delusion noted on interview. She denies AVH currently. Some paranoid ideation regarding medications. 04/21/2023  Ericka Rowland states she is feeling good currently. However, she had an episode this morning again where she pulled her hair and yelled \"stop it. \" There was no trigger. She denies SI/plan/intent, denies HI/plan/intent, denies AVH. Pt states she is still having headaches. She is sleeping well at night. Appetite is good. She has several somatic complaints, states there is something tingling from her ear to her arm. She has been taking Voltaren for this and finds it helpful. She states she does have depression in the mornings as well. She states she feels she constantly has \"congestion\" in her head, which again she thinks is a side effect from a medication. 04/20/2023  Patient reports sleeping well overnight. However, she says that she had 'an episode this morning.'  She reports that she had a good jasvir group. She felt nauseous, had a headache, felt her heart racing, feels tired. She says that this takes about 10miutes. Ericka Rowland thinks that these episodes are getting better, however, as she hadn't had any such episodes for the past 48 hours. She says that her headache is quite bothersome and is interested in further decrease of depakote. 04/19/2023  Reports sleeping well last night. She is eating her meals and taking medications. She report feeling 'head congestion' improved, but feels her dyspepsia is the same. Ericka Rowland feels hopeful about her symptoms because this is the second day of not experiencing symptoms of hair pulling, yelling, or self harm. She is anxious about the side-effects not going away and says that they are bothersome. She is agreeable to use mylanta prn and  if no improvement, she is agreeable to further reduction in depakote. 04/18/2023  Patient reports sleeping 7 hours. She attempted to board on the general unit, but she was unable to stay there any more than 15 minutes.    She remains in a blocked room because of her behaviors of yelling and hair pulling. She feels improvement in her symptoms of hair pulling and yelling out. She hasn't experienced any of those symptoms yesterday. However she complains of experiencing bothersome headache and nausea. She requests changes in her medication regimen. She denies SI. Denies HI.     04/17/2023  Patient had 2 episodes of hair pulling and calling out. She says that her increase in medication dose has caused nausea and headache, but she is tolerating this. She will think about whether or not she'd prefer prozac at night. Focussed patient's attention on the 'what' of her experience prior to hair pulling, or saying 'stop,' or clothes pulling, in order to define her stressors, and triggers; in order to focus work with her therapist.  Met with patient' family and answered questions about next steps. 04/16/2023  Patient reportedly had good sleep last night of 7 hours. Nursing report experienced significant yelling during the day today, biting on the stress ball, but no hair pulling. Patient reports that today she was pulling her hair, yelling out, biting stress balls. Took prn zyprex  and atarax this morning after. She felt they were helping. She reports that the urges come on in waves, and she doesn't know what causes them. She reports this occurs 2-3 times per day. 04/15/2023  Reports yelling out at night. Nurses report witnessing patient bite on her stress ball. She slept well. Complains of lightheadedness, but otherwise well. Denies having SI. Denies HI. Denies experiencing AH     04/14/2023  Nursing report patient bit the stress ball yesterday and had one episode of hair pulling. Last night she got up and screamed in the middle of night. She denies having side-effects of increasing depakote dose, but does feel that she is lightheaded. At this time she has no urges or thoughts to harm herself. Repots slept really well.   She requests changing prozac to day time instead of night time. 04/13/2023  Nursing report patient had no acute events overnight. Patient remains in a blocked room. However, when she got out of her room this am, she did pat her hair. No hair pulling or screaming noted. She had tylenol and trazodone prn last night. No IM medications. Treatment team evaluated her in her room this morning. She says that her mood is fair, but that she struggles with anger. She reports appreciating the 's help in empowering her to deal with her anger. She tolerated starting depakote and denies having any side-effects to it. She isn't sure if it is helping, but she reports feeling about the same, but no worsening of symptoms. 04/12/2023  Nursing report patient slept well and has taken her medications. Describes experiencing worsening of mood and irritability for the past days, with patient describing 'screaming fits' of 'stop it, stop it. ,' at the 'anxiety' as it is tormenting her. Kelsey Thayer shares that she quit her job because she was unable to function while struggling with these symptoms. She did have an admission to [address] [place] [address] after she was discharged from here and after finishing the PHP program. Her symptoms had been worsening for the past three days, and says that she presented here at the urging of her parents. She shares that she feels quite low and doesn't have hope for the future. Her LMP was 1 month ago, but her menstruation hadn't been regular. Medication changes and reationale:  04/27/2023  Plan d/c tomorrow with referral to OCD IOP.     04/26/2023  Transfer patient to general side. Continue current medications as prescribed. 04/25/2023  Continue current medication regimen. Recommend PHP/IOP for OCD.     4/24/2023  D/c depakote  Recommend ERP outpatient in addition to prozac and seroquel. Transfer pt to general side once safe.      4/23  Continue current treatment  Metformin  mg      4/22 -   Continue current treatment  Depakote level Wednesday  Discharge planning     04/21/2023  Added HCTZ 37.5-25 daily per home med rec, BP has been elevated. No other changes today. 04/20/2023  Decrease depakote dr from 500 to 250mg po qhs to mitigate side-effects of headache, fatigue and nausea. Discussed possible increase of seroquel, possible cross taper to zyprexa. Continue prozac 40mg po bid  Tranfer to general side. 04/19/2023  Continue current medication regimen  Patient may board on general unit  Transfer tomorrow if she does well. Could decrease depakote further to mitigate side-effects. 04/18/2023  D/c depakote dr 500mg po bid. Instead start depakote er 500mg po qhs  D/c prozac 80mg po qday  Instead start prozac 40mg po bid starting tomorrow  Encouraged patient to take prn clonazepam 0.25mg po prn anxiety and ibuprofen prn headache  04/17/2023  Continue current medications and dosages. 04/16/2023  Increae prozac from 60 to 80mg po qday. Will give the difference of 20mg today. Continue seroquel xr 300mg po qhs  Continue depakote dr 500mg po bid.     04/15/2023  Continue current medications. 04/14/2023  Continue current medications. 04/13/2023  Increase depakote from 250mg po bid to 500mg po bid. Will change prozac 60mg po qday to night time to simplify medication administration. 04/12/2023  Patient felt that NAC wasn't helping so she stopped it. At [facility] her prozac was increased from 40 to 60mg po qday, so I will increase her prozac here as well. I will continue prescribed seroquel 300mg po qhs. We discussed a trial of depakote DR 200mg po bid to target irritability and anger. Discussed with patient medication side-effects including being contraindication for pregnancy. Discussed d/c of buspar to decrease risk of polypharmacy        She was quite on the unit, appropriate in her interactions, and cooperative with medications and the unit routine.  Please see individual progress notes for more specific details regarding patient's hospitalization course. Patient was discharged as per the plan. She had been doing well on the unit as per the report of the nursing staff and my observations. No PRN medication for agitation, seclusion or restraints were required during the last 48 hours of her stay. Corrinne Haines had improved progressively to the point of being stable for discharge and outpatient FU. At this time she did not offer any complaints. Patient denied any SI or HI. Denied any AH or VH. She denied any delusions. Was not considered a danger to self or to others and is safe for discharge. Will FU with her appointments and remains motivated to be in treatment. The patient verbalized understanding of her discharge instructions. DISCHARGE DIAGNOSIS:  Mood d/o nos;  Psychotic d/ nos  Trichotillomania  Rule out OCD       Current Discharge Medication List        START taking these medications    Details   metFORMIN ER (GLUCOPHAGE XR) 500 mg tablet Take 1 Tablet by mouth daily (with dinner) for 30 days. Indications: type 2 diabetes mellitus  Qty: 30 Tablet, Refills: 0  Start date: 4/28/2023, End date: 5/28/2023      cetirizine (ZYRTEC) 10 mg tablet Take 1 Tablet by mouth every evening for 30 days. Indications: inflammation of the nose due to an allergy  Qty: 30 Tablet, Refills: 0  Start date: 4/28/2023, End date: 5/28/2023      hydrOXYzine HCL (ATARAX) 50 mg tablet Take 1 Tablet by mouth daily as needed for Anxiety for up to 30 days. Indications: anxious  Qty: 30 Tablet, Refills: 0  Start date: 4/28/2023, End date: 5/28/2023      !! methylPREDNISolone (MEDROL) 4 mg tab Take 1 Tablet by mouth three (3) times daily (with meals) for 3 doses. Indications: neuritis  Qty: 3 Tablet, Refills: 0  Start date: 4/28/2023, End date: 4/29/2023      !! methylPREDNISolone (MEDROL) 8 mg tablet Take 1 Tablet by mouth once for 1 dose. Indications: neuritis  Qty: 1 Tablet, Refills: 0  Start date: 4/28/2023, End date: 4/28/2023      !! methylPREDNISolone (MEDROL) 4 mg tab Take 1 Tablet by mouth four (4) times daily (with meals) for 4 doses. Indications: neuritis  Qty: 4 Tablet, Refills: 0  Start date: 4/29/2023, End date: 4/30/2023      !! methylPREDNISolone (MEDROL) 4 mg tab Take 1 Tablet by mouth three (3) times daily for 3 doses. Indications: neuritis  Qty: 3 Tablet, Refills: 0  Start date: 4/30/2023, End date: 5/1/2023      !! methylPREDNISolone (MEDROL) 4 mg tab Take 1 Tablet by mouth two (2) times a day for 2 doses. Indications: neuritis  Qty: 2 Tablet, Refills: 0  Start date: 5/1/2023, End date: 5/2/2023      !! methylPREDNISolone (MEDROL) 4 mg tab Take 1 Tablet by mouth Daily (before breakfast) for 1 dose. Indications: neuritis  Qty: 1 Tablet, Refills: 0  Start date: 5/2/2023, End date: 5/3/2023       !! - Potential duplicate medications found. Please discuss with provider. CONTINUE these medications which have CHANGED    Details   clonazePAM (KlonoPIN) 0.5 mg tablet Take 0.5 Tablets by mouth nightly as needed for Anxiety (first line at bedtime) for up to 30 days. Max Daily Amount: 0.25 mg. Indications: anxiety  Qty: 30 Tablet, Refills: 0  Start date: 4/28/2023, End date: 5/28/2023    Associated Diagnoses: Anxiety state      FLUoxetine (PROzac) 40 mg capsule Take 1 Capsule by mouth two (2) times a day for 30 days. Indications: anxiousness associated with depression  Qty: 60 Capsule, Refills: 0  Start date: 4/28/2023, End date: 5/28/2023      glipiZIDE (GLUCOTROL) 5 mg tablet Take 1 Tablet by mouth Daily (before breakfast) for 30 days. Indications: type 2 diabetes mellitus  Qty: 30 Tablet, Refills: 0  Start date: 4/28/2023, End date: 5/28/2023      losartan (COZAAR) 50 mg tablet Take 1 Tablet by mouth daily for 30 days. Indications: high blood pressure  Qty: 30 Tablet, Refills: 0  Start date: 4/28/2023, End date: 5/28/2023      melatonin 3 mg tablet Take 1 Tablet by mouth every evening for 30 days.  Indications: insomnia  Qty: 30 Tablet, Refills: 0  Start date: 4/28/2023, End date: 5/28/2023      QUEtiapine SR (Seroquel XR) 300 mg sr tablet Take 1 Tablet by mouth nightly for 30 days. Indications: bipolar depression  Qty: 30 Tablet, Refills: 0  Start date: 4/28/2023, End date: 5/28/2023      traZODone (DESYREL) 100 mg tablet Take 2 Tablets by mouth nightly for 30 days. Indications: insomnia associated with depression  Qty: 60 Tablet, Refills: 0  Start date: 4/28/2023, End date: 5/28/2023      triamterene-hydroCHLOROthiazide (MAXZIDE) 37.5-25 mg per tablet Take 1 Tablet by mouth daily for 30 days. Indications: high blood pressure  Qty: 30 Tablet, Refills: 0  Start date: 4/28/2023, End date: 5/28/2023      atorvastatin (LIPITOR) 40 mg tablet Take 1 Tablet by mouth daily for 30 days. Indications: excessive fat in the blood  Qty: 30 Tablet, Refills: 0  Start date: 4/28/2023, End date: 5/28/2023           STOP taking these medications       busPIRone (BUSPAR) 15 mg tablet Comments:   Reason for Stopping:         metFORMIN (GLUCOPHAGE) 1,000 mg tablet Comments:   Reason for Stopping:         acetylcysteine (NAC) 600 mg tab Comments:   Reason for Stopping:         ondansetron (ZOFRAN ODT) 4 mg disintegrating tablet Comments:   Reason for Stopping:         clonazePAM (KlonoPIN) 0.25 mg TbDi Comments:   Reason for Stopping: Follow-up Information       Follow up With Specialties Details Why Contact Info    [facility]  Follow up establish appointment prior to discharge [phone]    [facility]  Go to for follow up care with Dr. Dario Willisgamarquis ,   [id number] [place]  Phone: [phone]          WOUND CARE: none needed. PROGNOSIS:   Good / Fair based on nature of patient's pathology/ies and treatment compliance issues. Prognosis is greatly dependent upon patient's ability to  follow up on psychiatric/psychotherapy appointments as well as to comply with psychiatric medications as prescribed.

## 2023-04-28 NOTE — PROGRESS NOTES
Problem: Falls - Risk of  Goal: *Absence of Falls  Description: Document Esteban Pretty Fall Risk and appropriate interventions in the flowsheet.   Outcome: Progressing Towards Goal  Note: Fall Risk Interventions:            Medication Interventions: Teach patient to arise slowly                   Problem: Patient Education: Go to Patient Education Activity  Goal: Patient/Family Education  Outcome: Progressing Towards Goal     Problem: Discharge Planning  Goal: *Discharge to safe environment  Outcome: Progressing Towards Goal  Goal: *Knowledge of medication management  Outcome: Progressing Towards Goal  Goal: *Knowledge of discharge instructions  Outcome: Progressing Towards Goal

## 2023-04-28 NOTE — INTERDISCIPLINARY ROUNDS
Pt has been discharged. Pt is stable. Pts meds have been filled here. Pt will be picked up by family @ 130pm. Pt will follow up with PCP and [facility].

## 2023-05-10 ENCOUNTER — HOSPITAL ENCOUNTER (OUTPATIENT)
Facility: HOSPITAL | Age: 48
Discharge: HOME | End: 2023-05-13
Payer: MEDICAID

## 2023-05-10 DIAGNOSIS — M79.602 LEFT ARM PAIN: ICD-10-CM

## 2023-05-10 PROCEDURE — 73070 X-RAY EXAM OF ELBOW: CPT

## 2023-05-10 PROCEDURE — 73030 X-RAY EXAM OF SHOULDER: CPT

## 2023-09-17 ENCOUNTER — HOSPITAL ENCOUNTER (INPATIENT)
Facility: HOSPITAL | Age: 48
LOS: 8 days | Discharge: HOME | DRG: 754 | End: 2023-09-25
Attending: PSYCHIATRY & NEUROLOGY | Admitting: PSYCHIATRY & NEUROLOGY
Payer: MEDICAID

## 2023-09-17 ENCOUNTER — HOSPITAL ENCOUNTER (EMERGENCY)
Facility: HOSPITAL | Age: 48
Discharge: ANOTHER HOSPITAL | End: 2023-09-17
Attending: STUDENT IN AN ORGANIZED HEALTH CARE EDUCATION/TRAINING PROGRAM
Payer: MEDICAID

## 2023-09-17 VITALS
OXYGEN SATURATION: 97 % | TEMPERATURE: 98.7 F | DIASTOLIC BLOOD PRESSURE: 82 MMHG | WEIGHT: 221.12 LBS | BODY MASS INDEX: 39.18 KG/M2 | HEIGHT: 63 IN | RESPIRATION RATE: 20 BRPM | SYSTOLIC BLOOD PRESSURE: 147 MMHG | HEART RATE: 90 BPM

## 2023-09-17 DIAGNOSIS — F41.0 PANIC DISORDER WITHOUT AGORAPHOBIA WITH SEVERE PANIC ATTACKS: Primary | ICD-10-CM

## 2023-09-17 DIAGNOSIS — R45.851 SUICIDAL IDEATIONS: Primary | ICD-10-CM

## 2023-09-17 DIAGNOSIS — F32.2 SEVERE DEPRESSION (HCC): ICD-10-CM

## 2023-09-17 LAB
ALBUMIN SERPL-MCNC: 3.7 G/DL (ref 3.5–5)
ALBUMIN/GLOB SERPL: 0.8 (ref 1.1–2.2)
ALP SERPL-CCNC: 96 U/L (ref 45–117)
ALT SERPL-CCNC: 30 U/L (ref 12–78)
AMPHET UR QL SCN: NEGATIVE
ANION GAP SERPL CALC-SCNC: 5 MMOL/L (ref 5–15)
APAP SERPL-MCNC: <2 UG/ML (ref 10–30)
APPEARANCE UR: CLEAR
AST SERPL-CCNC: 16 U/L (ref 15–37)
BACTERIA URNS QL MICRO: NEGATIVE /HPF
BARBITURATES UR QL SCN: NEGATIVE
BASOPHILS # BLD: 0.1 K/UL (ref 0–0.1)
BASOPHILS NFR BLD: 1 % (ref 0–1)
BENZODIAZ UR QL: NEGATIVE
BILIRUB SERPL-MCNC: 0.2 MG/DL (ref 0.2–1)
BILIRUB UR QL: NEGATIVE
BUN SERPL-MCNC: 11 MG/DL (ref 6–20)
BUN/CREAT SERPL: 14 (ref 12–20)
CALCIUM SERPL-MCNC: 9.6 MG/DL (ref 8.5–10.1)
CANNABINOIDS UR QL SCN: NEGATIVE
CHLORIDE SERPL-SCNC: 105 MMOL/L (ref 97–108)
CO2 SERPL-SCNC: 30 MMOL/L (ref 21–32)
COCAINE UR QL SCN: NEGATIVE
COLOR UR: ABNORMAL
COMMENT:: NORMAL
CREAT SERPL-MCNC: 0.78 MG/DL (ref 0.55–1.02)
DIFFERENTIAL METHOD BLD: NORMAL
EOSINOPHIL # BLD: 0.3 K/UL (ref 0–0.4)
EOSINOPHIL NFR BLD: 4 % (ref 0–7)
EPITH CASTS URNS QL MICRO: ABNORMAL /LPF
ERYTHROCYTE [DISTWIDTH] IN BLOOD BY AUTOMATED COUNT: 13.4 % (ref 11.5–14.5)
ETHANOL SERPL-MCNC: <10 MG/DL (ref 0–0.08)
GLOBULIN SER CALC-MCNC: 4.6 G/DL (ref 2–4)
GLUCOSE BLD STRIP.AUTO-MCNC: 102 MG/DL (ref 65–117)
GLUCOSE BLD STRIP.AUTO-MCNC: 103 MG/DL (ref 65–117)
GLUCOSE BLD STRIP.AUTO-MCNC: 43 MG/DL (ref 65–117)
GLUCOSE BLD STRIP.AUTO-MCNC: 53 MG/DL (ref 65–117)
GLUCOSE BLD STRIP.AUTO-MCNC: 69 MG/DL (ref 65–117)
GLUCOSE SERPL-MCNC: 43 MG/DL (ref 65–100)
GLUCOSE UR STRIP.AUTO-MCNC: 250 MG/DL
HCG UR QL: NEGATIVE
HCT VFR BLD AUTO: 42.1 % (ref 35–47)
HGB BLD-MCNC: 13.9 G/DL (ref 11.5–16)
HGB UR QL STRIP: NEGATIVE
IMM GRANULOCYTES # BLD AUTO: 0 K/UL (ref 0–0.04)
IMM GRANULOCYTES NFR BLD AUTO: 0 % (ref 0–0.5)
KETONES UR QL STRIP.AUTO: NEGATIVE MG/DL
LEUKOCYTE ESTERASE UR QL STRIP.AUTO: NEGATIVE
LYMPHOCYTES # BLD: 2.2 K/UL (ref 0.8–3.5)
LYMPHOCYTES NFR BLD: 37 % (ref 12–49)
Lab: NORMAL
MAGNESIUM SERPL-MCNC: 1.9 MG/DL (ref 1.6–2.4)
MCH RBC QN AUTO: 28.8 PG (ref 26–34)
MCHC RBC AUTO-ENTMCNC: 33 G/DL (ref 30–36.5)
MCV RBC AUTO: 87.2 FL (ref 80–99)
METHADONE UR QL: NEGATIVE
MONOCYTES # BLD: 0.4 K/UL (ref 0–1)
MONOCYTES NFR BLD: 6 % (ref 5–13)
NEUTS SEG # BLD: 3.1 K/UL (ref 1.8–8)
NEUTS SEG NFR BLD: 52 % (ref 32–75)
NITRITE UR QL STRIP.AUTO: NEGATIVE
NRBC # BLD: 0 K/UL (ref 0–0.01)
NRBC BLD-RTO: 0 PER 100 WBC
OPIATES UR QL: NEGATIVE
PCP UR QL: NEGATIVE
PH UR STRIP: 7.5 (ref 5–8)
PLATELET # BLD AUTO: 324 K/UL (ref 150–400)
PMV BLD AUTO: 9.8 FL (ref 8.9–12.9)
POTASSIUM SERPL-SCNC: 3.1 MMOL/L (ref 3.5–5.1)
PROT SERPL-MCNC: 8.3 G/DL (ref 6.4–8.2)
PROT UR STRIP-MCNC: NEGATIVE MG/DL
RBC # BLD AUTO: 4.83 M/UL (ref 3.8–5.2)
RBC #/AREA URNS HPF: ABNORMAL /HPF (ref 0–5)
SALICYLATES SERPL-MCNC: <1.7 MG/DL (ref 2.8–20)
SARS-COV-2 RNA RESP QL NAA+PROBE: NOT DETECTED
SERVICE CMNT-IMP: ABNORMAL
SERVICE CMNT-IMP: ABNORMAL
SERVICE CMNT-IMP: NORMAL
SODIUM SERPL-SCNC: 140 MMOL/L (ref 136–145)
SOURCE: NORMAL
SP GR UR REFRACTOMETRY: 1.01 (ref 1–1.03)
SPECIMEN HOLD: NORMAL
UROBILINOGEN UR QL STRIP.AUTO: 0.2 EU/DL (ref 0.2–1)
WBC # BLD AUTO: 6.1 K/UL (ref 3.6–11)
WBC URNS QL MICRO: ABNORMAL /HPF (ref 0–4)

## 2023-09-17 PROCEDURE — 80143 DRUG ASSAY ACETAMINOPHEN: CPT

## 2023-09-17 PROCEDURE — 81025 URINE PREGNANCY TEST: CPT

## 2023-09-17 PROCEDURE — 81001 URINALYSIS AUTO W/SCOPE: CPT

## 2023-09-17 PROCEDURE — 83735 ASSAY OF MAGNESIUM: CPT

## 2023-09-17 PROCEDURE — 6370000000 HC RX 637 (ALT 250 FOR IP)

## 2023-09-17 PROCEDURE — 36415 COLL VENOUS BLD VENIPUNCTURE: CPT

## 2023-09-17 PROCEDURE — 82962 GLUCOSE BLOOD TEST: CPT

## 2023-09-17 PROCEDURE — 6360000002 HC RX W HCPCS: Performed by: NURSE PRACTITIONER

## 2023-09-17 PROCEDURE — 82077 ASSAY SPEC XCP UR&BREATH IA: CPT

## 2023-09-17 PROCEDURE — 87635 SARS-COV-2 COVID-19 AMP PRB: CPT

## 2023-09-17 PROCEDURE — 85025 COMPLETE CBC W/AUTO DIFF WBC: CPT

## 2023-09-17 PROCEDURE — 99285 EMERGENCY DEPT VISIT HI MDM: CPT

## 2023-09-17 PROCEDURE — 1240000000 HC EMOTIONAL WELLNESS R&B

## 2023-09-17 PROCEDURE — 93005 ELECTROCARDIOGRAM TRACING: CPT

## 2023-09-17 PROCEDURE — 80053 COMPREHEN METABOLIC PANEL: CPT

## 2023-09-17 PROCEDURE — 80179 DRUG ASSAY SALICYLATE: CPT

## 2023-09-17 PROCEDURE — 6370000000 HC RX 637 (ALT 250 FOR IP): Performed by: NURSE PRACTITIONER

## 2023-09-17 PROCEDURE — 90791 PSYCH DIAGNOSTIC EVALUATION: CPT

## 2023-09-17 PROCEDURE — 80307 DRUG TEST PRSMV CHEM ANLYZR: CPT

## 2023-09-17 RX ORDER — ACETAMINOPHEN 325 MG/1
650 TABLET ORAL EVERY 4 HOURS PRN
Status: DISCONTINUED | OUTPATIENT
Start: 2023-09-17 | End: 2023-09-25 | Stop reason: HOSPADM

## 2023-09-17 RX ORDER — HYDROXYZINE HYDROCHLORIDE 25 MG/1
50 TABLET, FILM COATED ORAL 3 TIMES DAILY PRN
Status: DISCONTINUED | OUTPATIENT
Start: 2023-09-17 | End: 2023-09-25 | Stop reason: HOSPADM

## 2023-09-17 RX ORDER — LORAZEPAM 2 MG/ML
2 INJECTION INTRAMUSCULAR EVERY 4 HOURS PRN
Status: DISCONTINUED | OUTPATIENT
Start: 2023-09-17 | End: 2023-09-25 | Stop reason: HOSPADM

## 2023-09-17 RX ORDER — LORAZEPAM 2 MG/ML
2 INJECTION INTRAMUSCULAR EVERY 4 HOURS PRN
Status: DISCONTINUED | OUTPATIENT
Start: 2023-09-17 | End: 2023-09-17

## 2023-09-17 RX ORDER — POLYETHYLENE GLYCOL 3350 17 G/17G
17 POWDER, FOR SOLUTION ORAL DAILY PRN
Status: DISCONTINUED | OUTPATIENT
Start: 2023-09-17 | End: 2023-09-25 | Stop reason: HOSPADM

## 2023-09-17 RX ORDER — MAGNESIUM HYDROXIDE/ALUMINUM HYDROXICE/SIMETHICONE 120; 1200; 1200 MG/30ML; MG/30ML; MG/30ML
30 SUSPENSION ORAL EVERY 6 HOURS PRN
Status: DISCONTINUED | OUTPATIENT
Start: 2023-09-17 | End: 2023-09-25 | Stop reason: HOSPADM

## 2023-09-17 RX ORDER — HALOPERIDOL 5 MG/1
5 TABLET ORAL EVERY 4 HOURS PRN
Status: DISCONTINUED | OUTPATIENT
Start: 2023-09-17 | End: 2023-09-25 | Stop reason: HOSPADM

## 2023-09-17 RX ORDER — POTASSIUM CHLORIDE 750 MG/1
10 TABLET, FILM COATED, EXTENDED RELEASE ORAL ONCE
Status: COMPLETED | OUTPATIENT
Start: 2023-09-17 | End: 2023-09-17

## 2023-09-17 RX ORDER — DIPHENHYDRAMINE HYDROCHLORIDE 50 MG/ML
50 INJECTION INTRAMUSCULAR; INTRAVENOUS EVERY 4 HOURS PRN
Status: DISCONTINUED | OUTPATIENT
Start: 2023-09-17 | End: 2023-09-25 | Stop reason: HOSPADM

## 2023-09-17 RX ORDER — TRAZODONE HYDROCHLORIDE 50 MG/1
50 TABLET ORAL NIGHTLY PRN
Status: DISCONTINUED | OUTPATIENT
Start: 2023-09-17 | End: 2023-09-22

## 2023-09-17 RX ORDER — HALOPERIDOL 5 MG/ML
5 INJECTION INTRAMUSCULAR EVERY 4 HOURS PRN
Status: DISCONTINUED | OUTPATIENT
Start: 2023-09-17 | End: 2023-09-25 | Stop reason: HOSPADM

## 2023-09-17 RX ADMIN — HYDROXYZINE HYDROCHLORIDE 50 MG: 25 TABLET, FILM COATED ORAL at 18:57

## 2023-09-17 RX ADMIN — DIPHENHYDRAMINE HYDROCHLORIDE 50 MG: 50 INJECTION INTRAMUSCULAR; INTRAVENOUS at 19:44

## 2023-09-17 RX ADMIN — ACETAMINOPHEN 650 MG: 325 TABLET ORAL at 22:06

## 2023-09-17 RX ADMIN — LORAZEPAM 2 MG: 2 INJECTION INTRAMUSCULAR; INTRAVENOUS at 19:44

## 2023-09-17 RX ADMIN — TRAZODONE HYDROCHLORIDE 50 MG: 50 TABLET ORAL at 22:06

## 2023-09-17 RX ADMIN — HALOPERIDOL 5 MG: 5 TABLET ORAL at 18:57

## 2023-09-17 RX ADMIN — POTASSIUM CHLORIDE 10 MEQ: 750 TABLET, EXTENDED RELEASE ORAL at 15:35

## 2023-09-17 ASSESSMENT — LIFESTYLE VARIABLES
HOW MANY STANDARD DRINKS CONTAINING ALCOHOL DO YOU HAVE ON A TYPICAL DAY: PATIENT DOES NOT DRINK
HOW OFTEN DO YOU HAVE A DRINK CONTAINING ALCOHOL: NEVER

## 2023-09-17 ASSESSMENT — SLEEP AND FATIGUE QUESTIONNAIRES
AVERAGE NUMBER OF SLEEP HOURS: 5
DO YOU USE A SLEEP AID: YES
DO YOU HAVE DIFFICULTY SLEEPING: YES

## 2023-09-17 ASSESSMENT — PAIN SCALES - GENERAL: PAINLEVEL_OUTOF10: 3

## 2023-09-17 ASSESSMENT — PAIN DESCRIPTION - LOCATION: LOCATION: HEAD

## 2023-09-17 ASSESSMENT — PAIN DESCRIPTION - DESCRIPTORS: DESCRIPTORS: ACHING

## 2023-09-17 ASSESSMENT — PAIN - FUNCTIONAL ASSESSMENT
PAIN_FUNCTIONAL_ASSESSMENT: ACTIVITIES ARE NOT PREVENTED
PAIN_FUNCTIONAL_ASSESSMENT: NONE - DENIES PAIN

## 2023-09-17 ASSESSMENT — ENCOUNTER SYMPTOMS
ABDOMINAL PAIN: 0
SHORTNESS OF BREATH: 0

## 2023-09-17 NOTE — BSMART NOTE
BSMART assessment completed, and suicide risk level noted to be high. Charge Nurse, Tyson Barclay notified. Concerns not observed. 1:1 sitter and mother, Angela Combs are present at bedside. Security/Off- has not been notified.
Received message from [facility] w/ Access confirming pt has been accepted to [facility] by [facility] and will be placed in bed #320-1 pending glucose above 80 and po K+. Tito Taylor is aware.     Theoplis Shutter #[phone]
ideation, and means. The potential for homicide is not noted. The patient has not been a perpetrator of sexual or physical abuse. There are not pending charges. The patient is  felt to be at risk for self harm or harm to others. Section III - Psychosocial  The patient's overall mood and attitude is irritable and anxious. Feelings of helplessness and hopelessness are observed by self-report and SI w/ planning. Generalized anxiety is observed by self-report, irritability and pressured speech. Panic is not observed. Phobias are not observed. Obsessive compulsive tendencies are not observed. Section IV - Mental Status Exam  The patient's appearance shows no evidence of impairment. The patient's behavior shows no evidence of impairment. The patient is oriented to time, place, person and situation. The patient's speech is pressured. The patient's mood is irritable. The range of affect shows no evidence of impairment. The patient's thought content demonstrates no evidence of impairment . The thought process shows no evidence of impairment. The patient's perception shows no evidence of impairment. The patient's memory shows no evidence of impairment. The patient's appetite shows no evidence of impairment . The patient's sleep has evidence of insomnia. The patient's insight shows no evidence of impairment. The patient's judgement shows no evidence of impairment. Section V - Substance Abuse  The patient is not using substances. Section VI - Living Arrangements  The patient Single. The patient lives alone. The patient has no children. The patient does plan to return home upon discharge. The patient does not have legal issues pending. The patient's source of income comes from unknown. Methodist and cultural practices have not been voiced at this time. The patient's greatest support comes from her parents and this person will be involved with the treatment.     The patient has not been

## 2023-09-17 NOTE — ED NOTES
TRANSFER - OUT REPORT:    Verbal report given to [address], Rn on Vanessa Hernandez  being transferred to [facility] for routine progression of patient care       Report consisted of patient's Situation, Background, Assessment and   Recommendations(SBAR). Information from the following report(s) Nurse Handoff Report, Index, ED SBAR, and MAR was reviewed with the receiving nurse. Newton Fall Assessment:    Presents to emergency department  because of falls (Syncope, seizure, or loss of consciousness): No  Age > 70: No  Altered Mental Status, Intoxication with alcohol or substance confusion (Disorientation, impaired judgment, poor safety awaremess, or inability to follow instructions): No  Impaired Mobility: Ambulates or transfers with assistive devices or assistance; Unable to ambulate or transer.: No  Nursing Judgement: No          Lines:       Opportunity for questions and clarification was provided.       Patient transported with:  Lori Buck RN  09/17/23 4798

## 2023-09-17 NOTE — ED NOTES
Patient changed into a green gown. Belongings secured. EKG, labs, and UA sent. Patients mother at bedside.       Twin Valle RN  09/17/23 1380

## 2023-09-17 NOTE — ED NOTES
Called [facility] for transport with ETA of 2100. [facility] transport ETA 1715.      Cancelled [facility] transport      Bethany Mendes RN  09/17/23 8682

## 2023-09-17 NOTE — ED TRIAGE NOTES
Patient is coming in with suicidal thoughts and depression for months. Patient has had multiple medication adjustments recently. Patient states having a plan to take pills that she disclosed started this morning. No attempt. Charge nurse notified need for 1:1 .

## 2023-09-17 NOTE — ED NOTES
This tech performed a fingerstick blood glucose with a result in the 40s. This tech repeated the sugar and it was 53. This tech notified me and patient was given sugary juice and snacks. Will repeat glucose in about 15 minutes. MD notified.       Ángel Garrett RN  09/17/23 1960

## 2023-09-18 PROBLEM — F33.1 MODERATE EPISODE OF RECURRENT MAJOR DEPRESSIVE DISORDER (HCC): Status: ACTIVE | Noted: 2022-05-12

## 2023-09-18 PROBLEM — F32.A DEPRESSION: Status: ACTIVE | Noted: 2022-05-12

## 2023-09-18 LAB
EKG ATRIAL RATE: 102 BPM
EKG DIAGNOSIS: NORMAL
EKG P AXIS: 59 DEGREES
EKG P-R INTERVAL: 140 MS
EKG Q-T INTERVAL: 362 MS
EKG QRS DURATION: 66 MS
EKG QTC CALCULATION (BAZETT): 471 MS
EKG R AXIS: -4 DEGREES
EKG T AXIS: 21 DEGREES
EKG VENTRICULAR RATE: 102 BPM
GLUCOSE BLD STRIP.AUTO-MCNC: 140 MG/DL (ref 65–117)
SERVICE CMNT-IMP: ABNORMAL

## 2023-09-18 PROCEDURE — 6370000000 HC RX 637 (ALT 250 FOR IP): Performed by: NURSE PRACTITIONER

## 2023-09-18 PROCEDURE — 99223 1ST HOSP IP/OBS HIGH 75: CPT | Performed by: PSYCHIATRY & NEUROLOGY

## 2023-09-18 PROCEDURE — 82962 GLUCOSE BLOOD TEST: CPT

## 2023-09-18 PROCEDURE — 6370000000 HC RX 637 (ALT 250 FOR IP): Performed by: PSYCHIATRY & NEUROLOGY

## 2023-09-18 PROCEDURE — 93010 ELECTROCARDIOGRAM REPORT: CPT | Performed by: SPECIALIST

## 2023-09-18 PROCEDURE — 1240000000 HC EMOTIONAL WELLNESS R&B

## 2023-09-18 RX ORDER — METFORMIN HYDROCHLORIDE 500 MG/1
500 TABLET, EXTENDED RELEASE ORAL 2 TIMES DAILY WITH MEALS
Status: DISCONTINUED | OUTPATIENT
Start: 2023-09-18 | End: 2023-09-25 | Stop reason: HOSPADM

## 2023-09-18 RX ORDER — GLIPIZIDE 5 MG/1
10 TABLET ORAL
Status: DISCONTINUED | OUTPATIENT
Start: 2023-09-19 | End: 2023-09-25 | Stop reason: HOSPADM

## 2023-09-18 RX ORDER — ARIPIPRAZOLE 5 MG/1
5 TABLET ORAL DAILY
Status: DISCONTINUED | OUTPATIENT
Start: 2023-09-18 | End: 2023-09-18

## 2023-09-18 RX ORDER — CLONIDINE HYDROCHLORIDE 0.2 MG/1
0.2 TABLET ORAL 2 TIMES DAILY
Status: ON HOLD | COMMUNITY
End: 2023-09-25 | Stop reason: HOSPADM

## 2023-09-18 RX ORDER — LOSARTAN POTASSIUM 50 MG/1
50 TABLET ORAL DAILY
COMMUNITY

## 2023-09-18 RX ORDER — MEMANTINE HYDROCHLORIDE 10 MG/1
10 TABLET ORAL DAILY
Status: DISCONTINUED | OUTPATIENT
Start: 2023-09-18 | End: 2023-09-20

## 2023-09-18 RX ORDER — FLUOXETINE HYDROCHLORIDE 20 MG/1
20 CAPSULE ORAL DAILY
Status: DISCONTINUED | OUTPATIENT
Start: 2023-09-18 | End: 2023-09-21

## 2023-09-18 RX ORDER — DESIPRAMINE HYDROCHLORIDE 100 MG/1
100 TABLET ORAL NIGHTLY
Status: DISCONTINUED | OUTPATIENT
Start: 2023-09-18 | End: 2023-09-19

## 2023-09-18 RX ORDER — DESIPRAMINE HYDROCHLORIDE 100 MG/1
100 TABLET ORAL NIGHTLY
Status: ON HOLD | COMMUNITY
End: 2023-09-25 | Stop reason: HOSPADM

## 2023-09-18 RX ORDER — TRIAMTERENE AND HYDROCHLOROTHIAZIDE 37.5; 25 MG/1; MG/1
1 TABLET ORAL DAILY
Status: DISCONTINUED | OUTPATIENT
Start: 2023-09-18 | End: 2023-09-25 | Stop reason: HOSPADM

## 2023-09-18 RX ORDER — LOSARTAN POTASSIUM 25 MG/1
50 TABLET ORAL DAILY
Status: DISCONTINUED | OUTPATIENT
Start: 2023-09-18 | End: 2023-09-25 | Stop reason: HOSPADM

## 2023-09-18 RX ORDER — METFORMIN HYDROCHLORIDE 500 MG/1
500 TABLET, EXTENDED RELEASE ORAL 2 TIMES DAILY WITH MEALS
Status: ON HOLD | COMMUNITY
End: 2023-09-25 | Stop reason: SDUPTHER

## 2023-09-18 RX ORDER — GLIPIZIDE 10 MG/1
10 TABLET, FILM COATED, EXTENDED RELEASE ORAL DAILY
Status: ON HOLD | COMMUNITY
End: 2023-09-25 | Stop reason: SDUPTHER

## 2023-09-18 RX ORDER — ZOLPIDEM TARTRATE 5 MG/1
5 TABLET ORAL
Status: ON HOLD | COMMUNITY
End: 2023-09-25 | Stop reason: HOSPADM

## 2023-09-18 RX ORDER — CLONIDINE HYDROCHLORIDE 0.1 MG/1
0.2 TABLET ORAL 2 TIMES DAILY
Status: DISCONTINUED | OUTPATIENT
Start: 2023-09-18 | End: 2023-09-20

## 2023-09-18 RX ORDER — ATORVASTATIN CALCIUM 40 MG/1
40 TABLET, FILM COATED ORAL DAILY
Status: DISCONTINUED | OUTPATIENT
Start: 2023-09-18 | End: 2023-09-25 | Stop reason: HOSPADM

## 2023-09-18 RX ADMIN — CLONIDINE HYDROCHLORIDE 0.2 MG: 0.1 TABLET ORAL at 20:58

## 2023-09-18 RX ADMIN — ATORVASTATIN CALCIUM 40 MG: 40 TABLET, FILM COATED ORAL at 13:52

## 2023-09-18 RX ADMIN — LOSARTAN POTASSIUM 50 MG: 25 TABLET, FILM COATED ORAL at 13:52

## 2023-09-18 RX ADMIN — FLUOXETINE 20 MG: 20 CAPSULE ORAL at 13:52

## 2023-09-18 RX ADMIN — HYDROXYZINE HYDROCHLORIDE 50 MG: 25 TABLET, FILM COATED ORAL at 04:16

## 2023-09-18 RX ADMIN — MEMANTINE 10 MG: 10 TABLET ORAL at 13:52

## 2023-09-18 RX ADMIN — TRIAMTERENE AND HYDROCHLOROTHIAZIDE 1 TABLET: 37.5; 25 TABLET ORAL at 13:52

## 2023-09-18 RX ADMIN — TRAZODONE HYDROCHLORIDE 50 MG: 50 TABLET ORAL at 22:24

## 2023-09-18 RX ADMIN — METFORMIN HYDROCHLORIDE 500 MG: 500 TABLET, EXTENDED RELEASE ORAL at 16:51

## 2023-09-18 RX ADMIN — ACETAMINOPHEN 650 MG: 325 TABLET ORAL at 22:41

## 2023-09-18 RX ADMIN — ACETAMINOPHEN 650 MG: 325 TABLET ORAL at 13:55

## 2023-09-18 ASSESSMENT — PAIN DESCRIPTION - LOCATION
LOCATION: ARM
LOCATION: ARM

## 2023-09-18 ASSESSMENT — PAIN DESCRIPTION - ORIENTATION
ORIENTATION: LEFT
ORIENTATION: LEFT

## 2023-09-18 ASSESSMENT — PAIN SCALES - GENERAL
PAINLEVEL_OUTOF10: 0
PAINLEVEL_OUTOF10: 5
PAINLEVEL_OUTOF10: 5

## 2023-09-18 ASSESSMENT — PAIN DESCRIPTION - DESCRIPTORS: DESCRIPTORS: SORE

## 2023-09-18 NOTE — GROUP NOTE
Group Therapy Note    Date: 9/18/2023    Group Start Time: 1000  Group End Time: 1100  Group Topic: Topic Group    [place] [address] ACUTE BEHAV [address] [facility]        Group Therapy Note    Attendees:        Patient's Goal:  To participate in mental health journey game      Status After Intervention:  Improved    Participation Level:  Active Listener and Interactive    Participation Quality: Appropriate, Attentive, and Sharing      Speech:  normal      Thought Process/Content: Logical      Affective Functioning: Congruent      Mood: euthymic      Level of consciousness:  Alert, Oriented x4, and Attentive      Response to Learning: Progressing to goal      Endings: None Reported    Modes of Intervention: Education and Problem-solving      Discipline Responsible: Recreational Therapist      Signature:  Philomena Nolen

## 2023-09-18 NOTE — PLAN OF CARE
Problem: Behavior  Goal: Pt/Family maintain appropriate behavior and adhere to behavioral management agreement, if implemented  Description: INTERVENTIONS:  1. Assess patient/family's coping skills and  non-compliant behavior (including use of illegal substances)  2. Notify security of behavior or suspected illegal substances which indicate the need for search of the family and/or belongings  3. Encourage verbalization of thoughts and concerns in a socially appropriate manner  4. Utilize positive, consistent limit setting strategies supporting safety of patient, staff and others  5. Encourage participation in the decision making process about the behavioral management agreement  6. If a visitor's behavior poses a threat to safety call refer to organization policy.   7. Initiate consult with , Psychosocial CNS, Spiritual Care as appropriate  9/18/2023 0851 by Corby García RN  Outcome: Progressing

## 2023-09-18 NOTE — CARE COORDINATION
09/18/23 1627   ITP   Date of Plan 09/18/23   Primary Diagnosis Code Depression   Barriers to Treatment Need for psychoeducation   Strengths Incorporated in Plan Acknowledging need for assistance; Family supports; Natural supports   Plan of Care   Long Term Goal (LTG) Stated in patient/guardian terms Patient will discharge home to parents   Short Term Goal 1   Short Term Goal 1 Client will learn and demonstrate anxiety management skills   Baseline Functioning Patient does not have effective anxiety management skills   Target Patient will discharge with effect anxiety management skills   Objectives Client will participate in group therapy   Intervention 1 Acknowledge client strengths;Group therapy;Milieu therapy and support;Monitor medications   Frequency Daily   Measured by Staff observation;Self report   Staff Responsible Clinical staff;[facility] staff   STG Goal 1 Status: Patient Appears to be  Progressing toward treatment plan goal   Short Term Goal 2   Short Term Goal 2 Client will learn and demonstrate effective coping skills   Baseline Functioning Patient does not have effective coping skills   Target Patient will discharge with effective coping skills   Objectives Client will participate in group therapy   Intervention 1 Acknowledge client strengths;Group therapy;Crisis support;Milieu therapy and support   Frequency Daily   Measured by Staff observation;Self report   Staff Responsible Clinical staff;[facility] staff   STG Goal 2 Status: Patient Appears to be  Progressing toward treatment plan goal   Crisis/Safety/Discharge Plan   Crisis/Safety Plan Standard program interventions and protocol   Comprehensive Assessment Completion Date 09/18/23   Discharge 0[address] [facility]

## 2023-09-18 NOTE — H&P
Final    Nucleated RBCs 09/17/2023 0.0  0  WBC Final    nRBC 09/17/2023 0.00  0.00 - 0.01 K/uL Final    Neutrophils % 09/17/2023 52  32 - 75 % Final    Lymphocytes % 09/17/2023 37  12 - 49 % Final    Monocytes % 09/17/2023 6  5 - 13 % Final    Eosinophils % 09/17/2023 4  0 - 7 % Final    Basophils % 09/17/2023 1  0 - 1 % Final    Immature Granulocytes 09/17/2023 0  0.0 - 0.5 % Final    Neutrophils Absolute 09/17/2023 3.1  1.8 - 8.0 K/UL Final    Lymphocytes Absolute 09/17/2023 2.2  0.8 - 3.5 K/UL Final    Monocytes Absolute 09/17/2023 0.4  0.0 - 1.0 K/UL Final    Eosinophils Absolute 09/17/2023 0.3  0.0 - 0.4 K/UL Final    Basophils Absolute 09/17/2023 0.1  0.0 - 0.1 K/UL Final    Absolute Immature Granulocyte 09/17/2023 0.0  0.00 - 0.04 K/UL Final    Differential Type 09/17/2023 AUTOMATED    Final    Sodium 09/17/2023 140  136 - 145 mmol/L Final    Potassium 09/17/2023 3.1 (L)  3.5 - 5.1 mmol/L Final    Chloride 09/17/2023 105  97 - 108 mmol/L Final    CO2 09/17/2023 30  21 - 32 mmol/L Final    Anion Gap 09/17/2023 5  5 - 15 mmol/L Final    Glucose 09/17/2023 43 (LL)  65 - 100 mg/dL Final    BUN 09/17/2023 11  6 - 20 MG/DL Final    Creatinine 09/17/2023 0.78  0.55 - 1.02 MG/DL Final    Bun/Cre Ratio 09/17/2023 14  12 - 20   Final    Est, Glom Filt Rate 09/17/2023 >60  >60 ml/min/1.73m2 Final    Calcium 09/17/2023 9.6  8.5 - 10.1 MG/DL Final    Total Bilirubin 09/17/2023 0.2  0.2 - 1.0 MG/DL Final    ALT 09/17/2023 30  12 - 78 U/L Final    AST 09/17/2023 16  15 - 37 U/L Final    Alk Phosphatase 09/17/2023 96  45 - 117 U/L Final    Total Protein 09/17/2023 8.3 (H)  6.4 - 8.2 g/dL Final    Albumin 09/17/2023 3.7  3.5 - 5.0 g/dL Final    Globulin 09/17/2023 4.6 (H)  2.0 - 4.0 g/dL Final    Albumin/Globulin Ratio 09/17/2023 0.8 (L)  1.1 - 2.2   Final    Salicylate, Serum 08/48/4031 <1.7 (L)  2.8 - 20.0 MG/DL Final    Acetaminophen Level 09/17/2023 <2 (L)  10 - 30 ug/mL Final    Ethanol Lvl 09/17/2023 <10  <10

## 2023-09-18 NOTE — PLAN OF CARE
Problem: Behavior  Goal: Pt/Family maintain appropriate behavior and adhere to behavioral management agreement, if implemented  Description: INTERVENTIONS:  1. Assess patient/family's coping skills and  non-compliant behavior (including use of illegal substances)  2. Notify security of behavior or suspected illegal substances which indicate the need for search of the family and/or belongings  3. Encourage verbalization of thoughts and concerns in a socially appropriate manner  4. Utilize positive, consistent limit setting strategies supporting safety of patient, staff and others  5. Encourage participation in the decision making process about the behavioral management agreement  6. If a visitor's behavior poses a threat to safety call refer to organization policy. 7. Initiate consult with , Psychosocial CNS, Spiritual Care as appropriate  Outcome: Not Progressing     Problem: Anxiety  Goal: Will report anxiety at manageable levels  Description: INTERVENTIONS:  1. Administer medication as ordered  2. Teach and rehearse alternative coping skills  3. Provide emotional support with 1:1 interaction with staff  Outcome: Not Progressing     Problem: Coping  Goal: Pt/Family able to verbalize concerns and demonstrate effective coping strategies  Description: INTERVENTIONS:  1. Assist patient/family to identify coping skills, available support systems and cultural and spiritual values  2. Provide emotional support, including active listening and acknowledgement of concerns of patient and caregivers  3. Reduce environmental stimuli, as able  4. Instruct patient/family in relaxation techniques, as appropriate  5.  Assess for spiritual pain/suffering and initiate Spiritual Care, Psychosocial Clinical Specialist consults as needed  Outcome: Not Progressing     Problem: Depression/Self Harm  Goal: Effect of psychiatric condition will be minimized and patient will be protected from self harm  Description:

## 2023-09-19 LAB
GLUCOSE BLD STRIP.AUTO-MCNC: 160 MG/DL (ref 65–117)
SERVICE CMNT-IMP: ABNORMAL

## 2023-09-19 PROCEDURE — 6370000000 HC RX 637 (ALT 250 FOR IP): Performed by: PSYCHIATRY & NEUROLOGY

## 2023-09-19 PROCEDURE — 6370000000 HC RX 637 (ALT 250 FOR IP): Performed by: NURSE PRACTITIONER

## 2023-09-19 PROCEDURE — 1240000000 HC EMOTIONAL WELLNESS R&B

## 2023-09-19 PROCEDURE — 6360000002 HC RX W HCPCS: Performed by: NURSE PRACTITIONER

## 2023-09-19 PROCEDURE — 99233 SBSQ HOSP IP/OBS HIGH 50: CPT | Performed by: PSYCHIATRY & NEUROLOGY

## 2023-09-19 PROCEDURE — 82962 GLUCOSE BLOOD TEST: CPT

## 2023-09-19 RX ORDER — DESIPRAMINE HYDROCHLORIDE 100 MG/1
50 TABLET ORAL NIGHTLY
Status: DISCONTINUED | OUTPATIENT
Start: 2023-09-19 | End: 2023-09-21

## 2023-09-19 RX ORDER — LORAZEPAM 1 MG/1
1 TABLET ORAL 2 TIMES DAILY PRN
Status: DISCONTINUED | OUTPATIENT
Start: 2023-09-19 | End: 2023-09-25 | Stop reason: HOSPADM

## 2023-09-19 RX ADMIN — ATORVASTATIN CALCIUM 40 MG: 40 TABLET, FILM COATED ORAL at 08:42

## 2023-09-19 RX ADMIN — LORAZEPAM 2 MG: 2 INJECTION INTRAMUSCULAR; INTRAVENOUS at 18:13

## 2023-09-19 RX ADMIN — LOSARTAN POTASSIUM 50 MG: 25 TABLET, FILM COATED ORAL at 08:41

## 2023-09-19 RX ADMIN — HALOPERIDOL LACTATE 5 MG: 5 INJECTION, SOLUTION INTRAMUSCULAR at 18:12

## 2023-09-19 RX ADMIN — METFORMIN HYDROCHLORIDE 500 MG: 500 TABLET, EXTENDED RELEASE ORAL at 08:45

## 2023-09-19 RX ADMIN — HALOPERIDOL 5 MG: 5 TABLET ORAL at 08:42

## 2023-09-19 RX ADMIN — TRIAMTERENE AND HYDROCHLOROTHIAZIDE 1 TABLET: 37.5; 25 TABLET ORAL at 08:42

## 2023-09-19 RX ADMIN — MEMANTINE 10 MG: 10 TABLET ORAL at 08:42

## 2023-09-19 RX ADMIN — HYDROXYZINE HYDROCHLORIDE 50 MG: 25 TABLET, FILM COATED ORAL at 21:48

## 2023-09-19 RX ADMIN — FLUOXETINE 20 MG: 20 CAPSULE ORAL at 08:42

## 2023-09-19 RX ADMIN — GLIPIZIDE 10 MG: 5 TABLET ORAL at 06:55

## 2023-09-19 RX ADMIN — DESIPRAMINE HYDROCHLORIDE 50 MG: 100 TABLET ORAL at 21:29

## 2023-09-19 RX ADMIN — HYDROXYZINE HYDROCHLORIDE 50 MG: 25 TABLET, FILM COATED ORAL at 08:42

## 2023-09-19 RX ADMIN — ACETAMINOPHEN 650 MG: 325 TABLET ORAL at 21:48

## 2023-09-19 RX ADMIN — METFORMIN HYDROCHLORIDE 500 MG: 500 TABLET, EXTENDED RELEASE ORAL at 16:56

## 2023-09-19 RX ADMIN — CLONIDINE HYDROCHLORIDE 0.2 MG: 0.1 TABLET ORAL at 21:30

## 2023-09-19 RX ADMIN — HYDROXYZINE HYDROCHLORIDE 50 MG: 25 TABLET, FILM COATED ORAL at 01:08

## 2023-09-19 RX ADMIN — TRAZODONE HYDROCHLORIDE 50 MG: 50 TABLET ORAL at 21:48

## 2023-09-19 RX ADMIN — CLONIDINE HYDROCHLORIDE 0.2 MG: 0.1 TABLET ORAL at 08:42

## 2023-09-19 ASSESSMENT — PAIN DESCRIPTION - LOCATION
LOCATION: ARM
LOCATION: ARM

## 2023-09-19 ASSESSMENT — PAIN DESCRIPTION - FREQUENCY: FREQUENCY: INTERMITTENT

## 2023-09-19 ASSESSMENT — PAIN DESCRIPTION - ORIENTATION
ORIENTATION: LEFT
ORIENTATION: LEFT

## 2023-09-19 ASSESSMENT — PAIN DESCRIPTION - DESCRIPTORS
DESCRIPTORS: ACHING
DESCRIPTORS: ACHING

## 2023-09-19 ASSESSMENT — PAIN SCALES - GENERAL
PAINLEVEL_OUTOF10: 0
PAINLEVEL_OUTOF10: 10

## 2023-09-19 ASSESSMENT — PAIN - FUNCTIONAL ASSESSMENT
PAIN_FUNCTIONAL_ASSESSMENT: ACTIVITIES ARE NOT PREVENTED
PAIN_FUNCTIONAL_ASSESSMENT: ACTIVITIES ARE NOT PREVENTED

## 2023-09-19 ASSESSMENT — PAIN DESCRIPTION - PAIN TYPE: TYPE: CHRONIC PAIN

## 2023-09-19 NOTE — PLAN OF CARE
Problem: Behavior  Goal: Pt/Family maintain appropriate behavior and adhere to behavioral management agreement, if implemented  Description: INTERVENTIONS:  1. Assess patient/family's coping skills and  non-compliant behavior (including use of illegal substances)  2. Notify security of behavior or suspected illegal substances which indicate the need for search of the family and/or belongings  3. Encourage verbalization of thoughts and concerns in a socially appropriate manner  4. Utilize positive, consistent limit setting strategies supporting safety of patient, staff and others  5. Encourage participation in the decision making process about the behavioral management agreement  6. If a visitor's behavior poses a threat to safety call refer to organization policy. 7. Initiate consult with , Psychosocial CNS, Spiritual Care as appropriate  Outcome: Not Progressing     Problem: Anxiety  Goal: Will report anxiety at manageable levels  Description: INTERVENTIONS:  1. Administer medication as ordered  2. Teach and rehearse alternative coping skills  3. Provide emotional support with 1:1 interaction with staff  Outcome: Not Progressing     Problem: Coping  Goal: Pt/Family able to verbalize concerns and demonstrate effective coping strategies  Description: INTERVENTIONS:  1. Assist patient/family to identify coping skills, available support systems and cultural and spiritual values  2. Provide emotional support, including active listening and acknowledgement of concerns of patient and caregivers  3. Reduce environmental stimuli, as able  4. Instruct patient/family in relaxation techniques, as appropriate  5.  Assess for spiritual pain/suffering and initiate Spiritual Care, Psychosocial Clinical Specialist consults as needed  Outcome: Not Progressing

## 2023-09-19 NOTE — GROUP NOTE
Group Therapy Note    Date: 9/19/2023    Group Start Time: 1400  Group End Time: 1500  Group Topic: Recreational    [address] ACUTE BEHAV [address] [facility]        Group Therapy Note    Attendees:        Patient's Goal:  To concentrate on selected task    Notes:  Pleasant ,cooperative,labile,tearful at times    Status After Intervention:  Improved    Participation Level:  Active Listener and Interactive    Participation Quality: Attentive and Sharing      Speech:  normal      Thought Process/Content: Logical      Level of consciousness:  Alert, Oriented x4, and Attentive      Response to Learning: Progressing to goal      Endings: None Reported    Modes of Intervention: Socialization and Activity      Discipline Responsible: Recreational Therapist      Signature:  Ino Celestin

## 2023-09-19 NOTE — GROUP NOTE
Group Therapy Note    Date: 9/19/2023    Group Start Time: 1000  Group End Time: 1100  Group Topic: Topic Group    [place] [address] ACUTE BEHAV [address] [facility]        Group Therapy Note    Attendees:        Patient's Goal:  To identify positive coping strategies    Notes:  Pt did not attend session    Discipline Responsible: Recreational Therapist      Signature:  Theodora Becker

## 2023-09-20 LAB
GLUCOSE BLD STRIP.AUTO-MCNC: 146 MG/DL (ref 65–117)
SERVICE CMNT-IMP: ABNORMAL

## 2023-09-20 PROCEDURE — 1240000000 HC EMOTIONAL WELLNESS R&B

## 2023-09-20 PROCEDURE — 6370000000 HC RX 637 (ALT 250 FOR IP): Performed by: NURSE PRACTITIONER

## 2023-09-20 PROCEDURE — 82962 GLUCOSE BLOOD TEST: CPT

## 2023-09-20 PROCEDURE — 6370000000 HC RX 637 (ALT 250 FOR IP): Performed by: PSYCHIATRY & NEUROLOGY

## 2023-09-20 PROCEDURE — 99232 SBSQ HOSP IP/OBS MODERATE 35: CPT | Performed by: PSYCHIATRY & NEUROLOGY

## 2023-09-20 RX ORDER — CLONIDINE HYDROCHLORIDE 0.1 MG/1
0.1 TABLET ORAL 2 TIMES DAILY
Status: DISCONTINUED | OUTPATIENT
Start: 2023-09-20 | End: 2023-09-22

## 2023-09-20 RX ORDER — MEMANTINE HYDROCHLORIDE 10 MG/1
20 TABLET ORAL DAILY
Status: DISCONTINUED | OUTPATIENT
Start: 2023-09-21 | End: 2023-09-25 | Stop reason: HOSPADM

## 2023-09-20 RX ORDER — TEMAZEPAM 15 MG/1
15 CAPSULE ORAL NIGHTLY
Status: DISCONTINUED | OUTPATIENT
Start: 2023-09-20 | End: 2023-09-21

## 2023-09-20 RX ADMIN — TRIAMTERENE AND HYDROCHLOROTHIAZIDE 1 TABLET: 37.5; 25 TABLET ORAL at 09:15

## 2023-09-20 RX ADMIN — FLUOXETINE 20 MG: 20 CAPSULE ORAL at 09:15

## 2023-09-20 RX ADMIN — METFORMIN HYDROCHLORIDE 500 MG: 500 TABLET, EXTENDED RELEASE ORAL at 09:38

## 2023-09-20 RX ADMIN — CLONIDINE HYDROCHLORIDE 0.1 MG: 0.1 TABLET ORAL at 21:06

## 2023-09-20 RX ADMIN — METFORMIN HYDROCHLORIDE 500 MG: 500 TABLET, EXTENDED RELEASE ORAL at 17:38

## 2023-09-20 RX ADMIN — TEMAZEPAM 15 MG: 15 CAPSULE ORAL at 22:12

## 2023-09-20 RX ADMIN — ATORVASTATIN CALCIUM 40 MG: 40 TABLET, FILM COATED ORAL at 09:15

## 2023-09-20 RX ADMIN — GLIPIZIDE 10 MG: 5 TABLET ORAL at 06:48

## 2023-09-20 RX ADMIN — ACETAMINOPHEN 650 MG: 325 TABLET ORAL at 22:13

## 2023-09-20 RX ADMIN — DESIPRAMINE HYDROCHLORIDE 50 MG: 100 TABLET ORAL at 21:09

## 2023-09-20 RX ADMIN — LORAZEPAM 1 MG: 1 TABLET ORAL at 01:18

## 2023-09-20 RX ADMIN — LOSARTAN POTASSIUM 50 MG: 25 TABLET, FILM COATED ORAL at 09:15

## 2023-09-20 RX ADMIN — CLONIDINE HYDROCHLORIDE 0.2 MG: 0.1 TABLET ORAL at 09:15

## 2023-09-20 RX ADMIN — MEMANTINE 10 MG: 10 TABLET ORAL at 09:15

## 2023-09-20 ASSESSMENT — PAIN DESCRIPTION - LOCATION: LOCATION: HEAD

## 2023-09-20 ASSESSMENT — PAIN SCALES - GENERAL
PAINLEVEL_OUTOF10: 0

## 2023-09-20 NOTE — PLAN OF CARE
Problem: Depression/Self Harm  Goal: Effect of psychiatric condition will be minimized and patient will be protected from self harm  Description: INTERVENTIONS:  1. Assess impact of patient's symptoms on level of functioning, self care needs and offer support as indicated  2. Assess patient/family knowledge of depression, impact on illness and need for teaching  3. Provide emotional support, presence and reassurance  4. Assess for possible suicidal thoughts or ideation. If patient expresses suicidal thoughts or statements do not leave alone, initiate Suicide Precautions, move to a room close to the nursing station and obtain sitter  5. Initiate consults as appropriate with Mental Health Professional, Spiritual Care, Psychosocial CNS, and consider a recommendation to the LIP for a Psychiatric Consultation  Outcome: Progressing  Flowsheets (Taken 9/20/2023 1342)  Effect of psychiatric condition will be minimized and patient will be protected from self harm: Provide emotional support, presence and reassurance     Problem: Behavior  Goal: Pt/Family maintain appropriate behavior and adhere to behavioral management agreement, if implemented  Description: INTERVENTIONS:  1. Assess patient/family's coping skills and  non-compliant behavior (including use of illegal substances)  2. Notify security of behavior or suspected illegal substances which indicate the need for search of the family and/or belongings  3. Encourage verbalization of thoughts and concerns in a socially appropriate manner  4. Utilize positive, consistent limit setting strategies supporting safety of patient, staff and others  5. Encourage participation in the decision making process about the behavioral management agreement  6. If a visitor's behavior poses a threat to safety call refer to organization policy.   7. Initiate consult with , Psychosocial CNS, Spiritual Care as appropriate  Recent Flowsheet Documentation  Taken 9/20/2023 1342 by

## 2023-09-21 LAB
GLUCOSE BLD STRIP.AUTO-MCNC: 139 MG/DL (ref 65–117)
SERVICE CMNT-IMP: ABNORMAL

## 2023-09-21 PROCEDURE — 6370000000 HC RX 637 (ALT 250 FOR IP): Performed by: PSYCHIATRY & NEUROLOGY

## 2023-09-21 PROCEDURE — 6370000000 HC RX 637 (ALT 250 FOR IP): Performed by: NURSE PRACTITIONER

## 2023-09-21 PROCEDURE — 99233 SBSQ HOSP IP/OBS HIGH 50: CPT | Performed by: PSYCHIATRY & NEUROLOGY

## 2023-09-21 PROCEDURE — 82962 GLUCOSE BLOOD TEST: CPT

## 2023-09-21 PROCEDURE — 1240000000 HC EMOTIONAL WELLNESS R&B

## 2023-09-21 RX ORDER — MECOBALAMIN 5000 MCG
5 TABLET,DISINTEGRATING ORAL NIGHTLY
Status: DISCONTINUED | OUTPATIENT
Start: 2023-09-21 | End: 2023-09-23

## 2023-09-21 RX ORDER — CALCIUM CARBONATE 500 MG/1
500 TABLET, CHEWABLE ORAL 3 TIMES DAILY PRN
Status: DISCONTINUED | OUTPATIENT
Start: 2023-09-21 | End: 2023-09-25 | Stop reason: HOSPADM

## 2023-09-21 RX ORDER — FLUOXETINE HYDROCHLORIDE 20 MG/1
40 CAPSULE ORAL DAILY
Status: DISCONTINUED | OUTPATIENT
Start: 2023-09-22 | End: 2023-09-25 | Stop reason: HOSPADM

## 2023-09-21 RX ADMIN — ALUMINUM HYDROXIDE, MAGNESIUM HYDROXIDE, AND SIMETHICONE 30 ML: 200; 200; 20 SUSPENSION ORAL at 13:26

## 2023-09-21 RX ADMIN — CLONIDINE HYDROCHLORIDE 0.1 MG: 0.1 TABLET ORAL at 08:36

## 2023-09-21 RX ADMIN — CLONIDINE HYDROCHLORIDE 0.1 MG: 0.1 TABLET ORAL at 22:15

## 2023-09-21 RX ADMIN — GLIPIZIDE 10 MG: 5 TABLET ORAL at 06:27

## 2023-09-21 RX ADMIN — TRIAMTERENE AND HYDROCHLOROTHIAZIDE 1 TABLET: 37.5; 25 TABLET ORAL at 08:36

## 2023-09-21 RX ADMIN — Medication 5 MG: at 22:15

## 2023-09-21 RX ADMIN — TRAZODONE HYDROCHLORIDE 50 MG: 50 TABLET ORAL at 00:30

## 2023-09-21 RX ADMIN — TRAZODONE HYDROCHLORIDE 50 MG: 50 TABLET ORAL at 22:15

## 2023-09-21 RX ADMIN — MEMANTINE 20 MG: 10 TABLET ORAL at 08:36

## 2023-09-21 RX ADMIN — ACETAMINOPHEN 650 MG: 325 TABLET ORAL at 22:21

## 2023-09-21 RX ADMIN — FLUOXETINE 20 MG: 20 CAPSULE ORAL at 08:36

## 2023-09-21 RX ADMIN — LOSARTAN POTASSIUM 50 MG: 25 TABLET, FILM COATED ORAL at 08:36

## 2023-09-21 RX ADMIN — METFORMIN HYDROCHLORIDE 500 MG: 500 TABLET, EXTENDED RELEASE ORAL at 17:57

## 2023-09-21 RX ADMIN — ATORVASTATIN CALCIUM 40 MG: 40 TABLET, FILM COATED ORAL at 08:40

## 2023-09-21 RX ADMIN — METFORMIN HYDROCHLORIDE 500 MG: 500 TABLET, EXTENDED RELEASE ORAL at 08:46

## 2023-09-21 ASSESSMENT — PAIN DESCRIPTION - LOCATION: LOCATION: SHOULDER;BACK

## 2023-09-21 ASSESSMENT — PAIN SCALES - GENERAL
PAINLEVEL_OUTOF10: 0
PAINLEVEL_OUTOF10: 7

## 2023-09-21 ASSESSMENT — PAIN DESCRIPTION - DESCRIPTORS: DESCRIPTORS: ACHING

## 2023-09-21 NOTE — PLAN OF CARE
Met with patient in her room, cooperative and compliant with assessment and vital signs, BP was slightly elevated, MD was notified. Denied depression, anxiety, SI, HI and AVH. CSSRS No Risk. Compliant with medications. No side effects reported. Reports sleeping and eating well. Remains on Q15 minute rounds for safety. Will continue to monitor. Problem: Anxiety  Goal: Will report anxiety at manageable levels  Description: INTERVENTIONS:  1. Administer medication as ordered  2. Teach and rehearse alternative coping skills  3.  Provide emotional support with 1:1 interaction with staff  Outcome: Progressing

## 2023-09-21 NOTE — GROUP NOTE
Group Therapy Note    Date: 9/21/2023    Group Start Time: 1400  Group End Time: 1500  Group Topic: Recreational    [address] ACUTE [facility]    [address]        Group Therapy Note    Attendees:        Patient's Goal:  To concentrate on selected task    Notes:  Pt did not attend session    Discipline Responsible: Recreational Therapist      Signature:  Ele Zaragoza

## 2023-09-21 NOTE — GROUP NOTE
Group Therapy Note    Date: 9/21/2023    Group Start Time: 1000  Group End Time: 1100  Group Topic: Topic Group    [address] AC[facility]    [facility]        Group Therapy Note    Attendees:        Patient's Goal:  To participate in chair exercise routine      Status After Intervention:  Improved    Participation Level:  Active Listener and Interactive    Participation Quality: Appropriate and Attentive      Speech:  normal      Thought Process/Content: Logical      Affective Functioning: Congruent      Mood: euthymic      Level of consciousness:  Alert, Oriented x4, and Attentive      Response to Learning: Progressing to goal      Endings: None Reported    Modes of Intervention: Movement      Discipline Responsible: Recreational Therapist      Signature:  Frances Godwin

## 2023-09-22 LAB
GLUCOSE BLD STRIP.AUTO-MCNC: 135 MG/DL (ref 65–117)
SERVICE CMNT-IMP: ABNORMAL

## 2023-09-22 PROCEDURE — 6370000000 HC RX 637 (ALT 250 FOR IP): Performed by: NURSE PRACTITIONER

## 2023-09-22 PROCEDURE — 1240000000 HC EMOTIONAL WELLNESS R&B

## 2023-09-22 PROCEDURE — 6370000000 HC RX 637 (ALT 250 FOR IP): Performed by: PSYCHIATRY & NEUROLOGY

## 2023-09-22 PROCEDURE — 99232 SBSQ HOSP IP/OBS MODERATE 35: CPT | Performed by: PSYCHIATRY & NEUROLOGY

## 2023-09-22 PROCEDURE — 82962 GLUCOSE BLOOD TEST: CPT

## 2023-09-22 RX ORDER — TRAZODONE HYDROCHLORIDE 100 MG/1
100 TABLET ORAL NIGHTLY PRN
Status: DISCONTINUED | OUTPATIENT
Start: 2023-09-22 | End: 2023-09-23

## 2023-09-22 RX ORDER — TRAZODONE HYDROCHLORIDE 100 MG/1
100 TABLET ORAL NIGHTLY PRN
Status: DISCONTINUED | OUTPATIENT
Start: 2023-09-22 | End: 2023-09-25 | Stop reason: HOSPADM

## 2023-09-22 RX ADMIN — TRIAMTERENE AND HYDROCHLOROTHIAZIDE 1 TABLET: 37.5; 25 TABLET ORAL at 08:38

## 2023-09-22 RX ADMIN — LORAZEPAM 1 MG: 1 TABLET ORAL at 00:24

## 2023-09-22 RX ADMIN — HYDROXYZINE HYDROCHLORIDE 50 MG: 25 TABLET, FILM COATED ORAL at 20:36

## 2023-09-22 RX ADMIN — TRAZODONE HYDROCHLORIDE 100 MG: 100 TABLET ORAL at 23:38

## 2023-09-22 RX ADMIN — METFORMIN HYDROCHLORIDE 500 MG: 500 TABLET, EXTENDED RELEASE ORAL at 17:40

## 2023-09-22 RX ADMIN — Medication 5 MG: at 20:35

## 2023-09-22 RX ADMIN — GLIPIZIDE 10 MG: 5 TABLET ORAL at 06:45

## 2023-09-22 RX ADMIN — LOSARTAN POTASSIUM 50 MG: 25 TABLET, FILM COATED ORAL at 08:38

## 2023-09-22 RX ADMIN — METFORMIN HYDROCHLORIDE 500 MG: 500 TABLET, EXTENDED RELEASE ORAL at 08:38

## 2023-09-22 RX ADMIN — ATORVASTATIN CALCIUM 40 MG: 40 TABLET, FILM COATED ORAL at 08:38

## 2023-09-22 RX ADMIN — CLONIDINE HYDROCHLORIDE 0.1 MG: 0.1 TABLET ORAL at 08:39

## 2023-09-22 RX ADMIN — FLUOXETINE 40 MG: 20 CAPSULE ORAL at 08:38

## 2023-09-22 RX ADMIN — ACETAMINOPHEN 650 MG: 325 TABLET ORAL at 09:03

## 2023-09-22 RX ADMIN — MEMANTINE 20 MG: 10 TABLET ORAL at 08:38

## 2023-09-22 ASSESSMENT — PAIN SCALES - GENERAL
PAINLEVEL_OUTOF10: 7
PAINLEVEL_OUTOF10: 10
PAINLEVEL_OUTOF10: 10

## 2023-09-22 ASSESSMENT — PAIN DESCRIPTION - LOCATION
LOCATION: SHOULDER
LOCATION: SHOULDER

## 2023-09-22 ASSESSMENT — PAIN DESCRIPTION - ORIENTATION: ORIENTATION: LEFT

## 2023-09-22 ASSESSMENT — PAIN DESCRIPTION - DESCRIPTORS: DESCRIPTORS: ACHING

## 2023-09-22 NOTE — PLAN OF CARE
Problem: Behavior  Goal: Pt/Family maintain appropriate behavior and adhere to behavioral management agreement, if implemented  Description: INTERVENTIONS:  1. Assess patient/family's coping skills and  non-compliant behavior (including use of illegal substances)  2. Notify security of behavior or suspected illegal substances which indicate the need for search of the family and/or belongings  3. Encourage verbalization of thoughts and concerns in a socially appropriate manner  4. Utilize positive, consistent limit setting strategies supporting safety of patient, staff and others  5. Encourage participation in the decision making process about the behavioral management agreement  6. If a visitor's behavior poses a threat to safety call refer to organization policy. 7. Initiate consult with , Psychosocial CNS, Spiritual Care as appropriate  9/22/2023 1008 by Dante Calvo RN  Outcome: Progressing  9/22/2023 0434 by Magdalene Sethi RN  Outcome: Progressing     Problem: Coping  Goal: Pt/Family able to verbalize concerns and demonstrate effective coping strategies  Description: INTERVENTIONS:  1. Assist patient/family to identify coping skills, available support systems and cultural and spiritual values  2. Provide emotional support, including active listening and acknowledgement of concerns of patient and caregivers  3. Reduce environmental stimuli, as able  4. Instruct patient/family in relaxation techniques, as appropriate  5. Assess for spiritual pain/suffering and initiate Spiritual Care, Psychosocial Clinical Specialist consults as needed  Outcome: Progressing     Problem: Anxiety  Goal: Will report anxiety at manageable levels  Description: INTERVENTIONS:  1. Administer medication as ordered  2. Teach and rehearse alternative coping skills  3.  Provide emotional support with 1:1 interaction with staff  9/22/2023 1008 by Dante Calvo RN  Outcome: Not Progressing  9/22/2023 0434 by Andrae Glaser

## 2023-09-22 NOTE — PLAN OF CARE
Problem: Anxiety  Goal: Will report anxiety at manageable levels  Description: INTERVENTIONS:  1. Administer medication as ordered  2. Teach and rehearse alternative coping skills  3. Provide emotional support with 1:1 interaction with staff  Outcome: Progressing     Problem: Behavior  Goal: Pt/Family maintain appropriate behavior and adhere to behavioral management agreement, if implemented  Description: INTERVENTIONS:  1. Assess patient/family's coping skills and  non-compliant behavior (including use of illegal substances)  2. Notify security of behavior or suspected illegal substances which indicate the need for search of the family and/or belongings  3. Encourage verbalization of thoughts and concerns in a socially appropriate manner  4. Utilize positive, consistent limit setting strategies supporting safety of patient, staff and others  5. Encourage participation in the decision making process about the behavioral management agreement  6. If a visitor's behavior poses a threat to safety call refer to organization policy.   7. Initiate consult with , Psychosocial CNS, Spiritual Care as appropriate  Outcome: Progressing

## 2023-09-22 NOTE — GROUP NOTE
Group Therapy Note    Date: 9/22/2023    Group Start Time: 1000  Group End Time: 1100  Group Topic: Topic Group    [facility]    [facility]        Group Therapy Note    Attendees:        Patient's Goal:  To participate in chair exercise routine      Status After Intervention:  Improved    Participation Level:  Active Listener and Interactive    Participation Quality: Appropriate, Attentive, and Sharing      Speech:  normal      Thought Process/Content: Logical      Affective Functioning: Congruent      Mood: euthymic      Level of consciousness:  Alert, Oriented x4, and Attentive      Response to Learning: Progressing to goal      Endings: None Reported    Modes of Intervention: Movement      Discipline Responsible: Recreational Therapist      Signature:  Philomena Nolen

## 2023-09-22 NOTE — GROUP NOTE
Group Therapy Note    Date: 9/22/2023    Group Start Time: 1500  Group End Time: 1600  Group Topic: Recreational    [facility] [place] [address] ACUTE BEHAV HLTH    Fior Obando        Group Therapy Note           Patient's Goal:  To concentrate on selected task      Status After Intervention:  Improved    Participation Level:  Active Listener and Interactive    Participation Quality: Appropriate, Attentive, and Sharing      Speech:  normal      Thought Process/Content: Logical      Affective Functioning: Congruent      Mood: euthymic      Level of consciousness:  Alert and Attentive      Response to Learning: Progressing to goal      Endings: None Reported    Modes of Intervention: Socialization and Activity      Discipline Responsible: Recreational Therapist      Signature:  Jack Yang

## 2023-09-23 LAB
GLUCOSE BLD STRIP.AUTO-MCNC: 148 MG/DL (ref 65–117)
SERVICE CMNT-IMP: ABNORMAL

## 2023-09-23 PROCEDURE — 6370000000 HC RX 637 (ALT 250 FOR IP): Performed by: NURSE PRACTITIONER

## 2023-09-23 PROCEDURE — 82962 GLUCOSE BLOOD TEST: CPT

## 2023-09-23 PROCEDURE — 1240000000 HC EMOTIONAL WELLNESS R&B

## 2023-09-23 PROCEDURE — 6370000000 HC RX 637 (ALT 250 FOR IP): Performed by: PSYCHIATRY & NEUROLOGY

## 2023-09-23 RX ORDER — MECOBALAMIN 5000 MCG
10 TABLET,DISINTEGRATING ORAL NIGHTLY
Status: DISCONTINUED | OUTPATIENT
Start: 2023-09-24 | End: 2023-09-25 | Stop reason: HOSPADM

## 2023-09-23 RX ADMIN — ACETAMINOPHEN 650 MG: 325 TABLET ORAL at 22:03

## 2023-09-23 RX ADMIN — TRAZODONE HYDROCHLORIDE 150 MG: 50 TABLET ORAL at 21:58

## 2023-09-23 RX ADMIN — METFORMIN HYDROCHLORIDE 500 MG: 500 TABLET, EXTENDED RELEASE ORAL at 16:55

## 2023-09-23 RX ADMIN — LOSARTAN POTASSIUM 50 MG: 25 TABLET, FILM COATED ORAL at 08:31

## 2023-09-23 RX ADMIN — MEMANTINE 20 MG: 10 TABLET ORAL at 08:31

## 2023-09-23 RX ADMIN — ATORVASTATIN CALCIUM 40 MG: 40 TABLET, FILM COATED ORAL at 08:31

## 2023-09-23 RX ADMIN — ACETAMINOPHEN 650 MG: 325 TABLET ORAL at 12:27

## 2023-09-23 RX ADMIN — METFORMIN HYDROCHLORIDE 500 MG: 500 TABLET, EXTENDED RELEASE ORAL at 08:31

## 2023-09-23 RX ADMIN — TRIAMTERENE AND HYDROCHLOROTHIAZIDE 1 TABLET: 37.5; 25 TABLET ORAL at 08:31

## 2023-09-23 RX ADMIN — GLIPIZIDE 10 MG: 5 TABLET ORAL at 06:26

## 2023-09-23 RX ADMIN — LORAZEPAM 1 MG: 1 TABLET ORAL at 10:26

## 2023-09-23 RX ADMIN — Medication 5 MG: at 20:30

## 2023-09-23 RX ADMIN — FLUOXETINE 40 MG: 20 CAPSULE ORAL at 08:31

## 2023-09-23 ASSESSMENT — PAIN DESCRIPTION - ORIENTATION
ORIENTATION: LEFT
ORIENTATION: LEFT

## 2023-09-23 ASSESSMENT — PAIN DESCRIPTION - DESCRIPTORS
DESCRIPTORS: ACHING
DESCRIPTORS: ACHING

## 2023-09-23 ASSESSMENT — PAIN SCALES - WONG BAKER: WONGBAKER_NUMERICALRESPONSE: 0

## 2023-09-23 ASSESSMENT — PAIN SCALES - GENERAL
PAINLEVEL_OUTOF10: 5
PAINLEVEL_OUTOF10: 0
PAINLEVEL_OUTOF10: 8
PAINLEVEL_OUTOF10: 0
PAINLEVEL_OUTOF10: 0
PAINLEVEL_OUTOF10: 8

## 2023-09-23 ASSESSMENT — PAIN DESCRIPTION - LOCATION: LOCATION: SHOULDER

## 2023-09-23 NOTE — PLAN OF CARE
Problem: Behavior  Goal: Pt/Family maintain appropriate behavior and adhere to behavioral management agreement, if implemented  Description: INTERVENTIONS:  1. Assess patient/family's coping skills and  non-compliant behavior (including use of illegal substances)  2. Notify security of behavior or suspected illegal substances which indicate the need for search of the family and/or belongings  3. Encourage verbalization of thoughts and concerns in a socially appropriate manner  4. Utilize positive, consistent limit setting strategies supporting safety of patient, staff and others  5. Encourage participation in the decision making process about the behavioral management agreement  6. If a visitor's behavior poses a threat to safety call refer to organization policy. 7. Initiate consult with , Psychosocial CNS, Spiritual Care as appropriate  Outcome: Progressing     Problem: Coping  Goal: Pt/Family able to verbalize concerns and demonstrate effective coping strategies  Description: INTERVENTIONS:  1. Assist patient/family to identify coping skills, available support systems and cultural and spiritual values  2. Provide emotional support, including active listening and acknowledgement of concerns of patient and caregivers  3. Reduce environmental stimuli, as able  4. Instruct patient/family in relaxation techniques, as appropriate  5. Assess for spiritual pain/suffering and initiate Spiritual Care, Psychosocial Clinical Specialist consults as needed  Outcome: Progressing     Problem: Anxiety  Goal: Will report anxiety at manageable levels  Description: INTERVENTIONS:  1. Administer medication as ordered  2. Teach and rehearse alternative coping skills  3.  Provide emotional support with 1:1 interaction with staff  Outcome: Not Progressing

## 2023-09-23 NOTE — PROGRESS NOTES
Group preparation. Patient actively participated in Spirituality Group about growing emotionally and spiritually in God and being okay with one's flaws and personality in the [address] [place] [address] unit.  utilized music, lyrics to favorite songs, words of encouragement, affirmation, scripture, and testimony and devotionals to facilitate the group discussion and enhance group dynamics. Rev.  Bruce Phillips, [address] [facility], [facility]
Morning assessment complete. Patient was encountered in her room while she was performing her ADLS. Patient is calm and cooperative with assessment. Eye contact is noted to be fair. Patient appears religiously preoccupied. Mood is noted to be . Patient endorsed . Patient currently reports that there are no signs or symptoms of depression also denies all SI/HI/AVH. C-SSRS level is no risk. Patient states that she's having a good day. Patient has been in room most of the day and out for meals and water. Patient was observed singing with peers in the hallway. Patient is both med and meal compliant. There are no untoward side effects from medications to note. Patient educated on indication for Namenda and Clonidine. She verbalized understanding. Hourly rounds are being completed to assure patient safety and attend to care needs. Will continue to monitor for safety.
Pt screened per LOS policy. No acute nutrition or weight issues identified. Pt meal compliant with CC diet with 4 CHO choices. Hx notable for DM, HTN, HLD, elev BMI. Ht: 5'5\"  Wt: 223 lb  BMI: 37.11 kg/(m^2) c/w obesity class II  Est energy needs: 1875 kcal, 70 g protein, 2200 mL fluids  Pt will consume > 75% of meals at follow up 7-10 days.   LOS
Spiritual Care Assessment/Progress Note  [facility]    Name: Ahsan Lopez MRN: [id number]    Age: 46 y.o. Sex: female   Language: English     Date: 9/19/2023                           Spiritual Assessment begun in [facility] [address] [place] [address]  Service Provided For[de-identified] Patient  Referral/Consult From[de-identified] Patient  Encounter Overview/Reason : Initial Encounter, Spiritual/Emotional Needs, Behavioral Health    Spiritual beliefs:      [x] Involved in a federico tradition/spiritual practice:      [] Supported by a federico community:      [] Claims no spiritual orientation:      [] Seeking spiritual identity:           [] Adheres to an individual form of spirituality:      [] Not able to assess:                Identified resources for coping and support system:   Support System: Parent       [x] Prayer                  [x] Devotional reading               [x] Music                  [] Guided Imagery     [] Pet visits                                        [] Other: (COMMENT)     Specific area/focus of visit   Encounter:    Crisis:    Spiritual/Emotional needs: Type: Spiritual Support, Spiritual Distress, Emotional Distress  Ritual, Rites and Sacraments:    Grief, Loss, and Adjustments:    Ethics/Mediation:    Behavioral Health: Type : Initial Encounter  Palliative Care: Advance Care Planning:      Plan/Referrals: Continue Support (comment) (Advised of  availability)    Narrative: Visited with patient in the [address] [place] [address] Unit, in response to consult. Re-introduced myself. Discussed her situation, and what is currently going on. Patient describes what's going on as spiritual warfare, and an attack of the enemy.  explored patient's use of her coping skills and spiritual practices, encouraged patient to say the scriptures she finds to be helpful out loud and to pray more.  played music for patient and prayed with her and for her.  Patient would scream out every now and then while we were
losartan (COZAAR) 50 MG tablet  Self, Outside Pharmacy/PCP   Sig: Take 1 tablet by mouth daily   metFORMIN (GLUCOPHAGE-XR) 500 MG extended release tablet  Self, Outside Pharmacy/PCP   Sig: Take 1 tablet by mouth with breakfast and with evening meal   triamterene-hydroCHLOROthiazide (MAXZIDE-25) 37.5-25 MG per tablet 9/17/2023 Self, Outside Pharmacy/PCP   Sig: Take 1 tablet by mouth daily   zolpidem (AMBIEN) 5 MG tablet  Self, Outside Pharmacy/PCP   Sig: Take 1 tablet by mouth nightly.       Facility-Administered Medications: None          Thank you,  Davian Norman, PharmD, BCPS, [facility], [address] [place] [address]  Desk: [phone] ([phone])  Pharmacy: [phone] ([phone])

## 2023-09-23 NOTE — PLAN OF CARE
Problem: Behavior  Goal: Pt/Family maintain appropriate behavior and adhere to behavioral management agreement, if implemented  Description: INTERVENTIONS:  1. Assess patient/family's coping skills and  non-compliant behavior (including use of illegal substances)  2. Notify security of behavior or suspected illegal substances which indicate the need for search of the family and/or belongings  3. Encourage verbalization of thoughts and concerns in a socially appropriate manner  4. Utilize positive, consistent limit setting strategies supporting safety of patient, staff and others  5. Encourage participation in the decision making process about the behavioral management agreement  6. If a visitor's behavior poses a threat to safety call refer to organization policy. 7. Initiate consult with , Psychosocial CNS, Spiritual Care as appropriate  9/22/2023 2142 by Lourena Gaucher, RN  Outcome: Progressing  9/22/2023 1008 by Ahsan Ny RN  Outcome: Progressing     Problem: Coping  Goal: Pt/Family able to verbalize concerns and demonstrate effective coping strategies  Description: INTERVENTIONS:  1. Assist patient/family to identify coping skills, available support systems and cultural and spiritual values  2. Provide emotional support, including active listening and acknowledgement of concerns of patient and caregivers  3. Reduce environmental stimuli, as able  4. Instruct patient/family in relaxation techniques, as appropriate  5. Assess for spiritual pain/suffering and initiate Spiritual Care, Psychosocial Clinical Specialist consults as needed  9/22/2023 2142 by Lourena Gaucher, RN  Outcome: Progressing  9/22/2023 1008 by Ahsan Ny RN  Outcome: Progressing     Problem: Anxiety  Goal: Will report anxiety at manageable levels  Description: INTERVENTIONS:  1. Administer medication as ordered  2. Teach and rehearse alternative coping skills  3.  Provide emotional support with 1:1

## 2023-09-24 LAB
GLUCOSE BLD STRIP.AUTO-MCNC: 133 MG/DL (ref 65–117)
SERVICE CMNT-IMP: ABNORMAL

## 2023-09-24 PROCEDURE — 6370000000 HC RX 637 (ALT 250 FOR IP): Performed by: NURSE PRACTITIONER

## 2023-09-24 PROCEDURE — 1240000000 HC EMOTIONAL WELLNESS R&B

## 2023-09-24 PROCEDURE — 82962 GLUCOSE BLOOD TEST: CPT

## 2023-09-24 PROCEDURE — 6370000000 HC RX 637 (ALT 250 FOR IP): Performed by: PSYCHIATRY & NEUROLOGY

## 2023-09-24 RX ORDER — LOPERAMIDE HYDROCHLORIDE 2 MG/1
2 CAPSULE ORAL 4 TIMES DAILY PRN
Status: DISCONTINUED | OUTPATIENT
Start: 2023-09-24 | End: 2023-09-25 | Stop reason: HOSPADM

## 2023-09-24 RX ADMIN — ATORVASTATIN CALCIUM 40 MG: 40 TABLET, FILM COATED ORAL at 08:39

## 2023-09-24 RX ADMIN — ACETAMINOPHEN 650 MG: 325 TABLET ORAL at 08:39

## 2023-09-24 RX ADMIN — GLIPIZIDE 10 MG: 5 TABLET ORAL at 06:29

## 2023-09-24 RX ADMIN — LOSARTAN POTASSIUM 50 MG: 25 TABLET, FILM COATED ORAL at 08:39

## 2023-09-24 RX ADMIN — TRAZODONE HYDROCHLORIDE 150 MG: 50 TABLET ORAL at 22:59

## 2023-09-24 RX ADMIN — METFORMIN HYDROCHLORIDE 500 MG: 500 TABLET, EXTENDED RELEASE ORAL at 08:39

## 2023-09-24 RX ADMIN — FLUOXETINE 40 MG: 20 CAPSULE ORAL at 08:39

## 2023-09-24 RX ADMIN — MEMANTINE 20 MG: 10 TABLET ORAL at 08:39

## 2023-09-24 RX ADMIN — Medication 10 MG: at 22:00

## 2023-09-24 RX ADMIN — LOPERAMIDE HYDROCHLORIDE 2 MG: 2 CAPSULE ORAL at 17:37

## 2023-09-24 RX ADMIN — ACETAMINOPHEN 650 MG: 325 TABLET ORAL at 17:45

## 2023-09-24 RX ADMIN — TRIAMTERENE AND HYDROCHLOROTHIAZIDE 1 TABLET: 37.5; 25 TABLET ORAL at 08:39

## 2023-09-24 RX ADMIN — METFORMIN HYDROCHLORIDE 500 MG: 500 TABLET, EXTENDED RELEASE ORAL at 17:39

## 2023-09-24 RX ADMIN — TRAZODONE HYDROCHLORIDE 100 MG: 100 TABLET ORAL at 00:46

## 2023-09-24 ASSESSMENT — PAIN DESCRIPTION - DESCRIPTORS
DESCRIPTORS: ACHING
DESCRIPTORS: ACHING

## 2023-09-24 ASSESSMENT — PAIN SCALES - GENERAL
PAINLEVEL_OUTOF10: 0
PAINLEVEL_OUTOF10: 3
PAINLEVEL_OUTOF10: 3
PAINLEVEL_OUTOF10: 0

## 2023-09-24 ASSESSMENT — PAIN DESCRIPTION - LOCATION
LOCATION: GENERALIZED
LOCATION: GENERALIZED

## 2023-09-24 NOTE — PLAN OF CARE
Problem: Behavior  Goal: Pt/Family maintain appropriate behavior and adhere to behavioral management agreement, if implemented  Description: INTERVENTIONS:  1. Assess patient/family's coping skills and  non-compliant behavior (including use of illegal substances)  2. Notify security of behavior or suspected illegal substances which indicate the need for search of the family and/or belongings  3. Encourage verbalization of thoughts and concerns in a socially appropriate manner  4. Utilize positive, consistent limit setting strategies supporting safety of patient, staff and others  5. Encourage participation in the decision making process about the behavioral management agreement  6. If a visitor's behavior poses a threat to safety call refer to organization policy. 7. Initiate consult with , Psychosocial CNS, Spiritual Care as appropriate  9/23/2023 2318 by Allison Scruggs RN  Outcome: Progressing  9/23/2023 1420 by Marilyn Pierce RN  Outcome: Progressing     Problem: Coping  Goal: Pt/Family able to verbalize concerns and demonstrate effective coping strategies  Description: INTERVENTIONS:  1. Assist patient/family to identify coping skills, available support systems and cultural and spiritual values  2. Provide emotional support, including active listening and acknowledgement of concerns of patient and caregivers  3. Reduce environmental stimuli, as able  4. Instruct patient/family in relaxation techniques, as appropriate  5. Assess for spiritual pain/suffering and initiate Spiritual Care, Psychosocial Clinical Specialist consults as needed  9/23/2023 2318 by Allison Scruggs RN  Outcome: Progressing  9/23/2023 1420 by Marilyn Pierce RN  Outcome: Progressing     Problem: Decision Making  Goal: Pt/Family able to effectively weigh alternatives and participate in decision making related to treatment and care  Description: INTERVENTIONS:  1.  Determine when there are differences between

## 2023-09-24 NOTE — PLAN OF CARE
Problem: Discharge Planning  Goal: Discharge to home or other facility with appropriate resources  Outcome: Progressing     Problem: Behavior  Goal: Pt/Family maintain appropriate behavior and adhere to behavioral management agreement, if implemented  Description: INTERVENTIONS:  1. Assess patient/family's coping skills and  non-compliant behavior (including use of illegal substances)  2. Notify security of behavior or suspected illegal substances which indicate the need for search of the family and/or belongings  3. Encourage verbalization of thoughts and concerns in a socially appropriate manner  4. Utilize positive, consistent limit setting strategies supporting safety of patient, staff and others  5. Encourage participation in the decision making process about the behavioral management agreement  6. If a visitor's behavior poses a threat to safety call refer to organization policy. 7. Initiate consult with , Psychosocial CNS, Spiritual Care as appropriate  9/24/2023 2199 by Jose Ash RN  Outcome: Progressing  9/23/2023 2318 by Marii Gramajo RN  Outcome: Progressing     Problem: Anxiety  Goal: Will report anxiety at manageable levels  Description: INTERVENTIONS:  1. Administer medication as ordered  2. Teach and rehearse alternative coping skills  3. Provide emotional support with 1:1 interaction with staff  9/24/2023 0836 by Jose Ash RN  Outcome: Progressing  9/23/2023 2318 by Marii Gramajo RN  Outcome: Progressing     Problem: Coping  Goal: Pt/Family able to verbalize concerns and demonstrate effective coping strategies  Description: INTERVENTIONS:  1. Assist patient/family to identify coping skills, available support systems and cultural and spiritual values  2. Provide emotional support, including active listening and acknowledgement of concerns of patient and caregivers  3. Reduce environmental stimuli, as able  4.  Instruct patient/family in relaxation techniques,

## 2023-09-25 VITALS
HEIGHT: 65 IN | OXYGEN SATURATION: 99 % | DIASTOLIC BLOOD PRESSURE: 81 MMHG | SYSTOLIC BLOOD PRESSURE: 132 MMHG | WEIGHT: 223 LBS | BODY MASS INDEX: 37.15 KG/M2 | RESPIRATION RATE: 16 BRPM | HEART RATE: 72 BPM | TEMPERATURE: 97.5 F

## 2023-09-25 LAB
GLUCOSE BLD STRIP.AUTO-MCNC: 132 MG/DL (ref 65–117)
SERVICE CMNT-IMP: ABNORMAL

## 2023-09-25 PROCEDURE — 82962 GLUCOSE BLOOD TEST: CPT

## 2023-09-25 PROCEDURE — 6370000000 HC RX 637 (ALT 250 FOR IP): Performed by: PSYCHIATRY & NEUROLOGY

## 2023-09-25 PROCEDURE — 99239 HOSP IP/OBS DSCHRG MGMT >30: CPT | Performed by: PSYCHIATRY & NEUROLOGY

## 2023-09-25 PROCEDURE — 6370000000 HC RX 637 (ALT 250 FOR IP): Performed by: NURSE PRACTITIONER

## 2023-09-25 RX ORDER — LORAZEPAM 1 MG/1
1 TABLET ORAL 2 TIMES DAILY PRN
Qty: 14 TABLET | Refills: 0 | Status: SHIPPED | OUTPATIENT
Start: 2023-09-25 | End: 2023-10-25

## 2023-09-25 RX ORDER — HYDROXYZINE 50 MG/1
50 TABLET, FILM COATED ORAL 2 TIMES DAILY PRN
Qty: 60 TABLET | Refills: 1 | Status: SHIPPED | OUTPATIENT
Start: 2023-09-25 | End: 2023-11-24

## 2023-09-25 RX ORDER — MEMANTINE HYDROCHLORIDE 10 MG/1
20 TABLET ORAL DAILY
Qty: 60 TABLET | Refills: 1 | Status: SHIPPED | OUTPATIENT
Start: 2023-09-26

## 2023-09-25 RX ORDER — METFORMIN HYDROCHLORIDE 500 MG/1
500 TABLET, EXTENDED RELEASE ORAL 2 TIMES DAILY WITH MEALS
Qty: 60 TABLET | Refills: 1 | Status: SHIPPED | OUTPATIENT
Start: 2023-09-25

## 2023-09-25 RX ORDER — TRAZODONE HYDROCHLORIDE 100 MG/1
100 TABLET ORAL NIGHTLY PRN
Qty: 30 TABLET | Refills: 1 | Status: SHIPPED | OUTPATIENT
Start: 2023-09-25

## 2023-09-25 RX ORDER — FLUOXETINE HYDROCHLORIDE 20 MG/1
20 CAPSULE ORAL DAILY
Qty: 30 CAPSULE | Refills: 1 | Status: SHIPPED | OUTPATIENT
Start: 2023-09-29

## 2023-09-25 RX ORDER — TRAZODONE HYDROCHLORIDE 150 MG/1
150 TABLET ORAL NIGHTLY PRN
Qty: 30 TABLET | Refills: 1 | Status: SHIPPED | OUTPATIENT
Start: 2023-09-25

## 2023-09-25 RX ORDER — PHENOL 1.4 %
10 AEROSOL, SPRAY (ML) MUCOUS MEMBRANE NIGHTLY
Qty: 30 TABLET | Refills: 1 | Status: SHIPPED | OUTPATIENT
Start: 2023-09-25

## 2023-09-25 RX ORDER — GLIPIZIDE 10 MG/1
10 TABLET, FILM COATED, EXTENDED RELEASE ORAL DAILY
Qty: 30 TABLET | Refills: 1 | Status: SHIPPED | OUTPATIENT
Start: 2023-09-25

## 2023-09-25 RX ORDER — FLUOXETINE HYDROCHLORIDE 40 MG/1
40 CAPSULE ORAL DAILY
Qty: 30 CAPSULE | Refills: 1 | Status: SHIPPED | OUTPATIENT
Start: 2023-09-26

## 2023-09-25 RX ADMIN — GLIPIZIDE 10 MG: 5 TABLET ORAL at 06:17

## 2023-09-25 RX ADMIN — TRAZODONE HYDROCHLORIDE 100 MG: 100 TABLET ORAL at 00:47

## 2023-09-25 RX ADMIN — ATORVASTATIN CALCIUM 40 MG: 40 TABLET, FILM COATED ORAL at 08:59

## 2023-09-25 RX ADMIN — METFORMIN HYDROCHLORIDE 500 MG: 500 TABLET, EXTENDED RELEASE ORAL at 09:02

## 2023-09-25 RX ADMIN — FLUOXETINE 40 MG: 20 CAPSULE ORAL at 08:59

## 2023-09-25 RX ADMIN — LOSARTAN POTASSIUM 50 MG: 25 TABLET, FILM COATED ORAL at 08:59

## 2023-09-25 RX ADMIN — MEMANTINE 20 MG: 10 TABLET ORAL at 08:59

## 2023-09-25 RX ADMIN — TRIAMTERENE AND HYDROCHLOROTHIAZIDE 1 TABLET: 37.5; 25 TABLET ORAL at 09:00

## 2023-09-25 RX ADMIN — ACETAMINOPHEN 650 MG: 325 TABLET ORAL at 02:40

## 2023-09-25 RX ADMIN — HYDROXYZINE HYDROCHLORIDE 50 MG: 25 TABLET, FILM COATED ORAL at 02:40

## 2023-09-25 ASSESSMENT — PAIN DESCRIPTION - DESCRIPTORS: DESCRIPTORS: ACHING

## 2023-09-25 ASSESSMENT — PAIN DESCRIPTION - LOCATION: LOCATION: ARM

## 2023-09-25 ASSESSMENT — PAIN SCALES - GENERAL
PAINLEVEL_OUTOF10: 6
PAINLEVEL_OUTOF10: 0

## 2023-09-25 ASSESSMENT — PAIN DESCRIPTION - ORIENTATION: ORIENTATION: LEFT;RIGHT

## 2023-09-25 NOTE — PLAN OF CARE
Problem: Discharge Planning  Goal: Discharge to home or other facility with appropriate resources  9/25/2023 1032 by Uzma Cook RN  Reactivated  9/25/2023 1032 by Uzma Cook RN  Outcome: [facility] 114 Resolved Not Met  9/25/2023 1003 by Uzma Cook RN  Outcome: Progressing     Problem: Behavior  Goal: Pt/Family maintain appropriate behavior and adhere to behavioral management agreement, if implemented  Description: INTERVENTIONS:  1. Assess patient/family's coping skills and  non-compliant behavior (including use of illegal substances)  2. Notify security of behavior or suspected illegal substances which indicate the need for search of the family and/or belongings  3. Encourage verbalization of thoughts and concerns in a socially appropriate manner  4. Utilize positive, consistent limit setting strategies supporting safety of patient, staff and others  5. Encourage participation in the decision making process about the behavioral management agreement  6. If a visitor's behavior poses a threat to safety call refer to organization policy. 7. Initiate consult with , Psychosocial CNS, Spiritual Care as appropriate  9/25/2023 1032 by Uzma Cook RN  Outcome: Completed  9/25/2023 1032 by Uzma Cook RN  Outcome: [facility] 114 Resolved Not Met  9/25/2023 1003 by Uzma Cook RN  Outcome: Progressing     Problem: Anxiety  Goal: Will report anxiety at manageable levels  Description: INTERVENTIONS:  1. Administer medication as ordered  2. Teach and rehearse alternative coping skills  3.  Provide emotional support with 1:1 interaction with staff  9/25/2023 1032 by Uzma Cook RN  Outcome: Completed  9/25/2023 1032 by Uzma Cook RN  Outcome: [address] [facility] 114 Resolved Not Met  9/25/2023 1003 by Uzma Cook RN  Outcome: Progressing  9/25/2023 0412 by Dom Millan RN  Outcome: Progressing     Problem: Coping  Goal: Pt/Family able to verbalize concerns and demonstrate effective coping

## 2023-09-25 NOTE — PLAN OF CARE
Patient sitting in dayroom during transition of care. Patient A/O X4, calm and cooperative. Affect bright. Patient denied and symptoms of depression or anxiety (symptoms of anxiety 5/10 reported during HS). Patient denied hallucinations, S/I or H/I. Patient displayed no obvious symptoms of medical or psychiatric distress. Vital signs stable: WNL  Nursing Interventions: Medications were administered (PRN Trazodone, Atarax and Tylenol for BL arm pain 5/10) and received with no concerns. Monitoring and recording of compliance, symptoms and possible side effects as appropriate. Response to medication: Medications effective in managing Patient's symptoms. Emotional support and encouragement were provided, Patient's response appeared receptive. Treatment plan does not require updating at this time. Patient slept intermittently for 4 hours. Patient's AM blood glucose level 132. Problem: Anxiety  Goal: Will report anxiety at manageable levels  Description: INTERVENTIONS:  1. Administer medication as ordered  2. Teach and rehearse alternative coping skills  3.  Provide emotional support with 1:1 interaction with staff  Outcome: Progressing

## 2023-09-25 NOTE — PLAN OF CARE
Problem: Discharge Planning  Goal: Discharge to home or other facility with appropriate resources  Outcome: Progressing     Problem: Behavior  Goal: Pt/Family maintain appropriate behavior and adhere to behavioral management agreement, if implemented  Description: INTERVENTIONS:  1. Assess patient/family's coping skills and  non-compliant behavior (including use of illegal substances)  2. Notify security of behavior or suspected illegal substances which indicate the need for search of the family and/or belongings  3. Encourage verbalization of thoughts and concerns in a socially appropriate manner  4. Utilize positive, consistent limit setting strategies supporting safety of patient, staff and others  5. Encourage participation in the decision making process about the behavioral management agreement  6. If a visitor's behavior poses a threat to safety call refer to organization policy. 7. Initiate consult with , Psychosocial CNS, Spiritual Care as appropriate  Outcome: Progressing     Problem: Anxiety  Goal: Will report anxiety at manageable levels  Description: INTERVENTIONS:  1. Administer medication as ordered  2. Teach and rehearse alternative coping skills  3. Provide emotional support with 1:1 interaction with staff  9/25/2023 1003 by Huber Robbins RN  Outcome: Progressing  9/25/2023 0412 by Manjit Arana RN  Outcome: Progressing     Problem: Coping  Goal: Pt/Family able to verbalize concerns and demonstrate effective coping strategies  Description: INTERVENTIONS:  1. Assist patient/family to identify coping skills, available support systems and cultural and spiritual values  2. Provide emotional support, including active listening and acknowledgement of concerns of patient and caregivers  3. Reduce environmental stimuli, as able  4. Instruct patient/family in relaxation techniques, as appropriate  5.  Assess for spiritual pain/suffering and initiate Spiritual Care, Psychosocial Clinical

## 2023-09-25 NOTE — DISCHARGE INSTRUCTIONS
DISCHARGE SUMMARY    NAME:Alisha Younger  : 1975  MRN: [id number]    The patient Marleen Nettles exhibits the ability to control behavior in a less restrictive environment. Patient's level of functioning is improving. No assaultive/destructive behavior has been observed for the past 24 hours. No suicidal/homicidal threat or behavior has been observed for the past 24 hours. There is no evidence of serious medication side effects. Patient has not been in physical or protective restraints for at least the past 24 hours. If weapons involved, how are they secured? None     Is patient aware of and in agreement with discharge plan? Yes    Arrangements for medication:  Prescriptions sent    Copy of discharge instructions to provider?:  Yes    Arrangements for transportation home:  Family to provide    Keep all follow up appointments as scheduled, continue to take prescribed medications per physician instructions.   Mental health crisis number:  705 or your local mental health crisis line number at 1020 \[facility]\"" Emergency WARM LINE      9-410-656-MHAV (8768)      M-F: 9am to 9pm      Sat & Sun: 5pm - 9pm  National suicide prevention lines:                             2-384-UYWMFKV (2-331-832-773-616-4874)       7-818-420-TALK (7-683-209-779-361-0880)    Crisis Text Line:  Text HOME to 162631

## 2023-09-25 NOTE — PLAN OF CARE
Problem: Discharge Planning  Goal: Discharge to home or other facility with appropriate resources  9/25/2023 1032 by Jessica King, RN  Reactivated  9/25/2023 1032 by Jessica King, RN  Outcome: [facility] 114 Resolved Not Met  9/25/2023 1003 by Jessica King, RN  Outcome: Progressing

## 2023-09-25 NOTE — CARE COORDINATION
09/25/23 1243   ITP   Date of Plan 09/25/23   Primary Diagnosis Code Depression   Short Term Goal 1   STG Goal 1 Status: Patient Appears to be    (Goal met, discharging)   Short Term Goal 2   STG Goal 2 Status: Patient Appears to be    (Goal met, discharging)   Crisis/Safety/Discharge Plan   Discharge [facility]

## 2023-09-25 NOTE — GROUP NOTE
Group Therapy Note    Date: 9/25/2023    Group Start Time: 1000  Group End Time: 1100  Group Topic: Topic Group    [address] 3 ACUTE BEHAV HLTH    Fior Obando        Group Therapy Note           Patient's Goal:  To identify positive coping strategies a-z      Status After Intervention:  Improved    Participation Level:  Active Listener and Interactive    Participation Quality: Appropriate, Attentive, and Sharing      Speech:  normal      Thought Process/Content: Logical      Affective Functioning: Congruent      Mood: euthymic      Level of consciousness:  Alert, Oriented x4, and Attentive      Response to Learning: Progressing to goal      Endings: None Reported    Modes of Intervention: Education      Discipline Responsible: Recreational Therapist      Signature:  Hernan Escoto

## 2023-09-26 NOTE — DISCHARGE SUMMARY
were sent to [address] [place] [address], [address] [place] [address] Drive      Phone: [phone]   FLUoxetine 20 MG capsule  FLUoxetine 40 MG capsule  glipiZIDE 10 MG extended release tablet  hydrOXYzine HCl 50 MG tablet  LORazepam 1 MG tablet  Melatonin 10 MG Tabs  memantine 10 MG tablet  metFORMIN 500 MG extended release tablet  traZODone 100 MG tablet  traZODone 150 MG tablet                A coordinated, multidisplinary treatment team round was conducted with Frank Lerma---this is done daily here at [facility]. This team consists of the nurse, psychiatric unit pharmacist,  and Gibson Jaime. I have spent greater than 35 minutes on discharge work.     Signed:  Haroldo Andres MD  9/25/2023

## 2023-10-04 ENCOUNTER — HOSPITAL ENCOUNTER (EMERGENCY)
Facility: HOSPITAL | Age: 48
Discharge: HOME | End: 2023-10-04
Attending: EMERGENCY MEDICINE
Payer: MEDICAID

## 2023-10-04 VITALS
HEART RATE: 98 BPM | BODY MASS INDEX: 38.98 KG/M2 | HEIGHT: 63 IN | TEMPERATURE: 98.2 F | DIASTOLIC BLOOD PRESSURE: 72 MMHG | WEIGHT: 220 LBS | SYSTOLIC BLOOD PRESSURE: 101 MMHG | OXYGEN SATURATION: 97 % | RESPIRATION RATE: 19 BRPM

## 2023-10-04 DIAGNOSIS — R45.4 OUTBURSTS OF ANGER: ICD-10-CM

## 2023-10-04 DIAGNOSIS — F32.A DEPRESSION, UNSPECIFIED DEPRESSION TYPE: Primary | ICD-10-CM

## 2023-10-04 PROCEDURE — 99282 EMERGENCY DEPT VISIT SF MDM: CPT

## 2023-10-04 PROCEDURE — 90791 PSYCH DIAGNOSTIC EVALUATION: CPT

## 2023-10-04 ASSESSMENT — PAIN DESCRIPTION - ORIENTATION: ORIENTATION: LOWER

## 2023-10-04 ASSESSMENT — PAIN DESCRIPTION - PAIN TYPE: TYPE: ACUTE PAIN

## 2023-10-04 ASSESSMENT — PAIN - FUNCTIONAL ASSESSMENT: PAIN_FUNCTIONAL_ASSESSMENT: 0-10

## 2023-10-04 ASSESSMENT — PAIN DESCRIPTION - LOCATION: LOCATION: ABDOMEN

## 2023-10-04 ASSESSMENT — PAIN DESCRIPTION - DESCRIPTORS: DESCRIPTORS: DULL

## 2023-10-04 ASSESSMENT — PAIN SCALES - GENERAL: PAINLEVEL_OUTOF10: 8

## 2023-10-04 NOTE — ED NOTES
Pt given discharge papers. Discharge instructions discussed with family, pt and family verbalizes understanding.           Srikanth Gomes RN  10/04/23 1584

## 2023-10-04 NOTE — ED PROVIDER NOTES
[facility] EMERGENCY DEPT  EMERGENCY DEPARTMENT ENCOUNTER       Pt Name: Jackelyn Chavez  MRN: [id number]  [address] 1975  Date of evaluation: 10/4/2023  Provider: Agustín Baron MD   PCP: Marcela Jeffrey MD  Note Started: 5:52 PM 10/4/23     CHIEF COMPLAINT       Chief Complaint   Patient presents with    Depression     Per pt reports chronic depression and anxiety, denies SI and HI. HISTORY OF PRESENT ILLNESS: 1 or more elements      History From: Patient, History limited by: None     Jackelyn Chavez is a 46 y.o. female who presents with depression, anxiety and angry outburst.  Patient reports since discharge the end of last month she has had persistent depression, anger and outbursts. During his outburst she will pull her hair, shout aggressively at her parents and break things. She denies any thoughts of wanting to hurt other people, mother confirms she has not physically been aggressive with them. She reports chronic abdominal pain and constipation, attributed to her IBS. She is compliant with her medications including trazodone, Prozac, Namenda, melatonin. Reports persistent difficulty sleeping. Nursing Notes were all reviewed and agreed with or any disagreements were addressed in the HPI. REVIEW OF SYSTEMS        Positives and Pertinent negatives as per HPI. PAST HISTORY     Past Medical History:  Past Medical History:   Diagnosis Date    Anxiety     Depression     ANXIETY & DEPRESSION    Diabetes ([address])     TYPE II    Hypertension     Infected sebaceous cyst 4/10/2019    Panic attack     Psychoses ([address]) 2/23/2023    Suicidal thoughts     Vision decreased        Past Surgical History:  Past Surgical History:   Procedure Laterality Date    COLONOSCOPY N/A 11/20/2020    . COLONOSCOPY  :- performed by Myron Smith MD at [facility] ENDOSCOPY    OTHER SURGICAL HISTORY  06/03/2019     Excision of large sebaceous cyst in the midline upper back and with packing- [facility]-DR. Sarmad Givens       [facility]

## 2023-10-04 NOTE — BSMART NOTE
BSMART assessment completed, and suicide risk level noted to be moderate according to the C-SSRS Suicide Screening d/t pt's history of a suicide attempt in 1998; however, pt is denying any recent SI or attempts, and is low risk suicide. Primary Nurse, Heather Mcconnell and Physician, Dr. Yanira Harrison notified. Concerns not observed. Security/Off- has not been notified.
impairments affecting communication. The patient's preference for learning can be described as: can read and write adequately. The patient's hearing is normal.  The patient's vision is impaired and wears glasses or contacts.       Vanessa Salazar LMSW

## 2023-12-13 ENCOUNTER — HOSPITAL ENCOUNTER (EMERGENCY)
Facility: HOSPITAL | Age: 48
Discharge: HOME | End: 2023-12-13
Attending: STUDENT IN AN ORGANIZED HEALTH CARE EDUCATION/TRAINING PROGRAM
Payer: MEDICAID

## 2023-12-13 VITALS
OXYGEN SATURATION: 95 % | DIASTOLIC BLOOD PRESSURE: 78 MMHG | SYSTOLIC BLOOD PRESSURE: 112 MMHG | TEMPERATURE: 97.4 F | RESPIRATION RATE: 18 BRPM | HEART RATE: 85 BPM | WEIGHT: 218.26 LBS | HEIGHT: 63 IN | BODY MASS INDEX: 38.67 KG/M2

## 2023-12-13 DIAGNOSIS — M54.50 ACUTE BILATERAL LOW BACK PAIN WITHOUT SCIATICA: Primary | ICD-10-CM

## 2023-12-13 DIAGNOSIS — S39.012A BACK STRAIN, INITIAL ENCOUNTER: ICD-10-CM

## 2023-12-13 LAB
APPEARANCE UR: CLEAR
BACTERIA URNS QL MICRO: ABNORMAL /HPF
BILIRUB UR QL: NEGATIVE
COLOR UR: ABNORMAL
GLUCOSE UR STRIP.AUTO-MCNC: NEGATIVE MG/DL
HGB UR QL STRIP: NEGATIVE
KETONES UR QL STRIP.AUTO: NEGATIVE MG/DL
LEUKOCYTE ESTERASE UR QL STRIP.AUTO: NEGATIVE
NITRITE UR QL STRIP.AUTO: NEGATIVE
PH UR STRIP: 5.5 (ref 5–8)
PROT UR STRIP-MCNC: NEGATIVE MG/DL
RBC #/AREA URNS HPF: ABNORMAL /HPF (ref 0–3)
SP GR UR REFRACTOMETRY: >1.03 (ref 1–1.03)
URINE CULTURE IF INDICATED: ABNORMAL
UROBILINOGEN UR QL STRIP.AUTO: 0.2 EU/DL (ref 0.2–1)
WBC URNS QL MICRO: ABNORMAL /HPF (ref 0–5)

## 2023-12-13 PROCEDURE — 81001 URINALYSIS AUTO W/SCOPE: CPT

## 2023-12-13 PROCEDURE — 6370000000 HC RX 637 (ALT 250 FOR IP): Performed by: STUDENT IN AN ORGANIZED HEALTH CARE EDUCATION/TRAINING PROGRAM

## 2023-12-13 PROCEDURE — 99283 EMERGENCY DEPT VISIT LOW MDM: CPT

## 2023-12-13 RX ORDER — METHOCARBAMOL 750 MG/1
1500 TABLET, FILM COATED ORAL
Status: COMPLETED | OUTPATIENT
Start: 2023-12-13 | End: 2023-12-13

## 2023-12-13 RX ORDER — MULTIVIT-MIN/IRON/FOLIC ACID/K 18-600-40
CAPSULE ORAL
COMMUNITY
Start: 2023-10-17

## 2023-12-13 RX ORDER — DIVALPROEX SODIUM 500 MG/1
1500 TABLET, EXTENDED RELEASE ORAL DAILY
COMMUNITY

## 2023-12-13 RX ORDER — TRIAMTERENE AND HYDROCHLOROTHIAZIDE 37.5; 25 MG/1; MG/1
1 CAPSULE ORAL DAILY
COMMUNITY
Start: 2023-11-25

## 2023-12-13 RX ORDER — LOSARTAN POTASSIUM 100 MG/1
100 TABLET ORAL DAILY
COMMUNITY
Start: 2023-11-17

## 2023-12-13 RX ORDER — PHENOL 1.4 %
1 AEROSOL, SPRAY (ML) MUCOUS MEMBRANE NIGHTLY
COMMUNITY
Start: 2023-11-25

## 2023-12-13 RX ORDER — METHOCARBAMOL 750 MG/1
750 TABLET, FILM COATED ORAL 3 TIMES DAILY
Qty: 9 TABLET | Refills: 0 | Status: SHIPPED | OUTPATIENT
Start: 2023-12-13 | End: 2023-12-16

## 2023-12-13 RX ORDER — NEOMYCIN SULFATE, POLYMYXIN B SULFATE AND DEXAMETHASONE 3.5; 10000; 1 MG/ML; [USP'U]/ML; MG/ML
SUSPENSION/ DROPS OPHTHALMIC
COMMUNITY
Start: 2023-12-08

## 2023-12-13 RX ORDER — QUETIAPINE FUMARATE 200 MG/1
200 TABLET, FILM COATED ORAL 2 TIMES DAILY
COMMUNITY
Start: 2023-12-07

## 2023-12-13 RX ORDER — ZOLPIDEM TARTRATE 10 MG/1
10 TABLET ORAL NIGHTLY
COMMUNITY
Start: 2023-11-28

## 2023-12-13 RX ORDER — NITROFURANTOIN 25; 75 MG/1; MG/1
CAPSULE ORAL
COMMUNITY

## 2023-12-13 RX ORDER — HYDROXYZINE PAMOATE 25 MG/1
25 CAPSULE ORAL 2 TIMES DAILY
COMMUNITY
Start: 2023-12-07

## 2023-12-13 RX ORDER — BLOOD SUGAR DIAGNOSTIC
STRIP MISCELLANEOUS
COMMUNITY
Start: 2023-12-01

## 2023-12-13 RX ADMIN — METHOCARBAMOL TABLETS 1500 MG: 750 TABLET, COATED ORAL at 06:39

## 2023-12-13 ASSESSMENT — PAIN SCALES - GENERAL
PAINLEVEL_OUTOF10: 5
PAINLEVEL_OUTOF10: 5

## 2023-12-13 ASSESSMENT — PAIN - FUNCTIONAL ASSESSMENT: PAIN_FUNCTIONAL_ASSESSMENT: 0-10

## 2023-12-13 NOTE — ED TRIAGE NOTES
Pt states that she is having lower back pain and left leg pain that is shooting and rates 5/10 at this time. Pt states that she had abdominal pain and diarrhea that has now resolved. Pt states that she has been using lidocaine patch and a heating pad but it has been giving her minimal relief.

## 2023-12-13 NOTE — ED PROVIDER NOTES
(macrocrystal-monohydrate)     Melatonin 10 MG Tabs  Ask about: Which instructions should I use?     memantine 10 MG tablet  Commonly known as: NAMENDA  Take 2 tablets by mouth daily     metFORMIN 500 MG extended release tablet  Commonly known as: GLUCOPHAGE-XR  Take 1 tablet by mouth with breakfast and with evening meal     neomycin-polymyxin-dexameth 3.5-25748-4.1 ophthalmic suspension  Commonly known as: MAXITROL     OneTouch Verio strip  Generic drug: blood glucose test strips     QUEtiapine 200 MG tablet  Commonly known as: SEROQUEL     * traZODone 100 MG tablet  Commonly known as: DESYREL  Take 1 tablet by mouth nightly as needed for Sleep (second dose, if needed 30 minutes after first dose of trazodone)     * traZODone 150 MG tablet  Commonly known as: DESYREL  Take 1 tablet by mouth nightly as needed for Sleep     triamterene-hydroCHLOROthiazide 37.5-25 MG per capsule  Commonly known as: DYAZIDE  Ask about: Which instructions should I use? Vitamin D (Cholecalciferol) 25 MCG (1000 UT) Tabs     zolpidem 10 MG tablet  Commonly known as: AMBIEN           * This list has 4 medication(s) that are the same as other medications prescribed for you. Read the directions carefully, and ask your doctor or other care provider to review them with you. Results, Consults, Medications     Consults:  None   Labs:  No results found for this or any previous visit (from the past 12 hour(s)). Radiologic Studies:  No orders to display     Medications ordered:  Medications   methocarbamol (ROBAXIN) tablet 1,500 mg (has no administration in time range)       Documentation Comments   - I am the first and primary provider for this patient and am the primary provider of record. - Initial assessment performed. The patients presenting problems have been discussed, and the staff are in agreement with the care plan formulated and outlined with them.   I have encouraged them to ask questions as they arise throughout

## 2023-12-13 NOTE — DISCHARGE INSTRUCTIONS
Thank you! Thank you for allowing me to care for you in the emergency department. It is my goal to provide you with excellent care. If you have not received excellent quality care, please ask to speak to the nurse manager. Please fill out the survey that will come to you by mail or email since we listen to your feedback! Below you will find a list of your tests from today's visit. Should you have any questions, please do not hesitate to call the emergency department. Labs  Recent Results (from the past 12 hour(s))   Urinalysis with Reflex to Culture    Collection Time: 12/13/23  6:42 AM    Specimen: Urine   Result Value Ref Range    Color, UA Yellow/Straw      Appearance Clear Clear      Specific Gravity, UA >1.030 (H) 1.003 - 1.030    pH, Urine 5.5 5.0 - 8.0      Protein, UA Negative Negative mg/dL    Glucose, UA Negative Negative mg/dL    Ketones, Urine Negative Negative mg/dL    Bilirubin Urine Negative Negative      Blood, Urine Negative Negative      Urobilinogen, Urine 0.2 0.2 - 1.0 EU/dL    Nitrite, Urine Negative Negative      Leukocyte Esterase, Urine Negative Negative      WBC, UA 0-4 0 - 5 /hpf    RBC, UA 0-5 0 - 3 /hpf    BACTERIA, URINE 1+ (A) Negative /hpf    Urine Culture if Indicated Culture not indicated by UA result Culture not indicated by UA result         Radiologic Studies  No orders to display     ------------------------------------------------------------------------------------------------------------  The exam and treatment you received in the Emergency Department were for an urgent problem and are not intended as complete care. It is important that you follow-up with a doctor, nurse practitioner, or physician assistant to:  (1) confirm your diagnosis,  (2) re-evaluation of changes in your illness and treatment, and  (3) for ongoing care. Please take your discharge instructions with you when you go to your follow-up appointment.      If you have any problem arranging a

## 2023-12-22 PROBLEM — K58.9 IBS (IRRITABLE BOWEL SYNDROME): Status: ACTIVE | Noted: 2023-12-22

## 2024-01-05 ENCOUNTER — OFFICE VISIT (OUTPATIENT)
Age: 49
End: 2024-01-05
Payer: MEDICAID

## 2024-01-05 VITALS
BODY MASS INDEX: 38.59 KG/M2 | OXYGEN SATURATION: 98 % | HEART RATE: 82 BPM | DIASTOLIC BLOOD PRESSURE: 80 MMHG | HEIGHT: 63 IN | SYSTOLIC BLOOD PRESSURE: 120 MMHG | TEMPERATURE: 97.6 F | RESPIRATION RATE: 14 BRPM | WEIGHT: 217.8 LBS

## 2024-01-05 DIAGNOSIS — Z86.010 HISTORY OF COLON POLYPS: ICD-10-CM

## 2024-01-05 DIAGNOSIS — K21.00 GASTROESOPHAGEAL REFLUX DISEASE WITH ESOPHAGITIS WITHOUT HEMORRHAGE: ICD-10-CM

## 2024-01-05 DIAGNOSIS — K58.2 IRRITABLE BOWEL SYNDROME WITH BOTH CONSTIPATION AND DIARRHEA: Primary | ICD-10-CM

## 2024-01-05 PROCEDURE — 99204 OFFICE O/P NEW MOD 45 MIN: CPT | Performed by: INTERNAL MEDICINE

## 2024-01-05 PROCEDURE — 3079F DIAST BP 80-89 MM HG: CPT | Performed by: INTERNAL MEDICINE

## 2024-01-05 PROCEDURE — 3074F SYST BP LT 130 MM HG: CPT | Performed by: INTERNAL MEDICINE

## 2024-01-05 RX ORDER — POLYETHYLENE GLYCOL 3350 17 G/17G
POWDER, FOR SOLUTION ORAL
Qty: 510 G | Refills: 0 | Status: SHIPPED | OUTPATIENT
Start: 2024-01-05

## 2024-01-05 RX ORDER — PANTOPRAZOLE SODIUM 20 MG/1
20 TABLET, DELAYED RELEASE ORAL DAILY
COMMUNITY
Start: 2023-12-29

## 2024-01-05 RX ORDER — ATORVASTATIN CALCIUM 40 MG/1
40 TABLET, FILM COATED ORAL DAILY
COMMUNITY

## 2024-01-05 ASSESSMENT — PATIENT HEALTH QUESTIONNAIRE - PHQ9
SUM OF ALL RESPONSES TO PHQ QUESTIONS 1-9: 0
2. FEELING DOWN, DEPRESSED OR HOPELESS: 0
SUM OF ALL RESPONSES TO PHQ QUESTIONS 1-9: 0

## 2024-01-05 NOTE — PROGRESS NOTES
1. Have you been to the ER, urgent care clinic since your last visit?  Hospitalized since your last visit? Yes dec-lower back    2. Have you seen or consulted any other health care providers outside of the [facility] since your last visit?  Include any pap smears or colon screening.  No   Chief Complaint   Patient presents with    Irritable Bowel Syndrome     SELF DIAGNOSED    Colon Polyps     11-20-20  ASCENDING COLON POLYP    Constipation     Bowels move small amt after each meal since Nov     /80 (Site: Left Upper Arm, Position: Sitting, Cuff Size: Large Adult)   Pulse 82   Temp 97.6 °F (36.4 °C) (Temporal)   Resp 14   Ht 1.6 m (5' 3\")   Wt 98.8 kg (217 lb 12.8 oz)   SpO2 98%   BMI 38.58 kg/m²

## 2024-01-05 NOTE — PROGRESS NOTES
1. Have you been to the ER, urgent care clinic since your last visit?  Hospitalized since your last visit? Yes  12/13/23  lower back    2. Have you seen or consulted any other health care providers outside of the [facility] since your last visit?  Include any pap smears or colon screening.  No.cc  /80 (Site: Left Upper Arm, Position: Sitting, Cuff Size: Large Adult)   Pulse 82   Temp 97.6 °F (36.4 °C) (Temporal)   Resp 14   Ht 1.6 m (5' 3\")   Wt 98.8 kg (217 lb 12.8 oz)   SpO2 98%   BMI 38.58 kg/m²   /80 (Site: Left Upper Arm, Position: Sitting, Cuff Size: Large Adult)   Pulse 82   Temp 97.6 °F (36.4 °C) (Temporal)   Resp 14   Ht 1.6 m (5' 3\")   Wt 98.8 kg (217 lb 12.8 oz)   SpO2 98%   BMI 38.58 kg/m²   .cc

## 2024-01-08 ASSESSMENT — ENCOUNTER SYMPTOMS
ALLERGIC/IMMUNOLOGIC NEGATIVE: 1
ANAL BLEEDING: 0
NAUSEA: 0
BACK PAIN: 1
VOMITING: 0
ABDOMINAL DISTENTION: 0
BLOOD IN STOOL: 0
RECTAL PAIN: 0
CONSTIPATION: 1
ABDOMINAL PAIN: 0
RESPIRATORY NEGATIVE: 1
DIARRHEA: 0

## 2024-01-09 ENCOUNTER — PREP FOR PROCEDURE (OUTPATIENT)
Age: 49
End: 2024-01-09

## 2024-01-09 DIAGNOSIS — Z86.010 PERSONAL HISTORY OF COLONIC POLYPS: ICD-10-CM

## 2024-01-09 PROBLEM — K21.9 GERD (GASTROESOPHAGEAL REFLUX DISEASE): Status: ACTIVE | Noted: 2024-01-09

## 2024-01-09 PROBLEM — Z86.0100 PERSONAL HISTORY OF COLONIC POLYPS: Status: ACTIVE | Noted: 2024-01-09

## 2024-01-09 NOTE — PROGRESS NOTES
Alisha Lerma is a 48 y.o. female who presents today for the following:  Chief Complaint   Patient presents with    Irritable Bowel Syndrome     SELF DIAGNOSED    Colon Polyps     11-20-20  ASCENDING COLON POLYP    Constipation     Bowels move small amt after each meal since Nov         Allergies   Allergen Reactions    Lisinopril Rash     Swelling of the lips       Current Outpatient Medications   Medication Sig Dispense Refill    atorvastatin (LIPITOR) 40 MG tablet Take 1 tablet by mouth daily      pantoprazole (PROTONIX) 20 MG tablet Take 1 tablet by mouth daily      polyethylene glycol (GLYCOLAX) 17 GM/SCOOP powder Use as directed by physician. 510 g 0    Melatonin 10 MG TABS Take 1 tablet by mouth nightly at bedtime.      losartan (COZAAR) 100 MG tablet Take 1 tablet by mouth daily      triamterene-hydroCHLOROthiazide (DYAZIDE) 37.5-25 MG per capsule Take 1 capsule by mouth daily      zolpidem (AMBIEN) 10 MG tablet Take 1 tablet by mouth nightly. at bedtime.      QUEtiapine (SEROQUEL) 200 MG tablet Take 1 tablet by mouth 2 times daily      neomycin-polymyxin-dexameth (MAXITROL) 3.5-64145-5.1 ophthalmic suspension INSTILL 1 DROP INTO EACH EYE THREE TIMES DAILY      hydrOXYzine pamoate (VISTARIL) 25 MG capsule Take 1 capsule by mouth 2 times daily      ONETOUCH VERIO strip TEST GLUCOSE LEVELS SUBCUTANEOUS TWICE DAILY      divalproex (DEPAKOTE ER) 500 MG extended release tablet Take 3 tablets by mouth daily      metFORMIN (GLUCOPHAGE-XR) 500 MG extended release tablet Take 1 tablet by mouth with breakfast and with evening meal 60 tablet 1    glipiZIDE (GLUCOTROL XL) 10 MG extended release tablet Take 1 tablet by mouth daily 30 tablet 1     No current facility-administered medications for this visit.       Past Medical History:   Diagnosis Date    Anxiety     Depression     ANXIETY & DEPRESSION    Diabetes (HCC)     TYPE II    Hypertension     IBS (irritable bowel syndrome)     Infected sebaceous cyst 4/10/2019

## 2024-01-09 NOTE — H&P (VIEW-ONLY)
Alisha Lerma is a 48 y.o. female who presents today for the following:  Chief Complaint   Patient presents with    Irritable Bowel Syndrome     SELF DIAGNOSED    Colon Polyps     11-20-20  ASCENDING COLON POLYP    Constipation     Bowels move small amt after each meal since Nov         Allergies   Allergen Reactions    Lisinopril Rash     Swelling of the lips       Current Outpatient Medications   Medication Sig Dispense Refill    atorvastatin (LIPITOR) 40 MG tablet Take 1 tablet by mouth daily      pantoprazole (PROTONIX) 20 MG tablet Take 1 tablet by mouth daily      polyethylene glycol (GLYCOLAX) 17 GM/SCOOP powder Use as directed by physician. 510 g 0    Melatonin 10 MG TABS Take 1 tablet by mouth nightly at bedtime.      losartan (COZAAR) 100 MG tablet Take 1 tablet by mouth daily      triamterene-hydroCHLOROthiazide (DYAZIDE) 37.5-25 MG per capsule Take 1 capsule by mouth daily      zolpidem (AMBIEN) 10 MG tablet Take 1 tablet by mouth nightly. at bedtime.      QUEtiapine (SEROQUEL) 200 MG tablet Take 1 tablet by mouth 2 times daily      neomycin-polymyxin-dexameth (MAXITROL) 3.5-51522-6.1 ophthalmic suspension INSTILL 1 DROP INTO EACH EYE THREE TIMES DAILY      hydrOXYzine pamoate (VISTARIL) 25 MG capsule Take 1 capsule by mouth 2 times daily      ONETOUCH VERIO strip TEST GLUCOSE LEVELS SUBCUTANEOUS TWICE DAILY      divalproex (DEPAKOTE ER) 500 MG extended release tablet Take 3 tablets by mouth daily      metFORMIN (GLUCOPHAGE-XR) 500 MG extended release tablet Take 1 tablet by mouth with breakfast and with evening meal 60 tablet 1    glipiZIDE (GLUCOTROL XL) 10 MG extended release tablet Take 1 tablet by mouth daily 30 tablet 1     No current facility-administered medications for this visit.       Past Medical History:   Diagnosis Date    Anxiety     Depression     ANXIETY & DEPRESSION    Diabetes (HCC)     TYPE II    Hypertension     IBS (irritable bowel syndrome)     Infected sebaceous cyst 4/10/2019

## 2024-01-09 NOTE — PROGRESS NOTES
EGD AND COLONOSCOPY ARE SCHEDULED FOR 1-17-24 AT 11:00 AM.  NO PA REQUIRED PER ON LINE [facility]

## 2024-01-17 ENCOUNTER — HOSPITAL ENCOUNTER (OUTPATIENT)
Facility: HOSPITAL | Age: 49
Setting detail: OUTPATIENT SURGERY
Discharge: HOME | End: 2024-01-17
Attending: INTERNAL MEDICINE | Admitting: INTERNAL MEDICINE
Payer: MEDICAID

## 2024-01-17 ENCOUNTER — ANESTHESIA (OUTPATIENT)
Facility: HOSPITAL | Age: 49
End: 2024-01-17
Payer: MEDICAID

## 2024-01-17 ENCOUNTER — ANESTHESIA EVENT (OUTPATIENT)
Facility: HOSPITAL | Age: 49
End: 2024-01-17
Payer: MEDICAID

## 2024-01-17 VITALS
WEIGHT: 220 LBS | TEMPERATURE: 97.3 F | RESPIRATION RATE: 16 BRPM | HEIGHT: 63 IN | SYSTOLIC BLOOD PRESSURE: 112 MMHG | BODY MASS INDEX: 38.98 KG/M2 | HEART RATE: 79 BPM | OXYGEN SATURATION: 98 % | DIASTOLIC BLOOD PRESSURE: 66 MMHG

## 2024-01-17 DIAGNOSIS — B37.0 CANDIDA INFECTION OF MOUTH: Primary | ICD-10-CM

## 2024-01-17 LAB
GLUCOSE BLD STRIP.AUTO-MCNC: 98 MG/DL (ref 65–100)
HCG UR QL: NEGATIVE
PERFORMED BY:: NORMAL

## 2024-01-17 PROCEDURE — 3600007502: Performed by: INTERNAL MEDICINE

## 2024-01-17 PROCEDURE — 43239 EGD BIOPSY SINGLE/MULTIPLE: CPT | Performed by: INTERNAL MEDICINE

## 2024-01-17 PROCEDURE — 6370000000 HC RX 637 (ALT 250 FOR IP): Performed by: NURSE ANESTHETIST, CERTIFIED REGISTERED

## 2024-01-17 PROCEDURE — 3600007512: Performed by: INTERNAL MEDICINE

## 2024-01-17 PROCEDURE — 88305 TISSUE EXAM BY PATHOLOGIST: CPT

## 2024-01-17 PROCEDURE — 3700000000 HC ANESTHESIA ATTENDED CARE: Performed by: INTERNAL MEDICINE

## 2024-01-17 PROCEDURE — 2580000003 HC RX 258: Performed by: INTERNAL MEDICINE

## 2024-01-17 PROCEDURE — 2709999900 HC NON-CHARGEABLE SUPPLY: Performed by: INTERNAL MEDICINE

## 2024-01-17 PROCEDURE — 7100000011 HC PHASE II RECOVERY - ADDTL 15 MIN: Performed by: INTERNAL MEDICINE

## 2024-01-17 PROCEDURE — 82962 GLUCOSE BLOOD TEST: CPT

## 2024-01-17 PROCEDURE — 81025 URINE PREGNANCY TEST: CPT

## 2024-01-17 PROCEDURE — 3700000001 HC ADD 15 MINUTES (ANESTHESIA): Performed by: INTERNAL MEDICINE

## 2024-01-17 PROCEDURE — 45378 DIAGNOSTIC COLONOSCOPY: CPT | Performed by: INTERNAL MEDICINE

## 2024-01-17 PROCEDURE — 2500000003 HC RX 250 WO HCPCS: Performed by: NURSE ANESTHETIST, CERTIFIED REGISTERED

## 2024-01-17 PROCEDURE — 7100000010 HC PHASE II RECOVERY - FIRST 15 MIN: Performed by: INTERNAL MEDICINE

## 2024-01-17 PROCEDURE — 6360000002 HC RX W HCPCS: Performed by: NURSE ANESTHETIST, CERTIFIED REGISTERED

## 2024-01-17 RX ORDER — CLOTRIMAZOLE 10 MG/1
10 LOZENGE ORAL; TOPICAL
Qty: 50 TABLET | Refills: 0 | Status: SHIPPED | OUTPATIENT
Start: 2024-01-17 | End: 2024-01-27

## 2024-01-17 RX ORDER — PROPOFOL 10 MG/ML
INJECTION, EMULSION INTRAVENOUS PRN
Status: DISCONTINUED | OUTPATIENT
Start: 2024-01-17 | End: 2024-01-17 | Stop reason: SDUPTHER

## 2024-01-17 RX ORDER — SODIUM CHLORIDE 9 MG/ML
25 INJECTION, SOLUTION INTRAVENOUS PRN
Status: DISCONTINUED | OUTPATIENT
Start: 2024-01-17 | End: 2024-01-17 | Stop reason: HOSPADM

## 2024-01-17 RX ORDER — GLYCOPYRROLATE 0.2 MG/ML
INJECTION INTRAMUSCULAR; INTRAVENOUS PRN
Status: DISCONTINUED | OUTPATIENT
Start: 2024-01-17 | End: 2024-01-17 | Stop reason: SDUPTHER

## 2024-01-17 RX ORDER — DEXMEDETOMIDINE HYDROCHLORIDE 100 UG/ML
INJECTION, SOLUTION INTRAVENOUS PRN
Status: DISCONTINUED | OUTPATIENT
Start: 2024-01-17 | End: 2024-01-17 | Stop reason: SDUPTHER

## 2024-01-17 RX ADMIN — PROPOFOL 50 MG: 10 INJECTION, EMULSION INTRAVENOUS at 12:16

## 2024-01-17 RX ADMIN — DEXMEDETOMIDINE HCL 4 MCG: 100 INJECTION INTRAVENOUS at 12:08

## 2024-01-17 RX ADMIN — PROPOFOL 50 MG: 10 INJECTION, EMULSION INTRAVENOUS at 12:24

## 2024-01-17 RX ADMIN — SODIUM CHLORIDE 25 ML: 9 INJECTION, SOLUTION INTRAVENOUS at 11:52

## 2024-01-17 RX ADMIN — DEXMEDETOMIDINE HCL 8 MCG: 100 INJECTION INTRAVENOUS at 12:00

## 2024-01-17 RX ADMIN — GLYCOPYRROLATE 0.2 MG: 0.2 INJECTION INTRAMUSCULAR; INTRAVENOUS at 12:10

## 2024-01-17 RX ADMIN — TOPICAL ANESTHETIC 1 EACH: 200 SPRAY DENTAL; PERIODONTAL at 12:10

## 2024-01-17 ASSESSMENT — PAIN - FUNCTIONAL ASSESSMENT
PAIN_FUNCTIONAL_ASSESSMENT: 0-10

## 2024-01-17 NOTE — ANESTHESIA PRE PROCEDURE
Department of Anesthesiology  Preprocedure Note       Name:  Alisha Lerma   Age:  48 y.o.  :  1975                                          MRN:  [id number]         Date:  2024      Surgeon: Surgeon(s):  Manuel Mcdonald Jr., MD    Procedure: Procedure(s):  EGD WITH BIOPSY  COLONOSCOPY    Medications prior to admission:   Prior to Admission medications    Medication Sig Start Date End Date Taking? Authorizing Provider   atorvastatin (LIPITOR) 40 MG tablet Take 1 tablet by mouth daily    Mckinley Carbajal MD   pantoprazole (PROTONIX) 20 MG tablet Take 1 tablet by mouth daily 23   Mckinley Carbajal MD   polyethylene glycol (GLYCOLAX) 17 GM/SCOOP powder Use as directed by physician. 24   Manuel Mcdonald Jr., MD   Melatonin 10 MG TABS Take 1 tablet by mouth nightly at bedtime. 23   Mckinley Carbajal MD   losartan (COZAAR) 100 MG tablet Take 1 tablet by mouth daily 23   Mckinley Carbajal MD   triamterene-hydroCHLOROthiazide (DYAZIDE) 37.5-25 MG per capsule Take 1 capsule by mouth daily 23   Mckinley Carbajal MD   zolpidem (AMBIEN) 10 MG tablet Take 1 tablet by mouth nightly. at bedtime. 23   Mckinley Carbajal MD   QUEtiapine (SEROQUEL) 200 MG tablet Take 1 tablet by mouth 2 times daily 23   Mckinley Carbajal MD   neomycin-polymyxin-dexameth (MAXITROL) 3.5-40584-7.1 ophthalmic suspension INSTILL 1 DROP INTO EACH EYE THREE TIMES DAILY  Patient not taking: Reported on 2024   Mckinley Carbajal MD   hydrOXYzine pamoate (VISTARIL) 25 MG capsule Take 1 capsule by mouth 2 times daily 23   Mckinley Carbajal MD   ONETOUCH VERIO strip TEST GLUCOSE LEVELS SUBCUTANEOUS TWICE DAILY 23   Mckinley Carbajal MD   divalproex (DEPAKOTE ER) 500 MG extended release tablet Take 3 tablets by mouth daily    Mckinley Carbajal MD   metFORMIN (GLUCOPHAGE-XR) 500 MG extended release tablet Take 1 tablet by mouth with

## 2024-01-17 NOTE — DISCHARGE INSTRUCTIONS
For the next 12 hours you should not:   1. drive   2. drink alcohol   3. operate any machinery   4. engage in activities that require mental sharpness or manual dexterity such as     cooking   5. take any drugs other than those prescribed by a physician   6. make any legal or financial decisions    Call your doctor's office immediately, if there is is anything unusual:   1. increased and continuing rectal bleeding   2. fever   3. Unusual abdominal pain    Take it easy today and resume normal activity tomorrow.It is common to have gas and mild bloating for a few hours. Pain is NOT normal. You may be groggy off and on for a few hours.    Resume previous diet.        Manuel Mcdonald Jr, MD  1/17/2024  12:47 PM

## 2024-01-17 NOTE — OP NOTE
Colonoscopy Procedure Note      Patient: Alisha Lerma MRN: [id number]  SSN: [id number]    YOB: 1975  Age: 48 y.o.  Sex: female      Date of Procedure: 1/17/2024  Date/Time:  1/17/2024 12:38 PM       IMPRESSION:     1.  Normal colon to level of cecum         RECOMMENDATIONS:     1) Repeat colonoscopy in 5 years.         INDICATION: History of colon polyps    PROCEDURE PERFORMED: Colonoscopy     DESCRIPTION OF PROCEDURE: An informed consent was obtained.  The patient was placed in left lateral position.  Perianal inspection and a digital rectal exam was performed.  Video colonoscope was introduced into the rectum and advanced under direct vision up to the terminal ileum.  With adequate insufflation and maneuvering of the withdrawing scope, the colonic mucosa was visualized carefully.  Retroflexion was performed in the rectum to see the anorectum and also in the ascending colon to look behind the folds.  Vital signs, pulse oximetry, single lead cardiac monitor were monitored throughout the procedure as the sedation was titrated to the desired effect ensuring patient comfort and safety.  The patient tolerated the procedure very well and was transferred to the recovery area. Following is the summary of findings: No mucosal lesions were noted to the level of the cecum.      ENDOSCOPIST: Manuel Mcdonald Jr, MD     ENDOSCOPE: Olympus video colonoscope     ASSISTANT:Circulator: Zain Swift RN              Scrub Person First: Danitza Mendez     ANESTHESIA: TIVA      QUALITY OF PREPARATION: Good      FINDINGS:   Normal colon to level of cecum        Complication:  None         EBL: None     SPECIMENS:   ID Type Source Tests Collected by Time Destination   1 : gastric antrum Tissue Stomach SURGICAL PATHOLOGY Manuel Mcdonald Jr., MD 1/17/2024 1218              Manuel Mcdonald Jr, MD  January 17, 2024  12:38 PM

## 2024-01-17 NOTE — OP NOTE
EGD Procedure Note        Patient: Alisha Lerma MRN: [id number]  SSN: [id number]    YOB: 1975  Age: 48 y.o.  Sex: female        Date/Time:  1/17/2024 12:42 PM         IMPRESSION:       Antral gastritis  Distal esophagitis (grade 1)       RECOMMENDATIONS:    Check biopsy results.  Continue the pantoprazole 20 mg daily for now.    Procedure: Esophagogastroduodenoscopy with cold biopsy    Indication: GERD, history of IBS    Endoscopist:  Manuel Mcdonald Jr, MD    Referring Provider:   Alex Larson MD    History: The history and physical exam were reviewed and updated.     Endoscope: GIF H190 Olympus video endoscope    Extent of Exam: second portion of the duodenum    ASA: ASA 2 - Patient with mild systemic disease with no functional limitations    Anethesia/Sedation:  TIVA    Description of the procedure:   The procedure was discussed with the patient including risks, benefits, alternatives including risks of iv sedation, bleeding, perforation and aspiration.  A safety timeout was performed. The patient was placed in the left lateral decubitus position.  A bite block was placed.  The patient was using standard protocol.  The patients vital signs were monitored at all times including heart rate/rhythm, blood pressure and oxygen saturation.  The endoscope was then passed under direct visualization to the second portion of the duodenum.  The endoscope was then slowly withdrawn while visualizing the mucosa.  In the stomach a retroflexion was performed and gastric fundus and cardia visualized. The patient was then transferred to recovery in stable condition.                Findings:   Esophagus:The esophageal mucosa was mildly inflamed in the distal esophagus consistent with a grade 1 esophagitis..  Stomach: The gastric mucosa was inflamed throughout the gastric antrum and to lesser degree gastric body multiple biopsies were taken gastric antrum of the finding.   Duodenum: The duodenum mucosa

## 2024-01-17 NOTE — INTERVAL H&P NOTE
Update History & Physical    The patient's History and Physical of January 17, 2024 was reviewed with the patient and I examined the patient. There was no change. The surgical site was confirmed by the patient and me.     Plan: The risks, benefits, expected outcome, and alternative to the recommended procedure have been discussed with the patient. Patient understands and wants to proceed with the procedure.     Electronically signed by Manuel Mcdonald Jr, MD on 1/17/2024 at 10:02 AM

## 2024-01-22 ENCOUNTER — TRANSCRIBE ORDERS (OUTPATIENT)
Facility: HOSPITAL | Age: 49
End: 2024-01-22

## 2024-01-22 DIAGNOSIS — Z12.31 SCREENING MAMMOGRAM FOR HIGH-RISK PATIENT: Primary | ICD-10-CM

## 2024-01-23 PROBLEM — N90.7 CYST OF VULVA: Status: ACTIVE | Noted: 2023-06-06

## 2024-01-23 RX ORDER — PANTOPRAZOLE SODIUM 20 MG/1
20 TABLET, DELAYED RELEASE ORAL
Qty: 90 TABLET | Refills: 1 | Status: SHIPPED | OUTPATIENT
Start: 2024-01-23

## 2024-01-26 ENCOUNTER — TELEPHONE (OUTPATIENT)
Age: 49
End: 2024-01-26

## 2024-01-26 DIAGNOSIS — B37.0 ORAL CANDIDIASIS: Primary | ICD-10-CM

## 2024-01-30 ENCOUNTER — TELEPHONE (OUTPATIENT)
Age: 49
End: 2024-01-30

## 2024-01-30 NOTE — TELEPHONE ENCOUNTER
Medicine prescribed for yeast infection in mouth is on back order.  Needs another script for this problem sent to drug store please.

## 2024-01-31 ENCOUNTER — TELEPHONE (OUTPATIENT)
Age: 49
End: 2024-01-31

## 2024-01-31 DIAGNOSIS — B37.0 ORAL CANDIDIASIS: ICD-10-CM

## 2024-02-12 ENCOUNTER — TELEPHONE (OUTPATIENT)
Age: 49
End: 2024-02-12

## 2024-02-12 DIAGNOSIS — B37.0 CANDIDAL STOMATITIS: Primary | ICD-10-CM

## 2024-02-12 NOTE — TELEPHONE ENCOUNTER
Patient called states she needs a refill on Nystatin still having the issue and has run out.Please send to [facility] patient states [facility] is out of Nystatin.

## 2024-03-08 ENCOUNTER — TELEPHONE (OUTPATIENT)
Age: 49
End: 2024-03-08

## 2024-03-08 NOTE — TELEPHONE ENCOUNTER
Patient called states she needs a refill on Nystatin sent to [facility] in [place].

## 2024-03-11 ENCOUNTER — TELEPHONE (OUTPATIENT)
Age: 49
End: 2024-03-11

## 2024-03-11 DIAGNOSIS — B37.9 CANDIDIASIS: Primary | ICD-10-CM

## 2024-03-29 ENCOUNTER — APPOINTMENT (OUTPATIENT)
Facility: HOSPITAL | Age: 49
End: 2024-03-29
Payer: MEDICAID

## 2024-03-29 ENCOUNTER — HOSPITAL ENCOUNTER (OUTPATIENT)
Facility: HOSPITAL | Age: 49
End: 2024-03-29
Payer: MEDICAID

## 2024-03-29 ENCOUNTER — HOSPITAL ENCOUNTER (EMERGENCY)
Facility: HOSPITAL | Age: 49
Discharge: HOME | End: 2024-03-29
Attending: STUDENT IN AN ORGANIZED HEALTH CARE EDUCATION/TRAINING PROGRAM
Payer: MEDICAID

## 2024-03-29 VITALS
WEIGHT: 212.52 LBS | HEART RATE: 84 BPM | HEIGHT: 63 IN | DIASTOLIC BLOOD PRESSURE: 92 MMHG | TEMPERATURE: 98 F | RESPIRATION RATE: 18 BRPM | SYSTOLIC BLOOD PRESSURE: 151 MMHG | OXYGEN SATURATION: 99 % | BODY MASS INDEX: 37.66 KG/M2

## 2024-03-29 VITALS — HEIGHT: 63 IN | BODY MASS INDEX: 37.39 KG/M2 | WEIGHT: 211 LBS

## 2024-03-29 DIAGNOSIS — S46.919A STRAIN OF SHOULDER, UNSPECIFIED LATERALITY, INITIAL ENCOUNTER: ICD-10-CM

## 2024-03-29 DIAGNOSIS — S39.012A BACK STRAIN, INITIAL ENCOUNTER: ICD-10-CM

## 2024-03-29 DIAGNOSIS — Z12.31 SCREENING MAMMOGRAM FOR HIGH-RISK PATIENT: ICD-10-CM
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
## 2024-03-29 PROCEDURE — 6370000000 HC RX 637 (ALT 250 FOR IP): Performed by: STUDENT IN AN ORGANIZED HEALTH CARE EDUCATION/TRAINING PROGRAM

## 2024-03-29 PROCEDURE — 73030 X-RAY EXAM OF SHOULDER: CPT

## 2024-03-29 PROCEDURE — 99284 EMERGENCY DEPT VISIT MOD MDM: CPT

## 2024-03-29 PROCEDURE — 72131 CT LUMBAR SPINE W/O DYE: CPT

## 2024-03-29 PROCEDURE — 72128 CT CHEST SPINE W/O DYE: CPT

## 2024-03-29 PROCEDURE — 77063 BREAST TOMOSYNTHESIS BI: CPT

## 2024-03-29 RX ORDER — CYCLOBENZAPRINE HCL 5 MG
5 TABLET ORAL 3 TIMES DAILY PRN
Qty: 10 TABLET | Refills: 0 | Status: SHIPPED | OUTPATIENT
Start: 2024-03-29 | End: 2024-04-08

## 2024-03-29 RX ORDER — IBUPROFEN 600 MG/1
600 TABLET ORAL 3 TIMES DAILY PRN
Qty: 30 TABLET | Refills: 0 | Status: SHIPPED | OUTPATIENT
Start: 2024-03-29

## 2024-03-29 RX ORDER — IBUPROFEN 400 MG/1
600 TABLET ORAL
Status: COMPLETED | OUTPATIENT
Start: 2024-03-29 | End: 2024-03-29

## 2024-03-29 RX ORDER — ACETAMINOPHEN 500 MG
1000 TABLET ORAL
Status: COMPLETED | OUTPATIENT
Start: 2024-03-29 | End: 2024-03-29

## 2024-03-29 RX ADMIN — ACETAMINOPHEN 1000 MG: 500 TABLET ORAL at 18:09

## 2024-03-29 RX ADMIN — IBUPROFEN 600 MG: 400 TABLET, FILM COATED ORAL at 18:09

## 2024-03-29 ASSESSMENT — PAIN DESCRIPTION - DESCRIPTORS
DESCRIPTORS: ACHING
DESCRIPTORS: ACHING

## 2024-03-29 ASSESSMENT — PAIN - FUNCTIONAL ASSESSMENT
PAIN_FUNCTIONAL_ASSESSMENT: 0-10
PAIN_FUNCTIONAL_ASSESSMENT: ACTIVITIES ARE NOT PREVENTED
PAIN_FUNCTIONAL_ASSESSMENT: PREVENTS OR INTERFERES SOME ACTIVE ACTIVITIES AND ADLS

## 2024-03-29 ASSESSMENT — PAIN DESCRIPTION - FREQUENCY: FREQUENCY: CONTINUOUS

## 2024-03-29 ASSESSMENT — ENCOUNTER SYMPTOMS
BACK PAIN: 1
VOMITING: 0
EYE REDNESS: 0
NAUSEA: 0
EYE DISCHARGE: 0

## 2024-03-29 ASSESSMENT — PAIN SCALES - GENERAL
PAINLEVEL_OUTOF10: 10
PAINLEVEL_OUTOF10: 10

## 2024-03-29 ASSESSMENT — PAIN DESCRIPTION - ORIENTATION
ORIENTATION: RIGHT;LEFT
ORIENTATION: RIGHT

## 2024-03-29 ASSESSMENT — PAIN DESCRIPTION - PAIN TYPE: TYPE: ACUTE PAIN

## 2024-03-29 ASSESSMENT — PAIN DESCRIPTION - LOCATION
LOCATION: SHOULDER
LOCATION: SHOULDER

## 2024-03-29 ASSESSMENT — PAIN DESCRIPTION - ONSET: ONSET: ON-GOING

## 2024-03-29 NOTE — ED TRIAGE NOTES
Patient states she tripped and fell at local restaurant. She states she fell fell forward and right shoulder hit the post. She heard a crack in her back. Denies LOC. No blood thinner. Denies nausea.

## 2024-03-29 NOTE — ED PROVIDER NOTES
[facility] EMERGENCY DEP  EMERGENCY DEPARTMENT ENCOUNTER      Pt Name: Alisha Lerma  MRN: [id number]  Birthdate 1975  Date of evaluation: 3/29/2024  Provider: Elizabeth Bass DO    CHIEF COMPLAINT     No chief complaint on file.        HISTORY OF PRESENT ILLNESS    HPI    Alisha Lerma is a 48 y.o. female who presents to the emergency department for evaluation after a fall. Patient reports she tripped and fell onto her hands and knees then slid into a corner of the wall and struck her shoulder. States she also felt a pop in her mid back. Does endorses striking head but no LOC or blood thinner use. State she has pain in her b/l shoulders and back. No headache, vision changes, n/v, numbness or tingling.     Nursing Notes were reviewed.    REVIEW OF SYSTEMS       Review of Systems   Constitutional:  Negative for chills and fever.   Eyes:  Negative for discharge, redness and visual disturbance.   Gastrointestinal:  Negative for nausea and vomiting.   Musculoskeletal:  Positive for arthralgias and back pain.   Neurological:  Negative for syncope, speech difficulty and headaches.   Psychiatric/Behavioral:  Negative for agitation.            PAST MEDICAL HISTORY     Past Medical History:   Diagnosis Date    Anxiety     Depression     ANXIETY & DEPRESSION    Diabetes ([facility])     TYPE II    Hyperlipidemia     Hypertension     IBS (irritable bowel syndrome)     Infected sebaceous cyst 4/10/2019    Panic attack     Psychoses ([facility]) 2/23/2023    Suicidal thoughts     Vision decreased          SURGICAL HISTORY       Past Surgical History:   Procedure Laterality Date    COLONOSCOPY N/A 11/20/2020    .COLONOSCOPY  :- performed by Dianne Gramajo MD at [facility] ENDOSCOPY    COLONOSCOPY N/A 1/17/2024    COLONOSCOPY performed by Manuel Mcdonald Jr., MD at [facility] ENDOSCOPY    OTHER SURGICAL HISTORY  06/03/2019     Excision of large sebaceous cyst in the midline upper back and with packing- [facility]-DR. CHUCK PRADO

## 2024-03-29 NOTE — ED NOTES
Discharge paperwork reviewed with patient by MD Bass. Patient ambulatory out of ED with strong, steady gait.

## 2024-04-02 ENCOUNTER — HOSPITAL ENCOUNTER (EMERGENCY)
Facility: HOSPITAL | Age: 49
Discharge: HOME | End: 2024-04-02
Attending: EMERGENCY MEDICINE
Payer: MEDICAID

## 2024-04-02 VITALS
HEART RATE: 98 BPM | RESPIRATION RATE: 18 BRPM | BODY MASS INDEX: 37.39 KG/M2 | OXYGEN SATURATION: 99 % | SYSTOLIC BLOOD PRESSURE: 135 MMHG | TEMPERATURE: 98.1 F | DIASTOLIC BLOOD PRESSURE: 102 MMHG | HEIGHT: 63 IN | WEIGHT: 211 LBS

## 2024-04-02 DIAGNOSIS — S40.011A CONTUSION OF MULTIPLE SITES OF RIGHT SHOULDER AND UPPER ARM, INITIAL ENCOUNTER: Primary | ICD-10-CM

## 2024-04-02 DIAGNOSIS — S40.021A CONTUSION OF MULTIPLE SITES OF RIGHT SHOULDER AND UPPER ARM, INITIAL ENCOUNTER: Primary | ICD-10-CM

## 2024-04-02 PROCEDURE — 99284 EMERGENCY DEPT VISIT MOD MDM: CPT

## 2024-04-02 PROCEDURE — 96372 THER/PROPH/DIAG INJ SC/IM: CPT

## 2024-04-02 PROCEDURE — 6360000002 HC RX W HCPCS: Performed by: EMERGENCY MEDICINE

## 2024-04-02 RX ORDER — KETOROLAC TROMETHAMINE 15 MG/ML
15 INJECTION, SOLUTION INTRAMUSCULAR; INTRAVENOUS ONCE
Status: COMPLETED | OUTPATIENT
Start: 2024-04-02 | End: 2024-04-02

## 2024-04-02 RX ORDER — HYDROCODONE BITARTRATE AND ACETAMINOPHEN 5; 325 MG/1; MG/1
1 TABLET ORAL EVERY 4 HOURS PRN
Qty: 7 TABLET | Refills: 0 | Status: SHIPPED | OUTPATIENT
Start: 2024-04-02 | End: 2024-04-05

## 2024-04-02 RX ADMIN — KETOROLAC TROMETHAMINE 15 MG: 15 INJECTION, SOLUTION INTRAMUSCULAR; INTRAVENOUS at 08:33

## 2024-04-02 ASSESSMENT — PAIN - FUNCTIONAL ASSESSMENT: PAIN_FUNCTIONAL_ASSESSMENT: 0-10

## 2024-04-02 ASSESSMENT — PAIN SCALES - GENERAL: PAINLEVEL_OUTOF10: 8

## 2024-04-02 NOTE — DISCHARGE INSTRUCTIONS
You can take tylenol or ibuprofen for aches and pains for the next few days. You can alternate these every 3 hours so that you always have something on board. For instance, you can take tylenol at 9am, ibuprofen at noon, tylenol again at 3pm and ibuprofen again at 6pm. Take in plenty of water to stay hydrated and follow up with your primary care doctor in the next few days.     Save the narcotic pain medication for severe pain or pain preventing sleep. You cannot drive or work on this medication.    Return to the ER for any headaches, vomiting, weakness or numbness on one side or any other new or concerning symptoms.         Thank you!  Thank you for allowing me to care for you in the emergency department. It is my goal to provide you with excellent care.  Please fill out the survey that will come to you by mail or email since we listen to your feedback!     Below you will find a list of your tests from today's visit.  Should you have any questions, please do not hesitate to call the emergency department.    Labs  No results found for this or any previous visit (from the past 12 hour(s)).    Radiologic Studies  No orders to display     ------------------------------------------------------------------------------------------------------------  The exam and treatment you received in the Emergency Department were for an urgent problem and are not intended as complete care. It is important that you follow-up with a doctor, nurse practitioner, or physician assistant to:  (1) confirm your diagnosis,  (2) re-evaluation of changes in your illness and treatment, and (3) for ongoing care. Please take your discharge instructions with you when you go to your follow-up appointment.     If you have any problem arranging a follow-up appointment, contact the Emergency Department.  If your symptoms become worse or you do not improve as expected and you are unable to reach your health care provider, please return to the Emergency

## 2024-04-02 NOTE — ED PROVIDER NOTES
tobacco   Vaping Use    Vaping Use: Never used   Substance Use Topics    Alcohol use: No    Drug use: No       Allergies:  Allergies   Allergen Reactions    Lisinopril Rash     Swelling of the lips         Review of Systems   Constitutional: Negative except as in HPI.  Eyes: Negative except as in HPI.  ENT: Negative except as in HPI.  Cardiovascular: Negative except as in HPI.  Respiratory: Negative except as in HPI.  Gastrointestinal: Negative except as in HPI.  Genitourinary: Negative except as in HPI.  Musculoskeletal: Negative except as in HPI.  Integumentary: Negative except as in HPI.  Neurological: Negative except as in HPI.  Psychiatric: Negative except as in HPI.  Endocrine: Negative except as in HPI.  Hematologic/Lymphatic: Negative except as in HPI.  Allergic/Immunologic: Negative except as in HPI.    Physical Exam   Constitutional: Awake and alert, interactive, NAD  Eyes: PERRL, no injection or scleral icterus, no discharge  HEENT: NCAT, neck supple, no midline neck tenderness, MMM, no oropharyngeal exudates, TMs clear  CV: RRR, no m/r/g  Respiratory: CTAB, no r/r/w  GI: Abd soft, nondistended, nontender  : Deferred  MSK: no joint effusions or edema, R shoulder ttp, FROM, paraspinal low back tenderness  Skin: No rashes  Neuro: CN2-12 intact, symmetric facies, fluent speech.  Psych: Well-groomed, normal speech, behavior, appropriate mood      Lab and Diagnostic Study Results     Labs -   No results found for this or any previous visit (from the past 12 hour(s)).    Radiologic Studies -   [unfilled]  [unfilled]  [unfilled]    Medical Decision Making and ED Course   - I am the first and primary provider for this patient AND AM THE PRIMARY PROVIDER OF RECORD.    - I reviewed the vital signs, available nursing notes, past medical history, past surgical history, family history and social history.    - Initial assessment performed. The patients presenting problems have been discussed, and the staff are in

## 2024-04-02 NOTE — ED TRIAGE NOTES
Patient reports she fell on Friday due to tripping and injured her right shoulder patient seen in [facility] for same reports she had xray's completed and given pain medication. Patient reports she is still having pain and mild swelling today and is out of her prescriptions given on Friday

## 2024-04-22 ENCOUNTER — OFFICE VISIT (OUTPATIENT)
Age: 49
End: 2024-04-22
Payer: MEDICAID

## 2024-04-22 VITALS
RESPIRATION RATE: 19 BRPM | HEIGHT: 63 IN | BODY MASS INDEX: 37.19 KG/M2 | TEMPERATURE: 98.4 F | SYSTOLIC BLOOD PRESSURE: 91 MMHG | HEART RATE: 89 BPM | WEIGHT: 209.9 LBS | OXYGEN SATURATION: 97 % | DIASTOLIC BLOOD PRESSURE: 59 MMHG

## 2024-04-22 DIAGNOSIS — K21.00 GASTROESOPHAGEAL REFLUX DISEASE WITH ESOPHAGITIS WITHOUT HEMORRHAGE: ICD-10-CM

## 2024-04-22 DIAGNOSIS — R14.0 BLOATING: Primary | ICD-10-CM

## 2024-04-22 DIAGNOSIS — K58.2 IRRITABLE BOWEL SYNDROME WITH BOTH CONSTIPATION AND DIARRHEA: ICD-10-CM

## 2024-04-22 PROCEDURE — 3074F SYST BP LT 130 MM HG: CPT | Performed by: INTERNAL MEDICINE

## 2024-04-22 PROCEDURE — 3078F DIAST BP <80 MM HG: CPT | Performed by: INTERNAL MEDICINE

## 2024-04-22 PROCEDURE — 99214 OFFICE O/P EST MOD 30 MIN: CPT | Performed by: INTERNAL MEDICINE

## 2024-04-22 RX ORDER — PANTOPRAZOLE SODIUM 40 MG/1
40 TABLET, DELAYED RELEASE ORAL
Qty: 30 TABLET | Refills: 5 | Status: SHIPPED | OUTPATIENT
Start: 2024-04-22

## 2024-04-22 RX ORDER — DICYCLOMINE HYDROCHLORIDE 10 MG/1
10 CAPSULE ORAL 3 TIMES DAILY PRN
Qty: 90 CAPSULE | Refills: 0 | Status: SHIPPED | OUTPATIENT
Start: 2024-04-22

## 2024-04-22 RX ORDER — GABAPENTIN 100 MG/1
100 CAPSULE ORAL 3 TIMES DAILY
COMMUNITY

## 2024-04-22 ASSESSMENT — PATIENT HEALTH QUESTIONNAIRE - PHQ9
2. FEELING DOWN, DEPRESSED OR HOPELESS: SEVERAL DAYS
SUM OF ALL RESPONSES TO PHQ QUESTIONS 1-9: 1
1. LITTLE INTEREST OR PLEASURE IN DOING THINGS: NOT AT ALL
SUM OF ALL RESPONSES TO PHQ QUESTIONS 1-9: 1
SUM OF ALL RESPONSES TO PHQ9 QUESTIONS 1 & 2: 1
SUM OF ALL RESPONSES TO PHQ QUESTIONS 1-9: 1
SUM OF ALL RESPONSES TO PHQ QUESTIONS 1-9: 1

## 2024-04-22 NOTE — PROGRESS NOTES
Chief Complaint   Patient presents with    Irritable Bowel Syndrome     Continues to have problems after colonoscopy in January 2024     \"Have you been to the ER, urgent care clinic since your last visit?  Hospitalized since your last visit?\"    YES - When: approximately 2  weeks ago.  Where and Why: fall.    “Have you seen or consulted any other health care providers outside of [facility] since your last visit?”    NO     “Have you had a pap smear?”    NO    No cervical cancer screening on file             Click Here for Release of Records Request

## 2024-04-30 ASSESSMENT — ENCOUNTER SYMPTOMS
NAUSEA: 0
DIARRHEA: 0
ANAL BLEEDING: 0
ABDOMINAL PAIN: 1
VOMITING: 0
ALLERGIC/IMMUNOLOGIC NEGATIVE: 1
CONSTIPATION: 1
RECTAL PAIN: 0
BLOOD IN STOOL: 0
ABDOMINAL DISTENTION: 0
RESPIRATORY NEGATIVE: 1

## 2024-04-30 NOTE — PROGRESS NOTES
Alisha Lerma is a 48 y.o. female who presents today for the following:  Chief Complaint   Patient presents with    Irritable Bowel Syndrome     Continues to have problems after colonoscopy in January 2024         Allergies   Allergen Reactions    Lisinopril Rash     Swelling of the lips       Current Outpatient Medications   Medication Sig Dispense Refill    gabapentin (NEURONTIN) 100 MG capsule Take 1 capsule by mouth 3 times daily. Max Daily Amount: 300 mg      dicyclomine (BENTYL) 10 MG capsule Take 1 capsule by mouth 3 times daily as needed (bloating) 90 capsule 0    pantoprazole (PROTONIX) 40 MG tablet Take 1 tablet by mouth every morning (before breakfast) 30 tablet 5    atorvastatin (LIPITOR) 40 MG tablet Take 1 tablet by mouth daily      Melatonin 10 MG TABS Take 1 tablet by mouth nightly at bedtime.      losartan (COZAAR) 100 MG tablet Take 1 tablet by mouth daily      triamterene-hydroCHLOROthiazide (DYAZIDE) 37.5-25 MG per capsule Take 1 capsule by mouth daily      zolpidem (AMBIEN) 10 MG tablet Take 1 tablet by mouth nightly. at bedtime.      QUEtiapine (SEROQUEL) 200 MG tablet Take 1 tablet by mouth 2 times daily      hydrOXYzine pamoate (VISTARIL) 25 MG capsule Take 1 capsule by mouth 2 times daily      ONETOUCH VERIO strip TEST GLUCOSE LEVELS SUBCUTANEOUS TWICE DAILY      divalproex (DEPAKOTE ER) 500 MG extended release tablet Take 3 tablets by mouth daily      metFORMIN (GLUCOPHAGE-XR) 500 MG extended release tablet Take 1 tablet by mouth with breakfast and with evening meal 60 tablet 1    glipiZIDE (GLUCOTROL XL) 10 MG extended release tablet Take 1 tablet by mouth daily 30 tablet 1     No current facility-administered medications for this visit.       Past Medical History:   Diagnosis Date    Anxiety     Depression     ANXIETY & DEPRESSION    Diabetes (HCC)     TYPE II    Hyperlipidemia     Hypertension     IBS (irritable bowel syndrome)     Infected sebaceous cyst 4/10/2019    Panic attack

## 2024-05-16 DIAGNOSIS — R14.0 BLOATING: ICD-10-CM

## 2024-05-22 RX ORDER — DICYCLOMINE HYDROCHLORIDE 10 MG/1
CAPSULE ORAL
Qty: 90 CAPSULE | Refills: 5 | Status: SHIPPED | OUTPATIENT
Start: 2024-05-22

## 2024-06-10 ENCOUNTER — TELEPHONE (OUTPATIENT)
Age: 49
End: 2024-06-10

## 2024-06-14 ENCOUNTER — TELEPHONE (OUTPATIENT)
Age: 49
End: 2024-06-14

## 2024-07-22 ENCOUNTER — OFFICE VISIT (OUTPATIENT)
Age: 49
End: 2024-07-22
Payer: MEDICAID

## 2024-07-22 VITALS
WEIGHT: 211 LBS | HEART RATE: 78 BPM | TEMPERATURE: 97.5 F | BODY MASS INDEX: 37.39 KG/M2 | DIASTOLIC BLOOD PRESSURE: 60 MMHG | SYSTOLIC BLOOD PRESSURE: 98 MMHG | RESPIRATION RATE: 14 BRPM | HEIGHT: 63 IN | OXYGEN SATURATION: 98 %

## 2024-07-22 DIAGNOSIS — K58.2 IRRITABLE BOWEL SYNDROME WITH BOTH CONSTIPATION AND DIARRHEA: ICD-10-CM

## 2024-07-22 DIAGNOSIS — K29.50 CHRONIC GASTRITIS WITHOUT BLEEDING, UNSPECIFIED GASTRITIS TYPE: Primary | ICD-10-CM

## 2024-07-22 DIAGNOSIS — K21.00 GASTROESOPHAGEAL REFLUX DISEASE WITH ESOPHAGITIS WITHOUT HEMORRHAGE: ICD-10-CM

## 2024-07-22 PROBLEM — B02.9 SHINGLES: Status: ACTIVE | Noted: 2024-06-01

## 2024-07-22 PROCEDURE — 99214 OFFICE O/P EST MOD 30 MIN: CPT | Performed by: INTERNAL MEDICINE

## 2024-07-22 PROCEDURE — 3074F SYST BP LT 130 MM HG: CPT | Performed by: INTERNAL MEDICINE

## 2024-07-22 PROCEDURE — 3078F DIAST BP <80 MM HG: CPT | Performed by: INTERNAL MEDICINE

## 2024-07-22 RX ORDER — ACETAMINOPHEN AND CODEINE PHOSPHATE 120; 12 MG/5ML; MG/5ML
1 SOLUTION ORAL DAILY
COMMUNITY
Start: 2024-05-14

## 2024-07-22 RX ORDER — SUCRALFATE 1 G/1
1 TABLET ORAL 4 TIMES DAILY
Qty: 120 TABLET | Refills: 3 | Status: SHIPPED | OUTPATIENT
Start: 2024-07-22

## 2024-07-22 ASSESSMENT — PATIENT HEALTH QUESTIONNAIRE - PHQ9
SUM OF ALL RESPONSES TO PHQ QUESTIONS 1-9: 0
1. LITTLE INTEREST OR PLEASURE IN DOING THINGS: NOT AT ALL
SUM OF ALL RESPONSES TO PHQ QUESTIONS 1-9: 0
SUM OF ALL RESPONSES TO PHQ9 QUESTIONS 1 & 2: 0
2. FEELING DOWN, DEPRESSED OR HOPELESS: NOT AT ALL
SUM OF ALL RESPONSES TO PHQ QUESTIONS 1-9: 0
SUM OF ALL RESPONSES TO PHQ QUESTIONS 1-9: 0

## 2024-07-22 NOTE — PROGRESS NOTES
1. Have you been to the ER, urgent care clinic since your last visit?  Hospitalized since your last visit? Yes 7-6-24 numb and tingling L arm     2. Have you seen or consulted any other health care providers outside of the [facility] since your last visit?  Include any pap smears or colon screening. No   Chief Complaint   Patient presents with    Follow-up    Bloated     Abd bloation     BP 98/60 (Site: Left Upper Arm, Position: Sitting, Cuff Size: Large Adult)   Pulse 78   Temp 97.5 °F (36.4 °C) (Temporal)   Resp 14   Ht 1.6 m (5' 3\")   Wt 95.7 kg (211 lb)   SpO2 98% Comment: room air  BMI 37.38 kg/m²

## 2024-07-23 ENCOUNTER — TELEPHONE (OUTPATIENT)
Age: 49
End: 2024-07-23

## 2024-07-23 NOTE — TELEPHONE ENCOUNTER
I explained to Alisha, that the Sucralfate was to be dissolved in 1 tbsp of water and take after meals and at bedtime. She may take her other meds. She said ok.

## 2024-07-23 NOTE — TELEPHONE ENCOUNTER
Pt std she saw  on 7/21 and request a rtn call concerning her medication that was prescribed  that day.  Does she continue to take other meds she is on or just take the new script.  Please call her to advise.

## 2024-08-03 ASSESSMENT — ENCOUNTER SYMPTOMS
RESPIRATORY NEGATIVE: 1
CONSTIPATION: 0
ALLERGIC/IMMUNOLOGIC NEGATIVE: 1
NAUSEA: 0
DIARRHEA: 0
VOMITING: 0
ANAL BLEEDING: 0
ABDOMINAL PAIN: 1
ABDOMINAL DISTENTION: 0
BLOOD IN STOOL: 0
RECTAL PAIN: 0

## 2024-08-03 NOTE — PROGRESS NOTES
Alisha Lerma is a 49 y.o. female who presents today for the following:  Chief Complaint   Patient presents with    Follow-up    Bloated     Abd bloating,  says still has gastritis         Allergies   Allergen Reactions    Lisinopril Rash     Swelling of the lips       Current Outpatient Medications   Medication Sig Dispense Refill    nystatin (MYCOSTATIN) 559259 UNIT/ML suspension Take by mouth 4 times daily 200 mL 0    norethindrone (MICRONOR) 0.35 MG tablet Take 1 tablet by mouth daily      sucralfate (CARAFATE) 1 GM tablet Take 1 tablet by mouth 4 times daily Dissolve tablet in tablespoon or medication cup for few drops of water then take as a suspension after meals and at bedtime. 120 tablet 3    dicyclomine (BENTYL) 10 MG capsule TAKE 1 CAPSULE BY MOUTH THREE TIMES DAILY AS NEEDED FOR  BLOATING 90 capsule 5    gabapentin (NEURONTIN) 100 MG capsule Take 1 capsule by mouth 3 times daily.      pantoprazole (PROTONIX) 40 MG tablet Take 1 tablet by mouth every morning (before breakfast) 30 tablet 5    atorvastatin (LIPITOR) 40 MG tablet Take 1 tablet by mouth daily      Melatonin 10 MG TABS Take 1 tablet by mouth nightly at bedtime.      losartan (COZAAR) 100 MG tablet Take 1 tablet by mouth daily      triamterene-hydroCHLOROthiazide (DYAZIDE) 37.5-25 MG per capsule Take 1 capsule by mouth daily      zolpidem (AMBIEN) 10 MG tablet Take 1 tablet by mouth nightly. at bedtime.      QUEtiapine (SEROQUEL) 200 MG tablet Take 1 tablet by mouth nightly      hydrOXYzine pamoate (VISTARIL) 25 MG capsule Take 1 capsule by mouth 2 times daily      ONETOUCH VERIO strip TEST GLUCOSE LEVELS SUBCUTANEOUS TWICE DAILY      divalproex (DEPAKOTE ER) 500 MG extended release tablet Take 3 tablets by mouth daily      metFORMIN (GLUCOPHAGE-XR) 500 MG extended release tablet Take 1 tablet by mouth with breakfast and with evening meal 60 tablet 1     No current facility-administered medications for this visit.       Past Medical

## 2024-08-29 ENCOUNTER — TELEPHONE (OUTPATIENT)
Age: 49
End: 2024-08-29

## 2024-08-29 DIAGNOSIS — B37.9 CANDIDIASIS: Primary | ICD-10-CM

## 2024-08-29 RX ORDER — NYSTATIN 100000/ML
500000 SUSPENSION, ORAL (FINAL DOSE FORM) ORAL 4 TIMES DAILY
Qty: 200 ML | Refills: 1 | Status: SHIPPED | OUTPATIENT
Start: 2024-08-29

## 2024-09-10 DIAGNOSIS — B37.9 CANDIDIASIS: ICD-10-CM

## 2024-09-11 RX ORDER — NYSTATIN 100000 [USP'U]/ML
SUSPENSION ORAL
Qty: 200 ML | Refills: 1 | OUTPATIENT
Start: 2024-09-11

## 2024-09-30 ENCOUNTER — HOSPITAL ENCOUNTER (EMERGENCY)
Facility: HOSPITAL | Age: 49
Discharge: HOME | End: 2024-09-30
Attending: EMERGENCY MEDICINE
Payer: MEDICAID

## 2024-09-30 VITALS
TEMPERATURE: 97.3 F | DIASTOLIC BLOOD PRESSURE: 91 MMHG | OXYGEN SATURATION: 99 % | SYSTOLIC BLOOD PRESSURE: 119 MMHG | BODY MASS INDEX: 37.56 KG/M2 | RESPIRATION RATE: 18 BRPM | HEART RATE: 80 BPM | HEIGHT: 63 IN | WEIGHT: 212 LBS

## 2024-09-30 DIAGNOSIS — H65.02 NON-RECURRENT ACUTE SEROUS OTITIS MEDIA OF LEFT EAR: Primary | ICD-10-CM

## 2024-09-30 PROCEDURE — 6370000000 HC RX 637 (ALT 250 FOR IP): Performed by: EMERGENCY MEDICINE

## 2024-09-30 PROCEDURE — 99283 EMERGENCY DEPT VISIT LOW MDM: CPT

## 2024-09-30 RX ORDER — IBUPROFEN 600 MG/1
600 TABLET, FILM COATED ORAL
Status: COMPLETED | OUTPATIENT
Start: 2024-09-30 | End: 2024-09-30

## 2024-09-30 RX ORDER — IBUPROFEN 400 MG/1
400 TABLET, FILM COATED ORAL EVERY 6 HOURS PRN
Qty: 120 TABLET | Refills: 0 | Status: SHIPPED | OUTPATIENT
Start: 2024-09-30

## 2024-09-30 RX ORDER — ACETAMINOPHEN 500 MG
1000 TABLET ORAL
Status: COMPLETED | OUTPATIENT
Start: 2024-09-30 | End: 2024-09-30

## 2024-09-30 RX ORDER — DIPHENHYDRAMINE HCL 25 MG
25 CAPSULE ORAL NIGHTLY PRN
Qty: 20 CAPSULE | Refills: 0 | Status: SHIPPED | OUTPATIENT
Start: 2024-09-30 | End: 2024-10-10

## 2024-09-30 RX ORDER — FEXOFENADINE HCL 180 MG/1
180 TABLET ORAL DAILY
Qty: 30 TABLET | Refills: 0 | Status: SHIPPED | OUTPATIENT
Start: 2024-09-30 | End: 2024-10-30

## 2024-09-30 RX ORDER — ACETAMINOPHEN 500 MG
1000 TABLET ORAL 4 TIMES DAILY PRN
Qty: 30 TABLET | Refills: 0 | Status: SHIPPED | OUTPATIENT
Start: 2024-09-30

## 2024-09-30 RX ADMIN — AMOXICILLIN AND CLAVULANATE POTASSIUM 1 TABLET: 875; 125 TABLET, FILM COATED ORAL at 11:58

## 2024-09-30 RX ADMIN — ACETAMINOPHEN 1000 MG: 500 TABLET ORAL at 11:58

## 2024-09-30 RX ADMIN — IBUPROFEN 600 MG: 600 TABLET, FILM COATED ORAL at 11:58

## 2024-09-30 ASSESSMENT — PAIN SCALES - GENERAL: PAINLEVEL_OUTOF10: 0

## 2024-09-30 ASSESSMENT — PAIN - FUNCTIONAL ASSESSMENT: PAIN_FUNCTIONAL_ASSESSMENT: 0-10

## 2024-09-30 NOTE — ED TRIAGE NOTES
Pt reports left sided ear soreness x1 week. Pt does not rate with pain scale due to being \"only sore\". Pt reports trying tylenol with no relief. Pt tried eardrops, but can not place into ear. Pt denies any other symptoms at this time.

## 2024-09-30 NOTE — ED PROVIDER NOTES
DISCONTINUED MEDICATIONS:  Discharge Medication List as of 9/30/2024 12:06 PM          I am the Primary Clinician of Record. Zain Carty MD (electronically signed)    (Please note that parts of this dictation were completed with voice recognition software. Quite often unanticipated grammatical, syntax, homophones, and other interpretive errors are inadvertently transcribed by the computer software. Please disregards these errors. Please excuse any errors that have escaped final proofreading.)      Zain Carty MD  09/30/24 [id number]

## 2024-10-19 DIAGNOSIS — K21.00 GASTROESOPHAGEAL REFLUX DISEASE WITH ESOPHAGITIS WITHOUT HEMORRHAGE: ICD-10-CM

## 2024-10-22 ENCOUNTER — OFFICE VISIT (OUTPATIENT)
Age: 49
End: 2024-10-22
Payer: MEDICAID

## 2024-10-22 VITALS
HEART RATE: 73 BPM | SYSTOLIC BLOOD PRESSURE: 100 MMHG | DIASTOLIC BLOOD PRESSURE: 60 MMHG | WEIGHT: 201.6 LBS | HEIGHT: 63 IN | BODY MASS INDEX: 35.72 KG/M2 | OXYGEN SATURATION: 97 % | RESPIRATION RATE: 14 BRPM | TEMPERATURE: 97.6 F

## 2024-10-22 DIAGNOSIS — K29.50 CHRONIC GASTRITIS WITHOUT BLEEDING, UNSPECIFIED GASTRITIS TYPE: Primary | ICD-10-CM

## 2024-10-22 DIAGNOSIS — K58.2 IRRITABLE BOWEL SYNDROME WITH BOTH CONSTIPATION AND DIARRHEA: ICD-10-CM

## 2024-10-22 DIAGNOSIS — R14.0 ABDOMINAL BLOATING: ICD-10-CM

## 2024-10-22 DIAGNOSIS — K21.00 GASTROESOPHAGEAL REFLUX DISEASE WITH ESOPHAGITIS WITHOUT HEMORRHAGE: ICD-10-CM

## 2024-10-22 PROCEDURE — 3074F SYST BP LT 130 MM HG: CPT | Performed by: INTERNAL MEDICINE

## 2024-10-22 PROCEDURE — 3078F DIAST BP <80 MM HG: CPT | Performed by: INTERNAL MEDICINE

## 2024-10-22 PROCEDURE — 99214 OFFICE O/P EST MOD 30 MIN: CPT | Performed by: INTERNAL MEDICINE

## 2024-10-22 RX ORDER — B-COMPLEX WITH VITAMIN C
250 TABLET ORAL DAILY
COMMUNITY

## 2024-10-22 RX ORDER — PANTOPRAZOLE SODIUM 40 MG/1
40 TABLET, DELAYED RELEASE ORAL
Qty: 30 TABLET | Refills: 11 | Status: SHIPPED | OUTPATIENT
Start: 2024-10-22

## 2024-10-22 RX ORDER — PANTOPRAZOLE SODIUM 40 MG/1
TABLET, DELAYED RELEASE ORAL
Qty: 30 TABLET | Refills: 0 | OUTPATIENT
Start: 2024-10-22

## 2024-10-22 ASSESSMENT — ENCOUNTER SYMPTOMS
VOMITING: 0
ABDOMINAL PAIN: 1
ABDOMINAL DISTENTION: 1
RECTAL PAIN: 0
ALLERGIC/IMMUNOLOGIC NEGATIVE: 1
DIARRHEA: 1
RESPIRATORY NEGATIVE: 1
BLOOD IN STOOL: 0
NAUSEA: 0
ANAL BLEEDING: 0
CONSTIPATION: 0

## 2024-10-22 ASSESSMENT — PATIENT HEALTH QUESTIONNAIRE - PHQ9
1. LITTLE INTEREST OR PLEASURE IN DOING THINGS: NOT AT ALL
SUM OF ALL RESPONSES TO PHQ QUESTIONS 1-9: 0
SUM OF ALL RESPONSES TO PHQ QUESTIONS 1-9: 0
2. FEELING DOWN, DEPRESSED OR HOPELESS: NOT AT ALL
SUM OF ALL RESPONSES TO PHQ QUESTIONS 1-9: 0
SUM OF ALL RESPONSES TO PHQ QUESTIONS 1-9: 0
SUM OF ALL RESPONSES TO PHQ9 QUESTIONS 1 & 2: 0

## 2024-10-23 NOTE — PROGRESS NOTES
1. Have you been to the ER, urgent care clinic since your last visit?  Hospitalized since your last visit? YES   EAR PROBLEM WAS GIVEN AMOX    2. Have you seen or consulted any other health care providers outside of the [facility] since your last visit?  Include any pap smears or colon screening.  NO   Chief Complaint   Patient presents with    Follow-up     3 month follow up OV 7-22-24  CHRONIC GASTRITIS.    HX OF IBS C&D     /60 (Site: Left Upper Arm, Position: Sitting, Cuff Size: Medium Adult)   Pulse 73   Temp 97.6 °F (36.4 °C) (Temporal)   Resp 14   Ht 1.6 m (5' 3\")   Wt 91.4 kg (201 lb 9.6 oz)   SpO2 97% Comment: ROOM AIR  BMI 35.71 kg/m²       
rhythm.      Pulses: Normal pulses.      Heart sounds: Normal heart sounds.   Pulmonary:      Effort: Pulmonary effort is normal.      Breath sounds: Normal breath sounds.   Abdominal:      General: Abdomen is flat. There is no distension.      Palpations: Abdomen is soft. There is no mass.      Tenderness: There is abdominal tenderness. There is no right CVA tenderness, left CVA tenderness, guarding or rebound.      Hernia: No hernia is present.   Musculoskeletal:      Right lower leg: No edema.      Left lower leg: No edema.   Skin:     General: Skin is warm and dry.   Neurological:      General: No focal deficit present.      Mental Status: She is alert and oriented to person, place, and time. Mental status is at baseline.   Psychiatric:         Mood and Affect: Mood normal.         Behavior: Behavior normal.         Thought Content: Thought content normal.         Judgment: Judgment normal.        1. Gastroesophageal reflux disease with esophagitis without hemorrhage  We will give patient pantoprazole 40 mg daily.  - pantoprazole (PROTONIX) 40 MG tablet; Take 1 tablet by mouth every morning (before breakfast)  Dispense: 30 tablet; Refill: 11    2. Chronic gastritis without bleeding, unspecified gastritis type      3. Abdominal bloating      4. Irritable bowel syndrome with both constipation and diarrhea  Patient advised to give a trial of IBgard  She is continuing the course of the probiotic

## 2024-11-30 DIAGNOSIS — R14.0 BLOATING: ICD-10-CM

## 2024-12-02 RX ORDER — DICYCLOMINE HYDROCHLORIDE 10 MG/1
CAPSULE ORAL
Qty: 90 CAPSULE | Refills: 5 | Status: SHIPPED | OUTPATIENT
Start: 2024-12-02

## 2025-03-13 ENCOUNTER — TRANSCRIBE ORDERS (OUTPATIENT)
Facility: HOSPITAL | Age: 50
End: 2025-03-13

## 2025-03-13 DIAGNOSIS — Z12.31 VISIT FOR SCREENING MAMMOGRAM: Primary | ICD-10-CM

## 2025-04-10 ENCOUNTER — HOSPITAL ENCOUNTER (OUTPATIENT)
Facility: HOSPITAL | Age: 50
Discharge: HOME | End: 2025-04-13
Payer: COMMERCIAL

## 2025-04-10 VITALS — BODY MASS INDEX: 43.77 KG/M2 | WEIGHT: 247 LBS | HEIGHT: 63 IN

## 2025-04-10 DIAGNOSIS — Z12.31 VISIT FOR SCREENING MAMMOGRAM: ICD-10-CM

## 2025-04-10 PROCEDURE — 77063 BREAST TOMOSYNTHESIS BI: CPT

## 2025-06-10 ENCOUNTER — TELEPHONE (OUTPATIENT)
Age: 50
End: 2025-06-10

## 2025-06-10 NOTE — TELEPHONE ENCOUNTER
PT CALLED REQUESTING A PRIOR AUTH ON HER PANTOPRAZOLE. THE PATIENT HAD CANCELLED HER LAST TWO VISITS AND HAS NOT BEEN SEEN OVER A YEAR. I INFORMED HER WE WOULD NEED CURRENT INFORMATION FROM HER CHART TO GIVE THE INSURANCE COMPANY FOR THE PRIOR AUTH. SHE VERBALIZED UNDERSTANDING AND DID NOT SCHEDULE AN APPOITMENT.
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
## (undated) DEVICE — SUTURE VCRL SZ 3-0 L27IN ABSRB UD L26MM SH 1/2 CIR J416H

## (undated) DEVICE — PAD,PREPPING,CUFFED,24X48,7",NONSTERILE: Brand: MEDLINE

## (undated) DEVICE — TRAP SURG QUAD PARABOLA SLOT DSGN SFTY SCRN TRAPEASE

## (undated) DEVICE — SPONGE GZ W4XL4IN COT 12 PLY TYP VII WVN C FLD DSGN STERILE

## (undated) DEVICE — Z DISCONTINUEDSOLUTION PREP 2OZ 10% POVIDONE IOD SCR CAP BTL

## (undated) DEVICE — DERMABOND SKIN ADH 0.7ML -- DERMABOND ADVANCED 12/BX

## (undated) DEVICE — FORCEPS BX L240CM WRK CHN 2.8MM STD CAP W/ NDL MIC MESH

## (undated) DEVICE — 1200CC GUARDIAN II: Brand: GUARDIAN

## (undated) DEVICE — TOWEL SURG W17XL27IN STD BLU COT NONFENESTRATED PREWASHED

## (undated) DEVICE — SUTURE PERMAHAND SZ 2-0 L18IN NONABSORBABLE BLK L26MM PS 1588H

## (undated) DEVICE — SOLUTION IRRIG 1000ML STRL H2O USP PLAS POUR BTL

## (undated) DEVICE — JELLY,LUBE,STERILE,FLIP TOP,TUBE,4-OZ: Brand: MEDLINE

## (undated) DEVICE — (D)PREP SKN CHLRAPRP APPL 26ML -- CONVERT TO ITEM 371833

## (undated) DEVICE — REM POLYHESIVE ADULT PATIENT RETURN ELECTRODE: Brand: VALLEYLAB

## (undated) DEVICE — GOWN,SIRUS,FABRNF,XL,20/CS: Brand: MEDLINE

## (undated) DEVICE — BNDG SOF-FORM 2X75 STRL LF --

## (undated) DEVICE — SOLUTION IV 1000ML 0.9% SOD CHL

## (undated) DEVICE — ROCKER SWITCH PENCIL BLADE ELECTRODE, HOLSTER: Brand: EDGE

## (undated) DEVICE — NEEDLE HYPO 25GA L1.5IN BVL ORIENTED ECLIPSE

## (undated) DEVICE — MASK POM PROCEDURE OXY W/ HI CONCENTRATION CO2 MONITOR

## (undated) DEVICE — PILLOW OR POS AD L5IN R INTUB FOAM HDRST SLOT DISP GENTLE

## (undated) DEVICE — SYR 10ML LUER LOK 1/5ML GRAD --

## (undated) DEVICE — SPONGE GZ W4XL4IN COT 12 PLY TYP VII WVN C FLD DSGN

## (undated) DEVICE — DBD-PACK,LAPAROTOMY,2 REINFORCED GOWNS: Brand: MEDLINE

## (undated) DEVICE — HANDLE LT SNAP ON ULT DURABLE LENS FOR TRUMPF ALC DISPOSABLE

## (undated) DEVICE — TUBING, SUCTION, 9/32" X 10', STRAIGHT: Brand: MEDLINE

## (undated) DEVICE — SNARE ENDOSCP M L240CM W27MM SHTH DIA2.4MM CHN 2.8MM OVL

## (undated) DEVICE — STERILE POLYISOPRENE POWDER-FREE SURGICAL GLOVES WITH EMOLLIENT COATING: Brand: PROTEXIS

## (undated) DEVICE — STRAP POS KNEE BODY VELC

## (undated) DEVICE — FORCEPS BX L240CM JAW DIA2.8MM L CAP W/ NDL MIC MESH TOOTH

## (undated) DEVICE — GLOVE ORANGE PI 7 1/2   MSG9075

## (undated) DEVICE — INFECTION CONTROL KIT SYS

## (undated) DEVICE — TUBING HYDR IRR --

## (undated) DEVICE — KENDALL SCD EXPRESS SLEEVES, KNEE LENGTH, MEDIUM: Brand: KENDALL SCD

## (undated) DEVICE — SUTURE MCRYL SZ 4-0 L27IN ABSRB UD L19MM PS-2 1/2 CIR PRIM Y426H

## (undated) DEVICE — SURGICAL PROCEDURE PACK BASIN MAJ SET CUST NO CAUT